# Patient Record
Sex: MALE | Race: BLACK OR AFRICAN AMERICAN | NOT HISPANIC OR LATINO | Employment: OTHER | URBAN - METROPOLITAN AREA
[De-identification: names, ages, dates, MRNs, and addresses within clinical notes are randomized per-mention and may not be internally consistent; named-entity substitution may affect disease eponyms.]

---

## 2017-01-06 ENCOUNTER — HOSPITAL ENCOUNTER (OUTPATIENT)
Dept: RADIOLOGY | Facility: HOSPITAL | Age: 62
Discharge: HOME | End: 2017-01-06
Attending: INTERNAL MEDICINE
Payer: MEDICARE

## 2017-01-06 ENCOUNTER — PATIENT MESSAGE (OUTPATIENT)
Dept: HEMATOLOGY/ONCOLOGY | Facility: CLINIC | Age: 62
End: 2017-01-06

## 2017-01-06 PROCEDURE — 78815 PET IMAGE W/CT SKULL-THIGH: CPT | Mod: 26,PS,, | Performed by: RADIOLOGY

## 2017-01-06 PROCEDURE — A9552 F18 FDG: HCPCS

## 2017-01-06 PROCEDURE — 78815 PET IMAGE W/CT SKULL-THIGH: CPT | Mod: TC

## 2017-01-23 ENCOUNTER — LAB VISIT (OUTPATIENT)
Dept: LAB | Facility: HOSPITAL | Age: 62
End: 2017-01-23
Attending: INTERNAL MEDICINE
Payer: MEDICARE

## 2017-01-23 ENCOUNTER — OFFICE VISIT (OUTPATIENT)
Dept: INTERNAL MEDICINE | Facility: CLINIC | Age: 62
End: 2017-01-23
Payer: MEDICARE

## 2017-01-23 VITALS
DIASTOLIC BLOOD PRESSURE: 78 MMHG | SYSTOLIC BLOOD PRESSURE: 147 MMHG | HEART RATE: 64 BPM | HEIGHT: 72 IN | BODY MASS INDEX: 26.07 KG/M2 | TEMPERATURE: 98 F | OXYGEN SATURATION: 98 % | WEIGHT: 192.44 LBS

## 2017-01-23 DIAGNOSIS — S90.822A BLISTER OF FOOT WITHOUT INFECTION, LEFT, INITIAL ENCOUNTER: Primary | ICD-10-CM

## 2017-01-23 DIAGNOSIS — S90.822A BLISTER OF FOOT WITHOUT INFECTION, LEFT, INITIAL ENCOUNTER: ICD-10-CM

## 2017-01-23 LAB
BASOPHILS # BLD AUTO: 0.03 K/UL
BASOPHILS NFR BLD: 0.3 %
DIFFERENTIAL METHOD: ABNORMAL
EOSINOPHIL # BLD AUTO: 0.2 K/UL
EOSINOPHIL NFR BLD: 1.7 %
ERYTHROCYTE [DISTWIDTH] IN BLOOD BY AUTOMATED COUNT: 12.4 %
HCT VFR BLD AUTO: 32.1 %
HGB BLD-MCNC: 10.8 G/DL
LYMPHOCYTES # BLD AUTO: 3.9 K/UL
LYMPHOCYTES NFR BLD: 37.9 %
MCH RBC QN AUTO: 27.5 PG
MCHC RBC AUTO-ENTMCNC: 33.6 %
MCV RBC AUTO: 82 FL
MONOCYTES # BLD AUTO: 0.5 K/UL
MONOCYTES NFR BLD: 5.3 %
NEUTROPHILS # BLD AUTO: 5.6 K/UL
NEUTROPHILS NFR BLD: 54.6 %
PLATELET # BLD AUTO: 204 K/UL
PMV BLD AUTO: 10.2 FL
RBC # BLD AUTO: 3.93 M/UL
WBC # BLD AUTO: 10.17 K/UL

## 2017-01-23 PROCEDURE — 83036 HEMOGLOBIN GLYCOSYLATED A1C: CPT

## 2017-01-23 PROCEDURE — 85025 COMPLETE CBC W/AUTO DIFF WBC: CPT

## 2017-01-23 PROCEDURE — 36415 COLL VENOUS BLD VENIPUNCTURE: CPT

## 2017-01-23 PROCEDURE — 99215 OFFICE O/P EST HI 40 MIN: CPT | Mod: PBBFAC,PO | Performed by: INTERNAL MEDICINE

## 2017-01-23 PROCEDURE — 99213 OFFICE O/P EST LOW 20 MIN: CPT | Mod: S$PBB,,, | Performed by: INTERNAL MEDICINE

## 2017-01-23 PROCEDURE — 99999 PR PBB SHADOW E&M-EST. PATIENT-LVL V: CPT | Mod: PBBFAC,,, | Performed by: INTERNAL MEDICINE

## 2017-01-23 RX ORDER — DICLOXACILLIN SODIUM 250 MG/1
250 CAPSULE ORAL 4 TIMES DAILY
Qty: 28 CAPSULE | Refills: 0 | Status: SHIPPED | OUTPATIENT
Start: 2017-01-23 | End: 2017-01-30

## 2017-01-23 NOTE — MR AVS SNAPSHOT
[facility]  [address]  [place] [address]  Phone: [phone]  Fax: [phone]                  Marie Reis Jr.   2017 11:40 AM   Office Visit    Description:  Male : 1955   Provider:  Deloris Ferro MD   Department:  [facility]           Reason for Visit     Foot Problem           Diagnoses this Visit        Comments    Blister of foot without infection, left, initial encounter    -  Primary            To Do List           Future Appointments        Provider Department Dept Phone    2017 1:10 PM APPOINTMENT LAB, [facility] [phone]    2017 2:00 PM SILVIA Al MD [facility] - Ophthalmology [phone]    2017 8:20 AM Deloris Ferro MD [facility] [phone]    2017 11:00 AM LAB, [facility] [phone]    2017 11:00 AM LAB, [facility] [phone]      Goals (5 Years of Data)     None       These Medications        Disp Refills Start End    dicloxacillin (DYNAPEN) 250 MG capsule 28 capsule 0 2017    Take 1 capsule (250 mg total) by mouth 4 (four) times daily. - Oral    Pharmacy: [id number] - [place] - [address] AT [facility] Ph #: [phone]         [facility]     [facility] Care [facility] -  Assistance  Registered nurses in the [facility] provide clinical advisement, health education, appointment booking, and other advisory services.  Call for this free service at [phone].             Medications           Message regarding Medications     Verify the changes and/or additions to your medication regime listed below are the same as discussed with your clinician today.  If any of these changes or additions are incorrect, please notify your healthcare provider.        START taking these NEW medications         "Refills    dicloxacillin (DYNAPEN) 250 MG capsule 0    Sig: Take 1 capsule (250 mg total) by mouth 4 (four) times daily.    Class: Normal    Route: Oral           Verify that the below list of medications is an accurate representation of the medications you are currently taking.  If none reported, the list may be blank. If incorrect, please contact your healthcare provider. Carry this list with you in case of emergency.           Current Medications     amlodipine (NORVASC) 10 MG tablet Take 1 tablet (10 mg total) by mouth once daily.    aspirin (ECOTRIN) 81 MG EC tablet Take 1 tablet (81 mg total) by mouth once daily.    atenolol (TENORMIN) 50 MG tablet Take 1 tablet (50 mg total) by mouth 2 (two) times daily.    atorvastatin (LIPITOR) 80 MG tablet Take 1 tablet (80 mg total) by mouth once daily.    blood sugar diagnostic Strp 1 strip by Misc.(Non-Drug; Combo Route) route after meals as needed.    clopidogrel (PLAVIX) 75 mg tablet Take 1 tablet (75 mg total) by mouth once daily.    fluticasone (FLONASE) 50 mcg/actuation nasal spray 1 spray by Each Nare route 2 (two) times daily as needed for Rhinitis.    furosemide (LASIX) 20 MG tablet Take 1 tablet (20 mg total) by mouth 2 (two) times daily. Do not take the pm dose after 3 pm    gabapentin (NEURONTIN) 300 MG capsule Take 1 capsule (300 mg total) by mouth 2 (two) times daily.    insulin aspart (NOVOLOG) 100 unit/mL InPn pen Inject 12 units w/ breakfast and lunch, 14 units w/ dinner plus scale 150-200 +2, 201-250 +4, 251-300 +6, 301-350 +8, >350 +10. Snack 4 units.    insulin glargine (LANTUS SOLOSTAR) 100 unit/mL (3 mL) InPn pen Inject 36 Units into the skin every evening.    insulin needles, disposable, 31 X 5/16 " Ndle Pt to use pen needle with Lantus daily    ketorolac 0.5% (ACULAR) 0.5 % Drop     lisinopril (PRINIVIL,ZESTRIL) 40 MG tablet TAKE 1 TABLET (40 MG TOTAL) BY MOUTH ONCE DAILY.    meclizine (ANTIVERT) 25 mg tablet Take 1 tablet (25 mg total) by mouth 3 " (three) times daily as needed for Dizziness.    METHYL SALICYLATE/MENTHOL (MENTHOLATUM DEEP HEAT RUB TOP) Apply topically.    mirtazapine (REMERON) 30 MG tablet Take 30 mg by mouth every evening.     montelukast (SINGULAIR) 10 mg tablet Take 1 tablet (10 mg total) by mouth every evening.    ondansetron (ZOFRAN-ODT) 8 MG TbDL Take 1 tablet (8 mg total) by mouth every 6 (six) hours as needed.    dicloxacillin (DYNAPEN) 250 MG capsule Take 1 capsule (250 mg total) by mouth 4 (four) times daily.           Clinical Reference Information           Vital Signs - Last Recorded  Most recent update: 1/23/2017 11:52 AM by Kayce Cleary    BP Pulse Temp Ht Wt SpO2    (!) 147/78 64 97.6 °F (36.4 °C) (Oral) 6' (1.829 m) 87.3 kg (192 lb 7.4 oz) 98%    BMI                26.1 kg/m2          Blood Pressure          Most Recent Value    BP  (!)  147/78      Allergies as of 1/23/2017     No Known Allergies      Immunizations Administered on Date of Encounter - 1/23/2017     None      Orders Placed During Today's Visit      Normal Orders This Visit    Ambulatory referral to Podiatry     Future Labs/Procedures Expected by Expires    CBC auto differential  1/23/2017 3/24/2018    Hemoglobin A1c  As directed 1/23/2018

## 2017-01-23 NOTE — PROGRESS NOTES
Subjective:    Portions of this note are generated with voice recognition software. Typographical errors may exist.   Patient ID: Marie Reis Jr. is a 61 y.o. male.    Chief Complaint: Foot Problem (left foot blisters)    HPI: Large blister left foot X 1 week, now ruptured. No purulent secretion, no fever or chills.    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    Lt eye   5/2/15     DR [facility]ULL[facility]     Family History   Problem Relation Age of Onset    Diabetes Mother     Diabetes Father     Diabetes Sister     Diabetes Brother     No Known Problems Maternal Aunt     No Known Problems Maternal Uncle     No Known Problems Paternal Aunt     No Known Problems Paternal Uncle     No Known Problems Maternal Grandmother     No Known Problems Maternal Grandfather     No Known Problems Paternal Grandmother     No Known Problems Paternal Grandfather     Blindness Neg Hx     Anesthesia problems Neg Hx     Amblyopia Neg Hx     Cancer Neg Hx     Cataracts Neg Hx     Glaucoma Neg Hx     Hypertension Neg Hx     Macular degeneration Neg Hx     Retinal detachment Neg Hx     Strabismus Neg Hx     Stroke Neg Hx     Thyroid disease Neg Hx      Social History     Social History    Marital status:      Spouse name: N/A    Number of children: N/A    Years of education: N/A     Occupational History    Not on file.     Social History Main Topics    Smoking status: Never Smoker    Smokeless tobacco: Not on file    Alcohol use No    Drug use: No    Sexual  activity: No     Other Topics Concern    Not on file     Social History Narrative     ROS:  .  PERIPHERAL VASCULAR: No claudication or cyanosis.  MUSCULOSKELETAL: No joint stiffness or swelling. Denies back pain.  NEUROLOGIC: No weakness or numbness.    Physical Exam       Vital signs reviewed  PE:   Left foot : large blister near heel about 3 x 2 in partly ruptured no discharge.denuded area: no evidence of infection     Assessment:     Labs:    Recent Results (from the past 1008 hour(s))   CBC auto differential    Collection Time: 01/23/17 12:29 PM   Result Value Ref Range    WBC 10.17 3.90 - 12.70 K/uL    RBC 3.93 (L) 4.60 - 6.20 M/uL    Hemoglobin 10.8 (L) 14.0 - 18.0 g/dL    Hematocrit 32.1 (L) 40.0 - 54.0 %    MCV 82 82 - 98 fL    MCH 27.5 27.0 - 31.0 pg    MCHC 33.6 32.0 - 36.0 %    RDW 12.4 11.5 - 14.5 %    Platelets 204 150 - 350 K/uL    MPV 10.2 9.2 - 12.9 fL    Gran # 5.6 1.8 - 7.7 K/uL    Lymph # 3.9 1.0 - 4.8 K/uL    Mono # 0.5 0.3 - 1.0 K/uL    Eos # 0.2 0.0 - 0.5 K/uL    Baso # 0.03 0.00 - 0.20 K/uL    Gran% 54.6 38.0 - 73.0 %    Lymph% 37.9 18.0 - 48.0 %    Mono% 5.3 4.0 - 15.0 %    Eosinophil% 1.7 0.0 - 8.0 %    Basophil% 0.3 0.0 - 1.9 %    Differential Method Automated      1. Blister of foot without infection, left, initial encounter        Plan:   Start on diclox. Referral to podiatry for foot care check A1c. Follow up in 1 week.  Orders Placed This Encounter   Procedures    CBC auto differential    Hemoglobin A1c    Ambulatory referral to Podiatry

## 2017-01-24 LAB
ESTIMATED AVG GLUCOSE: 220 MG/DL
HBA1C MFR BLD HPLC: 9.3 %

## 2017-01-31 ENCOUNTER — OFFICE VISIT (OUTPATIENT)
Dept: OPHTHALMOLOGY | Facility: CLINIC | Age: 62
End: 2017-01-31
Payer: MEDICARE

## 2017-01-31 DIAGNOSIS — E11.3412 SEVERE NONPROLIFERATIVE DIABETIC RETINOPATHY OF LEFT EYE WITH MACULAR EDEMA ASSOCIATED WITH TYPE 2 DIABETES MELLITUS: ICD-10-CM

## 2017-01-31 DIAGNOSIS — E11.3511 PROLIFERATIVE DIABETIC RETINOPATHY OF RIGHT EYE WITH MACULAR EDEMA ASSOCIATED WITH TYPE 2 DIABETES MELLITUS: Primary | ICD-10-CM

## 2017-01-31 PROCEDURE — 92134 CPTRZ OPH DX IMG PST SGM RTA: CPT | Mod: PBBFAC | Performed by: OPHTHALMOLOGY

## 2017-01-31 PROCEDURE — 99999 PR PBB SHADOW E&M-EST. PATIENT-LVL II: CPT | Mod: PBBFAC,,, | Performed by: OPHTHALMOLOGY

## 2017-01-31 PROCEDURE — 99212 OFFICE O/P EST SF 10 MIN: CPT | Mod: PBBFAC | Performed by: OPHTHALMOLOGY

## 2017-01-31 PROCEDURE — 92014 COMPRE OPH EXAM EST PT 1/>: CPT | Mod: S$PBB,,, | Performed by: OPHTHALMOLOGY

## 2017-01-31 PROCEDURE — 92226 PR SPECIAL EYE EXAM, SUBSEQUENT: CPT | Mod: 50,S$PBB,, | Performed by: OPHTHALMOLOGY

## 2017-01-31 PROCEDURE — 92226 PR SPECIAL EYE EXAM, SUBSEQUENT: CPT | Mod: 50,PBBFAC | Performed by: OPHTHALMOLOGY

## 2017-01-31 NOTE — MR AVS SNAPSHOT
[facility]  [address]  [facility] [address]  Phone: [phone]  Fax: [phone]                  Marie Reis Jr.   2017 3:00 PM   Office Visit    Description:  Male : 1955   Provider:  SILVIA Al MD   Department:  [facility] - Ophthalmology           Reason for Visit     Severe Non-proliferative Diabetic Retinopathy           Diagnoses this Visit        Comments    Proliferative diabetic retinopathy of right eye with macular edema associated with type 2 diabetes mellitus    -  Primary     Severe nonproliferative diabetic retinopathy of left eye with macular edema associated with type 2 diabetes mellitus                To Do List           Future Appointments        Provider Department Dept Phone    2017 8:20 AM Deloris eFrro MD [facility] Internal Medicine [phone]    2017 11:00 AM LAB, [facility] [phone]    2017 11:00 AM LAB, [facility] [phone]    2017 11:00 AM LAB, [facility] [phone]    2017 7:45 AM LAB, [facility] [phone]      Goals (5 Years of Data)     None      Follow-Up and Disposition     Return in about 4 weeks (around 2017).      [facility]     [facility] Nurse Care Line -  Assistance  Registered nurses in the [facility] provide clinical advisement, health education, appointment booking, and other advisory services.  Call for this free service at [phone].             Medications           Message regarding Medications     Verify the changes and/or additions to your medication regime listed below are the same as discussed with your clinician today.  If any of these changes or additions are incorrect, please notify your healthcare provider.        STOP taking these medications     ketorolac 0.5% (ACULAR) 0.5 % Drop            Verify that the below list of  "medications is an accurate representation of the medications you are currently taking.  If none reported, the list may be blank. If incorrect, please contact your healthcare provider. Carry this list with you in case of emergency.           Current Medications     amlodipine (NORVASC) 10 MG tablet Take 1 tablet (10 mg total) by mouth once daily.    aspirin (ECOTRIN) 81 MG EC tablet Take 1 tablet (81 mg total) by mouth once daily.    atenolol (TENORMIN) 50 MG tablet Take 1 tablet (50 mg total) by mouth 2 (two) times daily.    atorvastatin (LIPITOR) 80 MG tablet Take 1 tablet (80 mg total) by mouth once daily.    blood sugar diagnostic Strp 1 strip by Misc.(Non-Drug; Combo Route) route after meals as needed.    clopidogrel (PLAVIX) 75 mg tablet Take 1 tablet (75 mg total) by mouth once daily.    fluticasone (FLONASE) 50 mcg/actuation nasal spray 1 spray by Each Nare route 2 (two) times daily as needed for Rhinitis.    furosemide (LASIX) 20 MG tablet Take 1 tablet (20 mg total) by mouth 2 (two) times daily. Do not take the pm dose after 3 pm    gabapentin (NEURONTIN) 300 MG capsule Take 1 capsule (300 mg total) by mouth 2 (two) times daily.    insulin aspart (NOVOLOG) 100 unit/mL InPn pen Inject 12 units w/ breakfast and lunch, 14 units w/ dinner plus scale 150-200 +2, 201-250 +4, 251-300 +6, 301-350 +8, >350 +10. Snack 4 units.    insulin glargine (LANTUS SOLOSTAR) 100 unit/mL (3 mL) InPn pen Inject 36 Units into the skin every evening.    insulin needles, disposable, 31 X 5/16 " Ndle Pt to use pen needle with Lantus daily    lisinopril (PRINIVIL,ZESTRIL) 40 MG tablet TAKE 1 TABLET (40 MG TOTAL) BY MOUTH ONCE DAILY.    meclizine (ANTIVERT) 25 mg tablet Take 1 tablet (25 mg total) by mouth 3 (three) times daily as needed for Dizziness.    METHYL SALICYLATE/MENTHOL (MENTHOLATUM DEEP HEAT RUB TOP) Apply topically.    mirtazapine (REMERON) 30 MG tablet Take 30 mg by mouth every evening.     montelukast (SINGULAIR) 10 mg " tablet Take 1 tablet (10 mg total) by mouth every evening.    ondansetron (ZOFRAN-ODT) 8 MG TbDL Take 1 tablet (8 mg total) by mouth every 6 (six) hours as needed.           Clinical Reference Information           Allergies as of 1/31/2017     No Known Allergies      Immunizations Administered on Date of Encounter - 1/31/2017     None      Orders Placed During Today's Visit      Normal Orders This Visit    Posterior Segment OCT Retina-Both eyes     Future Labs/Procedures Expected by Expires    Color Fundus Photography - OU - Both Eyes  As directed 1/31/2018    Fluorescein Angiography - OU - Both Eyes  As directed 1/31/2018
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
## 2017-02-01 ENCOUNTER — TELEPHONE (OUTPATIENT)
Dept: INTERNAL MEDICINE | Facility: CLINIC | Age: 62
End: 2017-02-01

## 2017-02-06 ENCOUNTER — OFFICE VISIT (OUTPATIENT)
Dept: INTERNAL MEDICINE | Facility: CLINIC | Age: 62
End: 2017-02-06
Payer: MEDICARE

## 2017-02-06 VITALS
SYSTOLIC BLOOD PRESSURE: 140 MMHG | DIASTOLIC BLOOD PRESSURE: 90 MMHG | BODY MASS INDEX: 25.32 KG/M2 | HEART RATE: 82 BPM | OXYGEN SATURATION: 99 % | WEIGHT: 186.94 LBS | HEIGHT: 72 IN

## 2017-02-06 DIAGNOSIS — S90.822D BLISTER (NONTHERMAL), LEFT FOOT, SUBSEQUENT ENCOUNTER: Primary | ICD-10-CM

## 2017-02-06 PROCEDURE — 99214 OFFICE O/P EST MOD 30 MIN: CPT | Mod: PBBFAC,PO | Performed by: INTERNAL MEDICINE

## 2017-02-06 PROCEDURE — 3046F HEMOGLOBIN A1C LEVEL >9.0%: CPT | Mod: ,,, | Performed by: INTERNAL MEDICINE

## 2017-02-06 PROCEDURE — 3078F DIAST BP <80 MM HG: CPT | Mod: ,,, | Performed by: INTERNAL MEDICINE

## 2017-02-06 PROCEDURE — 3077F SYST BP >= 140 MM HG: CPT | Mod: ,,, | Performed by: INTERNAL MEDICINE

## 2017-02-06 PROCEDURE — 3066F NEPHROPATHY DOC TX: CPT | Mod: ,,, | Performed by: INTERNAL MEDICINE

## 2017-02-06 PROCEDURE — 99214 OFFICE O/P EST MOD 30 MIN: CPT | Mod: S$PBB,,, | Performed by: INTERNAL MEDICINE

## 2017-02-06 PROCEDURE — 99999 PR PBB SHADOW E&M-EST. PATIENT-LVL IV: CPT | Mod: PBBFAC,,, | Performed by: INTERNAL MEDICINE

## 2017-02-06 RX ORDER — INSULIN GLARGINE 100 [IU]/ML
40 INJECTION, SOLUTION SUBCUTANEOUS NIGHTLY
Qty: 15 ML | Refills: 3
Start: 2017-02-06 | End: 2017-10-26 | Stop reason: SDUPTHER

## 2017-02-06 RX ORDER — MUPIROCIN 20 MG/G
OINTMENT TOPICAL 3 TIMES DAILY
Qty: 1 TUBE | Refills: 2 | Status: SHIPPED | OUTPATIENT
Start: 2017-02-06 | End: 2017-02-16

## 2017-02-06 NOTE — MR AVS SNAPSHOT
[facility]  [address] [place] [address] [address]  [place] [address]  Phone: [phone]  Fax: [phone]                  Marie Reis Jr.   2017 8:20 AM   Office Visit    Description:  Male : 1955   Provider:  Deloris Ferro MD   Department:  [facility]           Reason for Visit     Follow-up           Diagnoses this Visit        Comments    Blister (nonthermal), left foot, subsequent encounter    -  Primary     Uncontrolled diabetes mellitus with polyneuropathy                To Do List           Future Appointments        Provider Department Dept Phone    2017 11:00 AM LAB, [facility] [phone]    3/7/2017 2:50 PM SILVIA Al MD [facility] - Ophthalmology [phone]    2017 11:00 AM LAB, [facility] [phone]    2017 11:00 AM LAB, [facility] [phone]    2017 7:45 AM LAB, [facility] [facility] [phone]      Goals (5 Years of Data)     None      Follow-Up and Disposition     Return in about 2 months (around 2017), or if symptoms worsen or fail to improve.       These Medications        Disp Refills Start End    mupirocin (BACTROBAN) 2 % ointment 1 Tube 2 2017    Apply topically 3 (three) times daily. - Topical (Top)    Pharmacy: [facility] - [address] Ph #: [phone]       insulin glargine (LANTUS SOLOSTAR) 100 unit/mL (3 mL) InPn pen 15 mL 3 2017     Inject 40 Units into the skin every evening. - Subcutaneous    Pharmacy: [facility] - [place], [place] - [address] [place] [address] Ph #: [phone]         [facility]     [facility] Nurse Care Line -  Assistance  Registered nurses in the [facility] provide clinical advisement, health education, appointment booking, and other advisory services.  Call for this free  service at [phone].             Medications           Message regarding Medications     Verify the changes and/or additions to your medication regime listed below are the same as discussed with your clinician today.  If any of these changes or additions are incorrect, please notify your healthcare provider.        START taking these NEW medications        Refills    mupirocin (BACTROBAN) 2 % ointment 2    Sig: Apply topically 3 (three) times daily.    Class: Normal    Route: Topical (Top)      CHANGE how you are taking these medications     Start Taking Instead of    insulin glargine (LANTUS SOLOSTAR) 100 unit/mL (3 mL) InPn pen insulin glargine (LANTUS SOLOSTAR) 100 unit/mL (3 mL) InPn pen    Dosage:  Inject 40 Units into the skin every evening. Dosage:  Inject 36 Units into the skin every evening.    Reason for Change:  Reorder            Verify that the below list of medications is an accurate representation of the medications you are currently taking.  If none reported, the list may be blank. If incorrect, please contact your healthcare provider. Carry this list with you in case of emergency.           Current Medications     amlodipine (NORVASC) 10 MG tablet Take 1 tablet (10 mg total) by mouth once daily.    aspirin (ECOTRIN) 81 MG EC tablet Take 1 tablet (81 mg total) by mouth once daily.    atenolol (TENORMIN) 50 MG tablet Take 1 tablet (50 mg total) by mouth 2 (two) times daily.    atorvastatin (LIPITOR) 80 MG tablet Take 1 tablet (80 mg total) by mouth once daily.    clopidogrel (PLAVIX) 75 mg tablet Take 1 tablet (75 mg total) by mouth once daily.    fluticasone (FLONASE) 50 mcg/actuation nasal spray 1 spray by Each Nare route 2 (two) times daily as needed for Rhinitis.    furosemide (LASIX) 20 MG tablet Take 1 tablet (20 mg total) by mouth 2 (two) times daily. Do not take the pm dose after 3 pm    gabapentin (NEURONTIN) 300 MG capsule Take 1 capsule (300 mg total) by mouth 2 (two) times daily.     "insulin aspart (NOVOLOG) 100 unit/mL InPn pen Inject 12 units w/ breakfast and lunch, 14 units w/ dinner plus scale 150-200 +2, 201-250 +4, 251-300 +6, 301-350 +8, >350 +10. Snack 4 units.    insulin glargine (LANTUS SOLOSTAR) 100 unit/mL (3 mL) InPn pen Inject 40 Units into the skin every evening.    lisinopril (PRINIVIL,ZESTRIL) 40 MG tablet TAKE 1 TABLET (40 MG TOTAL) BY MOUTH ONCE DAILY.    meclizine (ANTIVERT) 25 mg tablet Take 1 tablet (25 mg total) by mouth 3 (three) times daily as needed for Dizziness.    METHYL SALICYLATE/MENTHOL (MENTHOLATUM DEEP HEAT RUB TOP) Apply topically.    mirtazapine (REMERON) 30 MG tablet Take 30 mg by mouth every evening.     montelukast (SINGULAIR) 10 mg tablet Take 1 tablet (10 mg total) by mouth every evening.    ondansetron (ZOFRAN-ODT) 8 MG TbDL Take 1 tablet (8 mg total) by mouth every 6 (six) hours as needed.    blood sugar diagnostic Strp 1 strip by Misc.(Non-Drug; Combo Route) route after meals as needed.    insulin needles, disposable, 31 X 5/16 " Ndle Pt to use pen needle with Lantus daily    mupirocin (BACTROBAN) 2 % ointment Apply topically 3 (three) times daily.           Clinical Reference Information           Your Vitals Were     BP Pulse Height Weight SpO2 BMI    140/90 82 6' (1.829 m) 84.8 kg (186 lb 15.2 oz) 99% 25.35 kg/m2      Blood Pressure          Most Recent Value    BP  (!)  140/90      Allergies as of 2/6/2017     No Known Allergies      Immunizations Administered on Date of Encounter - 2/6/2017     None      Language Assistance Services     ATTENTION: Language assistance services are available, free of charge. Please call [phone].      ATENCIÓN: Si césar mejia, tiene a baca disposición servicios gratuitos de asistencia lingüística. Llame al [phone].     CHÚ Ý: N?u b?n nói Ti?ng Vi?t, có các d?ch v? h? tr? ngôn ng? mi?n phí dành cho b?n. G?i s? [phone].         [facility] complies with applicable Federal civil " rights laws and does not discriminate on the basis of race, color, national origin, age, disability, or sex.

## 2017-02-06 NOTE — PROGRESS NOTES
Subjective:    Portions of this note are generated with voice recognition software. Typographical errors may exist.   Patient ID: Marie Reis Jr. is a 61 y.o. male.    Chief Complaint: Follow-up    HPI: Patient is here for a follow-up visit.  He had been here with a blister of his left for.  The blister has ruptured and the denuded area visible.  No history of fever or chills.  He was referred to podiatry but has not received an appointment to he says.  He has a history of smoldering myeloma followed by Dr. Guaman and [facility].  History of type 2 diabetes mellitus on insulin.  The patient says his fasting blood sugar today is 117.  He does not recall the blood sugars and the prior days.  His A1c on January 23 was 9.3.  He is on 36 units of glargine at night and NovoLog with meals.    Review of patient's allergies indicates:  No Known Allergies  Past Medical History   Diagnosis Date    Cataract     Cervical spondylosis 10/1/2015    Diabetes mellitus, type 2     Hypertension     Smoldering myeloma 6/2/2016    Stroke 2014     Past Surgical History   Procedure Laterality Date    Left hand fracture sx      Cataract extraction w/  intraocular lens implant Left 4/30/15     Diane    Hand surgery  2/2012     [facility]    Lt eye   5/2/15     DR KULLMAN [facility]     Family History   Problem Relation Age of Onset    Diabetes Mother     Diabetes Father     Diabetes Sister     Diabetes Brother     No Known Problems Maternal Aunt     No Known Problems Maternal Uncle     No Known Problems Paternal Aunt     No Known Problems Paternal Uncle     No Known Problems Maternal Grandmother     No Known Problems Maternal Grandfather     No Known Problems Paternal Grandmother     No Known Problems Paternal Grandfather     Blindness Neg Hx     Anesthesia problems Neg Hx     Amblyopia Neg Hx     Cancer Neg Hx     Cataracts Neg Hx     Glaucoma Neg Hx     Hypertension Neg Hx     Macular degeneration Neg Hx      Retinal detachment Neg Hx     Strabismus Neg Hx     Stroke Neg Hx     Thyroid disease Neg Hx      Social History     Social History    Marital status:      Spouse name: N/A    Number of children: N/A    Years of education: N/A     Occupational History    Not on file.     Social History Main Topics    Smoking status: Never Smoker    Smokeless tobacco: Not on file    Alcohol use No    Drug use: No    Sexual activity: No     Other Topics Concern    Not on file     Social History Narrative     ROS:  GENERAL:   SKIN:   HEAD: No headaches.  EYES: No Blurriing of vision  EARS: No ear pain or changes in hearing.  NOSE: No congestion or rhinorrhea.  MOUTH & THROAT: No hoarseness, change in voice, or sore throat.  NODES: Denies swollen glands.  CHEST: Does chronically get some slight shortness of breath with going up her stairs. No chest pain. No acute worsening recently.  CARDIOVASCULAR: Denies chest pain, PND, orthopnea.  ABDOMEN: No nausea, vomiting, or changes in bowel function.  URINARY: No flank pain, dysuria or hematuria.      Physical Exam       Vital signs reviewed  PE:   APPEARANCE: Well nourished, well developed, in no acute distress.   HEAD: Normocephalic, atraumatic.  EYES: PERRL. EOMI. Conjunctivae noninjected.  EARS: TM's intact. Light reflex normal. No retraction or perforation  NOSE: Mucosa pink. Airway clear.  MOUTH & THROAT: No tonsillar enlargement. No pharyngeal erythema or exudate.   NECK: Supple with no cervical lymphadenopathy. No carotid bruits. No thyromegaly  CHEST: Good inspiratory effort. Lungs clear to auscultation with no wheezes or crackles.  CARDIOVASCULAR: Normal S1, S2. No rubs, murmurs, or gallops.  ABDOMEN: Bowel sounds normal. Not distended. Soft. No tenderness or masses. No organomegaly.  EXTREMITIES: Denuded skin and skin present left heel.     Assessment:     Labs:    Recent Results (from the past 1008 hour(s))   CBC auto differential    Collection Time: 01/23/17  12:29 PM   Result Value Ref Range    WBC 10.17 3.90 - 12.70 K/uL    RBC 3.93 (L) 4.60 - 6.20 M/uL    Hemoglobin 10.8 (L) 14.0 - 18.0 g/dL    Hematocrit 32.1 (L) 40.0 - 54.0 %    MCV 82 82 - 98 fL    MCH 27.5 27.0 - 31.0 pg    MCHC 33.6 32.0 - 36.0 %    RDW 12.4 11.5 - 14.5 %    Platelets 204 150 - 350 K/uL    MPV 10.2 9.2 - 12.9 fL    Gran # 5.6 1.8 - 7.7 K/uL    Lymph # 3.9 1.0 - 4.8 K/uL    Mono # 0.5 0.3 - 1.0 K/uL    Eos # 0.2 0.0 - 0.5 K/uL    Baso # 0.03 0.00 - 0.20 K/uL    Gran% 54.6 38.0 - 73.0 %    Lymph% 37.9 18.0 - 48.0 %    Mono% 5.3 4.0 - 15.0 %    Eosinophil% 1.7 0.0 - 8.0 %    Basophil% 0.3 0.0 - 1.9 %    Differential Method Automated    Hemoglobin A1c    Collection Time: 01/23/17 12:29 PM   Result Value Ref Range    Hemoglobin A1C 9.3 (H) 4.5 - 6.2 %    Estimated Avg Glucose 220 (H) 68 - 131 mg/dL     1. Blister (nonthermal), left foot, subsequent encounter    2. Uncontrolled diabetes mellitus with polyneuropathy        Prescribe Bactroban.  Refer to podiatry assured him that he would receive an appointment.  Increase glargine to 40 units daily at bedtime.  Advised patient to keep a record of blood sugars and call in for adjustment of insulin and return to clinic in 2  month.  No orders of the defined types were placed in this encounter.

## 2017-02-13 ENCOUNTER — LAB VISIT (OUTPATIENT)
Dept: LAB | Facility: HOSPITAL | Age: 62
End: 2017-02-13
Attending: INTERNAL MEDICINE
Payer: MEDICARE

## 2017-02-13 DIAGNOSIS — D47.2 SMOLDERING MYELOMA: ICD-10-CM

## 2017-02-13 DIAGNOSIS — R80.9 PROTEINURIA: ICD-10-CM

## 2017-02-13 DIAGNOSIS — D47.2 MONOCLONAL GAMMOPATHY: ICD-10-CM

## 2017-02-13 LAB
BASOPHILS # BLD AUTO: 0.03 K/UL
BASOPHILS NFR BLD: 0.4 %
DIFFERENTIAL METHOD: ABNORMAL
EOSINOPHIL # BLD AUTO: 0.3 K/UL
EOSINOPHIL NFR BLD: 3.3 %
ERYTHROCYTE [DISTWIDTH] IN BLOOD BY AUTOMATED COUNT: 12.9 %
HCT VFR BLD AUTO: 31.8 %
HGB BLD-MCNC: 10.5 G/DL
LYMPHOCYTES # BLD AUTO: 2.1 K/UL
LYMPHOCYTES NFR BLD: 25.9 %
MCH RBC QN AUTO: 27.6 PG
MCHC RBC AUTO-ENTMCNC: 33 %
MCV RBC AUTO: 84 FL
MONOCYTES # BLD AUTO: 0.6 K/UL
MONOCYTES NFR BLD: 6.9 %
NEUTROPHILS # BLD AUTO: 5.2 K/UL
NEUTROPHILS NFR BLD: 63.4 %
PLATELET # BLD AUTO: 190 K/UL
PMV BLD AUTO: 10.7 FL
RBC # BLD AUTO: 3.8 M/UL
WBC # BLD AUTO: 8.12 K/UL

## 2017-02-13 PROCEDURE — 84165 PROTEIN E-PHORESIS SERUM: CPT | Mod: 26,,, | Performed by: PATHOLOGY

## 2017-02-13 PROCEDURE — 85025 COMPLETE CBC W/AUTO DIFF WBC: CPT

## 2017-02-13 PROCEDURE — 36415 COLL VENOUS BLD VENIPUNCTURE: CPT | Mod: PO

## 2017-02-13 PROCEDURE — 82784 ASSAY IGA/IGD/IGG/IGM EACH: CPT

## 2017-02-13 PROCEDURE — 84165 PROTEIN E-PHORESIS SERUM: CPT

## 2017-02-13 PROCEDURE — 83520 IMMUNOASSAY QUANT NOS NONAB: CPT

## 2017-02-13 PROCEDURE — 80053 COMPREHEN METABOLIC PANEL: CPT

## 2017-02-14 LAB
ALBUMIN SERPL BCP-MCNC: 3.4 G/DL
ALBUMIN SERPL ELPH-MCNC: 3.57 G/DL
ALP SERPL-CCNC: 136 U/L
ALPHA1 GLOB SERPL ELPH-MCNC: 0.28 G/DL
ALPHA2 GLOB SERPL ELPH-MCNC: 0.76 G/DL
ALT SERPL W/O P-5'-P-CCNC: 16 U/L
ANION GAP SERPL CALC-SCNC: 6 MMOL/L
AST SERPL-CCNC: 18 U/L
B-GLOBULIN SERPL ELPH-MCNC: 0.7 G/DL
BILIRUB SERPL-MCNC: 0.3 MG/DL
BUN SERPL-MCNC: 29 MG/DL
CALCIUM SERPL-MCNC: 9.1 MG/DL
CHLORIDE SERPL-SCNC: 106 MMOL/L
CO2 SERPL-SCNC: 24 MMOL/L
CREAT SERPL-MCNC: 2 MG/DL
EST. GFR  (AFRICAN AMERICAN): 40.7 ML/MIN/1.73 M^2
EST. GFR  (NON AFRICAN AMERICAN): 35.2 ML/MIN/1.73 M^2
GAMMA GLOB SERPL ELPH-MCNC: 1.49 G/DL
GLUCOSE SERPL-MCNC: 172 MG/DL
IGG SERPL-MCNC: 1532 MG/DL
KAPPA LC SER QL IA: 71.68 MG/DL
KAPPA LC/LAMBDA SER IA: 24.98
LAMBDA LC SER QL IA: 2.87 MG/DL
PATHOLOGIST INTERPRETATION SPE: NORMAL
POTASSIUM SERPL-SCNC: 4.4 MMOL/L
PROT SERPL-MCNC: 6.8 G/DL
PROT SERPL-MCNC: 7.2 G/DL
SODIUM SERPL-SCNC: 136 MMOL/L

## 2017-03-03 DIAGNOSIS — N18.30 BENIGN HYPERTENSION WITH CHRONIC KIDNEY DISEASE, STAGE III: ICD-10-CM

## 2017-03-03 DIAGNOSIS — I12.9 BENIGN HYPERTENSION WITH CHRONIC KIDNEY DISEASE, STAGE III: ICD-10-CM

## 2017-03-03 RX ORDER — ATENOLOL 50 MG/1
TABLET ORAL
Qty: 30 TABLET | Refills: 0 | Status: SHIPPED | OUTPATIENT
Start: 2017-03-03 | End: 2017-11-08 | Stop reason: SDUPTHER

## 2017-03-07 ENCOUNTER — OFFICE VISIT (OUTPATIENT)
Dept: OPHTHALMOLOGY | Facility: CLINIC | Age: 62
End: 2017-03-07
Payer: MEDICARE

## 2017-03-07 VITALS — HEART RATE: 70 BPM | DIASTOLIC BLOOD PRESSURE: 81 MMHG | SYSTOLIC BLOOD PRESSURE: 162 MMHG

## 2017-03-07 DIAGNOSIS — E11.3511 PROLIFERATIVE DIABETIC RETINOPATHY OF RIGHT EYE WITH MACULAR EDEMA ASSOCIATED WITH TYPE 2 DIABETES MELLITUS: Primary | ICD-10-CM

## 2017-03-07 PROBLEM — E11.3513 PROLIFERATIVE DIABETIC RETINOPATHY OF BOTH EYES WITH MACULAR EDEMA ASSOCIATED WITH TYPE 2 DIABETES MELLITUS: Status: ACTIVE | Noted: 2017-01-31

## 2017-03-07 PROCEDURE — 99499 UNLISTED E&M SERVICE: CPT | Mod: S$PBB,,, | Performed by: OPHTHALMOLOGY

## 2017-03-07 PROCEDURE — 92250 FUNDUS PHOTOGRAPHY W/I&R: CPT | Mod: PBBFAC | Performed by: OPHTHALMOLOGY

## 2017-03-07 PROCEDURE — 99999 PR PBB SHADOW E&M-EST. PATIENT-LVL III: CPT | Mod: PBBFAC,,, | Performed by: OPHTHALMOLOGY

## 2017-03-07 PROCEDURE — 92235 FLUORESCEIN ANGRPH MLTIFRAME: CPT | Mod: 50,PBBFAC | Performed by: OPHTHALMOLOGY

## 2017-03-07 PROCEDURE — 99213 OFFICE O/P EST LOW 20 MIN: CPT | Mod: PBBFAC | Performed by: OPHTHALMOLOGY

## 2017-03-07 PROCEDURE — 67228 TREATMENT X10SV RETINOPATHY: CPT | Mod: S$PBB,RT,, | Performed by: OPHTHALMOLOGY

## 2017-03-07 PROCEDURE — 67228 TREATMENT X10SV RETINOPATHY: CPT | Mod: PBBFAC,RT | Performed by: OPHTHALMOLOGY

## 2017-03-07 NOTE — MR AVS SNAPSHOT
[facility] - Ophthalmology  [address]  [facility] [address]  Phone: [phone]  Fax: [phone]                  Marie Reis Jr.   3/7/2017 2:50 PM   Office Visit    Description:  Male : 1955   Provider:  SILVIA Al MD   Department:  [facility] - Ophthalmology           Reason for Visit     Laser Treatment           Diagnoses this Visit        Comments    Proliferative diabetic retinopathy of right eye with macular edema associated with type 2 diabetes mellitus    -  Primary            To Do List           Future Appointments        Provider Department Dept Phone    3/27/2017 3:10 PM MD [facility] - Ophthalmology [phone]    2017 11:00 AM [facility] [phone]    2017 11:00 AM [facility], [facility] [phone]    2017 7:45 AM [facility] [phone]    7/10/2017 10:00 AM Nancy Colón MD [facility] Internal Medicine [phone]      Goals (5 Years of Data)     None      Follow-Up and Disposition     Return in about 2 weeks (around 3/21/2017).      [facility]     [facility] Care [facility] - 24/7 Assistance  Registered nurses in the [facility] provide clinical advisement, health education, appointment booking, and other advisory services.  Call for this free service at [phone].             Medications           Message regarding Medications     Verify the changes and/or additions to your medication regime listed below are the same as discussed with your clinician today.  If any of these changes or additions are incorrect, please notify your healthcare provider.             Verify that the below list of medications is an accurate representation of the medications you are currently taking.  If none reported, the list may be blank. If incorrect, please contact your healthcare provider. Carry this list with you in case of emergency.  "          Current Medications     amlodipine (NORVASC) 10 MG tablet Take 1 tablet (10 mg total) by mouth once daily.    aspirin (ECOTRIN) 81 MG EC tablet Take 1 tablet (81 mg total) by mouth once daily.    atenolol (TENORMIN) 50 MG tablet TAKE 1 TABLET (50 MG TOTAL) BY MOUTH ONCE DAILY.    atorvastatin (LIPITOR) 80 MG tablet Take 1 tablet (80 mg total) by mouth once daily.    blood sugar diagnostic Strp 1 strip by Misc.(Non-Drug; Combo Route) route after meals as needed.    clopidogrel (PLAVIX) 75 mg tablet Take 1 tablet (75 mg total) by mouth once daily.    fluticasone (FLONASE) 50 mcg/actuation nasal spray 1 spray by Each Nare route 2 (two) times daily as needed for Rhinitis.    furosemide (LASIX) 20 MG tablet Take 1 tablet (20 mg total) by mouth 2 (two) times daily. Do not take the pm dose after 3 pm    gabapentin (NEURONTIN) 300 MG capsule Take 1 capsule (300 mg total) by mouth 2 (two) times daily.    insulin aspart (NOVOLOG) 100 unit/mL InPn pen Inject 12 units w/ breakfast and lunch, 14 units w/ dinner plus scale 150-200 +2, 201-250 +4, 251-300 +6, 301-350 +8, >350 +10. Snack 4 units.    insulin glargine (LANTUS SOLOSTAR) 100 unit/mL (3 mL) InPn pen Inject 40 Units into the skin every evening.    insulin needles, disposable, 31 X 5/16 " Ndle Pt to use pen needle with Lantus daily    lisinopril (PRINIVIL,ZESTRIL) 40 MG tablet TAKE 1 TABLET (40 MG TOTAL) BY MOUTH ONCE DAILY.    meclizine (ANTIVERT) 25 mg tablet Take 1 tablet (25 mg total) by mouth 3 (three) times daily as needed for Dizziness.    METHYL SALICYLATE/MENTHOL (MENTHOLATUM DEEP HEAT RUB TOP) Apply topically.    montelukast (SINGULAIR) 10 mg tablet Take 1 tablet (10 mg total) by mouth every evening.    ondansetron (ZOFRAN-ODT) 8 MG TbDL Take 1 tablet (8 mg total) by mouth every 6 (six) hours as needed.    mirtazapine (REMERON) 30 MG tablet Take 30 mg by mouth every evening.            Clinical Reference Information           Your Vitals Were     BP " Pulse                162/81 (BP Location: Left arm, Patient Position: Sitting, BP Method: Automatic) 70          Blood Pressure          Most Recent Value    BP  (!)  162/81      Allergies as of 3/7/2017     No Known Allergies      Immunizations Administered on Date of Encounter - 3/7/2017     None      Orders Placed During Today's Visit      Normal Orders This Visit    Color Fundus Photography - OU - Both Eyes     Fluorescein Angiography - OU - Both Eyes       Instructions      Fluorescein Angiography  Fluorescein angiography is an eye test. It is done to look at the back of your eye, including:  · The blood vessels in your eye  · The layer of tissue at the back of your eye (the retina)  · The center of your retina (the macula)  · The optic nerve  This test can diagnose diseases found in these areas. It can also diagnose other conditions that affect these areas. To do this test, a dye called fluorescein is shot (injected) into your arm. The dye goes into your bloodstream and up into the blood vessels in your eyes. A special camera is then used to take images (angiograms) of your eyes.     A fluorescein angiogram of the retina   Getting ready for your test  Tell your healthcare provider if you:  · Are pregnant or think you may be pregnant  · Are breastfeeding  · Have a history of severe allergic reactions, including to X-ray dye or other medicines  · Have kidney problems  Tell your provider about any medicines you are taking. You may need to stop taking all or some of these before the test. This includes:  · All prescription medicines  · Over-the-counter medicines such as aspirin or ibuprofen  · Street drugs  · Herbs, vitamins, and other supplements  You should arrange for an adult family member or friend to drive you home after your test. Your vision will be blurry for up to 12 hours.  Follow any other instructions from your healthcare provider.  During your test  · You are given eye drops to enlarge (dilate) your  pupils.  · You then sit in front of a special camera. You place your chin on the chin rest and look into the camera.  · Images are taken of your eyes, one eye at a time.  · Fluorescein dye is then injected into your arm. The lights in the room are turned off. You may have mild nausea. You may have a warm feeling in your arm or upper body. Tell your provider if your skin feels itchy or if you are having trouble breathing. If so, you could be having an allergic reaction to the dye.  · More pictures of your eyes are taken over 15 to 30 minutes. The camera shines a bright light into your eyes. Try to keep your head still and your eyes open.  · When enough images have been taken, the test is over.  After your test  Your vision will be blurry for up to 4 to 12 hours. This is because of your dilated pupils. Your eye will be more sensitive to light for up to 12 hours. You may want to wear sunglasses during this time. Do not drive if your vision is very blurry. You may also find it uncomfortable to read. Your skin may look yellow for a few hours. This is from the dye. Your urine will be bright yellow or orange for 24 to 48 hours after the test.     Risks and possible complications  All procedures have some risks. Possible risks of fluorescein angiography include:  · Upset stomach (nausea) and vomiting  · Leaking dye around the injection site that causes pain and swelling  · Metallic taste in your mouth  · Infection at injection site  · Allergic reaction to the dye  · Dry mouth or too much saliva  · Faster heart rate  · Sweating  · Lower back pain   © 2512-9656 [employer]. [address] [place] [address], [place] [address]. All rights reserved. This information is not intended as a substitute for professional medical care. Always follow your healthcare professional's instructions.          Controlling High Blood Pressure  High blood pressure (hypertension) is often called the silent killer. This is because many people  who have it dont know it. High blood pressure is defined as 140/90 mm Hg or higher. Know your blood pressure and remember to check it regularly. Doing so can save your life. Here are some things you can do to help control your blood pressure.    Choose heart-healthy foods  · Select low-salt, low-fat foods. Limit sodium intake to 2,400 mg per day or the amount suggested by your healthcare provider.  · Limit canned, dried, cured, packaged, and fast foods. These can contain a lot of salt.  · Eat 8 to 10 servings of fruits and vegetables every day.  · Choose lean meats, fish, or chicken.  · Eat whole-grain pasta, brown rice, and beans.  · Eat 2 to 3 servings of low-fat or fat-free dairy products.  · Ask your doctor about the DASH eating plan. This plan helps reduce blood pressure.  · When you go to a restaurant, ask that your meal be prepared with no added salt.  Maintain a healthy weight  · Ask your healthcare provider how many calories to eat a day. Then stick to that number.  · Ask your healthcare provider what weight range is healthiest for you. If you are overweight, a weight loss of only 3% to 5% of your body weight can help lower blood pressure. Generally, a good weight loss goal is to lose 10% of your body weight in a year.  · Limit snacks and sweets.  · Get regular exercise.  Get up and get active  · Choose activities you enjoy. Find ones you can do with friends or family. This includes bicycling, dancing, walking, and jogging.  · Park farther away from building entrances.  · Use stairs instead of the elevator.  · When you can, walk or bike instead of driving.  · [place] leaves, garden, or do household repairs.  · Be active at a moderate to vigorous level of physical activity for at least 40 minutes for a minimum of 3 to 4 days a week.   Manage stress  · Make time to relax and enjoy life. Find time to laugh.  · Communicate your concerns with your loved ones and your healthcare provider.  · Visit with family and  friends, and keep up with hobbies.  Limit alcohol and quit smoking  · Men should have no more than 2 drinks per day.  · Women should have no more than 1 drink per day.  · Talk with your healthcare provider about quitting smoking. Smoking significantly increases your risk for heart disease and stroke. Ask your healthcare provider about community smoking cessation programs and other options.  Medicines  If lifestyle changes arent enough, your healthcare provider may prescribe high blood pressure medicine. Take all medicines as prescribed. If you have any questions about your medicines, ask your healthcare provider before stopping or changing them.   © 8546-4161 [employer]. [address] [place] [address]. All rights reserved. This information is not intended as a substitute for professional medical care. Always follow your healthcare professional's instructions.             Language Assistance Services     ATTENTION: Language assistance services are available, free of charge. Please call [phone].      ATENCIÓN: Si habla español, tiene a baca disposición servicios gratuitos de asistencia lingüística. Llame al [phone].     MONSTER Ý: N?u b?n nói Ti?ng Vi?t, có các d?ch v? h? tr? ngôn ng? mi?n phí dành cho b?n. G?i s? [phone].         [facility] complies with applicable Federal civil rights laws and does not discriminate on the basis of race, color, national origin, age, disability, or sex.

## 2017-03-07 NOTE — PATIENT INSTRUCTIONS
Fluorescein Angiography  Fluorescein angiography is an eye test. It is done to look at the back of your eye, including:  · The blood vessels in your eye  · The layer of tissue at the back of your eye (the retina)  · The center of your retina (the macula)  · The optic nerve  This test can diagnose diseases found in these areas. It can also diagnose other conditions that affect these areas. To do this test, a dye called fluorescein is shot (injected) into your arm. The dye goes into your bloodstream and up into the blood vessels in your eyes. A special camera is then used to take images (angiograms) of your eyes.     A fluorescein angiogram of the retina   Getting ready for your test  Tell your healthcare provider if you:  · Are pregnant or think you may be pregnant  · Are breastfeeding  · Have a history of severe allergic reactions, including to X-ray dye or other medicines  · Have kidney problems  Tell your provider about any medicines you are taking. You may need to stop taking all or some of these before the test. This includes:  · All prescription medicines  · Over-the-counter medicines such as aspirin or ibuprofen  · Street drugs  · Herbs, vitamins, and other supplements  You should arrange for an adult family member or friend to drive you home after your test. Your vision will be blurry for up to 12 hours.  Follow any other instructions from your healthcare provider.  During your test  · You are given eye drops to enlarge (dilate) your pupils.  · You then sit in front of a special camera. You place your chin on the chin rest and look into the camera.  · Images are taken of your eyes, one eye at a time.  · Fluorescein dye is then injected into your arm. The lights in the room are turned off. You may have mild nausea. You may have a warm feeling in your arm or upper body. Tell your provider if your skin feels itchy or if you are having trouble breathing. If so, you could be having an allergic reaction to the  dye.  · More pictures of your eyes are taken over 15 to 30 minutes. The camera shines a bright light into your eyes. Try to keep your head still and your eyes open.  · When enough images have been taken, the test is over.  After your test  Your vision will be blurry for up to 4 to 12 hours. This is because of your dilated pupils. Your eye will be more sensitive to light for up to 12 hours. You may want to wear sunglasses during this time. Do not drive if your vision is very blurry. You may also find it uncomfortable to read. Your skin may look yellow for a few hours. This is from the dye. Your urine will be bright yellow or orange for 24 to 48 hours after the test.     Risks and possible complications  All procedures have some risks. Possible risks of fluorescein angiography include:  · Upset stomach (nausea) and vomiting  · Leaking dye around the injection site that causes pain and swelling  · Metallic taste in your mouth  · Infection at injection site  · Allergic reaction to the dye  · Dry mouth or too much saliva  · Faster heart rate  · Sweating  · Lower back pain   © 9734-3502 [employer]. [address] [place] [address]. All rights reserved. This information is not intended as a substitute for professional medical care. Always follow your healthcare professional's instructions.          Controlling High Blood Pressure  High blood pressure (hypertension) is often called the silent killer. This is because many people who have it dont know it. High blood pressure is defined as 140/90 mm Hg or higher. Know your blood pressure and remember to check it regularly. Doing so can save your life. Here are some things you can do to help control your blood pressure.    Choose heart-healthy foods  · Select low-salt, low-fat foods. Limit sodium intake to 2,400 mg per day or the amount suggested by your healthcare provider.  · Limit canned, dried, cured, packaged, and fast foods. These can contain a  lot of salt.  · Eat 8 to 10 servings of fruits and vegetables every day.  · Choose lean meats, fish, or chicken.  · Eat whole-grain pasta, brown rice, and beans.  · Eat 2 to 3 servings of low-fat or fat-free dairy products.  · Ask your doctor about the DASH eating plan. This plan helps reduce blood pressure.  · When you go to a restaurant, ask that your meal be prepared with no added salt.  Maintain a healthy weight  · Ask your healthcare provider how many calories to eat a day. Then stick to that number.  · Ask your healthcare provider what weight range is healthiest for you. If you are overweight, a weight loss of only 3% to 5% of your body weight can help lower blood pressure. Generally, a good weight loss goal is to lose 10% of your body weight in a year.  · Limit snacks and sweets.  · Get regular exercise.  Get up and get active  · Choose activities you enjoy. Find ones you can do with friends or family. This includes bicycling, dancing, walking, and jogging.  · Park farther away from building entrances.  · Use stairs instead of the elevator.  · When you can, walk or bike instead of driving.  · [place] leaves, garden, or do household repairs.  · Be active at a moderate to vigorous level of physical activity for at least 40 minutes for a minimum of 3 to 4 days a week.   Manage stress  · Make time to relax and enjoy life. Find time to laugh.  · Communicate your concerns with your loved ones and your healthcare provider.  · Visit with family and friends, and keep up with hobbies.  Limit alcohol and quit smoking  · Men should have no more than 2 drinks per day.  · Women should have no more than 1 drink per day.  · Talk with your healthcare provider about quitting smoking. Smoking significantly increases your risk for heart disease and stroke. Ask your healthcare provider about community smoking cessation programs and other options.  Medicines  If lifestyle changes arent enough, your healthcare provider may prescribe  high blood pressure medicine. Take all medicines as prescribed. If you have any questions about your medicines, ask your healthcare provider before stopping or changing them.   © 5225-1568 [employer]. [address] [place] [address], [place] [address]. All rights reserved. This information is not intended as a substitute for professional medical care. Always follow your healthcare professional's instructions.
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
## 2017-03-15 ENCOUNTER — TELEPHONE (OUTPATIENT)
Dept: ORTHOPEDICS | Facility: CLINIC | Age: 62
End: 2017-03-15

## 2017-03-15 NOTE — TELEPHONE ENCOUNTER
----- Message from Ruby Mcknight sent at 3/14/2017  2:40 PM CDT -----  Contact: [phone]  Bon from worker comp would to confirm that you receive the referral he faxed. Please advise.

## 2017-03-15 NOTE — TELEPHONE ENCOUNTER
Spoke with Bon and informed this office does not handle WC paperwork, provided phone number to WC dept. Understanding verbalized.

## 2017-03-27 ENCOUNTER — OFFICE VISIT (OUTPATIENT)
Dept: OPHTHALMOLOGY | Facility: CLINIC | Age: 62
End: 2017-03-27
Payer: MEDICARE

## 2017-03-27 VITALS — HEART RATE: 76 BPM | SYSTOLIC BLOOD PRESSURE: 163 MMHG | DIASTOLIC BLOOD PRESSURE: 83 MMHG

## 2017-03-27 DIAGNOSIS — E11.3513 PROLIFERATIVE DIABETIC RETINOPATHY OF BOTH EYES WITH MACULAR EDEMA ASSOCIATED WITH TYPE 2 DIABETES MELLITUS: Primary | ICD-10-CM

## 2017-03-27 PROCEDURE — 99499 UNLISTED E&M SERVICE: CPT | Mod: S$PBB,,, | Performed by: OPHTHALMOLOGY

## 2017-03-27 PROCEDURE — 99999 PR PBB SHADOW E&M-EST. PATIENT-LVL II: CPT | Mod: PBBFAC,,, | Performed by: OPHTHALMOLOGY

## 2017-03-27 PROCEDURE — 99212 OFFICE O/P EST SF 10 MIN: CPT | Mod: PBBFAC | Performed by: OPHTHALMOLOGY

## 2017-03-27 PROCEDURE — 67228 TREATMENT X10SV RETINOPATHY: CPT | Mod: S$PBB,RT,, | Performed by: OPHTHALMOLOGY

## 2017-03-27 PROCEDURE — 67228 TREATMENT X10SV RETINOPATHY: CPT | Mod: PBBFAC,RT | Performed by: OPHTHALMOLOGY
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
## 2017-03-27 NOTE — MR AVS SNAPSHOT
[facility] - Ophthalmology  [address]  [facility] [address]  Phone: [phone]  Fax: [phone]                  Marie Reis Jr.   3/27/2017 3:10 PM   Office Visit    Description:  Male : 1955   Provider:  SILVIA Al MD   Department:  [facility] - Ophthalmology           Reason for Visit     Post-op Evaluation           Diagnoses this Visit        Comments    Proliferative diabetic retinopathy of both eyes with macular edema associated with type 2 diabetes mellitus    -  Primary            To Do List           Future Appointments        Provider Department Dept Phone    2017 11:00 AM LAB, [facility] [phone]    2017 2:50 PM MD [facility] Ophthalmology [phone]    2017 11:00 AM LAB, [facility] [phone]    2017 7:45 AM [facility], [facility] [phone]    7/10/2017 10:00 AM Nancy Colón MD [place] - Internal Medicine [phone]      Goals (5 Years of Data)     None      Follow-Up and Disposition     Return in about 2 weeks (around 4/10/2017).      [facility]     [facility] -  Assistance  Registered nurses in the [facility] provide clinical advisement, health education, appointment booking, and other advisory services.  Call for this free service at [phone].             Medications           Message regarding Medications     Verify the changes and/or additions to your medication regime listed below are the same as discussed with your clinician today.  If any of these changes or additions are incorrect, please notify your healthcare provider.             Verify that the below list of medications is an accurate representation of the medications you are currently taking.  If none reported, the list may be blank. If incorrect, please contact your healthcare provider. Carry this list with you in case of  "emergency.           Current Medications     amlodipine (NORVASC) 10 MG tablet Take 1 tablet (10 mg total) by mouth once daily.    aspirin (ECOTRIN) 81 MG EC tablet Take 1 tablet (81 mg total) by mouth once daily.    atenolol (TENORMIN) 50 MG tablet TAKE 1 TABLET (50 MG TOTAL) BY MOUTH ONCE DAILY.    atorvastatin (LIPITOR) 80 MG tablet Take 1 tablet (80 mg total) by mouth once daily.    blood sugar diagnostic Strp 1 strip by Misc.(Non-Drug; Combo Route) route after meals as needed.    clopidogrel (PLAVIX) 75 mg tablet Take 1 tablet (75 mg total) by mouth once daily.    fluticasone (FLONASE) 50 mcg/actuation nasal spray 1 spray by Each Nare route 2 (two) times daily as needed for Rhinitis.    furosemide (LASIX) 20 MG tablet Take 1 tablet (20 mg total) by mouth 2 (two) times daily. Do not take the pm dose after 3 pm    gabapentin (NEURONTIN) 300 MG capsule Take 1 capsule (300 mg total) by mouth 2 (two) times daily.    insulin aspart (NOVOLOG) 100 unit/mL InPn pen Inject 12 units w/ breakfast and lunch, 14 units w/ dinner plus scale 150-200 +2, 201-250 +4, 251-300 +6, 301-350 +8, >350 +10. Snack 4 units.    insulin glargine (LANTUS SOLOSTAR) 100 unit/mL (3 mL) InPn pen Inject 40 Units into the skin every evening.    insulin needles, disposable, 31 X 5/16 " Ndle Pt to use pen needle with Lantus daily    lisinopril (PRINIVIL,ZESTRIL) 40 MG tablet TAKE 1 TABLET (40 MG TOTAL) BY MOUTH ONCE DAILY.    meclizine (ANTIVERT) 25 mg tablet Take 1 tablet (25 mg total) by mouth 3 (three) times daily as needed for Dizziness.    METHYL SALICYLATE/MENTHOL (MENTHOLATUM DEEP HEAT RUB TOP) Apply topically.    mirtazapine (REMERON) 30 MG tablet Take 30 mg by mouth every evening.     montelukast (SINGULAIR) 10 mg tablet Take 1 tablet (10 mg total) by mouth every evening.    ondansetron (ZOFRAN-ODT) 8 MG TbDL Take 1 tablet (8 mg total) by mouth every 6 (six) hours as needed.           Clinical Reference Information           Your Vitals " Were     BP Pulse                163/83 (BP Location: Right arm, Patient Position: Sitting, BP Method: Automatic) 76          Blood Pressure          Most Recent Value    BP  (!)  163/83      Allergies as of 3/27/2017     No Known Allergies      Immunizations Administered on Date of Encounter - 3/27/2017     None      Language Assistance Services     ATTENTION: Language assistance services are available, free of charge. Please call [phone].      ATENCIÓN: Si habla español, tiene a baca disposición servicios gratuitos de asistencia lingüística. Llame al [phone].     CHÚ Ý: N?u b?n nói Ti?ng Vi?t, có các d?ch v? h? tr? ngôn ng? mi?n phí dành cho b?n. G?i s? [phone].         [facility] complies with applicable Federal civil rights laws and does not discriminate on the basis of race, color, national origin, age, disability, or sex.

## 2017-03-30 ENCOUNTER — TELEPHONE (OUTPATIENT)
Dept: ORTHOPEDICS | Facility: CLINIC | Age: 62
End: 2017-03-30

## 2017-03-30 NOTE — TELEPHONE ENCOUNTER
----- Message from Mary Zuluaga sent at 3/30/2017  9:43 AM CDT -----  Contact: Bon [facility], [phone]  Calling in regards to paperwork faxed on 3/20, looking to know whether or not you will see new patient referral with workmen's compensation case. Please advise.

## 2017-04-06 ENCOUNTER — TELEPHONE (OUTPATIENT)
Dept: ORTHOPEDICS | Facility: CLINIC | Age: 62
End: 2017-04-06

## 2017-04-06 NOTE — TELEPHONE ENCOUNTER
----- Message from Lucinda Hui sent at 4/6/2017  9:32 AM CDT -----  Bon,  with [facility], returned your call.   No. [phone]  Ext. [phone]

## 2017-04-06 NOTE — TELEPHONE ENCOUNTER
----- Message from Mary Zuluaga sent at 4/5/2017  3:41 PM CDT -----  Contact: Bon [facility], [phone]  Called in requesting to speak with you regarding workmen's comp case and paperwork faxed on 3/27 and again today to have patient come in to see you. Please advise.

## 2017-04-06 NOTE — TELEPHONE ENCOUNTER
----- Message from Perlita Ramirez sent at 4/6/2017  1:29 PM CDT -----  Regarding: Second Opinon Exam  Contact: [phone]  Good afternoon,    I have received a second request from  Bon Ireland to schedule a second opinion exam. A copy of the request has been scanned into the patient's chart. For any additional information please contact me at [phone]. The number listed above is for the .    Thanks,    Grant Ramirez

## 2017-04-06 NOTE — TELEPHONE ENCOUNTER
I called and spoke with Mr. Crockett and he stated that they will find someone that can evaluate the patient for all of the body parts. He denied setting up an appointment for the patient.

## 2017-04-18 ENCOUNTER — OFFICE VISIT (OUTPATIENT)
Dept: OPHTHALMOLOGY | Facility: CLINIC | Age: 62
End: 2017-04-18
Payer: MEDICARE

## 2017-04-18 VITALS — HEART RATE: 79 BPM | SYSTOLIC BLOOD PRESSURE: 151 MMHG | DIASTOLIC BLOOD PRESSURE: 72 MMHG

## 2017-04-18 DIAGNOSIS — E11.3513 PROLIFERATIVE DIABETIC RETINOPATHY OF BOTH EYES WITH MACULAR EDEMA ASSOCIATED WITH TYPE 2 DIABETES MELLITUS: Primary | ICD-10-CM

## 2017-04-18 PROCEDURE — 99999 PR PBB SHADOW E&M-EST. PATIENT-LVL III: CPT | Mod: PBBFAC,,, | Performed by: OPHTHALMOLOGY

## 2017-04-18 PROCEDURE — 99499 UNLISTED E&M SERVICE: CPT | Mod: S$PBB,,, | Performed by: OPHTHALMOLOGY

## 2017-04-18 PROCEDURE — 67228 TREATMENT X10SV RETINOPATHY: CPT | Mod: PBBFAC,LT | Performed by: OPHTHALMOLOGY

## 2017-04-18 PROCEDURE — 67228 TREATMENT X10SV RETINOPATHY: CPT | Mod: S$PBB,LT,, | Performed by: OPHTHALMOLOGY

## 2017-04-18 PROCEDURE — 99213 OFFICE O/P EST LOW 20 MIN: CPT | Mod: PBBFAC | Performed by: OPHTHALMOLOGY
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
## 2017-04-18 NOTE — MR AVS SNAPSHOT
[facility] - Ophthalmology  [address]  [facility] [address]  Phone: [phone]  Fax: [phone]                  Marie Reis Jr.   2017 2:50 PM   Office Visit    Description:  Male : 1955   Provider:  SILVIA Al MD   Department:  [facility] - Ophthalmology           Reason for Visit     Diabetic Eye Exam           Diagnoses this Visit        Comments    Proliferative diabetic retinopathy of both eyes with macular edema associated with type 2 diabetes mellitus    -  Primary            To Do List           Future Appointments        Provider Department Dept Phone    2017 2:40 PM MD [facility] - Ophthalmology [phone]    2017 9:00 AM LAB, [facility] [phone]    2017 10:30 AM MD Keanu Villanueva Jr. - Hematology Oncology [phone]    2017 7:45 AM LAB, [facility] [phone]    7/10/2017 10:00 AM Nancy Colón MD [place] - Internal Medicine [phone]      Goals (5 Years of Data)     None      Follow-Up and Disposition     Return in about 2 weeks (around 2017).      [facility]     [facility] Care [facility] -  Assistance  Unless otherwise directed by your provider, please contact [facility], our nurse care line that is available for  assistance.     Registered nurses in the [facility] provide: appointment scheduling, clinical advisement, health education, and other advisory services.  Call: [phone] (toll free)               Medications           Message regarding Medications     Verify the changes and/or additions to your medication regime listed below are the same as discussed with your clinician today.  If any of these changes or additions are incorrect, please notify your healthcare provider.             Verify that the below list of medications is an accurate representation of the medications you are currently taking.   "If none reported, the list may be blank. If incorrect, please contact your healthcare provider. Carry this list with you in case of emergency.           Current Medications     amlodipine (NORVASC) 10 MG tablet Take 1 tablet (10 mg total) by mouth once daily.    aspirin (ECOTRIN) 81 MG EC tablet Take 1 tablet (81 mg total) by mouth once daily.    atenolol (TENORMIN) 50 MG tablet TAKE 1 TABLET (50 MG TOTAL) BY MOUTH ONCE DAILY.    atorvastatin (LIPITOR) 80 MG tablet Take 1 tablet (80 mg total) by mouth once daily.    blood sugar diagnostic Strp 1 strip by Misc.(Non-Drug; Combo Route) route after meals as needed.    clopidogrel (PLAVIX) 75 mg tablet Take 1 tablet (75 mg total) by mouth once daily.    fluticasone (FLONASE) 50 mcg/actuation nasal spray 1 spray by Each Nare route 2 (two) times daily as needed for Rhinitis.    furosemide (LASIX) 20 MG tablet Take 1 tablet (20 mg total) by mouth 2 (two) times daily. Do not take the pm dose after 3 pm    gabapentin (NEURONTIN) 300 MG capsule Take 1 capsule (300 mg total) by mouth 2 (two) times daily.    insulin aspart (NOVOLOG) 100 unit/mL InPn pen Inject 12 units w/ breakfast and lunch, 14 units w/ dinner plus scale 150-200 +2, 201-250 +4, 251-300 +6, 301-350 +8, >350 +10. Snack 4 units.    insulin glargine (LANTUS SOLOSTAR) 100 unit/mL (3 mL) InPn pen Inject 40 Units into the skin every evening.    insulin needles, disposable, 31 X 5/16 " Ndle Pt to use pen needle with Lantus daily    lisinopril (PRINIVIL,ZESTRIL) 40 MG tablet TAKE 1 TABLET (40 MG TOTAL) BY MOUTH ONCE DAILY.    meclizine (ANTIVERT) 25 mg tablet Take 1 tablet (25 mg total) by mouth 3 (three) times daily as needed for Dizziness.    METHYL SALICYLATE/MENTHOL (MENTHOLATUM DEEP HEAT RUB TOP) Apply topically.    mirtazapine (REMERON) 30 MG tablet Take 30 mg by mouth every evening.     montelukast (SINGULAIR) 10 mg tablet Take 1 tablet (10 mg total) by mouth every evening.    ondansetron (ZOFRAN-ODT) 8 MG TbDL " Take 1 tablet (8 mg total) by mouth every 6 (six) hours as needed.           Clinical Reference Information           Your Vitals Were     BP Pulse                151/72 (BP Location: Right arm, Patient Position: Sitting, BP Method: Automatic) 79          Blood Pressure          Most Recent Value    BP  (!)  151/72      Allergies as of 4/18/2017     No Known Allergies      Immunizations Administered on Date of Encounter - 4/18/2017     None      Language Assistance Services     ATTENTION: Language assistance services are available, free of charge. Please call [phone].      ATENCIÓN: Si habla shandragabo, tiene a baca disposición servicios gratuitos de asistencia lingüística. Llame al [phone].     MONSTER Ý: N?u b?n nói Ti?ng Vi?t, có các d?ch v? h? tr? ngôn ng? mi?n phí dành cho b?n. G?i s? [phone].         [facility] complies with applicable Federal civil rights laws and does not discriminate on the basis of race, color, national origin, age, disability, or sex.

## 2017-05-05 RX ORDER — FUROSEMIDE 20 MG/1
TABLET ORAL
Qty: 60 TABLET | Refills: 0 | Status: SHIPPED | OUTPATIENT
Start: 2017-05-05 | End: 2017-06-09 | Stop reason: SDUPTHER

## 2017-05-09 ENCOUNTER — OFFICE VISIT (OUTPATIENT)
Dept: OPHTHALMOLOGY | Facility: CLINIC | Age: 62
End: 2017-05-09
Payer: MEDICARE

## 2017-05-09 DIAGNOSIS — E11.3513 PROLIFERATIVE DIABETIC RETINOPATHY OF BOTH EYES WITH MACULAR EDEMA ASSOCIATED WITH TYPE 2 DIABETES MELLITUS: Primary | ICD-10-CM

## 2017-05-09 PROCEDURE — 67228 TREATMENT X10SV RETINOPATHY: CPT | Mod: S$PBB,LT,, | Performed by: OPHTHALMOLOGY

## 2017-05-09 PROCEDURE — 99213 OFFICE O/P EST LOW 20 MIN: CPT | Mod: PBBFAC,25 | Performed by: OPHTHALMOLOGY

## 2017-05-09 PROCEDURE — 67228 TREATMENT X10SV RETINOPATHY: CPT | Mod: PBBFAC,LT | Performed by: OPHTHALMOLOGY

## 2017-05-09 PROCEDURE — 99499 UNLISTED E&M SERVICE: CPT | Mod: S$PBB,,, | Performed by: OPHTHALMOLOGY

## 2017-05-09 PROCEDURE — 99999 PR PBB SHADOW E&M-EST. PATIENT-LVL III: CPT | Mod: PBBFAC,,, | Performed by: OPHTHALMOLOGY

## 2017-05-09 NOTE — MR AVS SNAPSHOT
[facility] - Ophthalmology  [address]  [facility] [address]  Phone: [phone]  Fax: [phone]                  Marie Reis Jr.   2017 2:40 PM   Office Visit    Description:  Male : 1955   Provider:  SILVIA Al MD   Department:  [facility] - Ophthalmology           Reason for Visit     here for PRP completion today           Diagnoses this Visit        Comments    Proliferative diabetic retinopathy of both eyes with macular edema associated with type 2 diabetes mellitus    -  Primary            To Do List           Future Appointments        Provider Department Dept Phone    2017 9:00 AM [facility], [facility] [phone]    2017 10:30 AM MD Darci Villanueva Jr.son - Hematology Oncology [phone]    2017 10:30 AM MD [facility] - Ophthalmology [phone]    2017 7:45 AM [facility] [phone]    7/10/2017 10:00 AM Nancy Colón MD [place] - Internal Medicine [phone]      Goals (5 Years of Data)     None      Follow-Up and Disposition     Return in about 6 weeks (around 2017).      [facility]     [facility] Nurse Care Line -  Assistance  Unless otherwise directed by your provider, please contact [facility], our nurse care line that is available for  assistance.     Registered nurses in the [facility] provide: appointment scheduling, clinical advisement, health education, and other advisory services.  Call: [phone] (toll free)               Medications           Message regarding Medications     Verify the changes and/or additions to your medication regime listed below are the same as discussed with your clinician today.  If any of these changes or additions are incorrect, please notify your healthcare provider.             Verify that the below list of medications is an accurate representation of the medications you are  "currently taking.  If none reported, the list may be blank. If incorrect, please contact your healthcare provider. Carry this list with you in case of emergency.           Current Medications     amlodipine (NORVASC) 10 MG tablet Take 1 tablet (10 mg total) by mouth once daily.    aspirin (ECOTRIN) 81 MG EC tablet Take 1 tablet (81 mg total) by mouth once daily.    atenolol (TENORMIN) 50 MG tablet TAKE 1 TABLET (50 MG TOTAL) BY MOUTH ONCE DAILY.    atorvastatin (LIPITOR) 80 MG tablet Take 1 tablet (80 mg total) by mouth once daily.    blood sugar diagnostic Strp 1 strip by Misc.(Non-Drug; Combo Route) route after meals as needed.    clopidogrel (PLAVIX) 75 mg tablet Take 1 tablet (75 mg total) by mouth once daily.    fluticasone (FLONASE) 50 mcg/actuation nasal spray 1 spray by Each Nare route 2 (two) times daily as needed for Rhinitis.    furosemide (LASIX) 20 MG tablet Take 1 tablet (20 mg total) by mouth 2 (two) times daily. Do not take the pm dose after 3 pm    furosemide (LASIX) 20 MG tablet TAKE 1 TABLET (20 MG TOTAL) BY MOUTH 2 (TWO) TIMES DAILY. DO NOT TAKE THE PM DOSE AFTER 3 PM    gabapentin (NEURONTIN) 300 MG capsule Take 1 capsule (300 mg total) by mouth 2 (two) times daily.    insulin aspart (NOVOLOG) 100 unit/mL InPn pen Inject 12 units w/ breakfast and lunch, 14 units w/ dinner plus scale 150-200 +2, 201-250 +4, 251-300 +6, 301-350 +8, >350 +10. Snack 4 units.    insulin glargine (LANTUS SOLOSTAR) 100 unit/mL (3 mL) InPn pen Inject 40 Units into the skin every evening.    insulin needles, disposable, 31 X 5/16 " Ndle Pt to use pen needle with Lantus daily    lisinopril (PRINIVIL,ZESTRIL) 40 MG tablet TAKE 1 TABLET (40 MG TOTAL) BY MOUTH ONCE DAILY.    meclizine (ANTIVERT) 25 mg tablet Take 1 tablet (25 mg total) by mouth 3 (three) times daily as needed for Dizziness.    METHYL SALICYLATE/MENTHOL (MENTHOLATUM DEEP HEAT RUB TOP) Apply topically.    mirtazapine (REMERON) 30 MG tablet Take 30 mg by mouth " every evening.     montelukast (SINGULAIR) 10 mg tablet Take 1 tablet (10 mg total) by mouth every evening.    ondansetron (ZOFRAN-ODT) 8 MG TbDL Take 1 tablet (8 mg total) by mouth every 6 (six) hours as needed.           Clinical Reference Information           Allergies as of 5/9/2017     No Known Allergies      Immunizations Administered on Date of Encounter - 5/9/2017     None      Language Assistance Services     ATTENTION: Language assistance services are available, free of charge. Please call [phone].      ATENCIÓN: Si habla español, tiene a baca disposición servicios gratuitos de asistencia lingüística. Llame al [phone].     MONSTER Ý: N?u b?n nói Ti?ng Vi?t, có các d?ch v? h? tr? ngôn ng? mi?n phí dành cho b?n. G?i s? [phone].         [facility] complies with applicable Federal civil rights laws and does not discriminate on the basis of race, color, national origin, age, disability, or sex.
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
## 2017-06-09 RX ORDER — FUROSEMIDE 20 MG/1
TABLET ORAL
Qty: 60 TABLET | Refills: 0 | Status: SHIPPED | OUTPATIENT
Start: 2017-06-09 | End: 2017-07-16 | Stop reason: SDUPTHER

## 2017-06-20 ENCOUNTER — OFFICE VISIT (OUTPATIENT)
Dept: OPHTHALMOLOGY | Facility: CLINIC | Age: 62
End: 2017-06-20
Payer: MEDICARE

## 2017-06-20 VITALS — HEART RATE: 67 BPM | DIASTOLIC BLOOD PRESSURE: 79 MMHG | SYSTOLIC BLOOD PRESSURE: 180 MMHG

## 2017-06-20 DIAGNOSIS — E11.3513 PROLIFERATIVE DIABETIC RETINOPATHY OF BOTH EYES WITH MACULAR EDEMA ASSOCIATED WITH TYPE 2 DIABETES MELLITUS: Primary | ICD-10-CM

## 2017-06-20 PROCEDURE — 67210 TREATMENT OF RETINAL LESION: CPT | Mod: S$PBB,RT,, | Performed by: OPHTHALMOLOGY

## 2017-06-20 PROCEDURE — 99214 OFFICE O/P EST MOD 30 MIN: CPT | Mod: PBBFAC | Performed by: OPHTHALMOLOGY

## 2017-06-20 PROCEDURE — 92134 CPTRZ OPH DX IMG PST SGM RTA: CPT | Mod: PBBFAC | Performed by: OPHTHALMOLOGY

## 2017-06-20 PROCEDURE — 99999 PR PBB SHADOW E&M-EST. PATIENT-LVL IV: CPT | Mod: PBBFAC,,, | Performed by: OPHTHALMOLOGY

## 2017-06-20 PROCEDURE — 67210 TREATMENT OF RETINAL LESION: CPT | Mod: PBBFAC,RT | Performed by: OPHTHALMOLOGY

## 2017-06-20 PROCEDURE — 92014 COMPRE OPH EXAM EST PT 1/>: CPT | Mod: 57,S$PBB,, | Performed by: OPHTHALMOLOGY
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
## 2017-07-17 RX ORDER — FUROSEMIDE 20 MG/1
TABLET ORAL
Qty: 60 TABLET | Refills: 0 | Status: SHIPPED | OUTPATIENT
Start: 2017-07-17 | End: 2017-08-26 | Stop reason: SDUPTHER

## 2017-08-27 RX ORDER — FUROSEMIDE 20 MG/1
TABLET ORAL
Qty: 30 TABLET | Refills: 0 | Status: SHIPPED | OUTPATIENT
Start: 2017-08-27 | End: 2017-10-08 | Stop reason: SDUPTHER

## 2017-09-22 DIAGNOSIS — Z00.00 ROUTINE GENERAL MEDICAL EXAMINATION AT A HEALTH CARE FACILITY: Primary | ICD-10-CM

## 2017-09-26 ENCOUNTER — OFFICE VISIT (OUTPATIENT)
Dept: OPHTHALMOLOGY | Facility: CLINIC | Age: 62
End: 2017-09-26
Payer: MEDICARE

## 2017-09-26 DIAGNOSIS — H35.033 HYPERTENSIVE RETINOPATHY OF BOTH EYES: ICD-10-CM

## 2017-09-26 PROCEDURE — 67028 INJECTION EYE DRUG: CPT | Mod: PBBFAC,RT | Performed by: OPHTHALMOLOGY

## 2017-09-26 PROCEDURE — C9257 BEVACIZUMAB INJECTION: HCPCS | Mod: PBBFAC | Performed by: OPHTHALMOLOGY

## 2017-09-26 PROCEDURE — 99213 OFFICE O/P EST LOW 20 MIN: CPT | Mod: PBBFAC | Performed by: OPHTHALMOLOGY

## 2017-09-26 PROCEDURE — 92134 CPTRZ OPH DX IMG PST SGM RTA: CPT | Mod: PBBFAC | Performed by: OPHTHALMOLOGY

## 2017-09-26 PROCEDURE — 67028 INJECTION EYE DRUG: CPT | Mod: S$PBB,RT,, | Performed by: OPHTHALMOLOGY

## 2017-09-26 PROCEDURE — 99999 PR PBB SHADOW E&M-EST. PATIENT-LVL III: CPT | Mod: PBBFAC,,, | Performed by: OPHTHALMOLOGY

## 2017-09-26 PROCEDURE — 92014 COMPRE OPH EXAM EST PT 1/>: CPT | Mod: 25,S$PBB,, | Performed by: OPHTHALMOLOGY

## 2017-09-26 RX ADMIN — BEVACIZUMAB 1.25 MG: 100 INJECTION, SOLUTION INTRAVENOUS at 11:09
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
## 2017-09-26 NOTE — PATIENT INSTRUCTIONS

## 2017-10-09 RX ORDER — FUROSEMIDE 20 MG/1
TABLET ORAL
Qty: 30 TABLET | Refills: 0 | Status: SHIPPED | OUTPATIENT
Start: 2017-10-09 | End: 2017-11-13 | Stop reason: DRUGHIGH

## 2017-10-24 ENCOUNTER — PROCEDURE VISIT (OUTPATIENT)
Dept: OPHTHALMOLOGY | Facility: CLINIC | Age: 62
End: 2017-10-24
Payer: MEDICARE

## 2017-10-24 VITALS — DIASTOLIC BLOOD PRESSURE: 83 MMHG | HEART RATE: 73 BPM | SYSTOLIC BLOOD PRESSURE: 169 MMHG

## 2017-10-24 DIAGNOSIS — H35.033 HYPERTENSIVE RETINOPATHY OF BOTH EYES: ICD-10-CM

## 2017-10-24 PROCEDURE — 92134 CPTRZ OPH DX IMG PST SGM RTA: CPT | Mod: PBBFAC | Performed by: OPHTHALMOLOGY

## 2017-10-24 PROCEDURE — 67028 INJECTION EYE DRUG: CPT | Mod: PBBFAC,RT | Performed by: OPHTHALMOLOGY

## 2017-10-24 PROCEDURE — 67028 INJECTION EYE DRUG: CPT | Mod: S$PBB,RT,, | Performed by: OPHTHALMOLOGY

## 2017-10-24 PROCEDURE — 92014 COMPRE OPH EXAM EST PT 1/>: CPT | Mod: 25,S$PBB,, | Performed by: OPHTHALMOLOGY

## 2017-10-24 PROCEDURE — C9257 BEVACIZUMAB INJECTION: HCPCS | Mod: PBBFAC | Performed by: OPHTHALMOLOGY

## 2017-10-24 RX ADMIN — BEVACIZUMAB 1.25 MG: 100 INJECTION, SOLUTION INTRAVENOUS at 01:10

## 2017-10-24 NOTE — PATIENT INSTRUCTIONS
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
## 2017-10-25 ENCOUNTER — HOSPITAL ENCOUNTER (OUTPATIENT)
Dept: RADIOLOGY | Facility: HOSPITAL | Age: 62
Discharge: HOME | End: 2017-10-25
Attending: INTERNAL MEDICINE
Payer: MEDICARE

## 2017-10-25 ENCOUNTER — OFFICE VISIT (OUTPATIENT)
Dept: INTERNAL MEDICINE | Facility: CLINIC | Age: 62
End: 2017-10-25
Payer: MEDICARE

## 2017-10-25 VITALS
BODY MASS INDEX: 26.94 KG/M2 | SYSTOLIC BLOOD PRESSURE: 152 MMHG | DIASTOLIC BLOOD PRESSURE: 80 MMHG | WEIGHT: 203.25 LBS | HEART RATE: 80 BPM | HEIGHT: 73 IN

## 2017-10-25 DIAGNOSIS — E78.5 DYSLIPIDEMIA: ICD-10-CM

## 2017-10-25 DIAGNOSIS — Z12.11 SCREEN FOR COLON CANCER: ICD-10-CM

## 2017-10-25 DIAGNOSIS — I10 ESSENTIAL HYPERTENSION: ICD-10-CM

## 2017-10-25 DIAGNOSIS — R60.9 EDEMA, UNSPECIFIED TYPE: ICD-10-CM

## 2017-10-25 DIAGNOSIS — R06.02 SOB (SHORTNESS OF BREATH): ICD-10-CM

## 2017-10-25 DIAGNOSIS — D63.8 ANEMIA OF CHRONIC DISEASE: ICD-10-CM

## 2017-10-25 DIAGNOSIS — D47.2 SMOLDERING MYELOMA: ICD-10-CM

## 2017-10-25 DIAGNOSIS — Z23 NEED FOR INFLUENZA VACCINATION: ICD-10-CM

## 2017-10-25 DIAGNOSIS — Z91.199 PATIENT NONADHERENCE: ICD-10-CM

## 2017-10-25 DIAGNOSIS — Z86.73 HISTORY OF STROKE: ICD-10-CM

## 2017-10-25 PROCEDURE — 99215 OFFICE O/P EST HI 40 MIN: CPT | Mod: S$PBB,,, | Performed by: INTERNAL MEDICINE

## 2017-10-25 PROCEDURE — 99999 PR PBB SHADOW E&M-EST. PATIENT-LVL V: CPT | Mod: PBBFAC,,, | Performed by: INTERNAL MEDICINE

## 2017-10-25 PROCEDURE — 71020 XR CHEST PA AND LATERAL: CPT | Mod: 26,,, | Performed by: RADIOLOGY

## 2017-10-25 PROCEDURE — 90688 IIV4 VACCINE SPLT 0.5 ML IM: CPT | Mod: PBBFAC,PO

## 2017-10-25 PROCEDURE — 71020 XR CHEST PA AND LATERAL: CPT | Mod: TC,PO

## 2017-10-25 PROCEDURE — 99215 OFFICE O/P EST HI 40 MIN: CPT | Mod: PBBFAC,25,PO | Performed by: INTERNAL MEDICINE

## 2017-10-25 NOTE — PROGRESS NOTES
Subjective:       Patient ID: Marie Reis Jr. is a 62 y.o. male.    Chief Complaint: Annual Exam    HPI 62-year-old male presents to clinic today for annual physical exam.  Patient did not undergo his blood work prior to her visit.  He is also overdue for follow-up with endocrinology for his uncontrolled diabetes with marked complications including neuropathy history of stroke chronic kidney disease and retinopathy.  Additionally he is overdue for his nephrology appointment.  He is also due for his hematology oncology appointment for smoldering myeloma.  Patient often comes the appointments backtracking on his disease control often due to lack of adherence to medications and regular follow-up.  Also needs to improve regarding his dietary adherence.  [facility].  Patient reports and swelling of the legs and some chest congestion.  Review of Systems  otherwise negative shortness of breath and edema   Objective:      Physical Exam  General: Well-appearing, well-nourished.  No distress  HEENT: conjunctivae are normal.  Pupils are equal and reative to light.  TM's are clear and intact bilaterally.  Hearing is grossly normal.  Nasopharynx is clear.  Oropharynx is clear.  Neck: Supple.  No thyroid megaly.  No bruits.  Lymph: No cervical or supraclavicular adenopathy.  Heart: Regular rate and rhythm, without murmur, rub or gallop.  Lungs: Clear to auscultation; respiratory effort normal.  Abdomen: Soft, nontender, nondistended.  Normoactive bowel sounds.  No hepatomegaly.  No masses.  Extremities: Good distal pulses.  No edema.  Psych: Oriented to time person place.  Judgment and insight seem unimpaired.  Mood and affect are appropriate.  Protective Sensation (w/ 10 gram monofilament):  Right: Intact  Left: Intact    Visual Inspection:  Normal -  Bilateral    Pedal Pulses:   Right: Present  Left: Present    Posterior tibialis:   Right:Present  Left: Present      Assessment:       .    Plan:       Marie  was seen today for annual exam.    Diagnoses and all orders for this visit:    Uncontrolled diabetes mellitus with polyneuropathy  -     Ambulatory consult to Endocrinology  Reminded the importance of adherence to medication diet and.  And follow-up. 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 follow-up scheduled  Dyslipidemia  Recommend compliance of medication  Anemia of chronic disease  Follow-up with nephrology  Screen for colon cancer  -     Case request GI: COLONOSCOPY    Smoldering myeloma  -     Ambulatory consult to Hematology    Need for influenza vaccination  -     Influenza - Quadrivalent (3 years & older)    Edema, unspecified type  -     2D Echo w/ Color Flow Doppler; Future  Follow-up scheduled to revisit his symptoms and test results  SOB (shortness of breath)  -     X-Ray Chest PA And Lateral; Future  -     B-TYPE NATRIURETIC PEPTIDE; Future

## 2017-10-26 ENCOUNTER — OFFICE VISIT (OUTPATIENT)
Dept: ENDOCRINOLOGY | Facility: CLINIC | Age: 62
End: 2017-10-26
Payer: MEDICARE

## 2017-10-26 ENCOUNTER — HOSPITAL ENCOUNTER (OUTPATIENT)
Dept: CARDIOLOGY | Facility: CLINIC | Age: 62
Discharge: HOME | End: 2017-10-26
Payer: MEDICARE

## 2017-10-26 VITALS
WEIGHT: 203.94 LBS | SYSTOLIC BLOOD PRESSURE: 140 MMHG | DIASTOLIC BLOOD PRESSURE: 70 MMHG | HEART RATE: 69 BPM | HEIGHT: 73 IN | BODY MASS INDEX: 27.03 KG/M2

## 2017-10-26 DIAGNOSIS — I10 ESSENTIAL HYPERTENSION: ICD-10-CM

## 2017-10-26 DIAGNOSIS — I51.7 CARDIOMEGALY: ICD-10-CM

## 2017-10-26 DIAGNOSIS — M43.16 SPONDYLOLISTHESIS OF LUMBAR REGION: ICD-10-CM

## 2017-10-26 DIAGNOSIS — E78.5 DYSLIPIDEMIA: ICD-10-CM

## 2017-10-26 DIAGNOSIS — R60.9 EDEMA, UNSPECIFIED TYPE: ICD-10-CM

## 2017-10-26 DIAGNOSIS — D47.2 SMOLDERING MYELOMA: ICD-10-CM

## 2017-10-26 LAB
AORTIC VALVE REGURGITATION: ABNORMAL
AORTIC VALVE STENOSIS: ABNORMAL
DIASTOLIC DYSFUNCTION: YES
ESTIMATED PA SYSTOLIC PRESSURE: 47.62
MITRAL VALVE REGURGITATION: ABNORMAL
RETIRED EF AND QEF - SEE NOTES: 55 (ref 55–65)
TRICUSPID VALVE REGURGITATION: ABNORMAL

## 2017-10-26 PROCEDURE — 93306 TTE W/DOPPLER COMPLETE: CPT | Mod: PBBFAC | Performed by: INTERNAL MEDICINE

## 2017-10-26 PROCEDURE — 99214 OFFICE O/P EST MOD 30 MIN: CPT | Mod: S$PBB,,, | Performed by: NURSE PRACTITIONER

## 2017-10-26 PROCEDURE — 99214 OFFICE O/P EST MOD 30 MIN: CPT | Mod: PBBFAC | Performed by: NURSE PRACTITIONER

## 2017-10-26 PROCEDURE — 99999 PR PBB SHADOW E&M-EST. PATIENT-LVL IV: CPT | Mod: PBBFAC,,, | Performed by: NURSE PRACTITIONER

## 2017-10-26 RX ORDER — INSULIN GLARGINE 100 [IU]/ML
INJECTION, SOLUTION SUBCUTANEOUS
Qty: 15 ML | Refills: 6
Start: 2017-10-26 | End: 2018-05-08 | Stop reason: SDUPTHER

## 2017-10-26 RX ORDER — INSULIN ASPART 100 [IU]/ML
INJECTION, SOLUTION INTRAVENOUS; SUBCUTANEOUS
Qty: 1 BOX | Refills: 6
Start: 2017-10-26 | End: 2018-08-08 | Stop reason: SDUPTHER

## 2017-10-26 NOTE — LETTER
October 26, 2017      Nancy Colón MD  [address] [facility] [address]           [facility] - Endocrinology  [address] [facility] [address]  Phone: [phone]          Patient: Marie Reis Jr.   MR Number: [id number]   YOB: 1955   Date of Visit: 10/26/2017       Dear Dr. Nancy Colón:    Thank you for referring Marie Reis to me for evaluation. Attached you will find relevant portions of my assessment and plan of care.    If you have questions, please do not hesitate to call me. I look forward to following Marie Reis along with you.    Sincerely,    BELEM Leslie, FNP    Enclosure  CC:  No Recipients    If you would like to receive this communication electronically, please contact [email or web address] or [phone] to request more information on [employer] Link access.    For providers and/or their staff who would like to refer a patient to [facility], please contact us through our one-stop-shop provider referral line, [facility], at [phone].    If you feel you have received this communication in error or would no longer like to receive these types of communications, please e-mail [email or web address]

## 2017-10-26 NOTE — PATIENT INSTRUCTIONS
Lantus 28 units at night  novolog 8 units w/ meals plus scale 150-200+2, 201-250+4, 251-300+6, 301-350+8.         Snacks can be an important part of a balanced, healthy meal plan. They allow you to eat more frequently, feeling full and satisfied throughout the day. Also, they allow you to spread carbohydrates evenly, which may stabilize blood sugars.  Plus, snacks are enjoyable!     The amount of carbohydrate needed at snacks varies. Generally, about 15-30 grams of carbohydrate per snack is recommended.  Below you will find some tasty treats.       0-5 gm carb   Crystal Light   Vitamin Water Zero   Herbal tea, unsweetened   2 tsp peanut butter on celery   1./2 cup sugar-free jell-o   1 sugar-free popsicle   ¼ cup blueberries   8oz Blue Amy unsweetened almond milk   5 baby carrots & celery sticks, cucumbers, bell peppers dipped in ¼ cup salsa, 2Tbsp light ranch dressing or 2Tbsp plain Greek yogurt   10 Goldfish crackers   ½ oz low-fat cheese or string cheese   1 closed handful of nuts, unsalted   1 Tbsp of sunflower seeds, unsalted   1 cup Smart Pop popcorn   1 whole grain brown rice cake        15 gm carb   1 small piece of fruit or ½ banana or 1/2 cup lite canned fruit   3 donna cracker squares   3 cups Smart Pop popcorn, top spray butter, Mijares lite salt or cinnamon and Truvia   5 Vanilla Wafers   ½ cup low fat, no added sugar ice cream or frozen yogurt (Blue bell, Blue Bunny, Weight Watchers, Skinny Cow)   ½ turkey, ham, or chicken sandwich   ½ c fruit with ½ c Cottage cheese   4-6 unsalted wheat crackers with 1 oz low fat cheese or 1 tbsp peanut butter    30-45 goldfish crackers (depending on flavor)    7-8 Yazidism mini brown rice cakes (caramel, apple cinnamon, chocolate)    12 Yazidism mini brown rice cakes (cheddar, bbq, ranch)    1/3 cup hummus dip with raw veg   1/2 whole wheat sourav, 1Tbsp hummus   Mini Pizza (1/2 whole wheat English muffin, low-fat  cheese, tomato  sauce)   100 calorie snack pack (Oreo, Chips Ahoy, Ritz Mix, Baked Cheetos)   4-6 oz. light or Greek Style yogurt (Chobani, Yoplait, Okios, Stoneyfield)   ½ cup sugar-free pudding     6 in. wheat tortilla or sourav oven toasted chips (topped with spray butter flavoring, cinnamon, Truvia OR spray butter, garlic powder, chili powder)    18 BBQ Popchips (available at Target, Whole Foods, Fresh Market)                   Diabetes Support Group Meetings    Date Topics   February 9 Health Promotion/Nutrition   March 9 Taking Care of Your Kidneys   April 13 Taking Care of Your Feet   May 11 Ease Your Mind with Diabetes   June 8 Hurricane and Emergency Preparedness   July 13 Family & Caregiver Support for Diabetes   *August 17  Taking Care of Your Eyes   September 14 Devices & Technology   October 12 Recipes & Treats   November 9 Getting Pumped Up for Diabetes   December 14 Year-End Close Out            Meetings are held in the [facility] (A) of the [facility] located at [address] [place] [address]. Please call [phone] for additional information.    Free service, offered every 2nd Thursday of every month, except in August! Family members and/or friends are welcome as well!  Support group is for patients with type 1 or type 2 diabetes.    From 3:30p to 4:30p

## 2017-10-26 NOTE — PROGRESS NOTES
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 Diagnoses of Diabetes mellitus due to underlying condition, uncontrolled, with stage 3 chronic kidney disease, without long-term current use of insulin, Uncontrolled diabetes mellitus with polyneuropathy, Essential hypertension, Dyslipidemia, Spondylolisthesis of lumbar region, and Smoldering myeloma were also pertinent to this visit.      HPI: Mr. Marie Reis Jr. is a 62 y.o. Black or  male who was diagnosed with Type 2 DM in 2006.   Pt was last seen by me late 2016 and is now being seen by me again today.  A1c has improved since last visit.  Pt has f/u with hem/onc regularly.  Lab Results   Component Value Date    HGBA1C 8.2 (H) 10/25/2017     BG readings are checked  1-3x/day and readings are   BG 71-90 mg/dl   Mostly under 200 mg/dl     Hypoglycemia: No    Skipped/missed glycemic medications: No    Prandial injections taken with meals: Yes    Physical Activity: No    Skipping meals and/or snacking: The patient eats a regular, healthy diet.    CURRENT DIABETIC MEDS: novolog 8 units ac w/ mod dose correction scale 150-200+2, etc , lantus 28 units qhs  Uses Vials or Pens: pens  Type of Glucose Meter: uptodate    Last Eye Exam: annual  Last [facility] Exam: 2017[facility]    Last DM Education Attended:  4/27/16    REVIEW OF SYSTEMS  General: no weakness or + fatigue.   Eyes: no visual disturbances.   Cardiac: no chest pain. +cuest discomfort- ekg recently done, +edema   Respiratory: no cough or dyspnea.   GI: no abdominal pain or nausea.   Skin: no rashes or itching.   Neuro: no numbness or tingling. +back pain  Endocrine: no polyuria, polydipsia, polyphagia.     Vital Signs  There were no vitals taken for this visit.    Hemoglobin A1C   Date Value Ref Range Status   10/25/2017 8.2 (H) 4.0 - 5.6 % Final     Comment:      According to ADA guidelines, hemoglobin A1c <7.0% represents  optimal control in non-pregnant diabetic patients. Different  metrics may apply to specific patient populations.   Standards of Medical Care in Diabetes-2016.  For the purpose of screening for the presence of diabetes:  <5.7%     Consistent with the absence of diabetes  5.7-6.4%  Consistent with increasing risk for diabetes   (prediabetes)  >or=6.5%  Consistent with diabetes  Currently, no consensus exists for use of hemoglobin A1c  for diagnosis of diabetes for children.  This Hemoglobin A1c assay has significant interference with fetal   hemoglobin   (HbF). The results are invalid for patients with abnormal amounts of   HbF,   including those with known Hereditary Persistence   of Fetal Hemoglobin. Heterozygous hemoglobin variants (HbAS, HbAC,   HbAD, HbAE, HbA2) do not significantly interfere with this assay;   however, presence of multiple variants in a sample may impact the %   interference.     01/23/2017 9.3 (H) 4.5 - 6.2 % Final     Comment:     According to ADA guidelines, hemoglobin A1C <7.0% represents  optimal control in non-pregnant diabetic patients.  Different  metrics may apply to specific populations.   Standards of Medical Care in Diabetes - 2016.  For the purpose of screening for the presence of diabetes:  <5.7%     Consistent with the absence of diabetes  5.7-6.4%  Consistent with increasing risk for diabetes   (prediabetes)  >or=6.5%  Consistent with diabetes  Currently no consensus exists for use of hemoglobin A1C  for diagnosis of diabetes for children.     10/25/2016 10.6 (H) 4.5 - 6.2 % Final     Comment:     According to ADA guidelines, hemoglobin A1C <7.0% represents  optimal control in non-pregnant diabetic patients.  Different  metrics may apply to specific populations.   Standards of Medical Care in Diabetes - 2016.  For the purpose of screening for the presence of diabetes:  <5.7%     Consistent with the absence of  diabetes  5.7-6.4%  Consistent with increasing risk for diabetes   (prediabetes)  >or=6.5%  Consistent with diabetes  Currently no consensus exists for use of hemoglobin A1C  for diagnosis of diabetes for children.         Chemistry        Component Value Date/Time     10/25/2017 1515    K 3.5 10/25/2017 1515     10/25/2017 1515    CO2 26 10/25/2017 1515    BUN 22 10/25/2017 1515    CREATININE 2.5 (H) 10/25/2017 1515    GLU 75 10/25/2017 1515        Component Value Date/Time    CALCIUM 8.8 10/25/2017 1515    ALKPHOS 128 10/25/2017 1515    AST 21 10/25/2017 1515    ALT 15 10/25/2017 1515    BILITOT 0.7 10/25/2017 1515          Lab Results   Component Value Date    CHOL 119 (L) 10/25/2017    CHOL 144 09/17/2016    CHOL 167 04/02/2016     Lab Results   Component Value Date    HDL 32 (L) 10/25/2017    HDL 27 (L) 09/17/2016    HDL 28 (L) 04/02/2016     Lab Results   Component Value Date    LDLCALC 68.6 10/25/2017    LDLCALC 72.8 09/17/2016    LDLCALC 82.8 04/02/2016     Lab Results   Component Value Date    TRIG 92 10/25/2017    TRIG 221 (H) 09/17/2016    TRIG 281 (H) 04/02/2016     Lab Results   Component Value Date    CHOLHDL 26.9 10/25/2017    CHOLHDL 18.8 (L) 09/17/2016    CHOLHDL 16.8 (L) 04/02/2016       Lab Results   Component Value Date    TSH 1.851 09/17/2016       Lab Results   Component Value Date    MICALBCREAT 1987.0 (H) 10/25/2017       Vit D, 25-Hydroxy   Date Value Ref Range Status   04/20/2016 18 (L) 30 - 96 ng/mL Final     Comment:     Vitamin D deficiency.........<10 ng/mL                              Vitamin D insufficiency......10-29 ng/mL       Vitamin D sufficiency........> or equal to 30 ng/mL  Vitamin D toxicity............>100 ng/mL         PHYSICAL EXAMINATION  Constitutional: Appears well, no distress  Neck: Supple, trachea midline.   Respiratory: no wheezes, even and unlabored.  Cardiovascular: RRR  Lymph: trace/+1 pedal edema  Skin: warm and dry; no injection site reactions, no  acanthosis nigracans observed.  Neuro:patient alert and cooperative.  Feet: footwear appropriate    Assessment/Plan  1. 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 Diabetes mellitus due to underlying condition, uncontrolled, with stage 3 chronic kidney disease, without long-term current use of insulin  See above   3. Uncontrolled diabetes mellitus with polyneuropathy  See above   4. Essential hypertension  Controlled, continue med(s)   5. Dyslipidemia  Lab Results   Component Value Date    LDLCALC 68.6 10/25/2017        6. Spondylolisthesis of lumbar region  F/u with back specialist   7. Smoldering myeloma  F/u with onc   FOLLOW UP  Return in about 3 months (around 1/26/2018).     Orders Placed This Encounter   Procedures    Hemoglobin A1c     Standing Status:   Future     Standing Expiration Date:   12/25/2018

## 2017-10-27 ENCOUNTER — OFFICE VISIT (OUTPATIENT)
Dept: NEPHROLOGY | Facility: CLINIC | Age: 62
End: 2017-10-27
Payer: MEDICARE

## 2017-10-27 VITALS
HEIGHT: 73 IN | OXYGEN SATURATION: 95 % | DIASTOLIC BLOOD PRESSURE: 70 MMHG | WEIGHT: 201.5 LBS | HEART RATE: 75 BPM | BODY MASS INDEX: 26.71 KG/M2 | SYSTOLIC BLOOD PRESSURE: 150 MMHG

## 2017-10-27 DIAGNOSIS — Z79.4 CONTROLLED TYPE 2 DIABETES MELLITUS WITH STAGE 3 CHRONIC KIDNEY DISEASE, WITH LONG-TERM CURRENT USE OF INSULIN: Primary | ICD-10-CM

## 2017-10-27 DIAGNOSIS — N18.30 CONTROLLED TYPE 2 DIABETES MELLITUS WITH STAGE 3 CHRONIC KIDNEY DISEASE, WITH LONG-TERM CURRENT USE OF INSULIN: Primary | ICD-10-CM

## 2017-10-27 DIAGNOSIS — N17.9 ACUTE KIDNEY INJURY (NONTRAUMATIC): ICD-10-CM

## 2017-10-27 DIAGNOSIS — E11.22 CONTROLLED TYPE 2 DIABETES MELLITUS WITH STAGE 3 CHRONIC KIDNEY DISEASE, WITH LONG-TERM CURRENT USE OF INSULIN: Primary | ICD-10-CM

## 2017-10-27 PROCEDURE — 99999 PR PBB SHADOW E&M-EST. PATIENT-LVL III: CPT | Mod: PBBFAC,,, | Performed by: INTERNAL MEDICINE

## 2017-10-27 PROCEDURE — 99213 OFFICE O/P EST LOW 20 MIN: CPT | Mod: PBBFAC | Performed by: INTERNAL MEDICINE

## 2017-10-27 PROCEDURE — 99214 OFFICE O/P EST MOD 30 MIN: CPT | Mod: S$PBB,,, | Performed by: INTERNAL MEDICINE

## 2017-10-27 NOTE — LETTER
November 3, 2017      Nancy Colón MD  [address] [facility] [place] [address]           [facility] - Nephrology  [address]  [place] [address]  Phone: [phone]  Fax: [phone]          Patient: Marie Reis Jr.   MR Number: [id number]   YOB: 1955   Date of Visit: 10/27/2017       Dear Dr. Nancy Colón:    Thank you for referring Marie Reis to me for evaluation. Attached you will find relevant portions of my assessment and plan of care.    If you have questions, please do not hesitate to call me. I look forward to following Marie Reis along with you.    Sincerely,    Jelani Huerta MD    Enclosure  CC:  No Recipients    If you would like to receive this communication electronically, please contact [email or web address] or [phone] to request more information on [employer] Link access.    For providers and/or their staff who would like to refer a patient to [facility], please contact us through our one-stop-shop provider referral line, [facility], at [phone].    If you feel you have received this communication in error or would no longer like to receive these types of communications, please e-mail [email or web address]

## 2017-10-27 NOTE — Clinical Note
Please schedule labs after other appt 11.8, ordered, please schedule with Dr. Dotson within 4 weeks.  Please call to review daily weights and make sure shortness of breath improved, thank you.

## 2017-11-03 NOTE — PROGRESS NOTES
Subjective:       Patient ID: Marie Reis Jr. is a 62 y.o. Black or  male who presents for new evaluation of Acute Kidney Injury    HPI  Mr. Reis is a 62 year old man with medical history of diabetes, hypertension presenting for evaluation of acute kidney injury.  Patient last seen in Nephrology clinic May 2016 by Dr. Dotson, this is his first visit with me.  Patient recently noted to have elevated creatinine, along with worsening shortness of breath with exertion.  Patient reports blood sugars elevated, though better controlled.  He reports adequate fluid intake, denies any NSAID use.     Review of Systems   Constitutional: Negative for appetite change, fatigue and fever.   Respiratory: Positive for shortness of breath. Negative for cough.    Cardiovascular: Positive for leg swelling. Negative for chest pain.   Gastrointestinal: Negative for abdominal pain, constipation, diarrhea, nausea and vomiting.   Genitourinary: Negative for dysuria, flank pain, frequency, hematuria and urgency.   Musculoskeletal: Negative for arthralgias, back pain and joint swelling.   Skin: Negative for rash.   Neurological: Negative for dizziness and light-headedness.   All other systems reviewed and are negative.      Objective:      Physical Exam   Constitutional: He appears well-developed and well-nourished.   Cardiovascular: Normal rate and regular rhythm.  Exam reveals no gallop and no friction rub.    No murmur heard.  Pulmonary/Chest: Effort normal. No respiratory distress. He has no wheezes. He has rales.   Musculoskeletal: He exhibits edema (1+ bilateral lower extremity swelling).   Neurological: He is alert.   Skin: Skin is warm and dry. No rash noted.   Vitals reviewed.      Assessment:       1. Controlled type 2 diabetes mellitus with stage 3 chronic kidney disease, with long-term current use of insulin    2. Acute kidney injury (nontraumatic)        Plan:     Mr. Reis is a 62 year old man with medical history  of diabetes, hypertension presenting for evaluation of acute kidney injury.  Patient creatinine elevated, possibly due to cardiomyopathy, will increase furosemide x3days and monitor symptoms.  Will repeat renal panel to trend, further recommendations pending results.  Stressed importance of blood pressure/glycemic control to prevent any further progression of kidney disease, patient voiced understanding.     Return to clinic in 2-4 weeks, with Dr. Dotson, with renal/heme panel, iron/TIBC/ferritin, urinalysis/culture, urine protein/creatinine ratio, PTH/VitD prior to next visit

## 2017-11-08 ENCOUNTER — LAB VISIT (OUTPATIENT)
Dept: LAB | Facility: HOSPITAL | Age: 62
End: 2017-11-08
Attending: INTERNAL MEDICINE
Payer: MEDICARE

## 2017-11-08 ENCOUNTER — OFFICE VISIT (OUTPATIENT)
Dept: INTERNAL MEDICINE | Facility: CLINIC | Age: 62
End: 2017-11-08
Payer: MEDICARE

## 2017-11-08 VITALS
DIASTOLIC BLOOD PRESSURE: 84 MMHG | HEIGHT: 73 IN | BODY MASS INDEX: 25.6 KG/M2 | SYSTOLIC BLOOD PRESSURE: 126 MMHG | WEIGHT: 193.13 LBS | HEART RATE: 84 BPM

## 2017-11-08 DIAGNOSIS — I34.0 MODERATE MITRAL REGURGITATION: ICD-10-CM

## 2017-11-08 DIAGNOSIS — I51.89 DIASTOLIC DYSFUNCTION: Primary | ICD-10-CM

## 2017-11-08 DIAGNOSIS — N18.30 CONTROLLED TYPE 2 DIABETES MELLITUS WITH STAGE 3 CHRONIC KIDNEY DISEASE, WITH LONG-TERM CURRENT USE OF INSULIN: ICD-10-CM

## 2017-11-08 DIAGNOSIS — I12.9 BENIGN HYPERTENSION WITH CHRONIC KIDNEY DISEASE, STAGE III: ICD-10-CM

## 2017-11-08 DIAGNOSIS — I51.7 ATRIAL ENLARGEMENT, BILATERAL: ICD-10-CM

## 2017-11-08 DIAGNOSIS — Z79.4 CONTROLLED TYPE 2 DIABETES MELLITUS WITH STAGE 3 CHRONIC KIDNEY DISEASE, WITH LONG-TERM CURRENT USE OF INSULIN: ICD-10-CM

## 2017-11-08 DIAGNOSIS — R06.02 SOB (SHORTNESS OF BREATH): ICD-10-CM

## 2017-11-08 DIAGNOSIS — E11.22 CONTROLLED TYPE 2 DIABETES MELLITUS WITH STAGE 3 CHRONIC KIDNEY DISEASE, WITH LONG-TERM CURRENT USE OF INSULIN: ICD-10-CM

## 2017-11-08 DIAGNOSIS — I27.20 PULMONARY HYPERTENSION: ICD-10-CM

## 2017-11-08 DIAGNOSIS — R60.9 EDEMA, UNSPECIFIED TYPE: ICD-10-CM

## 2017-11-08 DIAGNOSIS — I50.33 ACUTE ON CHRONIC DIASTOLIC HEART FAILURE: ICD-10-CM

## 2017-11-08 DIAGNOSIS — N18.30 BENIGN HYPERTENSION WITH CHRONIC KIDNEY DISEASE, STAGE III: ICD-10-CM

## 2017-11-08 LAB
ALBUMIN SERPL BCP-MCNC: 2.9 G/DL
ANION GAP SERPL CALC-SCNC: 5 MMOL/L
BUN SERPL-MCNC: 20 MG/DL
CALCIUM SERPL-MCNC: 8.9 MG/DL
CHLORIDE SERPL-SCNC: 108 MMOL/L
CO2 SERPL-SCNC: 26 MMOL/L
CREAT SERPL-MCNC: 2.3 MG/DL
EST. GFR  (AFRICAN AMERICAN): 34.2 ML/MIN/1.73 M^2
EST. GFR  (NON AFRICAN AMERICAN): 29.5 ML/MIN/1.73 M^2
GLUCOSE SERPL-MCNC: 88 MG/DL
PHOSPHATE SERPL-MCNC: 3.3 MG/DL
POTASSIUM SERPL-SCNC: 4.1 MMOL/L
SODIUM SERPL-SCNC: 139 MMOL/L

## 2017-11-08 PROCEDURE — 80069 RENAL FUNCTION PANEL: CPT

## 2017-11-08 PROCEDURE — 99214 OFFICE O/P EST MOD 30 MIN: CPT | Mod: S$PBB,,, | Performed by: INTERNAL MEDICINE

## 2017-11-08 PROCEDURE — 99999 PR PBB SHADOW E&M-EST. PATIENT-LVL III: CPT | Mod: PBBFAC,,, | Performed by: INTERNAL MEDICINE

## 2017-11-08 PROCEDURE — 99213 OFFICE O/P EST LOW 20 MIN: CPT | Mod: PBBFAC,PO | Performed by: INTERNAL MEDICINE

## 2017-11-08 PROCEDURE — 36415 COLL VENOUS BLD VENIPUNCTURE: CPT | Mod: PO

## 2017-11-08 RX ORDER — ATENOLOL 100 MG/1
100 TABLET ORAL DAILY
Start: 2017-11-08 | End: 2017-11-13 | Stop reason: ALTCHOICE

## 2017-11-08 NOTE — PROGRESS NOTES
Subjective:       Patient ID: Marie Reis Jr. is a 62 y.o. male.    Chief Complaint: Follow-up    HPI 62-year-old male presents to clinic today for follow-up.  Recently his having problems with shortness of breath and edema in his legs.  Further evaluation with BNP, echocardiogram and chest x-ray was obtained.  Findings are consistent with cardiac dysfunction secondary with diastolic dysfunction and pulmonary hypertension and moderate mitral regurgitation.  Patient recent recently saw his nephrologist who upped his Lasix for the last 3 days to twice a day dosing which is helped at least 10 pounds lower.  He still not at a euvolemic baseline.  The patient reports some decreased shortness of breath he's still having some edema and fluid retention.  Review of Systems  otherwise negative  Objective:      Physical Exam  General: Well-appearing, well-nourished.  No distress  HEENT: conjunctivae are normal.  Pupils are equal and reative to light.  TM's are clear and intact bilaterally.  Hearing is grossly normal.  Nasopharynx is clear.  Oropharynx is clear.  Neck: Supple.  No thyroid megaly.  No bruits.  Lymph: No cervical or supraclavicular adenopathy.  Heart: Regular rate and rhythm, without murmur, rub or gallop.  Lungs: Clear to auscultation; respiratory effort normal.  Abdomen: Soft, nontender, nondistended.  Normoactive bowel sounds.  No hepatomegaly.  No masses.  Extremities: Good distal pulses.   1+ edema  Psych: Oriented to time person place.  Judgment and insight seem unimpaired.  Mood and affect are appropriate.  Assessment:       1. Diastolic dysfunction    2. Pulmonary hypertension    3. Edema, unspecified type    4. Moderate mitral regurgitation    5. Atrial enlargement, bilateral    6. SOB (shortness of breath)    7. Acute on chronic diastolic heart failure    8. Benign hypertension with chronic kidney disease, stage III        Plan:       Marie was seen today for follow-up.    Diagnoses and all orders for  this visit:    Diastolic dysfunction  -     Ambulatory consult to Cardiology    Pulmonary hypertension  -     Ambulatory consult to Cardiology    Edema, unspecified type  -     Ambulatory consult to Cardiology    Moderate mitral regurgitation  -     Ambulatory consult to Cardiology    Atrial enlargement, bilateral  -     Ambulatory consult to Cardiology    SOB (shortness of breath)  -     Ambulatory consult to Cardiology    Acute on chronic diastolic heart failure  -     Ambulatory consult to Cardiology  Discussed with patient that this going to be a challenge as were going have to monitor his weight and not over diurese him given his kidney failure.  We will go ahead and schedule appointment with cardiology to discuss further medical management given patient is currently on beta blocker, ACE inhibitor, diuretic therapy.  Benign hypertension with chronic kidney disease, stage III  -     atenolol (TENORMIN) 100 MG tablet; Take 1 tablet (100 mg total) by mouth once daily.

## 2017-11-13 ENCOUNTER — DOCUMENTATION ONLY (OUTPATIENT)
Dept: CARDIOLOGY | Facility: CLINIC | Age: 62
End: 2017-11-13

## 2017-11-13 ENCOUNTER — OFFICE VISIT (OUTPATIENT)
Dept: CARDIOLOGY | Facility: CLINIC | Age: 62
End: 2017-11-13
Payer: MEDICARE

## 2017-11-13 ENCOUNTER — TELEPHONE (OUTPATIENT)
Dept: CARDIOLOGY | Facility: CLINIC | Age: 62
End: 2017-11-13

## 2017-11-13 VITALS
HEART RATE: 64 BPM | BODY MASS INDEX: 25.1 KG/M2 | WEIGHT: 189.38 LBS | SYSTOLIC BLOOD PRESSURE: 193 MMHG | DIASTOLIC BLOOD PRESSURE: 88 MMHG | HEIGHT: 73 IN

## 2017-11-13 DIAGNOSIS — D63.8 ANEMIA OF CHRONIC DISEASE: ICD-10-CM

## 2017-11-13 DIAGNOSIS — I50.33 ACUTE ON CHRONIC DIASTOLIC HEART FAILURE: Primary | ICD-10-CM

## 2017-11-13 DIAGNOSIS — I10 ESSENTIAL HYPERTENSION: ICD-10-CM

## 2017-11-13 DIAGNOSIS — D47.2 SMOLDERING MYELOMA: ICD-10-CM

## 2017-11-13 DIAGNOSIS — E78.5 DYSLIPIDEMIA: ICD-10-CM

## 2017-11-13 DIAGNOSIS — N25.81 SECONDARY HYPERPARATHYROIDISM: ICD-10-CM

## 2017-11-13 DIAGNOSIS — I10 HYPERTENSION, ESSENTIAL: Primary | ICD-10-CM

## 2017-11-13 DIAGNOSIS — I65.23 CAROTID STENOSIS, ASYMPTOMATIC, BILATERAL: ICD-10-CM

## 2017-11-13 PROCEDURE — 99999 PR PBB SHADOW E&M-EST. PATIENT-LVL III: CPT | Mod: PBBFAC,,, | Performed by: INTERNAL MEDICINE

## 2017-11-13 PROCEDURE — 99205 OFFICE O/P NEW HI 60 MIN: CPT | Mod: S$PBB,,, | Performed by: INTERNAL MEDICINE

## 2017-11-13 PROCEDURE — 99213 OFFICE O/P EST LOW 20 MIN: CPT | Mod: PBBFAC,PO | Performed by: INTERNAL MEDICINE

## 2017-11-13 RX ORDER — FUROSEMIDE 40 MG/1
40 TABLET ORAL 2 TIMES DAILY
COMMUNITY
End: 2017-11-13 | Stop reason: SDUPTHER

## 2017-11-13 RX ORDER — FUROSEMIDE 40 MG/1
40 TABLET ORAL 2 TIMES DAILY
Qty: 60 TABLET | Refills: 0 | Status: SHIPPED | OUTPATIENT
Start: 2017-11-13 | End: 2017-11-16 | Stop reason: SDUPTHER

## 2017-11-13 RX ORDER — CARVEDILOL 6.25 MG/1
6.25 TABLET ORAL 2 TIMES DAILY WITH MEALS
COMMUNITY
End: 2017-11-13 | Stop reason: SDUPTHER

## 2017-11-13 RX ORDER — CARVEDILOL 6.25 MG/1
6.25 TABLET ORAL 2 TIMES DAILY WITH MEALS
Qty: 60 TABLET | Refills: 0 | Status: SHIPPED | OUTPATIENT
Start: 2017-11-13 | End: 2017-11-16 | Stop reason: SDUPTHER

## 2017-11-13 NOTE — PROGRESS NOTES
Subjective:   Patient ID:  Marie Reis Jr. is a 62 y.o. male who presents for evaulation of Diastolic Dysfunction      HPI: Patient presents with substernal chest tightness and progressive leg edema.  He has poorly controlled diabetes, poorly controlled hypertension and CKD 3 with proteinuria.  He also carries a diagnosis of Myeloma and is followed by Dr. CORWIN Guaamn.  IN the past he was hospitalized with CVA.  For about a year he has on-off peripheral edema that improves with Lasix.  For about a month his symptoms are worse and associated with evening chest tightness and indigestion.  It resoles with rubbing alcohol in this chest.  He also noted more swelling.  His doctor prescribed Lasix 20 mg to take BID    He is sedentary, does not exercise and is not compliant with diet and recommendations.  He took his BP meds 15 minutes before his am visit and this morning BP is elevated.      Most recent CFD showed preserved LV function, presence of diastolic dysfunction,  bi atrial enlargement,  mild AS, mild-moderate MR and pulmonary hypertension in high 40 ties.    ECG- LVH, NSSTT wave changes.  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 BAKER U    left hand fracture sx      LT EYE   5/2/15    DR KULLMAN [facility]       Social History     Social History    Marital status:      Spouse name: N/A    Number of children: N/A    Years of education: N/A     Social History Main Topics    Smoking status: Never Smoker    Smokeless tobacco: Never Used    Alcohol use No    Drug use: No    Sexual activity: No     Other Topics Concern    None     Social History Narrative    None  "      Review of patient's allergies indicates:  No Known Allergies    Lab Results   Component Value Date     11/08/2017    K 4.1 11/08/2017     11/08/2017    CO2 26 11/08/2017    BUN 20 11/08/2017    CREATININE 2.3 (H) 11/08/2017    GLU 88 11/08/2017    HGBA1C 8.2 (H) 10/25/2017    MG 2.4 09/17/2016    AST 21 10/25/2017    ALT 15 10/25/2017    ALBUMIN 2.9 (L) 11/08/2017    PROT 7.1 10/25/2017    BILITOT 0.7 10/25/2017    WBC 8.27 10/25/2017    HGB 9.2 (L) 10/25/2017    HCT 27.5 (L) 10/25/2017    MCV 82 10/25/2017     (L) 10/25/2017    INR 1.0 09/17/2016    PSA 1.5 06/26/2015    TSH 1.851 09/17/2016    CHOL 119 (L) 10/25/2017    HDL 32 (L) 10/25/2017    LDLCALC 68.6 10/25/2017    TRIG 92 10/25/2017       Review of Systems   Constitution: Positive for weakness, malaise/fatigue and weight gain.   HENT: Negative.    Eyes: Negative.    Cardiovascular: Positive for chest pain, dyspnea on exertion and leg swelling. Negative for claudication, irregular heartbeat, near-syncope, palpitations and syncope.   Respiratory: Positive for cough and wheezing. Negative for shortness of breath and snoring.    Endocrine: Positive for polydipsia and polyuria. Negative for cold intolerance, heat intolerance and polyphagia.   Skin: Negative.    Musculoskeletal: Positive for arthritis, back pain and muscle weakness.   Gastrointestinal: Negative.    Genitourinary: Negative.    Neurological: Positive for dizziness, loss of balance, numbness and paresthesias.   Psychiatric/Behavioral: Positive for depression. The patient is nervous/anxious.        Objective:   Physical Exam   Constitutional: He is oriented to person, place, and time. He appears well-developed and well-nourished.   BP (!) 193/88   Pulse 64   Ht 6' 1" (1.854 m)   Wt 85.9 kg (189 lb 6 oz)   BMI 24.99 kg/m²   Flat affect   HENT:   Head: Normocephalic.   Eyes: Pupils are equal, round, and reactive to light.   Neck: Normal range of motion. Neck supple. No " thyromegaly present.   Cardiovascular: Normal rate, regular rhythm and intact distal pulses.  Exam reveals no gallop and no friction rub.    Murmur heard.   Early systolic murmur is present with a grade of 1/6  at the upper right sternal border  Pulses:       Carotid pulses are 2+ on the right side with bruit, and 2+ on the left side with bruit.       Radial pulses are 2+ on the right side, and 2+ on the left side.        Femoral pulses are 2+ on the right side, and 2+ on the left side.       Popliteal pulses are 2+ on the right side, and 2+ on the left side.        Dorsalis pedis pulses are 2+ on the right side, and 2+ on the left side.        Posterior tibial pulses are 2+ on the right side, and 2+ on the left side.   Pulmonary/Chest: Effort normal and breath sounds normal. No respiratory distress. He has no wheezes. He has no rales. He exhibits no tenderness.   Abdominal: Soft. Bowel sounds are normal.   Musculoskeletal: Normal range of motion. He exhibits edema.   +bilaterally   Neurological: He is alert and oriented to person, place, and time.   Skin: Skin is warm and dry.   Psychiatric: He has a normal mood and affect.   Nursing note and vitals reviewed.      Current Outpatient Prescriptions   Medication Sig    amlodipine (NORVASC) 10 MG tablet Take 1 tablet (10 mg total) by mouth once daily.    atenolol (TENORMIN) 100 MG tablet Take 1 tablet (100 mg total) by mouth once daily.    atorvastatin (LIPITOR) 80 MG tablet Take 1 tablet (80 mg total) by mouth once daily.    blood sugar diagnostic Strp 1 strip by Misc.(Non-Drug; Combo Route) route after meals as needed.    clopidogrel (PLAVIX) 75 mg tablet Take 1 tablet (75 mg total) by mouth once daily.    fluticasone (FLONASE) 50 mcg/actuation nasal spray 1 spray by Each Nare route 2 (two) times daily as needed for Rhinitis.    furosemide (LASIX) 20 MG tablet TAKE 1 TABLET (20 MG TOTAL) BY MOUTH 2 (TWO) TIMES DAILY. DO NOT TAKE THE PM DOSE AFTER 3 PM     "gabapentin (NEURONTIN) 300 MG capsule Take 1 capsule (300 mg total) by mouth 2 (two) times daily.    insulin aspart (NOVOLOG) 100 unit/mL InPn pen Inject 8 units w/ meals plus scale 150-200 +2, 201-250 +4, 251-300 +6, 301-350 +8, >350 +10. Snack 4 units.    insulin glargine (LANTUS SOLOSTAR) 100 unit/mL (3 mL) InPn pen Inject 28 units at night.    insulin needles, disposable, 31 X 5/16 " Ndle Pt to use pen needle with Lantus daily    lisinopril (PRINIVIL,ZESTRIL) 40 MG tablet TAKE 1 TABLET (40 MG TOTAL) BY MOUTH ONCE DAILY.    meclizine (ANTIVERT) 25 mg tablet Take 1 tablet (25 mg total) by mouth 3 (three) times daily as needed for Dizziness.    METHYL SALICYLATE/MENTHOL (MENTHOLATUM DEEP HEAT RUB TOP) Apply topically.    mirtazapine (REMERON) 30 MG tablet Take 30 mg by mouth every evening.     montelukast (SINGULAIR) 10 mg tablet Take 1 tablet (10 mg total) by mouth every evening.    ondansetron (ZOFRAN-ODT) 8 MG TbDL Take 1 tablet (8 mg total) by mouth every 6 (six) hours as needed.     No current facility-administered medications for this visit.        Assessment and Plan:     Problem List Items Addressed This Visit        Cardiology Problems    Essential hypertension    Relevant Orders    Cardiac PET Scan Stress    CAR Ultrasound doppler carotid bliateral    Hemoglobin A1c    Brain natriuretic peptide    TSH    Comprehensive metabolic panel    Hemoglobin    Hematocrit    Acute on chronic diastolic heart failure - Primary    Relevant Orders    Cardiac PET Scan Stress    CAR Ultrasound doppler carotid bliateral    Hemoglobin A1c    Brain natriuretic peptide    TSH    Comprehensive metabolic panel    Hemoglobin    Hematocrit       Other    Dyslipidemia    Relevant Orders    Cardiac PET Scan Stress    CAR Ultrasound doppler carotid bliateral    Uncontrolled diabetes with stage 3 chronic kidney disease GFR 30-59    Relevant Orders    Cardiac PET Scan Stress    CAR Ultrasound doppler carotid bliateral    " Hemoglobin A1c    Brain natriuretic peptide    TSH    Comprehensive metabolic panel    Hemoglobin    Hematocrit    Anemia of chronic disease    Relevant Orders    Cardiac PET Scan Stress    CAR Ultrasound doppler carotid bliateral    Hemoglobin A1c    Brain natriuretic peptide    TSH    Comprehensive metabolic panel    Hemoglobin    Hematocrit    Secondary hyperparathyroidism    Relevant Orders    Cardiac PET Scan Stress    CAR Ultrasound doppler carotid bliateral    Hemoglobin A1c    Brain natriuretic peptide    TSH    Comprehensive metabolic panel    Hemoglobin    Hematocrit    Smoldering myeloma    Relevant Orders    Cardiac PET Scan Stress    CAR Ultrasound doppler carotid bliateral    Hemoglobin A1c    Brain natriuretic peptide    TSH    Comprehensive metabolic panel    Hemoglobin    Hematocrit    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, with long-term current use of insulin    Relevant Orders    Cardiac PET Scan Stress    CAR Ultrasound doppler carotid bliateral    Hemoglobin A1c    Brain natriuretic peptide    TSH    Comprehensive metabolic panel    Hemoglobin    Hematocrit      Other Visit Diagnoses     Carotid stenosis, asymptomatic, bilateral        Relevant Orders    Cardiac PET Scan Stress    CAR Ultrasound doppler carotid bliateral    Hemoglobin A1c    Brain natriuretic peptide    TSH    Comprehensive metabolic panel    Hemoglobin    Hematocrit          Patient's Medications   New Prescriptions    No medications on file   Previous Medications    AMLODIPINE (NORVASC) 10 MG TABLET    Take 1 tablet (10 mg total) by mouth once daily.    ATENOLOL (TENORMIN) 100 MG TABLET    Take 1 tablet (100 mg total) by mouth once daily.    ATORVASTATIN (LIPITOR) 80 MG TABLET    Take 1 tablet (80 mg total) by mouth once daily.    BLOOD SUGAR DIAGNOSTIC STRP    1 strip by Misc.(Non-Drug; Combo Route) route after meals as needed.    CLOPIDOGREL (PLAVIX) 75  "MG TABLET    Take 1 tablet (75 mg total) by mouth once daily.    FLUTICASONE (FLONASE) 50 MCG/ACTUATION NASAL SPRAY    1 spray by Each Nare route 2 (two) times daily as needed for Rhinitis.    FUROSEMIDE (LASIX) 20 MG TABLET    TAKE 1 TABLET (20 MG TOTAL) BY MOUTH 2 (TWO) TIMES DAILY. DO NOT TAKE THE PM DOSE AFTER 3 PM    GABAPENTIN (NEURONTIN) 300 MG CAPSULE    Take 1 capsule (300 mg total) by mouth 2 (two) times daily.    INSULIN ASPART (NOVOLOG) 100 UNIT/ML INPN PEN    Inject 8 units w/ meals plus scale 150-200 +2, 201-250 +4, 251-300 +6, 301-350 +8, >350 +10. Snack 4 units.    INSULIN GLARGINE (LANTUS SOLOSTAR) 100 UNIT/ML (3 ML) INPN PEN    Inject 28 units at night.    INSULIN NEEDLES, DISPOSABLE, 31 X 5/16 " NDLE    Pt to use pen needle with Lantus daily    LISINOPRIL (PRINIVIL,ZESTRIL) 40 MG TABLET    TAKE 1 TABLET (40 MG TOTAL) BY MOUTH ONCE DAILY.    MECLIZINE (ANTIVERT) 25 MG TABLET    Take 1 tablet (25 mg total) by mouth 3 (three) times daily as needed for Dizziness.    METHYL SALICYLATE/MENTHOL (MENTHOLATUM DEEP HEAT RUB TOP)    Apply topically.    MIRTAZAPINE (REMERON) 30 MG TABLET    Take 30 mg by mouth every evening.     MONTELUKAST (SINGULAIR) 10 MG TABLET    Take 1 tablet (10 mg total) by mouth every evening.    ONDANSETRON (ZOFRAN-ODT) 8 MG TBDL    Take 1 tablet (8 mg total) by mouth every 6 (six) hours as needed.   Modified Medications    No medications on file   Discontinued Medications    No medications on file     HFpEF most likely related to uncontrolled hypertension.  Ischemic MR cannot be ruled out, but it seems that his valvular heart disease and PH are related to CKD and volume overload.  Edema is multifactorial and in part related to HFpEF, CKD 3 with proteinuria,chronic anemia, Amlodipine therapy and dietary non-compliance.  Atenolol is renaly excreted long lasting beta blocker. Will change it to Carvedilol 6.25mg ID and hopefully increase to 112.5 mg BID.  Increase Lasix to 40 mg " BID.  We discussed work up of CAD in light of CKD and possible ramifications of these two disease processes.  Review result of the tests and advise.  Wife was asked to call with BP reading after patient takes extra Lasix this afternoon.    Return in about 4 weeks (around 12/11/2017).

## 2017-11-13 NOTE — LETTER
November 13, 2017      Nancy Colón MD  [address] [facility] [address]           [place] - Cardiology  [address] [facility]  [place] [address]  Phone: [phone]          Patient: Marie Reis Jr.   MR Number: [id number]   YOB: 1955   Date of Visit: 11/13/2017       Dear Dr. Nancy Colón:    Thank you for referring Marie Reis to me for evaluation. Attached you will find relevant portions of my assessment and plan of care.    If you have questions, please do not hesitate to call me. I look forward to following Marie Reis along with you.    Sincerely,    Debbie Salinas MD    Enclosure  CC:  No Recipients    If you would like to receive this communication electronically, please contact [email or web address] or [phone] to request more information on [employer] Link access.    For providers and/or their staff who would like to refer a patient to [facility], please contact us through our one-stop-shop provider referral line, [facility], at [phone].    If you feel you have received this communication in error or would no longer like to receive these types of communications, please e-mail [email or web address]

## 2017-11-14 ENCOUNTER — OFFICE VISIT (OUTPATIENT)
Dept: HEMATOLOGY/ONCOLOGY | Facility: CLINIC | Age: 62
End: 2017-11-14
Payer: MEDICARE

## 2017-11-14 VITALS
WEIGHT: 192 LBS | HEIGHT: 73 IN | BODY MASS INDEX: 25.45 KG/M2 | DIASTOLIC BLOOD PRESSURE: 75 MMHG | HEART RATE: 80 BPM | SYSTOLIC BLOOD PRESSURE: 135 MMHG

## 2017-11-14 DIAGNOSIS — D63.8 ANEMIA OF CHRONIC DISEASE: ICD-10-CM

## 2017-11-14 DIAGNOSIS — D47.2 SMOLDERING MYELOMA: Primary | ICD-10-CM

## 2017-11-14 DIAGNOSIS — I50.33 ACUTE ON CHRONIC DIASTOLIC HEART FAILURE: ICD-10-CM

## 2017-11-14 PROCEDURE — 99999 PR PBB SHADOW E&M-EST. PATIENT-LVL III: CPT | Mod: PBBFAC,,, | Performed by: INTERNAL MEDICINE

## 2017-11-14 PROCEDURE — 99213 OFFICE O/P EST LOW 20 MIN: CPT | Mod: PBBFAC | Performed by: INTERNAL MEDICINE

## 2017-11-14 PROCEDURE — 99215 OFFICE O/P EST HI 40 MIN: CPT | Mod: S$PBB,,, | Performed by: INTERNAL MEDICINE

## 2017-11-14 NOTE — LETTER
November 14, 2017      Nancy Colón MD  [address] [facility]  [place] [address]           [facility]  [facility]  [place] [address]  Phone: [phone]          Patient: Marie Ries Jr.   MR Number: [id number]   YOB: 1955   Date of Visit: 11/14/2017       Dear Dr. Nancy Colón:    Thank you for referring Marie Reis to me for evaluation. Attached you will find relevant portions of my assessment and plan of care.    If you have questions, please do not hesitate to call me. I look forward to following Marie Reis along with you.    Sincerely,    Artur Guaman Jr., MD    Enclosure  CC:  No Recipients    If you would like to receive this communication electronically, please contact [email or web address] or [phone] to request more information on [employer] Link access.    For providers and/or their staff who would like to refer a patient to [facility], please contact us through our one-stop-shop provider referral line, [facility] , at [phone].    If you feel you have received this communication in error or would no longer like to receive these types of communications, please e-mail [email or web address]

## 2017-11-14 NOTE — PROGRESS NOTES
Mr. Reis is a 62-year-old man whom I first evaluated in May 2016 because of a   monoclonal protein.  A serum protein electrophoresis at that time showed a   paraprotein measuring 0.86 g/dL and identified as IgG kappa.  Since that time,   there has been a gradual increase in the amount of paraprotein with most recent   serum protein electrophoresis showing 1.20 g/dL of this protein.  The   quantitative IgG has risen from 1442 to 1660 and the kappa/lambda ratio has risen   from 19 to 32.    He has had renal impairment since at least 2014.  When the paraprotein was first   found, a 24-hour urine contained 847 mg of protein with 3% of this being kappa   light chains.  I suspected then that most of his renal impairment was due to   diabetes mellitus and atherosclerotic vascular disease.    A bone marrow examination revealed 15% plasma cells with immunostaining and 13%   with the standard count.  A cytogenetic study was normal, but FISH studies   disclosed a plasma cell clone with trisomy 1, 7, 9 and 15.  A PET scan to assess   his bones was performed in January 2017 and showed no evidence of bone lesions.    He has poorly controlled diabetes mellitus.  He has had several strokes, the   last of which was in September 2016.  Today, he reports some chest tightness and   he was seen yesterday in the Department of Cardiology.  His BNP is elevated and   a recent echocardiogram showed diastolic dysfunction, pulmonary hypertension   and mild-to-moderate mitral regurgitation.  I understand that he is scheduled   for a stress cardiac study.    He continues to have symptoms of peripheral neuropathy.  He has some difficulty   with balance, but he has had no falls.  He frequently has a sensation of   numbness in his feet and he regularly takes gabapentin for this.  His diabetes   mellitus has not been well controlled, with his last A1c level at 8.2%.    ADDITIONAL PAST HISTORY, SYSTEM REVIEW, SOCIAL HISTORY AND FAMILY HISTORY:   Have   been reviewed and updated in the electronic record.    PHYSICAL EXAMINATION:  GENERAL APPEARANCE:  Well-developed, well-nourished man in no distress.  EYES:  No jaundice or pallor.  SINUSES:  No tenderness.  MOUTH AND THROAT:  Adequate dentition.  No mucosal lesions.  NECK:  No masses or bruits.  No thyroid abnormalities.  LYMPH NODES:  No enlarged cervical, axillary or inguinal nodes.  CHEST AND LUNGS:  Normal respiratory effort.  Clear to auscultation and   percussion.  HEART:  Regular rate and rhythm.  I/VI systolic murmur at the left sternal   border.  ABDOMEN:  Soft without masses or tenderness.  No hepatosplenomegaly.  EXTREMITIES:  Trace pretibial edema bilaterally.  No cyanosis.  PERIPHERAL VASCULATURE:  Adequate pulses in the feet and ankles.  NEUROLOGIC:  Memory is impaired.  Motor function is good.  He is   oriented      INITIAL LABORATORY STUDIES:  The blood counts on 10/25/2017 included hemoglobin   9.2, WBC 8270 and platelets 149,000.  Comprehensive metabolic profile on that   day was remarkable for creatinine 2.5, BUN 22, along with albumin 3.1.    IMPRESSION:  1.  Smoldering myeloma.  2.  Diabetes mellitus with neuropathy, nephropathy and anemia.  3.  Cardiac disease, probably coronary artery disease, with some signs and   symptoms of congestive heart failure.  4.  Pulmonary hypertension.    RECOMMENDATIONS:  1.  I had a very lengthy discussion with Mr. Reis today.  There has not been up   to now a definite indication for treating him for myeloma.  The major change   that has occurred, suggesting that therapy possibly could be needed is the deterioration in   his renal function and the associated decline in his hemoglobin.  I do   not know if these are a consequence of progressive diabetes and vascular disease   or whether the renal impairment may be a consequence of myeloma.  If it is, I   would likely recommend treatment.  If it is not, I will probably continue to   monitor him very  closely.  2.  He will submit a 24-hour urine for protein analysis with electrophoresis and   immunofixation.  If there is a substantial amount of a paraprotein present, I   will likely recommend institution of some myeloma therapy, although it will be   challenging to find drugs this man may be able to tolerate because of his   numerous serious illnesses.  3.  If the urine paraprotein level is not very high, I plan to repeat CBC, CMP,   serum protein electrophoresis, IgG level and free light chain analysis every two   months and have him return to see me (EPA visit) in six months with the same   laboratory studies about one week before his visit.    We had a very lengthy discussion today about his numerous problems and the   difficulty in knowing whether myeloma is harming him at   this time along with the difficulty in finding a potential therapy that he may   tolerate.  Most of his 40-minute visit today was devoted to discussing all these   issues with him.  I think he understands some of the complexity of it.  I am   not certain that he will follow all the medical advice that he is given,   particularly since I see blood pressure readings and glucose readings that   suggest poor compliance.      HILDA/IN  dd: 11/14/2017 11:59:27 (CST)  td: 11/15/2017 03:51:33 (CST)  Doc ID   #[id number]  Job ID #[id number]    CC:

## 2017-11-16 DIAGNOSIS — I10 HYPERTENSION, ESSENTIAL: ICD-10-CM

## 2017-11-16 RX ORDER — FUROSEMIDE 40 MG/1
40 TABLET ORAL 2 TIMES DAILY
Qty: 180 TABLET | Refills: 3 | Status: SHIPPED | OUTPATIENT
Start: 2017-11-16 | End: 2018-01-09 | Stop reason: SDUPTHER

## 2017-11-16 RX ORDER — CARVEDILOL 6.25 MG/1
6.25 TABLET ORAL 2 TIMES DAILY WITH MEALS
Qty: 180 TABLET | Refills: 3 | Status: SHIPPED | OUTPATIENT
Start: 2017-11-16 | End: 2017-12-11 | Stop reason: DRUGHIGH

## 2017-11-21 ENCOUNTER — TELEPHONE (OUTPATIENT)
Dept: CARDIOLOGY | Facility: CLINIC | Age: 62
End: 2017-11-21

## 2017-11-22 ENCOUNTER — CLINICAL SUPPORT (OUTPATIENT)
Dept: CARDIOLOGY | Facility: CLINIC | Age: 62
End: 2017-11-22
Attending: INTERNAL MEDICINE
Payer: MEDICARE

## 2017-11-22 ENCOUNTER — LAB VISIT (OUTPATIENT)
Dept: LAB | Facility: HOSPITAL | Age: 62
End: 2017-11-22
Attending: INTERNAL MEDICINE
Payer: MEDICARE

## 2017-11-22 DIAGNOSIS — I10 ESSENTIAL HYPERTENSION: ICD-10-CM

## 2017-11-22 DIAGNOSIS — N25.81 SECONDARY HYPERPARATHYROIDISM: ICD-10-CM

## 2017-11-22 DIAGNOSIS — I50.33 ACUTE ON CHRONIC DIASTOLIC HEART FAILURE: ICD-10-CM

## 2017-11-22 DIAGNOSIS — I65.23 CAROTID STENOSIS, ASYMPTOMATIC, BILATERAL: ICD-10-CM

## 2017-11-22 DIAGNOSIS — E78.5 DYSLIPIDEMIA: ICD-10-CM

## 2017-11-22 DIAGNOSIS — D47.2 SMOLDERING MYELOMA: ICD-10-CM

## 2017-11-22 DIAGNOSIS — D63.8 ANEMIA OF CHRONIC DISEASE: ICD-10-CM

## 2017-11-22 LAB
DIASTOLIC DYSFUNCTION: NO
INTERNAL CAROTID STENOSIS: ABNORMAL

## 2017-11-22 PROCEDURE — 93017 CV STRESS TEST TRACING ONLY: CPT | Mod: PBBFAC | Performed by: INTERNAL MEDICINE

## 2017-11-22 PROCEDURE — 84156 ASSAY OF PROTEIN URINE: CPT

## 2017-11-22 PROCEDURE — 84166 PROTEIN E-PHORESIS/URINE/CSF: CPT | Mod: 26,,, | Performed by: PATHOLOGY

## 2017-11-22 PROCEDURE — A9555 RB82 RUBIDIUM: HCPCS | Mod: PBBFAC | Performed by: INTERNAL MEDICINE

## 2017-11-22 PROCEDURE — 93880 EXTRACRANIAL BILAT STUDY: CPT | Mod: PBBFAC | Performed by: INTERNAL MEDICINE

## 2017-11-22 PROCEDURE — 84166 PROTEIN E-PHORESIS/URINE/CSF: CPT

## 2017-11-22 PROCEDURE — 93018 CV STRESS TEST I&R ONLY: CPT | Mod: S$PBB,,, | Performed by: INTERNAL MEDICINE

## 2017-11-22 PROCEDURE — 86335 IMMUNFIX E-PHORSIS/URINE/CSF: CPT

## 2017-11-22 PROCEDURE — 86335 IMMUNFIX E-PHORSIS/URINE/CSF: CPT | Mod: 26,,, | Performed by: PATHOLOGY

## 2017-11-22 PROCEDURE — 93016 CV STRESS TEST SUPVJ ONLY: CPT | Mod: S$PBB,,, | Performed by: INTERNAL MEDICINE

## 2017-11-22 PROCEDURE — 78492 MYOCRD IMG PET MLT RST&STRS: CPT | Mod: 26,S$PBB,, | Performed by: INTERNAL MEDICINE

## 2017-11-24 ENCOUNTER — PATIENT MESSAGE (OUTPATIENT)
Dept: CARDIOLOGY | Facility: CLINIC | Age: 62
End: 2017-11-24

## 2017-11-24 LAB
PROT 24H UR-MRATE: 4559 MG/SPEC
PROT UR-MCNC: 188 MG/DL
URINE COLLECTION DURATION: 24 HR
URINE VOLUME: 2425 ML

## 2017-11-27 LAB
INTERPRETATION UR IFE-IMP: NORMAL
PATHOLOGIST INTERPRETATION UIFE: NORMAL
PATHOLOGIST INTERPRETATION UPE: NORMAL
PROT PATTERN UR ELPH-IMP: NORMAL

## 2017-11-28 ENCOUNTER — TELEPHONE (OUTPATIENT)
Dept: HEMATOLOGY/ONCOLOGY | Facility: CLINIC | Age: 62
End: 2017-11-28

## 2017-11-28 NOTE — TELEPHONE ENCOUNTER
He has much more protein in urine now (4559 mg) than he did in May 2016 (824 mg), but only 4.6% is kappa chains now compared to 3% in 2016.    If this is due to myeloma, it is not a consequence of cast nephropathy; it could be due to one of the infrequent GN complications of monoclonal proteins. Only a renal biopsy would clarify that issue.    I do not advise myeloma therapy now, but I will observe his closer.    1. Cbc, cmp, spep, IgG and light chains monthly.  2. EPA 4 months with same labs a week before.

## 2017-11-29 ENCOUNTER — TELEPHONE (OUTPATIENT)
Dept: CARDIOLOGY | Facility: CLINIC | Age: 62
End: 2017-11-29

## 2017-11-29 NOTE — TELEPHONE ENCOUNTER
----- Message from Debbie Salinas MD sent at 11/29/2017  3:44 PM CST -----  Mr. Reis ,please review results of  Your carotid duplex. It shows mild-moderate bilateral blockages. You are on appropriate medical therapy. We will repeat this test in 6 months

## 2017-11-29 NOTE — TELEPHONE ENCOUNTER
----- Message from Debbie Salinas MD sent at 11/29/2017  3:48 PM CST -----  ....And your PET scan shows mildly depressed heart function with probable heart attack that happen in the past.  In order for us to investigate it further we would consider coronary angiography, but it would require a dye.  As we have discussed this could further compromise your kidney functions.  If you are feeling better on the increased medications and your symptoms of chest pain have resolved we could follow you medically . Otherwise I would recommend coronary angiography. Please let me know how you would like to proceed

## 2017-11-30 DIAGNOSIS — R94.39 ABNORMAL STRESS TEST: Primary | ICD-10-CM

## 2017-11-30 NOTE — TELEPHONE ENCOUNTER
Pt notified, verbalized understanding. Pt stated that he wants to have an appointment to discuss angiography.

## 2017-12-11 ENCOUNTER — OFFICE VISIT (OUTPATIENT)
Dept: CARDIOLOGY | Facility: CLINIC | Age: 62
End: 2017-12-11
Payer: MEDICARE

## 2017-12-11 ENCOUNTER — PROCEDURE VISIT (OUTPATIENT)
Dept: OPHTHALMOLOGY | Facility: CLINIC | Age: 62
End: 2017-12-11
Payer: MEDICARE

## 2017-12-11 VITALS
HEART RATE: 80 BPM | WEIGHT: 188.5 LBS | DIASTOLIC BLOOD PRESSURE: 93 MMHG | HEIGHT: 73 IN | SYSTOLIC BLOOD PRESSURE: 202 MMHG | BODY MASS INDEX: 24.98 KG/M2

## 2017-12-11 VITALS — SYSTOLIC BLOOD PRESSURE: 158 MMHG | DIASTOLIC BLOOD PRESSURE: 79 MMHG | HEART RATE: 76 BPM

## 2017-12-11 DIAGNOSIS — E78.5 DYSLIPIDEMIA: ICD-10-CM

## 2017-12-11 DIAGNOSIS — I50.33 ACUTE ON CHRONIC DIASTOLIC HEART FAILURE: ICD-10-CM

## 2017-12-11 DIAGNOSIS — D47.2 SMOLDERING MYELOMA: ICD-10-CM

## 2017-12-11 DIAGNOSIS — E11.65 POORLY CONTROLLED DIABETES MELLITUS: Primary | ICD-10-CM

## 2017-12-11 DIAGNOSIS — D63.8 ANEMIA OF CHRONIC DISEASE: ICD-10-CM

## 2017-12-11 DIAGNOSIS — H35.033 HYPERTENSIVE RETINOPATHY OF BOTH EYES: ICD-10-CM

## 2017-12-11 DIAGNOSIS — I10 ESSENTIAL HYPERTENSION: ICD-10-CM

## 2017-12-11 PROCEDURE — 67028 INJECTION EYE DRUG: CPT | Mod: S$PBB,RT,, | Performed by: OPHTHALMOLOGY

## 2017-12-11 PROCEDURE — 99999 PR PBB SHADOW E&M-EST. PATIENT-LVL III: CPT | Mod: PBBFAC,,, | Performed by: INTERNAL MEDICINE

## 2017-12-11 PROCEDURE — 92134 CPTRZ OPH DX IMG PST SGM RTA: CPT | Mod: PBBFAC | Performed by: OPHTHALMOLOGY

## 2017-12-11 PROCEDURE — 67028 INJECTION EYE DRUG: CPT | Mod: PBBFAC,RT | Performed by: OPHTHALMOLOGY

## 2017-12-11 PROCEDURE — 99213 OFFICE O/P EST LOW 20 MIN: CPT | Mod: PBBFAC,PO | Performed by: INTERNAL MEDICINE

## 2017-12-11 PROCEDURE — 99214 OFFICE O/P EST MOD 30 MIN: CPT | Mod: S$PBB,,, | Performed by: INTERNAL MEDICINE

## 2017-12-11 PROCEDURE — 92014 COMPRE OPH EXAM EST PT 1/>: CPT | Mod: 25,S$PBB,, | Performed by: OPHTHALMOLOGY

## 2017-12-11 PROCEDURE — C9257 BEVACIZUMAB INJECTION: HCPCS | Mod: PBBFAC | Performed by: OPHTHALMOLOGY

## 2017-12-11 RX ORDER — CARVEDILOL 12.5 MG/1
12.5 TABLET ORAL 2 TIMES DAILY WITH MEALS
Qty: 180 TABLET | Refills: 3 | Status: SHIPPED | OUTPATIENT
Start: 2017-12-11 | End: 2017-12-27 | Stop reason: SDUPTHER

## 2017-12-11 RX ORDER — NITROGLYCERIN 0.4 MG/1
0.4 TABLET SUBLINGUAL EVERY 5 MIN PRN
Qty: 90 TABLET | Refills: 1 | Status: CANCELLED | OUTPATIENT
Start: 2017-12-11

## 2017-12-11 RX ORDER — CARVEDILOL 12.5 MG/1
12.5 TABLET ORAL 2 TIMES DAILY WITH MEALS
COMMUNITY
End: 2017-12-11 | Stop reason: SDUPTHER

## 2017-12-11 RX ORDER — NITROGLYCERIN 0.4 MG/1
0.4 TABLET SUBLINGUAL EVERY 5 MIN PRN
COMMUNITY
End: 2018-03-19 | Stop reason: SDUPTHER

## 2017-12-11 RX ORDER — NITROGLYCERIN 0.3 MG/1
0.4 TABLET SUBLINGUAL ONCE
Status: COMPLETED | OUTPATIENT
Start: 2017-12-11 | End: 2017-12-11

## 2017-12-11 RX ADMIN — NITROGLYCERIN 0.3 MG: 0.3 TABLET SUBLINGUAL at 08:12

## 2017-12-11 RX ADMIN — BEVACIZUMAB 1.25 MG: 100 INJECTION, SOLUTION INTRAVENOUS at 11:12

## 2017-12-11 NOTE — PROGRESS NOTES
Subjective:   Patient ID:  Marie Reis Jr. is a 62 y.o. male who presents for follow-up of Congestive Heart Failure      HPI: Patient returns with uncontrolled HTN.  Admits to dietary non-compliance.  Last night had Papayes chicken for dinner.  He is not checking BP at home.  Has two BP machines.  Still has occasional chest discomfort, but peripheral edema has resolved.    Lab Results   Component Value Date     11/08/2017    K 4.1 11/08/2017     11/08/2017    CO2 26 11/08/2017    BUN 20 11/08/2017    CREATININE 2.3 (H) 11/08/2017    GLU 88 11/08/2017    HGBA1C 8.2 (H) 10/25/2017    MG 2.4 09/17/2016    AST 21 10/25/2017    ALT 15 10/25/2017    ALBUMIN 2.9 (L) 11/08/2017    PROT 7.1 10/25/2017    BILITOT 0.7 10/25/2017    WBC 8.27 10/25/2017    HGB 9.2 (L) 10/25/2017    HCT 27.5 (L) 10/25/2017    MCV 82 10/25/2017     (L) 10/25/2017    INR 1.0 09/17/2016    PSA 1.5 06/26/2015    TSH 1.851 09/17/2016    CHOL 119 (L) 10/25/2017    HDL 32 (L) 10/25/2017    LDLCALC 68.6 10/25/2017    TRIG 92 10/25/2017       Review of Systems   Constitution: Positive for weakness, malaise/fatigue and weight gain.   HENT: Negative.    Eyes: Negative.    Cardiovascular: Positive for chest pain, dyspnea on exertion and irregular heartbeat. Negative for claudication, leg swelling, near-syncope, palpitations and syncope.   Respiratory: Negative.  Negative for cough, shortness of breath, snoring and wheezing.    Endocrine: Negative.  Negative for cold intolerance, heat intolerance, polydipsia, polyphagia and polyuria.   Skin: Negative.    Musculoskeletal: Positive for back pain and muscle weakness.   Gastrointestinal: Negative.    Genitourinary: Negative.    Neurological: Positive for dizziness, light-headedness, loss of balance, numbness and paresthesias.   Psychiatric/Behavioral: Positive for depression. The patient is nervous/anxious.        Objective:   Physical Exam   Constitutional: He is oriented to person, place, and  "time. He appears well-developed and well-nourished.   BP (!) 202/93   Pulse 80   Ht 6' 1" (1.854 m)   Wt 85.5 kg (188 lb 7.9 oz)   BMI 24.87 kg/m²      HENT:   Head: Normocephalic.   Eyes: Pupils are equal, round, and reactive to light.   Neck: Normal range of motion. Neck supple. Carotid bruit is not present. No thyromegaly present.   Cardiovascular: Normal rate, regular rhythm, normal heart sounds and intact distal pulses.  Exam reveals no gallop and no friction rub.    No murmur heard.  Pulses:       Carotid pulses are 2+ on the right side, and 2+ on the left side.       Radial pulses are 2+ on the right side, and 2+ on the left side.        Femoral pulses are 2+ on the right side, and 2+ on the left side.       Popliteal pulses are 2+ on the right side, and 2+ on the left side.        Dorsalis pedis pulses are 2+ on the right side, and 2+ on the left side.        Posterior tibial pulses are 2+ on the right side, and 2+ on the left side.   Pulmonary/Chest: Effort normal and breath sounds normal. No respiratory distress. He has no wheezes. He has no rales. He exhibits no tenderness.   Abdominal: Soft. Bowel sounds are normal.   Musculoskeletal: Normal range of motion. He exhibits no edema.   Neurological: He is alert and oriented to person, place, and time.   Skin: Skin is warm and dry.   Psychiatric: He has a normal mood and affect.   Nursing note and vitals reviewed.        Assessment and Plan:     Problem List Items Addressed This Visit        Cardiology Problems    Essential hypertension    Relevant Medications    carvedilol (COREG) 12.5 MG tablet    Other Relevant Orders    CAR US Renal Artery Stenosis Hyperten Comp    Acute on chronic diastolic heart failure    Relevant Medications    carvedilol (COREG) 12.5 MG tablet    Other Relevant Orders    CAR US Renal Artery Stenosis Hyperten Comp       Other    Dyslipidemia    Relevant Medications    carvedilol (COREG) 12.5 MG tablet    Other Relevant Orders    " "CAR US Renal Artery Stenosis Hyperten Comp    Poorly controlled diabetes mellitus - Primary    Relevant Medications    carvedilol (COREG) 12.5 MG tablet    Other Relevant Orders    CAR US Renal Artery Stenosis Hyperten Comp    Uncontrolled diabetes with stage 3 chronic kidney disease GFR 30-59    Relevant Medications    carvedilol (COREG) 12.5 MG tablet    Other Relevant Orders    CAR US Renal Artery Stenosis Hyperten Comp    Anemia of chronic disease    Relevant Medications    carvedilol (COREG) 12.5 MG tablet    Other Relevant Orders    CAR US Renal Artery Stenosis Hyperten Comp    Smoldering myeloma    Relevant Medications    carvedilol (COREG) 12.5 MG tablet    Other Relevant Orders    CAR  Renal Artery Stenosis Hyperten Comp          Patient's Medications   New Prescriptions    No medications on file   Previous Medications    AMLODIPINE (NORVASC) 10 MG TABLET    Take 1 tablet (10 mg total) by mouth once daily.    ATORVASTATIN (LIPITOR) 80 MG TABLET    Take 1 tablet (80 mg total) by mouth once daily.    BLOOD SUGAR DIAGNOSTIC STRP    1 strip by Misc.(Non-Drug; Combo Route) route after meals as needed.    CLOPIDOGREL (PLAVIX) 75 MG TABLET    Take 1 tablet (75 mg total) by mouth once daily.    FLUTICASONE (FLONASE) 50 MCG/ACTUATION NASAL SPRAY    1 spray by Each Nare route 2 (two) times daily as needed for Rhinitis.    FUROSEMIDE (LASIX) 40 MG TABLET    Take 1 tablet (40 mg total) by mouth 2 (two) times daily.    GABAPENTIN (NEURONTIN) 300 MG CAPSULE    Take 1 capsule (300 mg total) by mouth 2 (two) times daily.    INSULIN ASPART (NOVOLOG) 100 UNIT/ML INPN PEN    Inject 8 units w/ meals plus scale 150-200 +2, 201-250 +4, 251-300 +6, 301-350 +8, >350 +10. Snack 4 units.    INSULIN GLARGINE (LANTUS SOLOSTAR) 100 UNIT/ML (3 ML) INPN PEN    Inject 28 units at night.    INSULIN NEEDLES, DISPOSABLE, 31 X 5/16 " NDLE    Pt to use pen needle with Lantus daily    LISINOPRIL (PRINIVIL,ZESTRIL) 40 MG TABLET    TAKE 1 " TABLET (40 MG TOTAL) BY MOUTH ONCE DAILY.    MECLIZINE (ANTIVERT) 25 MG TABLET    Take 1 tablet (25 mg total) by mouth 3 (three) times daily as needed for Dizziness.    METHYL SALICYLATE/MENTHOL (MENTHOLATUM DEEP HEAT RUB TOP)    Apply topically.    MIRTAZAPINE (REMERON) 30 MG TABLET    Take 30 mg by mouth every evening.     MONTELUKAST (SINGULAIR) 10 MG TABLET    Take 1 tablet (10 mg total) by mouth every evening.    NITROGLYCERIN (NITROSTAT) 0.4 MG SL TABLET    Place 0.4 mg under the tongue every 5 (five) minutes as needed for Chest pain.    ONDANSETRON (ZOFRAN-ODT) 8 MG TBDL    Take 1 tablet (8 mg total) by mouth every 6 (six) hours as needed.   Modified Medications    Modified Medication Previous Medication    CARVEDILOL (COREG) 12.5 MG TABLET carvedilol (COREG) 12.5 MG tablet       Take 1 tablet (12.5 mg total) by mouth 2 (two) times daily with meals.    Take 12.5 mg by mouth 2 (two) times daily with meals.   Discontinued Medications    CARVEDILOL (COREG) 6.25 MG TABLET    Take 1 tablet (6.25 mg total) by mouth 2 (two) times daily with meals.   We gave patient I spray of s.l. Nitro with BP decreasing to 120/66.  He was stongly advised to be compliant with medications and diet.  Carvedilol was increased to 12.5mg BID and NTG s.l. Prescribed.  We discussed continued medical therapy, as presently his symptoms are controlled and he needs better BP control to improve his symptoms.  I am also concerned about CKD 3.  Patient will establish with interventional cardiology in case angiography is necessary in the future.    Return in about 6 months (around 6/11/2018).

## 2017-12-11 NOTE — PATIENT INSTRUCTIONS

## 2017-12-13 DIAGNOSIS — I10 HYPERTENSION, ESSENTIAL: ICD-10-CM

## 2017-12-14 RX ORDER — FUROSEMIDE 40 MG/1
40 TABLET ORAL 2 TIMES DAILY
Qty: 60 TABLET | Refills: 0 | OUTPATIENT
Start: 2017-12-14

## 2017-12-14 RX ORDER — CARVEDILOL 6.25 MG/1
6.25 TABLET ORAL 2 TIMES DAILY WITH MEALS
Qty: 60 TABLET | Refills: 0 | OUTPATIENT
Start: 2017-12-14

## 2017-12-15 ENCOUNTER — LAB VISIT (OUTPATIENT)
Dept: LAB | Facility: HOSPITAL | Age: 62
End: 2017-12-15
Attending: INTERNAL MEDICINE
Payer: MEDICARE

## 2017-12-15 DIAGNOSIS — I50.33 ACUTE ON CHRONIC DIASTOLIC HEART FAILURE: ICD-10-CM

## 2017-12-15 DIAGNOSIS — R80.9 PROTEINURIA: ICD-10-CM

## 2017-12-15 DIAGNOSIS — N25.81 SECONDARY HYPERPARATHYROIDISM: ICD-10-CM

## 2017-12-15 DIAGNOSIS — D47.2 MONOCLONAL GAMMOPATHY: ICD-10-CM

## 2017-12-15 DIAGNOSIS — D47.2 SMOLDERING MYELOMA: ICD-10-CM

## 2017-12-15 DIAGNOSIS — D63.8 ANEMIA OF CHRONIC DISEASE: ICD-10-CM

## 2017-12-15 DIAGNOSIS — I10 ESSENTIAL HYPERTENSION: ICD-10-CM

## 2017-12-15 DIAGNOSIS — I65.23 CAROTID STENOSIS, ASYMPTOMATIC, BILATERAL: ICD-10-CM

## 2017-12-15 LAB
ALBUMIN SERPL BCP-MCNC: 3.1 G/DL
ALP SERPL-CCNC: 121 U/L
ALT SERPL W/O P-5'-P-CCNC: 12 U/L
ANION GAP SERPL CALC-SCNC: 7 MMOL/L
AST SERPL-CCNC: 21 U/L
BASOPHILS # BLD AUTO: 0.02 K/UL
BASOPHILS NFR BLD: 0.2 %
BILIRUB SERPL-MCNC: 0.6 MG/DL
BNP SERPL-MCNC: 546 PG/ML
BUN SERPL-MCNC: 31 MG/DL
CALCIUM SERPL-MCNC: 9.2 MG/DL
CHLORIDE SERPL-SCNC: 107 MMOL/L
CO2 SERPL-SCNC: 25 MMOL/L
CREAT SERPL-MCNC: 2.5 MG/DL
DIFFERENTIAL METHOD: ABNORMAL
EOSINOPHIL # BLD AUTO: 0.2 K/UL
EOSINOPHIL NFR BLD: 1.9 %
ERYTHROCYTE [DISTWIDTH] IN BLOOD BY AUTOMATED COUNT: 12.9 %
EST. GFR  (AFRICAN AMERICAN): 30.9 ML/MIN/1.73 M^2
EST. GFR  (NON AFRICAN AMERICAN): 26.7 ML/MIN/1.73 M^2
ESTIMATED AVG GLUCOSE: 171 MG/DL
GLUCOSE SERPL-MCNC: 96 MG/DL
HBA1C MFR BLD HPLC: 7.6 %
HCT VFR BLD AUTO: 30.4 %
HGB BLD-MCNC: 10.2 G/DL
IGG SERPL-MCNC: 1745 MG/DL
IMM GRANULOCYTES # BLD AUTO: 0.04 K/UL
IMM GRANULOCYTES NFR BLD AUTO: 0.5 %
LYMPHOCYTES # BLD AUTO: 1.5 K/UL
LYMPHOCYTES NFR BLD: 17.1 %
MCH RBC QN AUTO: 27.6 PG
MCHC RBC AUTO-ENTMCNC: 33.6 G/DL
MCV RBC AUTO: 82 FL
MONOCYTES # BLD AUTO: 0.5 K/UL
MONOCYTES NFR BLD: 5.2 %
NEUTROPHILS # BLD AUTO: 6.6 K/UL
NEUTROPHILS NFR BLD: 75.1 %
NRBC BLD-RTO: 0 /100 WBC
PLATELET # BLD AUTO: 158 K/UL
PMV BLD AUTO: 10.5 FL
POTASSIUM SERPL-SCNC: 3.9 MMOL/L
PROT SERPL-MCNC: 7.3 G/DL
RBC # BLD AUTO: 3.7 M/UL
SODIUM SERPL-SCNC: 139 MMOL/L
TSH SERPL DL<=0.005 MIU/L-ACNC: 2.46 UIU/ML
WBC # BLD AUTO: 8.83 K/UL

## 2017-12-15 PROCEDURE — 36415 COLL VENOUS BLD VENIPUNCTURE: CPT | Mod: PO

## 2017-12-15 PROCEDURE — 84165 PROTEIN E-PHORESIS SERUM: CPT

## 2017-12-15 PROCEDURE — 83880 ASSAY OF NATRIURETIC PEPTIDE: CPT

## 2017-12-15 PROCEDURE — 85025 COMPLETE CBC W/AUTO DIFF WBC: CPT

## 2017-12-15 PROCEDURE — 82784 ASSAY IGA/IGD/IGG/IGM EACH: CPT

## 2017-12-15 PROCEDURE — 84165 PROTEIN E-PHORESIS SERUM: CPT | Mod: 26,,, | Performed by: PATHOLOGY

## 2017-12-15 PROCEDURE — 80053 COMPREHEN METABOLIC PANEL: CPT

## 2017-12-15 PROCEDURE — 84443 ASSAY THYROID STIM HORMONE: CPT

## 2017-12-15 PROCEDURE — 83520 IMMUNOASSAY QUANT NOS NONAB: CPT

## 2017-12-15 PROCEDURE — 83036 HEMOGLOBIN GLYCOSYLATED A1C: CPT

## 2017-12-18 ENCOUNTER — CLINICAL SUPPORT (OUTPATIENT)
Dept: CARDIOLOGY | Facility: CLINIC | Age: 62
End: 2017-12-18
Attending: INTERNAL MEDICINE
Payer: MEDICARE

## 2017-12-18 ENCOUNTER — TELEPHONE (OUTPATIENT)
Dept: CARDIOLOGY | Facility: CLINIC | Age: 62
End: 2017-12-18

## 2017-12-18 DIAGNOSIS — E78.5 DYSLIPIDEMIA: ICD-10-CM

## 2017-12-18 DIAGNOSIS — I10 ESSENTIAL HYPERTENSION: ICD-10-CM

## 2017-12-18 DIAGNOSIS — D63.8 ANEMIA OF CHRONIC DISEASE: ICD-10-CM

## 2017-12-18 DIAGNOSIS — D47.2 SMOLDERING MYELOMA: ICD-10-CM

## 2017-12-18 DIAGNOSIS — E11.65 POORLY CONTROLLED DIABETES MELLITUS: ICD-10-CM

## 2017-12-18 DIAGNOSIS — I50.33 ACUTE ON CHRONIC DIASTOLIC HEART FAILURE: ICD-10-CM

## 2017-12-18 LAB
ALBUMIN SERPL ELPH-MCNC: 3.48 G/DL
ALPHA1 GLOB SERPL ELPH-MCNC: 0.3 G/DL
ALPHA2 GLOB SERPL ELPH-MCNC: 0.78 G/DL
B-GLOBULIN SERPL ELPH-MCNC: 0.75 G/DL
GAMMA GLOB SERPL ELPH-MCNC: 1.69 G/DL
KAPPA LC SER QL IA: 93.54 MG/DL
KAPPA LC/LAMBDA SER IA: 34.39
LAMBDA LC SER QL IA: 2.72 MG/DL
PATHOLOGIST INTERPRETATION SPE: NORMAL
PROT SERPL-MCNC: 7 G/DL

## 2017-12-18 PROCEDURE — 93975 VASCULAR STUDY: CPT | Mod: PBBFAC,PO | Performed by: INTERNAL MEDICINE

## 2017-12-18 NOTE — TELEPHONE ENCOUNTER
----- Message from Debbie Salinas MD sent at 12/18/2017 11:00 AM CST -----  Mr. Reis, Please review results of your blood work.  Heart failure test is better, but not at goal. Diabetes test is also better, but not at goal. Please keep low salt diet and return to clinic to check your blood presse. It is very important to keep your blood pressure controlled.

## 2017-12-18 NOTE — TELEPHONE ENCOUNTER
----- Message from Debbie Salinas MD sent at 12/18/2017 12:41 PM CST -----  Mr. Reis ,[;ease review results of your kidney ultrasound. It did not show any blockages in the kidney vessels. I think this test will be also reviewed with you by kidney doctors and interventional doctors.

## 2017-12-19 ENCOUNTER — PATIENT MESSAGE (OUTPATIENT)
Dept: HEMATOLOGY/ONCOLOGY | Facility: CLINIC | Age: 62
End: 2017-12-19

## 2017-12-27 ENCOUNTER — INITIAL CONSULT (OUTPATIENT)
Dept: CARDIOLOGY | Facility: CLINIC | Age: 62
End: 2017-12-27
Payer: MEDICARE

## 2017-12-27 VITALS
WEIGHT: 189.13 LBS | HEART RATE: 74 BPM | DIASTOLIC BLOOD PRESSURE: 80 MMHG | OXYGEN SATURATION: 100 % | SYSTOLIC BLOOD PRESSURE: 140 MMHG | BODY MASS INDEX: 25.62 KG/M2 | HEIGHT: 72 IN

## 2017-12-27 DIAGNOSIS — I20.9 ANGINA, CLASS II: ICD-10-CM

## 2017-12-27 DIAGNOSIS — D47.2 SMOLDERING MYELOMA: ICD-10-CM

## 2017-12-27 DIAGNOSIS — Z86.73 HISTORY OF CVA (CEREBROVASCULAR ACCIDENT): ICD-10-CM

## 2017-12-27 DIAGNOSIS — I10 ESSENTIAL HYPERTENSION: ICD-10-CM

## 2017-12-27 DIAGNOSIS — R94.39 ABNORMAL STRESS TEST: Primary | ICD-10-CM

## 2017-12-27 DIAGNOSIS — D63.8 ANEMIA OF CHRONIC DISEASE: ICD-10-CM

## 2017-12-27 DIAGNOSIS — I50.33 ACUTE ON CHRONIC DIASTOLIC HEART FAILURE: ICD-10-CM

## 2017-12-27 DIAGNOSIS — Z86.73 HISTORY OF STROKE: ICD-10-CM

## 2017-12-27 DIAGNOSIS — E78.5 DYSLIPIDEMIA: ICD-10-CM

## 2017-12-27 DIAGNOSIS — E11.65 POORLY CONTROLLED DIABETES MELLITUS: ICD-10-CM

## 2017-12-27 PROCEDURE — 99204 OFFICE O/P NEW MOD 45 MIN: CPT | Mod: S$PBB,,, | Performed by: INTERNAL MEDICINE

## 2017-12-27 PROCEDURE — 99999 PR PBB SHADOW E&M-EST. PATIENT-LVL IV: CPT | Mod: PBBFAC,,, | Performed by: INTERNAL MEDICINE

## 2017-12-27 PROCEDURE — 99214 OFFICE O/P EST MOD 30 MIN: CPT | Mod: PBBFAC | Performed by: INTERNAL MEDICINE

## 2017-12-27 RX ORDER — CARVEDILOL 12.5 MG/1
12.5 TABLET ORAL 2 TIMES DAILY WITH MEALS
Qty: 180 TABLET | Refills: 3 | Status: SHIPPED | OUTPATIENT
Start: 2017-12-27 | End: 2017-12-29 | Stop reason: SDUPTHER

## 2017-12-27 NOTE — LETTER
December 27, 2017      Debbie Salinas MD  [address]  [address]th [address]  [place] [address]           [facility]  [address]  [facility] [address]  Phone: [phone]          Patient: Marie Reis Jr.   MR Number: [id number]   YOB: 1955   Date of Visit: 12/27/2017       Dear Dr. Debbie Salinas:    Thank you for referring Marie Reis to me for evaluation. Attached you will find relevant portions of my assessment and plan of care.    If you have questions, please do not hesitate to call me. I look forward to following Marie Reis along with you.    Sincerely,    Foreign Urbano MD    Enclosure  CC:  No Recipients    If you would like to receive this communication electronically, please contact [email or web address] or [phone] to request more information on [employer] Link access.    For providers and/or their staff who would like to refer a patient to [facility], please contact us through our one-stop-shop provider referral line, [facility], at [phone].    If you feel you have received this communication in error or would no longer like to receive these types of communications, please e-mail [email or web address]

## 2017-12-27 NOTE — ASSESSMENT & PLAN NOTE
Low risk stress test with mostly scar involving LCx territory (<5% myocardium)  Patient has rapid progression in CKD with GFR < 30 (probably multifactorial with uncontrolled HTN, DM2 and MGUS)  Scheduled to see nephrology  Medical management with asa/statin/BB/CCB - restart amlodipine  Will re-evaluate in 6 mths unless symptoms progress

## 2017-12-27 NOTE — PROGRESS NOTES
Cardiology Clinic Note  Reason for Visit: angina  Referring MD: Dr Salinas    HPI:   61 yo male with HTN, DM2, MGUS, nephrotic range proteinuria    ROS:    Constitution: Negative for fever or chills. Negative for weight loss or gain.   HENT: Negative for sore throat or headaches. Negative for rhinorrhea.  Eyes: Negative for blurred or double vision.   Cardiovascular: See above  Pulmonary: Negative for SOB. Negative for cough.   Gastrointestinal: Negative for abdominal pain. Negative for nausea/ vomiting. Negative for diarrhea.   : Negative for dysuria.   Neurological: Negative for focal weakness or sensory changes.  PMH:     Past Medical History:   Diagnosis Date    Cataract     Cervical spondylosis 10/1/2015    Diabetes mellitus, type 2     Hypertension     Smoldering myeloma 6/2/2016    Strabismus     Stroke 2014     Past Surgical History:   Procedure Laterality Date    CATARACT EXTRACTION W/  INTRAOCULAR LENS IMPLANT Left 4/30/15    [facility]    HAND SURGERY  2/2012    DR. BAKER [facility]    left hand fracture sx      LT EYE   5/2/15    DR KULLMAN [facility]     Allergies:   Review of patient's allergies indicates:  No Known Allergies  Medications:     Current Outpatient Prescriptions on File Prior to Visit   Medication Sig Dispense Refill    atorvastatin (LIPITOR) 80 MG tablet Take 1 tablet (80 mg total) by mouth once daily. 30 tablet 0    blood sugar diagnostic Strp 1 strip by Misc.(Non-Drug; Combo Route) route after meals as needed.      clopidogrel (PLAVIX) 75 mg tablet Take 1 tablet (75 mg total) by mouth once daily. 90 tablet 3    fluticasone (FLONASE) 50 mcg/actuation nasal spray 1 spray by Each Nare route 2 (two) times daily as needed for Rhinitis. 16 g 5    furosemide (LASIX) 40 MG tablet Take 1 tablet (40 mg total) by mouth 2 (two) times daily. 180 tablet 3    gabapentin (NEURONTIN) 300 MG capsule Take 1 capsule (300 mg total) by mouth 2 (two) times daily. 180 capsule 1    insulin aspart  "(NOVOLOG) 100 unit/mL InPn pen Inject 8 units w/ meals plus scale 150-200 +2, 201-250 +4, 251-300 +6, 301-350 +8, >350 +10. Snack 4 units. 1 Box 6    insulin glargine (LANTUS SOLOSTAR) 100 unit/mL (3 mL) InPn pen Inject 28 units at night. 15 mL 6    insulin needles, disposable, 31 X 5/16 " Ndle Pt to use pen needle with Lantus daily 100 each 3    lisinopril (PRINIVIL,ZESTRIL) 40 MG tablet TAKE 1 TABLET (40 MG TOTAL) BY MOUTH ONCE DAILY. 30 tablet 3    meclizine (ANTIVERT) 25 mg tablet Take 1 tablet (25 mg total) by mouth 3 (three) times daily as needed for Dizziness. 60 tablet 0    METHYL SALICYLATE/MENTHOL (MENTHOLATUM DEEP HEAT RUB TOP) Apply topically.      mirtazapine (REMERON) 30 MG tablet Take 30 mg by mouth every evening.       montelukast (SINGULAIR) 10 mg tablet Take 1 tablet (10 mg total) by mouth every evening. 90 tablet 1    ondansetron (ZOFRAN-ODT) 8 MG TbDL Take 1 tablet (8 mg total) by mouth every 6 (six) hours as needed. 30 tablet 2    [DISCONTINUED] carvedilol (COREG) 12.5 MG tablet Take 1 tablet (12.5 mg total) by mouth 2 (two) times daily with meals. 180 tablet 3    amlodipine (NORVASC) 10 MG tablet Take 1 tablet (10 mg total) by mouth once daily. 90 tablet 1    nitroGLYCERIN (NITROSTAT) 0.4 MG SL tablet Place 0.4 mg under the tongue every 5 (five) minutes as needed for Chest pain.       No current facility-administered medications on file prior to visit.      Social History:     Social History   Substance Use Topics    Smoking status: Never Smoker    Smokeless tobacco: Never Used    Alcohol use No     Family History:     Family History   Problem Relation Age of Onset    Diabetes Mother     Diabetes Father     Diabetes Sister     Diabetes Brother     No Known Problems Maternal Aunt     No Known Problems Maternal Uncle     No Known Problems Paternal Aunt     No Known Problems Paternal Uncle     No Known Problems Maternal Grandmother     No Known Problems Maternal Grandfather "     No Known Problems Paternal Grandmother     No Known Problems Paternal Grandfather     Blindness Neg Hx     Anesthesia problems Neg Hx     Amblyopia Neg Hx     Cancer Neg Hx     Cataracts Neg Hx     Glaucoma Neg Hx     Hypertension Neg Hx     Macular degeneration Neg Hx     Retinal detachment Neg Hx     Strabismus Neg Hx     Stroke Neg Hx     Thyroid disease Neg Hx     Heart attack Neg Hx     Heart disease Neg Hx     Heart failure Neg Hx     Hyperlipidemia Neg Hx      Physical Exam:   BP (!) 140/80 (BP Location: Left arm, Patient Position: Sitting, BP Method: Large (Manual))   Pulse 74   Ht 6' (1.829 m)   Wt 85.8 kg (189 lb 2.5 oz)   SpO2 100%   BMI 25.65 kg/m²      Constitutional: No acute distress, conversant  HEENT: Sclera anicteric, Pupils equal, round and reactive to light, extraocular motions intact, Oropharynx clear  Neck: No JVD, no carotid bruits  Cardiovascular: regular rate and rhythm, no murmur, rubs or gallops, normal S1/S2  Pulmonary: Clear to auscultation bilaterally  Abdominal: Abdomen soft, nontender, nondistended, positive bowel sounds  Extremities: No lower extremity edema,   Pulses: 2+ BL Radial, 2+ BL carotid, 2+ BL DP, 2+ BL PT, normal Allens Test BL  Skin: No ecchymosis, erythema, or ulcers  Psych: Alert and oriented x 3, appropriate affect  Neuro: CNII-XII intact, no focal deficits    Labs:     Lab Results   Component Value Date     12/26/2017    K 4.6 12/26/2017     12/26/2017    CO2 26 12/26/2017    BUN 31 (H) 12/26/2017    CREATININE 2.8 (H) 12/26/2017    ANIONGAP 7 (L) 12/26/2017     Lab Results   Component Value Date    AST 21 12/15/2017    ALT 12 12/15/2017    ALKPHOS 121 12/15/2017    BILITOT 0.6 12/15/2017    ALBUMIN 3.1 (L) 12/15/2017     Lab Results   Component Value Date    CALCIUM 9.0 12/26/2017    MG 2.4 09/17/2016    PHOS 3.3 11/08/2017     Lab Results   Component Value Date     (H) 12/15/2017    BNP 1,491 (H) 10/25/2017    BNP 51  01/12/2014    Lab Results   Component Value Date    WBC 7.35 12/26/2017    HGB 10.4 (L) 12/26/2017    HCT 30.8 (L) 12/26/2017     12/26/2017    GRAN 6.6 12/15/2017    GRAN 75.1 (H) 12/15/2017     Lab Results   Component Value Date    INR 1.0 12/26/2017     Lab Results   Component Value Date    CHOL 119 (L) 10/25/2017    HDL 32 (L) 10/25/2017    LDLCALC 68.6 10/25/2017    TRIG 92 10/25/2017     Lab Results   Component Value Date    HGBA1C 7.6 (H) 12/15/2017     Lab Results   Component Value Date    TSH 2.459 12/15/2017          Imaging:         EF   Date Value Ref Range Status   10/26/2017 55 55 - 65    09/19/2016 58 55 - 65    03/31/2015 55 55 - 65          Assessment:     Plan:   Essential hypertension  Suboptimally controlled  Patient reports compliance with meds - although has not been taking amlodipine 10 mg daily  Restart amlodipine  Continue coreg 12.5 bid and lisinopril 40 mg daily    Acute on chronic diastolic heart failure  euvolemic on exam today  Asymptomatic from a heart standpoint    History of stroke  Carotid surveillance - non critical disease  Improve BP control    Angina, class II  Low risk stress test with mostly scar involving LCx territory (<5% myocardium)  Patient has rapid progression in CKD with GFR < 30 (probably multifactorial with uncontrolled HTN, DM2 and MGUS)  Scheduled to see nephrology  Medical management with asa/statin/BB/CCB - restart amlodipine  Will re-evaluate in 6 mths unless symptoms progress      Signed:  Poncho Gore MD  Interventional Cardiology Fellow  [phone]  12/27/2017 9:24 AM    Follow-up:   Future Appointments  Date Time Provider Department Center   1/11/2018 10:30 AM Alissa Dotson MD [facility] NEPHRO [facility]   1/15/2018 9:00 AM LAB, [facility] [place]   1/31/2018 8:00 AM BELEM Leslie, FNP [facility] ENDODIA [facility]   2/6/2018 8:40 AM SILVIA Al MD [facility] OPHTHAL [facility]   2/15/2018 9:00 AM LAB, [facility] [place]

## 2017-12-27 NOTE — ASSESSMENT & PLAN NOTE
Suboptimally controlled  Patient reports compliance with meds - although has not been taking amlodipine 10 mg daily  Restart amlodipine  Continue coreg 12.5 bid and lisinopril 40 mg daily

## 2017-12-29 DIAGNOSIS — D47.2 SMOLDERING MYELOMA: ICD-10-CM

## 2017-12-29 DIAGNOSIS — N18.30 BENIGN HYPERTENSION WITH CHRONIC KIDNEY DISEASE, STAGE III: ICD-10-CM

## 2017-12-29 DIAGNOSIS — I50.33 ACUTE ON CHRONIC DIASTOLIC HEART FAILURE: ICD-10-CM

## 2017-12-29 DIAGNOSIS — I12.9 BENIGN HYPERTENSION WITH CHRONIC KIDNEY DISEASE, STAGE III: ICD-10-CM

## 2017-12-29 DIAGNOSIS — I10 ESSENTIAL HYPERTENSION: ICD-10-CM

## 2017-12-29 DIAGNOSIS — E11.65 POORLY CONTROLLED DIABETES MELLITUS: ICD-10-CM

## 2017-12-29 DIAGNOSIS — E78.5 DYSLIPIDEMIA: ICD-10-CM

## 2017-12-29 DIAGNOSIS — D63.8 ANEMIA OF CHRONIC DISEASE: ICD-10-CM

## 2017-12-29 RX ORDER — CARVEDILOL 12.5 MG/1
12.5 TABLET ORAL 2 TIMES DAILY WITH MEALS
Qty: 28 TABLET | Refills: 0 | Status: SHIPPED | OUTPATIENT
Start: 2017-12-29 | End: 2018-01-09 | Stop reason: DRUGHIGH

## 2017-12-29 RX ORDER — AMLODIPINE BESYLATE 10 MG/1
10 TABLET ORAL DAILY
Qty: 14 TABLET | Refills: 0 | Status: SHIPPED | OUTPATIENT
Start: 2017-12-29 | End: 2018-01-03 | Stop reason: SDUPTHER

## 2018-01-03 ENCOUNTER — TELEPHONE (OUTPATIENT)
Dept: CARDIOLOGY | Facility: CLINIC | Age: 63
End: 2018-01-03

## 2018-01-03 DIAGNOSIS — N18.30 BENIGN HYPERTENSION WITH CHRONIC KIDNEY DISEASE, STAGE III: ICD-10-CM

## 2018-01-03 DIAGNOSIS — I12.9 BENIGN HYPERTENSION WITH CHRONIC KIDNEY DISEASE, STAGE III: ICD-10-CM

## 2018-01-03 RX ORDER — AMLODIPINE BESYLATE 10 MG/1
10 TABLET ORAL DAILY
Qty: 90 TABLET | Refills: 3 | Status: SHIPPED | OUTPATIENT
Start: 2018-01-03 | End: 2018-03-02 | Stop reason: SDUPTHER

## 2018-01-03 NOTE — TELEPHONE ENCOUNTER
----- Message from Debbie Salinas MD sent at 1/2/2018  9:42 AM CST -----  He should stop by either this week or next week to check BP. Amlodipine was restarted and I want to see how it works.  ----- Message -----  From: Nicci Hallman MA  Sent: 12/29/2017   2:28 PM  To: Debbie Salinas MD    Pt was supposed to come to our office for b/p check on 12/27/17. He was seen by Dr. Urbano on 12/27/17. Please review pt's b/p from this visit and advise. Do you want him to come in for more b/p checks?  Thanks,  Nicci Hallman MA

## 2018-01-05 NOTE — TELEPHONE ENCOUNTER
Pt notified, verbalized understanding. Pt stated that he will come to the clinic on 1/9/18 at 8:30am for b/p check.

## 2018-01-09 ENCOUNTER — TELEPHONE (OUTPATIENT)
Dept: CARDIOLOGY | Facility: CLINIC | Age: 63
End: 2018-01-09

## 2018-01-09 DIAGNOSIS — I10 HYPERTENSION, ESSENTIAL: ICD-10-CM

## 2018-01-09 DIAGNOSIS — I10 HYPERTENSION, ESSENTIAL: Primary | ICD-10-CM

## 2018-01-09 RX ORDER — FUROSEMIDE 40 MG/1
40 TABLET ORAL 2 TIMES DAILY
Qty: 60 TABLET | Refills: 6 | Status: SHIPPED | OUTPATIENT
Start: 2018-01-09 | End: 2018-01-11 | Stop reason: SDUPTHER

## 2018-01-09 RX ORDER — CARVEDILOL 25 MG/1
25 TABLET ORAL 2 TIMES DAILY WITH MEALS
COMMUNITY
End: 2018-01-09 | Stop reason: SDUPTHER

## 2018-01-09 RX ORDER — CARVEDILOL 25 MG/1
25 TABLET ORAL 2 TIMES DAILY WITH MEALS
Qty: 60 TABLET | Refills: 6 | Status: SHIPPED | OUTPATIENT
Start: 2018-01-09 | End: 2018-03-02 | Stop reason: SDUPTHER

## 2018-01-09 NOTE — TELEPHONE ENCOUNTER
Pt in office today for b/p check. Pt stated that he forgot his b/p readings at home, but his readings have been 140's-150's. Pt stated that he had a nose bleed this am and that he has been having edema in his feet. I advised pt per Dr. Salinas to speak w/his PCP regarding nose bleed pt verbalized understanding. Please advise on the b/p readings and edema in feet.   Right Arm 142/63 P81  Left Arm 151/71 P 81

## 2018-01-11 ENCOUNTER — OFFICE VISIT (OUTPATIENT)
Dept: NEPHROLOGY | Facility: CLINIC | Age: 63
End: 2018-01-11
Payer: MEDICARE

## 2018-01-11 VITALS
HEART RATE: 73 BPM | BODY MASS INDEX: 27.33 KG/M2 | HEIGHT: 72 IN | OXYGEN SATURATION: 99 % | WEIGHT: 201.75 LBS | DIASTOLIC BLOOD PRESSURE: 70 MMHG | SYSTOLIC BLOOD PRESSURE: 142 MMHG

## 2018-01-11 DIAGNOSIS — I15.2 HYPERTENSION ASSOCIATED WITH DIABETES: ICD-10-CM

## 2018-01-11 DIAGNOSIS — D63.8 ANEMIA OF CHRONIC DISEASE: ICD-10-CM

## 2018-01-11 DIAGNOSIS — E11.59 HYPERTENSION ASSOCIATED WITH DIABETES: ICD-10-CM

## 2018-01-11 DIAGNOSIS — I10 ESSENTIAL HYPERTENSION: ICD-10-CM

## 2018-01-11 DIAGNOSIS — R80.1 PERSISTENT PROTEINURIA: ICD-10-CM

## 2018-01-11 DIAGNOSIS — I10 HYPERTENSION, ESSENTIAL: ICD-10-CM

## 2018-01-11 DIAGNOSIS — E11.65 POORLY CONTROLLED DIABETES MELLITUS: ICD-10-CM

## 2018-01-11 DIAGNOSIS — I50.32 CHRONIC DIASTOLIC HEART FAILURE: ICD-10-CM

## 2018-01-11 DIAGNOSIS — N25.81 SECONDARY HYPERPARATHYROIDISM: ICD-10-CM

## 2018-01-11 DIAGNOSIS — D47.2 SMOLDERING MYELOMA: ICD-10-CM

## 2018-01-11 PROCEDURE — 99214 OFFICE O/P EST MOD 30 MIN: CPT | Mod: PBBFAC | Performed by: INTERNAL MEDICINE

## 2018-01-11 PROCEDURE — 99215 OFFICE O/P EST HI 40 MIN: CPT | Mod: S$PBB,,, | Performed by: INTERNAL MEDICINE

## 2018-01-11 PROCEDURE — 99999 PR PBB SHADOW E&M-EST. PATIENT-LVL IV: CPT | Mod: PBBFAC,,, | Performed by: INTERNAL MEDICINE

## 2018-01-11 RX ORDER — FUROSEMIDE 40 MG/1
TABLET ORAL
Qty: 240 TABLET | Refills: 6 | Status: SHIPPED | OUTPATIENT
Start: 2018-01-11 | End: 2018-03-02 | Stop reason: SDUPTHER

## 2018-01-11 NOTE — PATIENT INSTRUCTIONS
Follow low sodium diet, sodium less than 1.5 to 2 grams per day.    Increase the dose of lasix to 60 mg in the morning and keep 40 mg dose for the afternoon.    Avoid NSAID like medicines like Ibuprofen, Advil, Motrin, Meloxicam/ Mobic, Naprosyn, Toradol/ Ketorolac, BC powder, Goody powder.    Tylenol 500 mg every 8 hours as needed for pain/ fever is ok to use.     Home BP monitoring, record reading 3-4 times per week, call MD if upper number > 150 mm hg.

## 2018-01-11 NOTE — PROGRESS NOTES
Subjective:       Patient ID: Marie Reis Jr. is a 62 y.o. Black or  male who presents for follow up evaluation of Chronic Kidney Disease    HPI     Mr. Reis is seen in nephrology clinic for follow up evaluation for CKD. He arrived alone for this visit. He was last seen by me in 5/16 and then he was lost for follow up until 10/17 when he saw Dr. Huerta in nephrology clinic. Since then again he had no follow up until today.     Pt has severely uncontrolled diabetes with A1C above 10 for several years. He has diabetic retinopathy, hypertension, NSAID use, diabetic polyneuropathy, prior h/o stroke, hyperlipidemia. He has CKD III which has progressed to stage IV now . He appears to have prior episodes of JAZLYN. Prior urine studies from 03/15 show presence of micro albumin which now has progressed to overt proteinuria.     Review of labs after his clinic visit showed presence of paraprotein and monoclonal band on protein electrophoresis. Hence he was referred to hematology. Dr. Guaman's note was reviewed. 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    Recent note by cardiology clinic noted. He had echo in 10/17 which showed diastolic dysfunction and elevated PA of 48. He has been on lasix, amlodipine, coreg, lisinopril. Recently during cardiology follow up in 12/17 he was advised to resume amlodipine. He feels it causes more leg swelling. But as such he has worsening of proteinuria as well. He does not have any symptoms to suggest uremia. He has been making good amount of urine.    Pt appears to be aware of his poorly controlled diabetes. He has some forgetfulness and that continues. Not sure if that's from his prior stroke. He acknowledges his worsening kidney function.    Labs from 4/20/16 noted for K 4.3, bicarbonate 26, creatinine 1.7 which is close to his baseline of 1.7 to 1.8, Ca 9, albumin 3.4, phos 3.6, , vit D 18, Hb 11.8, urine PC ratio of 1.02.    Historically he has been non compliant with  diet and medicines. He has uncontrolled diabetes. His most recent A1C from 4/2/16 was 11.1. And it is noted that he always have A1C above 9, mostly in the range of 11 to 12. He does not have any acute illness recently.    Renal Function:  Lab Results   Component Value Date     (H) 12/26/2017    GLU 96 12/15/2017     12/26/2017     12/15/2017    K 4.6 12/26/2017    K 3.9 12/15/2017     12/26/2017     12/15/2017    CO2 26 12/26/2017    CO2 25 12/15/2017    BUN 31 (H) 12/26/2017    BUN 31 (H) 12/15/2017    CALCIUM 9.0 12/26/2017    CALCIUM 9.2 12/15/2017    CREATININE 2.8 (H) 12/26/2017    CREATININE 2.5 (H) 12/15/2017    ALBUMIN 3.1 (L) 12/15/2017    ALBUMIN 2.9 (L) 11/08/2017    PHOS 3.3 11/08/2017    PHOS 3.5 09/17/2016    ESTGFRAFRICA 26.9 (A) 12/26/2017    ESTGFRAFRICA 30.9 (A) 12/15/2017    EGFRNONAA 23.3 (A) 12/26/2017    EGFRNONAA 26.7 (A) 12/15/2017       Urinalysis:  Lab Results   Component Value Date    APPEARANCEUA Clear 11/08/2017    PHUR 6.0 11/08/2017    SPECGRAV 1.010 11/08/2017    PROTEINUA 3+ (A) 11/08/2017    GLUCUA 1+ (A) 11/08/2017    OCCULTUA 2+ (A) 11/08/2017    NITRITE Negative 11/08/2017    LEUKOCYTESUR Negative 11/08/2017       Protein/Creatinine Ratio:  Lab Results   Component Value Date    PROTEINURINE 188 (H) 11/22/2017    CREATRANDUR 113.0 11/08/2017    UTPCR 3.08 (H) 11/08/2017       CBC:  Lab Results   Component Value Date    WBC 7.35 12/26/2017    HGB 10.4 (L) 12/26/2017    HCT 30.8 (L) 12/26/2017           Review of Systems   Constitutional: Negative for fever, chills, appetite change and fatigue.   HENT: Negative for sneezing and sore throat.    Respiratory: Negative for cough, shortness of breath and wheezing.    Cardiovascular: Positive for leg swelling worsened. Negative for chest pain.   Gastrointestinal: Negative for nausea, vomiting, abdominal pain and diarrhea.   Genitourinary: Negative for dysuria, frequency, hematuria and flank pain.    Musculoskeletal: Negative for myalgias and back pain.   Skin: Negative for pallor.   Neurological: Negative for dizziness, light-headedness and headaches.   Psychiatric/Behavioral: Negative for behavioral problems.       Objective:      Physical Exam   Constitutional: He is oriented to person, place, and time. He appears well-developed and well-nourished. No distress.   Mouth/Throat: Oropharynx is clear and moist.   Eyes: Conjunctivae are normal. Right eye exhibits no discharge. Left eye exhibits no discharge.   Neck: Normal range of motion. Neck supple.   Cardiovascular: Normal rate, regular rhythm and normal heart sounds.    Pulmonary/Chest: Effort normal and breath sounds normal. No respiratory distress. He has no wheezes. He has no rales.   Abdominal: Soft. Abdominal obesity.There is no tenderness. There is no guarding.   Musculoskeletal: Normal range of motion. He exhibits 2+ pitting edema. He exhibits no tenderness.   Neurological: He is alert and oriented to person, place, and time.   Skin: Skin is warm and dry. No erythema.   Psychiatric: He has a normal mood and affect.   Vitals reviewed.      Assessment:       1. Uncontrolled diabetes with stage 3 chronic kidney disease GFR 30-59    2. Proteinuria    3. paraproteinemia   4. Essential hypertension    5. Uncontrolled diabetes mellitus    6. Anemia of chronic illness    7.      Secondary hyperparathyroidism    Plan:     Mr. Reis has uncontrolled and longstanding diabetes with complications like polyneuropathy, retinopathy, has progression of CKD and proteinuria which is clinically felt to be due to diabetic nephropathy from his longstanding and poorly controlled diabetes. He has hypertension, hyperlipidemia, prior stroke, prior documented micro albumin in the urine. Prior CT imaging has shown right ureteral stone with mild right hydronephrosis from 08/14. US from 04/16 noted for 10.3 and 10.4 cm kidney size, there was no hydronephrosis. At present it is  very crucial that he gets better control of his diabetes and BP. He is at very high risk for rapid progression of his CKD to higher stages. Continue to follow with heme for smoldering myeloma and anemia management, per heme his anemia is likely multifactorial. Consider Epogen if Hb less than 10. Obtain iron profile.     Plan:    - increase lasix to 60 mg in the morning and 40 in the afternoon from current 40 mg bid, close follow up on labs  - pt explained about potential risk of worsening renal function to the point of need for dialysis   - strict glycemic control  - strict low salt diet, less than 2 grams per day  - avoid NSAID use ever  - continue lisinopril for RAAS blockade   - continue to trend PTH, anemia, acid base disorder  - medication compliance was stressed to him  - iron studies     Risk of progression of CKD to ESRD was discussed with him in detail again today. Stressed importance of monthly labs for surveillance and very close follow up given above. He expressed understanding.    Labs in 3 to 4 weeks and   RTC 4 weeks

## 2018-01-15 ENCOUNTER — LAB VISIT (OUTPATIENT)
Dept: LAB | Facility: HOSPITAL | Age: 63
End: 2018-01-15
Attending: INTERNAL MEDICINE
Payer: MEDICARE

## 2018-01-15 DIAGNOSIS — D47.2 SMOLDERING MYELOMA: ICD-10-CM

## 2018-01-15 DIAGNOSIS — D47.2 MONOCLONAL GAMMOPATHY: ICD-10-CM

## 2018-01-15 DIAGNOSIS — R80.9 PROTEINURIA: ICD-10-CM

## 2018-01-15 DIAGNOSIS — D64.9 ANEMIA, UNSPECIFIED TYPE: Primary | ICD-10-CM

## 2018-01-15 LAB
ALBUMIN SERPL BCP-MCNC: 2.8 G/DL
ALP SERPL-CCNC: 111 U/L
ALT SERPL W/O P-5'-P-CCNC: 15 U/L
ANION GAP SERPL CALC-SCNC: 7 MMOL/L
AST SERPL-CCNC: 18 U/L
BASOPHILS # BLD AUTO: 0.04 K/UL
BASOPHILS NFR BLD: 0.6 %
BILIRUB SERPL-MCNC: 0.8 MG/DL
BUN SERPL-MCNC: 36 MG/DL
CALCIUM SERPL-MCNC: 9 MG/DL
CHLORIDE SERPL-SCNC: 111 MMOL/L
CO2 SERPL-SCNC: 23 MMOL/L
CREAT SERPL-MCNC: 2.4 MG/DL
DIFFERENTIAL METHOD: ABNORMAL
EOSINOPHIL # BLD AUTO: 0.2 K/UL
EOSINOPHIL NFR BLD: 3.3 %
ERYTHROCYTE [DISTWIDTH] IN BLOOD BY AUTOMATED COUNT: 13.4 %
EST. GFR  (AFRICAN AMERICAN): 32.4 ML/MIN/1.73 M^2
EST. GFR  (NON AFRICAN AMERICAN): 28.1 ML/MIN/1.73 M^2
GLUCOSE SERPL-MCNC: 175 MG/DL
HCT VFR BLD AUTO: 23.4 %
HGB BLD-MCNC: 7.5 G/DL
IGG SERPL-MCNC: 1747 MG/DL
IMM GRANULOCYTES # BLD AUTO: 0.03 K/UL
IMM GRANULOCYTES NFR BLD AUTO: 0.4 %
LYMPHOCYTES # BLD AUTO: 1.2 K/UL
LYMPHOCYTES NFR BLD: 18.1 %
MCH RBC QN AUTO: 27.2 PG
MCHC RBC AUTO-ENTMCNC: 32.1 G/DL
MCV RBC AUTO: 85 FL
MONOCYTES # BLD AUTO: 0.5 K/UL
MONOCYTES NFR BLD: 6.9 %
NEUTROPHILS # BLD AUTO: 4.7 K/UL
NEUTROPHILS NFR BLD: 70.7 %
NRBC BLD-RTO: 0 /100 WBC
PLATELET # BLD AUTO: 178 K/UL
PMV BLD AUTO: 11.4 FL
POTASSIUM SERPL-SCNC: 4.6 MMOL/L
PROT SERPL-MCNC: 7.1 G/DL
RBC # BLD AUTO: 2.76 M/UL
SODIUM SERPL-SCNC: 141 MMOL/L
WBC # BLD AUTO: 6.68 K/UL

## 2018-01-15 PROCEDURE — 36415 COLL VENOUS BLD VENIPUNCTURE: CPT | Mod: PO

## 2018-01-15 PROCEDURE — 82784 ASSAY IGA/IGD/IGG/IGM EACH: CPT

## 2018-01-15 PROCEDURE — 80053 COMPREHEN METABOLIC PANEL: CPT

## 2018-01-15 PROCEDURE — 86334 IMMUNOFIX E-PHORESIS SERUM: CPT | Mod: 26,,, | Performed by: PATHOLOGY

## 2018-01-15 PROCEDURE — 86334 IMMUNOFIX E-PHORESIS SERUM: CPT

## 2018-01-15 PROCEDURE — 84165 PROTEIN E-PHORESIS SERUM: CPT | Mod: 26,,, | Performed by: PATHOLOGY

## 2018-01-15 PROCEDURE — 84165 PROTEIN E-PHORESIS SERUM: CPT

## 2018-01-15 PROCEDURE — 83520 IMMUNOASSAY QUANT NOS NONAB: CPT | Mod: 59

## 2018-01-15 PROCEDURE — 85025 COMPLETE CBC W/AUTO DIFF WBC: CPT

## 2018-01-16 ENCOUNTER — LAB VISIT (OUTPATIENT)
Dept: LAB | Facility: HOSPITAL | Age: 63
End: 2018-01-16
Attending: INTERNAL MEDICINE
Payer: MEDICARE

## 2018-01-16 ENCOUNTER — OFFICE VISIT (OUTPATIENT)
Dept: HEMATOLOGY/ONCOLOGY | Facility: CLINIC | Age: 63
End: 2018-01-16
Payer: MEDICARE

## 2018-01-16 VITALS
DIASTOLIC BLOOD PRESSURE: 68 MMHG | HEART RATE: 76 BPM | WEIGHT: 200.19 LBS | BODY MASS INDEX: 27.11 KG/M2 | HEIGHT: 72 IN | SYSTOLIC BLOOD PRESSURE: 118 MMHG

## 2018-01-16 DIAGNOSIS — I50.33 ACUTE ON CHRONIC DIASTOLIC HEART FAILURE: ICD-10-CM

## 2018-01-16 DIAGNOSIS — D64.9 ANEMIA, UNSPECIFIED TYPE: ICD-10-CM

## 2018-01-16 DIAGNOSIS — D50.0 ANEMIA DUE TO CHRONIC BLOOD LOSS: Primary | ICD-10-CM

## 2018-01-16 DIAGNOSIS — E11.65 POORLY CONTROLLED DIABETES MELLITUS: ICD-10-CM

## 2018-01-16 DIAGNOSIS — I10 ESSENTIAL HYPERTENSION: ICD-10-CM

## 2018-01-16 DIAGNOSIS — D50.0 ANEMIA DUE TO CHRONIC BLOOD LOSS: ICD-10-CM

## 2018-01-16 DIAGNOSIS — C90.00 MULTIPLE MYELOMA NOT HAVING ACHIEVED REMISSION: Primary | ICD-10-CM

## 2018-01-16 LAB
ABO + RH BLD: NORMAL
ALBUMIN SERPL ELPH-MCNC: 3.04 G/DL
ALPHA1 GLOB SERPL ELPH-MCNC: 0.42 G/DL
ALPHA2 GLOB SERPL ELPH-MCNC: 0.85 G/DL
B-GLOBULIN SERPL ELPH-MCNC: 0.71 G/DL
BASOPHILS # BLD AUTO: 0.02 K/UL
BASOPHILS NFR BLD: 0.3 %
BLD GP AB SCN CELLS X3 SERPL QL: NORMAL
DIFFERENTIAL METHOD: ABNORMAL
EOSINOPHIL # BLD AUTO: 0.2 K/UL
EOSINOPHIL NFR BLD: 3.1 %
ERYTHROCYTE [DISTWIDTH] IN BLOOD BY AUTOMATED COUNT: 13.5 %
FERRITIN SERPL-MCNC: 406 NG/ML
GAMMA GLOB SERPL ELPH-MCNC: 1.58 G/DL
HAPTOGLOB SERPL-MCNC: 314 MG/DL
HCT VFR BLD AUTO: 22.8 %
HGB BLD-MCNC: 7.6 G/DL
IMM GRANULOCYTES # BLD AUTO: 0.02 K/UL
IMM GRANULOCYTES NFR BLD AUTO: 0.3 %
IRON SERPL-MCNC: 34 UG/DL
KAPPA LC SER QL IA: 123.21 MG/DL
KAPPA LC/LAMBDA SER IA: 44
LAMBDA LC SER QL IA: 2.8 MG/DL
LYMPHOCYTES # BLD AUTO: 1.3 K/UL
LYMPHOCYTES NFR BLD: 22.5 %
MCH RBC QN AUTO: 27.6 PG
MCHC RBC AUTO-ENTMCNC: 33.3 G/DL
MCV RBC AUTO: 83 FL
MONOCYTES # BLD AUTO: 0.5 K/UL
MONOCYTES NFR BLD: 7.8 %
NEUTROPHILS # BLD AUTO: 3.9 K/UL
NEUTROPHILS NFR BLD: 66 %
NRBC BLD-RTO: 0 /100 WBC
PATHOLOGIST INTERPRETATION SPE: NORMAL
PLATELET # BLD AUTO: 155 K/UL
PMV BLD AUTO: 10.3 FL
PROT SERPL-MCNC: 6.6 G/DL
RBC # BLD AUTO: 2.75 M/UL
RETICS/RBC NFR AUTO: 2.8 %
SATURATED IRON: 17 %
TOTAL IRON BINDING CAPACITY: 203 UG/DL
TRANSFERRIN SERPL-MCNC: 137 MG/DL
WBC # BLD AUTO: 5.9 K/UL

## 2018-01-16 PROCEDURE — 36415 COLL VENOUS BLD VENIPUNCTURE: CPT

## 2018-01-16 PROCEDURE — 99215 OFFICE O/P EST HI 40 MIN: CPT | Mod: S$PBB,,, | Performed by: INTERNAL MEDICINE

## 2018-01-16 PROCEDURE — 99213 OFFICE O/P EST LOW 20 MIN: CPT | Mod: PBBFAC,25 | Performed by: INTERNAL MEDICINE

## 2018-01-16 PROCEDURE — 85045 AUTOMATED RETICULOCYTE COUNT: CPT

## 2018-01-16 PROCEDURE — 83010 ASSAY OF HAPTOGLOBIN QUANT: CPT

## 2018-01-16 PROCEDURE — 99999 PR PBB SHADOW E&M-EST. PATIENT-LVL III: CPT | Mod: PBBFAC,,, | Performed by: INTERNAL MEDICINE

## 2018-01-16 PROCEDURE — 85025 COMPLETE CBC W/AUTO DIFF WBC: CPT

## 2018-01-16 PROCEDURE — 86920 COMPATIBILITY TEST SPIN: CPT

## 2018-01-16 PROCEDURE — 83540 ASSAY OF IRON: CPT

## 2018-01-16 PROCEDURE — 86850 RBC ANTIBODY SCREEN: CPT

## 2018-01-16 PROCEDURE — 82728 ASSAY OF FERRITIN: CPT

## 2018-01-16 RX ORDER — FUROSEMIDE 10 MG/ML
20 INJECTION INTRAMUSCULAR; INTRAVENOUS ONCE
Status: CANCELLED | OUTPATIENT
Start: 2018-01-16

## 2018-01-16 RX ORDER — HYDROCODONE BITARTRATE AND ACETAMINOPHEN 500; 5 MG/1; MG/1
TABLET ORAL ONCE
Status: CANCELLED | OUTPATIENT
Start: 2018-01-16 | End: 2018-01-16

## 2018-01-16 RX ORDER — DEXAMETHASONE 4 MG/1
TABLET ORAL
Qty: 12 TABLET | Refills: 11 | Status: SHIPPED | OUTPATIENT
Start: 2018-01-16 | End: 2018-05-21 | Stop reason: ALTCHOICE

## 2018-01-16 RX ORDER — LENALIDOMIDE 10 MG/1
CAPSULE ORAL
Qty: 21 EACH | Refills: 8 | Status: SHIPPED | OUTPATIENT
Start: 2018-01-16 | End: 2018-01-22 | Stop reason: RX

## 2018-01-17 ENCOUNTER — TELEPHONE (OUTPATIENT)
Dept: PHARMACY | Facility: CLINIC | Age: 63
End: 2018-01-17

## 2018-01-17 ENCOUNTER — TELEPHONE (OUTPATIENT)
Dept: ENDOCRINOLOGY | Facility: CLINIC | Age: 63
End: 2018-01-17

## 2018-01-17 ENCOUNTER — TELEPHONE (OUTPATIENT)
Dept: HEMATOLOGY/ONCOLOGY | Facility: CLINIC | Age: 63
End: 2018-01-17

## 2018-01-17 NOTE — TELEPHONE ENCOUNTER
----- Message from BELEM Leslie, FNP sent at 1/17/2018 12:58 PM CST -----  Please let pt know that it is very important to make 2/1/8 with me.    If BG have remained above 200 throughout day and fasting bg has been >150    Increase long acting from 28 to 34 units.    Increase meal insulin from 8 to 12 units w/ scale 150-200+2, 201-250+4, 251-300+6.    Call office if bg consistently remain above 250.    We will have to increase meal insulin for dexamethasone (steroid) treatment/    Thanks,  Gifty

## 2018-01-17 NOTE — TELEPHONE ENCOUNTER
Spoke with patient to confirm his appointment with Anika on 2/1/18 he agree to come on that day. Anika  Has some new instructions for patient  With his insulin, Increase long acting from 28 to 34 units.     Increase meal insulin from 8 to 12 units w/ scale 150-200+2, 201-250+4, 251-300+6.     Call office if bg consistently remain above 250.

## 2018-01-17 NOTE — TELEPHONE ENCOUNTER
----- Message from Armani Uribe sent at 1/17/2018  1:20 PM CST -----  Contact: Pt  Will like to cancel and reschedule infusion appt on today due to the weather     Contact::[phone]    Called patient. No answered. Left message to call back.

## 2018-01-18 ENCOUNTER — TELEPHONE (OUTPATIENT)
Dept: HEMATOLOGY/ONCOLOGY | Facility: CLINIC | Age: 63
End: 2018-01-18

## 2018-01-18 LAB
INTERPRETATION SERPL IFE-IMP: NORMAL
PATHOLOGIST INTERPRETATION IFE: NORMAL

## 2018-01-18 NOTE — TELEPHONE ENCOUNTER
----- Message from Armani Uribe sent at 1/18/2018  1:53 PM CST -----  Contact: Pt  Pt calling stating that he was supposed to receive medication        Pt call back number [phone]   Patient reschedule for his blood transfusion tomorrow.

## 2018-01-19 ENCOUNTER — INFUSION (OUTPATIENT)
Dept: INFUSION THERAPY | Facility: HOSPITAL | Age: 63
End: 2018-01-19
Attending: INTERNAL MEDICINE
Payer: MEDICARE

## 2018-01-19 VITALS
SYSTOLIC BLOOD PRESSURE: 163 MMHG | OXYGEN SATURATION: 98 % | DIASTOLIC BLOOD PRESSURE: 75 MMHG | TEMPERATURE: 98 F | RESPIRATION RATE: 16 BRPM | HEART RATE: 75 BPM

## 2018-01-19 DIAGNOSIS — D50.0 ANEMIA DUE TO CHRONIC BLOOD LOSS: ICD-10-CM

## 2018-01-19 LAB
BLD PROD TYP BPU: NORMAL
BLOOD UNIT EXPIRATION DATE: NORMAL
BLOOD UNIT TYPE CODE: 5100
BLOOD UNIT TYPE: NORMAL
CODING SYSTEM: NORMAL
DISPENSE STATUS: NORMAL
NUM UNITS TRANS PACKED RBC: NORMAL
STFR SERPL-MCNC: 2.7 MG/L

## 2018-01-19 PROCEDURE — 63600175 PHARM REV CODE 636 W HCPCS: Performed by: INTERNAL MEDICINE

## 2018-01-19 PROCEDURE — 25000003 PHARM REV CODE 250: Performed by: INTERNAL MEDICINE

## 2018-01-19 PROCEDURE — 96374 THER/PROPH/DIAG INJ IV PUSH: CPT

## 2018-01-19 PROCEDURE — 36430 TRANSFUSION BLD/BLD COMPNT: CPT

## 2018-01-19 PROCEDURE — P9040 RBC LEUKOREDUCED IRRADIATED: HCPCS

## 2018-01-19 RX ORDER — FUROSEMIDE 10 MG/ML
20 INJECTION INTRAMUSCULAR; INTRAVENOUS ONCE
Status: COMPLETED | OUTPATIENT
Start: 2018-01-19 | End: 2018-01-19

## 2018-01-19 RX ORDER — HYDROCODONE BITARTRATE AND ACETAMINOPHEN 500; 5 MG/1; MG/1
TABLET ORAL ONCE
Status: COMPLETED | OUTPATIENT
Start: 2018-01-19 | End: 2018-01-19

## 2018-01-19 RX ADMIN — FUROSEMIDE 20 MG: 10 INJECTION, SOLUTION INTRAMUSCULAR; INTRAVENOUS at 02:01

## 2018-01-19 RX ADMIN — SODIUM CHLORIDE: 0.9 INJECTION, SOLUTION INTRAVENOUS at 02:01

## 2018-01-19 NOTE — PLAN OF CARE
Problem: Anemia (Adult)  Goal: Identify Related Risk Factors and Signs and Symptoms  Related risk factors and signs and symptoms are identified upon initiation of Human Response Clinical Practice Guideline (CPG)  Outcome: Ongoing (interventions implemented as appropriate)  Pt here for 1 unit PRBC. VSS. C/o weakness, dyspnea. Consent/labs/meds/allergies reviewed. PIV to right AC with blood return noted. All questions answered. Will continue to monitor.

## 2018-01-19 NOTE — PLAN OF CARE
Problem: Patient Care Overview (Adult)  Goal: Plan of Care Review  Outcome: Ongoing (interventions implemented as appropriate)  Pt tolerated tx without complications. VSS. No s/s of reaction. Instructed to contact MD with any questions. AVS given to patient.

## 2018-01-22 DIAGNOSIS — C90.00 MULTIPLE MYELOMA NOT HAVING ACHIEVED REMISSION: Primary | ICD-10-CM

## 2018-01-22 RX ORDER — LENALIDOMIDE 10 MG/1
CAPSULE ORAL
Qty: 21 EACH | Refills: 11 | Status: SHIPPED | OUTPATIENT
Start: 2018-01-22 | End: 2018-03-22 | Stop reason: SDUPTHER

## 2018-01-23 ENCOUNTER — TELEPHONE (OUTPATIENT)
Dept: CARDIOLOGY | Facility: CLINIC | Age: 63
End: 2018-01-23

## 2018-01-23 NOTE — TELEPHONE ENCOUNTER
Pt in office for b/p check. Pt stated his b/p at home is around 130's-140's/70's. Pt stated that he took his b/p medicine at 8:10am today.   175/78 P74 Right Arm  173/81 P73 Left Arm  Per Dr. Salinas pt is ok to go home, but he needs to call us around 10am to tell us what his b/p is at home at that time. Pt notified, verbalized understanding and stated that he will call w/his b/p reading at 10am today.

## 2018-01-24 NOTE — TELEPHONE ENCOUNTER
I called pt because he didn't call back yesterday w/b/p reading. Pt stated that his b/p yesterday when he got home was 140/78 P73 and at his doctors appointment outside of [facility] today 144/78 P72. Please advise.

## 2018-01-26 NOTE — TELEPHONE ENCOUNTER
Pt notified, per Dr. Salinas to come in next week for another b/p check 2-3 hours after taking his medications. Pt verbalized understanding and stated that he will come on 1/30/18 around 11 am for b/p check.

## 2018-01-30 ENCOUNTER — TELEPHONE (OUTPATIENT)
Dept: CARDIOLOGY | Facility: CLINIC | Age: 63
End: 2018-01-30

## 2018-01-30 NOTE — TELEPHONE ENCOUNTER
Pt here for b/p check. Pt stated that he took his medications at 7:30am. Please advise.   Right Arm 155/71  Left Arm 150/69 p71

## 2018-02-06 ENCOUNTER — PROCEDURE VISIT (OUTPATIENT)
Dept: OPHTHALMOLOGY | Facility: CLINIC | Age: 63
End: 2018-02-06
Payer: MEDICARE

## 2018-02-06 VITALS — DIASTOLIC BLOOD PRESSURE: 75 MMHG | SYSTOLIC BLOOD PRESSURE: 136 MMHG | HEART RATE: 79 BPM

## 2018-02-06 DIAGNOSIS — H35.033 HYPERTENSIVE RETINOPATHY OF BOTH EYES: ICD-10-CM

## 2018-02-06 PROCEDURE — 92014 COMPRE OPH EXAM EST PT 1/>: CPT | Mod: 25,S$PBB,, | Performed by: OPHTHALMOLOGY

## 2018-02-06 PROCEDURE — 67028 INJECTION EYE DRUG: CPT | Mod: S$PBB,RT,, | Performed by: OPHTHALMOLOGY

## 2018-02-06 PROCEDURE — 92134 CPTRZ OPH DX IMG PST SGM RTA: CPT | Mod: PBBFAC | Performed by: OPHTHALMOLOGY

## 2018-02-06 PROCEDURE — 67028 INJECTION EYE DRUG: CPT | Mod: PBBFAC,RT | Performed by: OPHTHALMOLOGY

## 2018-02-06 PROCEDURE — C9257 BEVACIZUMAB INJECTION: HCPCS | Mod: PBBFAC,JG | Performed by: OPHTHALMOLOGY

## 2018-02-06 RX ADMIN — BEVACIZUMAB 1.25 MG: 100 INJECTION, SOLUTION INTRAVENOUS at 10:02
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
## 2018-02-06 NOTE — PATIENT INSTRUCTIONS

## 2018-02-19 ENCOUNTER — TELEPHONE (OUTPATIENT)
Dept: HEMATOLOGY/ONCOLOGY | Facility: CLINIC | Age: 63
End: 2018-02-19

## 2018-02-19 NOTE — TELEPHONE ENCOUNTER
----- Message from Hilda Johnson sent at 2018 12:13 PM CST -----  Contact: Rayna Soler  Ms. Soler is calling to speak with Staff regarding authorization for the pt to start Revlimid; Reference #[id number].  The prescription has  and they have to verify whether the pt has started it.    She can be reached at [phone].    Thank you.      Spoke with patient. He will call [facility] to get his Revlimid.

## 2018-02-21 ENCOUNTER — HOSPITAL ENCOUNTER (INPATIENT)
Facility: HOSPITAL | Age: 63
LOS: 3 days | Discharge: HOME | DRG: 871 | End: 2018-02-24
Attending: EMERGENCY MEDICINE | Admitting: INTERNAL MEDICINE
Payer: MEDICARE

## 2018-02-21 DIAGNOSIS — R41.82 ALTERED MENTAL STATUS: ICD-10-CM

## 2018-02-21 DIAGNOSIS — E11.22 TYPE 2 DIABETES MELLITUS WITH STAGE 4 CHRONIC KIDNEY DISEASE, WITH LONG-TERM CURRENT USE OF INSULIN: ICD-10-CM

## 2018-02-21 DIAGNOSIS — R00.1 BRADYCARDIA: ICD-10-CM

## 2018-02-21 DIAGNOSIS — T68.XXXD HYPOTHERMIA, SUBSEQUENT ENCOUNTER: ICD-10-CM

## 2018-02-21 DIAGNOSIS — C90.00 MULTIPLE MYELOMA NOT HAVING ACHIEVED REMISSION: ICD-10-CM

## 2018-02-21 DIAGNOSIS — Z79.4 TYPE 2 DIABETES MELLITUS WITH STAGE 4 CHRONIC KIDNEY DISEASE, WITH LONG-TERM CURRENT USE OF INSULIN: ICD-10-CM

## 2018-02-21 DIAGNOSIS — T68.XXXA HYPOTHERMIA, INITIAL ENCOUNTER: ICD-10-CM

## 2018-02-21 DIAGNOSIS — N18.4 TYPE 2 DIABETES MELLITUS WITH STAGE 4 CHRONIC KIDNEY DISEASE, WITH LONG-TERM CURRENT USE OF INSULIN: ICD-10-CM

## 2018-02-21 DIAGNOSIS — G93.40 ACUTE ENCEPHALOPATHY: ICD-10-CM

## 2018-02-21 DIAGNOSIS — A41.9 SEPSIS, DUE TO UNSPECIFIED ORGANISM: Primary | ICD-10-CM

## 2018-02-21 DIAGNOSIS — A41.9 SEPSIS: ICD-10-CM

## 2018-02-21 LAB
ABO + RH BLD: NORMAL
ALBUMIN SERPL BCP-MCNC: 3.4 G/DL
ALLENS TEST: ABNORMAL
ALP SERPL-CCNC: 146 U/L
ALT SERPL W/O P-5'-P-CCNC: 39 U/L
AMMONIA PLAS-SCNC: 35 UMOL/L
AMPHET+METHAMPHET UR QL: NEGATIVE
ANION GAP SERPL CALC-SCNC: 4 MMOL/L
ANION GAP SERPL CALC-SCNC: 6 MMOL/L
ANION GAP SERPL CALC-SCNC: 9 MMOL/L
APTT BLDCRRT: 31.1 SEC
AST SERPL-CCNC: 29 U/L
BACTERIA #/AREA URNS HPF: NORMAL /HPF
BARBITURATES UR QL SCN>200 NG/ML: NEGATIVE
BASOPHILS # BLD AUTO: 0.02 K/UL
BASOPHILS NFR BLD: 0.2 %
BENZODIAZ UR QL SCN>200 NG/ML: NEGATIVE
BILIRUB SERPL-MCNC: 0.7 MG/DL
BILIRUB UR QL STRIP: NEGATIVE
BLD GP AB SCN CELLS X3 SERPL QL: NORMAL
BNP SERPL-MCNC: 1120 PG/ML
BUN SERPL-MCNC: 26 MG/DL
BUN SERPL-MCNC: 28 MG/DL
BUN SERPL-MCNC: 28 MG/DL
BZE UR QL SCN: NEGATIVE
CALCIUM SERPL-MCNC: 8.8 MG/DL
CALCIUM SERPL-MCNC: 8.9 MG/DL
CALCIUM SERPL-MCNC: 9.1 MG/DL
CANNABINOIDS UR QL SCN: NEGATIVE
CHLORIDE SERPL-SCNC: 109 MMOL/L
CHLORIDE SERPL-SCNC: 110 MMOL/L
CHLORIDE SERPL-SCNC: 111 MMOL/L
CK SERPL-CCNC: 385 U/L
CLARITY UR: CLEAR
CO2 SERPL-SCNC: 21 MMOL/L
CO2 SERPL-SCNC: 25 MMOL/L
CO2 SERPL-SCNC: 27 MMOL/L
COLOR UR: YELLOW
CORTIS SERPL-MCNC: 15.2 UG/DL
CREAT SERPL-MCNC: 2 MG/DL
CREAT SERPL-MCNC: 2.1 MG/DL
CREAT SERPL-MCNC: 2.2 MG/DL
CREAT UR-MCNC: 50 MG/DL
DIFFERENTIAL METHOD: ABNORMAL
EOSINOPHIL # BLD AUTO: 0.2 K/UL
EOSINOPHIL NFR BLD: 1.9 %
ERYTHROCYTE [DISTWIDTH] IN BLOOD BY AUTOMATED COUNT: 14.7 %
EST. GFR  (AFRICAN AMERICAN): 36 ML/MIN/1.73 M^2
EST. GFR  (AFRICAN AMERICAN): 38 ML/MIN/1.73 M^2
EST. GFR  (AFRICAN AMERICAN): 40 ML/MIN/1.73 M^2
EST. GFR  (NON AFRICAN AMERICAN): 31 ML/MIN/1.73 M^2
EST. GFR  (NON AFRICAN AMERICAN): 33 ML/MIN/1.73 M^2
EST. GFR  (NON AFRICAN AMERICAN): 35 ML/MIN/1.73 M^2
GLUCOSE SERPL-MCNC: 103 MG/DL
GLUCOSE SERPL-MCNC: 64 MG/DL
GLUCOSE SERPL-MCNC: 70 MG/DL
GLUCOSE UR QL STRIP: ABNORMAL
HCO3 UR-SCNC: 23.8 MMOL/L (ref 24–28)
HCT VFR BLD AUTO: 27.1 %
HCT VFR BLD CALC: 27 %PCV (ref 36–54)
HGB BLD-MCNC: 8.8 G/DL
HGB BLD-MCNC: 9 G/DL
HGB UR QL STRIP: ABNORMAL
HYALINE CASTS #/AREA URNS LPF: 0 /LPF
INR PPP: 1.1
KETONES UR QL STRIP: NEGATIVE
LACTATE SERPL-SCNC: 1.4 MMOL/L
LEUKOCYTE ESTERASE UR QL STRIP: NEGATIVE
LYMPHOCYTES # BLD AUTO: 1.4 K/UL
LYMPHOCYTES NFR BLD: 16 %
MAGNESIUM SERPL-MCNC: 2.3 MG/DL
MCH RBC QN AUTO: 27.2 PG
MCHC RBC AUTO-ENTMCNC: 32.5 G/DL
MCV RBC AUTO: 84 FL
METHADONE UR QL SCN>300 NG/ML: NEGATIVE
MICROSCOPIC COMMENT: NORMAL
MONOCYTES # BLD AUTO: 0.4 K/UL
MONOCYTES NFR BLD: 4.4 %
NEUTROPHILS # BLD AUTO: 6.8 K/UL
NEUTROPHILS NFR BLD: 77.5 %
NITRITE UR QL STRIP: NEGATIVE
OPIATES UR QL SCN: NEGATIVE
PCO2 BLDA: 48.2 MMHG (ref 35–45)
PCP UR QL SCN>25 NG/ML: NEGATIVE
PH SMN: 7.3 [PH] (ref 7.35–7.45)
PH UR STRIP: 6 [PH] (ref 5–8)
PLATELET # BLD AUTO: 95 K/UL
PMV BLD AUTO: 10.9 FL
PO2 BLDA: 45 MMHG (ref 40–60)
POC BE: -3 MMOL/L
POC IONIZED CALCIUM: 1.29 MMOL/L (ref 1.06–1.42)
POC SATURATED O2: 76 % (ref 95–100)
POC TCO2: 25 MMOL/L (ref 24–29)
POCT GLUCOSE: 112 MG/DL (ref 70–110)
POCT GLUCOSE: 69 MG/DL (ref 70–110)
POCT GLUCOSE: 75 MG/DL (ref 70–110)
POCT GLUCOSE: 76 MG/DL (ref 70–110)
POCT GLUCOSE: 97 MG/DL (ref 70–110)
POTASSIUM BLD-SCNC: 3.1 MMOL/L (ref 3.5–5.1)
POTASSIUM SERPL-SCNC: 3.1 MMOL/L
POTASSIUM SERPL-SCNC: 3.2 MMOL/L
POTASSIUM SERPL-SCNC: 3.3 MMOL/L
PROT SERPL-MCNC: 7.4 G/DL
PROT UR QL STRIP: ABNORMAL
PROTHROMBIN TIME: 12 SEC
RBC # BLD AUTO: 3.24 M/UL
RBC #/AREA URNS HPF: 4 /HPF (ref 0–4)
SAMPLE: ABNORMAL
SITE: ABNORMAL
SODIUM BLD-SCNC: 145 MMOL/L (ref 136–145)
SODIUM SERPL-SCNC: 140 MMOL/L
SODIUM SERPL-SCNC: 140 MMOL/L
SODIUM SERPL-SCNC: 142 MMOL/L
SP GR UR STRIP: 1.02 (ref 1–1.03)
SQUAMOUS #/AREA URNS HPF: 0 /HPF
TOXICOLOGY INFORMATION: NORMAL
TROPONIN I SERPL DL<=0.01 NG/ML-MCNC: 0.01 NG/ML
TSH SERPL DL<=0.005 MIU/L-ACNC: 2.4 UIU/ML
URN SPEC COLLECT METH UR: ABNORMAL
UROBILINOGEN UR STRIP-ACNC: NEGATIVE EU/DL
WBC # BLD AUTO: 8.8 K/UL
WBC #/AREA URNS HPF: 1 /HPF (ref 0–5)

## 2018-02-21 PROCEDURE — 84132 ASSAY OF SERUM POTASSIUM: CPT

## 2018-02-21 PROCEDURE — 99285 EMERGENCY DEPT VISIT HI MDM: CPT | Mod: 25

## 2018-02-21 PROCEDURE — 80048 BASIC METABOLIC PNL TOTAL CA: CPT

## 2018-02-21 PROCEDURE — 85730 THROMBOPLASTIN TIME PARTIAL: CPT

## 2018-02-21 PROCEDURE — 86850 RBC ANTIBODY SCREEN: CPT

## 2018-02-21 PROCEDURE — 63600175 PHARM REV CODE 636 W HCPCS: Performed by: EMERGENCY MEDICINE

## 2018-02-21 PROCEDURE — 63600175 PHARM REV CODE 636 W HCPCS: Performed by: INTERNAL MEDICINE

## 2018-02-21 PROCEDURE — 85025 COMPLETE CBC W/AUTO DIFF WBC: CPT

## 2018-02-21 PROCEDURE — 25000003 PHARM REV CODE 250: Performed by: STUDENT IN AN ORGANIZED HEALTH CARE EDUCATION/TRAINING PROGRAM

## 2018-02-21 PROCEDURE — 84484 ASSAY OF TROPONIN QUANT: CPT | Mod: 91

## 2018-02-21 PROCEDURE — 85610 PROTHROMBIN TIME: CPT

## 2018-02-21 PROCEDURE — 96374 THER/PROPH/DIAG INJ IV PUSH: CPT

## 2018-02-21 PROCEDURE — 51702 INSERT TEMP BLADDER CATH: CPT

## 2018-02-21 PROCEDURE — 80307 DRUG TEST PRSMV CHEM ANLYZR: CPT

## 2018-02-21 PROCEDURE — 96375 TX/PRO/DX INJ NEW DRUG ADDON: CPT

## 2018-02-21 PROCEDURE — 80053 COMPREHEN METABOLIC PANEL: CPT

## 2018-02-21 PROCEDURE — 82533 TOTAL CORTISOL: CPT

## 2018-02-21 PROCEDURE — 25000003 PHARM REV CODE 250: Performed by: EMERGENCY MEDICINE

## 2018-02-21 PROCEDURE — 82330 ASSAY OF CALCIUM: CPT

## 2018-02-21 PROCEDURE — 81000 URINALYSIS NONAUTO W/SCOPE: CPT

## 2018-02-21 PROCEDURE — S0030 INJECTION, METRONIDAZOLE: HCPCS | Performed by: STUDENT IN AN ORGANIZED HEALTH CARE EDUCATION/TRAINING PROGRAM

## 2018-02-21 PROCEDURE — 25000003 PHARM REV CODE 250: Performed by: INTERNAL MEDICINE

## 2018-02-21 PROCEDURE — 83605 ASSAY OF LACTIC ACID: CPT

## 2018-02-21 PROCEDURE — 84443 ASSAY THYROID STIM HORMONE: CPT

## 2018-02-21 PROCEDURE — 63600175 PHARM REV CODE 636 W HCPCS: Performed by: STUDENT IN AN ORGANIZED HEALTH CARE EDUCATION/TRAINING PROGRAM

## 2018-02-21 PROCEDURE — 86920 COMPATIBILITY TEST SPIN: CPT

## 2018-02-21 PROCEDURE — 87040 BLOOD CULTURE FOR BACTERIA: CPT

## 2018-02-21 PROCEDURE — 93005 ELECTROCARDIOGRAM TRACING: CPT

## 2018-02-21 PROCEDURE — 20000000 HC ICU ROOM

## 2018-02-21 PROCEDURE — 82962 GLUCOSE BLOOD TEST: CPT

## 2018-02-21 PROCEDURE — 83735 ASSAY OF MAGNESIUM: CPT

## 2018-02-21 PROCEDURE — 82550 ASSAY OF CK (CPK): CPT

## 2018-02-21 PROCEDURE — 84295 ASSAY OF SERUM SODIUM: CPT

## 2018-02-21 PROCEDURE — 36415 COLL VENOUS BLD VENIPUNCTURE: CPT

## 2018-02-21 PROCEDURE — 82803 BLOOD GASES ANY COMBINATION: CPT

## 2018-02-21 PROCEDURE — 82140 ASSAY OF AMMONIA: CPT

## 2018-02-21 PROCEDURE — 83880 ASSAY OF NATRIURETIC PEPTIDE: CPT

## 2018-02-21 PROCEDURE — 99900035 HC TECH TIME PER 15 MIN (STAT)

## 2018-02-21 PROCEDURE — 87086 URINE CULTURE/COLONY COUNT: CPT

## 2018-02-21 PROCEDURE — 84484 ASSAY OF TROPONIN QUANT: CPT

## 2018-02-21 RX ORDER — INSULIN ASPART 100 [IU]/ML
1-10 INJECTION, SOLUTION INTRAVENOUS; SUBCUTANEOUS
Status: DISCONTINUED | OUTPATIENT
Start: 2018-02-21 | End: 2018-02-22

## 2018-02-21 RX ORDER — HEPARIN SODIUM 5000 [USP'U]/ML
5000 INJECTION, SOLUTION INTRAVENOUS; SUBCUTANEOUS EVERY 8 HOURS
Status: DISCONTINUED | OUTPATIENT
Start: 2018-02-21 | End: 2018-02-24 | Stop reason: HOSPADM

## 2018-02-21 RX ORDER — DEXTROSE MONOHYDRATE, SODIUM CHLORIDE, AND POTASSIUM CHLORIDE 50; 2.98; 4.5 G/1000ML; G/1000ML; G/1000ML
INJECTION, SOLUTION INTRAVENOUS CONTINUOUS
Status: DISCONTINUED | OUTPATIENT
Start: 2018-02-21 | End: 2018-02-22

## 2018-02-21 RX ORDER — GLUCAGON 1 MG
1 KIT INJECTION
Status: DISCONTINUED | OUTPATIENT
Start: 2018-02-21 | End: 2018-02-24 | Stop reason: HOSPADM

## 2018-02-21 RX ORDER — IBUPROFEN 200 MG
16 TABLET ORAL
Status: DISCONTINUED | OUTPATIENT
Start: 2018-02-21 | End: 2018-02-24 | Stop reason: HOSPADM

## 2018-02-21 RX ORDER — DEXTROSE 50 % IN WATER (D50W) INTRAVENOUS SYRINGE
25
Status: COMPLETED | OUTPATIENT
Start: 2018-02-21 | End: 2018-02-21

## 2018-02-21 RX ORDER — POTASSIUM CHLORIDE 7.45 MG/ML
10 INJECTION INTRAVENOUS ONCE
Status: DISCONTINUED | OUTPATIENT
Start: 2018-02-21 | End: 2018-02-24 | Stop reason: HOSPADM

## 2018-02-21 RX ORDER — METRONIDAZOLE 500 MG/100ML
500 INJECTION, SOLUTION INTRAVENOUS
Status: DISCONTINUED | OUTPATIENT
Start: 2018-02-21 | End: 2018-02-23

## 2018-02-21 RX ORDER — ATORVASTATIN CALCIUM 40 MG/1
80 TABLET, FILM COATED ORAL DAILY
Status: DISCONTINUED | OUTPATIENT
Start: 2018-02-21 | End: 2018-02-24 | Stop reason: HOSPADM

## 2018-02-21 RX ORDER — AMLODIPINE BESYLATE 5 MG/1
10 TABLET ORAL DAILY
Status: DISCONTINUED | OUTPATIENT
Start: 2018-02-21 | End: 2018-02-24 | Stop reason: HOSPADM

## 2018-02-21 RX ORDER — SODIUM CHLORIDE 0.9 % (FLUSH) 0.9 %
5 SYRINGE (ML) INJECTION
Status: DISCONTINUED | OUTPATIENT
Start: 2018-02-21 | End: 2018-02-24 | Stop reason: HOSPADM

## 2018-02-21 RX ORDER — IBUPROFEN 200 MG
24 TABLET ORAL
Status: DISCONTINUED | OUTPATIENT
Start: 2018-02-21 | End: 2018-02-24 | Stop reason: HOSPADM

## 2018-02-21 RX ORDER — LISINOPRIL 20 MG/1
40 TABLET ORAL DAILY
Status: DISCONTINUED | OUTPATIENT
Start: 2018-02-21 | End: 2018-02-24 | Stop reason: HOSPADM

## 2018-02-21 RX ADMIN — DEXTROSE MONOHYDRATE, SODIUM CHLORIDE, AND POTASSIUM CHLORIDE: 50; 4.5; 2.98 INJECTION, SOLUTION INTRAVENOUS at 06:02

## 2018-02-21 RX ADMIN — VANCOMYCIN HYDROCHLORIDE 1750 MG: 10 INJECTION, POWDER, LYOPHILIZED, FOR SOLUTION INTRAVENOUS at 06:02

## 2018-02-21 RX ADMIN — METRONIDAZOLE 500 MG: 500 INJECTION, SOLUTION INTRAVENOUS at 10:02

## 2018-02-21 RX ADMIN — LISINOPRIL 40 MG: 20 TABLET ORAL at 01:02

## 2018-02-21 RX ADMIN — METRONIDAZOLE 500 MG: 500 INJECTION, SOLUTION INTRAVENOUS at 04:02

## 2018-02-21 RX ADMIN — DEXTROSE MONOHYDRATE 25 G: 25 INJECTION, SOLUTION INTRAVENOUS at 08:02

## 2018-02-21 RX ADMIN — HEPARIN SODIUM 5000 UNITS: 5000 INJECTION, SOLUTION INTRAVENOUS; SUBCUTANEOUS at 10:02

## 2018-02-21 RX ADMIN — PIPERACILLIN AND TAZOBACTAM 4.5 G: 4; .5 INJECTION, POWDER, LYOPHILIZED, FOR SOLUTION INTRAVENOUS; PARENTERAL at 11:02

## 2018-02-21 RX ADMIN — INSULIN HUMAN 10 UNITS: 100 INJECTION, SOLUTION PARENTERAL at 08:02

## 2018-02-21 RX ADMIN — PIPERACILLIN AND TAZOBACTAM 4.5 G: 4; .5 INJECTION, POWDER, LYOPHILIZED, FOR SOLUTION INTRAVENOUS; PARENTERAL at 04:02

## 2018-02-21 RX ADMIN — AMLODIPINE BESYLATE 10 MG: 5 TABLET ORAL at 01:02

## 2018-02-21 RX ADMIN — DEXTROSE MONOHYDRATE 12.5 G: 25 INJECTION, SOLUTION INTRAVENOUS at 08:02

## 2018-02-21 NOTE — ED PROVIDER NOTES
Encounter Date: 2/21/2018    SCRIBE #1 NOTE: IRon, am scribing for, and in the presence of, Dr. Ugalde.       History     Chief Complaint   Patient presents with    Bradycardia     pt was brought in by EMS with bradycardia, pt has a hx of stage 4 renal disease with an abnormal ECG.     Marie Reis Jr. is a 62 y.o. 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    The patient presents to the ED due to being found confused and somnolent by his wife this morning, around 07:00 AM. She reports he was last behaving normally around 10:00 PM last night. Patient's wife states he was somnolent, weak and unable to get up. He was also noted to have slurred speech. She reports history of stroke, but she denies noticing/witnessing any recent trauma or falls prior to today. She called EMS for transport.    En route by EMS, an EKG revealed bradycardia with wide QRS. Family reports history of kidney disease but patient is not on dialysis.    On arrival, patient somnolent but arousable to verbal stimuli. He reports only leg pain and swelling. He states he feels unwell, but denies any headache, focal weakness, fever, N/V, abdominal pain, chest pain, SOB, or urinary complaints.      The history is provided by the patient, the spouse and the EMS personnel.     Review of patient's allergies indicates:  No Known Allergies  Past Medical History:   Diagnosis Date    Cataract     Cervical spondylosis 10/1/2015    Diabetes mellitus, type 2     Hypertension     Smoldering myeloma 6/2/2016    Strabismus     Stroke 2014     Past Surgical History:   Procedure Laterality Date    CATARACT EXTRACTION W/  INTRAOCULAR LENS IMPLANT Left 4/30/15    Diane    HAND SURGERY  2/2012    DR. OLIVIA MOLINA    left hand fracture sx      LT EYE   5/2/15    DR KULLMAN [facility]     Family History   Problem  Relation Age of Onset    Diabetes Mother     Diabetes Father     Diabetes Sister     Diabetes Brother     No Known Problems Maternal Aunt     No Known Problems Maternal Uncle     No Known Problems Paternal Aunt     No Known Problems Paternal Uncle     No Known Problems Maternal Grandmother     No Known Problems Maternal Grandfather     No Known Problems Paternal Grandmother     No Known Problems Paternal Grandfather     Blindness Neg Hx     Anesthesia problems Neg Hx     Amblyopia Neg Hx     Cancer Neg Hx     Cataracts Neg Hx     Glaucoma Neg Hx     Hypertension Neg Hx     Macular degeneration Neg Hx     Retinal detachment Neg Hx     Strabismus Neg Hx     Stroke Neg Hx     Thyroid disease Neg Hx     Heart attack Neg Hx     Heart disease Neg Hx     Heart failure Neg Hx     Hyperlipidemia Neg Hx      Social History   Substance Use Topics    Smoking status: Never Smoker    Smokeless tobacco: Never Used    Alcohol use No     Review of Systems   Unable to perform ROS: Mental status change   Constitutional: Negative for fever.   HENT: Negative for sore throat.    Respiratory: Negative for shortness of breath.    Cardiovascular: Negative for chest pain.   Gastrointestinal: Negative for nausea.   Genitourinary: Negative for dysuria.   Musculoskeletal: Positive for arthralgias.   Skin: Negative for rash.   Neurological: Negative for weakness.   Hematological: Does not bruise/bleed easily.   Psychiatric/Behavioral: Positive for confusion.       Physical Exam     Vitals:    02/24/18 0432 02/24/18 0700 02/24/18 0710 02/24/18 0725   BP:    (!) 176/81   BP Location:    Right arm   Patient Position:    Lying   Pulse:   80 80   Resp:    20   Temp:    97.9 °F (36.6 °C)   TempSrc:    Oral   SpO2: 98%      Weight:  86 kg (189 lb 9.5 oz) 86 kg (189 lb 9.5 oz)    Height:   6' (1.829 m)          Physical Exam    Nursing note and vitals reviewed.  Constitutional: He appears well-developed and well-nourished.    HENT:   Head: Normocephalic and atraumatic.   Right Ear: External ear normal.   Left Ear: External ear normal.   Mouth/Throat: No oropharyngeal exudate.   Dry MM   Eyes: EOM are normal.   Pupils irregular, consistent with remote cataract repair   Neck: Neck supple.   Cardiovascular: Normal rate, regular rhythm, normal heart sounds and intact distal pulses.   Bradycardic with rate in the 50s   Pulmonary/Chest: Breath sounds normal. No respiratory distress. He has no wheezes.   Abdominal: Soft. He exhibits no distension. There is no tenderness.   Musculoskeletal: Normal range of motion.   Neurological: He is alert. No cranial nerve deficit. He exhibits abnormal muscle tone (generalized weakness). GCS eye subscore is 4. GCS verbal subscore is 4. GCS motor subscore is 6.   Confused.   Somnolent, but arousable.   Speech delayed.   Follows commands.   Skin: Skin is warm and dry. Capillary refill takes less than 2 seconds. No rash noted.   Psychiatric: His affect is blunt. He is slowed. Cognition and memory are impaired.         ED Course   Procedures  Labs Reviewed   CBC W/ AUTO DIFFERENTIAL - Abnormal; Notable for the following:        Result Value    RBC 3.24 (*)     Hemoglobin 8.8 (*)     Hematocrit 27.1 (*)     RDW 14.7 (*)     Platelets 95 (*)     Gran% 77.5 (*)     Lymph% 16.0 (*)     All other components within normal limits   COMPREHENSIVE METABOLIC PANEL - Abnormal; Notable for the following:     Potassium 3.2 (*)     CO2 21 (*)     BUN, Bld 28 (*)     Creatinine 2.2 (*)     Albumin 3.4 (*)     Alkaline Phosphatase 146 (*)     eGFR if  36 (*)     eGFR if non  31 (*)     All other components within normal limits   URINALYSIS - Abnormal; Notable for the following:     Protein, UA 2+ (*)     Glucose, UA Trace (*)     Occult Blood UA 2+ (*)     All other components within normal limits   B-TYPE NATRIURETIC PEPTIDE - Abnormal; Notable for the following:     BNP 1,120 (*)     All other  components within normal limits   CK - Abnormal; Notable for the following:      (*)     All other components within normal limits   BASIC METABOLIC PANEL - Abnormal; Notable for the following:     Potassium 3.1 (*)     Chloride 111 (*)     BUN, Bld 28 (*)     Creatinine 2.1 (*)     Anion Gap 6 (*)     eGFR if  38 (*)     eGFR if non  33 (*)     All other components within normal limits   POCT GLUCOSE - Abnormal; Notable for the following:     POCT Glucose 112 (*)     All other components within normal limits   ISTAT PROCEDURE - Abnormal; Notable for the following:     POC PH 7.301 (*)     POC PCO2 48.2 (*)     POC HCO3 23.8 (*)     POC SATURATED O2 76 (*)     POC Potassium 3.1 (*)     POC Hematocrit 27 (*)     All other components within normal limits   TROPONIN I   PROTIME-INR   LACTIC ACID, PLASMA   APTT   AMMONIA   URINALYSIS MICROSCOPIC   CK   MAGNESIUM   DRUG SCREEN PANEL, URINE EMERGENCY   MAGNESIUM   DRUG SCREEN PANEL, URINE EMERGENCY   TROPONIN I   CORTISOL, RANDOM   TSH   TYPE & SCREEN   POCT GLUCOSE   POCT GLUCOSE     EKG Readings: (Independently Interpreted)   Previous EKG: Compared with most recent EKG Rhythm: Sinus Bradycardia. Heart Rate: 49. ST Segments: Normal ST Segments. Other Findings: Prolonged QT Interval.   No ischemia.  Compared to prior EKG, sinus bradycardia is new, prolonged QT is new.     Imaging Results          MRI Brain Without Contrast (Final result)  Result time 02/21/18 13:16:38    Final result by Juan Mack DO (02/21/18 13:16:38)                 Impression:     Age-appropriate generalized volume loss with small bilateral basal ganglia and right thalamic remote lacunar-type infarcts corresponding to hypodensity seen on CT.    No evidence for acute infarction. Otherwise unremarkable noncontrast MRI brain as detailed above.      Electronically signed by: JUAN MACK DO  Date:     02/21/18  Time:    13:16              Narrative:     Procedure: MRI the brain without contrast.    Technique: Sagittal/axial T1, axial/coronal T2, axial FLAIR, axial gradient, and axial diffusion imaging of the whole brain.    Comparison: CT earlier today    Findings: Age-appropriate generalized cerebral volume loss. Small T2 flair signal hyperintensity within the right thalamus with more punctate components in the bilateral basal ganglia correspond to hypodensity seen on CT and compatible with remote lacunar type infarcts. There is no restricted diffusion to suggest acute infarction.  Study is limited by lack of contrast.  absence of the septum pellucidum again seen. Ventricles stable without hydrocephalus.. No midline shift or mass effect. No abnormal parenchymal  susceptibility. The major intracranial T2 flow-voids are present. Trace partial fluid opacification the mastoid air cells bilaterally                             CT Head Without Contrast (Final result)  Result time 02/21/18 09:54:30    Final result by Juan Mack DO (02/21/18 09:54:30)                 Impression:     Small hypodensities right thalamus and bilateral ganglia bilaterally suggestive for remote lacunar-type infarcts. Otherwise unremarkable noncontrast CT head specifically without evidence for acute intracranial hemorrhage or significant new abnormal parenchymal attenuation.. Further evaluation as warranted clinically.      Electronically signed by: JUAN MACK DO  Date:     02/21/18  Time:    09:54              Narrative:    CT brain without contrast.    Comparison: 09/17/2016    Technique: Multiple 5 mm axial images of the head were obtained without intravenous contrast.    Findings: No evidence for acute intracranial hemorrhage or sulcal effacement. The ventricles are stable in size and configuration with absent septum pellucidum without evidence for hydrocephalus. Small hypodensity right thalamus is compatible with remote lacunar type infarct unchanged. Stable small hypodensities  within the basal ganglia most compatible with remote lacunar type infarct. There is no midline shift or mass effect. The visualized paranasal sinuses and mastoid air cells are clear..                             X-Ray Chest 1 View (Final result)  Result time 02/21/18 08:49:43    Final result by Starla Dumont MD (02/21/18 08:49:43)                 Impression:     No acute intrathoracic process seen.      Electronically signed by: STARLA DUMONT MD  Date:     02/21/18  Time:    08:49              Narrative:     AP chest radiograph.      Comparison: 10/25/17    Results: The lung volume appears adequate.  No parenchymal or pleural abnormality is seen.  The cardiac silhouette  and pulmonary vascularity appear within normal limits.  The mediastinum is midline. .  No pneumothorax.  The osseous structures  appear normal  for age.                                 Medical Decision Making:   History:   Old Medical Records: I decided to obtain old medical records.  Initial Assessment:   62 y.o. Male with hx of HTN, diabetes mellitus, stroke, and recent diagnosis of CKD and multiple myeloma. He required a transfusion last month for anemia. Presents today with altered mental status per wife, who found him confused and somnolent at home this morning. She reports his last known well time was 10:00 PM last night. En route by EMS, EKG revealed bradycardia and wide complex QRS, most concerning for severe hyperkalemia. On arrival, blood pressure stable but patient remains bradycardic. Confused but follows commands, no focal deficits. Plan to treat empirically for hyperkalemia with calcium, insulin, D50, and albuterol.   Rectal temp on arrival 87.0 F. Will initiate Malgorzata hugger and warm IVF. Suspect dehydration and immobilization as potential cause of hypothermia but will evaluate for infection.  Will plan to repeat EKG and obtain labs, as well as CT head to evaluate for intracranial abnormality.   Will reassess after  treatment and lab results, and anticipate admission for further management.  Differential Diagnosis:   Differential Diagnosis includes, but is not limited to:  CVA/TIA, seizure, status epilepticus, post-ictal state, meningitis/encephalitis, sepsis, MI/ACS, arrhythmia, syncope, intracranial mass/hemorrhage, head trauma, anaphylaxis, substance abuse, alcohol intoxication/withdrawal, medication reaction, intentional medication overdose, neuroleptic malignant syndrome, serotonin syndrome, CO poisoning, hypoxia/hypercapnea, hepatic encephalopathy, metabolic disturbance, thyroid disease, hypoglycemia.  Clinical Tests:   Lab Tests: Ordered and Reviewed  Radiological Study: Ordered and Reviewed  Medical Tests: Ordered and Reviewed  ED Management:  10:21 AM  EKG sinus bradycardia, prolonged QT, no longer wide complex QRS, no evidence of ischemia.   CXR clear.   CT revealed possible remote infarcts but no acute findings.   Labs grossly unremarkable, WBC normal.  BNP slightly elevated.   Creatinine at baseline.   Potassium at 3.2.   Given confusion and persistent ams as well as hypothermia, will request admission for further evaluation and management.   [facility] medicine contacted for admission, who requested Neurology consult and evaluation.    11:15 AM  Discussed with neurology, who will evaluate.   Initial recommendation includes MRI brain, MRA head/neck, and ECHO.  Upon re-evaluation, the patient's status has remained stable.  At this time, I believe the patient should be admitted to the hospital for further evaluation and management of hypothermia, AMS, confusion.    [facility] service was contacted and the case was discussed. The consulting physician agrees with plan and will admit under their service. Additional recommendations at this time: Neurology consult    The patient and family were updated with test results, overall impression, and further plan of care. All questions were answered. The patient expressed  understanding and agreed with the current plan.                  Attending Attestation:         Attending Critical Care:   Critical Care Times:   Direct Patient Care (initial evaluation, reassessments, and time considering the case)................................................................55 minutes.   ==============================================================  · Total Critical Care Time - exclusive of procedural time: 55 minutes.  ==============================================================        Medications   calcium gluconate 1 g in dextrose 5 % 100 mL IVPB (0 g Intravenous Hold 2/21/18 0830)   insulin regular injection 10 Units (10 Units Intravenous Given 2/21/18 0835)   dextrose 50 % in water (D50W) injection 25 g (25 g Intravenous Given 2/21/18 0841)   vancomycin (VANCOCIN) 1,750 mg in sodium chloride 0.9% 500 mL IVPB (1,750 mg Intravenous New Bag 2/21/18 1846)   hydrALAZINE injection 5 mg (5 mg Intravenous Given 2/23/18 1601)                  Clinical Impression:   The primary encounter diagnosis was Sepsis, due to unspecified organism. Diagnoses of Altered mental status, Hypothermia, initial encounter, Bradycardia, Acute encephalopathy, Multiple myeloma not having achieved remission, and Sepsis were also pertinent to this visit.    Disposition:   Disposition: Admitted       I, Dr. Sina Ugalde, personally performed the services described in this documentation. All medical record entries made by the scribe were at my direction and in my presence.  I have reviewed the chart and agree that the record reflects my personal performance and is accurate and complete.     Sina Ugalde MD.                 Sina Ugalde MD  03/15/18 1257

## 2018-02-21 NOTE — H&P
"[facility] Internal Medicine History and Physical - Resident Note    Admitting Team: [facility] Internal Medicine Team B   Attending Physician: Dr. Encarnacion  Resident: Dr. Marie   Interns: Dr. Fairchild     Date of Admit: 2/21/2018    Chief Complaint     Weakness, Slurred speech, unsteady gait x1-2 hours    Subjective:      History of Present Illness:  Marie Reis Jr. is a 62 y.o.  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 The patient presented to [facility] on 2/21/2018 with a primary complaint of weakness, slurred speech, unsteady gait    The patient was in their usual state of health until this morning when his wife found him laying stretched out on the floor in the family room. She last saw him normal around 10pm last night, he was watching the news before going to sleep. When she got up around 7am this morning, she found on on the floor with his shirt off, which was next to him and later noticed to be "drenched." He was talking but she didn't understand him. He tried to get up but was too weak. He crawled about ft before his family helped him up. His gait was more unsteady than usually and had to be assisted with walking while he used the wall to balance. He was able to urinate standing up while leaning with both hands against the wall. EMS was called and brought him to ED. Family reports pt has had 4 strokes in the past 5 years, last one was around a year ago. Most of his strokes involved slurred speech, unsteady gate, and one with a headache. Pt reports he was told his glucose was in the 60's by EMS.     EMS run sheet with sinus bradycardia with widened QRS complexes, so he was empirically treated for hyperkalemia in the ED- with Calcium gluconate, Insulin 10units, and D50. Labs drawn at the same time, later resulting with a K of 3.2. He was also found to be " "hypothermic, initial temp 87.7F (rectal temp). Pt placed on a bearhugger. CT head negative for acute bleed, and CXR without acute findings.     Pt handles his own medications, but does not always take them consistently. He reports not eating much yesterday but he did take his Lantus 28units last night. He's had some nausea but denies vomiting. Denies fevers, chest pain, sore throat, rhinorrhea, abdominal pain. Notes diarrhea for past 2-3 days with 2-3 loose stools daily. Reports his legs are swollen, but no more than usual. Wife reports he was c/o leg pain this morning. At baseline, pt has residual gait disturbance- he's "wobbly" and unsteady, but only uses cane occasionally. His speech is normal at baseline, and his mentation is normal but mildly slowed since his strokes.     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 [facility]    left hand fracture sx      LT EYE   5/2/15    DR KULLMAN [facility]       Allergies:  Review of patient's allergies indicates:  No Known Allergies    Home Medications:  Prior to Admission medications    Medication Sig Start Date End Date Taking? Authorizing Provider   amLODIPine (NORVASC) 10 MG tablet Take 1 tablet (10 mg total) by mouth once daily. 1/3/18  Yes Debbie Salinas MD   atorvastatin (LIPITOR) 80 MG tablet Take 1 tablet (80 mg total) by mouth once daily. 9/26/16  Yes Nancy Colón MD   carvedilol (COREG) 25 MG tablet Take 1 tablet (25 mg total) by mouth 2 (two) times daily with meals. 1/9/18  Yes Debbie Salinas MD   clopidogrel (PLAVIX) 75 mg tablet Take 1 tablet (75 mg total) by mouth once daily. " "9/9/16 10/26/18 Yes Nancy Colón MD   dexamethasone (DECADRON) 4 MG Tab 3 tablets once a week 1/16/18  Yes Artur Guaman Jr., MD   fluticasone (FLONASE) 50 mcg/actuation nasal spray 1 spray by Each Nare route 2 (two) times daily as needed for Rhinitis. 9/9/16  Yes Nancy Colón MD   furosemide (LASIX) 40 MG tablet Take 60 mg which is 1 and a half pill in the morning at 8 a.m. And 40 mg (one full pill) at 3 p.m. In the afternoon 1/11/18  Yes Alissa Dotson MD   gabapentin (NEURONTIN) 300 MG capsule Take 1 capsule (300 mg total) by mouth 2 (two) times daily. 9/9/16  Yes Nancy Colón MD   insulin aspart (NOVOLOG) 100 unit/mL InPn pen Inject 8 units w/ meals plus scale 150-200 +2, 201-250 +4, 251-300 +6, 301-350 +8, >350 +10. Snack 4 units. 10/26/17  Yes BELEM Leslie FNP   insulin glargine (LANTUS SOLOSTAR) 100 unit/mL (3 mL) InPn pen Inject 28 units at night. 10/26/17  Yes BELEM Leslie FNP   lisinopril (PRINIVIL,ZESTRIL) 40 MG tablet TAKE 1 TABLET (40 MG TOTAL) BY MOUTH ONCE DAILY. 10/14/16  Yes Nancy Colón MD   blood sugar diagnostic Strp 1 strip by Misc.(Non-Drug; Combo Route) route after meals as needed. 1/14/14   Vince Kelly MD   insulin needles, disposable, 31 X 5/16 " Ndle Pt to use pen needle with Lantus daily 2/9/15   Nancy Colón MD   lenalidomide (REVLIMID) 10 mg Cap 10 mg daily for 21 days, followed by 7 days off 1/22/18   Artur Guaman Jr., MD   meclizine (ANTIVERT) 25 mg tablet Take 1 tablet (25 mg total) by mouth 3 (three) times daily as needed for Dizziness. 9/19/16   Gumaro Weinstein MD   METHYL SALICYLATE/MENTHOL (MENTHOLATUM DEEP HEAT RUB TOP) Apply topically.    Historical Provider, MD   mirtazapine (REMERON) 30 MG tablet Take 30 mg by mouth every evening.  5/11/15   Historical Provider, MD   montelukast (SINGULAIR) 10 mg tablet Take 1 tablet (10 mg total) by mouth every evening. 9/9/16   Nancy Colón MD   nitroGLYCERIN (NITROSTAT) 0.4 MG SL " tablet Place 0.4 mg under the tongue every 5 (five) minutes as needed for Chest pain.    Debbie Salinas MD   ondansetron (ZOFRAN-ODT) 8 MG TbDL Take 1 tablet (8 mg total) by mouth every 6 (six) hours as needed. 16   Gumaro Weinstein MD       Family History:  Family History   Problem Relation Age of Onset    Diabetes Mother     Diabetes Father     Diabetes Sister     Diabetes Brother     No Known Problems Maternal Aunt     No Known Problems Maternal Uncle     No Known Problems Paternal Aunt     No Known Problems Paternal Uncle     No Known Problems Maternal Grandmother     No Known Problems Maternal Grandfather     No Known Problems Paternal Grandmother     No Known Problems Paternal Grandfather     Blindness Neg Hx     Anesthesia problems Neg Hx     Amblyopia Neg Hx     Cancer Neg Hx     Cataracts Neg Hx     Glaucoma Neg Hx     Hypertension Neg Hx     Macular degeneration Neg Hx     Retinal detachment Neg Hx     Strabismus Neg Hx     Stroke Neg Hx     Thyroid disease Neg Hx     Heart attack Neg Hx     Heart disease Neg Hx     Heart failure Neg Hx     Hyperlipidemia Neg Hx        Social History:  Social History   Substance Use Topics    Smoking status: Never Smoker    Smokeless tobacco: Never Used    Alcohol use No       Review of Systems:  Pertinent items are noted in HPI. All other systems are reviewed and are negative.    Health Maintaince :   Primary Care Physician: Dr. Nancy Colón  Immunizations:   TDap is up to date, 9/3/13.  Influenza is up to date, 10/25/17.  Pneumovax is up to date, 16. Prevnar is up to date, 6/26/15  Cancer Screening:  Colonoscopy: is not up to date. Due 10/2017     Objective:   Last 24 Hour Vital Signs:  BP  Min: 142/58  Max: 187/86  Temp  Av.4 °F (36.9 °C)  Min: 98.1 °F (36.7 °C)  Max: 98.9 °F (37.2 °C)  Pulse  Av.7  Min: 76  Max: 86  Resp  Av.4  Min: 8  Max: 28  SpO2  Av.5 %  Min: 95 %  Max: 100 %  Weight  Av.3 kg  (190 lb 4.1 oz)  Min: 86.3 kg (190 lb 4.1 oz)  Max: 86.3 kg (190 lb 4.1 oz)  Body mass index is 25.8 kg/m².  I/O last 3 completed shifts:  In: 3084.3 [P.O.:6; I.V.:1978.3; IV Piggyback:1100]  Out: 1955 [Urine:1954]    Physical Examination:  General: sleepy but easily arousable, NAD, appears pale, layng with HOB at 60degrees  HEENT: NC/AT, EOMI, PERRL, MMM, Nasal cannual in place, oropharynx clear, uvula and tongue midline, no slurred speech or facial droop  Neck: Supple, symmetric, JVP 10cm  Resp: normal effort, lungs CTA b/l  CV: bradycardic with regular rhythm, grade 1/6 systolic murmur at RUSB, 2+ b/l radial and 1+ b/l DP pulses  Abdomen: soft, round/, nontender, nondistended, normoactive bowel sounds  Extremities: no cyanosis or clubbing, 3+ edema of b/l LE to above knees, TTP in areas of edema  Skin: dry, warm, non-diaphoretic, no lesions or rashes  Neuro: AOx4 but sluggish to respond to questions, follows commands, no slurred speech or facial droop, decreased hearing on the left but remaining CN intact, b/l UE 5/5 strenght and b/l LE 5/5 strength except with left hip flexion 4/5, no pronator drift, no asterixis, no focal deficits      Laboratory:  Most Recent Data:  Trended Lab Data:     Recent Labs  Lab 02/21/18  0840  02/21/18  0841 02/21/18  1149 02/21/18  1751   WBC  --   --  8.80  --   --    HGB  --   --  8.8*  --   --    HCT 27*  --  27.1*  --   --    PLT  --   --  95*  --   --    MCV  --   --  84  --   --    RDW  --   --  14.7*  --   --    NA  --   < > 140 142 140   K  --   < > 3.2* 3.1* 3.3*   CL  --   < > 110 111* 109   CO2  --   < > 21* 25 27   BUN  --   < > 28* 28* 26*   CREATININE  --   < > 2.2* 2.1* 2.0*   GLU  --   < > 103 70 64*   CALCIUM  --   < > 9.1 8.9 8.8   MG  --   --  2.3  --   --    PHOS  --   --   --   --   --    PROT  --   --  7.4  --   --    ALBUMIN  --   --  3.4*  --   --    BILITOT  --   --  0.7  --   --    AST  --   --  29  --   --    ALKPHOS  --   --  146*  --   --    ALT  --   --   39  --   --    < > = values in this interval not displayed.     WBC Differential: 77.5 % N, 0 % Bands, 16 % L, 4.4 % M, 1.9 % Eo, 0.2 % Baso    Trended Cardiac Data:     Recent Labs  Lab 02/21/18  0841 02/21/18  1149 02/21/18  1751 02/22/18  0014   TROPONINI 0.009 0.011 0.010 0.016   BNP 1,120*  --   --   --       Coags:   Lab Results   Component Value Date    INR 1.1 02/21/2018     FLP:   Lab Results   Component Value Date    CHOL 119 (L) 10/25/2017    HDL 32 (L) 10/25/2017    LDLCALC 68.6 10/25/2017    TRIG 92 10/25/2017    CHOLHDL 26.9 10/25/2017     DM:   Lab Results   Component Value Date    HGBA1C 7.6 (H) 12/15/2017    HGBA1C 8.2 (H) 10/25/2017    HGBA1C 9.3 (H) 01/23/2017    LDLCALC 68.6 10/25/2017    CREATININE 2.2 (H) 02/22/2018     Thyroid:   Lab Results   Component Value Date    TSH 2.402 02/21/2018     Anemia:   Lab Results   Component Value Date    IRON 34 (L) 01/16/2018    TIBC 203 (L) 01/16/2018    FERRITIN 406 (H) 01/16/2018    DXQBBJOZ38 529 09/18/2016    FOLATE 11.0 09/18/2016     Cardiac:   Lab Results   Component Value Date    TROPONINI 0.016 02/22/2018    BNP 1,120 (H) 02/21/2018     Urinalysis:   Lab Results   Component Value Date    LABURIN No growth 02/21/2018    COLORU Yellow 02/21/2018    SPECGRAV 1.020 02/21/2018    NITRITE Negative 02/21/2018    KETONESU Negative 02/21/2018    UROBILINOGEN Negative 02/21/2018    WBCUA 1 02/21/2018       Microbiology Data:  Blood Cx (2/21, x2)- pending  Urine Cx (2/21)- pending  C. Diff studies- awaiting sample      Other Results:  EKG (my interpretation): Sinus Bradycardia (rate 49), LVH, Prolonged QTc of 523    Radiology:  Imaging Results          MRI Brain Without Contrast (Final result)  Result time 02/21/18 13:16:38    Final result by Juan Mack DO (02/21/18 13:16:38)                 Impression:     Age-appropriate generalized volume loss with small bilateral basal ganglia and right thalamic remote lacunar-type infarcts corresponding to  hypodensity seen on CT.    No evidence for acute infarction. Otherwise unremarkable noncontrast MRI brain as detailed above.      Electronically signed by: JUAN MACK DO  Date:     02/21/18  Time:    13:16              Narrative:    Procedure: MRI the brain without contrast.    Technique: Sagittal/axial T1, axial/coronal T2, axial FLAIR, axial gradient, and axial diffusion imaging of the whole brain.    Comparison: CT earlier today    Findings: Age-appropriate generalized cerebral volume loss. Small T2 flair signal hyperintensity within the right thalamus with more punctate components in the bilateral basal ganglia correspond to hypodensity seen on CT and compatible with remote lacunar type infarcts. There is no restricted diffusion to suggest acute infarction.  Study is limited by lack of contrast.  absence of the septum pellucidum again seen. Ventricles stable without hydrocephalus.. No midline shift or mass effect. No abnormal parenchymal  susceptibility. The major intracranial T2 flow-voids are present. Trace partial fluid opacification the mastoid air cells bilaterally                             CT Head Without Contrast (Final result)  Result time 02/21/18 09:54:30    Final result by Juan Mack DO (02/21/18 09:54:30)                 Impression:     Small hypodensities right thalamus and bilateral ganglia bilaterally suggestive for remote lacunar-type infarcts. Otherwise unremarkable noncontrast CT head specifically without evidence for acute intracranial hemorrhage or significant new abnormal parenchymal attenuation.. Further evaluation as warranted clinically.      Electronically signed by: JUAN MACK DO  Date:     02/21/18  Time:    09:54              Narrative:    CT brain without contrast.    Comparison: 09/17/2016    Technique: Multiple 5 mm axial images of the head were obtained without intravenous contrast.    Findings: No evidence for acute intracranial hemorrhage or sulcal effacement. The  ventricles are stable in size and configuration with absent septum pellucidum without evidence for hydrocephalus. Small hypodensity right thalamus is compatible with remote lacunar type infarct unchanged. Stable small hypodensities within the basal ganglia most compatible with remote lacunar type infarct. There is no midline shift or mass effect. The visualized paranasal sinuses and mastoid air cells are clear..                             X-Ray Chest 1 View (Final result)  Result time 02/21/18 08:49:43    Final result by Starla Dumont MD (02/21/18 08:49:43)                 Impression:     No acute intrathoracic process seen.      Electronically signed by: STARLA DUMONT MD  Date:     02/21/18  Time:    08:49              Narrative:     AP chest radiograph.      Comparison: 10/25/17    Results: The lung volume appears adequate.  No parenchymal or pleural abnormality is seen.  The cardiac silhouette  and pulmonary vascularity appear within normal limits.  The mediastinum is midline. .  No pneumothorax.  The osseous structures  appear normal  for age.                                 Assessment:     Marie Reis Jr. is a 62 y.o. male with:  Patient Active Problem List    Diagnosis Date Noted    Altered mental status 02/22/2018    Prolonged Q-T interval on ECG 02/22/2018    Symptomatic sinus bradycardia 02/21/2018    Acute encephalopathy 02/21/2018    Hypothermia 02/21/2018    Sepsis 02/21/2018    Multiple myeloma not having achieved remission 01/16/2018    Uncontrolled secondary diabetes mellitus with stage 4 CKD (GFR 15-29) 01/11/2018    Hypertension associated with diabetes 01/11/2018    Persistent proteinuria 01/11/2018    Chronic diastolic heart failure 01/11/2018    Angina, class II 12/27/2017    Acute on chronic diastolic heart failure 11/13/2017    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, with long-term current use of  insulin 09/26/2017    Hypertensive retinopathy of both eyes 09/26/2017    Skew deviation 09/22/2016    Hypertropia of left eye 09/22/2016    Binocular vision disorder with diplopia 09/22/2016    Secondary hyperparathyroidism 05/26/2016    Cervical spondylosis 10/01/2015    Thoracic spondylosis 10/01/2015    Lumbar spondylosis 10/01/2015    Spondylolisthesis of lumbar region 10/01/2015    Radiculitis 10/01/2015    Bursitis/tendonitis, shoulder 10/01/2015    Bursitis of hip 10/01/2015    Uncontrolled diabetes mellitus with polyneuropathy 09/28/2015    Dyslipidemia 06/26/2015    Poorly controlled diabetes mellitus 06/26/2015    History of stroke 03/23/2015    Essential hypertension 01/12/2014    Hearing impairment 10/25/2013        Plan:     1) Sepsis with unknown source  - pt found with with slurred speech, AMS, acute on chronic unsteady gait, diaphoretic, and cold on floor this morning; brought in by EMS with bradycardia and widened QRS, treated empirically for hyperkalemia. Found to be hypothermic with initial rectal temp of 87.7F, and initial K 3.2. Suspect sepsis.  - Immunocompromised in setting of smoldering Multiple myeloma; will consult Heme/Onc  - Reports some diarrhea for past 2-3 days with 2-3 loose stools per day, some nausea and decreased PO intake but denies fevers. Increased SOB for 2-3 days.   -blood and urine cultures collected. Will add c.diff studies with reported loose stools.   -WBC 8.8, Hg 8.8, Plt 95, Lactate 1.4, UA with trace glucose, 2+ protein, 2+ blood, 4 RBC, 1 WBC, Troponin 0.009, BNP 1120, VBG 7.3/48/45/23.8. Urine and blood tox screen negative.  -CXR with no acute findings; Head CT with no acute bleed, remote lacunar infarcts. Will order stat MRI to r/o CVA  -started Vanc and Zosyn for broad empiric coverage for suspected sepsis.   -Admit to ICU for hypothermia and hypoglycemia therapy    2) Acute Encephalopathy   - found stretched on floor with shirt off and slurred  speech, too weak to get up, acute on chronic unsteady gait. EMS called  -blood and tox screen negative, ammonia WNL. Will get TSH and cortisol levels  - h/o 4 previous CVAs with symptoms of slurred speech and unsteady gait. Will get stat MRI  -pt with improved speech and strength in ED but still slow to respond and subjectively weak feeling  - suspect hypoglycemia and hypothermia are main contributors, but cannot rule out CVA.   -will consulte PT/OT and speech therapy for further assessment    3) Bradycardia, Prolonged QT  - EMS with run strip showing HR of 49 with widened QRS. HR in 50's on arrival, and pt empirically treated for hyperkalemia. EKG in ED with sinus bradycardia, LVH, and prolonged QTc of 523. Initial K 3.2. Initial Troponin 0.009  - likely 2/2 hypothermia and hypokalemia  - replete glucose and K, cont hypothermia tx  -cont to trend troponin and EKGs q6h   - cardiology consulted, appreciate recs  - will hold home beta-blocker    4) Hypothermia  - initial rectal temp 87.7F. Repeat bladder temp 88F  - placed on bearhugger and rewarming  - likely 2/2 sepsis vs hypoglycemia  - will get TSH and cortisol levels. F/u blood and urine cultures  - monitor closely in ICU    5) HTN   -/77 initial   - home meds include norvasc 10mg, coreg 25mg BID, lasix 60mg qam and 40mg qpm, lisinopril 40mg  - will hold beta blocker in setting of bradycardia, but continue remaining home meds  - hydralazine 10mg IV q6h prn SBP >180  - avoid hypotension    6) HLD   - continue home statin  - will repeat lipid panel if no recent studies    7) HFpEF  - b/l LE edema and pt c/o b/l leg pain per wife. BNP 1120 on admit  - h/o grade 2 diastolic dysfunction; follows Dr. Segundo for cardiology  - will continue home meds but hold beta blocker, as above  - cardiology consulted- concerned for volume overload, diruese prn, get TSH, monitor bradycardia and rewarm  - will repeat Echo    8) Hypoglycemia, DM type 2  - last A1c 7.6 on  12/15/17. Home meds include Lantus 28u qhs (last dose the night before admission, and pt reports eating less yesterday) and Novology 8u+ SSI before meals  - pt reports POC glucose was in 60's per EMS. POC glucose on arrival was 112- given insulin regular 10u with D50 for hyperkalemia empiric tx  - Repeat BMP with glucose of 70  - will start IV fluids with D5+1/2NS+40mEq KCl and prn D50 with q2h accuchecks for close monitoring and repletion    9) CKD4 with chronic proteinuria  - likely multifactoral with multiple myeloma, DM, HTN. Follows with Dr. Dotson for Nephrology  - Cr at baseline of 2.2  - renally dose all medications and avoid nephrotoxic meds    10) Multiple Myeloma  - dx'd 1 year ago but recently found to be worsening. Follows with Dr. Guaman for heme/onc  - recently was suppose to start Revlimid/Dexamethasone, but was told to wait until they get another medication approved, per pt's wife and daughter   - will consult Heme/Onc, Dr. Chiang - appreciate any recs    11) Anemia 2/2 chronic inflammation and poor production in setting of multiple myeloma  - required a blood transfusion last month  - Hg 8.8 on admit  - Iron studies on 1/16/18- iron 34, TIBC 203, Transferrin 137, ferrition 406  -continue to monitor; Heme/Onc consulted      Diet: NPO  PPx: Hep SQ  Code: Full    Dispo: Admit to the ICU for hypothermia therapy, close cardia monitoring, IV abx and IV fluids, Cardiology/Neurology/Heme-Onc consulted      Vivienne Marie DO  [facility] Internal Medicine/Pediatrics- PGY2  [facility] Internal Medicine Service Team B      [facility] Medicine Hospitalist Pager numbers:   [facility] Hospitalist Medicine Team A (Anson/Nadia): [phone]  [facility] Hospitalist Medicine Team B (Shashank/Aniket):  [phone]

## 2018-02-21 NOTE — ED TRIAGE NOTES
PT presents to ED today via EMS who reports wife found pt on floor at home with delayed response. Pt arrives AAO with delayed response, decreased heart rate, and cold to touch.

## 2018-02-21 NOTE — ED NOTES
APPEARANCE: Alert, oriented and in no acute distress.  CARDIAC: decreased rate and rhythm, no murmur heard.   PERIPHERAL VASCULAR: peripheral pulses present. delayed cap refill. 2+ edema. cold to touch.    RESPIRATORY:Normal rate and effort, breath sounds clear bilaterally throughout chest. Respirations are equal and unlabored no obvious signs of distress.  GASTRO: soft, bowel sounds normal, no tenderness, + abdominal distention.  MUSC: Full ROM. No bony tenderness or soft tissue tenderness. No obvious deformity.  SKIN: Skin is cold and dry, delayed skin turgor, mucous membranes moist.  MENTAL STATUS: awake, alert and aware of environment.  EYE: PERRL, both eyes: pupils brisk and reactive to light. Normal size.  ENT: EARS: no obvious drainage. NOSE: no active bleeding.

## 2018-02-21 NOTE — PLAN OF CARE
Patient arrived to the floor at 1500.  Dr Fairchild notified patient was on the floor.  Per Dr Fairchild they want the patient in ICU. Charge nurse and ICU called.  Patient transferred to ICU.

## 2018-02-21 NOTE — PROGRESS NOTES
Vancomycin Dosing and Monitoring Pharmacy Protocol    Marie Reis Jr. is a 62 y.o. male    Height: 6' (1.829 m)   Wt Readings from Last 1 Encounters:   02/21/18 90.7 kg (200 lb)     Ideal body weight: 77.6 kg (171 lb 1.2 oz)  Adjusted ideal body weight: 82.8 kg (182 lb 10.3 oz)    Temp Readings from Last 3 Encounters:   02/21/18 97.8 °F (36.6 °C)   01/19/18 98 °F (36.7 °C) (Oral)   01/23/17 97.6 °F (36.4 °C) (Oral)      Lab Results   Component Value Date/Time    WBC 8.80 02/21/2018 08:41 AM    WBC 5.90 01/16/2018 11:52 AM    WBC 6.68 01/15/2018 08:25 AM      Lab Results   Component Value Date/Time    CREATININE 2.1 (H) 02/21/2018 11:49 AM    CREATININE 2.2 (H) 02/21/2018 08:41 AM    CREATININE 2.4 (H) 01/15/2018 08:25 AM        Serum creatinine: 2.1 mg/dL (H) 02/21/18 1149  Estimated creatinine clearance: 40.5 mL/min (A)    Antibiotics     Start     Stop Route Frequency Ordered    02/21/18 1700  vancomycin (VANCOCIN) 1,750 mg in sodium chloride 0.9% 500 mL IVPB      -- IV Once 02/21/18 1527    02/21/18 1600  piperacillin-tazobactam 4.5 g in dextrose 5 % 100 mL IVPB (ready to mix system)      -- IV Every 8 hours (non-standard times) 02/21/18 1451    02/21/18 1500  vancomycin (VANCOCIN) 1,250 mg in sodium chloride 0.9% 250 mL IVPB  (Vancomycin IVPB with levels panel)      -- IV Every 24 hours (non-standard times) 02/21/18 1451        Antifungals     None          Microbiology Results (last 7 days)     Procedure Component Value Units Date/Time    Urine culture [[id number]]     Order Status:  No result Specimen:  Urine     Blood culture #2 **CANNOT BE ORDERED STAT** [[id number]] Collected:  02/21/18 0955    Order Status:  Sent Specimen:  Blood from Peripheral, Lower Arm, Right Updated:  02/21/18 1341    Blood culture #1 **CANNOT BE ORDERED STAT** [[id number]] Collected:  02/21/18 0922    Order Status:  Sent Specimen:  Blood from Peripheral, Hand, Right Updated:  02/21/18 1341          Indication:   Pneumonia    Target  Level: 15-20 mcg/ml    Hemodialysis:   No on N/A.  .  .  .  .  .  .  .  .  .  .  .  .  .  .  .  .  .  .  .  .  .      Dosing Weight:   ABW--actual body weight  If ABW is greater than or equal to 30% over Ideal Body Weight, AdjBW will be used to calculate vancomycin dose.    Last Vancomycin dose: N/A   Date/Time given: N/A             Per Protocol Initial/Adjustments Dosin. Initial/Adjustment Dose: 20mg/kg = 1750 mg  2. Vancomycin maintenance:  1250mg q 24 hours  Trough before 3rd dose  Pharmacy will continue to follow.    Please contact if you have any further questions. Thank you.    Felecia Dahl, PharmD  [phone]

## 2018-02-21 NOTE — CONSULTS
"NEUROLOGY FLOOR CONSULT    Reason for consult:  AMS    Informant:  Patient       Other sources of information : ED resident, family members, chart review    CC:  "n/a"    HPI:  Mr. Reis is 63 y/o AA male with PMHx of Uncontrolled DMII complicated with CKD and R eye Retinopathy, MM (w/o remission) with secondary normocytic anemia, Cervical spondylosis (10/1/2015), HTN, Small vessels disease 27009) who presents to [facility] ER due to changes in mental status. Last time known normal was yesterday around 10:00 Pm. Wife denies any recent trauma, falls, or almost falls. As per wife, patient was tired, somnolent and confused today morning. Recently patient visit his sister and he had contact with sister's boyfriend who was recovering from Influenza virus. Patient denies chest pain, sob, palpitations, n/v, or abdominal pain. Patient seen and examined at bedside, awake, alert, and oriented. Patient looks tired but he likely came back to his normal self. Patient c/o chronic neck pain, exacerbated with head movements. He also c/o chronic burning pain and paresthesias located BL Le.   Patient denies focal weakness, seizure like activity, dizziness, headache, visual disturbances, dysarthria, dysphagia, difficulty walking.     ROS:   As above    Histories:     Allergies:  Patient has no known allergies.    Current Medications:    Current Facility-Administered Medications   Medication Dose Route Frequency Provider Last Rate Last Dose    amLODIPine tablet 10 mg  10 mg Oral Daily Vivienne Cynthia, DO   10 mg at 02/21/18 1329    atorvastatin tablet 80 mg  80 mg Oral Daily Vivienne Cynthia, DO        calcium gluconate 1 g in dextrose 5 % 100 mL IVPB  1 g Intravenous ED 1 Time Sina Ugalde MD   Stopped at 02/21/18 0830    dextrose 50% injection 12.5 g  12.5 g Intravenous PRN Vivienne Mock, DO        dextrose 50% injection 25 g  25 g Intravenous PRN Vivienne Mock, DO        glucagon (human recombinant) injection 1 mg  1 mg Intramuscular " PRN Vivienne Mock, DO        glucose chewable tablet 16 g  16 g Oral PRN Vivienne Marie, DO        glucose chewable tablet 24 g  24 g Oral PRN Vivienne Mock, DO        heparin (porcine) injection 5,000 Units  5,000 Units Subcutaneous Q8H Vivienne Marie, DO        insulin aspart U-100 pen 1-10 Units  1-10 Units Subcutaneous QID (AC + HS) PRN Vivienne Mock, DO        insulin detemir U-100 pen 28 Units  28 Units Subcutaneous QHS Vivienne Marie, DO        lisinopril tablet 40 mg  40 mg Oral Daily Viviennenaveed Marie, DO   40 mg at 02/21/18 1329    sodium chloride 0.9% flush 5 mL  5 mL Intravenous PRN Vivienne Marie, DO         Current Outpatient Prescriptions   Medication Sig Dispense Refill    amLODIPine (NORVASC) 10 MG tablet Take 1 tablet (10 mg total) by mouth once daily. 90 tablet 3    atorvastatin (LIPITOR) 80 MG tablet Take 1 tablet (80 mg total) by mouth once daily. 30 tablet 0    carvedilol (COREG) 25 MG tablet Take 1 tablet (25 mg total) by mouth 2 (two) times daily with meals. 60 tablet 6    clopidogrel (PLAVIX) 75 mg tablet Take 1 tablet (75 mg total) by mouth once daily. 90 tablet 3    dexamethasone (DECADRON) 4 MG Tab 3 tablets once a week 12 tablet 11    fluticasone (FLONASE) 50 mcg/actuation nasal spray 1 spray by Each Nare route 2 (two) times daily as needed for Rhinitis. 16 g 5    furosemide (LASIX) 40 MG tablet Take 60 mg which is 1 and a half pill in the morning at 8 a.m. And 40 mg (one full pill) at 3 p.m. In the afternoon 240 tablet 6    gabapentin (NEURONTIN) 300 MG capsule Take 1 capsule (300 mg total) by mouth 2 (two) times daily. 180 capsule 1    insulin aspart (NOVOLOG) 100 unit/mL InPn pen Inject 8 units w/ meals plus scale 150-200 +2, 201-250 +4, 251-300 +6, 301-350 +8, >350 +10. Snack 4 units. 1 Box 6    insulin glargine (LANTUS SOLOSTAR) 100 unit/mL (3 mL) InPn pen Inject 28 units at night. 15 mL 6    lisinopril (PRINIVIL,ZESTRIL) 40 MG tablet TAKE 1 TABLET (40 MG TOTAL) BY MOUTH  "ONCE DAILY. 30 tablet 3    blood sugar diagnostic Strp 1 strip by Misc.(Non-Drug; Combo Route) route after meals as needed.      insulin needles, disposable, 31 X 5/16 " Ndle Pt to use pen needle with Lantus daily 100 each 3    lenalidomide (REVLIMID) 10 mg Cap 10 mg daily for 21 days, followed by 7 days off 21 each 11    meclizine (ANTIVERT) 25 mg tablet Take 1 tablet (25 mg total) by mouth 3 (three) times daily as needed for Dizziness. 60 tablet 0    METHYL SALICYLATE/MENTHOL (MENTHOLATUM DEEP HEAT RUB TOP) Apply topically.      mirtazapine (REMERON) 30 MG tablet Take 30 mg by mouth every evening.       montelukast (SINGULAIR) 10 mg tablet Take 1 tablet (10 mg total) by mouth every evening. 90 tablet 1    nitroGLYCERIN (NITROSTAT) 0.4 MG SL tablet Place 0.4 mg under the tongue every 5 (five) minutes as needed for Chest pain.      ondansetron (ZOFRAN-ODT) 8 MG TbDL Take 1 tablet (8 mg total) by mouth every 6 (six) hours as needed. 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 BAKER [facility]    left hand fracture sx      LT EYE   5/2/15    DR KULLMAN [facility]      Family:   Family History   Problem Relation Age of Onset    Diabetes Mother     Diabetes Father     Diabetes Sister     Diabetes Brother     No Known Problems Maternal Aunt     No Known Problems Maternal Uncle     No Known Problems Paternal Aunt     No Known Problems Paternal Uncle     No Known Problems Maternal Grandmother     No Known Problems Maternal Grandfather     No Known Problems Paternal Grandmother     No Known Problems Paternal " Grandfather     Blindness Neg Hx     Anesthesia problems Neg Hx     Amblyopia Neg Hx     Cancer Neg Hx     Cataracts Neg Hx     Glaucoma Neg Hx     Hypertension Neg Hx     Macular degeneration Neg Hx     Retinal detachment Neg Hx     Strabismus Neg Hx     Stroke Neg Hx     Thyroid disease Neg Hx     Heart attack Neg Hx     Heart disease Neg Hx     Heart failure Neg Hx     Hyperlipidemia Neg Hx    Non-contributory    Social History:    Substance Abuse/Dependence History:  Tobacco: Denies  EtOH: Denies   Ilicits: Denies    Occupational/Employment History:  Occupation: None    Current Evaluation:     Vital Signs:   Vitals:    02/21/18 1329   BP: (!) 158/73   Pulse: 68   Resp: 16   Temp: (!) 93.6 °F (34.2 °C)      Neurological Exam   Mental Status:  Alert and oriented to self, place, and situation    Language:  No aphasia, no dysarthria.     Cranial Nerves:  Visual fields were intact to confrontation, EOM intact bilaterally, no facial asymmetry, hearing grossly intact    Motor:  Moving four extremities spontaneously, no focal weakness noted.  Tone: Good muscle tone.    No pronator drift  No involuntary movements  Extremities +3 edema    DTRs:  Deferred    Sensation:  Intact to light touch through out.  Vibration and proprioception Deferred    Cerebellar:  Romberg: Deferred  Finger to nose: Deferred    Gait and Stand:  Gait and stand: no assess, patient wearing warming blanket      LABORATORY STUDIES:  Recent Results (from the past 24 hour(s))   POCT glucose    Collection Time: 02/21/18  8:29 AM   Result Value Ref Range    POCT Glucose 112 (H) 70 - 110 mg/dL   ISTAT PROCEDURE    Collection Time: 02/21/18  8:40 AM   Result Value Ref Range    POC PH 7.301 (L) 7.35 - 7.45    POC PCO2 48.2 (H) 35 - 45 mmHg    POC PO2 45 40 - 60 mmHg    POC HCO3 23.8 (L) 24 - 28 mmol/L    POC BE -3 -2 to 2 mmol/L    POC SATURATED O2 76 (L) 95 - 100 %    POC Sodium 145 136 - 145 mmol/L    POC Potassium 3.1 (L) 3.5 - 5.1 mmol/L     POC TCO2 25 24 - 29 mmol/L    POC Ionized Calcium 1.29 1.06 - 1.42 mmol/L    POC Hematocrit 27 (L) 36 - 54 %PCV    POC HEMOGLOBIN 9 g/dL    Sample VENOUS     Site Other     Allens Test N/A    CBC auto differential    Collection Time: 02/21/18  8:41 AM   Result Value Ref Range    WBC 8.80 3.90 - 12.70 K/uL    RBC 3.24 (L) 4.60 - 6.20 M/uL    Hemoglobin 8.8 (L) 14.0 - 18.0 g/dL    Hematocrit 27.1 (L) 40.0 - 54.0 %    MCV 84 82 - 98 fL    MCH 27.2 27.0 - 31.0 pg    MCHC 32.5 32.0 - 36.0 g/dL    RDW 14.7 (H) 11.5 - 14.5 %    Platelets 95 (L) 150 - 350 K/uL    MPV 10.9 9.2 - 12.9 fL    Gran # (ANC) 6.8 1.8 - 7.7 K/uL    Lymph # 1.4 1.0 - 4.8 K/uL    Mono # 0.4 0.3 - 1.0 K/uL    Eos # 0.2 0.0 - 0.5 K/uL    Baso # 0.02 0.00 - 0.20 K/uL    Gran% 77.5 (H) 38.0 - 73.0 %    Lymph% 16.0 (L) 18.0 - 48.0 %    Mono% 4.4 4.0 - 15.0 %    Eosinophil% 1.9 0.0 - 8.0 %    Basophil% 0.2 0.0 - 1.9 %    Differential Method Automated    Comprehensive metabolic panel    Collection Time: 02/21/18  8:41 AM   Result Value Ref Range    Sodium 140 136 - 145 mmol/L    Potassium 3.2 (L) 3.5 - 5.1 mmol/L    Chloride 110 95 - 110 mmol/L    CO2 21 (L) 23 - 29 mmol/L    Glucose 103 70 - 110 mg/dL    BUN, Bld 28 (H) 8 - 23 mg/dL    Creatinine 2.2 (H) 0.5 - 1.4 mg/dL    Calcium 9.1 8.7 - 10.5 mg/dL    Total Protein 7.4 6.0 - 8.4 g/dL    Albumin 3.4 (L) 3.5 - 5.2 g/dL    Total Bilirubin 0.7 0.1 - 1.0 mg/dL    Alkaline Phosphatase 146 (H) 55 - 135 U/L    AST 29 10 - 40 U/L    ALT 39 10 - 44 U/L    Anion Gap 9 8 - 16 mmol/L    eGFR if African American 36 (A) >60 mL/min/1.73 m^2    eGFR if non African American 31 (A) >60 mL/min/1.73 m^2   Troponin I    Collection Time: 02/21/18  8:41 AM   Result Value Ref Range    Troponin I 0.009 0.000 - 0.026 ng/mL   Protime-INR    Collection Time: 02/21/18  8:41 AM   Result Value Ref Range    Prothrombin Time 12.0 9.0 - 12.5 sec    INR 1.1 0.8 - 1.2   Lactic acid, plasma    Collection Time: 02/21/18  8:41 AM   Result  Value Ref Range    Lactate (Lactic Acid) 1.4 0.5 - 2.2 mmol/L   Brain natriuretic peptide    Collection Time: 02/21/18  8:41 AM   Result Value Ref Range    BNP 1,120 (H) 0 - 99 pg/mL   APTT    Collection Time: 02/21/18  8:41 AM   Result Value Ref Range    aPTT 31.1 21.0 - 32.0 sec   Type & Screen    Collection Time: 02/21/18  8:41 AM   Result Value Ref Range    Group & Rh O POS     Indirect Berenice NEG    Ammonia    Collection Time: 02/21/18  8:41 AM   Result Value Ref Range    Ammonia 35 10 - 50 umol/L   CK    Collection Time: 02/21/18  8:41 AM   Result Value Ref Range     (H) 20 - 200 U/L   Magnesium    Collection Time: 02/21/18  8:41 AM   Result Value Ref Range    Magnesium 2.3 1.6 - 2.6 mg/dL   Urinalysis    Collection Time: 02/21/18  9:15 AM   Result Value Ref Range    Specimen UA Urine, Catheterized     Color, UA Yellow Yellow, Straw, Jaycee    Appearance, UA Clear Clear    pH, UA 6.0 5.0 - 8.0    Specific Gravity, UA 1.020 1.005 - 1.030    Protein, UA 2+ (A) Negative    Glucose, UA Trace (A) Negative    Ketones, UA Negative Negative    Bilirubin (UA) Negative Negative    Occult Blood UA 2+ (A) Negative    Nitrite, UA Negative Negative    Urobilinogen, UA Negative <2.0 EU/dL    Leukocytes, UA Negative Negative   Urinalysis Microscopic    Collection Time: 02/21/18  9:15 AM   Result Value Ref Range    RBC, UA 4 0 - 4 /hpf    WBC, UA 1 0 - 5 /hpf    Bacteria, UA Rare None-Occ /hpf    Squam Epithel, UA 0 /hpf    Hyaline Casts, UA 0 0-1/lpf /lpf    Microscopic Comment SEE COMMENT    Drug screen panel, emergency    Collection Time: 02/21/18 10:58 AM   Result Value Ref Range    Benzodiazepines Negative     Methadone metabolites Negative     Cocaine (Metab.) Negative     Opiate Scrn, Ur Negative     Barbiturate Screen, Ur Negative     Amphetamine Screen, Ur Negative     THC Negative     Phencyclidine Negative     Creatinine, Random Ur 50.0 23.0 - 375.0 mg/dL    Toxicology Information SEE COMMENT    POCT glucose     Collection Time: 02/21/18 11:47 AM   Result Value Ref Range    POCT Glucose 75 70 - 110 mg/dL   Basic Metabolic Panel (BMP)    Collection Time: 02/21/18 11:49 AM   Result Value Ref Range    Sodium 142 136 - 145 mmol/L    Potassium 3.1 (L) 3.5 - 5.1 mmol/L    Chloride 111 (H) 95 - 110 mmol/L    CO2 25 23 - 29 mmol/L    Glucose 70 70 - 110 mg/dL    BUN, Bld 28 (H) 8 - 23 mg/dL    Creatinine 2.1 (H) 0.5 - 1.4 mg/dL    Calcium 8.9 8.7 - 10.5 mg/dL    Anion Gap 6 (L) 8 - 16 mmol/L    eGFR if African American 38 (A) >60 mL/min/1.73 m^2    eGFR if non African American 33 (A) >60 mL/min/1.73 m^2   Troponin I    Collection Time: 02/21/18 11:49 AM   Result Value Ref Range    Troponin I 0.011 0.000 - 0.026 ng/mL       RADIOLOGY STUDIES:  I have personally reviewed the images performed.     HEAD CT:  small hypodensities R thalamus and BL ganglia       BRAIN MRI:   Small T2 flair hyperintensity R thalamus with punctate components in BL ganglia          Assessment:  P     Mr. Reis is 63 y/o AA male with PMHx of Uncontrolled DMII complicated with CKD and R eye Retinopathy, MM (w/o remission) with secondary normocytic anemia and CKD stage IV, Cervical spondylosis (10/1/2015), HTN, Small vessels disease 84711) who presents [facility] ER due to being found confused and somnolent by his wife this morning. Last time known normal was yesterday around 10:00 Pm. Wife denies any recent trauma, falls, or almost falls.   BP (!) 158/73   Pulse 68   Temp (!) 93.6 °F (34.2 °C)   Resp 16   Ht 6' (1.829 m)   Wt 90.7 kg (200 lb)   SpO2 100%   BMI 27.12 kg/m²   X-ray chest w/o acute intrathoracic process.      1. AMS  Ddx: Hypothermia, Electrolytes derangment, endocrine, encephalopathy (hepatic, hypertensive, etc), infection (encephalitis, systemic infection), Overdose, Uremia, Trauma, Hypoglycemia, NCSE, CVA, SAH  -Admitted for changes in mental status (confusion and somnolent), hypothermia and bradycardia  -Neuro physical exam  unremarkable, no focal deficit found.   -Labs: Normal WBD, H/H 8.8/27.1 (MCV: 84), Plt 95, PT/INR/aPTT WNL. Hypokalemia (3.1), Hypochloremia (111), Cr/BUN: 2.1/28 (GFR: 33), inc ALK P, and hypoalbuminemia (3.4). CPK inc to 385 and BMP 1.120, lact WNL  UA normal  BCX pending  -CPK inc in Mi, myocarditis, Rhabdo, Myositis, Dermatomyositis/Polymyositis, Myxedema, Seizures, Pe, Aortic dissection, etc  -Drug screen negative.   -CT head with small hypodensities R thalamus and BL ganglia (remote lacunar CVA)  -MRI Brain: Generalized atrophy with remote small BL basal ganglia and R thalamic remote lacunar-type infarcts. No acute diffusion restriction  -Recommend consult hemo/onc for further w/u in patient with immunosuppressive condition  -Recommend TSH, B12, Thiamine, folate, A1c on 12/15/17: 7.6  -LP would be also a good idea in patient with MM and hypothermia  -No EEG is recommended at this time     Differential diagnosis was explained to the patient. All questions were answered. Patient understood and agreed to adhere to plan.     Please call us with any question/EEG test performed. Will sign off. Please do not hesitate to contact us for further assistance in the care of this patient. We appreciate the consult.     Case discussed with Dr. Vogel    Appreciate the consult.     WENDY Garrett MD  [employer]-Neurology PGY-2

## 2018-02-21 NOTE — CONSULTS
[facility] Consult - Resident Note    Primary Attending Physician: Dr. Encarnacion  Primary Team: [facility] Medicine Team B  Consultant Attending: Dr. Orr  Consultant Fellow: Dr. Vazquez    Reason for Consult:   Bradycardia    Subjective:      History of Present Illness:  Marie Reis Jr. is a 62 y.o.  male with hypertension, CAD, type 2 DM, multiple myeloma, and history of CVA who presented to ED after being found down at his home by his wife this morning.     History provided by wife. She states she last saw her  normal yesterday evening around 10pm. She was headed to bed and he told her he was going to watch tv a little bit more and then go to bed. This morning, his wife awoke and found him on the floor in the TV room. Unknown how long he was down. He was somnolent but arousable. He was slurring his speech. She was unable to stand him up due to weakness. He was not complaining of chest pain at that time. His shirt was off and she found it laying next to him soaking wet. He said he need to urinate and she tried to take him to the bathroom but he had difficulty standing up. She called her son who called EMS.     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    left hand fracture sx      LT EYE   5/2/15    DR KULLMAN [facility]       Allergies:  Review of patient's allergies indicates:  No Known Allergies    Medications:   In-Hospital Scheduled Medications:   amLODIPine  10 mg Oral Daily    atorvastatin  80 mg Oral Daily    calcium gluconate IVPB  1 g Intravenous ED 1 Time    heparin (porcine)  5,000 Units Subcutaneous Q8H     insulin detemir U-100  28 Units Subcutaneous QHS    lisinopril  40 mg Oral Daily      In-Hospital PRN Medications:  dextrose 50%, dextrose 50%, glucagon (human recombinant), glucose, glucose, insulin aspart U-100, sodium chloride 0.9%   In-Hospital IV Infusion Medications:     Home Medications:  Prior to Admission medications    Medication Sig Start Date End Date Taking? Authorizing Provider   amLODIPine (NORVASC) 10 MG tablet Take 1 tablet (10 mg total) by mouth once daily. 1/3/18  Yes Debbie Salinas MD   atorvastatin (LIPITOR) 80 MG tablet Take 1 tablet (80 mg total) by mouth once daily. 9/26/16  Yes Nancy Colón MD   carvedilol (COREG) 25 MG tablet Take 1 tablet (25 mg total) by mouth 2 (two) times daily with meals. 1/9/18  Yes Debbie Salinas MD   clopidogrel (PLAVIX) 75 mg tablet Take 1 tablet (75 mg total) by mouth once daily. 9/9/16 10/26/18 Yes Nancy Colón MD   dexamethasone (DECADRON) 4 MG Tab 3 tablets once a week 1/16/18  Yes Artur Guaman Jr., MD   fluticasone (FLONASE) 50 mcg/actuation nasal spray 1 spray by Each Nare route 2 (two) times daily as needed for Rhinitis. 9/9/16  Yes Nancy Colón MD   furosemide (LASIX) 40 MG tablet Take 60 mg which is 1 and a half pill in the morning at 8 a.m. And 40 mg (one full pill) at 3 p.m. In the afternoon 1/11/18  Yes Alissa Dotson MD   gabapentin (NEURONTIN) 300 MG capsule Take 1 capsule (300 mg total) by mouth 2 (two) times daily. 9/9/16  Yes Nancy Colón MD   insulin aspart (NOVOLOG) 100 unit/mL InPn pen Inject 8 units w/ meals plus scale 150-200 +2, 201-250 +4, 251-300 +6, 301-350 +8, >350 +10. Snack 4 units. 10/26/17  Yes Gifty Donaldson, APRN, FNP   insulin glargine (LANTUS SOLOSTAR) 100 unit/mL (3 mL) InPn pen Inject 28 units at night. 10/26/17  Yes BELEM Leslie, EMILIEP   lisinopril (PRINIVIL,ZESTRIL) 40 MG tablet TAKE 1 TABLET (40 MG TOTAL) BY MOUTH ONCE DAILY. 10/14/16  Yes Nancy Colón MD   blood sugar  "diagnostic Strp 1 strip by Misc.(Non-Drug; Combo Route) route after meals as needed. 1/14/14   Vince Kelly MD   insulin needles, disposable, 31 X 5/16 " Ndle Pt to use pen needle with Lantus daily 2/9/15   Nancy Colón MD   lenalidomide (REVLIMID) 10 mg Cap 10 mg daily for 21 days, followed by 7 days off 1/22/18   Artur Guaman Jr., MD   meclizine (ANTIVERT) 25 mg tablet Take 1 tablet (25 mg total) by mouth 3 (three) times daily as needed for Dizziness. 9/19/16   Gumaro Weinstein MD   METHYL SALICYLATE/MENTHOL (MENTHOLATUM DEEP HEAT RUB TOP) Apply topically.    Historical Provider, MD   mirtazapine (REMERON) 30 MG tablet Take 30 mg by mouth every evening.  5/11/15   Historical Provider, MD   montelukast (SINGULAIR) 10 mg tablet Take 1 tablet (10 mg total) by mouth every evening. 9/9/16   Nancy Colón MD   nitroGLYCERIN (NITROSTAT) 0.4 MG SL tablet Place 0.4 mg under the tongue every 5 (five) minutes as needed for Chest pain.    Debbie Salinas MD   ondansetron (ZOFRAN-ODT) 8 MG TbDL Take 1 tablet (8 mg total) by mouth every 6 (six) hours as needed. 9/19/16   Gumaro Weinstein MD       Family History:  Family History   Problem Relation Age of Onset    Diabetes Mother     Diabetes Father     Diabetes Sister     Diabetes Brother     No Known Problems Maternal Aunt     No Known Problems Maternal Uncle     No Known Problems Paternal Aunt     No Known Problems Paternal Uncle     No Known Problems Maternal Grandmother     No Known Problems Maternal Grandfather     No Known Problems Paternal Grandmother     No Known Problems Paternal Grandfather     Blindness Neg Hx     Anesthesia problems Neg Hx     Amblyopia Neg Hx     Cancer Neg Hx     Cataracts Neg Hx     Glaucoma Neg Hx     Hypertension Neg Hx     Macular degeneration Neg Hx     Retinal detachment Neg Hx     Strabismus Neg Hx     Stroke Neg Hx     Thyroid disease Neg Hx     Heart attack Neg Hx     Heart disease Neg Hx     " Heart failure Neg Hx     Hyperlipidemia Neg Hx        Social History:  Social History   Substance Use Topics    Smoking status: Never Smoker    Smokeless tobacco: Never Used    Alcohol use No       Review of Systems:  As per HPI. All other systems are reviewed and are negative.     Objective:   Last 24 Hour Vital Signs:  BP  Min: 153/75  Max: 164/71  Temp  Av.5 °F (31.9 °C)  Min: 87.7 °F (30.9 °C)  Max: 91.6 °F (33.1 °C)  Pulse  Av.9  Min: 48  Max: 59  Resp  Av.7  Min: 10  Max: 19  SpO2  Av %  Min: 100 %  Max: 100 %  Height  Av' (182.9 cm)  Min: 6' (182.9 cm)  Max: 6' (182.9 cm)  Weight  Av.7 kg (200 lb)  Min: 90.7 kg (200 lb)  Max: 90.7 kg (200 lb)  No intake/output data recorded.    Physical Examination:  Gen: alert, awake, conversing, slow to respond  Head: normocephalic, atraumatic  Eyes: PERRL, conjunctiva clear  CV: bradycardic in highs 50s but regular rhythm, no murmurs  Resp: on room air, breathing comfortably  Abd: soft, non-distended  Ext: edema bilaterally    Laboratory Results:  Most Recent Data:  CBC: Lab Results   Component Value Date    WBC 8.80 2018    HGB 8.8 (L) 2018    HCT 27.1 (L) 2018    PLT 95 (L) 2018    MCV 84 2018    RDW 14.7 (H) 2018     BMP: Lab Results   Component Value Date     2018    K 3.2 (L) 2018     2018    CO2 21 (L) 2018    BUN 28 (H) 2018     2018    CALCIUM 9.1 2018    MG 2.3 2018    PHOS 3.3 2017     LFTs: Lab Results   Component Value Date    PROT 7.4 2018    ALBUMIN 3.4 (L) 2018    BILITOT 0.7 2018    AST 29 2018    ALKPHOS 146 (H) 2018    ALT 39 2018     Coags:   Lab Results   Component Value Date    INR 1.1 2018     FLP: Lab Results   Component Value Date    CHOL 119 (L) 10/25/2017    HDL 32 (L) 10/25/2017    LDLCALC 68.6 10/25/2017    TRIG 92 10/25/2017    CHOLHDL 26.9 10/25/2017     DM: Lab  Results   Component Value Date    HGBA1C 7.6 (H) 12/15/2017    HGBA1C 8.2 (H) 10/25/2017    HGBA1C 9.3 (H) 01/23/2017    LDLCALC 68.6 10/25/2017    CREATININE 2.2 (H) 02/21/2018     Thyroid: Lab Results   Component Value Date    TSH 2.459 12/15/2017     Anemia: Lab Results   Component Value Date    IRON 34 (L) 01/16/2018    TIBC 203 (L) 01/16/2018    FERRITIN 406 (H) 01/16/2018    [id number] 529 09/18/2016    FOLATE 11.0 09/18/2016     Cardiac: Lab Results   Component Value Date    TROPONINI 0.009 02/21/2018    BNP 1,120 (H) 02/21/2018     Urinalysis: Lab Results   Component Value Date    COLORU Yellow 02/21/2018    SPECGRAV 1.020 02/21/2018    NITRITE Negative 02/21/2018    KETONESU Negative 02/21/2018    UROBILINOGEN Negative 02/21/2018       Trended Lab Data:    Recent Labs  Lab 02/21/18  0840 02/21/18  0841   WBC  --  8.80   HGB  --  8.8*   HCT 27* 27.1*   PLT  --  95*   MCV  --  84   RDW  --  14.7*   NA  --  140   K  --  3.2*   CL  --  110   CO2  --  21*   BUN  --  28*   GLU  --  103   PROT  --  7.4   ALBUMIN  --  3.4*   BILITOT  --  0.7   AST  --  29   ALKPHOS  --  146*   ALT  --  39       Trended Cardiac Data:    Recent Labs  Lab 02/21/18  0841   TROPONINI 0.009   BNP 1,120*       Other Results:  EKG (my interpretation): sinus bradycardia, no ST changes, normal axis    Radiology:  X-ray Chest 1 View    Result Date: 2/21/2018   AP chest radiograph.  Comparison: 10/25/17 Results: The lung volume appears adequate.  No parenchymal or pleural abnormality is seen.  The cardiac silhouette  and pulmonary vascularity appear within normal limits.  The mediastinum is midline. .  No pneumothorax.  The osseous structures  appear normal  for age.     No acute intrathoracic process seen. Electronically signed by: STARLA DUMONT MD Date:     02/21/18 Time:    08:49     Ct Head Without Contrast    Result Date: 2/21/2018  CT brain without contrast. Comparison: 09/17/2016 Technique: Multiple 5 mm axial images of the head  were obtained without intravenous contrast. Findings: No evidence for acute intracranial hemorrhage or sulcal effacement. The ventricles are stable in size and configuration with absent septum pellucidum without evidence for hydrocephalus. Small hypodensity right thalamus is compatible with remote lacunar type infarct unchanged. Stable small hypodensities within the basal ganglia most compatible with remote lacunar type infarct. There is no midline shift or mass effect. The visualized paranasal sinuses and mastoid air cells are clear..     Small hypodensities right thalamus and bilateral ganglia bilaterally suggestive for remote lacunar-type infarcts. Otherwise unremarkable noncontrast CT head specifically without evidence for acute intracranial hemorrhage or significant new abnormal parenchymal attenuation.. Further evaluation as warranted clinically. Electronically signed by: DOROTHY MADRID DO Date:     02/21/18 Time:    09:54     Assessment/Plan:   63 yo male with chronic diastolic heart failure, CAD, multiple myeloma, hypertension, type 2 DM, and CVA who presents after being found down for unknown period of time with altered mentation, dysarthria, and weakness. In ED, patient hypothermic and bradycardic.     Sinus bradycardia: Per family, patient with no complaints of chest pain. EKG with sinus preet with HR in 40s initially. No evidence of ischemia. Troponin normal. Likely secondary to underlying hypothermia. On exam, HR improved to 60s as patient was being rewarmed.  Patient also on coreg 25 BID.   -Monitor on telemetry.   -Continue rewarming and see if heart rate improves.   -Hold coreg.     Acute on chronic diastolic heart failure: Seen on previous echos. Most recent echo in Oct 2017 with grade III diastolic dysfunction. EF 55%. On admission, patient with bilateral lower ext edema. CXR with mild pulmonary congestion. BNP > 1,000.   -Diurese with lasix.   -Hold beta blocker due to bradycardia.   -Okay to  continue ace inhibitor if kidney function stable.     Thank you for allowing us to participate in the care of this patient. Please contact us if you have any questions regarding this consult.    Amy Quijano  [facility] HO-3  [facility] - Pediatrics

## 2018-02-22 PROBLEM — R94.31 PROLONGED Q-T INTERVAL ON ECG: Status: ACTIVE | Noted: 2018-02-22

## 2018-02-22 PROBLEM — R41.82 ALTERED MENTAL STATUS: Status: ACTIVE | Noted: 2018-02-22

## 2018-02-22 LAB
ALBUMIN SERPL BCP-MCNC: 2.7 G/DL
ALP SERPL-CCNC: 112 U/L
ALT SERPL W/O P-5'-P-CCNC: 29 U/L
ANION GAP SERPL CALC-SCNC: 6 MMOL/L
AST SERPL-CCNC: 22 U/L
BACTERIA UR CULT: NO GROWTH
BASOPHILS # BLD AUTO: 0.02 K/UL
BASOPHILS NFR BLD: 0.3 %
BILIRUB SERPL-MCNC: 0.7 MG/DL
BUN SERPL-MCNC: 21 MG/DL
CALCIUM SERPL-MCNC: 8.3 MG/DL
CHLORIDE SERPL-SCNC: 111 MMOL/L
CO2 SERPL-SCNC: 22 MMOL/L
CREAT SERPL-MCNC: 2.2 MG/DL
DIFFERENTIAL METHOD: ABNORMAL
EOSINOPHIL # BLD AUTO: 0.1 K/UL
EOSINOPHIL NFR BLD: 1.6 %
ERYTHROCYTE [DISTWIDTH] IN BLOOD BY AUTOMATED COUNT: 14.8 %
EST. GFR  (AFRICAN AMERICAN): 36 ML/MIN/1.73 M^2
EST. GFR  (NON AFRICAN AMERICAN): 31 ML/MIN/1.73 M^2
GLUCOSE SERPL-MCNC: 123 MG/DL
HCT VFR BLD AUTO: 23.7 %
HGB BLD-MCNC: 7.9 G/DL
LYMPHOCYTES # BLD AUTO: 1.2 K/UL
LYMPHOCYTES NFR BLD: 16.2 %
MAGNESIUM SERPL-MCNC: 1.6 MG/DL
MCH RBC QN AUTO: 27.6 PG
MCHC RBC AUTO-ENTMCNC: 33.3 G/DL
MCV RBC AUTO: 83 FL
MONOCYTES # BLD AUTO: 0.4 K/UL
MONOCYTES NFR BLD: 5.2 %
NEUTROPHILS # BLD AUTO: 5.6 K/UL
NEUTROPHILS NFR BLD: 76.4 %
PHOSPHATE SERPL-MCNC: 3.7 MG/DL
PLATELET # BLD AUTO: 131 K/UL
PMV BLD AUTO: 10.3 FL
POCT GLUCOSE: 114 MG/DL (ref 70–110)
POCT GLUCOSE: 114 MG/DL (ref 70–110)
POCT GLUCOSE: 124 MG/DL (ref 70–110)
POCT GLUCOSE: 150 MG/DL (ref 70–110)
POCT GLUCOSE: 181 MG/DL (ref 70–110)
POCT GLUCOSE: 214 MG/DL (ref 70–110)
POCT GLUCOSE: 77 MG/DL (ref 70–110)
POCT GLUCOSE: 99 MG/DL (ref 70–110)
POTASSIUM SERPL-SCNC: 3.6 MMOL/L
PROT SERPL-MCNC: 5.8 G/DL
RBC # BLD AUTO: 2.86 M/UL
SODIUM SERPL-SCNC: 139 MMOL/L
TROPONIN I SERPL DL<=0.01 NG/ML-MCNC: 0.02 NG/ML
WBC # BLD AUTO: 7.3 K/UL

## 2018-02-22 PROCEDURE — 80053 COMPREHEN METABOLIC PANEL: CPT

## 2018-02-22 PROCEDURE — G8997 SWALLOW GOAL STATUS: HCPCS | Mod: CH

## 2018-02-22 PROCEDURE — 85025 COMPLETE CBC W/AUTO DIFF WBC: CPT

## 2018-02-22 PROCEDURE — S0030 INJECTION, METRONIDAZOLE: HCPCS | Performed by: STUDENT IN AN ORGANIZED HEALTH CARE EDUCATION/TRAINING PROGRAM

## 2018-02-22 PROCEDURE — 94761 N-INVAS EAR/PLS OXIMETRY MLT: CPT

## 2018-02-22 PROCEDURE — G8996 SWALLOW CURRENT STATUS: HCPCS | Mod: CH

## 2018-02-22 PROCEDURE — 11000001 HC ACUTE MED/SURG PRIVATE ROOM

## 2018-02-22 PROCEDURE — 25000003 PHARM REV CODE 250: Performed by: STUDENT IN AN ORGANIZED HEALTH CARE EDUCATION/TRAINING PROGRAM

## 2018-02-22 PROCEDURE — 84100 ASSAY OF PHOSPHORUS: CPT

## 2018-02-22 PROCEDURE — 99223 1ST HOSP IP/OBS HIGH 75: CPT | Mod: ,,, | Performed by: INTERNAL MEDICINE

## 2018-02-22 PROCEDURE — G8998 SWALLOW D/C STATUS: HCPCS | Mod: CH

## 2018-02-22 PROCEDURE — 36415 COLL VENOUS BLD VENIPUNCTURE: CPT

## 2018-02-22 PROCEDURE — 25000003 PHARM REV CODE 250: Performed by: INTERNAL MEDICINE

## 2018-02-22 PROCEDURE — 63600175 PHARM REV CODE 636 W HCPCS: Performed by: STUDENT IN AN ORGANIZED HEALTH CARE EDUCATION/TRAINING PROGRAM

## 2018-02-22 PROCEDURE — 92610 EVALUATE SWALLOWING FUNCTION: CPT

## 2018-02-22 PROCEDURE — 63600175 PHARM REV CODE 636 W HCPCS: Performed by: INTERNAL MEDICINE

## 2018-02-22 PROCEDURE — 83735 ASSAY OF MAGNESIUM: CPT

## 2018-02-22 PROCEDURE — 84484 ASSAY OF TROPONIN QUANT: CPT

## 2018-02-22 RX ORDER — CLOPIDOGREL BISULFATE 75 MG/1
75 TABLET ORAL DAILY
Status: DISCONTINUED | OUTPATIENT
Start: 2018-02-22 | End: 2018-02-24 | Stop reason: HOSPADM

## 2018-02-22 RX ORDER — CARVEDILOL 25 MG/1
25 TABLET ORAL 2 TIMES DAILY
Status: DISCONTINUED | OUTPATIENT
Start: 2018-02-22 | End: 2018-02-24 | Stop reason: HOSPADM

## 2018-02-22 RX ORDER — INSULIN ASPART 100 [IU]/ML
1-10 INJECTION, SOLUTION INTRAVENOUS; SUBCUTANEOUS
Status: DISCONTINUED | OUTPATIENT
Start: 2018-02-22 | End: 2018-02-24 | Stop reason: HOSPADM

## 2018-02-22 RX ADMIN — AMLODIPINE BESYLATE 10 MG: 5 TABLET ORAL at 08:02

## 2018-02-22 RX ADMIN — ATORVASTATIN CALCIUM 80 MG: 40 TABLET, FILM COATED ORAL at 08:02

## 2018-02-22 RX ADMIN — HEPARIN SODIUM 5000 UNITS: 5000 INJECTION, SOLUTION INTRAVENOUS; SUBCUTANEOUS at 02:02

## 2018-02-22 RX ADMIN — DEXTROSE MONOHYDRATE, SODIUM CHLORIDE, AND POTASSIUM CHLORIDE: 50; 4.5; 2.98 INJECTION, SOLUTION INTRAVENOUS at 06:02

## 2018-02-22 RX ADMIN — VANCOMYCIN HYDROCHLORIDE 1250 MG: 100 INJECTION, POWDER, LYOPHILIZED, FOR SOLUTION INTRAVENOUS at 09:02

## 2018-02-22 RX ADMIN — HEPARIN SODIUM 5000 UNITS: 5000 INJECTION, SOLUTION INTRAVENOUS; SUBCUTANEOUS at 06:02

## 2018-02-22 RX ADMIN — INSULIN DETEMIR 28 UNITS: 100 INJECTION, SOLUTION SUBCUTANEOUS at 09:02

## 2018-02-22 RX ADMIN — INSULIN ASPART 6 UNITS: 100 INJECTION, SOLUTION INTRAVENOUS; SUBCUTANEOUS at 10:02

## 2018-02-22 RX ADMIN — METRONIDAZOLE 500 MG: 500 INJECTION, SOLUTION INTRAVENOUS at 03:02

## 2018-02-22 RX ADMIN — HEPARIN SODIUM 5000 UNITS: 5000 INJECTION, SOLUTION INTRAVENOUS; SUBCUTANEOUS at 09:02

## 2018-02-22 RX ADMIN — CARVEDILOL 25 MG: 25 TABLET, FILM COATED ORAL at 09:02

## 2018-02-22 RX ADMIN — PIPERACILLIN AND TAZOBACTAM 4.5 G: 4; .5 INJECTION, POWDER, LYOPHILIZED, FOR SOLUTION INTRAVENOUS; PARENTERAL at 08:02

## 2018-02-22 RX ADMIN — METRONIDAZOLE 500 MG: 500 INJECTION, SOLUTION INTRAVENOUS at 06:02

## 2018-02-22 RX ADMIN — CLOPIDOGREL BISULFATE 75 MG: 75 TABLET ORAL at 02:02

## 2018-02-22 RX ADMIN — LISINOPRIL 40 MG: 20 TABLET ORAL at 08:02

## 2018-02-22 RX ADMIN — CARVEDILOL 25 MG: 25 TABLET, FILM COATED ORAL at 02:02

## 2018-02-22 RX ADMIN — PIPERACILLIN AND TAZOBACTAM 4.5 G: 4; .5 INJECTION, POWDER, LYOPHILIZED, FOR SOLUTION INTRAVENOUS; PARENTERAL at 03:02

## 2018-02-22 NOTE — PLAN OF CARE
Problem: SLP Goal  Goal: SLP Goal  Outcome: Outcome(s) achieved Date Met: 02/22/18  Swallow eval completed this date. No audible s/s of dysphagia present. Continue regular diet and thin liquids.

## 2018-02-22 NOTE — PLAN OF CARE
Future Appointments  Date Time Provider Department Center   2/26/2018 3:00 PM Artur Guaman Jr., MD [facility] HEM ONC [place]   3/7/2018 7:40 AM LAB, HEMONC CANCER BLDG [facility] LAB HO [place]   3/14/2018 8:30 AM Artur Guaman Jr., MD [facility] HEM ONC [place]   4/17/2018 8:50 AM SILVIA Al MD [facility] BONNIE Gumaro Lopez       Patient lives at home with wife       02/22/18 1245   Discharge Assessment   Assessment Type Discharge Planning Assessment   Confirmed/corrected address and phone number on facesheet? Yes   Assessment information obtained from? Patient;Caregiver   Prior to hospitilization cognitive status: Alert/Oriented   Prior to hospitalization functional status: Independent   Current cognitive status: Alert/Oriented   Current Functional Status: Independent   Lives With spouse   Able to Return to Prior Arrangements yes   Patient's perception of discharge disposition home or selfcare   Readmission Within The Last 30 Days no previous admission in last 30 days   Patient currently being followed by outpatient case management? No   Patient currently receives any other outside agency services? Yes   Equipment Currently Used at Home cane, quad;grab bar   Do you have any problems affording any of your prescribed medications? No   Is the patient taking medications as prescribed? yes   Does the patient have transportation home? No   Transportation Available car   Does the patient receive services at the [facility]? No   Discharge Plan A Home   Discharge Plan B Home   Patient/Family In Agreement With Plan no     Vilma Stout, RN, CCM, CMSRN  RN Transition Navigator  [phone]

## 2018-02-22 NOTE — NURSING TRANSFER
Nursing Transfer Note      2/22/2018     Transfer To: 402    Transfer via wheelchair    Transfer with monitor    Transported by RN    Medicines sent: insulin    Chart send with patient: Yes    Notified: spouse with pt    Patient reassessed at:  (date, time)    Upon arrival to floor: patient oriented to room, call bell in reach and bed in lowest position

## 2018-02-22 NOTE — PT/OT/SLP EVAL
Speech Language Pathology Evaluation  Bedside Swallow    Patient Name:  Marie Reis Jr.   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 BAKER [facility]    left hand fracture sx      LT EYE   5/2/15    DR KULLMAN [facility]   HPI:  Mr. Reis is 61 y/o AA male with PMHx of Uncontrolled DMII complicated with CKD and R eye Retinopathy, MM (w/o remission) with secondary normocytic anemia, Cervical spondylosis (10/1/2015), HTN, Small vessels disease 23550) who presents to [facility] ER due to changes in mental status. Last time known normal was yesterday around 10:00 Pm. Wife denies any recent trauma, falls, or almost falls. As per wife, patient was tired, somnolent and confused today morning. Recently patient visit his sister and he had contact with sister's boyfriend who was recovering from Influenza virus. Patient denies chest pain, sob, palpitations, n/v, or abdominal pain. Patient seen and examined at bedside, awake, alert, and oriented. Patient looks tired but he likely came back to his normal self. Patient c/o chronic neck pain, exacerbated with head movements. He also c/o chronic burning pain and paresthesias located BL Le.   Patient denies focal weakness, seizure like activity, dizziness, headache, visual disturbances, dysarthria, dysphagia,  "difficulty walking.     Social History: Patient lives with wife and family at home.    Prior diet: pt currently on a regular diet and thin liquids at home.     Subjective     Consult received for clinical swallow eval this date, SLP did communicate with RN prior to eval/treat.    Patient goals: "Can you let me out of here?"    Objective:   Pt found in room, SLP did clear with RN. RN reported no issues with oral medications or cardiac tray this am.  Pt w/ fluent verbal expression, able to follow commands and converse with SLP.   He is alert, oriented x4 and able to state son's name.     Oral Musculature Evaluation  · Oral Musculature: WFL  · Dentition: present and adequate  · Mucosal Quality: good, adequate  · Mandibular Strength and Mobility: WFL  · Oral Labial Strength and Mobility: WFL  · Lingual Strength and Mobility: WFL  · Buccal Strength and Mobility: WFL  · Volitional Cough: elicited  · Volitional Swallow: timely  · Voice Prior to PO Intake: clear voice, non gurgly    Bedside Swallow Eval:   Consistencies Assessed:  · Thin liquids water by cup/straw   · Puree pudding by tsp  · Solids solid cracker 1/2" bite     Oral Phase:   · WNL    Pharyngeal Phase:   · WFL  · no overt clinical signs/symptoms of aspiration  · no overt clinical signs/symptoms of pharyngeal dysphagia    Treatment: continue regular diet and thin liquids, no further ST needs.     Assessment:     Marie Reis Jr. is a 62 y.o. male admitted with Sepsis. Pt presents with timely oral and pharyngeal swallow, no audible s/s of aspiration noted on any tested consistencies. Continue oral diet, no issues with verbal fluency/communication noted during assessment.     Goals:    SLP Goals     Not on file          Multidisciplinary Problems (Resolved)        Problem: SLP Goal    Goal Priority Disciplines Outcome   SLP Goal   (Resolved)     SLP Outcome(s) achieved                   Plan:     · Patient to be seen:      · Plan of Care expires:     · Plan of Care " reviewed with:  patient, son   · SLP Follow-Up:  No       Discharge recommendations:   (no further ST needs)     Time Tracking:     SLP Treatment Date:   02/22/18  Speech Start Time:  1015  Speech Stop Time:  1027     Speech Total Time (min):  12 min    Billable Minutes: Eval Swallow and Oral Function 12    IVAN Gross, CCC-SLP  02/22/2018

## 2018-02-22 NOTE — PROGRESS NOTES
U Internal Medicine Resident  Progress Note    Subjective:      Marie Reis Jr. is a 62 y.o. male who is being followed by the [facility] IM service at [facility] for sepsis.     Pt says he feels much better today. Did not require the bearhugger overnight, but currently has it on as he likes the warm feeling. Denies HA/dizziness, SOB, chest pain, N/V, abdomianl pain. Has not had any stools since admit.      Objective:   Last 24 Hour Vital Signs:  BP  Min: 142/58  Max: 185/79  Temp  Av.4 °F (35.8 °C)  Min: 91.6 °F (33.1 °C)  Max: 98.8 °F (37.1 °C)  Pulse  Av.6  Min: 59  Max: 83  Resp  Av.9  Min: 11  Max: 27  SpO2  Av.5 %  Min: 95 %  Max: 100 %  Height  Av' (182.9 cm)  Min: 6' (182.9 cm)  Max: 6' (182.9 cm)  Weight  Av.5 kg (186 lb 5.8 oz)  Min: 82.1 kg (181 lb)  Max: 86.3 kg (190 lb 4.1 oz)  I/O last 3 completed shifts:  In: 2292.7 [P.O.:6; I.V.:1286.7; IV Piggyback:1000]  Out: 1445 [Urine:1445]    Physical Examination:  General: sleepy but easily arousable, NAD, appears pale, layng with HOB at 60degrees  HEENT: NC/AT, EOMI, PERRL, MMM, Nasal cannual in place, oropharynx clear, uvula and tongue midline, no slurred speech or facial droop  Neck: Supple, symmetric, JVP 10cm  Resp: normal effort, lungs CTA b/l  CV: bradycardic with regular rhythm, grade 1/6 systolic murmur at RUSB, 2+ b/l radial and 1+ b/l DP pulses  Abdomen: soft, round/, nontender, nondistended, normoactive bowel sounds  Extremities: no cyanosis or clubbing, 3+ edema of b/l LE to above knees, TTP in areas of edema  Skin: dry, warm, non-diaphoretic, no lesions or rashes  Neuro: AOx4 but sluggish to respond to questions, follows commands, no slurred speech or facial droop, decreased hearing on the left but remaining CN intact, b/l UE 5/5 strenght and b/l LE 5/5 strength except with left hip flexion 4/5, no pronator drift, no asterixis, no focal deficits         Laboratory:  Laboratory Data Reviewed:  yes  Pertinent Findings:  Trended Lab Data:     Recent Labs  Lab 02/21/18  0840  02/21/18  0841 02/21/18  1149 02/21/18  1751 02/22/18  0607 02/22/18  0942   WBC  --   --  8.80  --   --   --  7.30   HGB  --   --  8.8*  --   --   --  7.9*   HCT 27*  --  27.1*  --   --   --  23.7*   PLT  --   --  95*  --   --   --  131*   MCV  --   --  84  --   --   --  83   RDW  --   --  14.7*  --   --   --  14.8*   NA  --   < > 140 142 140 139  --    K  --   < > 3.2* 3.1* 3.3* 3.6  --    CL  --   < > 110 111* 109 111*  --    CO2  --   < > 21* 25 27 22*  --    BUN  --   < > 28* 28* 26* 21  --    CREATININE  --   < > 2.2* 2.1* 2.0* 2.2*  --    GLU  --   < > 103 70 64* 123*  --    CALCIUM  --   < > 9.1 8.9 8.8 8.3*  --    MG  --   --  2.3  --   --  1.6  --    PHOS  --   --   --   --   --  3.7  --    PROT  --   --  7.4  --   --  5.8*  --    ALBUMIN  --   --  3.4*  --   --  2.7*  --    BILITOT  --   --  0.7  --   --  0.7  --    AST  --   --  29  --   --  22  --    ALKPHOS  --   --  146*  --   --  112  --    ALT  --   --  39  --   --  29  --    < > = values in this interval not displayed.   Trended Cardiac Data:     Recent Labs  Lab 02/21/18  0841 02/21/18  1149 02/21/18 1751 02/22/18  0014   TROPONINI 0.009 0.011 0.010 0.016   BNP 1,120*  --   --   --           Microbiology Data Reviewed: yes  Pertinent Findings:  Blood Cx (2/21, x2)- NGTD  Urine Cx (2/21)- NGTD  C. Diff studies- awaiting sample    Other Results:  Radiology Data Reviewed: yes  Pertinent Findings:  MRI Brain (2/21)- Age-appropriate generalized volume loss with small bilateral basal ganglia and right thalamic remote lacunar-type infarcts corresponding to hypodensity seen on CT. No evidence for acute infarction. Otherwise unremarkable noncontrast     LE US (2/21)- No evidence of acute DVT in the bilateral lower extremity     Current Medications:     Infusions:       Scheduled:   amLODIPine  10 mg Oral Daily    atorvastatin  80 mg Oral Daily    heparin (porcine)  5,000  Units Subcutaneous Q8H    insulin detemir U-100  28 Units Subcutaneous QHS    lisinopril  40 mg Oral Daily    metronidazole  500 mg Intravenous Q8H    piperacillin-tazobactam (ZOSYN) IVPB  4.5 g Intravenous Q8H    potassium chloride  10 mEq Intravenous Once    vancomycin (VANCOCIN) IVPB  1,250 mg Intravenous Q24H        PRN:  dextrose 50%, dextrose 50%, glucagon (human recombinant), glucose, glucose, insulin aspart U-100, sodium chloride 0.9%    Antibiotics and Day Number of Therapy:  Vanc, Zosyn, Flagyl- 2/21 to current    Lines and Day Number of Therapy:  PIV x2    Assessment:     Marie Reis Jr. is a 62 y.o.male with  Patient Active Problem List    Diagnosis Date Noted    Symptomatic sinus bradycardia 02/21/2018    Acute encephalopathy 02/21/2018    Hypothermia 02/21/2018    Sepsis 02/21/2018    Multiple myeloma not having achieved remission 01/16/2018    Uncontrolled secondary diabetes mellitus with stage 4 CKD (GFR 15-29) 01/11/2018    Hypertension associated with diabetes 01/11/2018    Persistent proteinuria 01/11/2018    Chronic diastolic heart failure 01/11/2018    Angina, class II 12/27/2017    Acute on chronic diastolic heart failure 11/13/2017    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, with long-term current use of insulin 09/26/2017    Hypertensive retinopathy of both eyes 09/26/2017    Skew deviation 09/22/2016    Hypertropia of left eye 09/22/2016    Binocular vision disorder with diplopia 09/22/2016    Secondary hyperparathyroidism 05/26/2016    Cervical spondylosis 10/01/2015    Thoracic spondylosis 10/01/2015    Lumbar spondylosis 10/01/2015    Spondylolisthesis of lumbar region 10/01/2015    Radiculitis 10/01/2015    Bursitis/tendonitis, shoulder 10/01/2015    Bursitis of hip 10/01/2015    Uncontrolled diabetes mellitus with polyneuropathy 09/28/2015    Dyslipidemia 06/26/2015    Poorly controlled  diabetes mellitus 06/26/2015    History of stroke 03/23/2015    Essential hypertension 01/12/2014    Hearing impairment 10/25/2013        Plan:        1) Sepsis with unknown source  - pt found with with slurred speech, AMS, acute on chronic unsteady gait, diaphoretic, and cold on floor this morning; brought in by EMS with bradycardia and widened QRS, treated empirically for hyperkalemia. Found to be hypothermic with initial rectal temp of 87.7F, and initial K 3.2. Suspect sepsis.  - Immunocompromised in setting of smoldering Multiple myeloma; will consult Heme/Onc  - Reports some diarrhea for past 2-3 days with 2-3 loose stools per day, some nausea and decreased PO intake but denies fevers. Increased SOB for 2-3 days.   -WBC 8.8, Hg 8.8, Plt 95, Lactate 1.4, UA with trace glucose, 2+ protein, 2+ blood, 4 RBC, 1 WBC, Troponin 0.009, BNP 1120, VBG 7.3/48/45/23.8. Urine and blood tox screen negative.  -blood and urine cultures- NGTD. C.diff studies unable to be completed as pt no longer with loose stools. Will stop contact precautions  -CXR with no acute findings; Head CT with no acute bleed, remote lacunar infarcts. MRI - no acute findings  -started Vanc, Zosyn, and flagyl for broad empiric coverage for suspected sepsis and c.diff coverage. Will de-escalate tomorrow once BCx and UCx are [id number]   -will step down to the floor today as hypoglycemia and hypothermia having resolved     2) Acute Encephalopathy - resolved  - found stretched on floor with shirt off and slurred speech, too weak to get up, acute on chronic unsteady gait. EMS called. Pt with improved speech and strength in ED but still slow to respond and subjectively weak feeling  -blood and tox screen negative, ammonia WNL. Will get TSH and cortisol levels  - h/o 4 previous CVAs with symptoms of slurred speech and unsteady gait. MRI with no acute findings  - suspect hypoglycemia and hypothermia are main contributors  - Neuro consulted-no eeg recommended,  suspect infection, continue to monitor cuoltures, consider LP in pt wit MM and hypothermia  -consulted PT/OT and speech therapy- appreciate recs     3) Bradycardia- resolved, Prolonged QT  - EMS with run strip showing HR of 49 with widened QRS. HR in 50's on arrival, and pt empirically treated for hyperkalemia. EKG in ED with sinus bradycardia, LVH, and prolonged QTc of 523. Initial K 3.2. Initial Troponin 0.009  - likely 2/2 hypothermia and hypokalemia   - repleted glucose and K, and hypothermia resolved  - trended troponins and EKGs q6h - no acute changes  - cardiology consulted-at this time, nothing cardiac to offer  - holding home beta-blocker, but as BP increasing and bradycardia resolved, will restart home coreg 25mg BID     4) Hypothermia- resolved  - initial rectal temp 87.7F. Repeat bladder temp 88F  - placed on bearhugger and rewarmed  - likely 2/2 sepsis vs hypoglycemia  - TSH and random cortisol levels WNL. Blood and urine cultures NGTD  - resolved and pt able to maintain normal body temp at room temp environment. Will step down to floor today     5) HTN   -/77 initial   - home meds include norvasc 10mg, coreg 25mg BID, lasix 60mg qam and 40mg qpm, lisinopril 40mg  - holding beta blocker in setting of bradycardia, but continue remaining home meds  - hydralazine 10mg IV q6h prn SBP >180  - avoiding hypotension  - will add back coreg 25mg BID today and monitor closely     6) HLD   - continue home statin  - will repeat lipid panel if no recent studies     7) HFpEF  - b/l LE edema and pt c/o b/l leg pain per wife. BNP 1120 on admit  - h/o grade 2 diastolic dysfunction; follows Dr. Segundo for cardiology  - will continue home meds but hold beta blocker, as above  - cardiology consulted- concerned for volume overload, diruese prn, get TSH, monitor bradycardia and rewarm     8) Hypoglycemia- resolved, DM type 2  - last A1c 7.6 on 12/15/17. Home meds include Lantus 28u qhs (last dose the night before  admission, and pt reports eating less yesterday) and Novology 8u+ SSI before meals  - pt reports POC glucose was in 60's per EMS. POC glucose on arrival was 112- given insulin regular 10u with D50 for hyperkalemia empiric tx  - Repeat BMP with glucose of 70.  - started IV fluids with D5+1/2NS+40mEq KCl and prn D50 with q2h accuchecks for close monitoring. POC glucose trended in 60-70's yesterday and required one dose of D50. But early this morning, pt maintaining normal glucose levels. Will stop IVF and start diet.   - will space accuchecks to q4h and as glucose rises, will add back pt's home insulin regimen     9) CKD4 with chronic proteinuria  - likely multifactoral with multiple myeloma, DM, HTN. Follows with Dr. Dotson for Nephrology  - Cr at baseline of 2.2  - renally dose all medications and avoid nephrotoxic meds     10) IgG Multiple Myeloma  - dx'd 1 year ago but recently found to be worsening. Follows with Dr. Guaman for heme/onc  - recently was suppose to start Revlimid/Dexamethasone, but was told to wait until they get another medication approved, per pt's wife and daughter   - consulted Heme/Onc, Dr. Chiang - do not start MM tx until pt completely recovers from ongoing acute illness, pt to contact Dr. Guaman's office after hospital discharge prior to starting the Revlimid; recommend transfusing regular pRBC to keep Hg close to 9g/dL- given fact that pt will be starting tx for his MM in the near future     11) Anemia 2/2 chronic inflammation and poor production in setting of multiple myeloma  - required a blood transfusion last month  - Hg 8.8 on admit  - Iron studies on 1/16/18- iron 34, TIBC 203, Transferrin 137, ferrition 406  - Hg 7.9 today (2/22)  - Heme/Onc consulted- recommend transfusing regular pRBC to keep Hg close to 9g/dL  - continue to monitor;     Diet: Cardiac  PPx: Hep SQ  Code: Full     Dispo: Step down to the floor as hypoglycemia, bradycardia, and hypothermia have resolved; tele cardia  monitoring; continue empiric IV abx, stop IV fluids, Cardiology signed off, Neurology signed off, Heme-Onc following, PT/OT/ST following       Vivienne Marie, DO  U Internal Medicine/Pediatrics- PGY2  [facility] Internal Medicine Service Team B        [facility] Medicine Hospitalist Pager numbers:   [facility] Hospitalist Medicine Team A (Anson/Nadia): [phone]  [facility] Hospitalist Medicine Team B (Shashank/Aniket):  [phone]

## 2018-02-22 NOTE — PLAN OF CARE
Problem: Patient Care Overview  Goal: Plan of Care Review  Outcome: Ongoing (interventions implemented as appropriate)  Pt's SpO2 99% on room air. No respiratory distress noted. Will continue to monitor SpO2.

## 2018-02-23 ENCOUNTER — TELEPHONE (OUTPATIENT)
Dept: HEMATOLOGY/ONCOLOGY | Facility: CLINIC | Age: 63
End: 2018-02-23

## 2018-02-23 LAB
ALBUMIN SERPL BCP-MCNC: 2.7 G/DL
ALP SERPL-CCNC: 101 U/L
ALT SERPL W/O P-5'-P-CCNC: 25 U/L
ANION GAP SERPL CALC-SCNC: 6 MMOL/L
AST SERPL-CCNC: 19 U/L
BASOPHILS # BLD AUTO: 0.03 K/UL
BASOPHILS NFR BLD: 0.5 %
BILIRUB SERPL-MCNC: 0.8 MG/DL
BLD PROD TYP BPU: NORMAL
BLD PROD TYP BPU: NORMAL
BLOOD UNIT EXPIRATION DATE: NORMAL
BLOOD UNIT EXPIRATION DATE: NORMAL
BLOOD UNIT TYPE CODE: 5100
BLOOD UNIT TYPE CODE: 5100
BLOOD UNIT TYPE: NORMAL
BLOOD UNIT TYPE: NORMAL
BUN SERPL-MCNC: 21 MG/DL
CALCIUM SERPL-MCNC: 8.4 MG/DL
CHLORIDE SERPL-SCNC: 113 MMOL/L
CO2 SERPL-SCNC: 23 MMOL/L
CODING SYSTEM: NORMAL
CODING SYSTEM: NORMAL
CREAT SERPL-MCNC: 2.3 MG/DL
DIFFERENTIAL METHOD: ABNORMAL
DISPENSE STATUS: NORMAL
DISPENSE STATUS: NORMAL
EOSINOPHIL # BLD AUTO: 0.2 K/UL
EOSINOPHIL NFR BLD: 3.4 %
ERYTHROCYTE [DISTWIDTH] IN BLOOD BY AUTOMATED COUNT: 14.5 %
EST. GFR  (AFRICAN AMERICAN): 34 ML/MIN/1.73 M^2
EST. GFR  (NON AFRICAN AMERICAN): 30 ML/MIN/1.73 M^2
GLUCOSE SERPL-MCNC: 52 MG/DL
GLUCOSE SERPL-MCNC: 73 MG/DL
HCT VFR BLD AUTO: 22 %
HGB BLD-MCNC: 7.3 G/DL
LYMPHOCYTES # BLD AUTO: 1.3 K/UL
LYMPHOCYTES NFR BLD: 19.6 %
MAGNESIUM SERPL-MCNC: 1.7 MG/DL
MCH RBC QN AUTO: 27.4 PG
MCHC RBC AUTO-ENTMCNC: 33.2 G/DL
MCV RBC AUTO: 83 FL
MONOCYTES # BLD AUTO: 0.5 K/UL
MONOCYTES NFR BLD: 7.7 %
NEUTROPHILS # BLD AUTO: 4.4 K/UL
NEUTROPHILS NFR BLD: 68.6 %
NUM UNITS TRANS PACKED RBC: NORMAL
NUM UNITS TRANS PACKED RBC: NORMAL
PHOSPHATE SERPL-MCNC: 2.6 MG/DL
PLATELET # BLD AUTO: 113 K/UL
PMV BLD AUTO: 9.5 FL
POCT GLUCOSE: 107 MG/DL (ref 70–110)
POCT GLUCOSE: 50 MG/DL (ref 70–110)
POCT GLUCOSE: 53 MG/DL (ref 70–110)
POCT GLUCOSE: 85 MG/DL (ref 70–110)
POCT GLUCOSE: 87 MG/DL (ref 70–110)
POCT GLUCOSE: 87 MG/DL (ref 70–110)
POTASSIUM SERPL-SCNC: 3.6 MMOL/L
PROT SERPL-MCNC: 5.9 G/DL
RBC # BLD AUTO: 2.66 M/UL
SODIUM SERPL-SCNC: 142 MMOL/L
WBC # BLD AUTO: 6.47 K/UL

## 2018-02-23 PROCEDURE — G8980 MOBILITY D/C STATUS: HCPCS | Mod: CI

## 2018-02-23 PROCEDURE — G8978 MOBILITY CURRENT STATUS: HCPCS | Mod: CI

## 2018-02-23 PROCEDURE — 97161 PT EVAL LOW COMPLEX 20 MIN: CPT

## 2018-02-23 PROCEDURE — 82947 ASSAY GLUCOSE BLOOD QUANT: CPT

## 2018-02-23 PROCEDURE — 11000001 HC ACUTE MED/SURG PRIVATE ROOM

## 2018-02-23 PROCEDURE — 94761 N-INVAS EAR/PLS OXIMETRY MLT: CPT

## 2018-02-23 PROCEDURE — 80053 COMPREHEN METABOLIC PANEL: CPT

## 2018-02-23 PROCEDURE — 36415 COLL VENOUS BLD VENIPUNCTURE: CPT

## 2018-02-23 PROCEDURE — 84100 ASSAY OF PHOSPHORUS: CPT

## 2018-02-23 PROCEDURE — 63600175 PHARM REV CODE 636 W HCPCS: Performed by: STUDENT IN AN ORGANIZED HEALTH CARE EDUCATION/TRAINING PROGRAM

## 2018-02-23 PROCEDURE — S0030 INJECTION, METRONIDAZOLE: HCPCS | Performed by: STUDENT IN AN ORGANIZED HEALTH CARE EDUCATION/TRAINING PROGRAM

## 2018-02-23 PROCEDURE — 97165 OT EVAL LOW COMPLEX 30 MIN: CPT

## 2018-02-23 PROCEDURE — 25000003 PHARM REV CODE 250: Performed by: STUDENT IN AN ORGANIZED HEALTH CARE EDUCATION/TRAINING PROGRAM

## 2018-02-23 PROCEDURE — 85025 COMPLETE CBC W/AUTO DIFF WBC: CPT

## 2018-02-23 PROCEDURE — 83735 ASSAY OF MAGNESIUM: CPT

## 2018-02-23 PROCEDURE — G8979 MOBILITY GOAL STATUS: HCPCS | Mod: CI

## 2018-02-23 PROCEDURE — 36430 TRANSFUSION BLD/BLD COMPNT: CPT

## 2018-02-23 PROCEDURE — P9038 RBC IRRADIATED: HCPCS

## 2018-02-23 PROCEDURE — 27201040 HC RC 50 FILTER

## 2018-02-23 RX ORDER — HYDRALAZINE HYDROCHLORIDE 20 MG/ML
5 INJECTION INTRAMUSCULAR; INTRAVENOUS ONCE
Status: COMPLETED | OUTPATIENT
Start: 2018-02-23 | End: 2018-02-23

## 2018-02-23 RX ORDER — HYDROCODONE BITARTRATE AND ACETAMINOPHEN 500; 5 MG/1; MG/1
TABLET ORAL
Status: DISCONTINUED | OUTPATIENT
Start: 2018-02-23 | End: 2018-02-24 | Stop reason: HOSPADM

## 2018-02-23 RX ORDER — MOXIFLOXACIN HYDROCHLORIDE 400 MG/1
400 TABLET ORAL DAILY
Qty: 5 TABLET | Refills: 0 | Status: SHIPPED | OUTPATIENT
Start: 2018-02-23 | End: 2018-02-28

## 2018-02-23 RX ADMIN — HEPARIN SODIUM 5000 UNITS: 5000 INJECTION, SOLUTION INTRAVENOUS; SUBCUTANEOUS at 06:02

## 2018-02-23 RX ADMIN — HEPARIN SODIUM 5000 UNITS: 5000 INJECTION, SOLUTION INTRAVENOUS; SUBCUTANEOUS at 01:02

## 2018-02-23 RX ADMIN — METRONIDAZOLE 500 MG: 500 INJECTION, SOLUTION INTRAVENOUS at 08:02

## 2018-02-23 RX ADMIN — CARVEDILOL 25 MG: 25 TABLET, FILM COATED ORAL at 08:02

## 2018-02-23 RX ADMIN — PIPERACILLIN AND TAZOBACTAM 4.5 G: 4; .5 INJECTION, POWDER, LYOPHILIZED, FOR SOLUTION INTRAVENOUS; PARENTERAL at 08:02

## 2018-02-23 RX ADMIN — PIPERACILLIN AND TAZOBACTAM 4.5 G: 4; .5 INJECTION, POWDER, LYOPHILIZED, FOR SOLUTION INTRAVENOUS; PARENTERAL at 01:02

## 2018-02-23 RX ADMIN — AMLODIPINE BESYLATE 10 MG: 5 TABLET ORAL at 08:02

## 2018-02-23 RX ADMIN — HEPARIN SODIUM 5000 UNITS: 5000 INJECTION, SOLUTION INTRAVENOUS; SUBCUTANEOUS at 10:02

## 2018-02-23 RX ADMIN — ATORVASTATIN CALCIUM 80 MG: 40 TABLET, FILM COATED ORAL at 08:02

## 2018-02-23 RX ADMIN — CLOPIDOGREL BISULFATE 75 MG: 75 TABLET ORAL at 08:02

## 2018-02-23 RX ADMIN — METRONIDAZOLE 500 MG: 500 INJECTION, SOLUTION INTRAVENOUS at 12:02

## 2018-02-23 RX ADMIN — HYDRALAZINE HYDROCHLORIDE 5 MG: 20 INJECTION INTRAMUSCULAR; INTRAVENOUS at 04:02

## 2018-02-23 RX ADMIN — LISINOPRIL 40 MG: 20 TABLET ORAL at 08:02

## 2018-02-23 NOTE — PLAN OF CARE
Problem: Patient Care Overview  Goal: Plan of Care Review  Outcome: Ongoing (interventions implemented as appropriate)   02/23/18 1240   Coping/Psychosocial   Plan Of Care Reviewed With patient;spouse   Care plan reviewed with Pt verbalized plan of care. AAOx4, spouse at bedside, no respiratory distress noted, on room air. NSR 76 per cardiac monitor, no red alarm noted. Denies any pain of discomfort, education provided on medication effect and side effect, MD educated on blood transfusion, patient voices understanding. No adverse reaction from RBC noted, first unit infusing at this time. Safety measures maintained, call light within his reach, no apparent distress noted, bed in low position, bed alarm on, educated on the importance of calling as needed, voices understanding, stable at this time.

## 2018-02-23 NOTE — HPI
Mr. Reis is a patient of Dr. Artur Guaman (heme onc).  He has symptomatic IgG multiple myeloma and was recently prescribed Revlimid/dexamethasone during the last clinic visit in January.  The patient has not started treatment yet.    He is now admitted to the hospital with altered mentation/hypothermia/sepsis and is on broad-spectrum antibiotics.    Labs from 2/22 WBC 7.3, hemoglobin 7.9, platelets 1 31,000.  Is recovering slowly.  Family is at the bedside..

## 2018-02-23 NOTE — NURSING
Dr. Encarnacion in to see patient, made aware of blood pressure 181/87. Patient stable at this time, first unit of RBC completed at 1450, no adverse reaction to blood transfusion noted, continue to monitor.

## 2018-02-23 NOTE — PLAN OF CARE
"Patient lying in bed. Patient reports "feeling hot" Patient also diaphoretic. Blood glucose check 50. Administered apple juice and donna crackers to patient. Will continue to monitor.   "

## 2018-02-23 NOTE — DISCHARGE SUMMARY
[facility] Discharge Summary    Primary Team: [facility] Team B  Attending Physician: Nadeem Encarnacion MD  Resident: Cynthia  Intern: Gurinder    Date of Admit: 2/21/2018  Date of Discharge: 2/24/2018    Discharge to: home  Condition: stable    Discharge Diagnoses     Patient Active Problem List   Diagnosis    Hearing impairment    Essential hypertension    History of stroke    Dyslipidemia    Poorly controlled diabetes mellitus    Uncontrolled diabetes mellitus with polyneuropathy    Cervical spondylosis    Thoracic spondylosis    Lumbar spondylosis    Spondylolisthesis of lumbar region    Radiculitis    Bursitis/tendonitis, shoulder    Bursitis of hip    Secondary hyperparathyroidism    Skew deviation    Hypertropia of left eye    Binocular vision disorder with diplopia    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, with long-term current use of insulin    Hypertensive retinopathy of both eyes    Acute on chronic diastolic heart failure    Angina, class II    Uncontrolled secondary diabetes mellitus with stage 4 CKD (GFR 15-29)    Hypertension associated with diabetes    Persistent proteinuria    Chronic diastolic heart failure    Multiple myeloma not having achieved remission    Symptomatic sinus bradycardia    Acute encephalopathy    Hypothermia    Sepsis    Altered mental status    Prolonged Q-T interval on ECG       Consultants and Procedures     Consultants:  Heme Onc  Neurology  Cardiology    Procedures:   none    Brief History of Present Illness      Marie Reis Jr. is a 62 y.o. 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  The patient presented to [facility] on 2/21/2018 with a primary complaint of Bradycardia (pt was brought in by EMS with bradycardia, pt  "has a hx of stage 4 renal disease with an abnormal ECG.)    The patient was in their usual state of health until this morning when his wife found him laying stretched out on the floor in the family room. She last saw him normal around 10pm last night, he was watching the news before going to sleep. When she got up around 7am this morning, she found on on the floor with his shirt off, which was next to him and later noticed to be "drenched." He was talking but she didn't understand him. He tried to get up but was too weak. He crawled about ft before his family helped him up. His gait was more unsteady than usually and had to be assisted with walking while he used the wall to balance. He was able to urinate standing up while leaning with both hands against the wall. EMS was called and brought him to ED. Family reports pt has had 4 strokes in the past 5 years, last one was around a year ago. Most of his strokes involved slurred speech, unsteady gate, and one with a headache. Pt reports he was told his glucose was in the 60's by EMS.      EMS run sheet with sinus bradycardia with widened QRS complexes, so he was empirically treated for hyperkalemia in the ED- with Calcium gluconate, Insulin 10units, and D50. Labs drawn at the same time, later resulting with a K of 3.2. He was also found to be hypothermic, initial temp 87.7F (rectal temp). Pt placed on a bearhugger. CT head negative for acute bleed, and CXR without acute findings.      Pt handles his own medications, but does not always take them consistently. He reports not eating much yesterday but he did take his Lantus 28units last night. He's had some nausea but denies vomiting. Denies fevers, chest pain, sore throat, rhinorrhea, abdominal pain. Notes diarrhea for past 2-3 days with 2-3 loose stools daily. Reports his legs are swollen, but no more than usual. Wife reports he was c/o leg pain this morning. At baseline, pt has residual gait disturbance- he's "wobbly" " and unsteady, but only uses cane occasionally. His speech is normal at baseline, and his mentation is normal but mildly slowed since his strokes.     For the full HPI please refer to the History & Physical from this admission.    Hospital Course By Problem with Pertinent Findings       1) Sepsis with unknown source  - pt found with with slurred speech, AMS, acute on chronic unsteady gait, diaphoretic, and cold on floor this morning; brought in by EMS with bradycardia and widened QRS, treated empirically for hyperkalemia. Found to be hypothermic with initial rectal temp of 87.7F, and initial K 3.2. Suspect sepsis.  - Immunocompromised in setting of smoldering Multiple myeloma; will consult Heme/Onc  - Reports some diarrhea for past 2-3 days with 2-3 loose stools per day, some nausea and decreased PO intake but denies fevers. Increased SOB for 2-3 days.   -WBC 8.8, Hg 8.8, Plt 95, Lactate 1.4, UA with trace glucose, 2+ protein, 2+ blood, 4 RBC, 1 WBC, Troponin 0.009, BNP 1120, VBG 7.3/48/45/23.8. Urine and blood tox screen negative.  -blood and urine cultures- NGTD. C.diff studies unable to be completed as pt no longer with loose stools. Will stop contact precautions  -CXR with no acute findings; Head CT with no acute bleed, remote lacunar infarcts. MRI - no acute findings  -started Vanc, Zosyn, and flagyl for broad empiric coverage for suspected sepsis and c.diff coverage. Will de-escalate tomorrow once BCx and UCx are NGx48h; discharge home with course of Moxifloxicin for 5 days  -stepped down to floor 2.22 as hypoglycemia and hypothermia having resolved     2) Acute Encephalopathy - resolved  - found stretched on floor with shirt off and slurred speech, too weak to get up, acute on chronic unsteady gait. EMS called. Pt with improved speech and strength in ED but still slow to respond and subjectively weak feeling  -blood and tox screen negative, ammonia WNL. Will get TSH and cortisol levels  - h/o 4 previous CVAs  with symptoms of slurred speech and unsteady gait. MRI with no acute findings  - suspect hypoglycemia and hypothermia are main contributors  - Neuro consulted-no eeg recommended, suspect infection, continue to monitor cuoltures, consider LP in pt wit MM and hypothermia  -consulted PT/OT and speech therapy- appreciate recs, no home needs     3) Bradycardia- resolved, Prolonged QT  - EMS with run strip showing HR of 49 with widened QRS. HR in 50's on arrival, and pt empirically treated for hyperkalemia. EKG in ED with sinus bradycardia, LVH, and prolonged QTc of 523. Initial K 3.2. Initial Troponin 0.009  - likely 2/2 hypothermia and hypokalemia   - repleted glucose and K, and hypothermia resolved  - trended troponins and EKGs q6h - no acute changes  - cardiology consulted-at this time, nothing cardiac to offer  - holding home beta-blocker, but as BP increasing and bradycardia resolved, will restart home coreg 25mg BID     4) Hypothermia- resolved  - initial rectal temp 87.7F. Repeat bladder temp 88F  - placed on bearhugger and rewarmed  - likely 2/2 sepsis vs hypoglycemia  - TSH and random cortisol levels WNL. Blood and urine cultures NGTD  - resolved and pt able to maintain normal body temp at room temp environment. Will step down to floor 2.22     5) HTN   -/77 initial   - home meds include norvasc 10mg, coreg 25mg BID, lasix 60mg qam and 40mg qpm, lisinopril 40mg  - holding beta blocker in setting of bradycardia, but continue remaining home meds  - hydralazine 10mg IV q6h prn SBP >180  - avoiding hypotension  - will add back coreg 25mg BID today and monitor closely  -hydralazine increased to 25mg PO TID with improvement in BPs; will discharge with script for same      6) HLD   - continue home statin  - will repeat lipid panel if no recent studies     7) HFpEF  - b/l LE edema and pt c/o b/l leg pain per wife. BNP 1120 on admit  - h/o grade 2 diastolic dysfunction; follows Dr. Segundo for cardiology  - will  continue home meds but hold beta blocker, as above  - cardiology consulted- concerned for volume overload, diruese prn, get TSH, monitor bradycardia and rewarm     8) Hypoglycemia- resolved, DM type 2  - last A1c 7.6 on 12/15/17. Home meds include Lantus 28u qhs (last dose the night before admission, and pt reports eating less yesterday) and [employer] 8u+ SSI before meals  - pt rports POC glucose was in 60's per EMS. POC glucose on arrival was 112- given insulin regular 10u with D50 for hyperkalemia empiric tx  - Repeat BMP with glucose of 70.  - started IV fluids with D5+1/2NS+40mEq KCl and prn D50 with q2h accuchecks for close monitoring. POC glucose trended in 60-70's yesterday and required one dose of D50. But early this morning, pt maintaining normal glucose levels. Will stop IVF and start diet.   - will space accuchecks to q4h and as glucose rises, will add back pt's home insulin regimen     9) CKD4 with chronic proteinuria  - likely multifactoral with multiple myeloma, DM, HTN. Follows with Dr. Dotson for Nephrology  - Cr at baseline of 2.2  - renally dose all medications and avoid nephrotoxic meds     10) IgG Multiple Myeloma  - dx'd 1 year ago but recently found to be worsening. Follows with Dr. Guaman for heme/onc  - recently was suppose to start Revlimid/Dexamethasone, but was told to wait until they get another medication approved, per pt's wife and daughter   - consulted Heme/Onc, Dr. Chiang - do not start MM tx until pt completely recovers from ongoing acute illness, pt to contact Dr. Guaman's office after hospital discharge prior to starting the Revlimid; recommend transfusing regular pRBC to keep Hg close to 9g/dL- given fact that pt will be starting tx for his MM in the near future     11) Anemia 2/2 chronic infla+mmation and poor production in setting of multiple myeloma  - required a blood transfusion last month  - Hg 8.8 on admit  - Iron studies on 1/16/18- iron 34, TIBC 203, Transferrin 137,  ferrition 406  - continue to monitor; 2 units ordered 2.23, patient consented  - Hg 9.2 s/p transfusion  - Heme/Onc consulted- recommend transfusing regular pRBC to keep Hg close to 9g/dL    Discharge Medications        Medication List      START taking these medications    hydrALAZINE 25 MG tablet  Commonly known as:  APRESOLINE  Take 1 tablet (25 mg total) by mouth every 8 (eight) hours.     moxifloxacin 400 mg tablet  Commonly known as:  AVELOX  Take 1 tablet (400 mg total) by mouth once daily.        CONTINUE taking these medications    amLODIPine 10 MG tablet  Commonly known as:  NORVASC  Take 1 tablet (10 mg total) by mouth once daily.     atorvastatin 80 MG tablet  Commonly known as:  LIPITOR  Take 1 tablet (80 mg total) by mouth once daily.     blood sugar diagnostic Strp     carvedilol 25 MG tablet  Commonly known as:  COREG  Take 1 tablet (25 mg total) by mouth 2 (two) times daily with meals.     clopidogrel 75 mg tablet  Commonly known as:  PLAVIX  Take 1 tablet (75 mg total) by mouth once daily.     dexamethasone 4 MG Tab  Commonly known as:  DECADRON  3 tablets once a week     fluticasone 50 mcg/actuation nasal spray  Commonly known as:  FLONASE  1 spray by Each Nare route 2 (two) times daily as needed for Rhinitis.     furosemide 40 MG tablet  Commonly known as:  LASIX  Take 60 mg which is 1 and a half pill in the morning at 8 a.m. And 40 mg (one full pill) at 3 p.m. In the afternoon     gabapentin 300 MG capsule  Commonly known as:  NEURONTIN  Take 1 capsule (300 mg total) by mouth 2 (two) times daily.     insulin aspart U-100 100 unit/mL Inpn pen  Commonly known as:  NovoLOG  Inject 8 units w/ meals plus scale 150-200 +2, 201-250 +4, 251-300 +6, 301-350 +8, >350 +10. Snack 4 units.     insulin glargine 100 unit/mL (3 mL) Inpn pen  Commonly known as:  LANTUS SOLOSTAR U-100 INSULIN  Inject 28 units at night.     lenalidomide 10 mg Cap  Commonly known as:  REVLIMID  10 mg daily for 21 days, followed by  "7 days off     lisinopril 40 MG tablet  Commonly known as:  PRINIVIL,ZESTRIL  TAKE 1 TABLET (40 MG TOTAL) BY MOUTH ONCE DAILY.     meclizine 25 mg tablet  Commonly known as:  ANTIVERT  Take 1 tablet (25 mg total) by mouth 3 (three) times daily as needed for Dizziness.     MENTHOLATUM DEEP HEAT RUB TOP     mirtazapine 30 MG tablet  Commonly known as:  REMERON     montelukast 10 mg tablet  Commonly known as:  SINGULAIR  Take 1 tablet (10 mg total) by mouth every evening.     nitroGLYCERIN 0.4 MG SL tablet  Commonly known as:  NITROSTAT     ondansetron 8 MG Tbdl  Commonly known as:  ZOFRAN-ODT  Take 1 tablet (8 mg total) by mouth every 6 (six) hours as needed.     pen needle, diabetic 31 gauge x 5/16" Ndle  Pt to use pen needle with Lantus daily           Where to Get Your Medications      [facility] - [address]  [address], [place] [address]    Phone:  [phone]   · moxifloxacin 400 mg tablet     You can get these medications from any pharmacy    Bring a paper prescription for each of these medications  · hydrALAZINE 25 MG tablet         Discharge Information:   Diet:  cardiac    Physical Activity:  As tolerated    Instructions:  1. Take all medications as prescribed  2. Keep all follow-up appointments  3. Return to the hospital or call your primary care physicians if any worsening symptoms such as chest pain, shortness of breath, one sided weakness or slurred speech occur.    Follow-Up Appointments:  Future Appointments  Date Time Provider Department Center   2/26/2018 3:00 PM Artur Guaman Jr., MD [facility] HEM ONC [place]   3/7/2018 7:40 AM LAB, HEMONC CANC[facility] LAB [facility] [place]   3/14/2018 8:30 AM Artur Guaman Jr., MD [facility] HEM ONC [place]   4/17/2018 8:50 AM SILVIA Al MD [facility]  Internal Medicine/Emergency Medicine, " PGY-1

## 2018-02-23 NOTE — PLAN OF CARE
Problem: Physical Therapy Goal  Goal: Physical Therapy Goal  Outcome: Outcome(s) achieved Date Met: 02/23/18  PT evaluation completed, pt with no needs at this time as he is functioning at his PLOF. D/C PT.

## 2018-02-23 NOTE — TELEPHONE ENCOUNTER
----- Message from Amber Alexander sent at 2/23/2018  2:12 PM CST -----  Contact: Pt Wife Naheed  Pt wife called about medication do not know the name at this time but give her a call  Callback [phone]  Thank You  FREDERICK Alexander      Called Naheed. No answered, but left message to call back.

## 2018-02-23 NOTE — PROGRESS NOTES
.Pharmacy New Medication Education    Patient accepted medication education.    Pharmacy educated patient on name and purpose of medications and possible side effects, using the teach-back method.     Current Inpatient Medication Orders   0.9% NaCl infusion (for blood administration)   amLODIPine tablet 10 mg   atorvastatin tablet 80 mg   carvedilol tablet 25 mg   clopidogrel tablet 75 mg   dextrose 50% injection 12.5 g   dextrose 50% injection 25 g   glucagon (human recombinant) injection 1 mg   glucose chewable tablet 16 g   glucose chewable tablet 24 g   heparin (porcine) injection 5,000 Units   insulin aspart U-100 pen 1-10 Units   insulin detemir U-100 pen 28 Units   lisinopril tablet 40 mg   metronidazole IVPB 500 mg   piperacillin-tazobactam 4.5 g in dextrose 5 % 100 mL IVPB (ready to mix system)   potassium chloride 10 mEq in 100 mL IVPB   sodium chloride 0.9% flush 5 mL   vancomycin (VANCOCIN) 1,250 mg in sodium chloride 0.9% 250 mL IVPB       Learners of pharmacy medication education included:  Patient    Patient +/- learner response:  Verbalized Understanding, Teachback

## 2018-02-23 NOTE — PROGRESS NOTES
[facility] Internal Medicine Resident  Progress Note    Subjective:      Marie Reis Jr. is a 62 y.o. male who is being followed by the [facility] IM service at [facility] for sepsis.     Pt says he feels much better today. Had BM this am. Stepped down to floor.  Consented for 2 units RBC.  Would like to go home today if possible.  Plan to de-escalate abx.        Objective:   Last 24 Hour Vital Signs:  BP  Min: 143/65  Max: 187/86  Temp  Av.2 °F (36.8 °C)  Min: 97.9 °F (36.6 °C)  Max: 98.9 °F (37.2 °C)  Pulse  Av.5  Min: 75  Max: 86  Resp  Av.2  Min: 8  Max: 28  SpO2  Av.7 %  Min: 95 %  Max: 100 %  Height  Av' (182.9 cm)  Min: 6' (182.9 cm)  Max: 6' (182.9 cm)  Weight  Av.3 kg (190 lb 4.1 oz)  Min: 86.3 kg (190 lb 4.1 oz)  Max: 86.3 kg (190 lb 4.1 oz)  I/O last 3 completed shifts:  In: 2378.3 [I.V.:1978.3; IV Piggyback:400]  Out: 1470 [Urine:1470]    Physical Examination:  General: NAD, resting in bed  HEENT: NC/AT, EOMI, PERRL, MMM,   Neck: Supple, symmetric, JVP 10cm  Resp: normal effort, lungs CTA b/l  CV: bradycardic with regular rhythm, grade 1/6 systolic murmur at RUSB, 2+ b/l radial and 1+ b/l DP pulses  Abdomen: soft, round/, nontender, nondistended, normoactive bowel sounds  Extremities: no cyanosis or clubbing, 3+ edema of b/l LE to above knees, TTP in areas of edema  Skin: dry, warm, non-diaphoretic, no lesions or rashes  Neuro: AOx4, no focal deficits       Laboratory:  Laboratory Data Reviewed: yes  Pertinent Findings:  Trended Lab Data:     Recent Labs  Lab 18  0841  18  1751 18  0607 18  0942 18  0424 18  0425 18  0618   WBC 8.80  --   --   --  7.30  --  6.47  --    HGB 8.8*  --   --   --  7.9*  --  7.3*  --    HCT 27.1*  --   --   --  23.7*  --  22.0*  --    PLT 95*  --   --   --  131*  --  113*  --    MCV 84  --   --   --  83  --  83  --    RDW 14.7*  --   --   --  14.8*  --  14.5  --      < > 140 139  --  142  --   --     K 3.2*  < > 3.3* 3.6  --  3.6  --   --      < > 109 111*  --  113*  --   --    CO2 21*  < > 27 22*  --  23  --   --    BUN 28*  < > 26* 21  --  21  --   --    CREATININE 2.2*  < > 2.0* 2.2*  --  2.3*  --   --      < > 64* 123*  --  73  --  52*   CALCIUM 9.1  < > 8.8 8.3*  --  8.4*  --   --    MG 2.3  --   --  1.6  --  1.7  --   --    PHOS  --   --   --  3.7  --  2.6*  --   --    PROT 7.4  --   --  5.8*  --  5.9*  --   --    ALBUMIN 3.4*  --   --  2.7*  --  2.7*  --   --    BILITOT 0.7  --   --  0.7  --  0.8  --   --    AST 29  --   --  22  --  19  --   --    ALKPHOS 146*  --   --  112  --  101  --   --    ALT 39  --   --  29  --  25  --   --    < > = values in this interval not displayed.   Trended Cardiac Data:     Recent Labs  Lab 02/21/18  0841 02/21/18  1149 02/21/18  1751 02/22/18  0014   TROPONINI 0.009 0.011 0.010 0.016   BNP 1,120*  --   --   --           Microbiology Data Reviewed: yes  Pertinent Findings:  Blood Cx (2/21, x2)- NGTD  Urine Cx (2/21)- NGTD  C. Diff studies- awaiting sample    Other Results:  Radiology Data Reviewed: yes  Pertinent Findings:  MRI Brain (2/21)- Age-appropriate generalized volume loss with small bilateral basal ganglia and right thalamic remote lacunar-type infarcts corresponding to hypodensity seen on CT. No evidence for acute infarction. Otherwise unremarkable noncontrast     LE US (2/21)- No evidence of acute DVT in the bilateral lower extremity     Current Medications:     Infusions:       Scheduled:   amLODIPine  10 mg Oral Daily    atorvastatin  80 mg Oral Daily    carvedilol  25 mg Oral BID    clopidogrel  75 mg Oral Daily    heparin (porcine)  5,000 Units Subcutaneous Q8H    insulin detemir U-100  28 Units Subcutaneous QHS    lisinopril  40 mg Oral Daily    metronidazole  500 mg Intravenous Q8H    piperacillin-tazobactam (ZOSYN) IVPB  4.5 g Intravenous Q8H    potassium chloride  10 mEq Intravenous Once    vancomycin (VANCOCIN) IVPB  1,250 mg  Intravenous Q24H        PRN:  sodium chloride, dextrose 50%, dextrose 50%, glucagon (human recombinant), glucose, glucose, insulin aspart U-100, sodium chloride 0.9%    Antibiotics and Day Number of Therapy:  Vanc, Zosyn, Flagyl- 2/21-2/23    Lines and Day Number of Therapy:  PIV x2    Assessment:     Marie Reis Jr. is a 62 y.o.male with  Patient Active Problem List    Diagnosis Date Noted    Altered mental status 02/22/2018    Prolonged Q-T interval on ECG 02/22/2018    Symptomatic sinus bradycardia 02/21/2018    Acute encephalopathy 02/21/2018    Hypothermia 02/21/2018    Sepsis 02/21/2018    Multiple myeloma not having achieved remission 01/16/2018    Uncontrolled secondary diabetes mellitus with stage 4 CKD (GFR 15-29) 01/11/2018    Hypertension associated with diabetes 01/11/2018    Persistent proteinuria 01/11/2018    Chronic diastolic heart failure 01/11/2018    Angina, class II 12/27/2017    Acute on chronic diastolic heart failure 11/13/2017    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, with long-term current use of insulin 09/26/2017    Hypertensive retinopathy of both eyes 09/26/2017    Skew deviation 09/22/2016    Hypertropia of left eye 09/22/2016    Binocular vision disorder with diplopia 09/22/2016    Secondary hyperparathyroidism 05/26/2016    Cervical spondylosis 10/01/2015    Thoracic spondylosis 10/01/2015    Lumbar spondylosis 10/01/2015    Spondylolisthesis of lumbar region 10/01/2015    Radiculitis 10/01/2015    Bursitis/tendonitis, shoulder 10/01/2015    Bursitis of hip 10/01/2015    Uncontrolled diabetes mellitus with polyneuropathy 09/28/2015    Dyslipidemia 06/26/2015    Poorly controlled diabetes mellitus 06/26/2015    History of stroke 03/23/2015    Essential hypertension 01/12/2014    Hearing impairment 10/25/2013        Plan:        1) Sepsis with unknown source  - pt found with with  slurred speech, AMS, acute on chronic unsteady gait, diaphoretic, and cold on floor this morning; brought in by EMS with bradycardia and widened QRS, treated empirically for hyperkalemia. Found to be hypothermic with initial rectal temp of 87.7F, and initial K 3.2. Suspect sepsis.  - Immunocompromised in setting of smoldering Multiple myeloma; will consult Heme/Onc  - Reports some diarrhea for past 2-3 days with 2-3 loose stools per day, some nausea and decreased PO intake but denies fevers. Increased SOB for 2-3 days.   -WBC 8.8, Hg 8.8, Plt 95, Lactate 1.4, UA with trace glucose, 2+ protein, 2+ blood, 4 RBC, 1 WBC, Troponin 0.009, BNP 1120, VBG 7.3/48/45/23.8. Urine and blood tox screen negative.  -blood and urine cultures- NGTD. C.diff studies unable to be completed as pt no longer with loose stools. Will stop contact precautions  -CXR with no acute findings; Head CT with no acute bleed, remote lacunar infarcts. MRI - no acute findings  -started Vanc, Zosyn, and flagyl for broad empiric coverage for suspected sepsis and c.diff coverage. Will de-escalate tomorrow once BCx and UCx are NGx48h; discharge home with course of Moxifloxicin for 5 days  -stepped down to floor yesterday as hypoglycemia and hypothermia having resolved     2) Acute Encephalopathy - resolved  - found stretched on floor with shirt off and slurred speech, too weak to get up, acute on chronic unsteady gait. EMS called. Pt with improved speech and strength in ED but still slow to respond and subjectively weak feeling  -blood and tox screen negative, ammonia WNL. Will get TSH and cortisol levels  - h/o 4 previous CVAs with symptoms of slurred speech and unsteady gait. MRI with no acute findings  - suspect hypoglycemia and hypothermia are main contributors  - Neuro consulted-no eeg recommended, suspect infection, continue to monitor cuoltures, consider LP in pt wit MM and hypothermia  -consulted PT/OT and speech therapy- appreciate recs, no home  needs     3) Bradycardia- resolved, Prolonged QT  - EMS with run strip showing HR of 49 with widened QRS. HR in 50's on arrival, and pt empirically treated for hyperkalemia. EKG in ED with sinus bradycardia, LVH, and prolonged QTc of 523. Initial K 3.2. Initial Troponin 0.009  - likely 2/2 hypothermia and hypokalemia   - repleted glucose and K, and hypothermia resolved  - trended troponins and EKGs q6h - no acute changes  - cardiology consulted-at this time, nothing cardiac to offer  - holding home beta-blocker, but as BP increasing and bradycardia resolved, will restart home coreg 25mg BID     4) Hypothermia- resolved  - initial rectal temp 87.7F. Repeat bladder temp 88F  - placed on bearhugger and rewarmed  - likely 2/2 sepsis vs hypoglycemia  - TSH and random cortisol levels WNL. Blood and urine cultures NGTD  - resolved and pt able to maintain normal body temp at room temp environment. Will step down to floor 2.22     5) HTN   -/77 initial   - home meds include norvasc 10mg, coreg 25mg BID, lasix 60mg qam and 40mg qpm, lisinopril 40mg  - holding beta blocker in setting of bradycardia, but continue remaining home meds  - hydralazine 10mg IV q6h prn SBP >180  - avoiding hypotension  - will add back coreg 25mg BID today and monitor closely     6) HLD   - continue home statin  - will repeat lipid panel if no recent studies     7) HFpEF  - b/l LE edema and pt c/o b/l leg pain per wife. BNP 1120 on admit  - h/o grade 2 diastolic dysfunction; follows Dr. Segundo for cardiology  - will continue home meds but hold beta blocker, as above  - cardiology consulted- concerned for volume overload, diruese prn, get TSH, monitor bradycardia and rewarm     8) Hypoglycemia- resolved, DM type 2  - last A1c 7.6 on 12/15/17. Home meds include Lantus 28u qhs (last dose the night before admission, and pt reports eating less yesterday) and Novology 8u+ SSI before meals  - pt reports POC glucose was in 60's per EMS. POC glucose on  arrival was 112- given insulin regular 10u with D50 for hyperkalemia empiric tx  - Repeat BMP with glucose of 70.  - started IV fluids with D5+1/2NS+40mEq KCl and prn D50 with q2h accuchecks for close monitoring. POC glucose trended in 60-70's yesterday and required one dose of D50. But early this morning, pt maintaining normal glucose levels. Will stop IVF and start diet.   - will space accuchecks to q4h and as glucose rises, will add back pt's home insulin regimen     9) CKD4 with chronic proteinuria  - likely multifactoral with multiple myeloma, DM, HTN. Follows with Dr. Dotson for Nephrology  - Cr at baseline of 2.2  - renally dose all medications and avoid nephrotoxic meds     10) IgG Multiple Myeloma  - dx'd 1 year ago but recently found to be worsening. Follows with Dr. Guaman for heme/onc  - recently was suppose to start Revlimid/Dexamethasone, but was told to wait until they get another medication approved, per pt's wife and daughter   - consulted Heme/Onc, Dr. Chiang - do not start MM tx until pt completely recovers from ongoing acute illness, pt to contact Dr. Guaman's office after hospital discharge prior to starting the Revlimid; recommend transfusing regular pRBC to keep Hg close to 9g/dL- given fact that pt will be starting tx for his MM in the near future     11) Anemia 2/2 chronic infla+mmation and poor production in setting of multiple myeloma  - required a blood transfusion last month  - Hg 8.8 on admit  - Iron studies on 1/16/18- iron 34, TIBC 203, Transferrin 137, ferrition 406  - Hg 7.9 today (2/22)  - Heme/Onc consulted- recommend transfusing regular pRBC to keep Hg close to 9g/dL  - continue to monitor; 2 unites ordered today, patient consented     Diet: Cardiac  PPx: Hep SQ  Code: Full     Dispo: Pending Transfusion, possibly home today.     Milla Horowitz  Internal Medicine/Emergency Medicine, PGY-1    [facility] Internal Medicine Service Team B    [facility] Medicine Hospitalist Pager numbers:   [facility]  Hospitalist Medicine Team A (Anson/Nadia): [phone]  [facility] Hospitalist Medicine Team B (Shashank/Aniket):  [phone]

## 2018-02-23 NOTE — SUBJECTIVE & OBJECTIVE
Oncology Treatment Plan:   [No treatment plan]    Medications:  Continuous Infusions:  Scheduled Meds:   amLODIPine  10 mg Oral Daily    atorvastatin  80 mg Oral Daily    carvedilol  25 mg Oral BID    clopidogrel  75 mg Oral Daily    heparin (porcine)  5,000 Units Subcutaneous Q8H    insulin detemir U-100  28 Units Subcutaneous QHS    lisinopril  40 mg Oral Daily    metronidazole  500 mg Intravenous Q8H    piperacillin-tazobactam (ZOSYN) IVPB  4.5 g Intravenous Q8H    potassium chloride  10 mEq Intravenous Once    vancomycin (VANCOCIN) IVPB  1,250 mg Intravenous Q24H     PRN Meds:dextrose 50%, dextrose 50%, glucagon (human recombinant), glucose, glucose, insulin aspart U-100, sodium chloride 0.9%     Review of patient's allergies indicates:  No Known Allergies     Past Medical History:   Diagnosis Date    Cataract     Cervical spondylosis 10/1/2015    Diabetes mellitus, type 2     Hypertension     Smoldering myeloma 6/2/2016    Strabismus     Stroke 2014     Past Surgical History:   Procedure Laterality Date    CATARACT EXTRACTION W/  INTRAOCULAR LENS IMPLANT Left 4/30/15    [facility]    HAND SURGERY  2/2012    DR. BAKER [facility]    left hand fracture sx      LT EYE   5/2/15    DR KULLMAN [facility]     Family History     Problem Relation (Age of Onset)    Diabetes Mother, Father, Sister, Brother    No Known Problems Maternal Aunt, Maternal Uncle, Paternal Aunt, Paternal Uncle, Maternal Grandmother, Maternal Grandfather, Paternal Grandmother, Paternal Grandfather        Social History Main Topics    Smoking status: Never Smoker    Smokeless tobacco: Never Used    Alcohol use No    Drug use: No    Sexual activity: No       Review of Systems   Constitutional: Negative for fever and unexpected weight change.   Respiratory: Negative for wheezing and stridor.    Cardiovascular: Negative for chest pain and palpitations.   Gastrointestinal: Negative for abdominal pain and vomiting.   Musculoskeletal:  Positive for arthralgias and gait problem.   Psychiatric/Behavioral: Negative for agitation.     Objective:     Vital Signs (Most Recent):  Temp: 98.1 °F (36.7 °C) (02/22/18 1759)  Pulse: 81 (02/22/18 1759)  Resp: 16 (02/22/18 1759)  BP: (!) 173/80 (02/22/18 1759)  SpO2: 96 % (02/22/18 1630) Vital Signs (24h Range):  Temp:  [98.1 °F (36.7 °C)-98.8 °F (37.1 °C)] 98.1 °F (36.7 °C)  Pulse:  [76-86] 81  Resp:  [8-28] 16  SpO2:  [95 %-100 %] 96 %  BP: (142-187)/() 173/80     Weight: 86.3 kg (190 lb 4.1 oz)  Body mass index is 25.8 kg/m².  Body surface area is 2.09 meters squared.      Intake/Output Summary (Last 24 hours) at 02/22/18 2006  Last data filed at 02/22/18 1400   Gross per 24 hour   Intake          2378.34 ml   Output             1430 ml   Net           948.34 ml       Physical Exam   Constitutional: He is oriented to person, place, and time.  Non-toxic appearance. He does not have a sickly appearance. No distress.   HENT:   Nose: No epistaxis.   Mouth/Throat: Oropharynx is clear and moist. Mucous membranes are not pale, not dry and not cyanotic. No lacerations. No oropharyngeal exudate.   Eyes: Conjunctivae are normal. No scleral icterus.   Neck: Neck supple. No thyroid mass and no thyromegaly present.   Cardiovascular: Normal rate, regular rhythm, S1 normal and normal heart sounds.    Pulmonary/Chest: Effort normal and breath sounds normal. No accessory muscle usage or stridor. No respiratory distress. He has no wheezes. He has no rales.   Abdominal: Soft. He exhibits no ascites and no mass. There is no hepatosplenomegaly. There is no tenderness.   Musculoskeletal: He exhibits no edema.   Lymphadenopathy:        Head (right side): No submental and no submandibular adenopathy present.        Head (left side): No submental and no submandibular adenopathy present.     He has no cervical adenopathy.     He has no axillary adenopathy.   Neurological: He is alert and oriented to person, place, and time. He  has normal strength. No cranial nerve deficit or sensory deficit. Gait normal.   Skin: No bruising, no petechiae and no rash noted. He is not diaphoretic. No cyanosis.   Psychiatric: He has a normal mood and affect. Judgment and thought content normal. Cognition and memory are normal.   Vitals reviewed.      Significant Labs:   All pertinent labs from the last 24 hours have been reviewed.    Diagnostic Results:  I have reviewed all pertinent imaging results/findings within the past 24 hours.

## 2018-02-23 NOTE — PLAN OF CARE
Problem: Occupational Therapy Goal  Goal: Occupational Therapy Goal  Outcome: Outcome(s) achieved Date Met: 02/23/18  Pt performing at baseline for ADLs and functional mobility. No deficits in strength/coordination. No further OT needs at this time. D/c OT

## 2018-02-23 NOTE — PLAN OF CARE
Patient . Placed call to primary team Dr. Shashank emnezes. Telephone order given from primary team to give Detemir 28 units.

## 2018-02-23 NOTE — CONSULTS
[facility]  Hematology/Oncology  Consult Note    Patient Name: Marie Reis Jr.  MRN: [id number]  Admission Date: 2/21/2018  Hospital Length of Stay: 1 days  Code Status: Full Code   Attending Provider: Nadeem Encarnacion MD  Consulting Provider: Miguel Angel Chiang MD  Primary Care Physician: Nancy Colón MD  Principal Problem:Sepsis    Consults  Subjective:     HPI:  Mr. Reis is a patient of Dr. Artur Guaman (heme onc).  He has symptomatic IgG multiple myeloma and was recently prescribed Revlimid/dexamethasone during the last clinic visit in January.  The patient has not started treatment yet.    He is now admitted to the hospital with altered mentation/hypothermia/sepsis and is on broad-spectrum antibiotics.    Labs from 2/22 WBC 7.3, hemoglobin 7.9, platelets 1 31,000.  Is recovering slowly.  Family is at the bedside..    Oncology Treatment Plan:   [No treatment plan]    Medications:  Continuous Infusions:  Scheduled Meds:   amLODIPine  10 mg Oral Daily    atorvastatin  80 mg Oral Daily    carvedilol  25 mg Oral BID    clopidogrel  75 mg Oral Daily    heparin (porcine)  5,000 Units Subcutaneous Q8H    insulin detemir U-100  28 Units Subcutaneous QHS    lisinopril  40 mg Oral Daily    metronidazole  500 mg Intravenous Q8H    piperacillin-tazobactam (ZOSYN) IVPB  4.5 g Intravenous Q8H    potassium chloride  10 mEq Intravenous Once    vancomycin (VANCOCIN) IVPB  1,250 mg Intravenous Q24H     PRN Meds:dextrose 50%, dextrose 50%, glucagon (human recombinant), glucose, glucose, insulin aspart U-100, sodium chloride 0.9%     Review of patient's allergies indicates:  No Known Allergies     Past Medical History:   Diagnosis Date    Cataract     Cervical spondylosis 10/1/2015    Diabetes mellitus, type 2     Hypertension     Smoldering myeloma 6/2/2016    Strabismus     Stroke 2014     Past Surgical History:   Procedure Laterality Date    CATARACT EXTRACTION W/  INTRAOCULAR LENS IMPLANT Left  4/30/15    [facility]    HAND SURGERY  2/2012    DR. BAKER [facility]    left hand fracture sx      LT EYE   5/2/15    DR KULLMAN [facility]     Family History     Problem Relation (Age of Onset)    Diabetes Mother, Father, Sister, Brother    No Known Problems Maternal Aunt, Maternal Uncle, Paternal Aunt, Paternal Uncle, Maternal Grandmother, Maternal Grandfather, Paternal Grandmother, Paternal Grandfather        Social History Main Topics    Smoking status: Never Smoker    Smokeless tobacco: Never Used    Alcohol use No    Drug use: No    Sexual activity: No       Review of Systems   Constitutional: Negative for fever and unexpected weight change.   Respiratory: Negative for wheezing and stridor.    Cardiovascular: Negative for chest pain and palpitations.   Gastrointestinal: Negative for abdominal pain and vomiting.   Musculoskeletal: Positive for arthralgias and gait problem.   Psychiatric/Behavioral: Negative for agitation.     Objective:     Vital Signs (Most Recent):  Temp: 98.1 °F (36.7 °C) (02/22/18 1759)  Pulse: 81 (02/22/18 1759)  Resp: 16 (02/22/18 1759)  BP: (!) 173/80 (02/22/18 1759)  SpO2: 96 % (02/22/18 1630) Vital Signs (24h Range):  Temp:  [98.1 °F (36.7 °C)-98.8 °F (37.1 °C)] 98.1 °F (36.7 °C)  Pulse:  [76-86] 81  Resp:  [8-28] 16  SpO2:  [95 %-100 %] 96 %  BP: (142-187)/() 173/80     Weight: 86.3 kg (190 lb 4.1 oz)  Body mass index is 25.8 kg/m².  Body surface area is 2.09 meters squared.      Intake/Output Summary (Last 24 hours) at 02/22/18 2006  Last data filed at 02/22/18 1400   Gross per 24 hour   Intake          2378.34 ml   Output             1430 ml   Net           948.34 ml       Physical Exam   Constitutional: He is oriented to person, place, and time.  Non-toxic appearance. He does not have a sickly appearance. No distress.   HENT:   Nose: No epistaxis.   Mouth/Throat: Oropharynx is clear and moist. Mucous membranes are not pale, not dry and not cyanotic. No lacerations. No  oropharyngeal exudate.   Eyes: Conjunctivae are normal. No scleral icterus.   Neck: Neck supple. No thyroid mass and no thyromegaly present.   Cardiovascular: Normal rate, regular rhythm, S1 normal and normal heart sounds.    Pulmonary/Chest: Effort normal and breath sounds normal. No accessory muscle usage or stridor. No respiratory distress. He has no wheezes. He has no rales.   Abdominal: Soft. He exhibits no ascites and no mass. There is no hepatosplenomegaly. There is no tenderness.   Musculoskeletal: He exhibits no edema.   Lymphadenopathy:        Head (right side): No submental and no submandibular adenopathy present.        Head (left side): No submental and no submandibular adenopathy present.     He has no cervical adenopathy.     He has no axillary adenopathy.   Neurological: He is alert and oriented to person, place, and time. He has normal strength. No cranial nerve deficit or sensory deficit. Gait normal.   Skin: No bruising, no petechiae and no rash noted. He is not diaphoretic. No cyanosis.   Psychiatric: He has a normal mood and affect. Judgment and thought content normal. Cognition and memory are normal.   Vitals reviewed.      Significant Labs:   All pertinent labs from the last 24 hours have been reviewed.    Diagnostic Results:  I have reviewed all pertinent imaging results/findings within the past 24 hours.    Assessment/Plan:   1.  IgG multiple myeloma  2.  Chronic kidney disease  3.  Altered mentation/hypothermia - better  4.  Sepsis of unclear source  5.  Anemia secondary to myeloma/chronic disease/chronic kidney disease    His symptoms are certainly better since admission.  Continue current care.    I spoke to the patient and family not to start his myeloma treatment until he completely recovers from ongoing acute illness. He will contact Dr. Guaman's office after hospital discharge prior to starting his Revlimid.    Recommend transfusing regular packed red blood cells to keep hemoglobin  close to 9 grams per deciliter - given the fact that he will be starting treatment for his multiple myeloma in the near future.    Miguel Angel Chiang MD  Hematology/Oncology  [facility]

## 2018-02-23 NOTE — PLAN OF CARE
Problem: Patient Care Overview  Goal: Plan of Care Review  Outcome: Ongoing (interventions implemented as appropriate)  Patient transferred to unit from ICU. Plan of care reviewed with patient and family, verbalized understanding. Pt AAOx4, follows commands, breathing unlabored, equal chest movement, lungs clear equal bilaterally, radial pulses 2+,pedal pulses 1+. Continuous cardiac monitoring NSR per telemetry. Bed rails 2x, bed in low position, call button in reach, bed alarm turned on.No acute distress noted, will continue to monitor.

## 2018-02-23 NOTE — PT/OT/SLP EVAL
"Occupational Therapy   Evaluation and Discharge Note    Name: Marie Reis Jr.  MRN: [id number]  Admitting Diagnosis:  Sepsis      Recommendations:     Discharge Recommendations: home  Discharge Equipment Recommendations:  none  Barriers to discharge:  None    History:     Occupational Profile:  Living Environment: Pt lives with spouse,in SSH, no steps, WIS and tub/shower available with grab bars   Previous level of function: independent, driving, uses SPC PRN   Equipment Owned:  cane, straight, grab bar  Assistance upon Discharge: from spouse if required     Past Medical History:   Diagnosis Date    Cataract     Cervical spondylosis 10/1/2015    Diabetes mellitus, type 2     Hypertension     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 BAKER [facility]    left hand fracture sx      LT EYE   5/2/15    DR KULLMAN [facility]       Subjective     Chief Complaint: fatigue; "He was up all night complaining about being hot then cold" per wife  Patient/Family stated goals: return home   Communicated with: nsg prior to session.  Pain/Comfort:  · Pain Rating 1: 0/10    Patients cultural, spiritual, Episcopal conflicts given the current situation:      Objective:     Patient found with:      General Precautions: Standard, fall   Orthopedic Precautions:N/A   Braces: N/A     Occupational Performance:    Bed Mobility:    · Patient completed Supine to Sit with supervision  · Patient completed Sit to Supine with supervision    Functional Mobility/Transfers:  · Patient completed Sit <> Stand Transfer with supervision  with  no assistive device   · Functional Mobility: supervision no AD     Activities of Daily Living:  · Feeding:  independence    · LB Dressing: modified independence      Cognitive/Visual Perceptual:  Cognitive/Psychosocial Skills:     -       " "Oriented to: Person, Place, Time and Situation   -       Follows Commands/attention:Follows multistep  commands  -       Communication: clear/fluent  -       Memory: No Deficits noted  -       Safety awareness/insight to disability: intact   -       Mood/Affect/Coping skills/emotional control: Appropriate to situation    Physical Exam:  Balance:    -       WFL  Postural examination/scapula alignment:    -       No postural abnormalities identified  Skin integrity: Visible skin intact  Edema:  None noted  Dominant hand:    -       right  Upper Extremity Range of Motion:   WFL  Upper Extremity Strength:  WFL   Strength:  Good   Fine Motor Coordination:    -       Intact    Patient left seated EOB with all lines intact, call button in reach, nsg notified and spouse  present    [facility] 6 Click:  [facility] Total Score: 23    Treatment & Education:  Pt educated on home safety; no further OT needs   Education:    Assessment:     Marie Reis Jr. is a 62 y.o. male with a medical diagnosis of Sepsis. At this time, patient is functioning at their prior level of function and does not require further acute OT services. Pt performing at baseline for ADLs and functional mobility. No deficits in strength/coordination. No further OT needs at this time. D/c OT     Clinical Decision Makin.  OT Low:  "Pt evaluation falls under low complexity for evaluation coding due to performance deficits noted in 1-3 areas as stated above and 0 co-morbities affecting current functional status. Data obtained from problem focused assessments. No modifications or assistance was required for completion of evaluation. Only brief occupational profile and history review completed."     Plan:     During this hospitalization, patient does not require further acute OT services.  Please re-consult if situation changes.    · Plan of Care Reviewed with: patient, spouse    This Plan of care has been discussed with the patient who was involved in its " development and understands and is in agreement with the identified goals and treatment plan    GOALS:    Occupational Therapy Goals     Not on file          Multidisciplinary Problems (Resolved)        Problem: Occupational Therapy Goal    Goal Priority Disciplines Outcome Interventions   Occupational Therapy Goal   (Resolved)     OT, PT/OT Outcome(s) achieved                    Time Tracking:     OT Date of Treatment: 02/23/18  OT Start Time: 0914  OT Stop Time: 0930  OT Total Time (min): 16 min    Billable Minutes:Evaluation 16    Grace Vazquez OT  2/23/2018

## 2018-02-23 NOTE — PT/OT/SLP EVAL
Physical Therapy Evaluation and Discharge Note    Patient Name:  Marie Reis Jr.   MRN:  [id number]    Recommendations:     Discharge Recommendations:  home   Discharge Equipment Recommendations: none   Barriers to discharge: None    Assessment:     Marie Reis Jr. is a 62 y.o. male admitted with a medical diagnosis of Sepsis. At this time, patient is functioning at their prior level of function and does not require further acute PT services.     Recent Surgery: * No surgery found *      Plan:     During this hospitalization, patient does not require further acute PT services.  Please re-consult if situation changes.     Plan of Care Reviewed with: patient, spouse    Subjective     Communicated with RADHA Castro prior to session.  Patient found L sidelying upon PT entry to room, agreeable to evaluation.      Chief Complaint: being tired  Patient comments/goals: pt reports he feels back to his baseline and has no concerns with his mobility. Pt's wife reporting he is ambulating and moving at his baseline level.  Pain/Comfort:  · Pain Rating 1: 0/10  · Pain Rating Post-Intervention 1: 0/10    Patients cultural, spiritual, Gnosticism conflicts given the current situation: none reported    Living Environment:  Pt lives with is wife in a H with no JANIYA and tub/shower combo with grab bar.  Prior to admission, patients level of function was independent without AD at home and occasionally uses SPC when ambulating outside the home, drives, and is independent with ADLs.  Patient has the following equipment: cane, straight, grab bar.  DME owned (not currently used): none.  Upon discharge, patient will have assistance from his wife.    Objective:     Patient found with: bed alarm, telemetry     General Precautions: Standard,     Orthopedic Precautions:N/A   Braces: N/A     Exams:  · Cognitive Exam:  Patient is oriented to Person, Place, Time and Situation and follows 100% of all commands   · Fine Motor Coordination:    · -        Intact  · Gross Motor Coordination:  WFL  · Sensation:    · -       Intact  · Skin Integrity/Edema:      · -       Skin integrity: Visible skin intact  · -       Edema: None noted    · RLE ROM: WFL  · RLE Strength: WFL  · LLE ROM: WFL  · LLE Strength: WFL    Functional Mobility:  · Bed Mobility:     · Scooting: modified independence  · Supine to Sit: modified independence  · Sit to Supine: modified independence  · Transfers:     · Sit to Stand:  independence with no AD  · Gait: 180 ft with no AD independently over level surface. Pt ambulates with wide DIALLO and toes out, reporting this is his baseline.    AM-PAC 6 CLICK MOBILITY  Total Score:23     Therapeutic Activities and Exercises:  Pt and pt's wife reporting pt is ambulating and moving at his PLOF. No IP PT/OT needs at this time.    Patient left HOB elevated with all lines intact, call button in reach, bed alarm on, RN notified and pt's wife present.    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    left hand fracture sx      LT EYE   5/2/15    DR KULLMAN [facility]       Clinical Decision Making:     History  Co-morbidities and personal factors that may impact the plan of care Examination  Body Structures and Functions, activity limitations and participation restrictions that may impact the plan of care Clinical Presentation   Decision  Making/ Complexity Score   Co-morbidities:   [] Time since onset of injury / illness / exacerbation  [] Status of current condition  []Patient's cognitive status and safety concerns    [] Multiple Medical Problems (see med hx)  Personal Factors:   [] Patient's age  [] Prior Level of function   [] Patient's home situation (environment and family support)  [] Patient's level of motivation  [] Expected progression of patient      HISTORY:(criteria)    [x] 54965 - no personal factors/history    [] 14232 - has 1-2 personal factor/comorbidity     [] 56516 - has >3 personal factor/comorbidity     Body Regions:  [] Objective examination findings  [] Head     []  Neck  [] Trunk   [] Upper Extremity  [] Lower Extremity    Body Systems:  [] For communication ability, affect, cognition, language, and learning style: the assessment of the ability to make needs known, consciousness, orientation (person, place, and time), expected emotional /behavioral responses, and learning preferences (eg, learning barriers, education  needs)  [] For the neuromuscular system: a general assessment of gross coordinated movement (eg, balance, gait, locomotion, transfers, and transitions) and motor function  (motor control and motor learning)  [] For the musculoskeletal system: the assessment of gross symmetry, gross range of motion, gross strength, height, and weight  [] For the integumentary system: the assessment of pliability(texture), presence of scar formation, skin color, and skin integrity  [] For cardiovascular/pulmonary system: the assessment of heart rate, respiratory rate, blood pressure, and edema     Activity limitations:    [] Patient's cognitive status and saf ety concerns          [] Status of current condition      [] Weight bearing restriction  [] Cardiopulmunary Restriction    Participation Restrictions:   [] Goals and goal agreement with the patient     [] Rehab potential (prognosis) and probable outcome      Examination of  Body System: (criteria)    [x] 22649 - addressing 1-2 elements    [] 99319 - addressing a total of 3 or more elements     [] 09527 -  Addressing a total of 4 or more elements         Clinical Presentation: (criteria)  Stable - 96991     On examination of body system using standardized tests and measures patient presents with 1-2 elements from any of the following: body structures and functions, activity limitations, and/or participation restrictions.  Leading to a clinical presentation that is considered stable and/or uncomplicated                              Clinical Decision Making  (Eval Complexity):  Low- 06063     Time Tracking:     PT Received On: 02/23/18  PT Start Time: 0914     PT Stop Time: 0928  PT Total Time (min): 14 min with OT    Billable Minutes: Evaluation 14      Antonia Zaumdio, PT  02/23/2018

## 2018-02-24 VITALS
HEART RATE: 80 BPM | HEIGHT: 72 IN | RESPIRATION RATE: 20 BRPM | BODY MASS INDEX: 25.68 KG/M2 | WEIGHT: 189.63 LBS | TEMPERATURE: 98 F | SYSTOLIC BLOOD PRESSURE: 176 MMHG | DIASTOLIC BLOOD PRESSURE: 81 MMHG | OXYGEN SATURATION: 98 %

## 2018-02-24 LAB
ALBUMIN SERPL BCP-MCNC: 3.2 G/DL
ALP SERPL-CCNC: 115 U/L
ALT SERPL W/O P-5'-P-CCNC: 29 U/L
ANION GAP SERPL CALC-SCNC: 9 MMOL/L
AST SERPL-CCNC: 36 U/L
BASOPHILS # BLD AUTO: 0.02 K/UL
BASOPHILS NFR BLD: 0.3 %
BILIRUB SERPL-MCNC: 1.5 MG/DL
BUN SERPL-MCNC: 20 MG/DL
CALCIUM SERPL-MCNC: 9 MG/DL
CHLORIDE SERPL-SCNC: 112 MMOL/L
CO2 SERPL-SCNC: 21 MMOL/L
CREAT SERPL-MCNC: 2.3 MG/DL
DIFFERENTIAL METHOD: ABNORMAL
EOSINOPHIL # BLD AUTO: 0.4 K/UL
EOSINOPHIL NFR BLD: 5.6 %
ERYTHROCYTE [DISTWIDTH] IN BLOOD BY AUTOMATED COUNT: 15.2 %
EST. GFR  (AFRICAN AMERICAN): 34 ML/MIN/1.73 M^2
EST. GFR  (NON AFRICAN AMERICAN): 30 ML/MIN/1.73 M^2
GLUCOSE SERPL-MCNC: 80 MG/DL
HCT VFR BLD AUTO: 28.3 %
HGB BLD-MCNC: 9.2 G/DL
LYMPHOCYTES # BLD AUTO: 1.4 K/UL
LYMPHOCYTES NFR BLD: 17.8 %
MAGNESIUM SERPL-MCNC: 1.7 MG/DL
MCH RBC QN AUTO: 26.8 PG
MCHC RBC AUTO-ENTMCNC: 32.5 G/DL
MCV RBC AUTO: 83 FL
MONOCYTES # BLD AUTO: 0.5 K/UL
MONOCYTES NFR BLD: 6.4 %
NEUTROPHILS # BLD AUTO: 5.4 K/UL
NEUTROPHILS NFR BLD: 69.8 %
PHOSPHATE SERPL-MCNC: 3.1 MG/DL
PLATELET # BLD AUTO: 137 K/UL
PMV BLD AUTO: 10.6 FL
POCT GLUCOSE: 95 MG/DL (ref 70–110)
POTASSIUM SERPL-SCNC: 3.6 MMOL/L
PROT SERPL-MCNC: 6.7 G/DL
RBC # BLD AUTO: 3.43 M/UL
SODIUM SERPL-SCNC: 142 MMOL/L
WBC # BLD AUTO: 7.7 K/UL

## 2018-02-24 PROCEDURE — 36415 COLL VENOUS BLD VENIPUNCTURE: CPT

## 2018-02-24 PROCEDURE — 85025 COMPLETE CBC W/AUTO DIFF WBC: CPT

## 2018-02-24 PROCEDURE — 25000003 PHARM REV CODE 250: Performed by: STUDENT IN AN ORGANIZED HEALTH CARE EDUCATION/TRAINING PROGRAM

## 2018-02-24 PROCEDURE — 84100 ASSAY OF PHOSPHORUS: CPT

## 2018-02-24 PROCEDURE — 80053 COMPREHEN METABOLIC PANEL: CPT

## 2018-02-24 PROCEDURE — 63600175 PHARM REV CODE 636 W HCPCS: Performed by: STUDENT IN AN ORGANIZED HEALTH CARE EDUCATION/TRAINING PROGRAM

## 2018-02-24 PROCEDURE — 83735 ASSAY OF MAGNESIUM: CPT

## 2018-02-24 RX ORDER — HYDRALAZINE HYDROCHLORIDE 25 MG/1
25 TABLET, FILM COATED ORAL EVERY 8 HOURS
Qty: 90 TABLET | Refills: 3 | Status: SHIPPED | OUTPATIENT
Start: 2018-02-24 | End: 2018-03-02 | Stop reason: SDUPTHER

## 2018-02-24 RX ORDER — HYDRALAZINE HYDROCHLORIDE 25 MG/1
25 TABLET, FILM COATED ORAL EVERY 8 HOURS
Status: DISCONTINUED | OUTPATIENT
Start: 2018-02-24 | End: 2018-02-24 | Stop reason: HOSPADM

## 2018-02-24 RX ADMIN — HYDRALAZINE HYDROCHLORIDE 25 MG: 25 TABLET, FILM COATED ORAL at 06:02

## 2018-02-24 RX ADMIN — CARVEDILOL 25 MG: 25 TABLET, FILM COATED ORAL at 04:02

## 2018-02-24 RX ADMIN — HEPARIN SODIUM 5000 UNITS: 5000 INJECTION, SOLUTION INTRAVENOUS; SUBCUTANEOUS at 06:02

## 2018-02-24 NOTE — PLAN OF CARE
PRBC infusion complete. Patient tolerated well. Patient denies any pain at this time. Will continue to monitor

## 2018-02-24 NOTE — PROGRESS NOTES
[facility] Internal Medicine Resident  Progress Note    Subjective:      Marie Reis Jr. is a 62 y.o. male who is being followed by the [facility] IM service at [facility] for sepsis.     RBC transfusion delayed yesterday due to elevated BP; PRN hydralazine added with improvement. Otherwise no issues overnight; has started having BM.  Patient ready to go.  Received 2nd unit of blood around midnight.  Discharge today with script for hydralazine and 5 day course of moxi (bedside delivery).        Objective:   Last 24 Hour Vital Signs:  BP  Min: 123/99  Max: 192/85  Temp  Av.1 °F (36.7 °C)  Min: 97.7 °F (36.5 °C)  Max: 98.8 °F (37.1 °C)  Pulse  Av.3  Min: 76  Max: 89  Resp  Av.6  Min: 16  Max: 20  SpO2  Av.3 %  Min: 95 %  Max: 98 %  Height  Av' (182.9 cm)  Min: 6' (182.9 cm)  Max: 6' (182.9 cm)  Weight  Av kg (189 lb 9.5 oz)  Min: 86 kg (189 lb 9.5 oz)  Max: 86 kg (189 lb 9.5 oz)  I/O last 3 completed shifts:  In: 558 [P.O.:360; Blood:198]  Out: 950 [Urine:950]    Physical Examination:  General: NAD, resting in bed  HEENT: NC/AT, EOMI, PERRL, MMM,   Neck: Supple, symmetric, JVP 10cm  Resp: normal effort, lungs CTA b/l  CV: bradycardic with regular rhythm, grade 1/6 systolic murmur at RUSB, 2+ b/l radial and 1+ b/l DP pulses  Abdomen: soft, round/, nontender, nondistended, normoactive bowel sounds  Extremities: no cyanosis or clubbing, 3+ edema of b/l LE to above knees, TTP in areas of edema  Skin: dry, warm, non-diaphoretic, no lesions or rashes  Neuro: AOx4, no focal deficits       Laboratory:  Laboratory Data Reviewed: yes  Pertinent Findings:  Trended Lab Data:     Recent Labs  Lab 18  0607 18  0942 18  0424 18  0425 18  0618 18  0330   WBC  --  7.30  --  6.47  --  7.70   HGB  --  7.9*  --  7.3*  --  9.2*   HCT  --  23.7*  --  22.0*  --  28.3*   PLT  --  131*  --  113*  --  137*   MCV  --  83  --  83  --  83   RDW  --  14.8*  --  14.5  --  15.2*      --  142  --   --  142   K 3.6  --  3.6  --   --  3.6   *  --  113*  --   --  112*   CO2 22*  --  23  --   --  21*   BUN 21  --  21  --   --  20   CREATININE 2.2*  --  2.3*  --   --  2.3*   *  --  73  --  52* 80   CALCIUM 8.3*  --  8.4*  --   --  9.0   MG 1.6  --  1.7  --   --  1.7   PHOS 3.7  --  2.6*  --   --  3.1   PROT 5.8*  --  5.9*  --   --  6.7   ALBUMIN 2.7*  --  2.7*  --   --  3.2*   BILITOT 0.7  --  0.8  --   --  1.5*   AST 22  --  19  --   --  36   ALKPHOS 112  --  101  --   --  115   ALT 29  --  25  --   --  29      Trended Cardiac Data:     Recent Labs  Lab 02/21/18  0841 02/21/18  1149 02/21/18  1751 02/22/18  0014   TROPONINI 0.009 0.011 0.010 0.016   BNP 1,120*  --   --   --           Microbiology Data Reviewed: yes  Pertinent Findings:  Blood Cx (2/21, x2)- NGTD  Urine Cx (2/21)- NGTD    Other Results:  Radiology Data Reviewed: yes  Pertinent Findings:  MRI Brain (2/21)- Age-appropriate generalized volume loss with small bilateral basal ganglia and right thalamic remote lacunar-type infarcts corresponding to hypodensity seen on CT. No evidence for acute infarction. Otherwise unremarkable noncontrast     LE US (2/21)- No evidence of acute DVT in the bilateral lower extremity     Current Medications:     Infusions:       Scheduled:   amLODIPine  10 mg Oral Daily    atorvastatin  80 mg Oral Daily    carvedilol  25 mg Oral BID    clopidogrel  75 mg Oral Daily    heparin (porcine)  5,000 Units Subcutaneous Q8H    hydrALAZINE  25 mg Oral Q8H    insulin detemir U-100  28 Units Subcutaneous QHS    lisinopril  40 mg Oral Daily    potassium chloride  10 mEq Intravenous Once        PRN:  sodium chloride, dextrose 50%, dextrose 50%, glucagon (human recombinant), glucose, glucose, insulin aspart U-100, sodium chloride 0.9%    Antibiotics and Day Number of Therapy:  Vanc, Zosyn, Flagyl- 2/21-2/23  Moxi 2/23-discharge    Lines and Day Number of Therapy:  PIV x2    Assessment:      Marie Reis Jr. is a 62 y.o.male with  Patient Active Problem List    Diagnosis Date Noted    Altered mental status 02/22/2018    Prolonged Q-T interval on ECG 02/22/2018    Symptomatic sinus bradycardia 02/21/2018    Acute encephalopathy 02/21/2018    Hypothermia 02/21/2018    Sepsis 02/21/2018    Multiple myeloma not having achieved remission 01/16/2018    Uncontrolled secondary diabetes mellitus with stage 4 CKD (GFR 15-29) 01/11/2018    Hypertension associated with diabetes 01/11/2018    Persistent proteinuria 01/11/2018    Chronic diastolic heart failure 01/11/2018    Angina, class II 12/27/2017    Acute on chronic diastolic heart failure 11/13/2017    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, with long-term current use of insulin 09/26/2017    Hypertensive retinopathy of both eyes 09/26/2017    Skew deviation 09/22/2016    Hypertropia of left eye 09/22/2016    Binocular vision disorder with diplopia 09/22/2016    Secondary hyperparathyroidism 05/26/2016    Cervical spondylosis 10/01/2015    Thoracic spondylosis 10/01/2015    Lumbar spondylosis 10/01/2015    Spondylolisthesis of lumbar region 10/01/2015    Radiculitis 10/01/2015    Bursitis/tendonitis, shoulder 10/01/2015    Bursitis of hip 10/01/2015    Uncontrolled diabetes mellitus with polyneuropathy 09/28/2015    Dyslipidemia 06/26/2015    Poorly controlled diabetes mellitus 06/26/2015    History of stroke 03/23/2015    Essential hypertension 01/12/2014    Hearing impairment 10/25/2013        Plan:        1) Sepsis with unknown source  - pt found with with slurred speech, AMS, acute on chronic unsteady gait, diaphoretic, and cold on floor this morning; brought in by EMS with bradycardia and widened QRS, treated empirically for hyperkalemia. Found to be hypothermic with initial rectal temp of 87.7F, and initial K 3.2. Suspect sepsis.  - Immunocompromised  in setting of smoldering Multiple myeloma; will consult Heme/Onc  - Reports some diarrhea for past 2-3 days with 2-3 loose stools per day, some nausea and decreased PO intake but denies fevers. Increased SOB for 2-3 days.   -WBC 8.8, Hg 8.8, Plt 95, Lactate 1.4, UA with trace glucose, 2+ protein, 2+ blood, 4 RBC, 1 WBC, Troponin 0.009, BNP 1120, VBG 7.3/48/45/23.8. Urine and blood tox screen negative.  -blood and urine cultures- NGTD. C.diff studies unable to be completed as pt no longer with loose stools. Will stop contact precautions  -CXR with no acute findings; Head CT with no acute bleed, remote lacunar infarcts. MRI - no acute findings  -started Vanc, Zosyn, and flagyl for broad empiric coverage for suspected sepsis and c.diff coverage. Will de-escalate tomorrow once BCx and UCx are NGx48h; discharge home with course of Moxifloxicin for 5 days  -stepped down to floor 2.22 as hypoglycemia and hypothermia having resolved     2) Acute Encephalopathy - resolved  - found stretched on floor with shirt off and slurred speech, too weak to get up, acute on chronic unsteady gait. EMS called. Pt with improved speech and strength in ED but still slow to respond and subjectively weak feeling  -blood and tox screen negative, ammonia WNL. Will get TSH and cortisol levels  - h/o 4 previous CVAs with symptoms of slurred speech and unsteady gait. MRI with no acute findings  - suspect hypoglycemia and hypothermia are main contributors  - Neuro consulted-no eeg recommended, suspect infection, continue to monitor cuoltures, consider LP in pt wit MM and hypothermia  -consulted PT/OT and speech therapy- appreciate recs, no home needs     3) Bradycardia- resolved, Prolonged QT  - EMS with run strip showing HR of 49 with widened QRS. HR in 50's on arrival, and pt empirically treated for hyperkalemia. EKG in ED with sinus bradycardia, LVH, and prolonged QTc of 523. Initial K 3.2. Initial Troponin 0.009  - likely 2/2 hypothermia and  hypokalemia   - repleted glucose and K, and hypothermia resolved  - trended troponins and EKGs q6h - no acute changes  - cardiology consulted-at this time, nothing cardiac to offer  - holding home beta-blocker, but as BP increasing and bradycardia resolved, will restart home coreg 25mg BID     4) Hypothermia- resolved  - initial rectal temp 87.7F. Repeat bladder temp 88F  - placed on bearhugger and rewarmed  - likely 2/2 sepsis vs hypoglycemia  - TSH and random cortisol levels WNL. Blood and urine cultures NGTD  - resolved and pt able to maintain normal body temp at room temp environment. Will step down to floor 2.22     5) HTN   -/77 initial   - home meds include norvasc 10mg, coreg 25mg BID, lasix 60mg qam and 40mg qpm, lisinopril 40mg  - holding beta blocker in setting of bradycardia, but continue remaining home meds  - hydralazine 10mg IV q6h prn SBP >180  - avoiding hypotension  - will add back coreg 25mg BID today and monitor closely  -hydralazine increased to 25mg PO TID with improvement in BPs; will discharge with script for same      6) HLD   - continue home statin  - will repeat lipid panel if no recent studies     7) HFpEF  - b/l LE edema and pt c/o b/l leg pain per wife. BNP 1120 on admit  - h/o grade 2 diastolic dysfunction; follows Dr. Segundo for cardiology  - will continue home meds but hold beta blocker, as above  - cardiology consulted- concerned for volume overload, diruese prn, get TSH, monitor bradycardia and rewarm     8) Hypoglycemia- resolved, DM type 2  - last A1c 7.6 on 12/15/17. Home meds include Lantus 28u qhs (last dose the night before admission, and pt reports eating less yesterday) and Novology 8u+ SSI before meals  - pt rports POC glucose was in 60's per EMS. POC glucose on arrival was 112- given insulin regular 10u with D50 for hyperkalemia empiric tx  - Repeat BMP with glucose of 70.  - started IV fluids with D5+1/2NS+40mEq KCl and prn D50 with q2h accuchecks for close  monitoring. POC glucose trended in 60-70's yesterday and required one dose of D50. But early this morning, pt maintaining normal glucose levels. Will stop IVF and start diet.   - will space accuchecks to q4h and as glucose rises, will add back pt's home insulin regimen     9) CKD4 with chronic proteinuria  - likely multifactoral with multiple myeloma, DM, HTN. Follows with Dr. Dotson for Nephrology  - Cr at baseline of 2.2  - renally dose all medications and avoid nephrotoxic meds     10) IgG Multiple Myeloma  - dx'd 1 year ago but recently found to be worsening. Follows with Dr. Guaman for heme/onc  - recently was suppose to start Revlimid/Dexamethasone, but was told to wait until they get another medication approved, per pt's wife and daughter   - consulted Heme/Onc, Dr. Chiang - do not start MM tx until pt completely recovers from ongoing acute illness, pt to contact Dr. Guaman's office after hospital discharge prior to starting the Revlimid; recommend transfusing regular pRBC to keep Hg close to 9g/dL- given fact that pt will be starting tx for his MM in the near future     11) Anemia 2/2 chronic infla+mmation and poor production in setting of multiple myeloma  - required a blood transfusion last month  - Hg 8.8 on admit  - Iron studies on 1/16/18- iron 34, TIBC 203, Transferrin 137, ferrition 406  - continue to monitor; 2 units ordered 2.23, patient consented  - Hg 9.2 s/p transfusion  - Heme/Onc consulted- recommend transfusing regular pRBC to keep Hg close to 9g/dL       Diet: Cardiac  PPx: Hep SQ  Code: Full     Dispo: Home today    Milla Ivy Horowitz  Internal Medicine/Emergency Medicine, PGY-1    [facility] Internal Medicine Service Team B    [facility] Medicine Hospitalist Pager numbers:   [facility] Hospitalist Medicine Team A (Anson/Nadia): [phone]  [facility] Hospitalist Medicine Team B (Shashank/Aniket):  [phone]

## 2018-02-24 NOTE — PLAN OF CARE
Problem: Patient Care Overview  Goal: Plan of Care Review  Pt on RA with sats of 98%. No respiratory distress noted. Will cont to monitor.

## 2018-02-24 NOTE — PLAN OF CARE
Problem: Patient Care Overview  Goal: Plan of Care Review  Outcome: Outcome(s) achieved Date Met: 02/24/18 02/24/18 0803   Coping/Psychosocial   Plan Of Care Reviewed With patient;spouse   Care plan reviewed with patient, JONNOx4, spouse at bedside, no respiratory distress noted, on room air. NSR 80 per cardiac monitor, no red alarm noted, telemetry discontinued. Denies any pain of discomfort, discharge instructions provided to patient including, education on medication effect and side effect, follow up appointments, voices understanding. Peripheral line discontinued, tip intact, pressure applied. Safety measures maintained, call light within his reach, no apparent distress noted, bed in low position, bed alarm on, educated on the importance of calling as needed, voices understanding, stable at this time.

## 2018-02-24 NOTE — PLAN OF CARE
Spoke to Dr. Horowitz, ordered to give scheduled blood pressure medications earlier and start the second unit of RBC. Report to incoming nurse provided, patient stable at this time.

## 2018-02-24 NOTE — NURSING
Dr. Encarnacion team paged, patient /86, HR 84, pending second unit of RBC. POCT rechecked after dinner, 107, continue to monitor.

## 2018-02-26 ENCOUNTER — PATIENT OUTREACH (OUTPATIENT)
Dept: ADMINISTRATIVE | Facility: CLINIC | Age: 63
End: 2018-02-26

## 2018-02-26 ENCOUNTER — OFFICE VISIT (OUTPATIENT)
Dept: HEMATOLOGY/ONCOLOGY | Facility: CLINIC | Age: 63
End: 2018-02-26
Payer: MEDICARE

## 2018-02-26 VITALS
BODY MASS INDEX: 26.78 KG/M2 | DIASTOLIC BLOOD PRESSURE: 75 MMHG | SYSTOLIC BLOOD PRESSURE: 126 MMHG | WEIGHT: 197.75 LBS | HEART RATE: 80 BPM | HEIGHT: 72 IN

## 2018-02-26 DIAGNOSIS — Z86.73 HISTORY OF STROKE: ICD-10-CM

## 2018-02-26 DIAGNOSIS — I50.32 CHRONIC DIASTOLIC HEART FAILURE: ICD-10-CM

## 2018-02-26 DIAGNOSIS — C90.00 MULTIPLE MYELOMA NOT HAVING ACHIEVED REMISSION: Primary | ICD-10-CM

## 2018-02-26 PROBLEM — A41.9 SEPSIS: Status: RESOLVED | Noted: 2018-02-21 | Resolved: 2018-02-26

## 2018-02-26 PROBLEM — R41.82 ALTERED MENTAL STATUS: Status: RESOLVED | Noted: 2018-02-22 | Resolved: 2018-02-26

## 2018-02-26 PROBLEM — G93.40 ACUTE ENCEPHALOPATHY: Status: RESOLVED | Noted: 2018-02-21 | Resolved: 2018-02-26

## 2018-02-26 LAB
BACTERIA BLD CULT: NORMAL
BACTERIA BLD CULT: NORMAL

## 2018-02-26 PROCEDURE — 99213 OFFICE O/P EST LOW 20 MIN: CPT | Mod: PBBFAC | Performed by: INTERNAL MEDICINE

## 2018-02-26 PROCEDURE — 99999 PR PBB SHADOW E&M-EST. PATIENT-LVL III: CPT | Mod: PBBFAC,,, | Performed by: INTERNAL MEDICINE

## 2018-02-26 PROCEDURE — 99215 OFFICE O/P EST HI 40 MIN: CPT | Mod: S$PBB,,, | Performed by: INTERNAL MEDICINE

## 2018-02-26 NOTE — PROGRESS NOTES
Mr. Reis is a 62-year-old man who was evaluated in May 2016  because of  a  monoclonal protein.  He had an IgG kappa measuring 0.86 g/dL.  He has had renal   impairment since at least 2014.  In 2016, a 24-hour urine contained 947 mg of   protein, 3% of which was kappa light chains.  Bone marrow examination had 15%   plasma cells.  The cytogenetic study was normal.  A FISH panel disclosed a   plasma cell clone with trisomy 1, 7, 9 and 15.  A PET scan to assess his bones   in January 2017 showed no bone lesions.    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  In November 2017, his proteinuria was substantially greater   with 4500 mg of protein, 4.6% of which was kappa light chains and 2.8% of which   was IgG kappa.  His anemia became worse and he was transfused in January and   transfused again several days ago.  There was a gradual increase in the serum   creatinine over the last 2 to 3 years.  There has also been modest increase in   the amount of paraprotein, which most recently measured 1.23 g/dL with   electrophoresis, compared to a value of 0.82 in May 2016.  Also, the   kappa/lambda light chain level has been increasing with a value of 44 in January 2018, compared to a value of 19 in May 2016.    He has vast array of other serious medical problems.  He had a stroke, which led   to persistent weakness of his left leg.  He can walk short distances with a   cane.  He has severe diabetes mellitus with retinal disease, renal disease and   severe symptomatic peripheral neuropathy.  He also has cardiac disease with   diastolic dysfunction, pulmonary hypertension and mild-to-moderate mitral   regurgitation.    At the time of his last visit 01/16/2018, I recommended that he start therapy   with Revlimid 10 mg daily for 21 days followed by one week off, along with   dexamethasone 12 mg once a week.  I had a long discussion with the patient and   his wife about these  drugs, particularly their potential side effects and the   importance of careful monitoring of his blood sugar and blood counts.    Unfortunately, he has not obtained the Revlimid.  Several phone calls to his   home from my office have gone unanswered (the patient indicated that   at times he does not  the phone or return phone messages).  His   wife seems far more motivated to help him with therapy than the patient   himself.  After a number of phone calls, it appears that his wife only needs to   call one number to have the Revlimid sent to him.  I will assume that he is   going to start the drug this week, along with dexamethasone 12 mg once weekly.    He was hospitalized at the [facility] in [place], [place] between   February 21st - 24th, 2018.  He was found lying in a room in his house with   bradycardia and very altered mentation.  He may have had hypoglycemia.  He was   suspected to have sepsis and treated with antibiotics.  No bacteria were   cultured.  He was also transfused with red blood cells when his hemoglobin   declined to a juliet of 7.3.    He is now feeling better.  He spends much of his time resting at home, but says   he plans to take walks outside of his house.  He is not having fevers or sweats.    ADDITIONAL PAST HISTORY, SYSTEM REVIEW, SOCIAL HISTORY AND FAMILY HISTORY:  Have   been reviewed and updated in the electronic record.    PHYSICAL EXAMINATION:  GENERAL APPEARANCE:  Well-developed, well-nourished man in no distress.  EYES:  Some conjunctival pallor.  No jaundice.  NOSE:  Clear nares.  SINUSES:  No tenderness.  MOUTH AND THROAT:  No mucosal lesions.  Adequate dentition.  EARS:  Clear canals and membranes.  LYMPH NODES:  No enlarged cervical, axillary or inguinal nodes.  CHEST AND LUNGS:  Normal respiratory effort.  Clear to auscultation and   percussion.  HEART:  Regular rate and rhythm.  No gallop.  I/VI systolic murmur heard at the   base.  ABDOMEN:  Soft without  "masses or tenderness.  No hepatosplenomegaly.  EXTREMITIES:  1+ pretibial and ankle edema bilaterally.  PERIPHERAL VASCULATURE:  Satisfactory pulses in the feet and ankles.  NEUROLOGIC:  Memory is poor.  He is oriented.  Weakness in the right leg.  SKIN:  No suspicious lesions in the areas examined.    IMPRESSION:  1.  Plasma cell myeloma, which now appears to be "symptomatic." This is   manifested by worsening anemia.  As I have noted above, there is also a   progression of the protein abnormalities that reflect the activity of myeloma.  2.  Diabetes mellitus with neuropathy, and nephropathy, retinal disease, etc.  3.  Cardiac dysfunction, probably coronary artery disease, with chronic heart   failure.  4.  Pulmonary hypertension.  5.  Prior stroke.    PLAN:  1.  Weekly CBC and CMP at [facility] in [place], [place].  2.  In four weeks, he will also have a serum protein electrophoresis, free light   chain analysis and quantitative IgG.  3.  Return visit.  ([facility] appointment) in six weeks with CBC and CMP.    I again spent much of Mr. Reis's 40-minute visit today trying to explain to him   and his wife the importance of careful monitoring of his blood counts and blood   chemistries while we are attempting to treat his myeloma.  If he cannot tolerate   Revlimid and dexamethasone, I will consider the use of Mephalan-Prednisone,   another regimen that is sometimes tolerated in patients who have numerous   comorbidities and dysfunction of multiple organs.      CLARISSE  dd: 02/26/2018 15:36:03 (CST)  td: 02/27/2018 09:45:15 (CST)  Doc ID   #[id number]  Job ID #[id number]    CC:       "

## 2018-02-26 NOTE — PATIENT INSTRUCTIONS
Bradycardia    When your heart rate is slow, less than 60 beats per minute, it is called bradycardia. Bradycardia can be normal, caused by medicines, or a sign of a disease. The slow heart rate may not be constant; it can come and go. It is a concern when it is very low, or you have symptoms.  Signs and symptoms  The following are signs and symptoms of bradycardia:  · Heart rate less than 60 per minute  · Dizziness or feeling lightheaded  · Weakness  · Trouble breathing  · Fainting  · Sleepiness  · More trouble exercising than usual because of fatigue  · Confusion or trouble concentrating  Causes  There are many causes of bradycardia. Some can be related to your heart, but some may be related to other factors.  Non-heart-related causes:  · Advanced age  · Side effect of certain medicines (such as beta-blockers, calcium channel blockers, digitalis, antiarrhythmic medicines like amiodarone, clonidine, lithium)  · Medical conditions such as hypoglycemia (low blood sugar), hypothyroidism (low thyroid), electrolyte disorder,  hypothermia, sleep apnea  · Athletes, especially long-distance runners, may have a slow heart rate. This can be normal.  · Sleep apnea  · Brain injury such as stroke or bleeding inside the brain  Heart-related causes:  · Coronary artery disease (angina or prior heart attack, also known as acute myocardial infarction, or AMI)  · Heart valve disease  · Heart muscle disease (cardiomyopathy)  · Congestive heart failure  · Sick sinus syndrome, which is when your heart's natural pacemaker is no longer working properly  · Diseases that infiltrate the heart such as sarcoid  · Heart infections  Sometimes the cause for the arrhythmia cannot be found.  Bradycardia that causes symptoms is sometimes reversible, and can be treated with medicines. When more severe bradycardia persists, a pacemaker is generally recommended. When the bradycardia does not cause symptoms, your doctor may decide to evaluate it in his  or her office.  Home care  The following will help you care for yourself at home:  · Resume your usual activities when you are feeling back to normal.  · If you develop any of the symptoms below during exertion, then you should not exert yourself until evaluated further by your doctor.  · Work with your doctor on any needed lifestyle changes, such as changing your diet, stopping smoking if you are a smoker, and a planned exercise program.  Follow-up care  Follow up with your doctor, or as advised.  Call 911  Call 911 if any of the following occur:  · Chest pain  · Trouble breathing  · Slow heart rate with dizziness or lightheadedness  · Fainting or loss of consciousness  · Chest, shoulder, arm, neck, or back pain  · Slow heart rate (under 50 beats per minute) if associated with symptoms  When to seek medical advice  Get prompt medical attention if any of the following occur:  · Occasional weakness, dizziness, or lightheadedness  Date Last Reviewed: 4/25/2016  © 8420-2274 [employer]. [address] [place] [address]. All rights reserved. This information is not intended as a substitute for professional medical care. Always follow your healthcare professional's instructions.        Sepsis     To treat sepsis, antibiotics and fluids may by given through an intravenous (IV) line.     Sepsis happens when your body responds with widespread inflammation to a bad infection or bacteremia--the presence of bacteria in your bloodstream. Sepsis can be deadly. Blood pressure may drop and the lungs and kidneys may start to fail. Emergency care for sepsis is crucial.  Risk factors  Those most at risk for sepsis are:  · Infants or older adults  · People who have an illness that weakens their immune system, such as cancer, AIDS, or diabetes  · People being treated with chemotherapy medicines or radiation, which weakens the immune system  · People who have had a transplant  · People with a very severe infection  such as pneumonia, meningitis, or a urinary tract infection  When to go to the emergency department (ED)  Sepsis is an emergency. Go to the nearest ED if you have a fever with any of these symptoms:  · Chills and shaking  · Rapid heartbeat and breathing  · Trouble breathing  · Severe nausea or uncontrolled vomiting  · Confusion, disorientation, drowsiness, or dizziness  · Decreased urination  · Severe pain, including in the back or joints   What to expect in the ED  · Blood and urine tests are done to look for bacteria. They also check for organ failure.  · Blood, urine, or sputum cultures may be taken. The samples are sent to a lab. They are placed in a special container. Any bacteria should grow in 24 hours.  · X-rays or other imaging tests may be done.  A person with sepsis will be admitted to the hospital and treated with antibiotics. Treatment may also include oxygen and intravenous fluids.  Date Last Reviewed: 10/1/2016  © 1376-8635 [employer]. [address] [place] [address], [place] [address]. All rights reserved. This information is not intended as a substitute for professional medical care. Always follow your healthcare professional's instructions.

## 2018-02-27 ENCOUNTER — TELEPHONE (OUTPATIENT)
Dept: ENDOCRINOLOGY | Facility: CLINIC | Age: 63
End: 2018-02-27

## 2018-02-27 NOTE — TELEPHONE ENCOUNTER
----- Message from BELEM Leslie, NAVI sent at 2/27/2018 11:07 AM CST -----  Please have pt call back his past 3-4 days of readings.  If he is having hypoglycemia.  Cut back long acting from 28 units to 22 units.  If not eating hold meal insulin, if not eating much cut back from 8 to 4 units.  Get info from pt on how much insulin he is on for both insulins.  He possibly had a hypoglycemic event not too long ago.  He missed his last appt with me.  Thanks  Gifty

## 2018-02-27 NOTE — TELEPHONE ENCOUNTER
Spoke with patient in regards to how is readings been, patient said he left the hospital on Saturday.     2/25   8 in the morning 126  12 noon 114  No units    2/26   9 in the morning 138 , took 2 units  12 noon 148, took 4 units  Dinner 128, took 2 units    2/27   7 in the morning 108, no units  3:30 163 6 units

## 2018-02-28 ENCOUNTER — LAB VISIT (OUTPATIENT)
Dept: LAB | Facility: HOSPITAL | Age: 63
End: 2018-02-28
Attending: INTERNAL MEDICINE
Payer: MEDICARE

## 2018-02-28 ENCOUNTER — TELEPHONE (OUTPATIENT)
Dept: ENDOCRINOLOGY | Facility: CLINIC | Age: 63
End: 2018-02-28

## 2018-02-28 DIAGNOSIS — R80.9 PROTEINURIA: ICD-10-CM

## 2018-02-28 DIAGNOSIS — D47.2 MONOCLONAL GAMMOPATHY: ICD-10-CM

## 2018-02-28 LAB
ALBUMIN SERPL BCP-MCNC: 3.1 G/DL
ALP SERPL-CCNC: 165 U/L
ALT SERPL W/O P-5'-P-CCNC: 135 U/L
ANION GAP SERPL CALC-SCNC: 7 MMOL/L
AST SERPL-CCNC: 115 U/L
BASOPHILS # BLD AUTO: 0.03 K/UL
BASOPHILS NFR BLD: 0.5 %
BILIRUB SERPL-MCNC: 0.5 MG/DL
BUN SERPL-MCNC: 27 MG/DL
CALCIUM SERPL-MCNC: 8.5 MG/DL
CHLORIDE SERPL-SCNC: 108 MMOL/L
CO2 SERPL-SCNC: 23 MMOL/L
CREAT SERPL-MCNC: 2.8 MG/DL
DIFFERENTIAL METHOD: ABNORMAL
EOSINOPHIL # BLD AUTO: 0.3 K/UL
EOSINOPHIL NFR BLD: 4.8 %
ERYTHROCYTE [DISTWIDTH] IN BLOOD BY AUTOMATED COUNT: 15.6 %
EST. GFR  (AFRICAN AMERICAN): 26.9 ML/MIN/1.73 M^2
EST. GFR  (NON AFRICAN AMERICAN): 23.3 ML/MIN/1.73 M^2
GLUCOSE SERPL-MCNC: 135 MG/DL
HCT VFR BLD AUTO: 25.6 %
HGB BLD-MCNC: 8.2 G/DL
IMM GRANULOCYTES # BLD AUTO: 0.01 K/UL
IMM GRANULOCYTES NFR BLD AUTO: 0.2 %
LYMPHOCYTES # BLD AUTO: 1 K/UL
LYMPHOCYTES NFR BLD: 18.7 %
MCH RBC QN AUTO: 26.6 PG
MCHC RBC AUTO-ENTMCNC: 32 G/DL
MCV RBC AUTO: 83 FL
MONOCYTES # BLD AUTO: 0.5 K/UL
MONOCYTES NFR BLD: 8.1 %
NEUTROPHILS # BLD AUTO: 3.8 K/UL
NEUTROPHILS NFR BLD: 67.7 %
NRBC BLD-RTO: 0 /100 WBC
PLATELET # BLD AUTO: 143 K/UL
PMV BLD AUTO: 11 FL
POTASSIUM SERPL-SCNC: 3.7 MMOL/L
PROT SERPL-MCNC: 6.9 G/DL
RBC # BLD AUTO: 3.08 M/UL
SODIUM SERPL-SCNC: 138 MMOL/L
WBC # BLD AUTO: 5.57 K/UL

## 2018-02-28 PROCEDURE — 85025 COMPLETE CBC W/AUTO DIFF WBC: CPT

## 2018-02-28 PROCEDURE — 80053 COMPREHEN METABOLIC PANEL: CPT

## 2018-02-28 PROCEDURE — 36415 COLL VENOUS BLD VENIPUNCTURE: CPT | Mod: PO

## 2018-02-28 NOTE — TELEPHONE ENCOUNTER
----- Message from BELEM Leslie, EMILIEP sent at 2/27/2018  4:34 PM CST -----  If he is only taking 2-4 units of meal insulin, change it on Med card.  Also, verify if he is on the long acting-if he is off take it off Med list and change dose that he is currently on.  Thanks  Gifty

## 2018-03-02 ENCOUNTER — LAB VISIT (OUTPATIENT)
Dept: LAB | Facility: HOSPITAL | Age: 63
End: 2018-03-02
Attending: INTERNAL MEDICINE
Payer: MEDICARE

## 2018-03-02 ENCOUNTER — OFFICE VISIT (OUTPATIENT)
Dept: INTERNAL MEDICINE | Facility: CLINIC | Age: 63
End: 2018-03-02
Payer: MEDICARE

## 2018-03-02 VITALS
HEIGHT: 72 IN | BODY MASS INDEX: 27.92 KG/M2 | HEART RATE: 64 BPM | WEIGHT: 206.13 LBS | SYSTOLIC BLOOD PRESSURE: 130 MMHG | DIASTOLIC BLOOD PRESSURE: 70 MMHG

## 2018-03-02 DIAGNOSIS — E11.3513 TYPE 2 DIABETES MELLITUS WITH BOTH EYES AFFECTED BY PROLIFERATIVE RETINOPATHY AND MACULAR EDEMA, WITH LONG-TERM CURRENT USE OF INSULIN: ICD-10-CM

## 2018-03-02 DIAGNOSIS — Z86.73 HISTORY OF STROKE: ICD-10-CM

## 2018-03-02 DIAGNOSIS — E87.6 DIURETIC-INDUCED HYPOKALEMIA: ICD-10-CM

## 2018-03-02 DIAGNOSIS — N18.30 BENIGN HYPERTENSION WITH CHRONIC KIDNEY DISEASE, STAGE III: ICD-10-CM

## 2018-03-02 DIAGNOSIS — Z79.4 TYPE 2 DIABETES MELLITUS WITH BOTH EYES AFFECTED BY PROLIFERATIVE RETINOPATHY AND MACULAR EDEMA, WITH LONG-TERM CURRENT USE OF INSULIN: ICD-10-CM

## 2018-03-02 DIAGNOSIS — E11.22 TYPE 2 DIABETES MELLITUS WITH STAGE 4 CHRONIC KIDNEY DISEASE, WITH LONG-TERM CURRENT USE OF INSULIN: ICD-10-CM

## 2018-03-02 DIAGNOSIS — C90.00 MULTIPLE MYELOMA NOT HAVING ACHIEVED REMISSION: ICD-10-CM

## 2018-03-02 DIAGNOSIS — I50.32 CHRONIC DIASTOLIC HEART FAILURE: ICD-10-CM

## 2018-03-02 DIAGNOSIS — R60.9 EDEMA, UNSPECIFIED TYPE: ICD-10-CM

## 2018-03-02 DIAGNOSIS — E78.5 DYSLIPIDEMIA: ICD-10-CM

## 2018-03-02 DIAGNOSIS — T50.2X5A DIURETIC-INDUCED HYPOKALEMIA: ICD-10-CM

## 2018-03-02 DIAGNOSIS — N18.4 TYPE 2 DIABETES MELLITUS WITH STAGE 4 CHRONIC KIDNEY DISEASE, WITH LONG-TERM CURRENT USE OF INSULIN: ICD-10-CM

## 2018-03-02 DIAGNOSIS — Z79.4 TYPE 2 DIABETES MELLITUS WITH STAGE 4 CHRONIC KIDNEY DISEASE, WITH LONG-TERM CURRENT USE OF INSULIN: ICD-10-CM

## 2018-03-02 DIAGNOSIS — I10 HYPERTENSION, ESSENTIAL: ICD-10-CM

## 2018-03-02 DIAGNOSIS — I12.9 BENIGN HYPERTENSION WITH CHRONIC KIDNEY DISEASE, STAGE III: ICD-10-CM

## 2018-03-02 DIAGNOSIS — E11.59 HYPERTENSION ASSOCIATED WITH DIABETES: Primary | ICD-10-CM

## 2018-03-02 DIAGNOSIS — I15.2 HYPERTENSION ASSOCIATED WITH DIABETES: Primary | ICD-10-CM

## 2018-03-02 DIAGNOSIS — E11.42 DIABETIC POLYNEUROPATHY ASSOCIATED WITH TYPE 2 DIABETES MELLITUS: ICD-10-CM

## 2018-03-02 PROBLEM — T68.XXXA HYPOTHERMIA: Status: RESOLVED | Noted: 2018-02-21 | Resolved: 2018-03-02

## 2018-03-02 PROBLEM — R00.1 SYMPTOMATIC SINUS BRADYCARDIA: Status: RESOLVED | Noted: 2018-02-21 | Resolved: 2018-03-02

## 2018-03-02 PROBLEM — R94.31 PROLONGED Q-T INTERVAL ON ECG: Status: RESOLVED | Noted: 2018-02-22 | Resolved: 2018-03-02

## 2018-03-02 LAB
ANION GAP SERPL CALC-SCNC: 9 MMOL/L
BUN SERPL-MCNC: 34 MG/DL
CALCIUM SERPL-MCNC: 8.7 MG/DL
CHLORIDE SERPL-SCNC: 110 MMOL/L
CO2 SERPL-SCNC: 20 MMOL/L
CREAT SERPL-MCNC: 2.7 MG/DL
EST. GFR  (AFRICAN AMERICAN): 28.1 ML/MIN/1.73 M^2
EST. GFR  (NON AFRICAN AMERICAN): 24.3 ML/MIN/1.73 M^2
GLUCOSE SERPL-MCNC: 80 MG/DL
POTASSIUM SERPL-SCNC: 4.2 MMOL/L
SODIUM SERPL-SCNC: 139 MMOL/L

## 2018-03-02 PROCEDURE — 80048 BASIC METABOLIC PNL TOTAL CA: CPT

## 2018-03-02 PROCEDURE — 99214 OFFICE O/P EST MOD 30 MIN: CPT | Mod: S$PBB,,, | Performed by: INTERNAL MEDICINE

## 2018-03-02 PROCEDURE — 99213 OFFICE O/P EST LOW 20 MIN: CPT | Mod: PBBFAC,PO | Performed by: INTERNAL MEDICINE

## 2018-03-02 PROCEDURE — 99496 TRANSJ CARE MGMT HIGH F2F 7D: CPT | Mod: PBBFAC,PO | Performed by: INTERNAL MEDICINE

## 2018-03-02 PROCEDURE — 36415 COLL VENOUS BLD VENIPUNCTURE: CPT | Mod: PO

## 2018-03-02 PROCEDURE — 99999 PR PBB SHADOW E&M-EST. PATIENT-LVL III: CPT | Mod: PBBFAC,,, | Performed by: INTERNAL MEDICINE

## 2018-03-02 RX ORDER — HYDRALAZINE HYDROCHLORIDE 25 MG/1
50 TABLET, FILM COATED ORAL EVERY 8 HOURS
Qty: 90 TABLET | Refills: 3
Start: 2018-03-02 | End: 2018-03-05

## 2018-03-02 NOTE — PATIENT INSTRUCTIONS
Hydralazine 25 mg  Take 2 tabs in am and 1 mid-day and 1 in pm  After 5 days, if blood pressure still >140/90, increase to 2 tabs in am 1 in mid-day and 2 in pm.  After 2 weeks if BP still >140/90, increase to 2 tabs 3x/day and let me know so I can adjust prescription.

## 2018-03-02 NOTE — PROGRESS NOTES
Subjective:       Patient ID: Marie Reis Jr. is a 62 y.o. male.    Chief Complaint: Hospital Follow Up    HPI 62-year-old male presents to clinic today for follow-up of recent hospitalization in late February where he presented after his wife found him down on the house.  His admission was for possible sepsis due to clinical presentation associate with hypothermia and hypoglycemia and hypokalemia.  He was treated with empiric and a biotics and fluids.  Also his electrolyte work reacted.  He is currently on Lasix 60 mg in the morning and 40 in the afternoon.  With diuresis in the hospital he improved and he states that he's gained approximately 8 pounds since his discharge.  The only medication that was added to his regimen was hydralazine 25 mg 3 times per days blood pressure remains high despite this addition.  He also continues to have edema.  He does not have a current follow-up with his cardiologist or nephrologist.  He is scheduled to see his hematologist regarding his current diagnosis of smoldering multiple myeloma.  His glycemic control is markedly improved with an A1c of 7.6 which is the best he has had in years.  He is bringing in blood sugar and blood pressure logs which is the first time ever seen and participates in his health this way.  This is a positive sign.  Review of Systems  otherwise negative  Objective:      Physical Exam  General: Well-appearing, well-nourished.  No distress  HEENT: conjunctivae are normal.  Pupils are equal and reative to light.  TM's are clear and intact bilaterally.  Hearing is grossly normal.  Nasopharynx is clear.  Oropharynx is clear.  Neck: Supple.  No thyroid megaly.  No bruits.  Lymph: No cervical or supraclavicular adenopathy.  Heart: Regular rate and rhythm, without murmur, rub or gallop.  Lungs: Clear to auscultation; respiratory effort normal.  Abdomen: Soft, nontender, nondistended.  Normoactive bowel sounds.  No hepatomegaly.  No masses.  Extremities: Good  distal pulses.   1+ edema.  Psych: Oriented to time person place.  Judgment and insight seem unimpaired.  Mood and affect are appropriate.  Assessment:       1. Hypertension associated with diabetes    2. History of stroke    3. Dyslipidemia    4. Multiple myeloma not having achieved remission    5. Chronic diastolic heart failure    6. Edema, unspecified type    7. Type 2 diabetes mellitus with stage 4 chronic kidney disease, with long-term current use of insulin    8. Type 2 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, with long-term current use of insulin    9. Diabetic polyneuropathy associated with type 2 diabetes mellitus    10. Diuretic-induced hypokalemia        Plan:       Marie was seen today for hospital follow up.    Diagnoses and all orders for this visit:    Hypertension associated with diabetes  -     hydrALAZINE (APRESOLINE) 25 MG tablet; Take 2 tablets (50 mg total) by mouth every 8 (eight) hours.  Written instructions given regarding titration of his hydralazine over the next 2-3 weeks to 50 mg up to 3 times per day if required for blood pressure greater than 140/90.  Also will schedule up follow-up with his cardiologist and nephrologist.  Glycemic control is much improved continue current regimen and follow up with endocrine  History of stroke    Dyslipidemia  Controlled.  Continue current medical regimen.  Prescription refills addressed.  Followup advised. See after visit summary.  Multiple myeloma not having achieved remission  Followed by Dr. Guaman in oncology  Chronic diastolic heart failure  -     hydrALAZINE (APRESOLINE) 25 MG tablet; Take 2 tablets (50 mg total) by mouth every 8 (eight) hours.  Set up cardiology follow-up  Edema, unspecified type  -     hydrALAZINE (APRESOLINE) 25 MG tablet; Take 2 tablets (50 mg total) by mouth every 8 (eight) hours.  Set up cardiology and nephrology follow-up discussed regular weight and will need to discuss  further Lasix instructions and get on the same page with specialist as well.  I went ahead and augmented his Lasix to 60 mg twice a day and we'll go ahead and check a BMP today to assess his potassium patient may require potassium replacement given his recent hospitalization  Type 2 diabetes mellitus with stage 4 chronic kidney disease, with long-term current use of insulin  Followed by endocrinology and nephrology  Type 2 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, with long-term current use of insulin  Followed by endocrinology and ophthalmology  Diabetic polyneuropathy associated with type 2 diabetes mellitus    Diuretic-induced hypokalemia  -     Basic metabolic panel; Future

## 2018-03-05 ENCOUNTER — HOSPITAL ENCOUNTER (OUTPATIENT)
Dept: RADIOLOGY | Facility: HOSPITAL | Age: 63
Discharge: HOME | End: 2018-03-05
Attending: INTERNAL MEDICINE
Payer: MEDICARE

## 2018-03-05 ENCOUNTER — OFFICE VISIT (OUTPATIENT)
Dept: INTERNAL MEDICINE | Facility: CLINIC | Age: 63
End: 2018-03-05
Payer: MEDICARE

## 2018-03-05 VITALS
BODY MASS INDEX: 28.07 KG/M2 | HEIGHT: 72 IN | DIASTOLIC BLOOD PRESSURE: 80 MMHG | WEIGHT: 207.25 LBS | SYSTOLIC BLOOD PRESSURE: 154 MMHG | OXYGEN SATURATION: 97 % | HEART RATE: 72 BPM

## 2018-03-05 DIAGNOSIS — Z79.4 TYPE 2 DIABETES MELLITUS WITH STAGE 4 CHRONIC KIDNEY DISEASE, WITH LONG-TERM CURRENT USE OF INSULIN: Primary | ICD-10-CM

## 2018-03-05 DIAGNOSIS — R60.9 EDEMA, UNSPECIFIED TYPE: ICD-10-CM

## 2018-03-05 DIAGNOSIS — R63.5 WEIGHT GAIN: ICD-10-CM

## 2018-03-05 DIAGNOSIS — E11.22 TYPE 2 DIABETES MELLITUS WITH STAGE 4 CHRONIC KIDNEY DISEASE, WITH LONG-TERM CURRENT USE OF INSULIN: Primary | ICD-10-CM

## 2018-03-05 DIAGNOSIS — I50.32 CHRONIC DIASTOLIC HEART FAILURE: ICD-10-CM

## 2018-03-05 DIAGNOSIS — R80.1 PERSISTENT PROTEINURIA: ICD-10-CM

## 2018-03-05 DIAGNOSIS — N18.4 TYPE 2 DIABETES MELLITUS WITH STAGE 4 CHRONIC KIDNEY DISEASE, WITH LONG-TERM CURRENT USE OF INSULIN: Primary | ICD-10-CM

## 2018-03-05 DIAGNOSIS — T50.905A ADVERSE EFFECT OF DRUG, INITIAL ENCOUNTER: ICD-10-CM

## 2018-03-05 DIAGNOSIS — I50.33 ACUTE ON CHRONIC DIASTOLIC HEART FAILURE: ICD-10-CM

## 2018-03-05 DIAGNOSIS — C90.00 MULTIPLE MYELOMA NOT HAVING ACHIEVED REMISSION: ICD-10-CM

## 2018-03-05 PROCEDURE — 71046 X-RAY EXAM CHEST 2 VIEWS: CPT | Mod: TC,FY

## 2018-03-05 PROCEDURE — 99213 OFFICE O/P EST LOW 20 MIN: CPT | Mod: PBBFAC,PO | Performed by: INTERNAL MEDICINE

## 2018-03-05 PROCEDURE — 99214 OFFICE O/P EST MOD 30 MIN: CPT | Mod: S$PBB,,, | Performed by: INTERNAL MEDICINE

## 2018-03-05 PROCEDURE — 99999 PR PBB SHADOW E&M-EST. PATIENT-LVL III: CPT | Mod: PBBFAC,,, | Performed by: INTERNAL MEDICINE

## 2018-03-05 PROCEDURE — 71046 X-RAY EXAM CHEST 2 VIEWS: CPT | Mod: 26,,, | Performed by: RADIOLOGY

## 2018-03-05 RX ORDER — MECLIZINE HYDROCHLORIDE 25 MG/1
25 TABLET ORAL 3 TIMES DAILY PRN
Qty: 270 TABLET | Refills: 0 | Status: SHIPPED | OUTPATIENT
Start: 2018-03-05 | End: 2018-03-09 | Stop reason: SDUPTHER

## 2018-03-05 RX ORDER — CARVEDILOL 25 MG/1
25 TABLET ORAL 2 TIMES DAILY WITH MEALS
Qty: 180 TABLET | Refills: 1 | Status: SHIPPED | OUTPATIENT
Start: 2018-03-05 | End: 2018-03-09 | Stop reason: SDUPTHER

## 2018-03-05 RX ORDER — FUROSEMIDE 40 MG/1
TABLET ORAL
Qty: 240 TABLET | Refills: 6 | Status: SHIPPED | OUTPATIENT
Start: 2018-03-05 | End: 2018-03-09 | Stop reason: SDUPTHER

## 2018-03-05 RX ORDER — METOLAZONE 2.5 MG/1
2.5 TABLET ORAL DAILY
Qty: 30 TABLET | Refills: 3 | Status: SHIPPED | OUTPATIENT
Start: 2018-03-05 | End: 2019-01-01 | Stop reason: SDUPTHER

## 2018-03-05 RX ORDER — FUROSEMIDE 20 MG/1
TABLET ORAL
Qty: 60 TABLET | Refills: 0 | Status: SHIPPED | OUTPATIENT
Start: 2018-03-05 | End: 2018-03-10

## 2018-03-05 RX ORDER — AMLODIPINE BESYLATE 10 MG/1
10 TABLET ORAL DAILY
Qty: 90 TABLET | Refills: 3 | Status: SHIPPED | OUTPATIENT
Start: 2018-03-05 | End: 2018-03-09 | Stop reason: SDUPTHER

## 2018-03-05 NOTE — PROGRESS NOTES
Subjective:       Patient ID: Marie Reis Jr. is a 62 y.o. male.    Chief Complaint: Swelling    HPI 62-year-old male presents to clinic today62-year-old male presents to clinic today for follow-up of recent hospitalization in late February where he presented after his wife found him down on the house.  His admission was for possible sepsis due to clinical presentation associate with hypothermia and hypoglycemia and hypokalemia.  He was treated with empiric and a biotics and fluids.  Also his electrolyte work reacted.  He is currently on Lasix 60 mg in the morning and 40 in the afternoon.  With diuresis in the hospital he improved and he states that he's gained approximately 8 pounds since his discharge.  The only medication that was added to his regimen was hydralazine 25 mg 3 times per days blood pressure remains high despite this addition.  He also continues to have edema.  He does not have a current follow-up with his cardiologist or nephrologist.  He is scheduled to see his hematologist regarding his current diagnosis of smoldering multiple myeloma.  His glycemic control is markedly improved with an A1c of 7.6 which is the best he has had in years.  He is bringing in blood sugar and blood pressure logs which is the first time ever seen and participates in his health this way.  This is a positive sign.    At my last visit I increased the hydralazine 25 mg 3 times per day that was started in the hospital to 50 mg 3 times per day.  He states he has not yet started to take this at a higher dose.  He reports increased swelling he's gained approximately 18 pounds since his hospital discharge.  He continues to take Lasix 60 mg twice a day.  He reports swelling really from head to toe he woke up this morning with his face and hands were swollen.  His legs are swollen up to his knees.  He does report a little shortness of breath.  Also reports some throat itching and tightness this started during his hospitalization the  only new medication is hydralazine.  At her last follow-up visit we scheduled his follow-ups with cardiology and nephrology their upcoming.  Review of Systems  otherwise negative  Objective:      Physical Exam  General: Well-appearing, well-nourished.  No distress  HEENT: conjunctivae are normal.  Pupils are equal and reative to light.  TM's are clear and intact bilaterally.  Hearing is grossly normal.  Nasopharynx is clear.  Oropharynx is clear.  Neck: Supple.  No thyroid megaly.  No bruits.  Lymph: No cervical or supraclavicular adenopathy.  Heart: Regular rate and rhythm, without murmur, rub or gallop.  Lungs: Clear to auscultation; respiratory effort normal.  Abdomen: Soft, nontender, nondistended.  Normoactive bowel sounds.  No hepatomegaly.  No masses.  Extremities: Good distal pulses.  2+ edema.  Psych: Oriented to time person place.  Judgment and insight seem unimpaired.  Mood and affect are appropriate.    Assessment:       1. Type 2 diabetes mellitus with stage 4 chronic kidney disease, with long-term current use of insulin    2. Acute on chronic diastolic heart failure    3. Chronic diastolic heart failure    4. Edema, unspecified type    5. Adverse effect of drug, initial encounter    6. Weight gain        Plan:       Diagnoses and all orders for this visit:    Type 2 diabetes mellitus with stage 4 chronic kidney disease, with long-term current use of insulin    Acute on chronic diastolic heart failure  -     metOLazone (ZAROXOLYN) 2.5 MG tablet; Take 1 tablet (2.5 mg total) by mouth once daily.  -     Brain natriuretic peptide; Future  -     X-Ray Chest PA And Lateral; Future  Plan to repeat BMP in 1 week at his follow-up to assess his potassium  Chronic diastolic heart failure  -     metOLazone (ZAROXOLYN) 2.5 MG tablet; Take 1 tablet (2.5 mg total) by mouth once daily.  -     Brain natriuretic peptide; Future    Edema, unspecified type  -     metOLazone (ZAROXOLYN) 2.5 MG tablet; Take 1 tablet (2.5 mg  total) by mouth once daily.  -     Brain natriuretic peptide; Future  -     X-Ray Chest PA And Lateral; Future    Adverse effect of drug, initial encounter  Discontinue hydralazine suspicious about a possible drug reaction given his throat itching and sensation of swelling this has been occurring over the last week and a half only new medication was hydralazine during his hospitalization.  Weight gain  -     X-Ray Chest PA And Lateral; Future

## 2018-03-09 DIAGNOSIS — I12.9 BENIGN HYPERTENSION WITH CHRONIC KIDNEY DISEASE, STAGE III: ICD-10-CM

## 2018-03-09 DIAGNOSIS — N18.30 BENIGN HYPERTENSION WITH CHRONIC KIDNEY DISEASE, STAGE III: ICD-10-CM

## 2018-03-09 DIAGNOSIS — I10 HYPERTENSION, ESSENTIAL: ICD-10-CM

## 2018-03-10 RX ORDER — AMLODIPINE BESYLATE 10 MG/1
10 TABLET ORAL DAILY
Qty: 90 TABLET | Refills: 3 | Status: SHIPPED | OUTPATIENT
Start: 2018-03-10 | End: 2018-03-14 | Stop reason: SDUPTHER

## 2018-03-10 RX ORDER — CARVEDILOL 25 MG/1
25 TABLET ORAL 2 TIMES DAILY WITH MEALS
Qty: 180 TABLET | Refills: 1 | Status: SHIPPED | OUTPATIENT
Start: 2018-03-10 | End: 2018-07-23 | Stop reason: SDUPTHER

## 2018-03-10 RX ORDER — FUROSEMIDE 40 MG/1
TABLET ORAL
Qty: 240 TABLET | Refills: 6 | Status: SHIPPED | OUTPATIENT
Start: 2018-03-10 | End: 2018-03-19 | Stop reason: SDUPTHER

## 2018-03-10 RX ORDER — MECLIZINE HYDROCHLORIDE 25 MG/1
25 TABLET ORAL 3 TIMES DAILY PRN
Qty: 270 TABLET | Refills: 0 | Status: ON HOLD | OUTPATIENT
Start: 2018-03-10 | End: 2020-01-01 | Stop reason: HOSPADM

## 2018-03-14 ENCOUNTER — OFFICE VISIT (OUTPATIENT)
Dept: INTERNAL MEDICINE | Facility: CLINIC | Age: 63
End: 2018-03-14
Payer: MEDICARE

## 2018-03-14 VITALS
BODY MASS INDEX: 26.82 KG/M2 | SYSTOLIC BLOOD PRESSURE: 136 MMHG | HEIGHT: 72 IN | DIASTOLIC BLOOD PRESSURE: 74 MMHG | HEART RATE: 84 BPM | WEIGHT: 198 LBS

## 2018-03-14 DIAGNOSIS — E11.22 TYPE 2 DIABETES MELLITUS WITH STAGE 4 CHRONIC KIDNEY DISEASE, WITH LONG-TERM CURRENT USE OF INSULIN: ICD-10-CM

## 2018-03-14 DIAGNOSIS — I12.9 BENIGN HYPERTENSION WITH CHRONIC KIDNEY DISEASE, STAGE III: ICD-10-CM

## 2018-03-14 DIAGNOSIS — I15.2 HYPERTENSION ASSOCIATED WITH DIABETES: ICD-10-CM

## 2018-03-14 DIAGNOSIS — N18.30 BENIGN HYPERTENSION WITH CHRONIC KIDNEY DISEASE, STAGE III: ICD-10-CM

## 2018-03-14 DIAGNOSIS — E11.59 HYPERTENSION ASSOCIATED WITH DIABETES: ICD-10-CM

## 2018-03-14 DIAGNOSIS — I50.33 ACUTE ON CHRONIC DIASTOLIC HEART FAILURE: Primary | ICD-10-CM

## 2018-03-14 DIAGNOSIS — R60.9 EDEMA, UNSPECIFIED TYPE: ICD-10-CM

## 2018-03-14 DIAGNOSIS — Z79.4 TYPE 2 DIABETES MELLITUS WITH STAGE 4 CHRONIC KIDNEY DISEASE, WITH LONG-TERM CURRENT USE OF INSULIN: ICD-10-CM

## 2018-03-14 DIAGNOSIS — Z79.4 TYPE 2 DIABETES MELLITUS WITH BOTH EYES AFFECTED BY PROLIFERATIVE RETINOPATHY AND MACULAR EDEMA, WITH LONG-TERM CURRENT USE OF INSULIN: ICD-10-CM

## 2018-03-14 DIAGNOSIS — E11.3513 TYPE 2 DIABETES MELLITUS WITH BOTH EYES AFFECTED BY PROLIFERATIVE RETINOPATHY AND MACULAR EDEMA, WITH LONG-TERM CURRENT USE OF INSULIN: ICD-10-CM

## 2018-03-14 DIAGNOSIS — N18.4 TYPE 2 DIABETES MELLITUS WITH STAGE 4 CHRONIC KIDNEY DISEASE, WITH LONG-TERM CURRENT USE OF INSULIN: ICD-10-CM

## 2018-03-14 DIAGNOSIS — I50.32 CHRONIC DIASTOLIC HEART FAILURE: ICD-10-CM

## 2018-03-14 PROCEDURE — 99999 PR PBB SHADOW E&M-EST. PATIENT-LVL III: CPT | Mod: PBBFAC,,, | Performed by: INTERNAL MEDICINE

## 2018-03-14 PROCEDURE — 99214 OFFICE O/P EST MOD 30 MIN: CPT | Mod: S$PBB,,, | Performed by: INTERNAL MEDICINE

## 2018-03-14 PROCEDURE — 99213 OFFICE O/P EST LOW 20 MIN: CPT | Mod: PBBFAC,PO | Performed by: INTERNAL MEDICINE

## 2018-03-14 RX ORDER — AMLODIPINE BESYLATE 10 MG/1
10 TABLET ORAL DAILY
Qty: 90 TABLET | Refills: 3 | Status: SHIPPED | OUTPATIENT
Start: 2018-03-14 | End: 2020-01-01 | Stop reason: SDUPTHER

## 2018-03-14 NOTE — PROGRESS NOTES
Subjective:       Patient ID: Marie Reis Jr. is a 62 y.o. male.    Chief Complaint: Follow-up    HPI 62-year-old male presents to clinic today please see last note for complete details. follow-up of recent hospitalization in late February where he presented after his wife found him down on the house.  His admission was for possible sepsis due to clinical presentation associate with hypothermia and hypoglycemia and hypokalemia.  He was treated with empiric and a biotics and fluids.  Also his electrolyte work reacted.  He is currently on Lasix 60 mg in the morning and 40 in the afternoon.  With diuresis in the hospital he improved and he states that he's gained approximately 8 pounds since his discharge.  The only medication that was added to his regimen was hydralazine 25 mg 3 times per days blood pressure remains high despite this addition.  He also continues to have edema.  He does not have a current follow-up with his cardiologist or nephrologist.  He is scheduled to see his hematologist regarding his current diagnosis of smoldering multiple myeloma.  His glycemic control is markedly improved with an A1c of 7.6 which is the best he has had in years.  He is bringing in blood sugar and blood pressure logs which is the first time ever seen and participates in his health this way.  This is a positive sign.     At my last visit I increased the hydralazine 25 mg 3 times per day that was started in the hospital to 50 mg 3 times per day.  He states he has not yet started to take this at a higher dose.  He reports increased swelling he's gained approximately 18 pounds since his hospital discharge.  He continues to take Lasix 60 mg twice a day.  He reports swelling really from head to toe he woke up this morning with his face and hands were swollen.  His legs are swollen up to his knees.  He does report a little shortness of breath.  Also reports some throat itching and tightness this started during his hospitalization the  only new medication is hydralazine.      Since our last week visit we discontinued hydralazine and added Zaroxolyn diuretic to his Lasix regimen, he is lost approximately 12 pounds and feels better regarding the swelling and shortness of breath.  He still has swelling but is able now to start wearing his compression stockings which she started today.  Additionally at his last visit his BNP was elevated at 1700 and chest x-ray was consistent with acute on chronic diastolic heart failure.  Patient has responded to the regimen and he has a follow-up with cardiology this Monday.  He is also still [facility] and [facility] for treatment for smoldering myeloma.  Review of Systems  otherwise negative  Objective:      Physical Exam  General: Well-appearing, well-nourished.  No distress  HEENT: conjunctivae are normal.  Pupils are equal and reative to light.  TM's are clear and intact bilaterally.  Hearing is grossly normal.  Nasopharynx is clear.  Oropharynx is clear.  Neck: Supple.  No thyroid megaly.  No bruits.  Lymph: No cervical or supraclavicular adenopathy.  Heart: Regular rate and rhythm, without murmur, rub or gallop.  Lungs: Clear to auscultation; respiratory effort normal.  Abdomen: Soft, nontender, nondistended.  Normoactive bowel sounds.  No hepatomegaly.  No masses.  Extremities: Good distal pulses.  1-2+ edema wearing compression stockings  Psych: Oriented to time person place.  Judgment and insight seem unimpaired.  Mood and affect are appropriate.  Assessment:       1. Acute on chronic diastolic heart failure    2. Chronic diastolic heart failure    3. Edema, unspecified type    4. Hypertension associated with diabetes    5. Type 2 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, with long-term current use of insulin    6. Type 2 diabetes mellitus with stage 4 chronic kidney disease, with long-term current use of insulin     7. Benign hypertension with chronic kidney disease, stage III        Plan:       Marie was seen today for follow-up.    Diagnoses and all orders for this visit:    Acute on chronic diastolic heart failure  -     Brain natriuretic peptide; Future  Clinically improving patient with weight loss and decreased edema.  Continue current regimen check BMP to assess potassium and BNP.  Patient has appropriate follow-up with myself and cardiology and nephrology.  Chronic diastolic heart failure  -     Brain natriuretic peptide; Future    Edema, unspecified type  -     Basic metabolic panel; Future    Hypertension associated with diabetes  -     Basic metabolic panel; Future  Clinically improved continue current regimen  Type 2 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, with long-term current use of insulin  Controlled.  Continue current medical regimen.  Prescription refills addressed.  Followup advised. See after visit summary.  Followed by ophthalmology  Type 2 diabetes mellitus with stage 4 chronic kidney disease, with long-term current use of insulin  Controlled.  Continue current medical regimen.  Prescription refills addressed.  Followup advised. See after visit summary.  Followed by nephrology  Benign hypertension with chronic kidney disease, stage III  -     amLODIPine (NORVASC) 10 MG tablet; Take 1 tablet (10 mg total) by mouth once daily.

## 2018-03-16 DIAGNOSIS — Z86.73 HISTORY OF CVA (CEREBROVASCULAR ACCIDENT): ICD-10-CM

## 2018-03-16 DIAGNOSIS — I63.9 CEREBROVASCULAR ACCIDENT (CVA), UNSPECIFIED MECHANISM: ICD-10-CM

## 2018-03-16 RX ORDER — CLOPIDOGREL BISULFATE 75 MG/1
75 TABLET ORAL DAILY
Qty: 90 TABLET | Refills: 3 | Status: SHIPPED | OUTPATIENT
Start: 2018-03-16 | End: 2019-04-11 | Stop reason: SDUPTHER

## 2018-03-16 RX ORDER — ATORVASTATIN CALCIUM 80 MG/1
80 TABLET, FILM COATED ORAL DAILY
Qty: 90 TABLET | Refills: 1 | Status: SHIPPED | OUTPATIENT
Start: 2018-03-16 | End: 2020-01-01 | Stop reason: SDUPTHER

## 2018-03-19 ENCOUNTER — OFFICE VISIT (OUTPATIENT)
Dept: CARDIOLOGY | Facility: CLINIC | Age: 63
End: 2018-03-19
Payer: MEDICARE

## 2018-03-19 VITALS
SYSTOLIC BLOOD PRESSURE: 166 MMHG | HEART RATE: 71 BPM | HEIGHT: 72 IN | BODY MASS INDEX: 25.3 KG/M2 | DIASTOLIC BLOOD PRESSURE: 76 MMHG | WEIGHT: 186.81 LBS

## 2018-03-19 DIAGNOSIS — E78.5 DYSLIPIDEMIA: ICD-10-CM

## 2018-03-19 DIAGNOSIS — C90.00 MULTIPLE MYELOMA NOT HAVING ACHIEVED REMISSION: ICD-10-CM

## 2018-03-19 DIAGNOSIS — I50.33 ACUTE ON CHRONIC DIASTOLIC HEART FAILURE: Primary | ICD-10-CM

## 2018-03-19 DIAGNOSIS — I25.84 CORONARY ARTERY DISEASE DUE TO CALCIFIED CORONARY LESION: ICD-10-CM

## 2018-03-19 DIAGNOSIS — E11.22 TYPE 2 DIABETES MELLITUS WITH STAGE 4 CHRONIC KIDNEY DISEASE, WITH LONG-TERM CURRENT USE OF INSULIN: ICD-10-CM

## 2018-03-19 DIAGNOSIS — I10 ESSENTIAL HYPERTENSION: ICD-10-CM

## 2018-03-19 DIAGNOSIS — I10 HYPERTENSION, ESSENTIAL: ICD-10-CM

## 2018-03-19 DIAGNOSIS — I25.10 CORONARY ARTERY DISEASE DUE TO CALCIFIED CORONARY LESION: ICD-10-CM

## 2018-03-19 DIAGNOSIS — N18.4 TYPE 2 DIABETES MELLITUS WITH STAGE 4 CHRONIC KIDNEY DISEASE, WITH LONG-TERM CURRENT USE OF INSULIN: ICD-10-CM

## 2018-03-19 DIAGNOSIS — Z86.73 HISTORY OF STROKE: ICD-10-CM

## 2018-03-19 DIAGNOSIS — Z79.4 TYPE 2 DIABETES MELLITUS WITH STAGE 4 CHRONIC KIDNEY DISEASE, WITH LONG-TERM CURRENT USE OF INSULIN: ICD-10-CM

## 2018-03-19 DIAGNOSIS — I65.23 BILATERAL CAROTID ARTERY STENOSIS: ICD-10-CM

## 2018-03-19 PROBLEM — I50.32 CHRONIC DIASTOLIC HEART FAILURE: Status: RESOLVED | Noted: 2018-01-11 | Resolved: 2018-03-19

## 2018-03-19 PROBLEM — I20.9 ANGINA, CLASS II: Status: RESOLVED | Noted: 2017-12-27 | Resolved: 2018-03-19

## 2018-03-19 PROCEDURE — 99999 PR PBB SHADOW E&M-EST. PATIENT-LVL III: CPT | Mod: PBBFAC,,, | Performed by: INTERNAL MEDICINE

## 2018-03-19 PROCEDURE — 99214 OFFICE O/P EST MOD 30 MIN: CPT | Mod: S$PBB,,, | Performed by: INTERNAL MEDICINE

## 2018-03-19 PROCEDURE — 99213 OFFICE O/P EST LOW 20 MIN: CPT | Mod: PBBFAC,PO | Performed by: INTERNAL MEDICINE

## 2018-03-19 RX ORDER — FUROSEMIDE 40 MG/1
TABLET ORAL
Qty: 270 TABLET | Refills: 3 | Status: SHIPPED | OUTPATIENT
Start: 2018-03-19 | End: 2019-01-01 | Stop reason: SDUPTHER

## 2018-03-19 RX ORDER — NITROGLYCERIN 0.4 MG/1
0.4 TABLET SUBLINGUAL EVERY 5 MIN PRN
Qty: 30 TABLET | Refills: 1 | Status: SHIPPED | OUTPATIENT
Start: 2018-03-19 | End: 2020-01-01 | Stop reason: SDUPTHER

## 2018-03-19 NOTE — PROGRESS NOTES
Subjective:   Patient ID:  Marie Reis Jr. is a 62 y.o. male who presents for follow-up of Congestive Heart Failure      HPI: Patient was recently hospitalized with presumed sepsis and encephalopathy.  He was also found to be hypoglycemic and in wide complex bradycardia ( presumed hyperkalemia).  He was successfully treated and his symptoms have resolved.  He also received transfusion for steadily declining H/H  He is followed by Dr. Guaman for MM and is supposed to have a follow up visit in 4 weeks.  Most recent H/H shows another steady decline.  Patient has no symptoms except for chronic peripheral edema.  His last BNP was around 1500    Lab Results   Component Value Date     03/14/2018     03/14/2018    K 4.3 03/14/2018    K 4.2 03/14/2018     03/14/2018     03/14/2018    CO2 19 (L) 03/14/2018    CO2 20 (L) 03/14/2018    BUN 31 (H) 03/14/2018    BUN 31 (H) 03/14/2018    CREATININE 2.5 (H) 03/14/2018    CREATININE 2.5 (H) 03/14/2018     (H) 03/14/2018     (H) 03/14/2018    HGBA1C 7.6 (H) 12/15/2017    MG 1.7 02/24/2018    AST 37 03/14/2018    ALT 45 (H) 03/14/2018    ALBUMIN 3.1 (L) 03/14/2018    PROT 7.1 03/14/2018    BILITOT 0.8 03/14/2018    WBC 8.68 03/14/2018    HGB 8.1 (L) 03/14/2018    HCT 25.5 (L) 03/14/2018    HCT 27 (L) 02/21/2018    MCV 88 03/14/2018     (L) 03/14/2018    INR 1.1 02/21/2018    PSA 1.5 06/26/2015    TSH 2.402 02/21/2018    CHOL 119 (L) 10/25/2017    HDL 32 (L) 10/25/2017    LDLCALC 68.6 10/25/2017    TRIG 92 10/25/2017       Review of Systems   Constitution: Positive for weakness and malaise/fatigue.   HENT: Negative.    Eyes: Negative.    Cardiovascular: Positive for leg swelling. Negative for chest pain, claudication, dyspnea on exertion, irregular heartbeat, near-syncope, palpitations and syncope.   Respiratory: Negative.  Negative for cough, shortness of breath, snoring and wheezing.    Endocrine: Negative.  Negative for cold intolerance,  heat intolerance, polydipsia, polyphagia and polyuria.   Skin: Negative.    Musculoskeletal: Positive for arthritis, back pain and muscle weakness.   Gastrointestinal: Negative.    Genitourinary: Negative.    Neurological: Positive for dizziness, light-headedness, loss of balance and numbness.   Psychiatric/Behavioral: Negative.        Objective:   Physical Exam   Constitutional: He is oriented to person, place, and time. He appears well-developed and well-nourished.   BP (!) 166/76   Pulse 71   Ht 6' (1.829 m)   Wt 84.8 kg (186 lb 13.4 oz)   BMI 25.34 kg/m²      HENT:   Head: Normocephalic.   Eyes: Pupils are equal, round, and reactive to light.   Neck: Normal range of motion. Neck supple. Carotid bruit is not present. No thyromegaly present.   Cardiovascular: Normal rate, regular rhythm, normal heart sounds and intact distal pulses.  Exam reveals no gallop and no friction rub.    No murmur heard.  Pulses:       Carotid pulses are 2+ on the right side, and 2+ on the left side.       Radial pulses are 2+ on the right side, and 2+ on the left side.        Femoral pulses are 2+ on the right side, and 2+ on the left side.       Popliteal pulses are 2+ on the right side, and 2+ on the left side.        Dorsalis pedis pulses are 2+ on the right side, and 2+ on the left side.        Posterior tibial pulses are 2+ on the right side, and 2+ on the left side.   Pulmonary/Chest: Effort normal and breath sounds normal. No respiratory distress. He has no wheezes. He has no rales. He exhibits no tenderness.   Abdominal: Soft. Bowel sounds are normal.   Musculoskeletal: Normal range of motion. He exhibits edema.   1+ bilaterally   Neurological: He is alert and oriented to person, place, and time.   Skin: Skin is warm and dry.   Psychiatric: He has a normal mood and affect.   Nursing note and vitals reviewed.        Assessment and Plan:     Problem List Items Addressed This Visit        Cardiology Problems    Essential  hypertension    Relevant Medications    nitroGLYCERIN (NITROSTAT) 0.4 MG SL tablet    Other Relevant Orders    2D echo with color flow doppler    CAR Ultrasound doppler carotid bilateral    Acute on chronic diastolic heart failure - Primary    Relevant Medications    nitroGLYCERIN (NITROSTAT) 0.4 MG SL tablet    Other Relevant Orders    2D echo with color flow doppler    CAR Ultrasound doppler carotid bilateral    Coronary artery disease due to calcified coronary lesion    Relevant Medications    nitroGLYCERIN (NITROSTAT) 0.4 MG SL tablet    Other Relevant Orders    2D echo with color flow doppler    CAR Ultrasound doppler carotid bilateral    Bilateral carotid artery stenosis    Relevant Medications    nitroGLYCERIN (NITROSTAT) 0.4 MG SL tablet    Other Relevant Orders    2D echo with color flow doppler    CAR Ultrasound doppler carotid bilateral       Other    History of stroke    Dyslipidemia    Relevant Medications    nitroGLYCERIN (NITROSTAT) 0.4 MG SL tablet    Other Relevant Orders    2D echo with color flow doppler    CAR Ultrasound doppler carotid bilateral    Type 2 diabetes mellitus with stage 4 chronic kidney disease, with long-term current use of insulin    Multiple myeloma not having achieved remission      Other Visit Diagnoses     Hypertension, essential              Patient's Medications   New Prescriptions    No medications on file   Previous Medications    AMLODIPINE (NORVASC) 10 MG TABLET    Take 1 tablet (10 mg total) by mouth once daily.    ATORVASTATIN (LIPITOR) 80 MG TABLET    Take 1 tablet (80 mg total) by mouth once daily.    BLOOD SUGAR DIAGNOSTIC STRP    1 strip by Misc.(Non-Drug; Combo Route) route after meals as needed.    CARVEDILOL (COREG) 25 MG TABLET    Take 1 tablet (25 mg total) by mouth 2 (two) times daily with meals.    CLOPIDOGREL (PLAVIX) 75 MG TABLET    Take 1 tablet (75 mg total) by mouth once daily.    DEXAMETHASONE (DECADRON) 4 MG TAB    3 tablets once a week     "FLUTICASONE (FLONASE) 50 MCG/ACTUATION NASAL SPRAY    1 spray by Each Nare route 2 (two) times daily as needed for Rhinitis.    FUROSEMIDE (LASIX) 40 MG TABLET    Take 60 mg which is 1 and a half pill in the morning at 8 a.m. And 40 mg (one full pill) at 3 p.m. In the afternoon    GABAPENTIN (NEURONTIN) 300 MG CAPSULE    Take 1 capsule (300 mg total) by mouth 2 (two) times daily.    INSULIN ASPART (NOVOLOG) 100 UNIT/ML INPN PEN    Inject 8 units w/ meals plus scale 150-200 +2, 201-250 +4, 251-300 +6, 301-350 +8, >350 +10. Snack 4 units.    INSULIN GLARGINE (LANTUS SOLOSTAR) 100 UNIT/ML (3 ML) INPN PEN    Inject 28 units at night.    INSULIN NEEDLES, DISPOSABLE, 31 X 5/16 " NDLE    Pt to use pen needle with Lantus daily    LENALIDOMIDE (REVLIMID) 10 MG CAP    10 mg daily for 21 days, followed by 7 days off    LISINOPRIL (PRINIVIL,ZESTRIL) 40 MG TABLET    TAKE 1 TABLET (40 MG TOTAL) BY MOUTH ONCE DAILY.    MECLIZINE (ANTIVERT) 25 MG TABLET    Take 1 tablet (25 mg total) by mouth 3 (three) times daily as needed for Dizziness.    METHYL SALICYLATE/MENTHOL (MENTHOLATUM DEEP HEAT RUB TOP)    Apply topically.    METOLAZONE (ZAROXOLYN) 2.5 MG TABLET    Take 1 tablet (2.5 mg total) by mouth once daily.    MIRTAZAPINE (REMERON) 30 MG TABLET    Take 30 mg by mouth every evening.     MONTELUKAST (SINGULAIR) 10 MG TABLET    Take 1 tablet (10 mg total) by mouth every evening.    ONDANSETRON (ZOFRAN-ODT) 8 MG TBDL    Take 1 tablet (8 mg total) by mouth every 6 (six) hours as needed.   Modified Medications    Modified Medication Previous Medication    NITROGLYCERIN (NITROSTAT) 0.4 MG SL TABLET nitroGLYCERIN (NITROSTAT) 0.4 MG SL tablet       Place 1 tablet (0.4 mg total) under the tongue every 5 (five) minutes as needed for Chest pain.    Place 0.4 mg under the tongue every 5 (five) minutes as needed for Chest pain.   Discontinued Medications    No medications on file   Peripheral edema is most likely multifactorial: HFpEF, CKD and " anemia.  Repeat CFD to assess for regional and global LV function.  Prior tests indicated CAD, but in light of lack of symptoms and CKD we have elected to treat medically for now.  Increase Lasix to 60 mg twice daily.  Monitor BP and call if out of range.  Anemia and MM is managed by Dr. Guaman.  Compliance with diet and medications reinforced    Follow-up in about 3 months (around 6/19/2018).

## 2018-03-20 ENCOUNTER — TELEPHONE (OUTPATIENT)
Dept: HEMATOLOGY/ONCOLOGY | Facility: CLINIC | Age: 63
End: 2018-03-20

## 2018-03-20 DIAGNOSIS — D50.0 ANEMIA DUE TO CHRONIC BLOOD LOSS: Primary | ICD-10-CM

## 2018-03-20 PROCEDURE — 86920 COMPATIBILITY TEST SPIN: CPT

## 2018-03-20 RX ORDER — FUROSEMIDE 10 MG/ML
20 INJECTION INTRAMUSCULAR; INTRAVENOUS
Status: CANCELLED | OUTPATIENT
Start: 2018-03-20

## 2018-03-20 RX ORDER — HYDROCODONE BITARTRATE AND ACETAMINOPHEN 500; 5 MG/1; MG/1
TABLET ORAL ONCE
Status: CANCELLED | OUTPATIENT
Start: 2018-03-20 | End: 2018-03-20

## 2018-03-22 DIAGNOSIS — C90.00 MULTIPLE MYELOMA NOT HAVING ACHIEVED REMISSION: ICD-10-CM

## 2018-03-22 RX ORDER — LENALIDOMIDE 10 MG/1
CAPSULE ORAL
Qty: 21 EACH | Refills: 11 | Status: SHIPPED | OUTPATIENT
Start: 2018-03-22 | End: 2018-03-27 | Stop reason: SDUPTHER

## 2018-03-23 ENCOUNTER — INFUSION (OUTPATIENT)
Dept: INFUSION THERAPY | Facility: HOSPITAL | Age: 63
End: 2018-03-23
Attending: INTERNAL MEDICINE
Payer: MEDICARE

## 2018-03-23 VITALS
RESPIRATION RATE: 16 BRPM | DIASTOLIC BLOOD PRESSURE: 88 MMHG | SYSTOLIC BLOOD PRESSURE: 193 MMHG | HEART RATE: 73 BPM | TEMPERATURE: 98 F

## 2018-03-23 DIAGNOSIS — D50.0 ANEMIA DUE TO CHRONIC BLOOD LOSS: ICD-10-CM

## 2018-03-23 LAB
BLD PROD TYP BPU: NORMAL
BLD PROD TYP BPU: NORMAL
BLOOD UNIT EXPIRATION DATE: NORMAL
BLOOD UNIT EXPIRATION DATE: NORMAL
BLOOD UNIT TYPE CODE: 5100
BLOOD UNIT TYPE CODE: 5100
BLOOD UNIT TYPE: NORMAL
BLOOD UNIT TYPE: NORMAL
CODING SYSTEM: NORMAL
CODING SYSTEM: NORMAL
DISPENSE STATUS: NORMAL
DISPENSE STATUS: NORMAL
NUM UNITS TRANS PACKED RBC: NORMAL
NUM UNITS TRANS PACKED RBC: NORMAL

## 2018-03-23 PROCEDURE — 25000003 PHARM REV CODE 250: Performed by: INTERNAL MEDICINE

## 2018-03-23 PROCEDURE — P9040 RBC LEUKOREDUCED IRRADIATED: HCPCS

## 2018-03-23 PROCEDURE — 96374 THER/PROPH/DIAG INJ IV PUSH: CPT

## 2018-03-23 PROCEDURE — 36430 TRANSFUSION BLD/BLD COMPNT: CPT

## 2018-03-23 PROCEDURE — 63600175 PHARM REV CODE 636 W HCPCS: Performed by: INTERNAL MEDICINE

## 2018-03-23 RX ORDER — HYDROCODONE BITARTRATE AND ACETAMINOPHEN 500; 5 MG/1; MG/1
TABLET ORAL ONCE
Status: COMPLETED | OUTPATIENT
Start: 2018-03-23 | End: 2018-03-23

## 2018-03-23 RX ORDER — FUROSEMIDE 10 MG/ML
20 INJECTION INTRAMUSCULAR; INTRAVENOUS
Status: DISCONTINUED | OUTPATIENT
Start: 2018-03-23 | End: 2018-03-23 | Stop reason: HOSPADM

## 2018-03-23 RX ADMIN — FUROSEMIDE 20 MG: 10 INJECTION, SOLUTION INTRAMUSCULAR; INTRAVENOUS at 02:03

## 2018-03-23 RX ADMIN — SODIUM CHLORIDE: 9 INJECTION, SOLUTION INTRAVENOUS at 11:03

## 2018-03-23 NOTE — PLAN OF CARE
Problem: Patient Care Overview (Adult)  Goal: Plan of Care Review  Outcome: Ongoing (interventions implemented as appropriate)  Pt received 2 units of PRBC with 20mg lasix between units. BP elevated after 2nd unit- patient stated he forgot to take Lisinopril and will take at home. Explained to retake BP tonight and if not trending down to go to ED. Patient and wife stated understanding. PIV removed and AVS given to patient.

## 2018-03-26 ENCOUNTER — CLINICAL SUPPORT (OUTPATIENT)
Dept: CARDIOLOGY | Facility: CLINIC | Age: 63
End: 2018-03-26
Attending: INTERNAL MEDICINE
Payer: MEDICARE

## 2018-03-26 DIAGNOSIS — I25.84 CORONARY ARTERY DISEASE DUE TO CALCIFIED CORONARY LESION: ICD-10-CM

## 2018-03-26 DIAGNOSIS — E78.5 DYSLIPIDEMIA: ICD-10-CM

## 2018-03-26 DIAGNOSIS — I10 ESSENTIAL HYPERTENSION: ICD-10-CM

## 2018-03-26 DIAGNOSIS — I50.33 ACUTE ON CHRONIC DIASTOLIC HEART FAILURE: ICD-10-CM

## 2018-03-26 DIAGNOSIS — I65.23 BILATERAL CAROTID ARTERY STENOSIS: ICD-10-CM

## 2018-03-26 DIAGNOSIS — I25.10 CORONARY ARTERY DISEASE DUE TO CALCIFIED CORONARY LESION: ICD-10-CM

## 2018-03-26 LAB
AORTIC VALVE REGURGITATION: ABNORMAL
AORTIC VALVE STENOSIS: ABNORMAL
DIASTOLIC DYSFUNCTION: YES
ESTIMATED PA SYSTOLIC PRESSURE: 42.19
INTERNAL CAROTID STENOSIS: ABNORMAL
MITRAL VALVE REGURGITATION: ABNORMAL
RETIRED EF AND QEF - SEE NOTES: 50 (ref 55–65)
TRICUSPID VALVE REGURGITATION: ABNORMAL

## 2018-03-26 PROCEDURE — 93306 TTE W/DOPPLER COMPLETE: CPT | Mod: PBBFAC,PO | Performed by: INTERNAL MEDICINE

## 2018-03-26 PROCEDURE — 93880 EXTRACRANIAL BILAT STUDY: CPT | Mod: PBBFAC,PO | Performed by: INTERNAL MEDICINE

## 2018-03-26 RX ORDER — LISINOPRIL 40 MG/1
40 TABLET ORAL DAILY
Qty: 90 TABLET | Refills: 1 | Status: SHIPPED | OUTPATIENT
Start: 2018-03-26 | End: 2018-10-06 | Stop reason: SDUPTHER

## 2018-03-27 ENCOUNTER — TELEPHONE (OUTPATIENT)
Dept: CARDIOLOGY | Facility: CLINIC | Age: 63
End: 2018-03-27

## 2018-03-27 DIAGNOSIS — C90.00 MULTIPLE MYELOMA NOT HAVING ACHIEVED REMISSION: ICD-10-CM

## 2018-03-27 RX ORDER — LENALIDOMIDE 10 MG/1
CAPSULE ORAL
Qty: 21 EACH | Refills: 0 | Status: SHIPPED | OUTPATIENT
Start: 2018-03-27 | End: 2018-05-21 | Stop reason: ALTCHOICE

## 2018-03-27 NOTE — TELEPHONE ENCOUNTER
----- Message from Debbie Salinas MD sent at 3/27/2018  3:31 PM CDT -----  Please inform the patient that carotid duplex is c/w mild bilateral plaques.  He is on appropriate medical therapy, just needs to adhere to diet and recommendations.

## 2018-03-27 NOTE — TELEPHONE ENCOUNTER
----- Message from Debbie Salinas MD sent at 3/27/2018  3:32 PM CDT -----  ...and CFD looks good too with some chronic changes to be expected in his condition.

## 2018-03-29 ENCOUNTER — TELEPHONE (OUTPATIENT)
Dept: CARDIOLOGY | Facility: CLINIC | Age: 63
End: 2018-03-29

## 2018-03-29 NOTE — TELEPHONE ENCOUNTER
I spoke w/pt regarding letter from [facility] stating that they can't fill his Furosemide 40mg because they don't have all of his insurance info. Pt stated that he spoke w/his PCP and his PCP sent Furosemide [facility]mg to his [facility] and [facility] picked [facility]. Pt stated that we should disregard the letter from [facility].

## 2018-04-02 ENCOUNTER — LAB VISIT (OUTPATIENT)
Dept: LAB | Facility: HOSPITAL | Age: 63
End: 2018-04-02
Attending: INTERNAL MEDICINE
Payer: MEDICARE

## 2018-04-02 DIAGNOSIS — D47.2 MONOCLONAL GAMMOPATHY: ICD-10-CM

## 2018-04-02 DIAGNOSIS — R80.9 PROTEINURIA: ICD-10-CM

## 2018-04-02 LAB
ALBUMIN SERPL BCP-MCNC: 3.4 G/DL
ALP SERPL-CCNC: 198 U/L
ALT SERPL W/O P-5'-P-CCNC: 52 U/L
ANION GAP SERPL CALC-SCNC: 8 MMOL/L
AST SERPL-CCNC: 37 U/L
BASOPHILS # BLD AUTO: 0.04 K/UL
BASOPHILS NFR BLD: 0.6 %
BILIRUB SERPL-MCNC: 0.6 MG/DL
BUN SERPL-MCNC: 60 MG/DL
CALCIUM SERPL-MCNC: 9.3 MG/DL
CHLORIDE SERPL-SCNC: 98 MMOL/L
CO2 SERPL-SCNC: 26 MMOL/L
CREAT SERPL-MCNC: 4.1 MG/DL
DIFFERENTIAL METHOD: ABNORMAL
EOSINOPHIL # BLD AUTO: 0.3 K/UL
EOSINOPHIL NFR BLD: 4.4 %
ERYTHROCYTE [DISTWIDTH] IN BLOOD BY AUTOMATED COUNT: 13.4 %
EST. GFR  (AFRICAN AMERICAN): 17 ML/MIN/1.73 M^2
EST. GFR  (NON AFRICAN AMERICAN): 14.7 ML/MIN/1.73 M^2
GLUCOSE SERPL-MCNC: 400 MG/DL
HCT VFR BLD AUTO: 35.3 %
HGB BLD-MCNC: 11.6 G/DL
IMM GRANULOCYTES # BLD AUTO: 0.04 K/UL
IMM GRANULOCYTES NFR BLD AUTO: 0.6 %
LYMPHOCYTES # BLD AUTO: 1.6 K/UL
LYMPHOCYTES NFR BLD: 22.5 %
MCH RBC QN AUTO: 27.8 PG
MCHC RBC AUTO-ENTMCNC: 32.9 G/DL
MCV RBC AUTO: 85 FL
MONOCYTES # BLD AUTO: 0.6 K/UL
MONOCYTES NFR BLD: 8 %
NEUTROPHILS # BLD AUTO: 4.6 K/UL
NEUTROPHILS NFR BLD: 63.9 %
NRBC BLD-RTO: 0 /100 WBC
PLATELET # BLD AUTO: 170 K/UL
PMV BLD AUTO: 11.1 FL
POTASSIUM SERPL-SCNC: 4.5 MMOL/L
PROT SERPL-MCNC: 7.9 G/DL
RBC # BLD AUTO: 4.17 M/UL
SODIUM SERPL-SCNC: 132 MMOL/L
WBC # BLD AUTO: 7.24 K/UL

## 2018-04-02 PROCEDURE — 80053 COMPREHEN METABOLIC PANEL: CPT

## 2018-04-02 PROCEDURE — 85025 COMPLETE CBC W/AUTO DIFF WBC: CPT

## 2018-04-02 PROCEDURE — 36415 COLL VENOUS BLD VENIPUNCTURE: CPT | Mod: PO

## 2018-04-03 ENCOUNTER — TELEPHONE (OUTPATIENT)
Dept: HEMATOLOGY/ONCOLOGY | Facility: CLINIC | Age: 63
End: 2018-04-03

## 2018-04-03 NOTE — TELEPHONE ENCOUNTER
----- Message from Artur Guaman Jr., MD sent at 4/2/2018  7:49 PM CDT -----  Major worsening of kidney function & glucose.  We cannot fix this in infusion center--must go to ED here or [facility]    I called patient and his wife. I instructed Mr Salazar to go to the ER due to abnormal results from glucose and BUN. Patient and wife verbalized understanding.

## 2018-04-11 ENCOUNTER — HOSPITAL ENCOUNTER (INPATIENT)
Facility: HOSPITAL | Age: 63
LOS: 1 days | Discharge: HOME | DRG: 683 | End: 2018-04-12
Attending: INTERNAL MEDICINE | Admitting: HOSPITALIST
Payer: MEDICARE

## 2018-04-11 ENCOUNTER — OFFICE VISIT (OUTPATIENT)
Dept: NEPHROLOGY | Facility: CLINIC | Age: 63
DRG: 683 | End: 2018-04-11
Payer: MEDICARE

## 2018-04-11 ENCOUNTER — TELEPHONE (OUTPATIENT)
Dept: NEPHROLOGY | Facility: CLINIC | Age: 63
End: 2018-04-11

## 2018-04-11 VITALS
DIASTOLIC BLOOD PRESSURE: 72 MMHG | HEIGHT: 72 IN | WEIGHT: 171.94 LBS | SYSTOLIC BLOOD PRESSURE: 140 MMHG | HEART RATE: 80 BPM | BODY MASS INDEX: 23.29 KG/M2 | OXYGEN SATURATION: 99 %

## 2018-04-11 DIAGNOSIS — N17.9 AKI (ACUTE KIDNEY INJURY): ICD-10-CM

## 2018-04-11 DIAGNOSIS — N17.9 AKI (ACUTE KIDNEY INJURY): Primary | ICD-10-CM

## 2018-04-11 DIAGNOSIS — Z79.4 TYPE 2 DIABETES MELLITUS WITH STAGE 4 CHRONIC KIDNEY DISEASE, WITH LONG-TERM CURRENT USE OF INSULIN: ICD-10-CM

## 2018-04-11 DIAGNOSIS — R80.9 PROTEINURIA, UNSPECIFIED TYPE: ICD-10-CM

## 2018-04-11 DIAGNOSIS — E11.59 HYPERTENSION ASSOCIATED WITH DIABETES: ICD-10-CM

## 2018-04-11 DIAGNOSIS — C90.00 MULTIPLE MYELOMA NOT HAVING ACHIEVED REMISSION: ICD-10-CM

## 2018-04-11 DIAGNOSIS — N18.4 TYPE 2 DIABETES MELLITUS WITH STAGE 4 CHRONIC KIDNEY DISEASE, WITH LONG-TERM CURRENT USE OF INSULIN: ICD-10-CM

## 2018-04-11 DIAGNOSIS — I12.9 HYPERTENSIVE CKD (CHRONIC KIDNEY DISEASE): ICD-10-CM

## 2018-04-11 DIAGNOSIS — N25.81 SECONDARY HYPERPARATHYROIDISM: ICD-10-CM

## 2018-04-11 DIAGNOSIS — Z79.4 TYPE 2 DIABETES MELLITUS WITH DIABETIC POLYNEUROPATHY, WITH LONG-TERM CURRENT USE OF INSULIN: Primary | ICD-10-CM

## 2018-04-11 DIAGNOSIS — E11.42 TYPE 2 DIABETES MELLITUS WITH DIABETIC POLYNEUROPATHY, WITH LONG-TERM CURRENT USE OF INSULIN: Primary | ICD-10-CM

## 2018-04-11 DIAGNOSIS — I15.2 HYPERTENSION ASSOCIATED WITH DIABETES: ICD-10-CM

## 2018-04-11 DIAGNOSIS — E11.22 TYPE 2 DIABETES MELLITUS WITH STAGE 4 CHRONIC KIDNEY DISEASE, WITH LONG-TERM CURRENT USE OF INSULIN: ICD-10-CM

## 2018-04-11 PROBLEM — E11.65 TYPE 2 DIABETES MELLITUS WITH HYPERGLYCEMIA, WITH LONG-TERM CURRENT USE OF INSULIN: Status: ACTIVE | Noted: 2018-04-11

## 2018-04-11 PROBLEM — I50.33 ACUTE ON CHRONIC DIASTOLIC HEART FAILURE: Status: RESOLVED | Noted: 2017-11-13 | Resolved: 2018-04-11

## 2018-04-11 PROBLEM — R60.9 EDEMA: Status: RESOLVED | Noted: 2018-03-05 | Resolved: 2018-04-11

## 2018-04-11 LAB
ESTIMATED AVG GLUCOSE: 223 MG/DL
HBA1C MFR BLD HPLC: 9.4 %
POCT GLUCOSE: 187 MG/DL (ref 70–110)
POCT GLUCOSE: 255 MG/DL (ref 70–110)
POCT GLUCOSE: 498 MG/DL (ref 70–110)

## 2018-04-11 PROCEDURE — 36415 COLL VENOUS BLD VENIPUNCTURE: CPT

## 2018-04-11 PROCEDURE — 63600175 PHARM REV CODE 636 W HCPCS: Performed by: STUDENT IN AN ORGANIZED HEALTH CARE EDUCATION/TRAINING PROGRAM

## 2018-04-11 PROCEDURE — 99215 OFFICE O/P EST HI 40 MIN: CPT | Mod: S$PBB,,, | Performed by: INTERNAL MEDICINE

## 2018-04-11 PROCEDURE — 99213 OFFICE O/P EST LOW 20 MIN: CPT | Mod: PBBFAC | Performed by: INTERNAL MEDICINE

## 2018-04-11 PROCEDURE — 25000003 PHARM REV CODE 250: Performed by: STUDENT IN AN ORGANIZED HEALTH CARE EDUCATION/TRAINING PROGRAM

## 2018-04-11 PROCEDURE — 80048 BASIC METABOLIC PNL TOTAL CA: CPT

## 2018-04-11 PROCEDURE — 11000001 HC ACUTE MED/SURG PRIVATE ROOM

## 2018-04-11 PROCEDURE — 99999 PR PBB SHADOW E&M-EST. PATIENT-LVL III: CPT | Mod: PBBFAC,,, | Performed by: INTERNAL MEDICINE

## 2018-04-11 PROCEDURE — 83036 HEMOGLOBIN GLYCOSYLATED A1C: CPT

## 2018-04-11 RX ORDER — CARVEDILOL 25 MG/1
25 TABLET ORAL 2 TIMES DAILY WITH MEALS
Status: DISCONTINUED | OUTPATIENT
Start: 2018-04-11 | End: 2018-04-12 | Stop reason: HOSPADM

## 2018-04-11 RX ORDER — GLUCAGON 1 MG
1 KIT INJECTION
Status: DISCONTINUED | OUTPATIENT
Start: 2018-04-11 | End: 2018-04-12 | Stop reason: HOSPADM

## 2018-04-11 RX ORDER — CLOPIDOGREL BISULFATE 75 MG/1
75 TABLET ORAL DAILY
Status: DISCONTINUED | OUTPATIENT
Start: 2018-04-12 | End: 2018-04-12 | Stop reason: HOSPADM

## 2018-04-11 RX ORDER — AMLODIPINE BESYLATE 10 MG/1
10 TABLET ORAL DAILY
Status: DISCONTINUED | OUTPATIENT
Start: 2018-04-11 | End: 2018-04-12 | Stop reason: HOSPADM

## 2018-04-11 RX ORDER — ACETAMINOPHEN 325 MG/1
650 TABLET ORAL EVERY 4 HOURS PRN
Status: DISCONTINUED | OUTPATIENT
Start: 2018-04-11 | End: 2018-04-12 | Stop reason: HOSPADM

## 2018-04-11 RX ORDER — HEPARIN SODIUM 5000 [USP'U]/ML
5000 INJECTION, SOLUTION INTRAVENOUS; SUBCUTANEOUS EVERY 8 HOURS
Status: DISCONTINUED | OUTPATIENT
Start: 2018-04-11 | End: 2018-04-12 | Stop reason: HOSPADM

## 2018-04-11 RX ORDER — AMOXICILLIN 250 MG
1 CAPSULE ORAL 2 TIMES DAILY
Status: DISCONTINUED | OUTPATIENT
Start: 2018-04-11 | End: 2018-04-12 | Stop reason: HOSPADM

## 2018-04-11 RX ORDER — MIRTAZAPINE 30 MG/1
30 TABLET, FILM COATED ORAL NIGHTLY
Status: DISCONTINUED | OUTPATIENT
Start: 2018-04-11 | End: 2018-04-12 | Stop reason: HOSPADM

## 2018-04-11 RX ORDER — IBUPROFEN 200 MG
16 TABLET ORAL
Status: DISCONTINUED | OUTPATIENT
Start: 2018-04-11 | End: 2018-04-12 | Stop reason: HOSPADM

## 2018-04-11 RX ORDER — IBUPROFEN 200 MG
24 TABLET ORAL
Status: DISCONTINUED | OUTPATIENT
Start: 2018-04-11 | End: 2018-04-12 | Stop reason: HOSPADM

## 2018-04-11 RX ORDER — SODIUM CHLORIDE 9 MG/ML
INJECTION, SOLUTION INTRAVENOUS CONTINUOUS
Status: ACTIVE | OUTPATIENT
Start: 2018-04-11 | End: 2018-04-12

## 2018-04-11 RX ORDER — SODIUM CHLORIDE 0.9 % (FLUSH) 0.9 %
5 SYRINGE (ML) INJECTION
Status: DISCONTINUED | OUTPATIENT
Start: 2018-04-11 | End: 2018-04-12 | Stop reason: HOSPADM

## 2018-04-11 RX ORDER — INSULIN ASPART 100 [IU]/ML
1-10 INJECTION, SOLUTION INTRAVENOUS; SUBCUTANEOUS EVERY 6 HOURS PRN
Status: DISCONTINUED | OUTPATIENT
Start: 2018-04-11 | End: 2018-04-12 | Stop reason: HOSPADM

## 2018-04-11 RX ORDER — GLUCAGON 1 MG
1 KIT INJECTION
Status: DISCONTINUED | OUTPATIENT
Start: 2018-04-11 | End: 2018-04-11

## 2018-04-11 RX ADMIN — STANDARDIZED SENNA CONCENTRATE AND DOCUSATE SODIUM 1 TABLET: 8.6; 5 TABLET, FILM COATED ORAL at 09:04

## 2018-04-11 RX ADMIN — INSULIN ASPART 1 UNITS: 100 INJECTION, SOLUTION INTRAVENOUS; SUBCUTANEOUS at 11:04

## 2018-04-11 RX ADMIN — CARVEDILOL 25 MG: 25 TABLET, FILM COATED ORAL at 06:04

## 2018-04-11 RX ADMIN — INSULIN ASPART 10 UNITS: 100 INJECTION, SOLUTION INTRAVENOUS; SUBCUTANEOUS at 06:04

## 2018-04-11 RX ADMIN — AMLODIPINE BESYLATE 10 MG: 10 TABLET ORAL at 06:04

## 2018-04-11 RX ADMIN — INSULIN DETEMIR 23 UNITS: 100 INJECTION, SOLUTION SUBCUTANEOUS at 06:04

## 2018-04-11 RX ADMIN — SODIUM CHLORIDE 1000 ML: 0.9 INJECTION, SOLUTION INTRAVENOUS at 06:04

## 2018-04-11 RX ADMIN — HEPARIN SODIUM 5000 UNITS: 5000 INJECTION, SOLUTION INTRAVENOUS; SUBCUTANEOUS at 09:04

## 2018-04-11 RX ADMIN — SODIUM CHLORIDE: 0.9 INJECTION, SOLUTION INTRAVENOUS at 09:04

## 2018-04-11 NOTE — SUBJECTIVE & OBJECTIVE
Past Medical History:   Diagnosis Date    Binocular vision disorder with diplopia 9/22/2016    Bradycardia     Cataract     Cervical spondylosis 10/1/2015    Chronic diastolic heart failure 1/11/2018    Coronary artery disease due to calcified coronary lesion 3/19/2018    Dyslipidemia 6/26/2015    Hearing impairment 10/25/2013    History of stroke 3/23/2015    Hypertension associated with diabetes 1/11/2018    Hypertensive retinopathy of both eyes 9/26/2017    Lumbar spondylosis 10/1/2015    Multiple myeloma not having achieved remission 1/16/2018    Persistent proteinuria 1/11/2018    Spondylolisthesis of lumbar region 10/1/2015    Strabismus     Stroke 2014    Thoracic spondylosis 10/1/2015    Type 2 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, with long-term current use of insulin 9/26/2017    Type 2 diabetes mellitus with diabetic polyneuropathy, with long-term current use of insulin 9/28/2015    Type 2 diabetes mellitus with stage 4 chronic kidney disease, with long-term current use of insulin 1/11/2018       Past Surgical History:   Procedure Laterality Date    CATARACT EXTRACTION W/  INTRAOCULAR LENS IMPLANT Left 4/30/15    [facility]    HAND SURGERY  2/2012    DR. BAKER [facility]    left hand fracture sx      LT EYE   5/2/15    DR KULLMAN [facility]       Review of patient's allergies indicates:   Allergen Reactions    Hydralazine analogues      Increase swelling and throat itching and tightness with use.  Symptoms correlate only with this medication onset in hospital.       No current facility-administered medications on file prior to encounter.      Current Outpatient Prescriptions on File Prior to Encounter   Medication Sig    amLODIPine (NORVASC) 10 MG tablet Take 1 tablet (10 mg total) by mouth once daily.    atorvastatin (LIPITOR) 80 MG tablet Take 1 tablet (80 mg total) by mouth once daily.    blood sugar diagnostic Strp 1 strip by  "Misc.(Non-Drug; Combo Route) route after meals as needed.    carvedilol (COREG) 25 MG tablet Take 1 tablet (25 mg total) by mouth 2 (two) times daily with meals.    clopidogrel (PLAVIX) 75 mg tablet Take 1 tablet (75 mg total) by mouth once daily.    dexamethasone (DECADRON) 4 MG Tab 3 tablets once a week    furosemide (LASIX) 40 MG tablet Pt to take 1 1/2 tablet twice a day per Dr. Salinas on 3/19/18.    gabapentin (NEURONTIN) 300 MG capsule Take 1 capsule (300 mg total) by mouth 2 (two) times daily.    insulin aspart (NOVOLOG) 100 unit/mL InPn pen Inject 8 units w/ meals plus scale 150-200 +2, 201-250 +4, 251-300 +6, 301-350 +8, >350 +10. Snack 4 units.    insulin glargine (LANTUS SOLOSTAR) 100 unit/mL (3 mL) InPn pen Inject 28 units at night.    lenalidomide (REVLIMID) 10 mg Cap 10 mg daily for 21 days, followed by 7 days off    lisinopril (PRINIVIL,ZESTRIL) 40 MG tablet Take 1 tablet (40 mg total) by mouth once daily.    meclizine (ANTIVERT) 25 mg tablet Take 1 tablet (25 mg total) by mouth 3 (three) times daily as needed for Dizziness.    metOLazone (ZAROXOLYN) 2.5 MG tablet Take 1 tablet (2.5 mg total) by mouth once daily.    mirtazapine (REMERON) 30 MG tablet Take 30 mg by mouth every evening.     montelukast (SINGULAIR) 10 mg tablet Take 1 tablet (10 mg total) by mouth every evening.    fluticasone (FLONASE) 50 mcg/actuation nasal spray 1 spray by Each Nare route 2 (two) times daily as needed for Rhinitis.    insulin needles, disposable, 31 X 5/16 " Ndle Pt to use pen needle with Lantus daily    METHYL SALICYLATE/MENTHOL (MENTHOLATUM DEEP HEAT RUB TOP) Apply topically.    nitroGLYCERIN (NITROSTAT) 0.4 MG SL tablet Place 1 tablet (0.4 mg total) under the tongue every 5 (five) minutes as needed for Chest pain.    ondansetron (ZOFRAN-ODT) 8 MG TbDL Take 1 tablet (8 mg total) by mouth every 6 (six) hours as needed.     Family History     Problem Relation (Age of Onset)    Diabetes Mother, " Father, Sister, Brother    No Known Problems Maternal Aunt, Maternal Uncle, Paternal Aunt, Paternal Uncle, Maternal Grandmother, Maternal Grandfather, Paternal Grandmother, Paternal Grandfather        Social History Main Topics    Smoking status: Never Smoker    Smokeless tobacco: Never Used    Alcohol use No    Drug use: No    Sexual activity: No     Review of Systems   Constitutional: Positive for fatigue. Negative for chills and fever.   HENT: Negative for sore throat and trouble swallowing.    Eyes: Negative for photophobia and visual disturbance.   Respiratory: Negative for cough and shortness of breath.    Cardiovascular: Negative for chest pain, palpitations and leg swelling.   Gastrointestinal: Negative for abdominal distention, abdominal pain, diarrhea, nausea and vomiting.   Endocrine: Positive for polydipsia and polyuria.   Genitourinary: Negative for decreased urine volume, dysuria and hematuria.   Musculoskeletal: Negative for arthralgias and myalgias.   Skin: Negative for rash and wound.   Neurological: Negative for dizziness and syncope.   Psychiatric/Behavioral: Negative for agitation and confusion.     Objective:     Vital Signs (Most Recent):  Temp: 97.7 °F (36.5 °C) (04/11/18 1623)  Pulse: 72 (04/11/18 1623)  Resp: 16 (04/11/18 1623)  BP: (!) 173/70 (04/11/18 1700)  SpO2: 100 % (04/11/18 1623) Vital Signs (24h Range):  Temp:  [97.7 °F (36.5 °C)] 97.7 °F (36.5 °C)  Pulse:  [72-80] 72  Resp:  [16] 16  SpO2:  [99 %-100 %] 100 %  BP: (140-193)/(70-88) 173/70     Weight: 73.5 kg (162 lb 0.6 oz)  Body mass index is 21.98 kg/m².    Physical Exam   Constitutional: He is oriented to person, place, and time.   HENT:   Head: Normocephalic and atraumatic.   Eyes: EOM are normal. Pupils are equal, round, and reactive to light.   Cardiovascular: Normal rate, regular rhythm, normal heart sounds and intact distal pulses.    Pulmonary/Chest: Effort normal and breath sounds normal. No respiratory distress. He  has no wheezes. He has no rales.   Abdominal: Soft. He exhibits no distension. There is no tenderness. There is no rebound and no guarding.   Musculoskeletal: He exhibits no edema or deformity.   Lymphadenopathy:     He has no cervical adenopathy.   Neurological: He is alert and oriented to person, place, and time. No cranial nerve deficit.   Skin: Skin is warm and dry.   Psychiatric: He has a normal mood and affect. His behavior is normal.   Nursing note and vitals reviewed.        CRANIAL NERVES     CN III, IV, VI   Pupils are equal, round, and reactive to light.  Extraocular motions are normal.        Significant Labs:   Recent Lab Results       04/11/18  1808 04/11/18  1210 04/11/18  1201      Immature Granulocytes  0.8(H)      Immature Grans (Abs)  0.10  Comment:  Mild elevation in immature granulocytes is non specific and   can be seen in a variety of conditions including stress response,   acute inflammation, trauma and pregnancy. Correlation with other   laboratory and clinical findings is essential.  (H)      Albumin  3.5      Anion Gap  8      Appearance, UA   Clear     Bacteria, UA   None     Baso #  0.01      Basophil%  0.1      Bilirubin (UA)   Negative     BUN, Bld  57(H)      Calcium  9.4      Chloride  94(L)      CO2  25      Color, UA   Straw     Creatinine  3.5(H)      Creatinine, Random Ur   93.0  Comment:  The random urine reference ranges provided were established   for 24 hour urine collections.  No reference ranges exist for  random urine specimens.  Correlate clinically.       Differential Method  Automated      eGFR if   20.6(A)      eGFR if non   17.8  Comment:  Calculation used to obtain the estimated glomerular filtration  rate (eGFR) is the CKD-EPI equation.   (A)      Eos #  0.3      Eosinophil%  2.2      Glucose  546  Comment:  *Critical value -   Results called to and read back by:frank morse lpn  (HH)      Glucose, UA   3+(A)     Gran # (ANC)   8.3(H)      Gran%  68.5      Hematocrit  36.3(L)      Hemoglobin  12.0(L)      Hyaline Casts, UA   1     Ketones, UA   Negative     Leukocytes, UA   Negative     Lymph #  2.8      Lymph%  22.8      MCH  27.4      MCHC  33.1      MCV  83      Microscopic Comment   SEE COMMENT  Comment:  Other formed elements not mentioned in the report are not   present in the microscopic examination.        Mono #  0.7      Mono%  5.6      MPV  11.2      Nitrite, UA   Negative     nRBC  0      Occult Blood UA   2+(A)     pH, UA   5.0     Phosphorus  3.6      Platelets  152      POCT Glucose 498(HH)       Potassium  4.1      Prot/Creat Ratio, Ur   0.90(H)     Protein, UA   2+  Comment:  Recommend a 24 hour urine protein or a urine   protein/creatinine ratio if globulin induced proteinuria is  clinically suspected.  (A)     Protein, Urine Random   84  Comment:  The random urine reference ranges provided were established   for 24 hour urine collections.  No reference ranges exist for  random urine specimens.  Correlate clinically.  (H)     PTH  161.0(H)      RBC  4.38(L)      RBC, UA   4     RDW  13.3      Sodium  127(L)      Specific Gravity, UA   1.020     Specimen UA   Urine, Unspecified     Uric Acid  8.5(H)      Urobilinogen, UA   Negative     WBC, UA   1     WBC  12.13      Yeast, UA   None

## 2018-04-11 NOTE — Clinical Note
I admitted him from the clinic. He did not go to ER when he was advised to do so per labs from 4/2/18 from heme clinic. He has definitely declined further. Blood glucose were in 500's which was likely causing osmotic diuresis. Plan to repeat labs in one week. Of note he has not been doing labs, keeping with follow up visits etc. Apparently he did not take insulin for a week, he self decided to do so. His wife had come for this visit, I explained to her about his non compliance and did explain his risk of heading towards ESRD.  Thanks, Alissa

## 2018-04-11 NOTE — HPI
62 M with T2DM with retinopathy and CKD IV, HFpEF, CAD, HTN, multiple myeloma who was admitted for hyperglycemia and JAZLYN. Patient is on chronic diuresis for diastolic dysfunction and lower extremity swelling and he noticed a few days ago that his leg swelling had improved so he stopped taking them, apparently per his cardiologist's recommendations. Additionally patient decided to stop taking his insulin because he was having polyuria and general malaise and wasn't sure how the medication would affect him. Additionally he took his weekly dose of steroids for multiple myeloma yesterday. He reports that his blood glucose is usually very well controlled but he decided to stop checking in the last few days and did not know it was elevated. He reports polyuria without dysuria or hematuria, denies fever or chills, cp, abdominal pain, N/V/D, syncope. Patient takes 28 units Lantus QHS, 8 units Aspart with meals.     Patient had labs drawn on 4/2 which showed JAZLYN with creatinine >4 from a baseline in the mid 2s and was instructed to come to the ED at that time. He decided to stay home and was sent from clinic today when he was seen by nephrology with cr 3.5 and blood glucose >600.

## 2018-04-11 NOTE — PLAN OF CARE
Direct Admit from Clinic Acceptance Note    Clinic Physician or Mid-Level provider/Clinic giving report: Dr. Alissa Dotson from Nephrology clinic, [facility]    Accepting Physician for admission to hospital: Alexsandra Tavarez     Date of acceptance: 04/11/2018     Reason for direct admission from clinic: JAZLYN and Hyperglycemia in known diabetic with baseline CKD stage IV    Report from Clinic Physician/Mid-Level Provider: 63 y/o male with past medical history of Type 2 diabetes complicated by retinopathy and CKD stage IV and polyneuropathy on long term insulin therapy, chronic diastolic heart failure, CAD, essential HTN, bilateral carotid stenosis, multiple myeloma being treated by Dr. Guaman in Heme/Onc clinic, essential HTN and CAD was noted on recent labs on 4/2 to have Creatinine of 4.1 up from baseline creatinine of 2.0-2.6 and blood sugar of 400 on routine blood work. Patient was called and told to go to ER for evaluation but patient did not. Patient did hold his diuretics Lasix and Metolazone for past 4 days as his Cardiologist instructed him too. Today, patient came back I for repeat labs and his creatinine is better at 3.5 but blood sugar even higher at 546. Dr. Dotson states patient appears dehydrated and she suspects JAZLYN is prerenal from hyperglycemia causing osmotic diuresis in conjunction with patient already being on diuretics for his chronic heart failure. Dr. Dotson requesting admit for management of patient's hyperglycemia as well as JAZLYN.     To Do List upon arrival:   · IVF's for hyperglycemia and JAZLYN  · Treatment of hyperglycemia     Please call extension [phone] upon patient arrival to floor for Hospital Medicine admit team assignment and for additional admit orders for the patient.      ALEXSADNRA TAVAREZ MD  Attending Staff Physician   Hospital Medicine  Pager: [phone]  Spectralink: [phone]

## 2018-04-12 VITALS
HEART RATE: 77 BPM | WEIGHT: 176.38 LBS | BODY MASS INDEX: 23.89 KG/M2 | SYSTOLIC BLOOD PRESSURE: 163 MMHG | TEMPERATURE: 98 F | OXYGEN SATURATION: 97 % | DIASTOLIC BLOOD PRESSURE: 72 MMHG | RESPIRATION RATE: 20 BRPM | HEIGHT: 72 IN

## 2018-04-12 PROBLEM — N17.9 AKI (ACUTE KIDNEY INJURY): Status: RESOLVED | Noted: 2018-04-11 | Resolved: 2018-04-12

## 2018-04-12 LAB
25(OH)D3+25(OH)D2 SERPL-MCNC: 26 NG/ML
ALBUMIN SERPL BCP-MCNC: 2.9 G/DL
ALP SERPL-CCNC: 140 U/L
ALT SERPL W/O P-5'-P-CCNC: 33 U/L
ANION GAP SERPL CALC-SCNC: 8 MMOL/L
ANION GAP SERPL CALC-SCNC: 8 MMOL/L
AST SERPL-CCNC: 16 U/L
BASOPHILS # BLD AUTO: 0.03 K/UL
BASOPHILS NFR BLD: 0.4 %
BILIRUB SERPL-MCNC: 0.6 MG/DL
BUN SERPL-MCNC: 52 MG/DL
BUN SERPL-MCNC: 55 MG/DL
CALCIUM SERPL-MCNC: 8.5 MG/DL
CALCIUM SERPL-MCNC: 8.9 MG/DL
CHLORIDE SERPL-SCNC: 105 MMOL/L
CHLORIDE SERPL-SCNC: 109 MMOL/L
CO2 SERPL-SCNC: 22 MMOL/L
CO2 SERPL-SCNC: 24 MMOL/L
CREAT SERPL-MCNC: 2.6 MG/DL
CREAT SERPL-MCNC: 2.9 MG/DL
DIFFERENTIAL METHOD: ABNORMAL
EOSINOPHIL # BLD AUTO: 0.3 K/UL
EOSINOPHIL NFR BLD: 4 %
ERYTHROCYTE [DISTWIDTH] IN BLOOD BY AUTOMATED COUNT: 13.4 %
EST. GFR  (AFRICAN AMERICAN): 25.8 ML/MIN/1.73 M^2
EST. GFR  (AFRICAN AMERICAN): 29.4 ML/MIN/1.73 M^2
EST. GFR  (NON AFRICAN AMERICAN): 22.3 ML/MIN/1.73 M^2
EST. GFR  (NON AFRICAN AMERICAN): 25.5 ML/MIN/1.73 M^2
GLUCOSE SERPL-MCNC: 194 MG/DL
GLUCOSE SERPL-MCNC: 91 MG/DL
HCT VFR BLD AUTO: 32.1 %
HGB BLD-MCNC: 10.8 G/DL
IMM GRANULOCYTES # BLD AUTO: 0.06 K/UL
IMM GRANULOCYTES NFR BLD AUTO: 0.7 %
LYMPHOCYTES # BLD AUTO: 2 K/UL
LYMPHOCYTES NFR BLD: 24.1 %
MAGNESIUM SERPL-MCNC: 1.6 MG/DL
MCH RBC QN AUTO: 27.7 PG
MCHC RBC AUTO-ENTMCNC: 33.6 G/DL
MCV RBC AUTO: 82 FL
MONOCYTES # BLD AUTO: 0.5 K/UL
MONOCYTES NFR BLD: 5.8 %
NEUTROPHILS # BLD AUTO: 5.4 K/UL
NEUTROPHILS NFR BLD: 65 %
NRBC BLD-RTO: 0 /100 WBC
PHOSPHATE SERPL-MCNC: 3.6 MG/DL
PLATELET # BLD AUTO: 114 K/UL
PMV BLD AUTO: 11.5 FL
POCT GLUCOSE: 79 MG/DL (ref 70–110)
POCT GLUCOSE: 82 MG/DL (ref 70–110)
POTASSIUM SERPL-SCNC: 3.5 MMOL/L
POTASSIUM SERPL-SCNC: 3.7 MMOL/L
PROT SERPL-MCNC: 6 G/DL
RBC # BLD AUTO: 3.9 M/UL
SODIUM SERPL-SCNC: 137 MMOL/L
SODIUM SERPL-SCNC: 139 MMOL/L
WBC # BLD AUTO: 8.3 K/UL

## 2018-04-12 PROCEDURE — 63600175 PHARM REV CODE 636 W HCPCS: Performed by: STUDENT IN AN ORGANIZED HEALTH CARE EDUCATION/TRAINING PROGRAM

## 2018-04-12 PROCEDURE — 99223 1ST HOSP IP/OBS HIGH 75: CPT | Mod: AI,GC,, | Performed by: HOSPITALIST

## 2018-04-12 PROCEDURE — 99900038 HC OT GENERIC THERAPY SCREENING (STAT)

## 2018-04-12 PROCEDURE — 99900037 HC PT THERAPY SCREENING (STAT)

## 2018-04-12 PROCEDURE — 85025 COMPLETE CBC W/AUTO DIFF WBC: CPT

## 2018-04-12 PROCEDURE — 84100 ASSAY OF PHOSPHORUS: CPT

## 2018-04-12 PROCEDURE — 82306 VITAMIN D 25 HYDROXY: CPT

## 2018-04-12 PROCEDURE — 36415 COLL VENOUS BLD VENIPUNCTURE: CPT

## 2018-04-12 PROCEDURE — 97802 MEDICAL NUTRITION INDIV IN: CPT

## 2018-04-12 PROCEDURE — 25000003 PHARM REV CODE 250: Performed by: STUDENT IN AN ORGANIZED HEALTH CARE EDUCATION/TRAINING PROGRAM

## 2018-04-12 PROCEDURE — 80053 COMPREHEN METABOLIC PANEL: CPT

## 2018-04-12 PROCEDURE — 83735 ASSAY OF MAGNESIUM: CPT

## 2018-04-12 RX ORDER — ERGOCALCIFEROL 1.25 MG/1
50000 CAPSULE ORAL
Qty: 6 CAPSULE | Refills: 0 | Status: SHIPPED | OUTPATIENT
Start: 2018-04-12 | End: 2018-05-24

## 2018-04-12 RX ORDER — POTASSIUM CHLORIDE 20 MEQ/1
20 TABLET, EXTENDED RELEASE ORAL ONCE
Status: COMPLETED | OUTPATIENT
Start: 2018-04-12 | End: 2018-04-12

## 2018-04-12 RX ORDER — LENALIDOMIDE 10 MG/1
CAPSULE ORAL
Qty: 21 EACH | Refills: 5 | Status: SHIPPED | OUTPATIENT
Start: 2018-04-12 | End: 2018-05-16

## 2018-04-12 RX ORDER — ERGOCALCIFEROL 1.25 MG/1
50000 CAPSULE ORAL
Qty: 6 CAPSULE | Refills: 0 | Status: SHIPPED | OUTPATIENT
Start: 2018-04-12 | End: 2018-04-12

## 2018-04-12 RX ORDER — CHOLECALCIFEROL (VITAMIN D3) 25 MCG
1000 TABLET ORAL DAILY
Status: DISCONTINUED | OUTPATIENT
Start: 2018-04-12 | End: 2018-04-12 | Stop reason: HOSPADM

## 2018-04-12 RX ORDER — VIT C/E/ZN/COPPR/LUTEIN/ZEAXAN 250MG-90MG
1000 CAPSULE ORAL DAILY
Refills: 0 | COMMUNITY
Start: 2018-04-12 | End: 2018-04-12 | Stop reason: HOSPADM

## 2018-04-12 RX ADMIN — CLOPIDOGREL 75 MG: 75 TABLET, FILM COATED ORAL at 08:04

## 2018-04-12 RX ADMIN — POTASSIUM CHLORIDE 20 MEQ: 1500 TABLET, EXTENDED RELEASE ORAL at 08:04

## 2018-04-12 RX ADMIN — CARVEDILOL 25 MG: 25 TABLET, FILM COATED ORAL at 08:04

## 2018-04-12 RX ADMIN — VITAMIN D, TAB 1000IU (100/BT) 1000 UNITS: 25 TAB at 10:04

## 2018-04-12 RX ADMIN — HEPARIN SODIUM 5000 UNITS: 5000 INJECTION, SOLUTION INTRAVENOUS; SUBCUTANEOUS at 05:04

## 2018-04-12 RX ADMIN — SODIUM CHLORIDE: 0.9 INJECTION, SOLUTION INTRAVENOUS at 03:04

## 2018-04-12 RX ADMIN — AMLODIPINE BESYLATE 10 MG: 10 TABLET ORAL at 08:04

## 2018-04-12 NOTE — DISCHARGE SUMMARY
[facility] Hospital Medicine  Discharge Summary      Patient Name: Marie Reis Jr.  MRN: [id number]  Admission Date: 4/11/2018  Hospital Length of Stay: 1 days  Discharge Date and Time:  04/12/2018 11:02 AM  Attending Physician: Silvana Delong MD   Discharging Provider: Des Williamson MD  Primary Care Provider: Nancy Colón MD  [facility] Medicine Team: [facility]OSP MED 1 Des Williamson MD    HPI:   62 M with T2DM with retinopathy and CKD IV, HFpEF, CAD, HTN, multiple myeloma who was admitted for hyperglycemia and JAZLYN. Patient is on chronic diuresis for diastolic dysfunction and lower extremity swelling and he noticed a few days ago that his leg swelling had improved so he stopped taking them, apparently per his cardiologist's recommendations. Additionally patient decided to stop taking his insulin because he was having polyuria and general malaise and wasn't sure how the medication would affect him. Additionally he took his weekly dose of steroids for multiple myeloma yesterday. He reports that his blood glucose is usually very well controlled but he decided to stop checking in the last few days and did not know it was elevated. He reports polyuria without dysuria or hematuria, denies fever or chills, cp, abdominal pain, N/V/D, syncope. Patient takes 28 units Lantus QHS, 8 units Aspart with meals.     Patient had labs drawn on 4/2 which showed JAZLYN with creatinine >4 from a baseline in the mid 2s and was instructed to come to the ED at that time. He decided to stay home and was sent from clinic today when he was seen by nephrology with cr 3.5 and blood glucose >600.     * No surgery found *      Hospital Course:   Patient admitted with hyperglycemia and JAZLYN 2/2 hypovolemia. Was not taking insulin at home, recent steroid use and was eating large amounts of candy.     Given 3 L NS and restarted on long acting insulin with 10 units aspart given initially. Blood glucose improved to WNL and JAZLYN resolved  with creatinine returning to what appears to be baseline around 2.5.     For Outpatient follow up:   1. Assess blood glucose and insuline regimen. No changes made, it seemed like patient just wasn't using it. No barriers identified.   2. BP elevated while admitted, lisinopril held 2/2 JAZLYN while inpatient. No changes made to outpatient regimen  3. Will need monitoring of renal function and diuretics     Review of Systems   Constitutional:  Negative for chills and fever.   HENT: Negative for sore throat and trouble swallowing.    Eyes: Negative for photophobia and visual disturbance.   Respiratory: Negative for cough and shortness of breath.    Cardiovascular: Negative for chest pain, palpitations and leg swelling.   Gastrointestinal: Negative for abdominal distention, abdominal pain, diarrhea, nausea and vomiting.   Endocrine: Positive for polyuria  Genitourinary: Negative for decreased urine volume, dysuria and hematuria.   Musculoskeletal: Negative for arthralgias and myalgias.   Skin: Negative for rash and wound.   Neurological: Negative for dizziness and syncope.   Psychiatric/Behavioral: Negative for agitation and confusion.      Objective:      Vitals:    04/12/18 0733   BP: (!) 163/72   Pulse: 77   Resp: 20   Temp: 98.1 °F (36.7 °C)          Physical Exam   Constitutional: He is oriented to person, place, and time.   HENT:   Head: Normocephalic and atraumatic.   Eyes: EOM are normal. Pupils are equal, round, and reactive to light.   Cardiovascular: Normal rate, regular rhythm, normal heart sounds and intact distal pulses.    Pulmonary/Chest: Effort normal and breath sounds normal. No respiratory distress. He has no wheezes. He has no rales.   Abdominal: Soft. He exhibits no distension. There is no tenderness. There is no rebound and no guarding.   Musculoskeletal: He exhibits no edema or deformity.   Lymphadenopathy:     He has no cervical adenopathy.   Neurological: He is alert and oriented to person, place,  and time. No cranial nerve deficit.   Skin: Skin is warm and dry.   Psychiatric: He has a normal mood and affect. His behavior is normal.   Nursing note and vitals reviewed.      * JAZLYN (acute kidney injury)-resolved as of 4/12/2018    Suspect pre-renal in setting of osmotic diuresis. Improved with fluid resuscitation back to baseline creatinine.   Resuming all home medications.           Type 2 diabetes mellitus with hyperglycemia, with long-term current use of insulin    Presented with  and it appears to have been elevated for at least one week. Had not been taking insulin and responded well to long acting given in hospital.   Advised on need for insulin and not skipping when he doesn't feel well. Explained rationale behind SSI and long acting and he agrees to take at home.   Has all supplies needed.             Multiple myeloma not having achieved remission    Followed by Dr. Guaman on steroids and  Revlimid. Resume home regimen.           Chronic diastolic heart failure    No evidence of volume overload despite 3 L crystalloid given          Hypertension associated with diabetes    Hypertensive on presentation. Home meds given without lisinopril. Resuming all home medications on discharge.         Hyperparathyroidism    Elevated PTH with nl Calcium. Will order Vit D for AM  Appears 2/2 renal disease with low vitamin D  Discharge with Vit D supplementation        Essential hypertension    Continued B Blocker, CCB.   Acei held on admission, restart on discharge            Final Active Diagnoses:    Diagnosis Date Noted POA    Type 2 diabetes mellitus with hyperglycemia, with long-term current use of insulin [E11.65, Z79.4] 04/11/2018 Not Applicable    Coronary artery disease due to calcified coronary lesion [I25.10, I25.84] 03/19/2018 Yes    Multiple myeloma not having achieved remission [C90.00] 01/16/2018 Yes    Type 2 diabetes mellitus with stage 4 chronic kidney disease, with long-term current use of  insulin [E11.22, N18.4, Z79.4] 01/11/2018 Not Applicable    Hypertension associated with diabetes [E11.59, I10] 01/11/2018 Yes    Chronic diastolic heart failure [I50.32] 01/11/2018 Yes    Type 2 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, with long-term current use of insulin [E11.3513, Z79.4] 09/26/2017 Not Applicable    Hyperparathyroidism [E21.3] 05/26/2016 Yes    Type 2 diabetes mellitus with diabetic polyneuropathy, with long-term current use of insulin [E11.42, Z79.4] 09/28/2015 Not Applicable    Essential hypertension [I10] 01/12/2014 Yes      Problems Resolved During this Admission:    Diagnosis Date Noted Date Resolved POA    PRINCIPAL PROBLEM:  JAZLYN (acute kidney injury) [N17.9] 04/11/2018 04/12/2018 Yes       Discharged Condition: stable    Disposition:     Follow Up:  Follow-up Information     Nancy Colón MD On 4/23/2018.    Specialty:  Internal Medicine  Why:  2:20 for recheck of blood sugar and management of diuretics  Contact information:  [address] [facility] [facility]  [place] [id number] [phone]                 Patient Instructions:     Diet diabetic     Activity as tolerated     Notify your health care provider if you experience any of the following:  temperature >100.4     Notify your health care provider if you experience any of the following:  persistent nausea and vomiting or diarrhea     Notify your health care provider if you experience any of the following:  severe uncontrolled pain     Notify your health care provider if you experience any of the following:  difficulty breathing or increased cough     Notify your health care provider if you experience any of the following:  persistent dizziness, light-headedness, or visual disturbances     Notify your health care provider if you experience any of the following:  increased confusion or weakness         Significant Diagnostic Studies: Labs:   BMP:   Recent Labs  Lab 04/11/18  1210  04/11/18  2332 04/12/18  0432   * 194* 91   * 137 139   K 4.1 3.7 3.5   CL 94* 105 109   CO2 25 24 22*   BUN 57* 55* 52*   CREATININE 3.5* 2.9* 2.6*   CALCIUM 9.4 8.9 8.5*   MG  --   --  1.6   , CMP   Recent Labs  Lab 04/11/18  1210 04/11/18  2332 04/12/18  0432   * 137 139   K 4.1 3.7 3.5   CL 94* 105 109   CO2 25 24 22*   * 194* 91   BUN 57* 55* 52*   CREATININE 3.5* 2.9* 2.6*   CALCIUM 9.4 8.9 8.5*   PROT  --   --  6.0   ALBUMIN 3.5  --  2.9*   BILITOT  --   --  0.6   ALKPHOS  --   --  140*   AST  --   --  16   ALT  --   --  33   ANIONGAP 8 8 8   ESTGFRAFRICA 20.6* 25.8* 29.4*   EGFRNONAA 17.8* 22.3* 25.5*    and A1C:   Recent Labs  Lab 10/25/17  1515 12/15/17  0840 04/11/18  1829   HGBA1C 8.2* 7.6* 9.4*       Pending Diagnostic Studies:     None         Medications:  Reconciled Home Medications:      Medication List      START taking these medications    ergocalciferol 50,000 unit Cap  Commonly known as:  VITAMIN D2  Take 1 capsule (50,000 Units total) by mouth every 7 days.        CHANGE how you take these medications    * lenalidomide 10 mg Cap  Commonly known as:  REVLIMID  10 mg daily for 21 days, followed by 7 days off  What changed:  Another medication with the same name was added. Make sure you understand how and when to take each.     * REVLIMID 10 mg Cap  Generic drug:  lenalidomide  TAKE 1 CAPSULE (10MG) BY MOUTH ONCE DAILY FOR 21 DAYS ON AND 7 DAYS OFF  What changed:  You were already taking a medication with the same name, and this prescription was added. Make sure you understand how and when to take each.        * This list has 2 medication(s) that are the same as other medications prescribed for you. Read the directions carefully, and ask your doctor or other care provider to review them with you.            CONTINUE taking these medications    amLODIPine 10 MG tablet  Commonly known as:  NORVASC  Take 1 tablet (10 mg total) by mouth once daily.     atorvastatin 80 MG  tablet  Commonly known as:  LIPITOR  Take 1 tablet (80 mg total) by mouth once daily.     blood sugar diagnostic Strp  1 strip by Misc.(Non-Drug; Combo Route) route after meals as needed.     carvedilol 25 MG tablet  Commonly known as:  COREG  Take 1 tablet (25 mg total) by mouth 2 (two) times daily with meals.     clopidogrel 75 mg tablet  Commonly known as:  PLAVIX  Take 1 tablet (75 mg total) by mouth once daily.     dexamethasone 4 MG Tab  Commonly known as:  DECADRON  3 tablets once a week     fluticasone 50 mcg/actuation nasal spray  Commonly known as:  FLONASE  1 spray by Each Nare route 2 (two) times daily as needed for Rhinitis.     furosemide 40 MG tablet  Commonly known as:  LASIX  Pt to take 1 1/2 tablet twice a day per Dr. Salinas on 3/19/18.     gabapentin 300 MG capsule  Commonly known as:  NEURONTIN  Take 1 capsule (300 mg total) by mouth 2 (two) times daily.     insulin aspart U-100 100 unit/mL Inpn pen  Commonly known as:  NovoLOG  Inject 8 units w/ meals plus scale 150-200 +2, 201-250 +4, 251-300 +6, 301-350 +8, >350 +10. Snack 4 units.     insulin glargine 100 unit/mL (3 mL) Inpn pen  Commonly known as:  LANTUS SOLOSTAR U-100 INSULIN  Inject 28 units at night.     lisinopril 40 MG tablet  Commonly known as:  PRINIVIL,ZESTRIL  Take 1 tablet (40 mg total) by mouth once daily.     meclizine 25 mg tablet  Commonly known as:  ANTIVERT  Take 1 tablet (25 mg total) by mouth 3 (three) times daily as needed for Dizziness.     MENTHOLATUM DEEP HEAT RUB TOP  Apply topically.     metOLazone 2.5 MG tablet  Commonly known as:  ZAROXOLYN  Take 1 tablet (2.5 mg total) by mouth once daily.     mirtazapine 30 MG tablet  Commonly known as:  REMERON  Take 30 mg by mouth every evening.     montelukast 10 mg tablet  Commonly known as:  SINGULAIR  Take 1 tablet (10 mg total) by mouth every evening.     nitroGLYCERIN 0.4 MG SL tablet  Commonly known as:  NITROSTAT  Place 1 tablet (0.4 mg total) under the tongue every  "5 (five) minutes as needed for Chest pain.     ondansetron 8 MG Tbdl  Commonly known as:  ZOFRAN-ODT  Take 1 tablet (8 mg total) by mouth every 6 (six) hours as needed.     pen needle, diabetic 31 gauge x 5/16" Ndle  Pt to use pen needle with Lantus daily            Indwelling Lines/Drains at time of discharge:   Lines/Drains/Airways          No matching active lines, drains, or airways          Time spent on the discharge of patient: 30 minutes  Patient was seen and examined on the date of discharge and determined to be suitable for discharge.         Des Williamson MD   Internal Medicine PGY-1  [phone]    "

## 2018-04-12 NOTE — PLAN OF CARE
Problem: Patient Care Overview  Goal: Plan of Care Review  Outcome: Outcome(s) achieved Date Met: 04/12/18  Pt being discharged home per MD orders.  VSS and no c/o pain at this time.  Reviewed discharge instructions, follow-up's and meds with ptZACHARIAH.  Removed PIV access from R fa, dry gauze/tape placed to site, CDI.  Ride to be provided by family upon discharge and will transport per W/C.  W/C transport order placed.

## 2018-04-12 NOTE — HOSPITAL COURSE
Patient admitted with hyperglycemia and JAZLYN 2/2 hypovolemia. Was not taking insulin at home, recent steroid use and was eating large amounts of candy.     Given 3 L NS and restarted on long acting insulin with 10 units aspart given initially. Blood glucose improved to WNL and JAZLYN resolved with creatinine returning to what appears to be baseline around 2.5.     For Outpatient follow up:   1. Assess blood glucose and insuline regimen. No changes made, it seemed like patient just wasn't using it. No barriers identified.   2. BP elevated while admitted, lisinopril held 2/2 JAZLYN while inpatient. No changes made to outpatient regimen  3. Will need monitoring of renal function and diuretics

## 2018-04-12 NOTE — PT/OT/SLP PROGRESS
Occupational Therapy Screen      Patient Name:  Marie Reis Jr.   MRN:  [id number]    Pt with OT orders written on 4/11/18 by MD Teri.  Upon attempt at evaluation pt & pt's spouse reported that pt has been independent with ADL's & ambulation in room during hospitalization & is at baseline mobility.  Pt's RN confirmed pt mobility.  Pt declined need for therapy evaluation.  Upon discussion, OT, pt & pt's spouse agreed that no evaluation needed at this time.  No further OT attempts to follow.  No formal evaluation proceeded & no charge posted.    JAIMIE BairdR  4/12/2018

## 2018-04-12 NOTE — MEDICAL/APP STUDENT
Progress Note  [facility] Medicine    Patient Name: Marie Reis Jr.  YOB: 1955    Admit Date: 4/11/2018                     LOS: 1    SUBJECTIVE:     Reason for Admission:  JAZLYN (acute kidney injury)    Initial HPI:  Mr. Marie Reis Jr. is a 62 y.o. male with Type 2 diabetes mellitus, stage IV chonic kidney disease, diastolic heart failure and multiple myeloma who presented direct from the nephrology clinic with hyperglycemia and increased creatinine. Patient was noted on recent labs on 4/2 to have Creatinine of 4.1 up from baseline creatinine of 2.0-2.6 and blood sugar of 400 on routine blood work. Patient was called and told to go to ER for evaluation but patient did not. Patient did hold his diuretics, Lasix and Metolazone, for the past 4 days as his Cardiologist instructed him too. Today, patient came back in for repeat labs and his creatinine was better at 3.5 but blood sugar even higher at 546. Patient's only complaints are nausea and fatigue for the past 2 days. The nausea is somewhat relieved with eating. Patient denies chest pain, dyspnea, SOB, abdominal pain, dysuria and hematuria. Patient reports he is constantly thirsty and urinates a lot even though he stopped the diuretics. Patient reports some noncompliance with his insulin regimen over the past week because he wasn't sure how the polyuria and fatigue would effect it.    Hospital course:  4/12 Patient's blood sugar has gone down to 79 overnight. Patient is no longer NPO and will be discharged today. Patient's Vitamin D 26 and , will give Vitamin D analog today.    Interval history: NAEON    Review of Systems   Constitutional: Positive for malaise/fatigue. Negative for chills and fever.   HENT: Negative.    Eyes: Positive for blurred vision, double vision and photophobia.   Respiratory: Negative for cough, shortness of breath and wheezing.    Cardiovascular: Negative for chest pain, orthopnea and leg swelling.   Gastrointestinal:  Negative for abdominal pain, diarrhea, nausea and vomiting.   Genitourinary: Positive for frequency. Negative for dysuria and urgency.   Musculoskeletal: Negative for back pain, joint pain and myalgias.   Skin: Negative.    Neurological: Negative for dizziness, focal weakness and headaches.   Endo/Heme/Allergies: Positive for polydipsia.   Psychiatric/Behavioral: Negative for depression and memory loss. The patient is not nervous/anxious.        OBJECTIVE:     Vital Signs Range (Last 24H):  Temp:  [97.7 °F (36.5 °C)-98.1 °F (36.7 °C)]   Pulse:  [72-83]   Resp:  [16-20]   BP: (140-193)/(65-88)   SpO2:  [97 %-100 %] Body mass index is 23.92 kg/m².  Wt Readings from Last 1 Encounters:   04/12/18 0546 80 kg (176 lb 5.9 oz)   04/11/18 1700 73.5 kg (162 lb 0.6 oz)       Physical Exam:  Physical Exam   Constitutional: He is oriented to person, place, and time and well-developed, well-nourished, and in no distress. No distress.   HENT:   Head: Normocephalic and atraumatic.   Eyes: Conjunctivae and EOM are normal. Pupils are equal, round, and reactive to light.   Neck: Normal range of motion. Neck supple.   Cardiovascular: Normal rate, regular rhythm, normal heart sounds and intact distal pulses.    Pulmonary/Chest: Breath sounds normal. No respiratory distress.   Abdominal: Soft. Bowel sounds are normal. There is no tenderness.   Musculoskeletal: Normal range of motion.   Neurological: He is alert and oriented to person, place, and time. Gait normal.   Skin: Skin is warm and dry. He is not diaphoretic.   Psychiatric: Mood and affect normal.       Diagnostic Results:  Lab Results   Component Value Date    WBC 8.30 04/12/2018    HGB 10.8 (L) 04/12/2018    HCT 32.1 (L) 04/12/2018    MCV 82 04/12/2018     (L) 04/12/2018       Recent Labs  Lab 04/12/18  0432   GLU 91      K 3.5      CO2 22*   BUN 52*   CREATININE 2.6*   CALCIUM 8.5*   MG 1.6     Lab Results   Component Value Date    INR 1.1 02/21/2018    INR  1.0 12/26/2017    INR 1.0 09/17/2016     Lab Results   Component Value Date    HGBA1C 9.4 (H) 04/11/2018     Recent Labs      04/11/18   1808  04/11/18   2123  04/11/18   2308  04/12/18   0540  04/12/18   0811   POCTGLUCOSE  498*  255*  187*  79  82       ASSESSMENT/PLAN:     Active Hospital Problems    Diagnosis  POA    Type 2 diabetes mellitus with hyperglycemia, with long-term current use of insulin [E11.65, Z79.4]  Not Applicable    Coronary artery disease due to calcified coronary lesion [I25.10, I25.84]  Yes    Multiple myeloma not having achieved remission [C90.00]  Yes    Type 2 diabetes mellitus with stage 4 chronic kidney disease, with long-term current use of insulin [E11.22, N18.4, Z79.4]  Not Applicable    Hypertension associated with diabetes [E11.59, I10]  Yes    Chronic diastolic heart failure [I50.32]  Yes    Type 2 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, with long-term current use of insulin [E11.3513, Z79.4]  Not Applicable    Hyperparathyroidism [E21.3]  Yes    Type 2 diabetes mellitus with diabetic polyneuropathy, with long-term current use of insulin [E11.42, Z79.4]  Not Applicable    Essential hypertension [I10]  Yes      Resolved Hospital Problems    Diagnosis Date Resolved POA    *JAZLYN (acute kidney injury) [N17.9] 04/12/2018 Yes       JAZLYN:   - Patient came in with Cr of 3.5, was 4.1 at outpatient clinic last week   - labs improving, Cr now 2.6 which is close to baseline    Hyperglycemia:   - Glucose of 546 on admission, now down to 82   - Restart normal diet    Type 2 diabetes mellitus:   - Restart insulin regimen of 28 Lantus QHS and 8 units Novolog with meals    Hyperparathyroidism of renal disease:   - PTH of 161, Vitamin D of 26   - Give Vitamin D analog 1,000 units    Multiple myeloma:   - Continue home medicines upon discharge    Chronic diastolic heart failure:   - Clinically dry    Hypertension:   - Continue home medicines  upon discharge    DISCHARGE PLANNING:   Discharge home with wife.    Signing Physician:  Iván Jasso

## 2018-04-12 NOTE — MEDICAL/APP STUDENT
HISTORY & PHYSICAL  Hospital Medicine    [place] [facility] [place] [facility]    PRESENTING HISTORY     Chief Complaint/Reason for Admission:  JAZLYN and hyperglycemia    History of Present Illness:  Mr. Marie Reis Jr. is a 62 y.o. male with Type 2 diabetes mellitus, stage IV chonic kidney disease, diastolic heart failure and multiple myeloma who presented direct from the nephrology clinic with hyperglycemia and increased creatinine. Patient was noted on recent labs on 4/2 to have Creatinine of 4.1 up from baseline creatinine of 2.0-2.6 and blood sugar of 400 on routine blood work. Patient was called and told to go to ER for evaluation but patient did not. Patient did hold his diuretics, Lasix and Metolazone, for the past 4 days as his Cardiologist instructed him too. Today, patient came back in for repeat labs and his creatinine was better at 3.5 but blood sugar even higher at 546. Patient's only complaints are nausea and fatigue for the past 2 days. The nausea is somewhat relieved with eating. Patient denies chest pain, dyspnea, SOB, abdominal pain, dysuria and hematuria. Patient reports he is constantly thirsty and urinates a lot even though he stopped the diuretics. Patient reports some noncompliance with his insulin regimen over the past week because he wasn't sure how the polyuria and fatigue would effect it.    Provisional diagnosis is JAZLYN due to osmotic diuresis 2/2 insulin noncompliance. Differential includes continued diuretic use, adverse reaction to Revlimid    Review of Systems:  Review of Systems   Constitutional: Positive for malaise/fatigue. Negative for chills, fever and weight loss.   HENT: Negative.    Eyes: Positive for blurred vision and double vision. Negative for pain.   Respiratory: Negative for cough, shortness of breath and wheezing.    Cardiovascular: Negative for chest pain, orthopnea and leg swelling.   Gastrointestinal: Positive for nausea. Negative for abdominal pain, diarrhea and vomiting.    Genitourinary: Negative for dysuria, frequency and urgency.        Polyuria   Musculoskeletal: Positive for back pain. Negative for joint pain, myalgias and neck pain.   Skin: Negative.    Neurological: Negative for dizziness, sensory change, focal weakness, weakness and headaches.   Endo/Heme/Allergies: Positive for polydipsia.   Psychiatric/Behavioral: Negative for depression and memory loss. The patient is not nervous/anxious.          PAST HISTORY:     Past Medical History:   Diagnosis Date    Binocular vision disorder with diplopia 9/22/2016    Bradycardia     Cataract     Cervical spondylosis 10/1/2015    Chronic diastolic heart failure 1/11/2018    Coronary artery disease due to calcified coronary lesion 3/19/2018    Dyslipidemia 6/26/2015    Hearing impairment 10/25/2013    History of stroke 3/23/2015    Hypertension associated with diabetes 1/11/2018    Hypertensive retinopathy of both eyes 9/26/2017    Lumbar spondylosis 10/1/2015    Multiple myeloma not having achieved remission 1/16/2018    Persistent proteinuria 1/11/2018    Spondylolisthesis of lumbar region 10/1/2015    Strabismus     Stroke 2014    Thoracic spondylosis 10/1/2015    Type 2 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, with long-term current use of insulin 9/26/2017    Type 2 diabetes mellitus with diabetic polyneuropathy, with long-term current use of insulin 9/28/2015    Type 2 diabetes mellitus with stage 4 chronic kidney disease, with long-term current use of insulin 1/11/2018       Past Surgical History:   Procedure Laterality Date    CATARACT EXTRACTION W/  INTRAOCULAR LENS IMPLANT Left 4/30/15    [facility]    HAND SURGERY  2/2012    DR. BAKER [facility]    left hand fracture sx      LT EYE   5/2/15    DR KULLMAN [facility]       Family History   Problem Relation Age of Onset    Diabetes Mother     Diabetes Father     Diabetes Sister     Diabetes Brother     No Known  Problems Maternal Aunt     No Known Problems Maternal Uncle     No Known Problems Paternal Aunt     No Known Problems Paternal Uncle     No Known Problems Maternal Grandmother     No Known Problems Maternal Grandfather     No Known Problems Paternal Grandmother     No Known Problems Paternal Grandfather     Blindness Neg Hx     Anesthesia problems Neg Hx     Amblyopia Neg Hx     Cancer Neg Hx     Cataracts Neg Hx     Glaucoma Neg Hx     Hypertension Neg Hx     Macular degeneration Neg Hx     Retinal detachment Neg Hx     Strabismus Neg Hx     Stroke Neg Hx     Thyroid disease Neg Hx     Heart attack Neg Hx     Heart disease Neg Hx     Heart failure Neg Hx     Hyperlipidemia Neg Hx        Social History     Social History    Marital status:      Spouse name: N/A    Number of children: N/A    Years of education: N/A     Social History Main Topics    Smoking status: Never Smoker    Smokeless tobacco: Never Used    Alcohol use No    Drug use: No    Sexual activity: No     Other Topics Concern    None     Social History Narrative    None       MEDICATIONS & ALLERGIES:     No current facility-administered medications on file prior to encounter.      Current Outpatient Prescriptions on File Prior to Encounter   Medication Sig Dispense Refill    amLODIPine (NORVASC) 10 MG tablet Take 1 tablet (10 mg total) by mouth once daily. 90 tablet 3    atorvastatin (LIPITOR) 80 MG tablet Take 1 tablet (80 mg total) by mouth once daily. 90 tablet 1    blood sugar diagnostic Strp 1 strip by Misc.(Non-Drug; Combo Route) route after meals as needed.      carvedilol (COREG) 25 MG tablet Take 1 tablet (25 mg total) by mouth 2 (two) times daily with meals. 180 tablet 1    clopidogrel (PLAVIX) 75 mg tablet Take 1 tablet (75 mg total) by mouth once daily. 90 tablet 3    dexamethasone (DECADRON) 4 MG Tab 3 tablets once a week 12 tablet 11    furosemide (LASIX) 40 MG tablet Pt to take 1 1/2 tablet  "twice a day per Dr. Salinas on 3/19/18. 270 tablet 3    gabapentin (NEURONTIN) 300 MG capsule Take 1 capsule (300 mg total) by mouth 2 (two) times daily. 180 capsule 1    insulin aspart (NOVOLOG) 100 unit/mL InPn pen Inject 8 units w/ meals plus scale 150-200 +2, 201-250 +4, 251-300 +6, 301-350 +8, >350 +10. Snack 4 units. 1 Box 6    insulin glargine (LANTUS SOLOSTAR) 100 unit/mL (3 mL) InPn pen Inject 28 units at night. 15 mL 6    lenalidomide (REVLIMID) 10 mg Cap 10 mg daily for 21 days, followed by 7 days off 21 each 0    lisinopril (PRINIVIL,ZESTRIL) 40 MG tablet Take 1 tablet (40 mg total) by mouth once daily. 90 tablet 1    meclizine (ANTIVERT) 25 mg tablet Take 1 tablet (25 mg total) by mouth 3 (three) times daily as needed for Dizziness. 270 tablet 0    metOLazone (ZAROXOLYN) 2.5 MG tablet Take 1 tablet (2.5 mg total) by mouth once daily. 30 tablet 3    mirtazapine (REMERON) 30 MG tablet Take 30 mg by mouth every evening.       montelukast (SINGULAIR) 10 mg tablet Take 1 tablet (10 mg total) by mouth every evening. 90 tablet 1    fluticasone (FLONASE) 50 mcg/actuation nasal spray 1 spray by Each Nare route 2 (two) times daily as needed for Rhinitis. 16 g 5    insulin needles, disposable, 31 X 5/16 " Ndle Pt to use pen needle with Lantus daily 100 each 3    METHYL SALICYLATE/MENTHOL (MENTHOLATUM DEEP HEAT RUB TOP) Apply topically.      nitroGLYCERIN (NITROSTAT) 0.4 MG SL tablet Place 1 tablet (0.4 mg total) under the tongue every 5 (five) minutes as needed for Chest pain. 30 tablet 1    ondansetron (ZOFRAN-ODT) 8 MG TbDL Take 1 tablet (8 mg total) by mouth every 6 (six) hours as needed. 30 tablet 2        Review of patient's allergies indicates:   Allergen Reactions    Hydralazine analogues      Increase swelling and throat itching and tightness with use.  Symptoms correlate only with this medication onset in hospital.       OBJECTIVE:     Vital Signs:  Temp:  [97.7 °F (36.5 °C)-97.9 °F (36.6 " °C)] 97.9 °F (36.6 °C)  Pulse:  [72-83] 83  Resp:  [16-18] 18  SpO2:  [99 %-100 %] 99 %  BP: (140-193)/(70-88) 159/80  Body mass index is 21.98 kg/m².     Physical Exam:  Physical Exam   Constitutional: He is oriented to person, place, and time and well-developed, well-nourished, and in no distress.   Eyes: EOM are normal. Pupils are equal, round, and reactive to light.   Neck: Normal range of motion. Neck supple.   Cardiovascular: Normal rate, regular rhythm, normal heart sounds and intact distal pulses.    Pulmonary/Chest: Effort normal and breath sounds normal. No respiratory distress.   Abdominal: Soft. Bowel sounds are normal. He exhibits no distension. There is no tenderness.   Musculoskeletal: Normal range of motion.   Neurological: He is alert and oriented to person, place, and time. Gait normal.   Skin: Skin is warm and dry.   Psychiatric: Mood and affect normal.   Vitals reviewed.      Laboratory  Lab Results   Component Value Date    WBC 12.13 04/11/2018    HGB 12.0 (L) 04/11/2018    HCT 36.3 (L) 04/11/2018    MCV 83 04/11/2018     04/11/2018       Recent Labs  Lab 04/11/18  1210   *   *   K 4.1   CL 94*   CO2 25   BUN 57*   CREATININE 3.5*   CALCIUM 9.4     Lab Results   Component Value Date    INR 1.1 02/21/2018    INR 1.0 12/26/2017    INR 1.0 09/17/2016     Lab Results   Component Value Date    HGBA1C 9.4 (H) 04/11/2018     Recent Labs      04/11/18   1808   POCTGLUCOSE  498*       ASSESSMENT & PLAN:     Current Problems List:  Active Hospital Problems    Diagnosis  POA    *JAZLYN (acute kidney injury) [N17.9]  Yes    Type 2 diabetes mellitus with hyperglycemia, with long-term current use of insulin [E11.65, Z79.4]  Not Applicable    Coronary artery disease due to calcified coronary lesion [I25.10, I25.84]  Yes    Multiple myeloma not having achieved remission [C90.00]  Yes    Type 2 diabetes mellitus with stage 4 chronic kidney disease, with long-term current use of insulin  [E11.22, N18.4, Z79.4]  Not Applicable    Hypertension associated with diabetes [E11.59, I10]  Yes    Chronic diastolic heart failure [I50.32]  Yes    Type 2 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, with long-term current use of insulin [E11.3513, Z79.4]  Not Applicable    Hyperparathyroidism [E21.3]  Yes    Type 2 diabetes mellitus with diabetic polyneuropathy, with long-term current use of insulin [E11.42, Z79.4]  Not Applicable    Essential hypertension [I10]  Yes      Resolved Hospital Problems    Diagnosis Date Resolved POA   No resolved problems to display.       Problem Assessment & Treatment Plan:    Acute kidney injury:   - Patient with stage IV chronic kidney disease   - Cr 3.5, was 4.1 last week, baseline is ~2.0-2.6   - Most likely 2/2 osmotic diuresis   - Given 1L of IVF, will continue to monitor renal function   - PTH elevated, ordering Vitamin D, Magnesium and Phorphorus labs    Hyperglycemia:   - Patient with type 2 diabetes mellitus, noncompliant with insulin regimen   - Home regimen is 28 units of Lantus at night and 8 units Novolog with meals   - Serum osm 305, no anion gap or evidence of acidosis   - Given 1L of IVF   - Given 23 units of insulin, reduced from home medicine due to reduced kidney function    - Q6 accucheck and NPO to limit glucose intake while correcting    Multiple myeloma:   - Followed by Dr. Artur Guaman Jr.   - On dexamethasone and Revlimid, holding for now    Chronic diastolic heart failure:   - Clinically dry   - Will monitor for signs of fluid overload    Hypertension:   - Will give home amlodipine and carvedilol, hold Lisinopril due to JAZLYN        Signing Physician:  Iván Jasso, medical student

## 2018-04-12 NOTE — H&P
[facility] Hospital Medicine  History & Physical    Patient Name: Marie Reis Jr.  MRN: [id number]  Admission Date: 4/11/2018  Attending Physician: Silvana Delong MD   Primary Care Provider: Nancy Colón MD    [facility] Medicine Team: [facility] 1 Des Williamson MD     Patient information was obtained from patient and past medical records.     Subjective:     Principal Problem:JAZLYN (acute kidney injury)    Chief Complaint: Fatigue       HPI: 62 M with T2DM with retinopathy and CKD IV, HFpEF, CAD, HTN, multiple myeloma who was admitted for hyperglycemia and JAZLYN. Patient is on chronic diuresis for diastolic dysfunction and lower extremity swelling and he noticed a few days ago that his leg swelling had improved so he stopped taking them, apparently per his cardiologist's recommendations. Additionally patient decided to stop taking his insulin because he was having polyuria and general malaise and wasn't sure how the medication would affect him. Additionally he took his weekly dose of steroids for multiple myeloma yesterday. He reports that his blood glucose is usually very well controlled but he decided to stop checking in the last few days and did not know it was elevated. He reports polyuria without dysuria or hematuria, denies fever or chills, cp, abdominal pain, N/V/D, syncope. Patient takes 28 units Lantus QHS, 8 units Aspart with meals.     Patient had labs drawn on 4/2 which showed JAZLYN with creatinine >4 from a baseline in the mid 2s and was instructed to come to the ED at that time. He decided to stay home and was sent from clinic today when he was seen by nephrology with cr 3.5 and blood glucose >600.     Past Medical History:   Diagnosis Date    Binocular vision disorder with diplopia 9/22/2016    Bradycardia     Cataract     Cervical spondylosis 10/1/2015    Chronic diastolic heart failure 1/11/2018    Coronary artery disease due to calcified coronary lesion 3/19/2018     Dyslipidemia 6/26/2015    Hearing impairment 10/25/2013    History of stroke 3/23/2015    Hypertension associated with diabetes 1/11/2018    Hypertensive retinopathy of both eyes 9/26/2017    Lumbar spondylosis 10/1/2015    Multiple myeloma not having achieved remission 1/16/2018    Persistent proteinuria 1/11/2018    Spondylolisthesis of lumbar region 10/1/2015    Strabismus     Stroke 2014    Thoracic spondylosis 10/1/2015    Type 2 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, with long-term current use of insulin 9/26/2017    Type 2 diabetes mellitus with diabetic polyneuropathy, with long-term current use of insulin 9/28/2015    Type 2 diabetes mellitus with stage 4 chronic kidney disease, with long-term current use of insulin 1/11/2018       Past Surgical History:   Procedure Laterality Date    CATARACT EXTRACTION W/  INTRAOCULAR LENS IMPLANT Left 4/30/15    [facility]    HAND SURGERY  2/2012    DR. BAKER [facility]    left hand fracture sx      LT EYE   5/2/15    DR KULLMAN [facility]       Review of patient's allergies indicates:   Allergen Reactions    Hydralazine analogues      Increase swelling and throat itching and tightness with use.  Symptoms correlate only with this medication onset in hospital.       No current facility-administered medications on file prior to encounter.      Current Outpatient Prescriptions on File Prior to Encounter   Medication Sig    amLODIPine (NORVASC) 10 MG tablet Take 1 tablet (10 mg total) by mouth once daily.    atorvastatin (LIPITOR) 80 MG tablet Take 1 tablet (80 mg total) by mouth once daily.    blood sugar diagnostic Strp 1 strip by Misc.(Non-Drug; Combo Route) route after meals as needed.    carvedilol (COREG) 25 MG tablet Take 1 tablet (25 mg total) by mouth 2 (two) times daily with meals.    clopidogrel (PLAVIX) 75 mg tablet Take 1 tablet (75 mg total) by mouth once daily.    dexamethasone (DECADRON) 4 MG Tab  "3 tablets once a week    furosemide (LASIX) 40 MG tablet Pt to take 1 1/2 tablet twice a day per Dr. Salinas on 3/19/18.    gabapentin (NEURONTIN) 300 MG capsule Take 1 capsule (300 mg total) by mouth 2 (two) times daily.    insulin aspart (NOVOLOG) 100 unit/mL InPn pen Inject 8 units w/ meals plus scale 150-200 +2, 201-250 +4, 251-300 +6, 301-350 +8, >350 +10. Snack 4 units.    insulin glargine (LANTUS SOLOSTAR) 100 unit/mL (3 mL) InPn pen Inject 28 units at night.    lenalidomide (REVLIMID) 10 mg Cap 10 mg daily for 21 days, followed by 7 days off    lisinopril (PRINIVIL,ZESTRIL) 40 MG tablet Take 1 tablet (40 mg total) by mouth once daily.    meclizine (ANTIVERT) 25 mg tablet Take 1 tablet (25 mg total) by mouth 3 (three) times daily as needed for Dizziness.    metOLazone (ZAROXOLYN) 2.5 MG tablet Take 1 tablet (2.5 mg total) by mouth once daily.    mirtazapine (REMERON) 30 MG tablet Take 30 mg by mouth every evening.     montelukast (SINGULAIR) 10 mg tablet Take 1 tablet (10 mg total) by mouth every evening.    fluticasone (FLONASE) 50 mcg/actuation nasal spray 1 spray by Each Nare route 2 (two) times daily as needed for Rhinitis.    insulin needles, disposable, 31 X 5/16 " Ndle Pt to use pen needle with Lantus daily    METHYL SALICYLATE/MENTHOL (MENTHOLATUM DEEP HEAT RUB TOP) Apply topically.    nitroGLYCERIN (NITROSTAT) 0.4 MG SL tablet Place 1 tablet (0.4 mg total) under the tongue every 5 (five) minutes as needed for Chest pain.    ondansetron (ZOFRAN-ODT) 8 MG TbDL Take 1 tablet (8 mg total) by mouth every 6 (six) hours as needed.     Family History     Problem Relation (Age of Onset)    Diabetes Mother, Father, Sister, Brother    No Known Problems Maternal Aunt, Maternal Uncle, Paternal Aunt, Paternal Uncle, Maternal Grandmother, Maternal Grandfather, Paternal Grandmother, Paternal Grandfather        Social History Main Topics    Smoking status: Never Smoker    Smokeless tobacco: Never " Used    Alcohol use No    Drug use: No    Sexual activity: No     Review of Systems   Constitutional: Positive for fatigue. Negative for chills and fever.   HENT: Negative for sore throat and trouble swallowing.    Eyes: Negative for photophobia and visual disturbance.   Respiratory: Negative for cough and shortness of breath.    Cardiovascular: Negative for chest pain, palpitations and leg swelling.   Gastrointestinal: Negative for abdominal distention, abdominal pain, diarrhea, nausea and vomiting.   Endocrine: Positive for polydipsia and polyuria.   Genitourinary: Negative for decreased urine volume, dysuria and hematuria.   Musculoskeletal: Negative for arthralgias and myalgias.   Skin: Negative for rash and wound.   Neurological: Negative for dizziness and syncope.   Psychiatric/Behavioral: Negative for agitation and confusion.     Objective:     Vital Signs (Most Recent):  Temp: 97.7 °F (36.5 °C) (04/11/18 1623)  Pulse: 72 (04/11/18 1623)  Resp: 16 (04/11/18 1623)  BP: (!) 173/70 (04/11/18 1700)  SpO2: 100 % (04/11/18 1623) Vital Signs (24h Range):  Temp:  [97.7 °F (36.5 °C)] 97.7 °F (36.5 °C)  Pulse:  [72-80] 72  Resp:  [16] 16  SpO2:  [99 %-100 %] 100 %  BP: (140-193)/(70-88) 173/70     Weight: 73.5 kg (162 lb 0.6 oz)  Body mass index is 21.98 kg/m².    Physical Exam   Constitutional: He is oriented to person, place, and time.   HENT:   Head: Normocephalic and atraumatic.   Eyes: EOM are normal. Pupils are equal, round, and reactive to light.   Cardiovascular: Normal rate, regular rhythm, normal heart sounds and intact distal pulses.    Pulmonary/Chest: Effort normal and breath sounds normal. No respiratory distress. He has no wheezes. He has no rales.   Abdominal: Soft. He exhibits no distension. There is no tenderness. There is no rebound and no guarding.   Musculoskeletal: He exhibits no edema or deformity.   Lymphadenopathy:     He has no cervical adenopathy.   Neurological: He is alert and oriented  to person, place, and time. No cranial nerve deficit.   Skin: Skin is warm and dry.   Psychiatric: He has a normal mood and affect. His behavior is normal.   Nursing note and vitals reviewed.        CRANIAL NERVES     CN III, IV, VI   Pupils are equal, round, and reactive to light.  Extraocular motions are normal.        Significant Labs:   Recent Lab Results       04/11/18  1808 04/11/18  1210 04/11/18  1201      Immature Granulocytes  0.8(H)      Immature Grans (Abs)  0.10  Comment:  Mild elevation in immature granulocytes is non specific and   can be seen in a variety of conditions including stress response,   acute inflammation, trauma and pregnancy. Correlation with other   laboratory and clinical findings is essential.  (H)      Albumin  3.5      Anion Gap  8      Appearance, UA   Clear     Bacteria, UA   None     Baso #  0.01      Basophil%  0.1      Bilirubin (UA)   Negative     BUN, Bld  57(H)      Calcium  9.4      Chloride  94(L)      CO2  25      Color, UA   Straw     Creatinine  3.5(H)      Creatinine, Random Ur   93.0  Comment:  The random urine reference ranges provided were established   for 24 hour urine collections.  No reference ranges exist for  random urine specimens.  Correlate clinically.       Differential Method  Automated      eGFR if   20.6(A)      eGFR if non   17.8  Comment:  Calculation used to obtain the estimated glomerular filtration  rate (eGFR) is the CKD-EPI equation.   (A)      Eos #  0.3      Eosinophil%  2.2      Glucose  546  Comment:  *Critical value -   Results called to and read back by:frank morse lpn  (HH)      Glucose, UA   3+(A)     Gran # (ANC)  8.3(H)      Gran%  68.5      Hematocrit  36.3(L)      Hemoglobin  12.0(L)      Hyaline Casts, UA   1     Ketones, UA   Negative     Leukocytes, UA   Negative     Lymph #  2.8      Lymph%  22.8      MCH  27.4      MCHC  33.1      MCV  83      Microscopic Comment   SEE COMMENT  Comment:  Other  formed elements not mentioned in the report are not   present in the microscopic examination.        Mono #  0.7      Mono%  5.6      MPV  11.2      Nitrite, UA   Negative     nRBC  0      Occult Blood UA   2+(A)     pH, UA   5.0     Phosphorus  3.6      Platelets  152      POCT Glucose 498(HH)       Potassium  4.1      Prot/Creat Ratio, Ur   0.90(H)     Protein, UA   2+  Comment:  Recommend a 24 hour urine protein or a urine   protein/creatinine ratio if globulin induced proteinuria is  clinically suspected.  (A)     Protein, Urine Random   84  Comment:  The random urine reference ranges provided were established   for 24 hour urine collections.  No reference ranges exist for  random urine specimens.  Correlate clinically.  (H)     PTH  161.0(H)      RBC  4.38(L)      RBC, UA   4     RDW  13.3      Sodium  127(L)      Specific Gravity, UA   1.020     Specimen UA   Urine, Unspecified     Uric Acid  8.5(H)      Urobilinogen, UA   Negative     WBC, UA   1     WBC  12.13      Yeast, UA   None             Assessment/Plan:     * JAZLYN (acute kidney injury)    Suspect pre-renal in setting of osmotic diuresis.   Baseline cr mid 2s increased to 4.1 about 1 week ago. Improved somewhat today to 3.5.   Will give IVF and monitor for improvement.           Type 2 diabetes mellitus with hyperglycemia, with long-term current use of insulin    Likely etiology of hyperglycemia is medication nonconformity in setting of steroid use.   Serum osm 305, no anion gap or evidence of acidosis  Patient with polyuria likely 2/2 hyperglycemia   Will replace suspected hypovolemia and correct blood glucose. Reducing home dose by 5 units to account for kidney injury  Q6 accucheck with MDSSI and holding NPO to limit variables while correcting.   23 units detemir          Multiple myeloma not having achieved remission    Followed by Dr. Guaman on steroids and  Revlimid. Holding for now.           Chronic diastolic heart failure    Clinically dry, will  fluid resuscitate and monitor for signs of volume overload          Hypertension associated with diabetes    Hypertensive on presentation. Will give home medicines but holding Lisinopril in setting of JAZLYN.           Hyperparathyroidism    Elevated PTH with nl Calcium. Will order Vit D for AM        Essential hypertension    Continuing B Blocker, CCB. Hold Acei in setting of JAZLYN            VTE Risk Mitigation         Ordered     heparin (porcine) injection 5,000 Units  Every 8 hours     Route:  Subcutaneous        04/11/18 1757     IP VTE HIGH RISK PATIENT  Once      04/11/18 1757             Des Williamson MD   Internal Medicine PGY-1  [phone]

## 2018-04-12 NOTE — PLAN OF CARE
Extended Emergency Contact Information  Primary Emergency Contact: Naheed Reis  Address: [address] [address] [place]  Home Phone: [phone]  Relation: Spouse  Secondary Emergency Contact: Ursula Guaman  Address: [address] [address] [place]  Mobile Phone: [phone]  Relation: Daughter    Nancy Colón MD  [address] [facility] / [place] [address]    Future Appointments  Date Time Provider Department Center   4/17/2018 8:50 AM SILVIA Al MD [facility] OPHTHAL [address]   4/17/2018 11:00 AM NOM OIC-US1 MASTER NOM ULTR IC [address]   4/23/2018 2:20 PM Nancy Colón MD [facility] IM [place]   4/30/2018 10:00 AM LAB, [facility] LAB [place]   5/8/2018 10:30 AM Gifty Donaldson APRN, FNP [facility] ENDODIA [address]   5/14/2018 2:00 PM LAB, HEMONC CANCER BLDG NOM LAB HO [address]   5/14/2018 3:00 PM Artur Guaman Jr., MD [facility] HEM ONC [address]   6/12/2018 9:30 AM Debbie Salinas MD [facility] CARDIO [place]   6/28/2018 7:30 AM LAB, [facility] LAB [place]   7/2/2018 10:20 AM Nancy Colón MD [facility] [place]     Payor: MEDICARE / Plan: MEDICARE PART A & B / Product Type: Government /       [facility] - [place], [place] - [facility] [address] [address]  Phone: [phone] Fax: [phone]    [id number] [place] - [address] AT [facility]  [address] [place] [address]  Phone: [phone] Fax: [phone]    [facility] - [place], [place] - [address]  [address]  [address]  [facility] [address]  Phone: [phone] Fax: [phone]    [facility] [place] [facility] [address] [facility] [address]  [address] [facility] [address]  [place] [address]  Phone: [phone] Fax: [phone]       04/12/18 1111   Discharge Assessment   Assessment Type Discharge Planning Assessment    Confirmed/corrected address and phone number on facesheet? Yes   Assessment information obtained from? Patient;Medical Record   Expected Length of Stay (days) 1   Communicated expected length of stay with patient/caregiver yes   Prior to hospitilization cognitive status: Alert/Oriented   Prior to hospitalization functional status: Assistive Equipment   Current cognitive status: Alert/Oriented   Current Functional Status: Assistive Equipment   Lives With spouse   Able to Return to Prior Arrangements yes   Is patient able to care for self after discharge? Yes   Patient's perception of discharge disposition home or selfcare   Readmission Within The Last 30 Days no previous admission in last 30 days   Patient currently being followed by outpatient case management? No   Patient currently receives any other outside agency services? No   Equipment Currently Used at Home cane, straight   Do you have any problems affording any of your prescribed medications? No   Is the patient taking medications as prescribed? yes   Does the patient have transportation home? Yes   Transportation Available family or friend will provide   Does the patient receive services at the [facility]mad[facility]? No   Discharge Plan A Home   Discharge Plan B Home   Patient/Family In Agreement With Plan yes

## 2018-04-12 NOTE — PROGRESS NOTES
Food & Nutrition  Education    Diet Education: DM diet   Time Spent: 15 minutes  Learners: Pt and wife      Nutrition Education provided with handouts: Carbohydrate Counting for Patient with Diabetes.       Comments: Per glycemic index committee, DM education was provided to pt d/t elevated HgA1c of 9.4. Pt reports prior DM diet education. Reviewed meaning of lab values with pt and explained the importance of eating a consistent amount of CHO throughout the day. Provided pt with examples of foods that contain CHO. Educated pt on the number of grams of CHO that are equivalent to 1 serving of CHO. Pt was receptive.        All questions and concerns answered. Dietitian's contact information provided.         Follow-Up: 4/23/18      Please Re-consult as needed  Thanks!    Pepe Cabezas   Dietetic Intern

## 2018-04-12 NOTE — PT/OT/SLP PROGRESS
Physical Therapy      Patient Name:  Marie Reis Jr.   MRN:  [id number]    Patient not seen today secondary to not being appropriate for skilled PT services. PT ( I ) for all ambulation and functional tasks at this time.     Brock Gandhi, PT

## 2018-04-13 DIAGNOSIS — E11.9 DIABETES MELLITUS WITHOUT COMPLICATION: ICD-10-CM

## 2018-04-14 PROBLEM — I12.9 HYPERTENSIVE CKD (CHRONIC KIDNEY DISEASE): Status: ACTIVE | Noted: 2018-04-14

## 2018-04-14 PROBLEM — N25.81 SECONDARY HYPERPARATHYROIDISM: Status: ACTIVE | Noted: 2018-04-14

## 2018-04-14 NOTE — PROGRESS NOTES
Subjective:       Patient ID: Marie Reis Jr. is a 62 y.o. Black or  male who presents for follow up evaluation of Chronic Kidney Disease    HPI     Mr. Reis is seen in nephrology clinic for follow up evaluation for CKD. He arrived accompanied by his wife. He established care with me in 4/16, he followed until 5/16 and then he was lost for follow up until 10/17 when he saw Dr. Huerta in nephrology clinic. Since then again he did not return for follow up until 1/18. He was last followed on 1/11/18.     At the time of his last visit in 1/18 he had dose increase in lasix due to edema. He was noted to have progression of his diabetic nephropathy and CKD. He was advised strict need for monthly labs and labs were ordered accordingly but he did not follow up for lab visit and hence did not do labs since the dose increase in lasix. He also did not return for follow up within 4 weeks of the visit in 1/18. That time he had come for the appointment himself and he had told me his wife was out of town.     Also, of note he was hospitalized in February 2018 after he was found down at house. He was essentially treated for sepsis. He also received blood transfusion due to worsened anemia. During visit with his PCP in 3/18 he was noted to have severe weight gain suspected from fluid retention. So he was interim also started on metolazone per his cardiologist and PCP, to be taken daily along with higher dose of lasix. He did have episodes of diastolic heart failure. Interim he was also started on treatment fo myeloma. Per Dr. Guaman he was started on Revlimid. Noted that he had labs obtained by his Oncologist, Dr. Guaman on 4/2/18, it showed JAZLYN with creatinine rising to 4.1, BUN 60, blood glucose 400. He was advised to go to ER his Oncologist office. But apparently patient did not go to the ER. This became apparent only during this clinic visit on 4/11/18. So stat labs were obtained and followed urgently and pt was  brought back to the clinic to review the labs. Labs came back showing blood glucose of 546, Na 127, K 4.1, bicarbonate 25, BUN 57, creatinine 3.5, uric acid 8.5. He was advised hospital admission. Arrangements were made to admit patient to hospital medicine after this clinic visit for management of JAZLYN, azotemia, hyperglycemia crisis. As such it has been noticed that his CKD has been progressing rapidly over last few months, as well his proteinuria.     Pt has severely uncontrolled diabetes with A1C above 10 for several years. He has diabetic retinopathy, hypertension, NSAID use, diabetic polyneuropathy, prior h/o stroke, hyperlipidemia. Additionally he has chronic heart failure with recent acute exacerbation, multiple myeloma. He has progression of CKD and proteinuria. He appears to have prior episodes of JAZLYN. Prior urine studies from 03/15 show presence of micro albumin which now has progressed to overt proteinuria.     During prior visits he has acknowledged his worsening diabetes, fluid status but problem has been his poor memory which likely is impacting his ability to retain information about follow up, dietary changes etc. He admits he has not been following diabetic diet at all. His most recent A1C was 9.4.    Renal Function:  As detailed above    Urinalysis:  Lab Results   Component Value Date    APPEARANCEUA Clear 04/11/2018    PHUR 5.0 04/11/2018    SPECGRAV 1.020 04/11/2018    PROTEINUA 2+ (A) 04/11/2018    GLUCUA 3+ (A) 04/11/2018    OCCULTUA 2+ (A) 04/11/2018    NITRITE Negative 04/11/2018    LEUKOCYTESUR Negative 04/11/2018       Protein/Creatinine Ratio:  Lab Results   Component Value Date    PROTEINURINE 84 (H) 04/11/2018    CREATRANDUR 93.0 04/11/2018    UTPCR 0.90 (H) 04/11/2018         Review of Systems   Constitutional: Negative for fever, chills, appetite change, positive for fatigue.   HENT: Negative for sneezing and sore throat.    Respiratory: Negative for cough, shortness of breath  and wheezing. But reduced effort tolerance.  Cardiovascular: Positive for dizziness. Negative for chest pain.   Gastrointestinal: Negative for nausea, vomiting, abdominal pain and diarrhea.   Genitourinary: Negative for dysuria, frequency, hematuria and flank pain.   Musculoskeletal: Negative for myalgias and back pain.   Skin: Negative for pallor.   Neurological: Negative for dizziness, light-headedness and headaches.   Psychiatric/Behavioral: Negative for behavioral problems.       Objective:      Physical Exam   Constitutional: He is oriented to person, place, and time. He appears well-developed and well-nourished. No distress.   Mouth/Throat: Oropharynx is clear and moist.   Eyes: Conjunctivae are normal. Right eye exhibits no discharge. Left eye exhibits no discharge.   Neck: Normal range of motion. Neck supple.   Cardiovascular: Normal rate, regular rhythm and normal heart sounds.    Pulmonary/Chest: Effort normal and breath sounds normal. No respiratory distress. He has no wheezes. He has no rales.   Abdominal: Soft. Abdominal obesity.There is no tenderness. There is no guarding.   Musculoskeletal: Normal range of motion. He exhibits only trace edema. He exhibits no tenderness.   Neurological: He is alert and oriented to person, place, and time.   Skin: Skin is warm and dry. No erythema.   Psychiatric: He has a normal mood and affect.   Vitals reviewed.      Assessment:       1. Uncontrolled diabetes with stage 3 chronic kidney disease GFR 30-59    2. Proteinuria    3. paraproteinemia   4. Essential hypertension    5. Uncontrolled diabetes mellitus    6. Anemia of chronic illness    7.      Secondary hyperparathyroidism    Plan:     JAZLYN superimposed on CKD III/IV due to osmotic diuresis due to severe hyperglycemia, likely over diuresis. Pt has very high risk of progression to ESRD. Pt admitted to hospital medicine after these labs were obtained. Plan to check urine studies, rule out UTI, also obtain US  kidneys to rule out obstruction.    Worsened proteinuria due to diabetic nephropathy, diabetes becoming uncontrolled again due to his dietary non compliance and multiple myeloma possibly.    CKD is due to longstanding and poorly controlled diabetes, hypertension, MM, cardiorenal causing decreased EABV.     Non compliance with recommendations, follow up, dietary restrictions, medicine intake.    Memory deficit. Likely due to h/o stroke.     Mr. Reis has uncontrolled and longstanding diabetes with complications like polyneuropathy, retinopathy, has progression of CKD and proteinuria. He has hypertension, hyperlipidemia, prior stroke, prior documented micro albumin in the urine. Prior CT imaging has shown right ureteral stone with mild right hydronephrosis from 08/14. US from 04/16 noted for 10.3 and 10.4 cm kidney size, there was no hydronephrosis. At present it is very crucial that he gets better control of his diabetes and BP. He is at very high risk for rapid progression of his CKD to higher stages. Continue to follow with heme for smoldering myeloma and anemia management, per heme his anemia is likely multifactorial. Consider Epogen if Hb less than 10.     Plan:    - diuretic regimen needs to be addressed again after he is released from hospital  - will need renal panel every 4 weeks post discharge to follow on creatinine, eGFR, electrolytes, acid base status very closely  - high risk of progression to ESRD explained to both patient and his wife, will need arrangements with access creation, TOPS education after he is discharged from the hospital  - strict glycemic control  - strict low salt diet, less than 2 grams per day  - avoid NSAID use ever  - continue lisinopril for RAAS blockade after his JAZLYN resolves, can be decided after he comes back for follow up after hospital discharge   - continue to trend PTH, anemia, acid base disorder  - medication compliance was stressed to him      Risk of progression of CKD to  ESRD was discussed with him in detail again today. Stressed importance of monthly labs for surveillance and very close follow up given above. He and his wife expressed understanding.    Labs in 1-2 weeks and RTC in 2-3 weeks for closer follow up

## 2018-04-17 ENCOUNTER — PROCEDURE VISIT (OUTPATIENT)
Dept: OPHTHALMOLOGY | Facility: CLINIC | Age: 63
End: 2018-04-17
Payer: MEDICARE

## 2018-04-17 ENCOUNTER — HOSPITAL ENCOUNTER (OUTPATIENT)
Dept: RADIOLOGY | Facility: HOSPITAL | Age: 63
Discharge: HOME | End: 2018-04-17
Attending: INTERNAL MEDICINE
Payer: MEDICARE

## 2018-04-17 VITALS — DIASTOLIC BLOOD PRESSURE: 53 MMHG | HEART RATE: 75 BPM | SYSTOLIC BLOOD PRESSURE: 99 MMHG

## 2018-04-17 DIAGNOSIS — N17.9 AKI (ACUTE KIDNEY INJURY): ICD-10-CM

## 2018-04-17 DIAGNOSIS — H35.033 HYPERTENSIVE RETINOPATHY OF BOTH EYES: ICD-10-CM

## 2018-04-17 PROCEDURE — 76770 US EXAM ABDO BACK WALL COMP: CPT | Mod: TC

## 2018-04-17 PROCEDURE — 92014 COMPRE OPH EXAM EST PT 1/>: CPT | Mod: 25,S$PBB,, | Performed by: OPHTHALMOLOGY

## 2018-04-17 PROCEDURE — 67028 INJECTION EYE DRUG: CPT | Mod: S$PBB,RT,, | Performed by: OPHTHALMOLOGY

## 2018-04-17 PROCEDURE — 76770 US EXAM ABDO BACK WALL COMP: CPT | Mod: 26,,, | Performed by: RADIOLOGY

## 2018-04-17 PROCEDURE — 92134 CPTRZ OPH DX IMG PST SGM RTA: CPT | Mod: PBBFAC | Performed by: OPHTHALMOLOGY

## 2018-04-17 PROCEDURE — 67028 INJECTION EYE DRUG: CPT | Mod: PBBFAC,RT | Performed by: OPHTHALMOLOGY

## 2018-04-17 PROCEDURE — C9257 BEVACIZUMAB INJECTION: HCPCS | Performed by: OPHTHALMOLOGY

## 2018-04-17 RX ADMIN — BEVACIZUMAB 1.25 MG: 100 INJECTION, SOLUTION INTRAVENOUS at 10:04

## 2018-04-17 NOTE — PROGRESS NOTES
HPI     10 wk / OCT / Avastin   DLS-2018 Dr. Al     Pt sts va has not changed since last visit. Denies pain.  (-)Flashes (+)Floaters occasionally   (-)Photophobia  (-)Glare    Clear eyes PRN     HPI     Eye Problem    Additional comments: 8 week check           Comments   DLS 17- No changes or new concerns x last visit      HPI     DLS 10/24/17 Pt states VA OU seems the same.       HPI     Eye Problem    Additional comments: 4 week check        Meds: Systane Balance prn OU       1/15 - Pt's son and grandson  in a car accident     Prior FA -  Late macular leakage OU  NP OU with NV OU    OCT - increased DME temporal OD   Increased temporal DME OS vision, stable    A/P    1. Recurrent iritis  stable    2. PDR OU  - new small preretinal heme nasal OD  S/p PRP OD 2017  S/p PRP OS 2017    3. DME OU  - stable OS, temporal edema increase OD  S/p Avastin OD x 4 OS x 2  S/p Focal OS (14)  - mild worsening extrafoveal edema OD -  S/p Focal OD (16, )    Try injection therapy with extension OD    Avastin OD today    Get approved for Ozurdex - but doing well on anti VEGF extension      4. PCIOL OU  S/p phaco w/IOL OD 8/13/15  S/p phaco w/IOL OS 4/30/15        12 weeks OCT    Risks, benefits, and alternatives to treatment discussed in detail with the patient.  The patient voiced understanding and wished to proceed with the procedure    Injection Procedure Note:  Diagnosis: DME OD    Topical Proparacaine and Betadine.  Inject Avastin OD at 6:00 @ 3.5-4mm posterior to limbus  Post Operative Dx: Same  Complications: None  Follow up as above.

## 2018-04-17 NOTE — PATIENT INSTRUCTIONS

## 2018-04-23 ENCOUNTER — OFFICE VISIT (OUTPATIENT)
Dept: INTERNAL MEDICINE | Facility: CLINIC | Age: 63
End: 2018-04-23
Payer: MEDICARE

## 2018-04-23 VITALS
WEIGHT: 173.75 LBS | HEIGHT: 72 IN | DIASTOLIC BLOOD PRESSURE: 66 MMHG | SYSTOLIC BLOOD PRESSURE: 120 MMHG | BODY MASS INDEX: 23.53 KG/M2 | HEART RATE: 84 BPM

## 2018-04-23 DIAGNOSIS — R53.81 MALAISE AND FATIGUE: ICD-10-CM

## 2018-04-23 DIAGNOSIS — I12.9 HYPERTENSIVE CHRONIC KIDNEY DISEASE, UNSPECIFIED CKD STAGE: ICD-10-CM

## 2018-04-23 DIAGNOSIS — I10 ESSENTIAL HYPERTENSION: ICD-10-CM

## 2018-04-23 DIAGNOSIS — J42 CHRONIC BRONCHITIS, UNSPECIFIED CHRONIC BRONCHITIS TYPE: Primary | ICD-10-CM

## 2018-04-23 DIAGNOSIS — E11.59 HYPERTENSION ASSOCIATED WITH DIABETES: ICD-10-CM

## 2018-04-23 DIAGNOSIS — Z79.4 TYPE 2 DIABETES MELLITUS WITH STAGE 4 CHRONIC KIDNEY DISEASE, WITH LONG-TERM CURRENT USE OF INSULIN: ICD-10-CM

## 2018-04-23 DIAGNOSIS — N18.4 TYPE 2 DIABETES MELLITUS WITH STAGE 4 CHRONIC KIDNEY DISEASE, WITH LONG-TERM CURRENT USE OF INSULIN: ICD-10-CM

## 2018-04-23 DIAGNOSIS — C90.00 MULTIPLE MYELOMA NOT HAVING ACHIEVED REMISSION: ICD-10-CM

## 2018-04-23 DIAGNOSIS — Z79.4 TYPE 2 DIABETES MELLITUS WITH HYPERGLYCEMIA, WITH LONG-TERM CURRENT USE OF INSULIN: ICD-10-CM

## 2018-04-23 DIAGNOSIS — E11.22 TYPE 2 DIABETES MELLITUS WITH STAGE 4 CHRONIC KIDNEY DISEASE, WITH LONG-TERM CURRENT USE OF INSULIN: ICD-10-CM

## 2018-04-23 DIAGNOSIS — I15.2 HYPERTENSION ASSOCIATED WITH DIABETES: ICD-10-CM

## 2018-04-23 DIAGNOSIS — Z86.73 HISTORY OF STROKE: ICD-10-CM

## 2018-04-23 DIAGNOSIS — R53.83 MALAISE AND FATIGUE: ICD-10-CM

## 2018-04-23 DIAGNOSIS — E11.65 TYPE 2 DIABETES MELLITUS WITH HYPERGLYCEMIA, WITH LONG-TERM CURRENT USE OF INSULIN: ICD-10-CM

## 2018-04-23 PROCEDURE — 99213 OFFICE O/P EST LOW 20 MIN: CPT | Mod: PBBFAC,PO | Performed by: INTERNAL MEDICINE

## 2018-04-23 PROCEDURE — 99214 OFFICE O/P EST MOD 30 MIN: CPT | Mod: S$PBB,,, | Performed by: INTERNAL MEDICINE

## 2018-04-23 PROCEDURE — 99999 PR PBB SHADOW E&M-EST. PATIENT-LVL III: CPT | Mod: PBBFAC,,, | Performed by: INTERNAL MEDICINE

## 2018-04-23 RX ORDER — HYDRALAZINE HYDROCHLORIDE 25 MG/1
25 TABLET, FILM COATED ORAL EVERY 12 HOURS
COMMUNITY
Start: 2018-04-21 | End: 2019-01-15

## 2018-04-23 RX ORDER — DOXYCYCLINE 100 MG/1
100 CAPSULE ORAL 2 TIMES DAILY
Qty: 20 CAPSULE | Refills: 0 | Status: SHIPPED | OUTPATIENT
Start: 2018-04-23 | End: 2018-05-03

## 2018-04-24 ENCOUNTER — TELEPHONE (OUTPATIENT)
Dept: CARDIOLOGY | Facility: CLINIC | Age: 63
End: 2018-04-24

## 2018-04-24 ENCOUNTER — TELEPHONE (OUTPATIENT)
Dept: HEMATOLOGY/ONCOLOGY | Facility: CLINIC | Age: 63
End: 2018-04-24

## 2018-04-24 NOTE — TELEPHONE ENCOUNTER
----- Message from Amber Alexander sent at 4/23/2018  3:32 PM CDT -----  Contact: Pt wife   Pt wife called and would like to speak with nurse about pt medication REVLIMID 10 mg Cap states that pt is feeling weak   Callback#[phone]  Thank You  FREDERICK Benjamin    Spoke with patient 's wife. She said that Mr Salazar feels weak and feels like he is going to passed out. Per Dr Guaman. His CBC yesterday stable. She will call his cardiologist about this symptoms.

## 2018-04-24 NOTE — TELEPHONE ENCOUNTER
----- Message from Isatu Jones sent at 4/24/2018 11:59 AM CDT -----  Contact: pt  Pls call pt's wife at [phone].  Pt is having some weakness and feels off balance since taking  carvedilol (COREG) 25 MG tablet .      Thank you

## 2018-04-24 NOTE — TELEPHONE ENCOUNTER
Naheed called stating that pt has been having dizziness and looking spaced out. Naheed stated that pt told her that he feels this way when he takes Carvedilol 25mg. Naheed stated that pt saw his PCP yesterday for his symptoms and pt had blood work done and was told pt should contact his oncologist. Nhaeed stated that they spoke w/pt's oncologist and they stated that it sounds like pt's symptoms are d/t Carvedilol. Please advise.

## 2018-04-25 NOTE — PROGRESS NOTES
Subjective:       Patient ID: Marie Reis Jr. is a 62 y.o. male.    Chief Complaint: Hospital Follow Up    HPI 62-year-old male presents to clinic today for hospital follow-up recently had a another hospitalization being found down in his home.  Patient had a history of stroke.  There's been some concern about his blood sugar levels up and down.  He had a history of very poor control diabetes often due to a lack of adherence to medication and follow-up.  More recently his regimen seems to be working.  During the last hospitalization was commented that he stopped taking his insulin but now he does not recall staying that or doing that.  Certainly would explain his lab results during his hospitalization.  He also recently had Decadron as part as his chemotherapy regimen for multiple myeloma and recently started also on revlimid.  He is followed by oncologist Dr. Guaman.  Today he complains mostly of congestion and sinus pressure and ALLERGY symptoms.  He is experiencing cough with sputum production.  Since his been present for greater than one week.  Review of Systems  otherwise negative  Objective:      Physical Exam  General: Well-appearing, well-nourished.  No distress  HEENT: conjunctivae are normal.  Pupils are equal and reative to light.  TM's are clear and intact bilaterally.  Hearing is grossly normal.  Nasopharynx is clear.  Oropharynx is clear.  Neck: Supple.  No thyroid megaly.  No bruits.  Lymph: No cervical or supraclavicular adenopathy.  Heart: Regular rate and rhythm, without murmur, rub or gallop.  Lungs: Clear to auscultation; respiratory effort normal.  Abdomen: Soft, nontender, nondistended.  Normoactive bowel sounds.  No hepatomegaly.  No masses.  Extremities: Good distal pulses.  No edema.  Psych: Oriented to time person place.  Judgment and insight seem unimpaired.  Mood and affect are appropriate.  Assessment:       1. Chronic bronchitis, unspecified chronic bronchitis type    2. Hypertensive  chronic kidney disease, unspecified CKD stage    3. Multiple myeloma not having achieved remission    4. Hypertension associated with diabetes    5. Type 2 diabetes mellitus with stage 4 chronic kidney disease, with long-term current use of insulin    6. Essential hypertension    7. Uncontrolled type 2 diabetes mellitus with diabetic nephropathy, with long-term current use of insulin    8. Type 2 diabetes mellitus with hyperglycemia, with long-term current use of insulin    9. History of stroke    10. Malaise and fatigue        Plan:       Marie was seen today for hospital follow up.    Diagnoses and all orders for this visit:    Chronic bronchitis, unspecified chronic bronchitis type  -     doxycycline (VIBRAMYCIN) 100 MG Cap; Take 1 capsule (100 mg total) by mouth 2 (two) times daily.  Follow-up if refractory  Hypertensive chronic kidney disease, unspecified CKD stage    Multiple myeloma not having achieved remission  Followed by oncology  Hypertension associated with diabetes  Controlled.  Continue current medical regimen.  Prescription refills addressed.  Followup advised. See after visit summary.  Diabetes follow-up scheduled  Type 2 diabetes mellitus with stage 4 chronic kidney disease, with long-term current use of insulin  Patient followed by endocrinology and nephrology.        Type 2 diabetes mellitus with hyperglycemia, with long-term current use of insulin  Diabetes follow-up scheduled  History of stroke  Continue risk factor management  Malaise and fatigue  -     CBC auto differential; Future  -     Comprehensive metabolic panel; Future

## 2018-04-30 ENCOUNTER — LAB VISIT (OUTPATIENT)
Dept: LAB | Facility: HOSPITAL | Age: 63
End: 2018-04-30
Attending: INTERNAL MEDICINE
Payer: MEDICARE

## 2018-04-30 ENCOUNTER — TELEPHONE (OUTPATIENT)
Dept: NEPHROLOGY | Facility: CLINIC | Age: 63
End: 2018-04-30

## 2018-04-30 DIAGNOSIS — D47.2 MONOCLONAL GAMMOPATHY: ICD-10-CM

## 2018-04-30 DIAGNOSIS — R80.9 PROTEINURIA: ICD-10-CM

## 2018-04-30 LAB
25(OH)D3+25(OH)D2 SERPL-MCNC: 25 NG/ML
ALBUMIN SERPL BCP-MCNC: 2.7 G/DL
ALP SERPL-CCNC: 119 U/L
ALT SERPL W/O P-5'-P-CCNC: 24 U/L
ANION GAP SERPL CALC-SCNC: 6 MMOL/L
AST SERPL-CCNC: 18 U/L
BASOPHILS # BLD AUTO: 0.03 K/UL
BASOPHILS NFR BLD: 0.4 %
BILIRUB SERPL-MCNC: 0.5 MG/DL
BUN SERPL-MCNC: 31 MG/DL
CALCIUM SERPL-MCNC: 8.8 MG/DL
CHLORIDE SERPL-SCNC: 107 MMOL/L
CO2 SERPL-SCNC: 29 MMOL/L
CREAT SERPL-MCNC: 2.1 MG/DL
DIFFERENTIAL METHOD: ABNORMAL
EOSINOPHIL # BLD AUTO: 0.3 K/UL
EOSINOPHIL NFR BLD: 4.4 %
ERYTHROCYTE [DISTWIDTH] IN BLOOD BY AUTOMATED COUNT: 13.2 %
EST. GFR  (AFRICAN AMERICAN): 38 ML/MIN/1.73 M^2
EST. GFR  (NON AFRICAN AMERICAN): 33 ML/MIN/1.73 M^2
ESTIMATED AVG GLUCOSE: 249 MG/DL
FERRITIN SERPL-MCNC: 747 NG/ML
GLUCOSE SERPL-MCNC: 79 MG/DL
HBA1C MFR BLD HPLC: 10.3 %
HCT VFR BLD AUTO: 32.5 %
HGB BLD-MCNC: 11 G/DL
IRON SERPL-MCNC: 69 UG/DL
LYMPHOCYTES # BLD AUTO: 1.8 K/UL
LYMPHOCYTES NFR BLD: 25.1 %
MCH RBC QN AUTO: 27.2 PG
MCHC RBC AUTO-ENTMCNC: 33.8 G/DL
MCV RBC AUTO: 80 FL
MONOCYTES # BLD AUTO: 0.9 K/UL
MONOCYTES NFR BLD: 12.6 %
NEUTROPHILS # BLD AUTO: 4 K/UL
NEUTROPHILS NFR BLD: 56.7 %
PHOSPHATE SERPL-MCNC: 3.9 MG/DL
PHOSPHATE SERPL-MCNC: 3.9 MG/DL
PLATELET # BLD AUTO: 318 K/UL
PMV BLD AUTO: 9.7 FL
POTASSIUM SERPL-SCNC: 3.6 MMOL/L
PROT SERPL-MCNC: 6.1 G/DL
PTH-INTACT SERPL-MCNC: 224.5 PG/ML
PTH-INTACT SERPL-MCNC: 224.5 PG/ML
RBC # BLD AUTO: 4.04 M/UL
SATURATED IRON: 39 %
SODIUM SERPL-SCNC: 142 MMOL/L
TOTAL IRON BINDING CAPACITY: 178 UG/DL
TRANSFERRIN SERPL-MCNC: 120 MG/DL
WBC # BLD AUTO: 7.12 K/UL

## 2018-04-30 PROCEDURE — 82728 ASSAY OF FERRITIN: CPT

## 2018-04-30 PROCEDURE — 85025 COMPLETE CBC W/AUTO DIFF WBC: CPT

## 2018-04-30 PROCEDURE — 83970 ASSAY OF PARATHORMONE: CPT

## 2018-04-30 PROCEDURE — 82306 VITAMIN D 25 HYDROXY: CPT

## 2018-04-30 PROCEDURE — 80069 RENAL FUNCTION PANEL: CPT

## 2018-04-30 PROCEDURE — 80053 COMPREHEN METABOLIC PANEL: CPT

## 2018-04-30 PROCEDURE — 83036 HEMOGLOBIN GLYCOSYLATED A1C: CPT

## 2018-04-30 PROCEDURE — 36415 COLL VENOUS BLD VENIPUNCTURE: CPT

## 2018-04-30 PROCEDURE — 83540 ASSAY OF IRON: CPT

## 2018-05-07 NOTE — PROGRESS NOTES
CC: The primary encounter diagnosis was Dyslipidemia. Diagnoses of Essential hypertension, History of stroke, Type 2 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, with long-term current use of insulin, Type 2 diabetes mellitus with diabetic polyneuropathy, with long-term current use of insulin, Type 2 diabetes mellitus with stage 4 chronic kidney disease, with long-term current use of insulin, 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, with long-term current use of insulin, Multiple myeloma not having achieved remission, Lumbar spondylosis, Coronary artery disease due to calcified coronary lesion, and Chronic diastolic heart failure were also pertinent to this visit.      HPI: Mr. Marie Reis Jr. is a 62 y.o. Black or  male who was diagnosed with Type 2 DM in 2006.     Pt was admitted on 4/11/18 for JAZLYN and in feb 2018 for sepsis.    Has f/u with hem/onc regularly-received steroid therapy (dexamethasone 4 mg 3 tabs/week) with chemotherapy treatment x 1 month.  a1c elevated since last visit r/t steroid therapy. Eating a lot fruits-grapes, watermelon.     Lab Results   Component Value Date    HGBA1C 10.3 (H) 04/30/2018     BG readings are checked  3x/day and readings are   FBG  71-100s, 325 (ate seafood)  Lunch mid 140s-180s  Bedtime 140s-180s     Hypoglycemia: +mild (r/t not eating enough carbs)-uses oj for correction,  glucose tabs prn, symptomatic   Has had a severe hypoglycemia -EMS in the past few years.    Skipped/missed glycemic medications: No    Prandial injections taken with meals: Yes    Physical Activity: No    Skipping meals and/or snacking: The patient eats a regular, healthy diet.    CURRENT DIABETIC MEDS: novolog 8 units ac w/ mod dose correction scale 150-200+2, etc , lantus 28 units qhs  Uses Vials or Pens: pens  Type of Glucose Meter: up to date    Last Eye Exam: 4/17/18   Servando (procedure), f/u in 2 weeks   Last Podiatry Exam: 2018-[facility] x 2-3 mos    Last DM Education Attended:  4/27/16    REVIEW OF SYSTEMS  General: no weakness or + fatigue.   Eyes: no visual disturbances.   Cardiac: + intermittent chest pain. +edema   Respiratory: no cough or dyspnea.   GI: no abdominal pain or nausea.   Skin: no rashes or itching.   Neuro: no numbness or tingling. +back pain  Musc: Wears (L) wrist brace-had accident/trauma  Endocrine: no polyuria, polydipsia, polyphagia.     Vital Signs  There were no vitals taken for this visit.    Hemoglobin A1C   Date Value Ref Range Status   04/30/2018 10.3 (H) 4.0 - 5.6 % Final     Comment:     According to ADA guidelines, hemoglobin A1c <7.0% represents  optimal control in non-pregnant diabetic patients. Different  metrics may apply to specific patient populations.   Standards of Medical Care in Diabetes-2016.  For the purpose of screening for the presence of diabetes:  <5.7%     Consistent with the absence of diabetes  5.7-6.4%  Consistent with increasing risk for diabetes   (prediabetes)  >or=6.5%  Consistent with diabetes  Currently, no consensus exists for use of hemoglobin A1c  for diagnosis of diabetes for children.  This Hemoglobin A1c assay has significant interference with fetal   hemoglobin   (HbF). The results are invalid for patients with abnormal amounts of   HbF,   including those with known Hereditary Persistence   of Fetal Hemoglobin. Heterozygous hemoglobin variants (HbAS, HbAC,   HbAD, HbAE, HbA2) do not significantly interfere with this assay;   however, presence of multiple variants in a sample may impact the %   interference.     04/11/2018 9.4 (H) 4.0 - 5.6 % Final     Comment:     According to ADA guidelines, hemoglobin A1c <7.0% represents  optimal control in non-pregnant diabetic patients. Different  metrics may apply to specific patient populations.   Standards of Medical Care in Diabetes-2016.  For the purpose of screening  for the presence of diabetes:  <5.7%     Consistent with the absence of diabetes  5.7-6.4%  Consistent with increasing risk for diabetes   (prediabetes)  >or=6.5%  Consistent with diabetes  Currently, no consensus exists for use of hemoglobin A1c  for diagnosis of diabetes for children.  This Hemoglobin A1c assay has significant interference with fetal   hemoglobin   (HbF). The results are invalid for patients with abnormal amounts of   HbF,   including those with known Hereditary Persistence   of Fetal Hemoglobin. Heterozygous hemoglobin variants (HbAS, HbAC,   HbAD, HbAE, HbA2) do not significantly interfere with this assay;   however, presence of multiple variants in a sample may impact the %   interference.     12/15/2017 7.6 (H) 4.0 - 5.6 % Final     Comment:     According to ADA guidelines, hemoglobin A1c <7.0% represents  optimal control in non-pregnant diabetic patients. Different  metrics may apply to specific patient populations.   Standards of Medical Care in Diabetes-2016.  For the purpose of screening for the presence of diabetes:  <5.7%     Consistent with the absence of diabetes  5.7-6.4%  Consistent with increasing risk for diabetes   (prediabetes)  >or=6.5%  Consistent with diabetes  Currently, no consensus exists for use of hemoglobin A1c  for diagnosis of diabetes for children.  This Hemoglobin A1c assay has significant interference with fetal   hemoglobin   (HbF). The results are invalid for patients with abnormal amounts of   HbF,   including those with known Hereditary Persistence   of Fetal Hemoglobin. Heterozygous hemoglobin variants (HbAS, HbAC,   HbAD, HbAE, HbA2) do not significantly interfere with this assay;   however, presence of multiple variants in a sample may impact the %   interference.         Chemistry        Component Value Date/Time     04/30/2018 1043     04/30/2018 1043     04/30/2018 1043    K 3.6 04/30/2018 1043    K 3.6 04/30/2018 1043    K 3.6 04/30/2018  1043     04/30/2018 1043     04/30/2018 1043     04/30/2018 1043    CO2 29 04/30/2018 1043    CO2 29 04/30/2018 1043    CO2 29 04/30/2018 1043    BUN 31 (H) 04/30/2018 1043    BUN 31 (H) 04/30/2018 1043    BUN 31 (H) 04/30/2018 1043    CREATININE 2.1 (H) 04/30/2018 1043    CREATININE 2.1 (H) 04/30/2018 1043    CREATININE 2.1 (H) 04/30/2018 1043    GLU 79 04/30/2018 1043    GLU 79 04/30/2018 1043    GLU 79 04/30/2018 1043        Component Value Date/Time    CALCIUM 8.8 04/30/2018 1043    CALCIUM 8.8 04/30/2018 1043    CALCIUM 8.8 04/30/2018 1043    ALKPHOS 119 04/30/2018 1043    AST 18 04/30/2018 1043    ALT 24 04/30/2018 1043    BILITOT 0.5 04/30/2018 1043          Lab Results   Component Value Date    CHOL 119 (L) 10/25/2017    CHOL 144 09/17/2016    CHOL 167 04/02/2016     Lab Results   Component Value Date    HDL 32 (L) 10/25/2017    HDL 27 (L) 09/17/2016    HDL 28 (L) 04/02/2016     Lab Results   Component Value Date    LDLCALC 68.6 10/25/2017    LDLCALC 72.8 09/17/2016    LDLCALC 82.8 04/02/2016     Lab Results   Component Value Date    TRIG 92 10/25/2017    TRIG 221 (H) 09/17/2016    TRIG 281 (H) 04/02/2016     Lab Results   Component Value Date    CHOLHDL 26.9 10/25/2017    CHOLHDL 18.8 (L) 09/17/2016    CHOLHDL 16.8 (L) 04/02/2016       Lab Results   Component Value Date    TSH 2.402 02/21/2018       Lab Results   Component Value Date    MICALBCREAT 1987.0 (H) 10/25/2017       Vit D, 25-Hydroxy   Date Value Ref Range Status   04/30/2018 25 (L) 30 - 96 ng/mL Final     Comment:     Vitamin D deficiency.........<10 ng/mL                              Vitamin D insufficiency......10-29 ng/mL       Vitamin D sufficiency........> or equal to 30 ng/mL  Vitamin D toxicity............>100 ng/mL         PHYSICAL EXAMINATION  Constitutional: Appears well, no distress  Neck: Supple, trachea midline.   Respiratory: no wheezes, even and unlabored.  Cardiovascular: RRR  Lymph: trace/+1 pedal edema  Skin:  warm and dry; no injection site reactions, no acanthosis nigracans observed.  Neuro:patient alert and cooperative.  Feet: footwear appropriate    Assessment/Plan    1. Type 2 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, with long-term current use of insulin  F/u in 3 mos  a1c next time   a1c goal less than 7%  Add [id number] h/o pancreatitis, medullary thyroid ca  Start at 0.6 mg then 1.2 mg 2nd week-will help w/ a1c reduction and postprandial excursions  Decrease basal to 24 units at night r/t hypoglycemia  Continue prandial 8 units ac   Mod dose correction scale 150-200+2 ,etc   DE for f/u with added medication, etc-nutrition    2. Type 2 diabetes mellitus with stage 4 chronic kidney disease, with long-term current use of insulin  F/u with nephrology   3. Type 2 diabetes mellitus with diabetic polyneuropathy, with long-term current use of insulin  Continue gabapentin  If uncontrolled-may increase insulin resistance.    4. Dyslipidemia  Lab Results   Component Value Date    LDLCALC 68.6 10/25/2017     At goal, continue atorvastatin 80 mg qhs   5. Essential hypertension  Controlled, continue med(s)   6. History of stroke  F/u with cards    7. Multiple myeloma not having achieved remission  F/u with hem/onc, steroid therapy -may increase insulin resistance.    8. Lumbar spondylosis  If uncontrolled, may increase insulin resistance    9. Coronary artery disease due to calcified coronary lesion  Avoid hypoglycemia  F/u with cards    10. Chronic diastolic heart failure  See above        Follow-up in about 3 months (around 8/8/2018).     Orders Placed This Encounter   Procedures    Hemoglobin A1c     Standing Status:   Future     Standing Expiration Date:   7/7/2019

## 2018-05-08 ENCOUNTER — OFFICE VISIT (OUTPATIENT)
Dept: ENDOCRINOLOGY | Facility: CLINIC | Age: 63
End: 2018-05-08
Payer: MEDICARE

## 2018-05-08 VITALS
BODY MASS INDEX: 24.25 KG/M2 | WEIGHT: 183 LBS | HEIGHT: 73 IN | SYSTOLIC BLOOD PRESSURE: 140 MMHG | DIASTOLIC BLOOD PRESSURE: 80 MMHG | HEART RATE: 84 BPM

## 2018-05-08 DIAGNOSIS — I50.32 CHRONIC DIASTOLIC HEART FAILURE: ICD-10-CM

## 2018-05-08 DIAGNOSIS — E11.42 TYPE 2 DIABETES MELLITUS WITH DIABETIC POLYNEUROPATHY, WITH LONG-TERM CURRENT USE OF INSULIN: ICD-10-CM

## 2018-05-08 DIAGNOSIS — N18.4 TYPE 2 DIABETES MELLITUS WITH STAGE 4 CHRONIC KIDNEY DISEASE, WITH LONG-TERM CURRENT USE OF INSULIN: ICD-10-CM

## 2018-05-08 DIAGNOSIS — I25.10 CORONARY ARTERY DISEASE DUE TO CALCIFIED CORONARY LESION: ICD-10-CM

## 2018-05-08 DIAGNOSIS — Z79.4 TYPE 2 DIABETES MELLITUS WITH STAGE 4 CHRONIC KIDNEY DISEASE, WITH LONG-TERM CURRENT USE OF INSULIN: ICD-10-CM

## 2018-05-08 DIAGNOSIS — M47.816 LUMBAR SPONDYLOSIS: ICD-10-CM

## 2018-05-08 DIAGNOSIS — E11.3513 TYPE 2 DIABETES MELLITUS WITH BOTH EYES AFFECTED BY PROLIFERATIVE RETINOPATHY AND MACULAR EDEMA, WITH LONG-TERM CURRENT USE OF INSULIN: Primary | ICD-10-CM

## 2018-05-08 DIAGNOSIS — E78.5 DYSLIPIDEMIA: ICD-10-CM

## 2018-05-08 DIAGNOSIS — I10 ESSENTIAL HYPERTENSION: ICD-10-CM

## 2018-05-08 DIAGNOSIS — I25.84 CORONARY ARTERY DISEASE DUE TO CALCIFIED CORONARY LESION: ICD-10-CM

## 2018-05-08 DIAGNOSIS — Z86.73 HISTORY OF STROKE: ICD-10-CM

## 2018-05-08 DIAGNOSIS — C90.00 MULTIPLE MYELOMA NOT HAVING ACHIEVED REMISSION: ICD-10-CM

## 2018-05-08 DIAGNOSIS — Z79.4 TYPE 2 DIABETES MELLITUS WITH DIABETIC POLYNEUROPATHY, WITH LONG-TERM CURRENT USE OF INSULIN: ICD-10-CM

## 2018-05-08 DIAGNOSIS — Z79.4 TYPE 2 DIABETES MELLITUS WITH BOTH EYES AFFECTED BY PROLIFERATIVE RETINOPATHY AND MACULAR EDEMA, WITH LONG-TERM CURRENT USE OF INSULIN: Primary | ICD-10-CM

## 2018-05-08 DIAGNOSIS — E11.22 TYPE 2 DIABETES MELLITUS WITH STAGE 4 CHRONIC KIDNEY DISEASE, WITH LONG-TERM CURRENT USE OF INSULIN: ICD-10-CM

## 2018-05-08 PROCEDURE — 99999 PR PBB SHADOW E&M-EST. PATIENT-LVL V: CPT | Mod: PBBFAC,,, | Performed by: NURSE PRACTITIONER

## 2018-05-08 PROCEDURE — 99214 OFFICE O/P EST MOD 30 MIN: CPT | Mod: S$PBB,,, | Performed by: NURSE PRACTITIONER

## 2018-05-08 PROCEDURE — 99215 OFFICE O/P EST HI 40 MIN: CPT | Mod: PBBFAC | Performed by: NURSE PRACTITIONER

## 2018-05-08 RX ORDER — INSULIN GLARGINE 100 [IU]/ML
INJECTION, SOLUTION SUBCUTANEOUS
Qty: 15 ML | Refills: 6
Start: 2018-05-08 | End: 2018-08-08 | Stop reason: SDUPTHER

## 2018-05-08 NOTE — PATIENT INSTRUCTIONS
Snacks can be an important part of a balanced, healthy meal plan. They allow you to eat more frequently, feeling full and satisfied throughout the day. Also, they allow you to spread carbohydrates evenly, which may stabilize blood sugars.  Plus, snacks are enjoyable!     The amount of carbohydrate needed at snacks varies. Generally, about 15-30 grams of carbohydrate per snack is recommended.  Below you will find some tasty treats.       0-5 gm carb   Crystal Light   Vitamin Water Zero   Herbal tea, unsweetened   2 tsp peanut butter on celery   1./2 cup sugar-free jell-o   1 sugar-free popsicle   ¼ cup blueberries   8oz Blue Amy unsweetened almond milk   5 baby carrots & celery sticks, cucumbers, bell peppers dipped in ¼ cup salsa, 2Tbsp light ranch dressing or 2Tbsp plain Greek yogurt   10 Goldfish crackers   ½ oz low-fat cheese or string cheese   1 closed handful of nuts, unsalted   1 Tbsp of sunflower seeds, unsalted   1 cup Smart Pop popcorn   1 whole grain brown rice cake        15 gm carb   1 small piece of fruit or ½ banana or 1/2 cup lite canned fruit   3 donna cracker squares   3 cups Smart Pop popcorn, top spray butter, Mijares lite salt or cinnamon and Truvia   5 Vanilla Wafers   ½ cup low fat, no added sugar ice cream or frozen yogurt (Blue bell, Blue Bunny, Weight Watchers, Skinny Cow)   ½ turkey, ham, or chicken sandwich   ½ c fruit with ½ c Cottage cheese   4-6 unsalted wheat crackers with 1 oz low fat cheese or 1 tbsp peanut butter    30-45 goldfish crackers (depending on flavor)    7-8 Worship mini brown rice cakes (caramel, apple cinnamon, chocolate)    12 Worship mini brown rice cakes (cheddar, bbq, ranch)    1/3 cup hummus dip with raw veg   1/2 whole wheat sourav, 1Tbsp hummus   Mini Pizza (1/2 whole wheat English muffin, low-fat  cheese, tomato sauce)   100 calorie snack pack (Oreo, Chips Ahoy, Ritz Mix, Baked Cheetos)   4-6 oz. light or Greek Style yogurt  (Mata, [place], [place], [facility])   ½ cup sugar-free pudding     6 in. wheat tortilla or sourav oven toasted chips (topped with spray butter flavoring, cinnamon, Truvia OR spray butter, garlic powder, chili powder)    18 BBQ Popchips (available at [employer], [employer], Fresh Market)                   Diabetes Support Group Meetings         Date: Topic:   February 8 Health Promotion/Cooking Demo   March 8 Taking Care of Your Kidneys   April 12 Taking Care of Your Feet   May 10 Ease Your Mind with Diabetes   Alexandra 14 Summer Treats/Cooking Demo   July 12 Super Market Sweep   August 9 Taking Care of Your Eyes   Sept 13 Technology/ADA updates   October 11 Recipes & Treats/Cooking Demo   November 8 Heart Health/Pump it up!   December 13 Year-End Close Out        Meetings are held in the [facility] ([facility]) of the [facility] located at [address] [place] [address]. Please call [phone] for additional information.    Free service, offered every 2nd Thursday of every month! Family members and/or friends are welcome as well!  Support group is for patients with type 1 or type 2 diabetes.    From 3:30p to 4:30p        Liraglutide injection  What is this medicine?  LIRAGLUTIDE (LIR a GLOO tide) is used to improve blood sugar control in adults with type 2 diabetes. This medicine may be used with other oral diabetes medicines.  How should I use this medicine?  This medicine is for injection under the skin of your upper leg, stomach area, or upper arm. You will be taught how to prepare and give this medicine. Use exactly as directed. Take your medicine at regular intervals. Do not take it more often than directed.  It is important that you put your used needles and syringes in a special sharps container. Do not put them in a trash can. If you do not have a sharps container, call your pharmacist or healthcare provider to get one.  A special MedGuide will be given to you by  the pharmacist with each prescription and refill. Be sure to read this information carefully each time.  Talk to your pediatrician regarding the use of this medicine in children. Special care may be needed.  What side effects may I notice from receiving this medicine?  Side effects that you should report to your doctor or health care professional as soon as possible:  · allergic reactions like skin rash, itching or hives, swelling of the face, lips, or tongue  · breathing problems  · fever, chills  · loss of appetite  · signs and symptoms of low blood sugar such as feeling anxious, confusion, dizziness, increased hunger, unusually weak or tired, sweating, shakiness, cold, irritable, headache, blurred vision, fast heartbeat, loss of consciousness  · trouble passing urine or change in the amount of urine  · unusual stomach pain or upset  · vomiting  Side effects that usually do not require medical attention (Report these to your doctor or health care professional if they continue or are bothersome.):  · constipation  · diarrhea  · fatigue  · headache  · nausea  What may interact with this medicine?  · acetaminophen  · atorvastatin  · birth control pills  · digoxin  · griseofulvin  · lisinoprilMany medications may cause changes in blood sugar, these include:  · alcohol containing beverages  · aspirin and aspirin-like drugs  · chloramphenicol  · chromium  · diuretics  · female hormones, such as estrogens or progestins, birth control pills  · heart medicines  · isoniazid  · male hormones or anabolic steroids  · medications for weight loss  · medicines for allergies, asthma, cold, or cough  · medicines for mental problems  · medicines called MAO inhibitors - Nardil, Parnate, Marplan, Eldepryl  · niacin  · NSAIDS, such as ibuprofen  · pentamidine  · phenytoin  · probenecid  · quinolone antibiotics such as ciprofloxacin, levofloxacin, ofloxacin  · some herbal dietary supplements  · steroid medicines such as prednisone or  cortisone  · thyroid hormonesSome medications can hide the warning symptoms of low blood sugar (hypoglycemia). You may need to monitor your blood sugar more closely if you are taking one of these medications. These include:  · beta-blockers, often used for high blood pressure or heart problems (examples include atenolol, metoprolol, propranolol)  · clonidine  · guanethidine  · reserpine  What if I miss a dose?  If you miss a dose, take it as soon as you can. If it is almost time for your next dose, take only that dose. Do not take double or extra doses.  Where should I keep my medicine?  Keep out of the reach of children.  Store unopened pen in a refrigerator between 2 and 8 degrees C (36 and 46 degrees F). Do not freeze or use if the medicine has been frozen. Protect from light and excessive heat. After you first use the pen, it can be stored at room temperature between 15 and 30 degrees C (59 and 86 degrees F) or in a refrigerator. Throw away your used pen after 30 days or after the expiration date, whichever comes first.  Do not store your pen with the needle attached. If the needle is left on, medicine may leak from the pen.  What should I tell my health care provider before I take this medicine?  They need to know if you have any of these conditions:  · endocrine tumors (MEN 2) or if someone in your family had these tumors  · gallstones  · high cholesterol  · history of alcohol abuse problem  · history of pancreatitis  · kidney disease or if you are on dialysis  · liver disease  · previous swelling of the tongue, face, or lips with difficulty breathing, difficulty swallowing, hoarseness, or tightening of the throat  · stomach problems  · suicidal thoughts, plans, or attempt; a previous suicide attempt by you or a family member  · thyroid cancer or if someone in your family had thyroid cancer  · an unusual or allergic reaction to liraglutide, medicines, foods, dyes, or preservatives  · pregnant or trying to get  pregnant  · breast-feeding  What should I watch for while using this medicine?  Visit your doctor or health care professional for regular checks on your progress.  A test called the HbA1C (A1C) will be monitored. This is a simple blood test. It measures your blood sugar control over the last 2 to 3 months. You will receive this test every 3 to 6 months.  Learn how to check your blood sugar. Learn the symptoms of low and high blood sugar and how to manage them.  Always carry a quick-source of sugar with you in case you have symptoms of low blood sugar. Examples include hard sugar candy or glucose tablets. Make sure others know that you can choke if you eat or drink when you develop serious symptoms of low blood sugar, such as seizures or unconsciousness. They must get medical help at once.  Tell your doctor or health care professional if you have high blood sugar. You might need to change the dose of your medicine. If you are sick or exercising more than usual, you might need to change the dose of your medicine.  Do not skip meals. Ask your doctor or health care professional if you should avoid alcohol. Many nonprescription cough and cold products contain sugar or alcohol. These can affect blood sugar.  Liraglutide pens and cartridges should never be shared. Even if the needle is changed, sharing may result in passing of viruses like hepatitis or HIV.  Wear a medical ID bracelet or chain, and carry a card that describes your disease and details of your medicine and dosage times.  Patients and their families should watch out for worsening depression or thoughts of suicide. Also watch out for sudden changes in feelings such as feeling anxious, agitated, panicky, irritable, hostile, aggressive, impulsive, severely restless, overly excited and hyperactive, or not being able to sleep. If this happens, especially at the beginning of treatment or after a change in dose, call your health care professional.  NOTE:This sheet  is a summary. It may not cover all possible information. If you have questions about this medicine, talk to your doctor, pharmacist, or health care provider. Copyright© 2017 [employer]        Liraglutide injection  What is this medicine?  LIRAGLUTIDE (LIR a GLOO tide) is used to improve blood sugar control in adults with type 2 diabetes. This medicine may be used with other oral diabetes medicines.  How should I use this medicine?  This medicine is for injection under the skin of your upper leg, stomach area, or upper arm. You will be taught how to prepare and give this medicine. Use exactly as directed. Take your medicine at regular intervals. Do not take it more often than directed.  It is important that you put your used needles and syringes in a special sharps container. Do not put them in a trash can. If you do not have a sharps container, call your pharmacist or healthcare provider to get one.  A special MedGuide will be given to you by the pharmacist with each prescription and refill. Be sure to read this information carefully each time.  Talk to your pediatrician regarding the use of this medicine in children. Special care may be needed.  What side effects may I notice from receiving this medicine?  Side effects that you should report to your doctor or health care professional as soon as possible:  · allergic reactions like skin rash, itching or hives, swelling of the face, lips, or tongue  · breathing problems  · fever, chills  · loss of appetite  · signs and symptoms of low blood sugar such as feeling anxious, confusion, dizziness, increased hunger, unusually weak or tired, sweating, shakiness, cold, irritable, headache, blurred vision, fast heartbeat, loss of consciousness  · trouble passing urine or change in the amount of urine  · unusual stomach pain or upset  · vomiting  Side effects that usually do not require medical attention (Report these to your doctor or health care professional if they  continue or are bothersome.):  · constipation  · diarrhea  · fatigue  · headache  · nausea  What may interact with this medicine?  · acetaminophen  · atorvastatin  · birth control pills  · digoxin  · griseofulvin  · lisinoprilMany medications may cause changes in blood sugar, these include:  · alcohol containing beverages  · aspirin and aspirin-like drugs  · chloramphenicol  · chromium  · diuretics  · female hormones, such as estrogens or progestins, birth control pills  · heart medicines  · isoniazid  · male hormones or anabolic steroids  · medications for weight loss  · medicines for allergies, asthma, cold, or cough  · medicines for mental problems  · medicines called MAO inhibitors - Nardil, Parnate, Marplan, Eldepryl  · niacin  · NSAIDS, such as ibuprofen  · pentamidine  · phenytoin  · probenecid  · quinolone antibiotics such as ciprofloxacin, levofloxacin, ofloxacin  · some herbal dietary supplements  · steroid medicines such as prednisone or cortisone  · thyroid hormonesSome medications can hide the warning symptoms of low blood sugar (hypoglycemia). You may need to monitor your blood sugar more closely if you are taking one of these medications. These include:  · beta-blockers, often used for high blood pressure or heart problems (examples include atenolol, metoprolol, propranolol)  · clonidine  · guanethidine  · reserpine  What if I miss a dose?  If you miss a dose, take it as soon as you can. If it is almost time for your next dose, take only that dose. Do not take double or extra doses.  Where should I keep my medicine?  Keep out of the reach of children.  Store unopened pen in a refrigerator between 2 and 8 degrees C (36 and 46 degrees F). Do not freeze or use if the medicine has been frozen. Protect from light and excessive heat. After you first use the pen, it can be stored at room temperature between 15 and 30 degrees C (59 and 86 degrees F) or in a refrigerator. Throw away your used pen after 30 days  or after the expiration date, whichever comes first.  Do not store your pen with the needle attached. If the needle is left on, medicine may leak from the pen.  What should I tell my health care provider before I take this medicine?  They need to know if you have any of these conditions:  · endocrine tumors (MEN 2) or if someone in your family had these tumors  · gallstones  · high cholesterol  · history of alcohol abuse problem  · history of pancreatitis  · kidney disease or if you are on dialysis  · liver disease  · previous swelling of the tongue, face, or lips with difficulty breathing, difficulty swallowing, hoarseness, or tightening of the throat  · stomach problems  · suicidal thoughts, plans, or attempt; a previous suicide attempt by you or a family member  · thyroid cancer or if someone in your family had thyroid cancer  · an unusual or allergic reaction to liraglutide, medicines, foods, dyes, or preservatives  · pregnant or trying to get pregnant  · breast-feeding  What should I watch for while using this medicine?  Visit your doctor or health care professional for regular checks on your progress.  A test called the HbA1C (A1C) will be monitored. This is a simple blood test. It measures your blood sugar control over the last 2 to 3 months. You will receive this test every 3 to 6 months.  Learn how to check your blood sugar. Learn the symptoms of low and high blood sugar and how to manage them.  Always carry a quick-source of sugar with you in case you have symptoms of low blood sugar. Examples include hard sugar candy or glucose tablets. Make sure others know that you can choke if you eat or drink when you develop serious symptoms of low blood sugar, such as seizures or unconsciousness. They must get medical help at once.  Tell your doctor or health care professional if you have high blood sugar. You might need to change the dose of your medicine. If you are sick or exercising more than usual, you might  need to change the dose of your medicine.  Do not skip meals. Ask your doctor or health care professional if you should avoid alcohol. Many nonprescription cough and cold products contain sugar or alcohol. These can affect blood sugar.  Liraglutide pens and cartridges should never be shared. Even if the needle is changed, sharing may result in passing of viruses like hepatitis or HIV.  Wear a medical ID bracelet or chain, and carry a card that describes your disease and details of your medicine and dosage times.  Patients and their families should watch out for worsening depression or thoughts of suicide. Also watch out for sudden changes in feelings such as feeling anxious, agitated, panicky, irritable, hostile, aggressive, impulsive, severely restless, overly excited and hyperactive, or not being able to sleep. If this happens, especially at the beginning of treatment or after a change in dose, call your health care professional.  NOTE:This sheet is a summary. It may not cover all possible information. If you have questions about this medicine, talk to your doctor, pharmacist, or health care provider. Copyright© 2017 [employer]

## 2018-05-10 ENCOUNTER — PATIENT MESSAGE (OUTPATIENT)
Dept: NEUROLOGY | Facility: HOSPITAL | Age: 63
End: 2018-05-10

## 2018-05-14 ENCOUNTER — OFFICE VISIT (OUTPATIENT)
Dept: HEMATOLOGY/ONCOLOGY | Facility: CLINIC | Age: 63
End: 2018-05-14
Payer: MEDICARE

## 2018-05-14 ENCOUNTER — LAB VISIT (OUTPATIENT)
Dept: LAB | Facility: HOSPITAL | Age: 63
End: 2018-05-14
Attending: INTERNAL MEDICINE
Payer: MEDICARE

## 2018-05-14 VITALS
HEIGHT: 73 IN | BODY MASS INDEX: 23.58 KG/M2 | DIASTOLIC BLOOD PRESSURE: 72 MMHG | SYSTOLIC BLOOD PRESSURE: 125 MMHG | HEART RATE: 84 BPM | WEIGHT: 177.94 LBS

## 2018-05-14 DIAGNOSIS — N17.9 AKI (ACUTE KIDNEY INJURY): ICD-10-CM

## 2018-05-14 DIAGNOSIS — C90.00 MULTIPLE MYELOMA NOT HAVING ACHIEVED REMISSION: Primary | ICD-10-CM

## 2018-05-14 DIAGNOSIS — Z79.4 TYPE 2 DIABETES MELLITUS WITH DIABETIC POLYNEUROPATHY, WITH LONG-TERM CURRENT USE OF INSULIN: ICD-10-CM

## 2018-05-14 DIAGNOSIS — I12.9 HYPERTENSIVE CHRONIC KIDNEY DISEASE, UNSPECIFIED CKD STAGE: ICD-10-CM

## 2018-05-14 DIAGNOSIS — E11.42 TYPE 2 DIABETES MELLITUS WITH DIABETIC POLYNEUROPATHY, WITH LONG-TERM CURRENT USE OF INSULIN: ICD-10-CM

## 2018-05-14 LAB
ALBUMIN SERPL BCP-MCNC: 2.4 G/DL
ANION GAP SERPL CALC-SCNC: 8 MMOL/L
BUN SERPL-MCNC: 19 MG/DL
CALCIUM SERPL-MCNC: 8.7 MG/DL
CHLORIDE SERPL-SCNC: 105 MMOL/L
CO2 SERPL-SCNC: 23 MMOL/L
CREAT SERPL-MCNC: 2.3 MG/DL
EST. GFR  (AFRICAN AMERICAN): 34.2 ML/MIN/1.73 M^2
EST. GFR  (NON AFRICAN AMERICAN): 29.5 ML/MIN/1.73 M^2
GLUCOSE SERPL-MCNC: 222 MG/DL
PHOSPHATE SERPL-MCNC: 3.3 MG/DL
POTASSIUM SERPL-SCNC: 3.8 MMOL/L
SODIUM SERPL-SCNC: 136 MMOL/L

## 2018-05-14 PROCEDURE — 99213 OFFICE O/P EST LOW 20 MIN: CPT | Mod: PBBFAC | Performed by: INTERNAL MEDICINE

## 2018-05-14 PROCEDURE — 36415 COLL VENOUS BLD VENIPUNCTURE: CPT | Mod: PO

## 2018-05-14 PROCEDURE — 99999 PR PBB SHADOW E&M-EST. PATIENT-LVL III: CPT | Mod: PBBFAC,,, | Performed by: INTERNAL MEDICINE

## 2018-05-14 PROCEDURE — 99214 OFFICE O/P EST MOD 30 MIN: CPT | Mod: S$PBB,,, | Performed by: INTERNAL MEDICINE

## 2018-05-14 PROCEDURE — 80069 RENAL FUNCTION PANEL: CPT | Mod: 91

## 2018-05-14 NOTE — PROGRESS NOTES
HISTORY OF PRESENT ILLNESS:  Mr. Reis is a 62-year-old man with plasma cell   myeloma.  In May 2016, a paraprotein measuring 0.86 g/dL and identified as IgG   kappa was found.  His 24-hour urine collection contained 947 mg of protein, 3%   of which was kappa light chains.  His bone marrow had 15% plasma cells with the   FISH panel revealing a plasma cell clone with trisomy 1, 7, 9 and 15.  A PET   scan in January 2017 showed no bone lesions.    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  I was more concerned about this   disease in early 2018 because there was a substantially greater amount of   protein in his urine (4500 mg) with 4.6% of this kappa light chain and 2.8% IgG   kappa.  His anemia was worse and he was requiring periodic transfusions.  The   paraprotein measured 1.23 g/dL.  There was some increase in the kappa/lambda   ratio.    He has a huge array of other serious medical problems.  He long ago had a   stroke, which left him with weakness of his left leg and arm.  He has severe diabetes   mellitus with retinal, renal and peripheral nerves complications.  He also has   advanced cardiac disease with diastolic dysfunction, pulmonary hypertension and   valvular changes.    I was concerned primarily about the worsening anemia and the possibility that   myeloma was contributing to this.  I think that the increase in protein in the   urine is mostly a consequence his diabetes mellitus.  I advised treatment with   Revlimid 10 mg daily for 21 days followed by one week off along with   dexamethasone 12 mg once weekly.  I felt that this was a therapy that was likely   to have the least side effects of those available and probably be better   tolerated.  After reviewing his status today, I am very doubtful that any   therapy for myeloma will be satisfactorily tolerated.    He has taken three weeks of Revlimid after a long delay in obtaining it.  He   feels that it has worsened his balance and that he is weaker.   He also has   anorexia and nausea.    He was hospitalized briefly April 11-12, 2018 for acute renal impairment   and hyperglycemia.  He also had been hospitalized in late March 2018 for   findings compatible with sepsis along with encephalopathy, congestive heart   failure and hypoglycemia.  He has required red cell transfusions with 2 units on   02/21/2018 when his hemoglobin was 7.9 and another 2 units on 03/20/2018 when   his hemoglobin was 7.8.  On 04/30/2018 his hemoglobin was 11.0.    ADDITIONAL PAST HISTORY, SYSTEM REVIEW, SOCIAL HISTORY AND FAMILY HISTORY:  Have   been reviewed and updated in the electronic record.    PHYSICAL EXAMINATION:  GENERAL APPEARANCE:  A well-developed man in no acute distress.  EYES:  No pallor or jaundice.  NOSE:  Clear nares.  SINUSES:  No tenderness.  MOUTH AND THROAT:  No mucosal lesions.  Some gingivitis.  The teeth are   otherwise in satisfactory repair.  LYMPH NODES:  No enlarged cervical, axillary or inguinal nodes.  CHEST AND LUNGS:  Normal respiratory effort.  Clear to auscultation and   percussion.  HEART:  Regular rate and rhythm without murmur or gallop.  ABDOMEN:  Soft without masses or tenderness.  No hepatosplenomegaly.  EXTREMITIES:  A faint trace of pretibial and ankle edema bilaterally.  PERIPHERAL VASCULATURE:  Adequate pulses in the feet and ankles.  NEUROLOGIC:  Poor memory.  He is oriented to person and place.  Weakness of the   left leg and to a lesser extent the left arm.  SKIN:  No suspicious lesions in the areas examined.    LABORATORY STUDIES:  The protein studies that I had wanted were not drawn and I   have asked that to be obtained today.  His blood counts include hemoglobin 8.6,   WBC 7080 and platelets 158,000.  Comprehensive metabolic profile is remarkable   for BUN 20, creatinine 2.4 and glucose 330.  Calcium is 8.5.  Albumin is 2.3.    IMPRESSION:  1.  Plasma cell myeloma.  2.  Diabetes mellitus with neuropathy, nephropathy and retinal  disease.  3.  Cardiomyopathy, probably ischemic with chronic heart failure.  4.  Pulmonary hypertension.  5.  Prior stroke.    PLAN:  1.  I had a long discussion with the patient and his wife today.  Both of them   are convinced that he feels worse after three weeks of relatively low-dose   Revlimid and dexamethasone that he did before.  It seems clear that my major   goal in treating him for this disease (decreasing the need for red cell   transfusions by improving his anemia) has not been achieved and the potential   risks from continued therapy seem excessive.  Therefore, we have agreed that he   will stop Revlimid and dexamethasone at this time.  2.  Each month, he will have a CBC and type and cross and be transfused as   necessary.  3.  Return visit in three months with CBC, CMP, serum protein electrophoresis,   free light chain analysis, IgG level and type and cross.    Almost all of his 30 minute visit today was devoted to discussing the above   matters.  I am very doubtful that this patient could tolerate any further   myeloma therapy.  If he has severe symptomatic myeloma with bone pain or   hypercalcemia, I might consider an alkylating agent with or without a   corticosteroid.  Unfortunately, his other illnesses are so advanced that most of   the effective drugs for myeloma cannot be given with an adequate safety margin.    ADDENDUM: The total IgG declined from 1,741 in January 2018 to 991 now,  so the Revlimid-Decadron did have some effect on his myeloma, even though  no symptoms improved and he had troublesome side effects.      HILDA/MARIA G  dd: 05/14/2018 15:36:40 (CDT)  td: 05/15/2018 05:46:53 (CDT)  Doc ID   #[id number]  Job ID #[id number]    CC:  VIDHI Colón MD

## 2018-05-16 ENCOUNTER — OFFICE VISIT (OUTPATIENT)
Dept: NEPHROLOGY | Facility: CLINIC | Age: 63
End: 2018-05-16
Payer: MEDICARE

## 2018-05-16 VITALS
HEIGHT: 73 IN | BODY MASS INDEX: 23.58 KG/M2 | OXYGEN SATURATION: 98 % | DIASTOLIC BLOOD PRESSURE: 66 MMHG | HEART RATE: 92 BPM | SYSTOLIC BLOOD PRESSURE: 126 MMHG | WEIGHT: 177.94 LBS

## 2018-05-16 DIAGNOSIS — I15.2 HYPERTENSION ASSOCIATED WITH DIABETES: ICD-10-CM

## 2018-05-16 DIAGNOSIS — I10 ESSENTIAL HYPERTENSION: ICD-10-CM

## 2018-05-16 DIAGNOSIS — N25.81 SECONDARY HYPERPARATHYROIDISM: ICD-10-CM

## 2018-05-16 DIAGNOSIS — E11.59 HYPERTENSION ASSOCIATED WITH DIABETES: ICD-10-CM

## 2018-05-16 DIAGNOSIS — I12.9 HYPERTENSIVE KIDNEY DISEASE WITH STAGE 3 CHRONIC KIDNEY DISEASE: ICD-10-CM

## 2018-05-16 DIAGNOSIS — Z79.4 TYPE 2 DIABETES MELLITUS WITH STAGE 3 CHRONIC KIDNEY DISEASE, WITH LONG-TERM CURRENT USE OF INSULIN: Primary | ICD-10-CM

## 2018-05-16 DIAGNOSIS — R80.1 PERSISTENT PROTEINURIA: ICD-10-CM

## 2018-05-16 DIAGNOSIS — E55.9 VITAMIN D DEFICIENCY: ICD-10-CM

## 2018-05-16 DIAGNOSIS — C90.00 MULTIPLE MYELOMA NOT HAVING ACHIEVED REMISSION: ICD-10-CM

## 2018-05-16 DIAGNOSIS — E11.22 TYPE 2 DIABETES MELLITUS WITH STAGE 3 CHRONIC KIDNEY DISEASE, WITH LONG-TERM CURRENT USE OF INSULIN: Primary | ICD-10-CM

## 2018-05-16 DIAGNOSIS — N18.30 HYPERTENSIVE KIDNEY DISEASE WITH STAGE 3 CHRONIC KIDNEY DISEASE: ICD-10-CM

## 2018-05-16 DIAGNOSIS — N18.30 TYPE 2 DIABETES MELLITUS WITH STAGE 3 CHRONIC KIDNEY DISEASE, WITH LONG-TERM CURRENT USE OF INSULIN: Primary | ICD-10-CM

## 2018-05-16 DIAGNOSIS — I50.32 CHRONIC DIASTOLIC HEART FAILURE: ICD-10-CM

## 2018-05-16 PROCEDURE — 99214 OFFICE O/P EST MOD 30 MIN: CPT | Mod: S$PBB,,, | Performed by: INTERNAL MEDICINE

## 2018-05-16 PROCEDURE — 99213 OFFICE O/P EST LOW 20 MIN: CPT | Mod: PBBFAC | Performed by: INTERNAL MEDICINE

## 2018-05-16 PROCEDURE — 99999 PR PBB SHADOW E&M-EST. PATIENT-LVL III: CPT | Mod: PBBFAC,,, | Performed by: INTERNAL MEDICINE

## 2018-05-16 NOTE — PROGRESS NOTES
Subjective:       Patient ID: Marie Reis Jr. is a 62 y.o. Black or  male who presents for follow up evaluation of Chronic Kidney Disease    HPI     Mr. Reis is seen in nephrology clinic for follow up evaluation for CKD. He arrived accompanied by his wife. He established care with me in 4/16, he followed until 5/16 and then he was lost for follow up until 10/17 when he saw Dr. Huerta in nephrology clinic. Since then again he did not return for follow up until 1/18. He was last followed on 4/11/18.     Most recently, his wife reports he has been feeling very tired. She reports he has been taken off Revlimid by his oncologist. He continues to have uncontrolled hyperglycemia. He states he does check BP at home and describes variations throughout the day. But he did not bring any BP readings for review.    He has had several episodes pf JAZLYN on CKD in the last few months. He has partial recovery. His wife wanted to know if he can receive a kidney transplant. Per most recent labs his eGFR is in low 30's. Explained that he will need clearance by his Oncologist before referring for kidney transplant as he is being treated with chemo for his myeloma. Stressed to follow BP at home and report to MD. His diuretic regimen is being managed by his cardiologist. Pt remains on lisinopril. He has mild vit D deficiency and secondary hyperparathyroidism. But his corrected Ca stays at upper limit of normal.    Pt has severely uncontrolled diabetes with A1C above 10 for several years. He has diabetic retinopathy, hypertension, NSAID use, diabetic polyneuropathy, prior h/o stroke, hyperlipidemia. Additionally he has chronic heart failure with recent acute exacerbation, multiple myeloma. He has progression of CKD and proteinuria. He appears to have prior episodes of JAZLYN. Prior urine studies from 03/15 show presence of micro albumin which now has progressed to overt proteinuria.     During prior visits he has acknowledged his  worsening diabetes, fluid status but problem has been his poor memory which likely is impacting his ability to retain information about follow up, dietary changes etc. He admits he has not been following diabetic diet at all. His most recent A1C was 10.3.      Renal Function:  Lab Results   Component Value Date     (H) 05/14/2018     (H) 05/14/2018     05/14/2018     05/14/2018    K 4.1 05/14/2018    K 4.1 05/14/2018     05/14/2018     05/14/2018    CO2 22 (L) 05/14/2018    CO2 22 (L) 05/14/2018    BUN 20 05/14/2018    BUN 20 05/14/2018    CALCIUM 8.5 (L) 05/14/2018    CALCIUM 8.5 (L) 05/14/2018    CREATININE 2.4 (H) 05/14/2018    CREATININE 2.4 (H) 05/14/2018    ALBUMIN 2.3 (L) 05/14/2018    ALBUMIN 2.3 (L) 05/14/2018    PHOS 3.4 05/14/2018    PHOS 3.3 05/14/2018    ESTGFRAFRICA 32.4 (A) 05/14/2018    ESTGFRAFRICA 32.4 (A) 05/14/2018    EGFRNONAA 28.1 (A) 05/14/2018    EGFRNONAA 28.1 (A) 05/14/2018       Urinalysis:  Lab Results   Component Value Date    APPEARANCEUA Clear 04/11/2018    PHUR 5.0 04/11/2018    SPECGRAV 1.020 04/11/2018    PROTEINUA 2+ (A) 04/11/2018    GLUCUA 3+ (A) 04/11/2018    OCCULTUA 2+ (A) 04/11/2018    NITRITE Negative 04/11/2018    LEUKOCYTESUR Negative 04/11/2018       Protein/Creatinine Ratio:  Lab Results   Component Value Date    PROTEINURINE 84 (H) 04/11/2018    CREATRANDUR 93.0 04/11/2018    UTPCR 0.90 (H) 04/11/2018       CBC:  Lab Results   Component Value Date    WBC 7.08 05/14/2018    HGB 8.6 (L) 05/14/2018    HCT 26.3 (L) 05/14/2018       .5  Vit D 25    Review of Systems   Constitutional: Negative for fever, chills, appetite change, positive for fatigue and worsening.   HENT: Negative for sneezing and sore throat.    Respiratory: Negative for cough, shortness of breath and wheezing. But reduced effort tolerance.  Cardiovascular: Positive for dizziness. Negative for chest pain.   Gastrointestinal: Negative for nausea, vomiting,  abdominal pain and diarrhea.   Genitourinary: Negative for dysuria, frequency, hematuria and flank pain.   Musculoskeletal: Negative for myalgias and back pain.   Skin: Negative for pallor.   Neurological: Negative for dizziness, light-headedness and headaches.   Psychiatric/Behavioral: Negative for behavioral problems.       Objective:      Physical Exam   Constitutional: He is oriented to person, place, and time. He appears well-developed and well-nourished. No distress.   Mouth/Throat: Oropharynx is clear and moist.   Eyes: Conjunctivae are normal. Right eye exhibits no discharge. Left eye exhibits no discharge.   Neck: Normal range of motion. Neck supple.   Cardiovascular: Normal rate, regular rhythm and normal heart sounds.    Pulmonary/Chest: Effort normal and breath sounds normal. No respiratory distress. He has no wheezes. He has no rales.   Abdominal: Soft. Abdominal obesity.There is no tenderness. There is no guarding.   Musculoskeletal: Normal range of motion. He exhibits only trace edema. He exhibits no tenderness.   Neurological: He is alert and oriented to person, place, and time.   Skin: Skin is warm and dry. No erythema.   Psychiatric: He has a normal mood and affect.   Vitals reviewed.      Assessment:       1. Uncontrolled diabetes with stage 3 chronic kidney disease GFR 30-59    2. Proteinuria    3. paraproteinemia   4. Essential hypertension    5. Uncontrolled diabetes mellitus    6. Anemia of chronic illness    7.      Secondary hyperparathyroidism  8.      Multiple myeloma    Plan:     He has had multiple episodes of JAZLYN superimposed on CKD III/IV due to osmotic diuresis due to severe hyperglycemia, likely over diuresis. Pt has very high risk of progression to ESRD.     Worsened proteinuria due to diabetic nephropathy, diabetes becoming uncontrolled again due to his dietary non compliance and multiple myeloma possibly.    CKD is due to longstanding and poorly controlled diabetes, hypertension,  MM, cardiorenal causing decreased EABV. Unfortunately, his diabetes continues to remain poorly controlled.    Non compliance with recommendations, follow up, dietary restrictions, medicine intake.    Memory deficit. Likely due to h/o stroke.     Mr. Reis has uncontrolled and longstanding diabetes with complications like polyneuropathy, retinopathy, has progression of CKD and proteinuria. He has hypertension, hyperlipidemia, prior stroke, prior documented micro albumin in the urine. Prior CT imaging has shown right ureteral stone with mild right hydronephrosis from 08/14. US from 04/16 noted for 10.3 and 10.4 cm kidney size, there was no hydronephrosis. At present it is very crucial that he gets better control of his diabetes and BP. He is at very high risk for rapid progression of his CKD to higher stages. Continue to follow with heme for smoldering myeloma and anemia management, per heme his anemia is likely multifactorial. Consider Epogen if Hb less than 10.     His worsened fatigue and anemia is likely due to chemo for MM. Management deferred to his oncology team.    Plan:    - will need renal panel every 4 weeks to follow on creatinine, eGFR, electrolytes, acid base status very closely  - high risk of progression to ESRD explained to both patient and his wife, will need arrangements with access creation, TOPS education if eGFR remains < 20 and persists   - strict glycemic control  - strict low salt diet, less than 2 grams per day  - avoid NSAID use ever  - continue lisinopril for RAAS blockade   - continue to trend PTH, acid base disorder, corrected Ca   - medication compliance was stressed to him  - most recent US kidneys from 4/18 noted to show changes c/w CKD  - diuretic regimen per his cardiologist    Risk of progression of CKD to ESRD was discussed with him in detail again today. Stressed importance of monthly labs for surveillance and very close follow up given above. He and his wife expressed  understanding.    RTC 3 months, sooner if any new symptoms.  Plan, labs, recommendations were discussed with patient, his wife, their questions were answered to their satisfaction.   Renal panel every 4 weekly

## 2018-05-21 DIAGNOSIS — C90.00 MULTIPLE MYELOMA NOT HAVING ACHIEVED REMISSION: ICD-10-CM

## 2018-05-21 RX ORDER — LENALIDOMIDE 10 MG/1
CAPSULE ORAL
OUTPATIENT
Start: 2018-05-21

## 2018-05-28 ENCOUNTER — TELEPHONE (OUTPATIENT)
Dept: HEMATOLOGY/ONCOLOGY | Facility: CLINIC | Age: 63
End: 2018-05-28

## 2018-05-28 ENCOUNTER — LAB VISIT (OUTPATIENT)
Dept: LAB | Facility: HOSPITAL | Age: 63
End: 2018-05-28
Attending: INTERNAL MEDICINE
Payer: MEDICARE

## 2018-05-28 DIAGNOSIS — D50.0 ANEMIA DUE TO CHRONIC BLOOD LOSS: Primary | ICD-10-CM

## 2018-05-28 DIAGNOSIS — D50.0 ANEMIA DUE TO CHRONIC BLOOD LOSS: ICD-10-CM

## 2018-05-28 DIAGNOSIS — D47.2 MONOCLONAL GAMMOPATHY: ICD-10-CM

## 2018-05-28 LAB
ABO + RH BLD: NORMAL
BASOPHILS # BLD AUTO: 0.06 K/UL
BASOPHILS NFR BLD: 0.8 %
BLD GP AB SCN CELLS X3 SERPL QL: NORMAL
DIFFERENTIAL METHOD: ABNORMAL
EOSINOPHIL # BLD AUTO: 0.2 K/UL
EOSINOPHIL NFR BLD: 2.5 %
ERYTHROCYTE [DISTWIDTH] IN BLOOD BY AUTOMATED COUNT: 14.3 %
HCT VFR BLD AUTO: 20.5 %
HGB BLD-MCNC: 6.5 G/DL
IMM GRANULOCYTES # BLD AUTO: 0.03 K/UL
IMM GRANULOCYTES NFR BLD AUTO: 0.4 %
LYMPHOCYTES # BLD AUTO: 1.3 K/UL
LYMPHOCYTES NFR BLD: 16.9 %
MCH RBC QN AUTO: 27 PG
MCHC RBC AUTO-ENTMCNC: 31.7 G/DL
MCV RBC AUTO: 85 FL
MONOCYTES # BLD AUTO: 0.3 K/UL
MONOCYTES NFR BLD: 4.4 %
NEUTROPHILS # BLD AUTO: 5.7 K/UL
NEUTROPHILS NFR BLD: 75 %
NRBC BLD-RTO: 0 /100 WBC
PLATELET # BLD AUTO: 300 K/UL
PMV BLD AUTO: 9.2 FL
RBC # BLD AUTO: 2.41 M/UL
WBC # BLD AUTO: 7.57 K/UL

## 2018-05-28 PROCEDURE — P9040 RBC LEUKOREDUCED IRRADIATED: HCPCS

## 2018-05-28 PROCEDURE — 86920 COMPATIBILITY TEST SPIN: CPT

## 2018-05-28 PROCEDURE — 36415 COLL VENOUS BLD VENIPUNCTURE: CPT

## 2018-05-28 PROCEDURE — 85025 COMPLETE CBC W/AUTO DIFF WBC: CPT

## 2018-05-28 PROCEDURE — 86850 RBC ANTIBODY SCREEN: CPT

## 2018-05-28 RX ORDER — FUROSEMIDE 10 MG/ML
20 INJECTION INTRAMUSCULAR; INTRAVENOUS
Status: CANCELLED | OUTPATIENT
Start: 2018-05-28

## 2018-05-28 RX ORDER — HYDROCODONE BITARTRATE AND ACETAMINOPHEN 500; 5 MG/1; MG/1
TABLET ORAL ONCE
Status: CANCELLED | OUTPATIENT
Start: 2018-05-28 | End: 2018-05-28

## 2018-05-28 NOTE — TELEPHONE ENCOUNTER
----- Message from Kishor Romo MA sent at 5/28/2018 12:49 PM CDT -----  Contact: Pt's  Wife Naheed Reis   Pt's  Wife Naheed Reis  Called and would like a call back from the nurse regarding her  Marie Morelarleen 10mgs med.Pt need a call back asap      Pt's  Wife Naheed Reis can be reached at [phone].      Thanks    Patient will come today to check CBC due to wife said that he is weak.

## 2018-05-30 ENCOUNTER — INFUSION (OUTPATIENT)
Dept: INFUSION THERAPY | Facility: HOSPITAL | Age: 63
End: 2018-05-30
Attending: INTERNAL MEDICINE
Payer: MEDICARE

## 2018-05-30 VITALS
DIASTOLIC BLOOD PRESSURE: 78 MMHG | SYSTOLIC BLOOD PRESSURE: 162 MMHG | TEMPERATURE: 98 F | HEART RATE: 86 BPM | RESPIRATION RATE: 18 BRPM

## 2018-05-30 DIAGNOSIS — D50.0 ANEMIA DUE TO CHRONIC BLOOD LOSS: ICD-10-CM

## 2018-05-30 PROCEDURE — P9040 RBC LEUKOREDUCED IRRADIATED: HCPCS

## 2018-05-30 PROCEDURE — 96374 THER/PROPH/DIAG INJ IV PUSH: CPT

## 2018-05-30 PROCEDURE — 63600175 PHARM REV CODE 636 W HCPCS: Performed by: INTERNAL MEDICINE

## 2018-05-30 PROCEDURE — 36430 TRANSFUSION BLD/BLD COMPNT: CPT

## 2018-05-30 RX ORDER — FUROSEMIDE 10 MG/ML
20 INJECTION INTRAMUSCULAR; INTRAVENOUS
Status: DISCONTINUED | OUTPATIENT
Start: 2018-05-30 | End: 2018-05-30 | Stop reason: HOSPADM

## 2018-05-30 RX ORDER — HYDROCODONE BITARTRATE AND ACETAMINOPHEN 500; 5 MG/1; MG/1
TABLET ORAL ONCE
Status: DISCONTINUED | OUTPATIENT
Start: 2018-05-30 | End: 2018-05-30 | Stop reason: HOSPADM

## 2018-05-30 RX ADMIN — FUROSEMIDE 20 MG: 10 INJECTION, SOLUTION INTRAVENOUS at 04:05

## 2018-05-30 NOTE — NURSING
1315:  [facility], VSS, assessment completed.  Weakness and marked BLE edema noted.  #22g PIV placed to right forearm flushes easily, brisk blood return noted.  1st of 2 units PRBC's released.  Will monitor.  Lasix to be given between units today.

## 2018-05-30 NOTE — PLAN OF CARE
Problem: Patient Care Overview  Goal: Plan of Care Review  Outcome: Ongoing (interventions implemented as appropriate)  1830:  Patient tolerated transfusion of 2 units PRBC's well.  VSS, NAD noted, was able to ambulate to restroom independently with no ill effects.  Released to home accompanied by his wife, in stable condition.

## 2018-06-11 ENCOUNTER — PATIENT MESSAGE (OUTPATIENT)
Dept: HEMATOLOGY/ONCOLOGY | Facility: CLINIC | Age: 63
End: 2018-06-11

## 2018-06-11 ENCOUNTER — LAB VISIT (OUTPATIENT)
Dept: LAB | Facility: HOSPITAL | Age: 63
End: 2018-06-11
Attending: INTERNAL MEDICINE
Payer: MEDICARE

## 2018-06-11 ENCOUNTER — TELEPHONE (OUTPATIENT)
Dept: NEPHROLOGY | Facility: CLINIC | Age: 63
End: 2018-06-11

## 2018-06-11 DIAGNOSIS — R80.9 PROTEINURIA: ICD-10-CM

## 2018-06-11 DIAGNOSIS — D47.2 MONOCLONAL GAMMOPATHY: ICD-10-CM

## 2018-06-11 DIAGNOSIS — N17.9 AKI (ACUTE KIDNEY INJURY): ICD-10-CM

## 2018-06-11 DIAGNOSIS — D50.0 ANEMIA DUE TO CHRONIC BLOOD LOSS: ICD-10-CM

## 2018-06-11 LAB
ABO + RH BLD: NORMAL
ALBUMIN SERPL BCP-MCNC: 2.7 G/DL
ANION GAP SERPL CALC-SCNC: 9 MMOL/L
BASOPHILS # BLD AUTO: 0.03 K/UL
BASOPHILS NFR BLD: 0.5 %
BLD GP AB SCN CELLS X3 SERPL QL: NORMAL
BUN SERPL-MCNC: 22 MG/DL
CALCIUM SERPL-MCNC: 8.8 MG/DL
CHLORIDE SERPL-SCNC: 106 MMOL/L
CO2 SERPL-SCNC: 23 MMOL/L
CREAT SERPL-MCNC: 2.2 MG/DL
DIFFERENTIAL METHOD: ABNORMAL
EOSINOPHIL # BLD AUTO: 0.3 K/UL
EOSINOPHIL NFR BLD: 5.3 %
ERYTHROCYTE [DISTWIDTH] IN BLOOD BY AUTOMATED COUNT: 15 %
EST. GFR  (AFRICAN AMERICAN): 36 ML/MIN/1.73 M^2
EST. GFR  (NON AFRICAN AMERICAN): 31.2 ML/MIN/1.73 M^2
GLUCOSE SERPL-MCNC: 249 MG/DL
HCT VFR BLD AUTO: 25.8 %
HGB BLD-MCNC: 8.3 G/DL
IMM GRANULOCYTES # BLD AUTO: 0.04 K/UL
IMM GRANULOCYTES NFR BLD AUTO: 0.7 %
LYMPHOCYTES # BLD AUTO: 1.2 K/UL
LYMPHOCYTES NFR BLD: 21 %
MCH RBC QN AUTO: 26.9 PG
MCHC RBC AUTO-ENTMCNC: 32.2 G/DL
MCV RBC AUTO: 84 FL
MONOCYTES # BLD AUTO: 0.4 K/UL
MONOCYTES NFR BLD: 7.5 %
NEUTROPHILS # BLD AUTO: 3.6 K/UL
NEUTROPHILS NFR BLD: 65 %
NRBC BLD-RTO: 0 /100 WBC
PHOSPHATE SERPL-MCNC: 3.7 MG/DL
PLATELET # BLD AUTO: 254 K/UL
PMV BLD AUTO: 9.7 FL
POTASSIUM SERPL-SCNC: 3.7 MMOL/L
RBC # BLD AUTO: 3.08 M/UL
SODIUM SERPL-SCNC: 138 MMOL/L
WBC # BLD AUTO: 5.61 K/UL

## 2018-06-11 PROCEDURE — 80069 RENAL FUNCTION PANEL: CPT

## 2018-06-11 PROCEDURE — 86901 BLOOD TYPING SEROLOGIC RH(D): CPT

## 2018-06-11 PROCEDURE — 85025 COMPLETE CBC W/AUTO DIFF WBC: CPT

## 2018-06-12 ENCOUNTER — OFFICE VISIT (OUTPATIENT)
Dept: CARDIOLOGY | Facility: CLINIC | Age: 63
End: 2018-06-12
Payer: MEDICARE

## 2018-06-12 ENCOUNTER — LAB VISIT (OUTPATIENT)
Dept: LAB | Facility: HOSPITAL | Age: 63
End: 2018-06-12
Attending: INTERNAL MEDICINE
Payer: MEDICARE

## 2018-06-12 VITALS
HEART RATE: 68 BPM | WEIGHT: 176.56 LBS | DIASTOLIC BLOOD PRESSURE: 57 MMHG | SYSTOLIC BLOOD PRESSURE: 115 MMHG | BODY MASS INDEX: 23.92 KG/M2 | HEIGHT: 72 IN

## 2018-06-12 DIAGNOSIS — N18.30 HYPERTENSIVE KIDNEY DISEASE WITH STAGE 3 CHRONIC KIDNEY DISEASE: ICD-10-CM

## 2018-06-12 DIAGNOSIS — C90.00 MULTIPLE MYELOMA NOT HAVING ACHIEVED REMISSION: ICD-10-CM

## 2018-06-12 DIAGNOSIS — R80.1 PERSISTENT PROTEINURIA: ICD-10-CM

## 2018-06-12 DIAGNOSIS — I50.32 CHRONIC DIASTOLIC HEART FAILURE: ICD-10-CM

## 2018-06-12 DIAGNOSIS — E11.22 TYPE 2 DIABETES MELLITUS WITH STAGE 3 CHRONIC KIDNEY DISEASE, WITH LONG-TERM CURRENT USE OF INSULIN: ICD-10-CM

## 2018-06-12 DIAGNOSIS — E11.59 HYPERTENSION ASSOCIATED WITH DIABETES: Primary | ICD-10-CM

## 2018-06-12 DIAGNOSIS — I12.9 HYPERTENSIVE KIDNEY DISEASE WITH STAGE 3 CHRONIC KIDNEY DISEASE: ICD-10-CM

## 2018-06-12 DIAGNOSIS — N18.30 TYPE 2 DIABETES MELLITUS WITH STAGE 3 CHRONIC KIDNEY DISEASE, WITH LONG-TERM CURRENT USE OF INSULIN: ICD-10-CM

## 2018-06-12 DIAGNOSIS — N17.9 AKI (ACUTE KIDNEY INJURY): ICD-10-CM

## 2018-06-12 DIAGNOSIS — I65.23 BILATERAL CAROTID ARTERY STENOSIS: ICD-10-CM

## 2018-06-12 DIAGNOSIS — I25.10 CORONARY ARTERY DISEASE DUE TO CALCIFIED CORONARY LESION: ICD-10-CM

## 2018-06-12 DIAGNOSIS — N25.81 SECONDARY HYPERPARATHYROIDISM: ICD-10-CM

## 2018-06-12 DIAGNOSIS — I15.2 HYPERTENSION ASSOCIATED WITH DIABETES: Primary | ICD-10-CM

## 2018-06-12 DIAGNOSIS — E78.5 DYSLIPIDEMIA: ICD-10-CM

## 2018-06-12 DIAGNOSIS — Z79.4 TYPE 2 DIABETES MELLITUS WITH STAGE 3 CHRONIC KIDNEY DISEASE, WITH LONG-TERM CURRENT USE OF INSULIN: ICD-10-CM

## 2018-06-12 DIAGNOSIS — R00.1 BRADYCARDIA: ICD-10-CM

## 2018-06-12 DIAGNOSIS — I25.84 CORONARY ARTERY DISEASE DUE TO CALCIFIED CORONARY LESION: ICD-10-CM

## 2018-06-12 LAB
ALBUMIN SERPL BCP-MCNC: 2.7 G/DL
ANION GAP SERPL CALC-SCNC: 11 MMOL/L
BUN SERPL-MCNC: 22 MG/DL
CALCIUM SERPL-MCNC: 8.8 MG/DL
CHLORIDE SERPL-SCNC: 106 MMOL/L
CO2 SERPL-SCNC: 25 MMOL/L
CREAT SERPL-MCNC: 2.3 MG/DL
EST. GFR  (AFRICAN AMERICAN): 34.2 ML/MIN/1.73 M^2
EST. GFR  (NON AFRICAN AMERICAN): 29.5 ML/MIN/1.73 M^2
GLUCOSE SERPL-MCNC: 101 MG/DL
PHOSPHATE SERPL-MCNC: 4.5 MG/DL
POTASSIUM SERPL-SCNC: 3.6 MMOL/L
SODIUM SERPL-SCNC: 142 MMOL/L

## 2018-06-12 PROCEDURE — 80069 RENAL FUNCTION PANEL: CPT

## 2018-06-12 PROCEDURE — 36415 COLL VENOUS BLD VENIPUNCTURE: CPT | Mod: PO

## 2018-06-12 PROCEDURE — 99999 PR PBB SHADOW E&M-EST. PATIENT-LVL III: CPT | Mod: PBBFAC,,, | Performed by: INTERNAL MEDICINE

## 2018-06-12 PROCEDURE — 99214 OFFICE O/P EST MOD 30 MIN: CPT | Mod: S$PBB,,, | Performed by: INTERNAL MEDICINE

## 2018-06-12 PROCEDURE — 99213 OFFICE O/P EST LOW 20 MIN: CPT | Mod: PBBFAC,PO | Performed by: INTERNAL MEDICINE

## 2018-06-12 NOTE — PROGRESS NOTES
Subjective:   Patient ID:  Marie Reis Jr. is a 62 y.o. male who presents for follow-up of Congestive Heart Failure      HPI: Patient with multiple medical problems routinely followed by hem-onc for MM and by nephrology presents for a follow up. He has no CV symptoms and seems to tolerate his meds well. Since Revlimid was stopped patient is doing better, but noticed more peripheral edema.  He requires periodic transfusions.  Diabetes is uncontrolled.  CKD is stable.    He lowered Carvedilol to 12.5mg BID due to low blood pressure and fatigue. He is also taking only 40 mg Lasix BID    Lab Results   Component Value Date     06/11/2018    K 3.7 06/11/2018     06/11/2018    CO2 23 06/11/2018    BUN 22 06/11/2018    CREATININE 2.2 (H) 06/11/2018     (H) 06/11/2018    HGBA1C 10.3 (H) 04/30/2018    MG 1.6 04/12/2018    AST 13 05/14/2018    ALT 16 05/14/2018    ALBUMIN 2.7 (L) 06/11/2018    PROT 6.0 05/14/2018    BILITOT 1.4 (H) 05/14/2018    WBC 5.61 06/11/2018    HGB 8.3 (L) 06/11/2018    HCT 25.8 (L) 06/11/2018    HCT 27 (L) 02/21/2018    MCV 84 06/11/2018     06/11/2018    INR 1.1 02/21/2018    PSA 1.5 06/26/2015    TSH 2.402 02/21/2018    CHOL 119 (L) 10/25/2017    HDL 32 (L) 10/25/2017    LDLCALC 68.6 10/25/2017    TRIG 92 10/25/2017       Review of Systems   Constitution: Positive for weakness and malaise/fatigue.   HENT: Negative.    Eyes: Negative.    Cardiovascular: Positive for dyspnea on exertion and leg swelling. Negative for chest pain, claudication, irregular heartbeat, near-syncope, palpitations and syncope.   Respiratory: Positive for cough. Negative for shortness of breath, snoring and wheezing.    Endocrine: Negative.  Negative for cold intolerance, heat intolerance, polydipsia, polyphagia and polyuria.   Skin: Negative.    Musculoskeletal: Positive for back pain and muscle weakness.   Gastrointestinal: Negative.    Genitourinary: Negative.    Neurological: Positive for dizziness,  light-headedness, loss of balance and paresthesias.   Psychiatric/Behavioral: Negative.        Objective:   Physical Exam   Constitutional: He is oriented to person, place, and time. He appears well-developed and well-nourished.   BP (!) 115/57   Pulse 68   Ht 6' (1.829 m)   Wt 80.1 kg (176 lb 9.4 oz)   BMI 23.95 kg/m²      HENT:   Head: Normocephalic.   Eyes: Pupils are equal, round, and reactive to light.   Neck: Normal range of motion. Neck supple. Carotid bruit is not present. No thyromegaly present.   Cardiovascular: Normal rate, regular rhythm, normal heart sounds and intact distal pulses.  Exam reveals no gallop and no friction rub.    No murmur heard.  Pulses:       Carotid pulses are 2+ on the right side, and 2+ on the left side.       Radial pulses are 2+ on the right side, and 2+ on the left side.        Femoral pulses are 2+ on the right side, and 2+ on the left side.       Popliteal pulses are 2+ on the right side, and 2+ on the left side.        Dorsalis pedis pulses are 2+ on the right side, and 2+ on the left side.        Posterior tibial pulses are 2+ on the right side, and 2+ on the left side.   Pulmonary/Chest: Effort normal and breath sounds normal. No respiratory distress. He has no wheezes. He has no rales. He exhibits no tenderness.   Abdominal: Soft. Bowel sounds are normal.   Musculoskeletal: Normal range of motion. He exhibits edema.   1+ bilaterally   Neurological: He is alert and oriented to person, place, and time.   Skin: Skin is warm and dry.   Psychiatric: He has a normal mood and affect.   Nursing note and vitals reviewed.        Assessment and Plan:     Problem List Items Addressed This Visit        Cardiology Problems    Hypertension associated with diabetes - Primary    Chronic diastolic heart failure    Coronary artery disease due to calcified coronary lesion    Bilateral carotid artery stenosis       Other    Dyslipidemia    Type 2 diabetes mellitus with stage 3 chronic  "kidney disease, with long-term current use of insulin    Persistent proteinuria    Multiple myeloma not having achieved remission    Bradycardia    Secondary hyperparathyroidism    Hypertensive CKD (chronic kidney disease)    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, with long-term current use of insulin          Patient's Medications   New Prescriptions    No medications on file   Previous Medications    AMLODIPINE (NORVASC) 10 MG TABLET    Take 1 tablet (10 mg total) by mouth once daily.    ATORVASTATIN (LIPITOR) 80 MG TABLET    Take 1 tablet (80 mg total) by mouth once daily.    BLOOD SUGAR DIAGNOSTIC STRP    1 strip by Misc.(Non-Drug; Combo Route) route after meals as needed.    CARVEDILOL (COREG) 25 MG TABLET    Take 1 tablet (25 mg total) by mouth 2 (two) times daily with meals.    CLOPIDOGREL (PLAVIX) 75 MG TABLET    Take 1 tablet (75 mg total) by mouth once daily.    FLUTICASONE (FLONASE) 50 MCG/ACTUATION NASAL SPRAY    1 spray by Each Nare route 2 (two) times daily as needed for Rhinitis.    FUROSEMIDE (LASIX) 40 MG TABLET    Pt to take 1 1/2 tablet twice a day per Dr. Salinas on 3/19/18.    GABAPENTIN (NEURONTIN) 300 MG CAPSULE    Take 1 capsule (300 mg total) by mouth 2 (two) times daily.    HYDRALAZINE (APRESOLINE) 25 MG TABLET    Take 25 mg by mouth every 12 (twelve) hours.     INSULIN ASPART (NOVOLOG) 100 UNIT/ML INPN PEN    Inject 8 units w/ meals plus scale 150-200 +2, 201-250 +4, 251-300 +6, 301-350 +8, >350 +10. Snack 4 units.    INSULIN GLARGINE (LANTUS SOLOSTAR U-100 INSULIN) 100 UNIT/ML (3 ML) INPN PEN    Inject 24 units at night.    INSULIN NEEDLES, DISPOSABLE, 31 X 5/16 " NDLE    Pt to use pen needle with Lantus daily    LIRAGLUTIDE 0.6 MG/0.1 ML, 18 MG/3 ML, SUBQ PNIJ (VICTOZA 2-LINCOLN) 0.6 MG/0.1 ML (18 MG/3 ML) PNIJ    Inject 0.6 mg daily x 1st week, 1.2 mg daily x 2nd week and on.    LISINOPRIL (PRINIVIL,ZESTRIL) 40 MG " TABLET    Take 1 tablet (40 mg total) by mouth once daily.    MECLIZINE (ANTIVERT) 25 MG TABLET    Take 1 tablet (25 mg total) by mouth 3 (three) times daily as needed for Dizziness.    METHYL SALICYLATE/MENTHOL (MENTHOLATUM DEEP HEAT RUB TOP)    Apply topically.    METOLAZONE (ZAROXOLYN) 2.5 MG TABLET    Take 1 tablet (2.5 mg total) by mouth once daily.    MIRTAZAPINE (REMERON) 30 MG TABLET    Take 30 mg by mouth every evening.     MONTELUKAST (SINGULAIR) 10 MG TABLET    Take 1 tablet (10 mg total) by mouth every evening.    NITROGLYCERIN (NITROSTAT) 0.4 MG SL TABLET    Place 1 tablet (0.4 mg total) under the tongue every 5 (five) minutes as needed for Chest pain.    ONDANSETRON (ZOFRAN-ODT) 8 MG TBDL    Take 1 tablet (8 mg total) by mouth every 6 (six) hours as needed.   Modified Medications    No medications on file   Discontinued Medications    No medications on file     No change in the present regimen.    Follow-up in about 6 months (around 12/12/2018).

## 2018-06-25 ENCOUNTER — LAB VISIT (OUTPATIENT)
Dept: LAB | Facility: HOSPITAL | Age: 63
End: 2018-06-25
Attending: INTERNAL MEDICINE
Payer: MEDICARE

## 2018-06-25 ENCOUNTER — TELEPHONE (OUTPATIENT)
Dept: HEMATOLOGY/ONCOLOGY | Facility: CLINIC | Age: 63
End: 2018-06-25

## 2018-06-25 DIAGNOSIS — D50.0 ANEMIA DUE TO CHRONIC BLOOD LOSS: Primary | ICD-10-CM

## 2018-06-25 DIAGNOSIS — D47.2 MONOCLONAL GAMMOPATHY: ICD-10-CM

## 2018-06-25 LAB
BASOPHILS # BLD AUTO: 0.02 K/UL
BASOPHILS NFR BLD: 0.4 %
DIFFERENTIAL METHOD: ABNORMAL
EOSINOPHIL # BLD AUTO: 0.1 K/UL
EOSINOPHIL NFR BLD: 2.9 %
ERYTHROCYTE [DISTWIDTH] IN BLOOD BY AUTOMATED COUNT: 17.3 %
HCT VFR BLD AUTO: 25.2 %
HGB BLD-MCNC: 7.7 G/DL
IMM GRANULOCYTES # BLD AUTO: 0.02 K/UL
IMM GRANULOCYTES NFR BLD AUTO: 0.4 %
LYMPHOCYTES # BLD AUTO: 0.8 K/UL
LYMPHOCYTES NFR BLD: 18.1 %
MCH RBC QN AUTO: 27 PG
MCHC RBC AUTO-ENTMCNC: 30.6 G/DL
MCV RBC AUTO: 88 FL
MONOCYTES # BLD AUTO: 0.4 K/UL
MONOCYTES NFR BLD: 8.9 %
NEUTROPHILS # BLD AUTO: 3.1 K/UL
NEUTROPHILS NFR BLD: 69.3 %
NRBC BLD-RTO: 0 /100 WBC
PLATELET # BLD AUTO: 200 K/UL
PMV BLD AUTO: 10.3 FL
RBC # BLD AUTO: 2.85 M/UL
WBC # BLD AUTO: 4.48 K/UL

## 2018-06-25 PROCEDURE — 36415 COLL VENOUS BLD VENIPUNCTURE: CPT | Mod: PO

## 2018-06-25 PROCEDURE — 85025 COMPLETE CBC W/AUTO DIFF WBC: CPT

## 2018-06-25 PROCEDURE — 86920 COMPATIBILITY TEST SPIN: CPT

## 2018-06-25 RX ORDER — FUROSEMIDE 10 MG/ML
20 INJECTION INTRAMUSCULAR; INTRAVENOUS
Status: CANCELLED | OUTPATIENT
Start: 2018-06-25

## 2018-06-25 RX ORDER — HYDROCODONE BITARTRATE AND ACETAMINOPHEN 500; 5 MG/1; MG/1
TABLET ORAL ONCE
Status: CANCELLED | OUTPATIENT
Start: 2018-06-25 | End: 2018-06-25

## 2018-06-26 ENCOUNTER — INFUSION (OUTPATIENT)
Dept: INFUSION THERAPY | Facility: HOSPITAL | Age: 63
End: 2018-06-26
Attending: INTERNAL MEDICINE
Payer: MEDICARE

## 2018-06-26 VITALS
RESPIRATION RATE: 18 BRPM | TEMPERATURE: 98 F | SYSTOLIC BLOOD PRESSURE: 180 MMHG | HEART RATE: 77 BPM | DIASTOLIC BLOOD PRESSURE: 84 MMHG

## 2018-06-26 DIAGNOSIS — D50.0 ANEMIA DUE TO CHRONIC BLOOD LOSS: ICD-10-CM

## 2018-06-26 DIAGNOSIS — D47.2 MONOCLONAL GAMMOPATHY: ICD-10-CM

## 2018-06-26 PROCEDURE — 36430 TRANSFUSION BLD/BLD COMPNT: CPT

## 2018-06-26 PROCEDURE — 63600175 PHARM REV CODE 636 W HCPCS: Performed by: INTERNAL MEDICINE

## 2018-06-26 PROCEDURE — 96374 THER/PROPH/DIAG INJ IV PUSH: CPT

## 2018-06-26 PROCEDURE — P9040 RBC LEUKOREDUCED IRRADIATED: HCPCS

## 2018-06-26 PROCEDURE — 25000003 PHARM REV CODE 250: Performed by: INTERNAL MEDICINE

## 2018-06-26 PROCEDURE — P9011 BLOOD SPLIT UNIT: HCPCS | Mod: 59

## 2018-06-26 PROCEDURE — 86985 SPLIT BLOOD OR PRODUCTS: CPT

## 2018-06-26 PROCEDURE — 27201040 HC RC 50 FILTER

## 2018-06-26 RX ORDER — FUROSEMIDE 10 MG/ML
20 INJECTION INTRAMUSCULAR; INTRAVENOUS
Status: DISCONTINUED | OUTPATIENT
Start: 2018-06-26 | End: 2018-06-26 | Stop reason: HOSPADM

## 2018-06-26 RX ORDER — HYDROCODONE BITARTRATE AND ACETAMINOPHEN 500; 5 MG/1; MG/1
TABLET ORAL ONCE
Status: COMPLETED | OUTPATIENT
Start: 2018-06-26 | End: 2018-06-26

## 2018-06-26 RX ADMIN — SODIUM CHLORIDE: 9 INJECTION, SOLUTION INTRAVENOUS at 10:06

## 2018-06-26 RX ADMIN — FUROSEMIDE 20 MG: 10 INJECTION, SOLUTION INTRAVENOUS at 11:06

## 2018-07-09 ENCOUNTER — LAB VISIT (OUTPATIENT)
Dept: LAB | Facility: HOSPITAL | Age: 63
End: 2018-07-09
Attending: INTERNAL MEDICINE
Payer: MEDICARE

## 2018-07-09 DIAGNOSIS — N18.30 TYPE 2 DIABETES MELLITUS WITH STAGE 3 CHRONIC KIDNEY DISEASE, WITH LONG-TERM CURRENT USE OF INSULIN: ICD-10-CM

## 2018-07-09 DIAGNOSIS — D47.2 MONOCLONAL GAMMOPATHY: ICD-10-CM

## 2018-07-09 DIAGNOSIS — Z79.4 TYPE 2 DIABETES MELLITUS WITH STAGE 3 CHRONIC KIDNEY DISEASE, WITH LONG-TERM CURRENT USE OF INSULIN: ICD-10-CM

## 2018-07-09 DIAGNOSIS — R80.9 PROTEINURIA: ICD-10-CM

## 2018-07-09 DIAGNOSIS — D50.0 ANEMIA DUE TO CHRONIC BLOOD LOSS: ICD-10-CM

## 2018-07-09 DIAGNOSIS — E11.22 TYPE 2 DIABETES MELLITUS WITH STAGE 3 CHRONIC KIDNEY DISEASE, WITH LONG-TERM CURRENT USE OF INSULIN: ICD-10-CM

## 2018-07-09 LAB
ABO + RH BLD: NORMAL
ALBUMIN SERPL BCP-MCNC: 3.1 G/DL
ANION GAP SERPL CALC-SCNC: 8 MMOL/L
BASOPHILS # BLD AUTO: 0.03 K/UL
BASOPHILS NFR BLD: 0.5 %
BLD GP AB SCN CELLS X3 SERPL QL: NORMAL
BUN SERPL-MCNC: 25 MG/DL
CALCIUM SERPL-MCNC: 9 MG/DL
CHLORIDE SERPL-SCNC: 107 MMOL/L
CO2 SERPL-SCNC: 23 MMOL/L
CREAT SERPL-MCNC: 2.3 MG/DL
DIFFERENTIAL METHOD: ABNORMAL
EOSINOPHIL # BLD AUTO: 0.2 K/UL
EOSINOPHIL NFR BLD: 3.3 %
ERYTHROCYTE [DISTWIDTH] IN BLOOD BY AUTOMATED COUNT: 14.8 %
EST. GFR  (AFRICAN AMERICAN): 34.2 ML/MIN/1.73 M^2
EST. GFR  (NON AFRICAN AMERICAN): 29.5 ML/MIN/1.73 M^2
GLUCOSE SERPL-MCNC: 250 MG/DL
HCT VFR BLD AUTO: 29.1 %
HGB BLD-MCNC: 9.5 G/DL
IMM GRANULOCYTES # BLD AUTO: 0.03 K/UL
IMM GRANULOCYTES NFR BLD AUTO: 0.5 %
LYMPHOCYTES # BLD AUTO: 1.1 K/UL
LYMPHOCYTES NFR BLD: 18.6 %
MCH RBC QN AUTO: 28.4 PG
MCHC RBC AUTO-ENTMCNC: 32.6 G/DL
MCV RBC AUTO: 87 FL
MONOCYTES # BLD AUTO: 0.5 K/UL
MONOCYTES NFR BLD: 8.1 %
NEUTROPHILS # BLD AUTO: 4 K/UL
NEUTROPHILS NFR BLD: 69 %
NRBC BLD-RTO: 0 /100 WBC
PHOSPHATE SERPL-MCNC: 3.5 MG/DL
PLATELET # BLD AUTO: 192 K/UL
PMV BLD AUTO: 10 FL
POTASSIUM SERPL-SCNC: 4.1 MMOL/L
RBC # BLD AUTO: 3.35 M/UL
SODIUM SERPL-SCNC: 138 MMOL/L
WBC # BLD AUTO: 5.8 K/UL

## 2018-07-09 PROCEDURE — 86901 BLOOD TYPING SEROLOGIC RH(D): CPT

## 2018-07-09 PROCEDURE — 80069 RENAL FUNCTION PANEL: CPT

## 2018-07-09 PROCEDURE — 36415 COLL VENOUS BLD VENIPUNCTURE: CPT

## 2018-07-09 PROCEDURE — 85025 COMPLETE CBC W/AUTO DIFF WBC: CPT

## 2018-07-10 ENCOUNTER — PROCEDURE VISIT (OUTPATIENT)
Dept: OPHTHALMOLOGY | Facility: CLINIC | Age: 63
End: 2018-07-10
Payer: MEDICARE

## 2018-07-10 VITALS — DIASTOLIC BLOOD PRESSURE: 78 MMHG | SYSTOLIC BLOOD PRESSURE: 165 MMHG | HEART RATE: 86 BPM

## 2018-07-10 DIAGNOSIS — H35.033 HYPERTENSIVE RETINOPATHY OF BOTH EYES: ICD-10-CM

## 2018-07-10 PROCEDURE — 92226 PR SPECIAL EYE EXAM, SUBSEQUENT: CPT | Mod: 50,S$PBB,, | Performed by: OPHTHALMOLOGY

## 2018-07-10 PROCEDURE — 92014 COMPRE OPH EXAM EST PT 1/>: CPT | Mod: S$PBB,,, | Performed by: OPHTHALMOLOGY

## 2018-07-10 PROCEDURE — 92226 PR SPECIAL EYE EXAM, SUBSEQUENT: CPT | Mod: 50,PBBFAC | Performed by: OPHTHALMOLOGY

## 2018-07-10 PROCEDURE — 92134 CPTRZ OPH DX IMG PST SGM RTA: CPT | Mod: PBBFAC | Performed by: OPHTHALMOLOGY

## 2018-07-10 NOTE — PROGRESS NOTES
HPI     12wk // OCT // Avastin   DLS- 04/17/2018 Dr. Al     Pt sts va has not changed since last visit. Denies pain.  (-)Flashes (+)Floaters occasionally see's hair line in OD started a couple   weeks ago comes and goes   (-)Photophobia  (-)Glare    Clear eyes PRN   thera tears        Prior FA -  Late macular leakage OU  NP OU with NV OU    OCT - VMT released OD , DME improved   Increased temporal DME OS vision, stable    A/P    1. Recurrent iritis  stable    2. PDR OU  - new small preretinal heme nasal OD  S/p PRP OD 03/27/2017  S/p PRP OS 05/09/2017    3. DME OU  - stable OS, temporal edema increase OD  S/p Avastin OD x 5 OS x 2  S/p Focal OS (12/16/14)  - mild worsening extrafoveal edema OD -  S/p Focal OD (5/24/16, 6/17)    Stable at 3 months - try observation    Get approved for Ozurdex - but doing well on anti VEGF extension      4. PCIOL OU  S/p phaco w/IOL OD 8/13/15  S/p phaco w/IOL OS 4/30/15        12 weeks OCT

## 2018-07-19 ENCOUNTER — LAB VISIT (OUTPATIENT)
Dept: LAB | Facility: HOSPITAL | Age: 63
End: 2018-07-19
Attending: INTERNAL MEDICINE
Payer: MEDICARE

## 2018-07-19 DIAGNOSIS — N17.9 AKI (ACUTE KIDNEY INJURY): ICD-10-CM

## 2018-07-19 DIAGNOSIS — Z00.00 ROUTINE GENERAL MEDICAL EXAMINATION AT A HEALTH CARE FACILITY: ICD-10-CM

## 2018-07-19 LAB
ALBUMIN SERPL BCP-MCNC: 3.3 G/DL
ALBUMIN SERPL BCP-MCNC: 3.3 G/DL
ALP SERPL-CCNC: 164 U/L
ALT SERPL W/O P-5'-P-CCNC: 15 U/L
ANION GAP SERPL CALC-SCNC: 9 MMOL/L
ANION GAP SERPL CALC-SCNC: 9 MMOL/L
AST SERPL-CCNC: 16 U/L
BILIRUB SERPL-MCNC: 0.5 MG/DL
BUN SERPL-MCNC: 29 MG/DL
BUN SERPL-MCNC: 29 MG/DL
CALCIUM SERPL-MCNC: 9 MG/DL
CALCIUM SERPL-MCNC: 9 MG/DL
CHLORIDE SERPL-SCNC: 107 MMOL/L
CHLORIDE SERPL-SCNC: 107 MMOL/L
CHOLEST SERPL-MCNC: 135 MG/DL
CHOLEST/HDLC SERPL: 3.1 {RATIO}
CO2 SERPL-SCNC: 23 MMOL/L
CO2 SERPL-SCNC: 23 MMOL/L
CREAT SERPL-MCNC: 2.5 MG/DL
CREAT SERPL-MCNC: 2.5 MG/DL
EST. GFR  (AFRICAN AMERICAN): 30.9 ML/MIN/1.73 M^2
EST. GFR  (AFRICAN AMERICAN): 30.9 ML/MIN/1.73 M^2
EST. GFR  (NON AFRICAN AMERICAN): 26.7 ML/MIN/1.73 M^2
EST. GFR  (NON AFRICAN AMERICAN): 26.7 ML/MIN/1.73 M^2
ESTIMATED AVG GLUCOSE: 203 MG/DL
GLUCOSE SERPL-MCNC: 248 MG/DL
GLUCOSE SERPL-MCNC: 248 MG/DL
HBA1C MFR BLD HPLC: 8.7 %
HDLC SERPL-MCNC: 43 MG/DL
HDLC SERPL: 31.9 %
LDLC SERPL CALC-MCNC: 77.6 MG/DL
NONHDLC SERPL-MCNC: 92 MG/DL
PHOSPHATE SERPL-MCNC: 3.5 MG/DL
POTASSIUM SERPL-SCNC: 3.6 MMOL/L
POTASSIUM SERPL-SCNC: 3.6 MMOL/L
PROT SERPL-MCNC: 6.8 G/DL
SODIUM SERPL-SCNC: 139 MMOL/L
SODIUM SERPL-SCNC: 139 MMOL/L
TRIGL SERPL-MCNC: 72 MG/DL

## 2018-07-19 PROCEDURE — 80069 RENAL FUNCTION PANEL: CPT

## 2018-07-19 PROCEDURE — 80061 LIPID PANEL: CPT

## 2018-07-19 PROCEDURE — 83036 HEMOGLOBIN GLYCOSYLATED A1C: CPT

## 2018-07-19 PROCEDURE — 80053 COMPREHEN METABOLIC PANEL: CPT

## 2018-07-19 PROCEDURE — 36415 COLL VENOUS BLD VENIPUNCTURE: CPT | Mod: PO

## 2018-07-20 ENCOUNTER — TELEPHONE (OUTPATIENT)
Dept: NEPHROLOGY | Facility: CLINIC | Age: 63
End: 2018-07-20

## 2018-07-23 ENCOUNTER — TELEPHONE (OUTPATIENT)
Dept: INTERNAL MEDICINE | Facility: CLINIC | Age: 63
End: 2018-07-23

## 2018-07-23 ENCOUNTER — OFFICE VISIT (OUTPATIENT)
Dept: INTERNAL MEDICINE | Facility: CLINIC | Age: 63
End: 2018-07-23
Payer: MEDICARE

## 2018-07-23 VITALS
HEIGHT: 72 IN | DIASTOLIC BLOOD PRESSURE: 90 MMHG | HEART RATE: 94 BPM | BODY MASS INDEX: 24.28 KG/M2 | WEIGHT: 179.25 LBS | SYSTOLIC BLOOD PRESSURE: 150 MMHG

## 2018-07-23 DIAGNOSIS — Z12.11 SCREEN FOR COLON CANCER: ICD-10-CM

## 2018-07-23 DIAGNOSIS — E11.22 TYPE 2 DIABETES MELLITUS WITH STAGE 3 CHRONIC KIDNEY DISEASE, WITH LONG-TERM CURRENT USE OF INSULIN: ICD-10-CM

## 2018-07-23 DIAGNOSIS — I10 ESSENTIAL HYPERTENSION: Primary | ICD-10-CM

## 2018-07-23 DIAGNOSIS — N18.30 TYPE 2 DIABETES MELLITUS WITH STAGE 3 CHRONIC KIDNEY DISEASE, WITH LONG-TERM CURRENT USE OF INSULIN: ICD-10-CM

## 2018-07-23 DIAGNOSIS — I12.9 HYPERTENSIVE KIDNEY DISEASE WITH STAGE 3 CHRONIC KIDNEY DISEASE: ICD-10-CM

## 2018-07-23 DIAGNOSIS — E11.59 HYPERTENSION ASSOCIATED WITH DIABETES: ICD-10-CM

## 2018-07-23 DIAGNOSIS — I10 HYPERTENSION, ESSENTIAL: ICD-10-CM

## 2018-07-23 DIAGNOSIS — I15.2 HYPERTENSION ASSOCIATED WITH DIABETES: ICD-10-CM

## 2018-07-23 DIAGNOSIS — Z79.4 TYPE 2 DIABETES MELLITUS WITH DIABETIC POLYNEUROPATHY, WITH LONG-TERM CURRENT USE OF INSULIN: ICD-10-CM

## 2018-07-23 DIAGNOSIS — N25.81 SECONDARY HYPERPARATHYROIDISM: ICD-10-CM

## 2018-07-23 DIAGNOSIS — Z86.73 HISTORY OF STROKE: ICD-10-CM

## 2018-07-23 DIAGNOSIS — H35.033 HYPERTENSIVE RETINOPATHY OF BOTH EYES: ICD-10-CM

## 2018-07-23 DIAGNOSIS — E78.5 DYSLIPIDEMIA: ICD-10-CM

## 2018-07-23 DIAGNOSIS — C90.00 MULTIPLE MYELOMA NOT HAVING ACHIEVED REMISSION: ICD-10-CM

## 2018-07-23 DIAGNOSIS — E11.42 TYPE 2 DIABETES MELLITUS WITH DIABETIC POLYNEUROPATHY, WITH LONG-TERM CURRENT USE OF INSULIN: ICD-10-CM

## 2018-07-23 DIAGNOSIS — Z79.4 TYPE 2 DIABETES MELLITUS WITH STAGE 3 CHRONIC KIDNEY DISEASE, WITH LONG-TERM CURRENT USE OF INSULIN: ICD-10-CM

## 2018-07-23 DIAGNOSIS — N18.30 HYPERTENSIVE KIDNEY DISEASE WITH STAGE 3 CHRONIC KIDNEY DISEASE: ICD-10-CM

## 2018-07-23 DIAGNOSIS — I50.32 CHRONIC DIASTOLIC HEART FAILURE: ICD-10-CM

## 2018-07-23 PROCEDURE — 99215 OFFICE O/P EST HI 40 MIN: CPT | Mod: S$PBB,,, | Performed by: INTERNAL MEDICINE

## 2018-07-23 PROCEDURE — 99214 OFFICE O/P EST MOD 30 MIN: CPT | Mod: PBBFAC,PO | Performed by: INTERNAL MEDICINE

## 2018-07-23 PROCEDURE — 99999 PR PBB SHADOW E&M-EST. PATIENT-LVL IV: CPT | Mod: PBBFAC,,, | Performed by: INTERNAL MEDICINE

## 2018-07-23 RX ORDER — CARVEDILOL 25 MG/1
25 TABLET ORAL 2 TIMES DAILY WITH MEALS
Qty: 180 TABLET | Refills: 1
Start: 2018-07-23 | End: 2020-01-01 | Stop reason: SDUPTHER

## 2018-07-23 NOTE — TELEPHONE ENCOUNTER
----- Message from Alissa Dotson MD sent at 7/20/2018  9:55 AM CDT -----  Kidney function has slightly worsened as a result of diabetes not under control.

## 2018-07-23 NOTE — PROGRESS NOTES
Subjective:       Patient ID: Marie Reis Jr. is a 62 y.o. male.    Chief Complaint: Follow-up    HPI 62-year-old male presents to clinic today for follow-up of hypertension dyslipidemia patient has multiple comorbidities including uncontrolled diabetes with complications of stage 3 kidney disease polyneuropathy and retinopathy.  Patient has upcoming appointments with his nephrologist, his oncologist and his ophthalmologist.  Next week he also has an appointment with Endocrinology for his diabetes management.  He did not tolerate chemotherapy for treatment of the multiple myeloma and currently he is on regular schedule monthly blood work and transfusions as needed received a transfusion in June.  His blood pressure is elevated today 1 review his carvedilol was decreased back in April by his cardiologist when his blood pressure is running low his requirements of increased since.  Review of Systems  otherwise negative  Objective:      Physical Exam  General: Well-appearing, well-nourished.  No distress  HEENT: conjunctivae are normal.  Pupils are equal and reative to light.  TM's are clear and intact bilaterally.  Hearing is grossly normal.  Nasopharynx is clear.  Oropharynx is clear.  Neck: Supple.  No thyroid megaly.  No bruits.  Lymph: No cervical or supraclavicular adenopathy.  Heart: Regular rate and rhythm, without murmur, rub or gallop.  Lungs: Clear to auscultation; respiratory effort normal.  Abdomen: Soft, nontender, nondistended.  Normoactive bowel sounds.  No hepatomegaly.  No masses.  Extremities: Good distal pulses.  No edema.  Psych: Oriented to time person place.  Judgment and insight seem unimpaired.  Mood and affect are appropriate.  Last A1c current value 8.7%  Assessment:       1. Essential hypertension    2. History of stroke    3. Dyslipidemia    4. Type 2 diabetes mellitus with diabetic polyneuropathy, with long-term current use of insulin    5. Hypertensive retinopathy of both eyes    6. Type 2  diabetes mellitus with stage 3 chronic kidney disease, with long-term current use of insulin    7. Hypertension associated with diabetes    8. Chronic diastolic heart failure    9. 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, with long-term current use of insulin    10. Hypertensive kidney disease with stage 3 chronic kidney disease    11. Secondary hyperparathyroidism    12. Multiple myeloma not having achieved remission    13. Hypertension, essential    14. Screen for colon cancer        Plan:       Marie was seen today for follow-up.    Diagnoses and all orders for this visit:    Essential hypertension  Uncontrolled increase carvedilol to 25 mg b.i.d. follow-up scheduled.  He also has several doctors visits over the next month reminded of his appointments.  Discussed goal blood pressure 120 over 70s.  He will contact me if his blood pressure is not within this range.  History of stroke  Continue risk factor management  Dyslipidemia  Controlled.  Continue current medical regimen.  Prescription refills addressed.  Followup advised. See after visit summary.  Type 2 diabetes mellitus with diabetic polyneuropathy, with long-term current use of insulin  Uncontrolled Endocrine appointment scheduled  Hypertensive retinopathy of both eyes  -     Comprehensive metabolic panel; Future  -     Lipid panel; Future    Type 2 diabetes mellitus with stage 3 chronic kidney disease, with long-term current use of insulin  Uncontrolled Endocrine appointment scheduled and renal function declining upcoming Nephrology appointment scheduled.  Hypertension associated with diabetes  Blood pressure medication address the above  Chronic diastolic heart failure  Asymptomatic chronic stable  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, with long-term current use of insulin  Followed by endocrine and  ophthalmology  Hypertensive kidney disease with stage 3 chronic kidney disease    Secondary hyperparathyroidism  Followed by Nephrology  Multiple myeloma not having achieved remission  Followed by oncology  Hypertension, essential  -     carvedilol (COREG) 25 MG tablet; Take 1 tablet (25 mg total) by mouth 2 (two) times daily with meals.    Screen for colon cancer  -     Fecal Immunochemical Test (iFOBT); Future

## 2018-07-25 ENCOUNTER — OFFICE VISIT (OUTPATIENT)
Dept: CARDIOLOGY | Facility: CLINIC | Age: 63
End: 2018-07-25
Payer: MEDICARE

## 2018-07-25 VITALS
DIASTOLIC BLOOD PRESSURE: 68 MMHG | BODY MASS INDEX: 24.31 KG/M2 | SYSTOLIC BLOOD PRESSURE: 151 MMHG | HEART RATE: 78 BPM | WEIGHT: 183.44 LBS | HEIGHT: 73 IN | OXYGEN SATURATION: 99 %

## 2018-07-25 DIAGNOSIS — I25.10 CORONARY ARTERY DISEASE DUE TO CALCIFIED CORONARY LESION: ICD-10-CM

## 2018-07-25 DIAGNOSIS — E78.5 DYSLIPIDEMIA: ICD-10-CM

## 2018-07-25 DIAGNOSIS — I25.84 CORONARY ARTERY DISEASE DUE TO CALCIFIED CORONARY LESION: ICD-10-CM

## 2018-07-25 DIAGNOSIS — E11.59 HYPERTENSION ASSOCIATED WITH DIABETES: Primary | ICD-10-CM

## 2018-07-25 DIAGNOSIS — I65.23 BILATERAL CAROTID ARTERY STENOSIS: ICD-10-CM

## 2018-07-25 DIAGNOSIS — I15.2 HYPERTENSION ASSOCIATED WITH DIABETES: Primary | ICD-10-CM

## 2018-07-25 PROCEDURE — 99999 PR PBB SHADOW E&M-EST. PATIENT-LVL IV: CPT | Mod: PBBFAC,,, | Performed by: INTERNAL MEDICINE

## 2018-07-25 PROCEDURE — 99214 OFFICE O/P EST MOD 30 MIN: CPT | Mod: PBBFAC | Performed by: INTERNAL MEDICINE

## 2018-07-25 PROCEDURE — 99212 OFFICE O/P EST SF 10 MIN: CPT | Mod: S$PBB,,, | Performed by: INTERNAL MEDICINE

## 2018-07-25 NOTE — PROGRESS NOTES
Subjective:    Patient ID:  Marie Reis Jr. is a 62 y.o. male who presents for follow-up of CAD.      HPI  His symptoms are essentially unchanged.    Review of Systems   Constitution: Negative for chills and fever.   Cardiovascular: Positive for dyspnea on exertion and leg swelling. Negative for chest pain, claudication, cyanosis, irregular heartbeat, orthopnea, palpitations and syncope.   Respiratory: Positive for shortness of breath.         Objective:    Physical Exam   Constitutional: He is oriented to person, place, and time. He appears well-developed and well-nourished. No distress.   Neck: No JVD present.   Cardiovascular: Normal rate, regular rhythm and intact distal pulses.    Pulmonary/Chest: Effort normal and breath sounds normal. No respiratory distress. He has no wheezes. He has no rales. He exhibits no tenderness.   Musculoskeletal: He exhibits edema.   Neurological: He is alert and oriented to person, place, and time.   Skin: Skin is warm and dry. He is not diaphoretic.         Assessment:       1. Hypertension associated with diabetes    2. Coronary artery disease due to calcified coronary lesion    3. Bilateral carotid artery stenosis    4. Dyslipidemia         Plan:       1.  Have discussed the increased risk of renal deterioration with contrast and in the face of stable symptoms will not proceed with angiography.

## 2018-08-01 ENCOUNTER — LAB VISIT (OUTPATIENT)
Dept: LAB | Facility: HOSPITAL | Age: 63
End: 2018-08-01
Attending: INTERNAL MEDICINE
Payer: MEDICARE

## 2018-08-01 DIAGNOSIS — N17.9 AKI (ACUTE KIDNEY INJURY): ICD-10-CM

## 2018-08-01 DIAGNOSIS — E11.42 TYPE 2 DIABETES MELLITUS WITH DIABETIC POLYNEUROPATHY, WITH LONG-TERM CURRENT USE OF INSULIN: ICD-10-CM

## 2018-08-01 DIAGNOSIS — E11.3513 TYPE 2 DIABETES MELLITUS WITH BOTH EYES AFFECTED BY PROLIFERATIVE RETINOPATHY AND MACULAR EDEMA, WITH LONG-TERM CURRENT USE OF INSULIN: ICD-10-CM

## 2018-08-01 DIAGNOSIS — Z79.4 TYPE 2 DIABETES MELLITUS WITH DIABETIC POLYNEUROPATHY, WITH LONG-TERM CURRENT USE OF INSULIN: ICD-10-CM

## 2018-08-01 DIAGNOSIS — Z79.4 TYPE 2 DIABETES MELLITUS WITH STAGE 4 CHRONIC KIDNEY DISEASE, WITH LONG-TERM CURRENT USE OF INSULIN: ICD-10-CM

## 2018-08-01 DIAGNOSIS — Z79.4 TYPE 2 DIABETES MELLITUS WITH BOTH EYES AFFECTED BY PROLIFERATIVE RETINOPATHY AND MACULAR EDEMA, WITH LONG-TERM CURRENT USE OF INSULIN: ICD-10-CM

## 2018-08-01 DIAGNOSIS — E11.22 TYPE 2 DIABETES MELLITUS WITH STAGE 4 CHRONIC KIDNEY DISEASE, WITH LONG-TERM CURRENT USE OF INSULIN: ICD-10-CM

## 2018-08-01 DIAGNOSIS — N18.4 TYPE 2 DIABETES MELLITUS WITH STAGE 4 CHRONIC KIDNEY DISEASE, WITH LONG-TERM CURRENT USE OF INSULIN: ICD-10-CM

## 2018-08-01 LAB
ALBUMIN SERPL BCP-MCNC: 3.1 G/DL
ANION GAP SERPL CALC-SCNC: 7 MMOL/L
BUN SERPL-MCNC: 22 MG/DL
CALCIUM SERPL-MCNC: 8.4 MG/DL
CHLORIDE SERPL-SCNC: 107 MMOL/L
CO2 SERPL-SCNC: 22 MMOL/L
CREAT SERPL-MCNC: 2.4 MG/DL
EST. GFR  (AFRICAN AMERICAN): 32.4 ML/MIN/1.73 M^2
EST. GFR  (NON AFRICAN AMERICAN): 28.1 ML/MIN/1.73 M^2
ESTIMATED AVG GLUCOSE: 189 MG/DL
GLUCOSE SERPL-MCNC: 276 MG/DL
HBA1C MFR BLD HPLC: 8.2 %
PHOSPHATE SERPL-MCNC: 3.7 MG/DL
POTASSIUM SERPL-SCNC: 3.6 MMOL/L
SODIUM SERPL-SCNC: 136 MMOL/L

## 2018-08-01 PROCEDURE — 80069 RENAL FUNCTION PANEL: CPT

## 2018-08-01 PROCEDURE — 83036 HEMOGLOBIN GLYCOSYLATED A1C: CPT

## 2018-08-01 PROCEDURE — 36415 COLL VENOUS BLD VENIPUNCTURE: CPT | Mod: PO

## 2018-08-07 NOTE — PROGRESS NOTES
CC: The primary encounter diagnosis was Type 2 diabetes mellitus with diabetic polyneuropathy, with long-term current use of insulin. Diagnoses of Type 2 diabetes mellitus with stage 3 chronic kidney disease, with long-term current use of insulin, 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, with long-term current use of insulin, Chronic diastolic heart failure, Dyslipidemia, Hypertension associated with diabetes, History of stroke, Coronary artery disease due to calcified coronary lesion, Multiple myeloma not having achieved remission, and Vitamin D deficiency were also pertinent to this visit.      HPI: Mr. Marie Reis Jr. is a 62 y.o. Black or  male who was diagnosed with Type 2 DM in 2006.     Pt was admitted on 4/11/18 for JAZLYN and in feb 2018 for sepsis.    Pt was last seen by me in May 2018.  a1c elevated. Decreased from 10.3 to 8.7% to 8.2%.     Lab Results   Component Value Date    HGBA1C 8.2 (H) 08/01/2018     BG readings are checked 4x/day (max) and readings---will mail after visit.  FBG    Lunch mid   Bedtime  Hypoglycemia: +mild (r/t not eating enough carbs)-uses oj for correction,  glucose tabs prn, symptomatic   Has had a severe hypoglycemia -EMS in the past few years.    Skipped/missed glycemic medications: No    Prandial injections taken with meals: Yes    Physical Activity: No    Skipping meals and/or snacking: The patient eats a regular, healthy diet.    CURRENT DIABETIC MEDS: novolog 8 units ac w/ mod dose correction scale 150-200+2, etc , lantus 24 units qhs, victoza 0.6 to 1.2 mg daily--off in the past few weeks  Uses Vials or Pens: pens  Type of Glucose Meter: up to date    Last Eye Exam: 4/17/18, 7/10/18  Dr. Al (procedure)  Last Podiatry Exam: 2018-[facility]     Last DM Education Attended:  4/27/16    REVIEW OF SYSTEMS  General: no weakness or + fatigue.   Eyes: no visual disturbances.   Cardiac: +  "intermittent chest pain-has nitro prn. +edema   Respiratory: no cough or dyspnea.   GI: no abdominal pain or nausea.   Skin: no rashes or itching.   Neuro: + numbness or tingling. +back pain, +knee pain  Musc: Wears (L) wrist brace-had accident/trauma  Endocrine: no polyuria, polydipsia, polyphagia.     Vital Signs  /70 (BP Location: Left arm, Patient Position: Sitting)   Pulse 72   Ht 6' 1" (1.854 m)   Wt 83.3 kg (183 lb 10.3 oz)   BMI 24.23 kg/m²     Hemoglobin A1C   Date Value Ref Range Status   08/01/2018 8.2 (H) 4.0 - 5.6 % Final     Comment:     ADA Screening Guidelines:  5.7-6.4%  Consistent with prediabetes  >or=6.5%  Consistent with diabetes  High levels of fetal hemoglobin interfere with the HbA1C  assay. Heterozygous hemoglobin variants (HbS, HgC, etc)do  not significantly interfere with this assay.   However, presence of multiple variants may affect accuracy.     07/19/2018 8.7 (H) 4.0 - 5.6 % Final     Comment:     ADA Screening Guidelines:  5.7-6.4%  Consistent with prediabetes  >or=6.5%  Consistent with diabetes  High levels of fetal hemoglobin interfere with the HbA1C  assay. Heterozygous hemoglobin variants (HbS, HgC, etc)do  not significantly interfere with this assay.   However, presence of multiple variants may affect accuracy.     04/30/2018 10.3 (H) 4.0 - 5.6 % Final     Comment:     According to ADA guidelines, hemoglobin A1c <7.0% represents  optimal control in non-pregnant diabetic patients. Different  metrics may apply to specific patient populations.   Standards of Medical Care in Diabetes-2016.  For the purpose of screening for the presence of diabetes:  <5.7%     Consistent with the absence of diabetes  5.7-6.4%  Consistent with increasing risk for diabetes   (prediabetes)  >or=6.5%  Consistent with diabetes  Currently, no consensus exists for use of hemoglobin A1c  for diagnosis of diabetes for children.  This Hemoglobin A1c assay has significant interference with fetal "   hemoglobin   (HbF). The results are invalid for patients with abnormal amounts of   HbF,   including those with known Hereditary Persistence   of Fetal Hemoglobin. Heterozygous hemoglobin variants (HbAS, HbAC,   HbAD, HbAE, HbA2) do not significantly interfere with this assay;   however, presence of multiple variants in a sample may impact the %   interference.         Chemistry        Component Value Date/Time     08/01/2018 0946     08/01/2018 0946    K 3.6 08/01/2018 0946    K 3.7 08/01/2018 0946     08/01/2018 0946     08/01/2018 0946    CO2 22 (L) 08/01/2018 0946    CO2 21 (L) 08/01/2018 0946    BUN 22 08/01/2018 0946    BUN 23 08/01/2018 0946    CREATININE 2.4 (H) 08/01/2018 0946    CREATININE 2.3 (H) 08/01/2018 0946     (H) 08/01/2018 0946     (H) 08/01/2018 0946        Component Value Date/Time    CALCIUM 8.4 (L) 08/01/2018 0946    CALCIUM 8.5 (L) 08/01/2018 0946    ALKPHOS 125 08/01/2018 0946    AST 13 08/01/2018 0946    ALT 9 (L) 08/01/2018 0946    BILITOT 1.5 (H) 08/01/2018 0946          Lab Results   Component Value Date    CHOL 135 07/19/2018    CHOL 119 (L) 10/25/2017    CHOL 144 09/17/2016     Lab Results   Component Value Date    HDL 43 07/19/2018    HDL 32 (L) 10/25/2017    HDL 27 (L) 09/17/2016     Lab Results   Component Value Date    LDLCALC 77.6 07/19/2018    LDLCALC 68.6 10/25/2017    LDLCALC 72.8 09/17/2016     Lab Results   Component Value Date    TRIG 72 07/19/2018    TRIG 92 10/25/2017    TRIG 221 (H) 09/17/2016     Lab Results   Component Value Date    CHOLHDL 31.9 07/19/2018    CHOLHDL 26.9 10/25/2017    CHOLHDL 18.8 (L) 09/17/2016       Lab Results   Component Value Date    TSH 2.402 02/21/2018       Lab Results   Component Value Date    MICALBCREAT 1987.0 (H) 10/25/2017       Vit D, 25-Hydroxy   Date Value Ref Range Status   04/30/2018 25 (L) 30 - 96 ng/mL Final     Comment:     Vitamin D deficiency.........<10 ng/mL                               Vitamin D insufficiency......10-29 ng/mL       Vitamin D sufficiency........> or equal to 30 ng/mL  Vitamin D toxicity............>100 ng/mL         PHYSICAL EXAMINATION  Constitutional: Appears well, no distress  Neck: Supple, trachea midline.   Respiratory: no wheezes, even and unlabored.  Cardiovascular: RRR  Lymph: +1 pedal edema  Skin: warm and dry; no injection site reactions, no acanthosis nigracans observed.  Neuro:patient alert and cooperative.    Feet: footwear appropriate  Protective Sensation (w/ 10 gram monofilament):  Right: Intact  Left: Intact    Visual Inspection:  Normal -  Bilateral and Onychomycosis -  Bilateral    Pedal Pulses:   Right: Diminshed  Left: Diminshed    Posterior tibialis:   Right:Diminshed  Left: Diminshed    Slightly decreased sensation to vibratory -bilaterally    Assessment/Plan  1. Type 2 diabetes mellitus with diabetic polyneuropathy, with long-term current use of insulin  Hemoglobin A1c next time   F/u in 3 mos  a1c goal less than 7.5%  Has improved, but not at goal.  D/c victoza, start tradjenta 5 mg daily-send rx  bg monitoring 4 times a day.  Envelop given to send other logs.   New logs given.     Counseling >35 mins      2. Type 2 diabetes mellitus with stage 3 chronic kidney disease, with long-term current use of insulin  stable   3. 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, with long-term current use of insulin  F/u with ophthalmology   4. Chronic diastolic heart failure  Avoid hypoglycemia, insulin stacking   5. Dyslipidemia  Lab Results   Component Value Date    LDLCALC 77.6 07/19/2018     At goal, atorvastatin 80 mg qhs   6. Hypertension associated with diabetes  Controlled, continue med(s)acei   7. History of stroke  F/u with pcp   8. Coronary artery disease due to calcified coronary lesion  Avoid hypoglycemia   9. Multiple myeloma not having achieved remission  F/u with hem/onc   10. Vitamin D  deficiency  Continue to monitor 4/2018  D3 2000 I U daily =rec       Follow-up in about 3 months (around 11/8/2018).     Orders Placed This Encounter   Procedures    Hemoglobin A1c     Standing Status:   Future     Standing Expiration Date:   10/6/2019

## 2018-08-08 ENCOUNTER — OFFICE VISIT (OUTPATIENT)
Dept: ENDOCRINOLOGY | Facility: CLINIC | Age: 63
End: 2018-08-08
Payer: MEDICARE

## 2018-08-08 VITALS
HEART RATE: 72 BPM | HEIGHT: 73 IN | DIASTOLIC BLOOD PRESSURE: 70 MMHG | BODY MASS INDEX: 24.34 KG/M2 | SYSTOLIC BLOOD PRESSURE: 116 MMHG | WEIGHT: 183.63 LBS

## 2018-08-08 DIAGNOSIS — I15.2 HYPERTENSION ASSOCIATED WITH DIABETES: ICD-10-CM

## 2018-08-08 DIAGNOSIS — E11.59 HYPERTENSION ASSOCIATED WITH DIABETES: ICD-10-CM

## 2018-08-08 DIAGNOSIS — Z86.73 HISTORY OF STROKE: ICD-10-CM

## 2018-08-08 DIAGNOSIS — E78.5 DYSLIPIDEMIA: ICD-10-CM

## 2018-08-08 DIAGNOSIS — C90.00 MULTIPLE MYELOMA NOT HAVING ACHIEVED REMISSION: ICD-10-CM

## 2018-08-08 DIAGNOSIS — N18.30 TYPE 2 DIABETES MELLITUS WITH STAGE 3 CHRONIC KIDNEY DISEASE, WITH LONG-TERM CURRENT USE OF INSULIN: ICD-10-CM

## 2018-08-08 DIAGNOSIS — E55.9 VITAMIN D DEFICIENCY: ICD-10-CM

## 2018-08-08 DIAGNOSIS — I25.10 CORONARY ARTERY DISEASE DUE TO CALCIFIED CORONARY LESION: ICD-10-CM

## 2018-08-08 DIAGNOSIS — Z79.4 TYPE 2 DIABETES MELLITUS WITH STAGE 3 CHRONIC KIDNEY DISEASE, WITH LONG-TERM CURRENT USE OF INSULIN: ICD-10-CM

## 2018-08-08 DIAGNOSIS — I25.84 CORONARY ARTERY DISEASE DUE TO CALCIFIED CORONARY LESION: ICD-10-CM

## 2018-08-08 DIAGNOSIS — E11.22 TYPE 2 DIABETES MELLITUS WITH STAGE 3 CHRONIC KIDNEY DISEASE, WITH LONG-TERM CURRENT USE OF INSULIN: ICD-10-CM

## 2018-08-08 DIAGNOSIS — Z79.4 TYPE 2 DIABETES MELLITUS WITH DIABETIC POLYNEUROPATHY, WITH LONG-TERM CURRENT USE OF INSULIN: Primary | ICD-10-CM

## 2018-08-08 DIAGNOSIS — I50.32 CHRONIC DIASTOLIC HEART FAILURE: ICD-10-CM

## 2018-08-08 DIAGNOSIS — E11.42 TYPE 2 DIABETES MELLITUS WITH DIABETIC POLYNEUROPATHY, WITH LONG-TERM CURRENT USE OF INSULIN: Primary | ICD-10-CM

## 2018-08-08 PROCEDURE — 99999 PR PBB SHADOW E&M-EST. PATIENT-LVL IV: CPT | Mod: PBBFAC,,, | Performed by: NURSE PRACTITIONER

## 2018-08-08 PROCEDURE — 99215 OFFICE O/P EST HI 40 MIN: CPT | Mod: S$PBB,,, | Performed by: NURSE PRACTITIONER

## 2018-08-08 PROCEDURE — 99214 OFFICE O/P EST MOD 30 MIN: CPT | Mod: PBBFAC | Performed by: NURSE PRACTITIONER

## 2018-08-08 RX ORDER — INSULIN ASPART 100 [IU]/ML
INJECTION, SOLUTION INTRAVENOUS; SUBCUTANEOUS
Qty: 1 BOX | Refills: 6
Start: 2018-08-08 | End: 2019-01-08

## 2018-08-08 RX ORDER — INSULIN GLARGINE 100 [IU]/ML
INJECTION, SOLUTION SUBCUTANEOUS
Qty: 15 ML | Refills: 6
Start: 2018-08-08 | End: 2019-01-08

## 2018-08-08 NOTE — PATIENT INSTRUCTIONS
Snacks can be an important part of a balanced, healthy meal plan. They allow you to eat more frequently, feeling full and satisfied throughout the day. Also, they allow you to spread carbohydrates evenly, which may stabilize blood sugars.  Plus, snacks are enjoyable!     The amount of carbohydrate needed at snacks varies. Generally, about 15-30 grams of carbohydrate per snack is recommended.  Below you will find some tasty treats.       0-5 gm carb   Crystal Light   Vitamin Water Zero   Herbal tea, unsweetened   2 tsp peanut butter on celery   1./2 cup sugar-free jell-o   1 sugar-free popsicle   ¼ cup blueberries   8oz Blue Amy unsweetened almond milk   5 baby carrots & celery sticks, cucumbers, bell peppers dipped in ¼ cup salsa, 2Tbsp light ranch dressing or 2Tbsp plain Greek yogurt   10 Goldfish crackers   ½ oz low-fat cheese or string cheese   1 closed handful of nuts, unsalted   1 Tbsp of sunflower seeds, unsalted   1 cup Smart Pop popcorn   1 whole grain brown rice cake        15 gm carb   1 small piece of fruit or ½ banana or 1/2 cup lite canned fruit   3 donna cracker squares   3 cups Smart Pop popcorn, top spray butter, Mijares lite salt or cinnamon and Truvia   5 Vanilla Wafers   ½ cup low fat, no added sugar ice cream or frozen yogurt (Blue bell, Blue Bunny, Weight Watchers, Skinny Cow)   ½ turkey, ham, or chicken sandwich   ½ c fruit with ½ c Cottage cheese   4-6 unsalted wheat crackers with 1 oz low fat cheese or 1 tbsp peanut butter    30-45 goldfish crackers (depending on flavor)    7-8 Evangelical mini brown rice cakes (caramel, apple cinnamon, chocolate)    12 Evangelical mini brown rice cakes (cheddar, bbq, ranch)    1/3 cup hummus dip with raw veg   1/2 whole wheat sourav, 1Tbsp hummus   Mini Pizza (1/2 whole wheat English muffin, low-fat  cheese, tomato sauce)   100 calorie snack pack (Oreo, Chips Ahoy, Ritz Mix, Baked Cheetos)   4-6 oz. light or Greek Style yogurt  ([place], [place], [place])   ½ cup sugar-free pudding     6 in. wheat tortilla or sourav oven toasted chips (topped with spray butter flavoring, cinnamon, Truvia OR spray butter, garlic powder, chili powder)    18 BBQ Popchips (available at [employer], [employer] Foods, Fresh Market)                 Diabetes Support Group Meetings         Date: Topic:   February 8 Health Promotion/Cooking Demo   March 8 Taking Care of Your Kidneys   April 12 Taking Care of Your Feet   May 10 Ease Your Mind with Diabetes   Alexandra 14 Summer Treats/Cooking Demo   July 12 Super Market Sweep   August 9 Taking Care of Your Eyes   Sept 13 Technology/ADA updates   October 11 Recipes & Treats/Cooking Demo   November 8 Heart Health/Pump it up!   December 13 Year-End Close Out        Meetings are held in the [facility] (A) of the [facility] located at [address] [place] [address]. Please call [phone] for additional information.    Free service, offered every 2nd Thursday of every month! Family members and/or friends are welcome as well!  Support group is for patients with type 1 or type 2 diabetes.    From 3:30p to 4:30p        Linagliptin oral tablets  What is this medicine?  Linagliptin (dheeraj a GLIP tin) helps to treat type 2 diabetes. It helps to control blood sugar. Treatment is combined with diet and exercise.  How should I use this medicine?  Take this medicine by mouth with a glass of water. Follow the directions on the prescription label. You can take it with or without food. Take your dose at the same time each day. Do not take more often than directed. Do not stop taking except on your doctor's advice.  A special MedGuide will be given to you by the pharmacist with each prescription and refill. Be sure to read this information carefully each time.  Talk to your pediatrician regarding the use of this medicine in children. Special care may be needed.  What side effects may I  notice from receiving this medicine?  Side effects that you should report to your doctor or health care professional as soon as possible:  · allergic reactions like skin rash, itching or hives, swelling of the face, lips, or tongue  · breathing problems  · fever, chills  · joint pain  · nausea, vomiting  · signs and symptoms of low blood sugar such as feeling anxious, confusion, dizziness, increased hunger, unusually weak or tired, sweating, shakiness, cold, irritable, headache, blurred vision, fast heartbeat, loss of consciousness  · unusual stomach pain or discomfort  · vomiting  Side effects that usually do not require medical attention (Report these to your doctor or health care professional if they continue or are bothersome.):  · headache  · sore throat  · stuffy or runny nose  What may interact with this medicine?  Do not take this medicine with any of the following medications:  · gatifloxacin  This medicine may also interact with the following medications:  · alcohol  · bosentan  · certain medicines for seizures like carbamazepine, phenobarbital, phenytoin  · rifabutin  · rifampin  · Landusky Wort  · sulfonylureas like glimepiride, glipizide, glyburide  What if I miss a dose?  If you miss a dose, take it as soon as you can. If it is almost time for your next dose, take only that dose. Do not take double or extra doses.  Where should I keep my medicine?  Keep out of the reach of children.  Store at room temperature between 15 and 30 degrees C (59 and 86 degrees F). Throw away any unused medicine after the expiration date.  What should I tell my health care provider before I take this medicine?  They need to know if you have any of these conditions:  · diabetic ketoacidosis  · type 1 diabetes  · an unusual or allergic reaction to linagliptin, other medicines, foods, dyes, or preservatives  · pregnant or trying to get pregnant  · breast-feeding  What should I watch for while using this medicine?  Visit your  doctor or health care professional for regular checks on your progress.  A test called the HbA1C (A1C) will be monitored. This is a simple blood test. It measures your blood sugar control over the last 2 to 3 months. You will receive this test every 3 to 6 months.  Learn how to check your blood sugar. Learn the symptoms of low and high blood sugar and how to manage them.  Always carry a quick-source of sugar with you in case you have symptoms of low blood sugar. Examples include hard sugar candy or glucose tablets. Make sure others know that you can choke if you eat or drink when you develop serious symptoms of low blood sugar, such as seizures or unconsciousness. They must get medical help at once.  Tell your doctor or health care professional if you have high blood sugar. You might need to change the dose of your medicine. If you are sick or exercising more than usual, you might need to change the dose of your medicine.  Do not skip meals. Ask your doctor or health care professional if you should avoid alcohol. Many nonprescription cough and cold products contain sugar or alcohol. These can affect blood sugar.  Wear a medical ID bracelet or chain, and carry a card that describes your disease and details of your medicine and dosage times.  NOTE:This sheet is a summary. It may not cover all possible information. If you have questions about this medicine, talk to your doctor, pharmacist, or health care provider. Copyright© 2017 [employer]

## 2018-08-09 ENCOUNTER — TELEPHONE (OUTPATIENT)
Dept: HEMATOLOGY/ONCOLOGY | Facility: CLINIC | Age: 63
End: 2018-08-09

## 2018-08-21 ENCOUNTER — OFFICE VISIT (OUTPATIENT)
Dept: NEPHROLOGY | Facility: CLINIC | Age: 63
End: 2018-08-21
Payer: MEDICARE

## 2018-08-21 VITALS
OXYGEN SATURATION: 99 % | HEART RATE: 87 BPM | HEIGHT: 73 IN | DIASTOLIC BLOOD PRESSURE: 90 MMHG | WEIGHT: 176 LBS | BODY MASS INDEX: 23.33 KG/M2 | SYSTOLIC BLOOD PRESSURE: 160 MMHG

## 2018-08-21 DIAGNOSIS — E11.22 TYPE 2 DIABETES MELLITUS WITH STAGE 3 CHRONIC KIDNEY DISEASE, WITH LONG-TERM CURRENT USE OF INSULIN: Primary | ICD-10-CM

## 2018-08-21 DIAGNOSIS — R80.9 DIABETES MELLITUS WITH PROTEINURIA: ICD-10-CM

## 2018-08-21 DIAGNOSIS — E11.29 DIABETES MELLITUS WITH PROTEINURIA: ICD-10-CM

## 2018-08-21 DIAGNOSIS — I10 ESSENTIAL HYPERTENSION: ICD-10-CM

## 2018-08-21 DIAGNOSIS — N18.30 HYPERTENSIVE KIDNEY DISEASE WITH STAGE 3 CHRONIC KIDNEY DISEASE: ICD-10-CM

## 2018-08-21 DIAGNOSIS — N25.81 SECONDARY HYPERPARATHYROIDISM: ICD-10-CM

## 2018-08-21 DIAGNOSIS — I12.9 HYPERTENSIVE KIDNEY DISEASE WITH STAGE 3 CHRONIC KIDNEY DISEASE: ICD-10-CM

## 2018-08-21 DIAGNOSIS — N18.30 TYPE 2 DIABETES MELLITUS WITH STAGE 3 CHRONIC KIDNEY DISEASE, WITH LONG-TERM CURRENT USE OF INSULIN: Primary | ICD-10-CM

## 2018-08-21 DIAGNOSIS — Z79.4 TYPE 2 DIABETES MELLITUS WITH STAGE 3 CHRONIC KIDNEY DISEASE, WITH LONG-TERM CURRENT USE OF INSULIN: Primary | ICD-10-CM

## 2018-08-21 DIAGNOSIS — E55.9 VITAMIN D DEFICIENCY: ICD-10-CM

## 2018-08-21 PROCEDURE — 99999 PR PBB SHADOW E&M-EST. PATIENT-LVL III: CPT | Mod: PBBFAC,,, | Performed by: INTERNAL MEDICINE

## 2018-08-21 PROCEDURE — 99213 OFFICE O/P EST LOW 20 MIN: CPT | Mod: PBBFAC | Performed by: INTERNAL MEDICINE

## 2018-08-21 PROCEDURE — 99214 OFFICE O/P EST MOD 30 MIN: CPT | Mod: S$PBB,,, | Performed by: INTERNAL MEDICINE

## 2018-08-21 RX ORDER — CAMPHOR, LIDOCAINE, AND METHYL SALICYLATE 2.5; 2; 4 MG/100MG; MG/100MG; MG/100MG
PATCH TOPICAL
Refills: 3 | COMMUNITY
Start: 2018-08-13 | End: 2019-01-01

## 2018-08-21 RX ORDER — CALCITRIOL 0.25 UG/1
0.5 CAPSULE ORAL
Qty: 30 CAPSULE | Refills: 6 | Status: SHIPPED | OUTPATIENT
Start: 2018-08-22 | End: 2019-01-15

## 2018-08-21 RX ORDER — DICLOFENAC SODIUM 16.05 MG/ML
SOLUTION TOPICAL
Refills: 3 | Status: ON HOLD | COMMUNITY
Start: 2018-08-13 | End: 2020-01-01 | Stop reason: HOSPADM

## 2018-08-21 RX ORDER — LIDOCAINE HYDROCHLORIDE 38.8 MG/G
CREAM TOPICAL
Refills: 3 | Status: ON HOLD | COMMUNITY
Start: 2018-08-13 | End: 2020-01-01 | Stop reason: HOSPADM

## 2018-08-21 NOTE — PROGRESS NOTES
Subjective:       Patient ID: Marie Reis Jr. is a 62 y.o. Black or  male who presents for follow up evaluation of Chronic Kidney Disease    HPI     Mr. Reis is seen in nephrology clinic for follow up evaluation for CKD. He arrived accompanied by his wife. He established care with me in 4/16, he followed until 5/16 and then he was lost for follow up until 10/17 when he saw Dr. Huerta in nephrology clinic. Since then again he did not return for follow up until 1/18. He was last followed on 5/16/18.     He arrived today accompanied by his wife, but she received a phone call and left the room at the very beginning of this visit. Pt stated he was doing around the same. He denied any nausea/ vomiting/ decreased urine output/ leg swelling/ flank pain/ cloudy urine/ dysuria. He did not bring his BP medicines from home, unclear if he has been checking it at home. He reports he received blood transfusion recently by his oncologist. Apparently he did not tolerate his chemo for the treatment of multiple myeloma.     He has had several episodes pf JAZLYN on CKD in the last few months. He has partial recovery. Stressed to follow BP at home and report to MD. His diuretic regimen is being managed by his cardiologist. Pt remains on lisinopril.     Pt has severely uncontrolled diabetes with A1C above 10 for several years. He has diabetic retinopathy, hypertension, NSAID use, diabetic polyneuropathy, prior h/o stroke, hyperlipidemia. Additionally he has chronic heart failure with recent acute exacerbation, multiple myeloma. He has progression of CKD and proteinuria. He has had prior episodes of JAZLYN. Prior urine studies from 03/15 show presence of micro albumin which now has progressed to overt proteinuria.     During prior visits he has acknowledged his worsening diabetes, fluid status but problem has been his poor memory which likely is impacting his ability to retain information about follow up, dietary changes etc. He  admits he has not been following diabetic diet at all. His most recent A1C was 8.2.      Renal Function:  Lab Results   Component Value Date     (H) 08/01/2018     (H) 08/01/2018     08/01/2018     08/01/2018    K 3.6 08/01/2018    K 3.7 08/01/2018     08/01/2018     08/01/2018    CO2 22 (L) 08/01/2018    CO2 21 (L) 08/01/2018    BUN 22 08/01/2018    BUN 23 08/01/2018    CALCIUM 8.4 (L) 08/01/2018    CALCIUM 8.5 (L) 08/01/2018    CREATININE 2.4 (H) 08/01/2018    CREATININE 2.3 (H) 08/01/2018    ALBUMIN 3.1 (L) 08/01/2018    ALBUMIN 3.2 (L) 08/01/2018    PHOS 3.7 08/01/2018    PHOS 3.5 07/19/2018    ESTGFRAFRICA 32.4 (A) 08/01/2018    ESTGFRAFRICA 34.2 (A) 08/01/2018    EGFRNONAA 28.1 (A) 08/01/2018    EGFRNONAA 29.5 (A) 08/01/2018       Urinalysis:  Lab Results   Component Value Date    APPEARANCEUA Clear 07/19/2018    PHUR 5.0 07/19/2018    SPECGRAV 1.010 07/19/2018    PROTEINUA 2+ (A) 07/19/2018    GLUCUA 3+ (A) 07/19/2018    OCCULTUA 1+ (A) 07/19/2018    NITRITE Negative 07/19/2018    LEUKOCYTESUR Negative 07/19/2018       Protein/Creatinine Ratio:  Lab Results   Component Value Date    PROTEINURINE 130 (H) 07/19/2018    CREATRANDUR 82.0 07/19/2018    UTPCR 1.59 (H) 07/19/2018       CBC:  Lab Results   Component Value Date    WBC 5.75 08/01/2018    HGB 8.7 (L) 08/01/2018    HCT 26.5 (L) 08/01/2018       .5  Vit D 25    Review of Systems   Constitutional: Negative for fever, chills, appetite change, positive for fatigue.   HENT: Negative for sneezing and sore throat.    Respiratory: Negative for cough, shortness of breath and wheezing. But reduced effort tolerance.  Cardiovascular: Positive for dizziness. Negative for chest pain.   Gastrointestinal: Negative for nausea, vomiting, abdominal pain and diarrhea.   Genitourinary: Negative for dysuria, frequency, hematuria and flank pain.   Musculoskeletal: Negative for myalgias and back pain.   Skin: Negative for pallor.    Neurological: Negative for dizziness, light-headedness and headaches.   Psychiatric/Behavioral: Negative for behavioral problems.       Objective:      Physical Exam   Constitutional: He is oriented to person, place, and time. He appears well-developed and well-nourished. No distress.   Mouth/Throat: Oropharynx is clear and moist.   Eyes: Conjunctivae are normal. Right eye exhibits no discharge. Left eye exhibits no discharge.   Neck: Normal range of motion. Neck supple.   Cardiovascular: Normal rate, regular rhythm and normal heart sounds.    Pulmonary/Chest: Effort normal and breath sounds normal. No respiratory distress. He has no wheezes. He has no rales.   Abdominal: Soft. Abdominal obesity.There is no tenderness. There is no guarding.   Musculoskeletal: Normal range of motion. He exhibits only trace edema. He exhibits no tenderness.   Neurological: He is alert and oriented to person, place, and time.   Skin: Skin is warm and dry. No erythema.   Psychiatric: He has a normal mood and affect.   Vitals reviewed.      Assessment:       1. Uncontrolled diabetes with stage 3 chronic kidney disease GFR 30-59    2. Proteinuria    3. Multiple myeloma   4. Essential hypertension    5. Uncontrolled diabetes mellitus    6. Anemia of chronic illness    7.      Secondary hyperparathyroidism  8.     Vit D deficiency     Plan:     He has had multiple episodes of JAZLYN superimposed on CKD III/IV due to osmotic diuresis due to severe hyperglycemia, likely over diuresis. Pt has very high risk of progression to ESRD.     Worsened proteinuria due to diabetic nephropathy, diabetes becoming uncontrolled again due to his dietary non compliance and multiple myeloma possibly.    CKD is due to longstanding and poorly controlled diabetes, hypertension, MM, cardiorenal causing decreased EABV. Unfortunately, his diabetes continues to remain poorly controlled.    Non compliance with recommendations, follow up, dietary restrictions, medicine  intake.    Memory deficit. Likely due to h/o stroke.     Mr. Reis has uncontrolled and longstanding diabetes with complications like polyneuropathy, retinopathy, has progression of CKD and proteinuria. He has hypertension, hyperlipidemia, prior stroke, prior documented micro albumin in the urine. Prior CT imaging has shown right ureteral stone with mild right hydronephrosis from 08/14. US from 04/16 noted for 10.3 and 10.4 cm kidney size, there was no hydronephrosis. At present it is very crucial that he gets better control of his diabetes and BP. He is at very high risk for rapid progression of his CKD to higher stages. Continue to follow with heme for myeloma and anemia management, per heme his anemia is likely multifactorial. Consider Epogen if Hb less than 10.     His worsened fatigue and anemia is likely due to chemo for MM. Management deferred to his oncology team.    Plan:    - will need renal panel every 6 to 8 weeks to follow on creatinine, eGFR, electrolytes, acid base status very closely  - high risk of progression to ESRD explained to both patient and his wife (during previous visit), will need arrangements with access creation, TOPS education if eGFR remains < 20 and persists   - strict glycemic control  - strict low salt diet, less than 2 grams per day  - avoid NSAID use ever  - continue lisinopril for RAAS blockade while monitoring K levels periodically  - continue to trend PTH, acid base disorder, corrected Ca. Start calcitriol at 0.25 mcg MWF dose and trend all these parameters closely   - medication compliance was stressed to him  - most recent US kidneys from 4/18 noted to show changes c/w CKD  - diuretic regimen per his cardiologist  - pt needs to follow BP closely at home. His BP control has been erratic and only close follow up on BP at home can help maintain it a stable level    Risk of progression of CKD to ESRD was discussed with him in detail again today. Stressed importance of monthly labs  for surveillance and very close follow up given above. He expressed understanding.    RTC 3 months, sooner if any new symptoms.  Plan, labs, recommendations were discussed with patient, his questions were answered to his satisfaction.   Renal panel every 6 to 8 weeks

## 2018-08-31 ENCOUNTER — OFFICE VISIT (OUTPATIENT)
Dept: OPHTHALMOLOGY | Facility: CLINIC | Age: 63
End: 2018-08-31
Payer: MEDICARE

## 2018-08-31 DIAGNOSIS — H43.11 VITREOUS HEMORRHAGE OF RIGHT EYE: ICD-10-CM

## 2018-08-31 PROCEDURE — 99999 PR PBB SHADOW E&M-EST. PATIENT-LVL III: CPT | Mod: PBBFAC,,, | Performed by: OPHTHALMOLOGY

## 2018-08-31 PROCEDURE — 92014 COMPRE OPH EXAM EST PT 1/>: CPT | Mod: S$PBB,25,, | Performed by: OPHTHALMOLOGY

## 2018-08-31 PROCEDURE — 92226 PR SPECIAL EYE EXAM, SUBSEQUENT: CPT | Mod: PBBFAC,LT | Performed by: OPHTHALMOLOGY

## 2018-08-31 PROCEDURE — 92226 PR SPECIAL EYE EXAM, SUBSEQUENT: CPT | Mod: S$PBB,59,LT, | Performed by: OPHTHALMOLOGY

## 2018-08-31 PROCEDURE — 76512 OPH US DX B-SCAN: CPT | Mod: 50,PBBFAC | Performed by: OPHTHALMOLOGY

## 2018-08-31 PROCEDURE — C9257 BEVACIZUMAB INJECTION: HCPCS | Mod: PBBFAC,JG | Performed by: OPHTHALMOLOGY

## 2018-08-31 PROCEDURE — 99213 OFFICE O/P EST LOW 20 MIN: CPT | Mod: PBBFAC,25 | Performed by: OPHTHALMOLOGY

## 2018-08-31 PROCEDURE — 67028 INJECTION EYE DRUG: CPT | Mod: S$PBB,RT,, | Performed by: OPHTHALMOLOGY

## 2018-08-31 PROCEDURE — 67028 INJECTION EYE DRUG: CPT | Mod: PBBFAC,RT | Performed by: OPHTHALMOLOGY

## 2018-08-31 RX ADMIN — BEVACIZUMAB 1.25 MG: 100 INJECTION, SOLUTION INTRAVENOUS at 02:08

## 2018-08-31 NOTE — PROGRESS NOTES
HPI     Decline in va   DLS- 09/26/2017 Dr. Al     Decline in va over 2 week period OD.  Started with black spot / floater   now vision just really hazy/foggy.  No flashes.  Can see shapes OD.  No   problems with OS.  Va good OS.   No f/f OS       Clear eyes PRN   thera tears     1. Recurrent iritis    2. PDR OU   --s/p PRP OD (3/27/2017)   S/p PRP OS complete 05/09/2017     3. DME OU   S/p Avastin OS x 2   S/p Avastin OD x 5 (04/17/2018)   S/p Focal OS (12/16/14)     3. PCIOL OU   S/p phaco w/IOL OD 8/13/15   S/p phaco w/IOL OS 4/30/15    Last edited by Carrington Thacker MD on 8/31/2018  5:07 PM. (History)            Assessment /Plan     For exam results, see Encounter Report.    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      New VH OD  Attached on Bscan OD  OS appears stable  Had been receiving Avastin OD for ME but not at last visit    Recommend Avastin OD today.  No view for laser.  Discussed obs as option  Pt agrees with inj  F/u Mazzulla 4 wks, sooner PRN any worsening vision    Patient identified.  Timeout performed.    Risks, benefits, and alternatives to treatment were discussed in detail with the patient, including bleeding/infection (endophthalmitis)/etc.  The patient voiced understanding and wished to proceed with the procedure.  See separate consent form.    Injection Procedure Note:  Diagnosis: PDR with VH Right Eye    Topical Proparacaine drop placed then topical 10% Betadine swabs to lids, lashes, and conjunctiva.  Sterile gloves used, and sterile lid speculum placed.  10% Betadine swabbed at injection site again prior to injection.  Avastin 1.25mg in 0.05cc Injected at 7:00 position 3.5-4mm posterior to the  limbus.  Complications: None  Va at least CF at 5 feet post injection.  Retina, ONH, IOP normal after injection.    Followup as above.  Patient should return immediately PRN.  Retinal Detachment and Endophthalmitis precautions given.

## 2018-08-31 NOTE — PATIENT INSTRUCTIONS
POST INTRAVITREAL INJECTION PATIENT INSTRUCTIONS   Below are some guidelines for what to expect after your treatment and additional care instructions.    You may experience mild discomfort in your eye after the treatment. Usually your eye feels better within 24 to 48 hours after the procedure.      You have been given drops that numb the surface of your eye. DO NOT rub or touch your eye. DO NOT wear contacts until numbness goes away.      You may experience small spots that appear in your field of vision. These are usually caused by an air bubble or from the medication. It takes a few hours or days for these to go away.      The use of sunglasses will help reduce light sensitivity and glare.      DO NOT swim or put sink water (tap water) in your eyes for at least 4 hours after the injection.      You may get a gritty, burning, irritating or stinging feeling in the injected eye as a result of the antiseptic used. Use artificial tears or eye lubricant to reduce the sensation. These are available over-the-counter from your local pharmacy. If you already have some at home, be sure to check the expiration date and to avoid contaminating your eye. A cool pack over your eye brow above the injected eye may also relieve discomfort.     Call us right away or go to the Emergency Department if you have a dramatic decrease in vision or extreme pain in the eye.   [facility] [phone]   Item: [id number]   Revised: 09/2015

## 2018-09-12 ENCOUNTER — OFFICE VISIT (OUTPATIENT)
Dept: HEMATOLOGY/ONCOLOGY | Facility: CLINIC | Age: 63
End: 2018-09-12
Payer: MEDICARE

## 2018-09-12 ENCOUNTER — LAB VISIT (OUTPATIENT)
Dept: LAB | Facility: HOSPITAL | Age: 63
End: 2018-09-12
Attending: INTERNAL MEDICINE
Payer: MEDICARE

## 2018-09-12 VITALS
WEIGHT: 179.25 LBS | BODY MASS INDEX: 24.28 KG/M2 | HEIGHT: 72 IN | SYSTOLIC BLOOD PRESSURE: 142 MMHG | DIASTOLIC BLOOD PRESSURE: 75 MMHG | HEART RATE: 84 BPM

## 2018-09-12 DIAGNOSIS — R80.9 PROTEINURIA: ICD-10-CM

## 2018-09-12 DIAGNOSIS — C90.00 MULTIPLE MYELOMA NOT HAVING ACHIEVED REMISSION: Primary | ICD-10-CM

## 2018-09-12 DIAGNOSIS — D50.0 ANEMIA DUE TO CHRONIC BLOOD LOSS: ICD-10-CM

## 2018-09-12 DIAGNOSIS — N18.30 HYPERTENSIVE KIDNEY DISEASE WITH STAGE 3 CHRONIC KIDNEY DISEASE: ICD-10-CM

## 2018-09-12 DIAGNOSIS — Z79.4 TYPE 2 DIABETES MELLITUS WITH STAGE 3 CHRONIC KIDNEY DISEASE, WITH LONG-TERM CURRENT USE OF INSULIN: ICD-10-CM

## 2018-09-12 DIAGNOSIS — I12.9 HYPERTENSIVE KIDNEY DISEASE WITH STAGE 3 CHRONIC KIDNEY DISEASE: ICD-10-CM

## 2018-09-12 DIAGNOSIS — D47.2 SMOLDERING MYELOMA: ICD-10-CM

## 2018-09-12 DIAGNOSIS — D47.2 MONOCLONAL GAMMOPATHY: ICD-10-CM

## 2018-09-12 DIAGNOSIS — N18.30 TYPE 2 DIABETES MELLITUS WITH STAGE 3 CHRONIC KIDNEY DISEASE, WITH LONG-TERM CURRENT USE OF INSULIN: ICD-10-CM

## 2018-09-12 DIAGNOSIS — I50.32 CHRONIC DIASTOLIC HEART FAILURE: ICD-10-CM

## 2018-09-12 DIAGNOSIS — E11.22 TYPE 2 DIABETES MELLITUS WITH STAGE 3 CHRONIC KIDNEY DISEASE, WITH LONG-TERM CURRENT USE OF INSULIN: ICD-10-CM

## 2018-09-12 LAB
ABO + RH BLD: NORMAL
ALBUMIN SERPL BCP-MCNC: 3.3 G/DL
ALP SERPL-CCNC: 129 U/L
ALT SERPL W/O P-5'-P-CCNC: 14 U/L
ANION GAP SERPL CALC-SCNC: 5 MMOL/L
AST SERPL-CCNC: 17 U/L
BASOPHILS # BLD AUTO: 0.03 K/UL
BASOPHILS NFR BLD: 0.5 %
BILIRUB SERPL-MCNC: 0.7 MG/DL
BLD GP AB SCN CELLS X3 SERPL QL: NORMAL
BLOOD GROUP ANTIBODIES SERPL: NORMAL
BUN SERPL-MCNC: 21 MG/DL
CALCIUM SERPL-MCNC: 8.9 MG/DL
CHLORIDE SERPL-SCNC: 111 MMOL/L
CO2 SERPL-SCNC: 24 MMOL/L
CREAT SERPL-MCNC: 2.4 MG/DL
DIFFERENTIAL METHOD: ABNORMAL
EOSINOPHIL # BLD AUTO: 0.1 K/UL
EOSINOPHIL NFR BLD: 2 %
ERYTHROCYTE [DISTWIDTH] IN BLOOD BY AUTOMATED COUNT: 13.9 %
EST. GFR  (AFRICAN AMERICAN): 32.2 ML/MIN/1.73 M^2
EST. GFR  (NON AFRICAN AMERICAN): 27.9 ML/MIN/1.73 M^2
GLUCOSE SERPL-MCNC: 117 MG/DL
HCT VFR BLD AUTO: 29.7 %
HGB BLD-MCNC: 9.9 G/DL
IGG SERPL-MCNC: 1401 MG/DL
IMM GRANULOCYTES # BLD AUTO: 0.03 K/UL
IMM GRANULOCYTES NFR BLD AUTO: 0.5 %
LYMPHOCYTES # BLD AUTO: 1.2 K/UL
LYMPHOCYTES NFR BLD: 18.6 %
MCH RBC QN AUTO: 29.2 PG
MCHC RBC AUTO-ENTMCNC: 33.3 G/DL
MCV RBC AUTO: 88 FL
MONOCYTES # BLD AUTO: 0.4 K/UL
MONOCYTES NFR BLD: 6.7 %
NEUTROPHILS # BLD AUTO: 4.7 K/UL
NEUTROPHILS NFR BLD: 71.7 %
NRBC BLD-RTO: 0 /100 WBC
PLATELET # BLD AUTO: 167 K/UL
PMV BLD AUTO: 10.7 FL
POTASSIUM SERPL-SCNC: 3.7 MMOL/L
PROT SERPL-MCNC: 6.7 G/DL
RBC # BLD AUTO: 3.39 M/UL
SODIUM SERPL-SCNC: 140 MMOL/L
WBC # BLD AUTO: 6.57 K/UL

## 2018-09-12 PROCEDURE — 86901 BLOOD TYPING SEROLOGIC RH(D): CPT

## 2018-09-12 PROCEDURE — 99999 PR PBB SHADOW E&M-EST. PATIENT-LVL III: CPT | Mod: PBBFAC,,, | Performed by: INTERNAL MEDICINE

## 2018-09-12 PROCEDURE — 99213 OFFICE O/P EST LOW 20 MIN: CPT | Mod: PBBFAC | Performed by: INTERNAL MEDICINE

## 2018-09-12 PROCEDURE — 85025 COMPLETE CBC W/AUTO DIFF WBC: CPT

## 2018-09-12 PROCEDURE — 86870 RBC ANTIBODY IDENTIFICATION: CPT | Mod: 59

## 2018-09-12 PROCEDURE — 99214 OFFICE O/P EST MOD 30 MIN: CPT | Mod: S$PBB,,, | Performed by: INTERNAL MEDICINE

## 2018-09-12 PROCEDURE — 82784 ASSAY IGA/IGD/IGG/IGM EACH: CPT

## 2018-09-12 PROCEDURE — 80053 COMPREHEN METABOLIC PANEL: CPT

## 2018-09-12 PROCEDURE — 83520 IMMUNOASSAY QUANT NOS NONAB: CPT | Mod: 59

## 2018-09-12 PROCEDURE — 84165 PROTEIN E-PHORESIS SERUM: CPT

## 2018-09-12 PROCEDURE — 84165 PROTEIN E-PHORESIS SERUM: CPT | Mod: 26,,, | Performed by: PATHOLOGY

## 2018-09-12 NOTE — PROGRESS NOTES
Mr. Reis is a 63-year-old man with plasma cell myeloma.  In May 2016, an IgG   kappa paraprotein measuring 0.86 g/dL was detected.  A 24-hour urine   contained 947 mg of protein, 3% of which was kappa light chains.  His bone   marrow had 15% plasma cells with the FISH panel showing trisomy 1, 7, 9 and 15.    A PET scan in January 2017 revealed no bone lesions.    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    However, only 4.6% of this was kappa light chains and 2.8% was IgG kappa.  His   anemia had become worse and he was requiring periodic transfusions.  The IgG   measured in electrophoresis was 1.23 g/dL.    Primarily because of the worsening anemia and the possibility that this was at   least partially caused by myeloma, I recommended a trial of therapy with   Revlimid 10 mg daily for 21 days every 28 days along with dexamethasone 12 mg   once weekly.  I thought this was one of the treatments that he would be most likely   to tolerate.  However, it did not go well.  There was a long delay in   obtaining Revlimid, but after he started it, he had a hospitalization for   worsening renal failure and hyperglycemia.  He had another hospitalization in   late March 2018 for encephalopathy, congestive heart failure, hypoglycemia and   worsening anemia that required red cell transfusions.  When he returned to see   me on 05/14/2018 with his wife, we agreed that he would stop the Revlimid and   dexamethasone because of these problems and because he continued to   require transfusion support.  He has substantial renal impairment and recent   creatinine values have been in the range of 2.3-2.5.  He continues to require   red cell transfusion support.  He had 2 units of red blood cells in late June 2018 when his hemoglobin was 7.7, and he was also transfused shortly after I saw   him in May with 2 units of red  blood cells when his hemoglobin fell to 6.5.    His last hemoglobin was 8.7 on 08/01/2018.    Mr. Reis has many other serious medical problems.  He had a stroke many years   ago, which left him with weakness of the left leg and arm.  He has severe   diabetes mellitus with renal, retinal and peripheral nerve complications.  He   has advanced cardiac disease with diastolic dysfunction, pulmonary hypertension   and cardiac valvular changes.  He also has a poor understanding of his medical   status, which is probably related to complications from the stroke.  His   compliance has been a serious problem.  His wife is away on a business trip   today and he is here with his son.    He reports that he is having no bone pain.  Since I last saw him, he has had no   infections.  He continues to be followed in the Department of Cardiology and in   the Department of Nephrology.  His appetite is good.  His activity level has not   changed in the last several months.    The very limited amount of Revlimid and dexamethasone did appear to have some   effect against his myeloma since the quantitative IgG fell from 1747 to 991 and   the paraprotein from 1.23 g/dL to 0.56.  These studies were repeated on   08/01/2018 and the paraprotein had increased to 0.82 and the quantitative IgG   to 1316.    ADDITIONAL PAST HISTORY, SYSTEM REVIEW, SOCIAL HISTORY AND FAMILY HISTORY:  Have   been reviewed and updated in the electronic record.    PHYSICAL EXAMINATION:  GENERAL APPEARANCE:  Well-developed, well-nourished man who can climb on to an   examination table without assistance.  EYES:  No jaundice or pallor.  NOSE:  Clear nares.  SINUSES:  No tenderness.  MOUTH AND THROAT:  No mucosal lesions.  Gingivitis.  Some teeth are missing.  NECK:  No masses or bruits.  No thyroid abnormalities.  LYMPH NODES:  No enlarged cervical, axillary or inguinal nodes.  CHEST AND LUNGS:  Normal respiratory effort.  Clear to auscultation and   percussion.  HEART:   Regular rate and rhythm without murmur or gallop.  ABDOMEN:  Soft without masses or tenderness.  No hepatosplenomegaly.  EXTREMITIES:  Trace pretibial and ankle edema bilaterally.  PERIPHERAL VASCULATURE:  Diminished pulses in the feet and ankles.  Adequate   femoral pulses.  NEUROLOGIC:  He is oriented to place and person.  Memory is poor.  There is   weakness of the left leg and mild weakness of the left arm.  SKIN:  No suspicious lesions in the areas examined.    LABORATORY STUDIES:  His last blood work available to me was from 08/01/2018, at   which time, his creatinine was 2.3 and BUN 23.  Glucose was 278.  Albumin was   3.2.  Blood counts included hemoglobin 8.7, WBC 5750 and platelets 156,000.    IMPRESSION:  1.  Plasma cell myeloma.  2.  Diabetes mellitus with neuropathy, nephropathy and retinal disease.  3.  Cardiomyopathy, probably ischemic with chronic diastolic heart failure.  4.  Prior stroke.    RECOMMENDATIONS:  1.  The patient is here with his son today.  I have recommended that we continue   his current management.  2.  He will have a CBC and type and cross every month and be transfused as   needed.  3.  Return visit (EPA appointment) in three months with CBC, CMP, serum protein   electrophoresis, free light chain analysis, IgG level and type and cross.  4.  When he comes next with his wife, I will discuss other possible therapy   that we might try.  That would include melphalan and prednisone, possibly   combined with daratumumab and bortezomib.  Those drugs have been approved now as   initial therapy for myeloma and have generally been given to elderly frail   patients.  My concerns about daratumumab and Velcade relate to obtaining his   cooperation with the lengthy time that is required for administration along with   the possibility of worsening of diabetes from the steroid premedication   and also neuropathy from Velcade.  We might simply chose, if any   therapy is chosen, melphalan and  prednisone.    Most of his 30-minute visit today was devoted to discussing aspects of anemia   and myeloma with him and his son but I am not sure that they really understood much of what I   had to explain.      HILDA/MARIA G  dd: 09/12/2018 09:53:24 (CDT)  td: 09/12/2018 23:01:33 (CDT)  Doc ID   #[id number]  Job ID #[id number]    CC:

## 2018-09-13 LAB
ALBUMIN SERPL ELPH-MCNC: 3.5 G/DL
ALPHA1 GLOB SERPL ELPH-MCNC: 0.29 G/DL
ALPHA2 GLOB SERPL ELPH-MCNC: 0.66 G/DL
B-GLOBULIN SERPL ELPH-MCNC: 0.7 G/DL
GAMMA GLOB SERPL ELPH-MCNC: 1.35 G/DL
KAPPA LC SER QL IA: 62.86 MG/DL
KAPPA LC/LAMBDA SER IA: 19.89
LAMBDA LC SER QL IA: 3.16 MG/DL
PATHOLOGIST INTERPRETATION SPE: NORMAL
PROT SERPL-MCNC: 6.5 G/DL

## 2018-10-05 ENCOUNTER — OFFICE VISIT (OUTPATIENT)
Dept: OPHTHALMOLOGY | Facility: CLINIC | Age: 63
End: 2018-10-05
Payer: MEDICARE

## 2018-10-05 VITALS — DIASTOLIC BLOOD PRESSURE: 76 MMHG | SYSTOLIC BLOOD PRESSURE: 159 MMHG | HEART RATE: 71 BPM

## 2018-10-05 DIAGNOSIS — H43.11 VITREOUS HEMORRHAGE, RIGHT: ICD-10-CM

## 2018-10-05 DIAGNOSIS — H35.033 HYPERTENSIVE RETINOPATHY OF BOTH EYES: ICD-10-CM

## 2018-10-05 PROCEDURE — 99999 PR PBB SHADOW E&M-EST. PATIENT-LVL III: CPT | Mod: PBBFAC,,, | Performed by: OPHTHALMOLOGY

## 2018-10-05 PROCEDURE — 92226 PR SPECIAL EYE EXAM, SUBSEQUENT: CPT | Mod: 50,S$PBB,, | Performed by: OPHTHALMOLOGY

## 2018-10-05 PROCEDURE — 92014 COMPRE OPH EXAM EST PT 1/>: CPT | Mod: S$PBB,,, | Performed by: OPHTHALMOLOGY

## 2018-10-05 PROCEDURE — 92134 CPTRZ OPH DX IMG PST SGM RTA: CPT | Mod: PBBFAC | Performed by: OPHTHALMOLOGY

## 2018-10-05 PROCEDURE — 92226 PR SPECIAL EYE EXAM, SUBSEQUENT: CPT | Mod: 50,PBBFAC | Performed by: OPHTHALMOLOGY

## 2018-10-05 PROCEDURE — 99213 OFFICE O/P EST LOW 20 MIN: CPT | Mod: PBBFAC,25 | Performed by: OPHTHALMOLOGY

## 2018-10-06 DIAGNOSIS — I10 ESSENTIAL HYPERTENSION: ICD-10-CM

## 2018-10-08 ENCOUNTER — LAB VISIT (OUTPATIENT)
Dept: LAB | Facility: HOSPITAL | Age: 63
End: 2018-10-08
Attending: INTERNAL MEDICINE
Payer: MEDICARE

## 2018-10-08 DIAGNOSIS — D50.0 ANEMIA DUE TO CHRONIC BLOOD LOSS: ICD-10-CM

## 2018-10-08 DIAGNOSIS — R80.9 PROTEINURIA: ICD-10-CM

## 2018-10-08 DIAGNOSIS — D47.2 MONOCLONAL GAMMOPATHY: ICD-10-CM

## 2018-10-08 LAB
ABO + RH BLD: NORMAL
BASOPHILS # BLD AUTO: 0.03 K/UL
BASOPHILS NFR BLD: 0.6 %
BLD GP AB SCN CELLS X3 SERPL QL: NORMAL
DIFFERENTIAL METHOD: ABNORMAL
EOSINOPHIL # BLD AUTO: 0.1 K/UL
EOSINOPHIL NFR BLD: 2.4 %
ERYTHROCYTE [DISTWIDTH] IN BLOOD BY AUTOMATED COUNT: 13.7 %
HCT VFR BLD AUTO: 28.6 %
HGB BLD-MCNC: 9.6 G/DL
IMM GRANULOCYTES # BLD AUTO: 0.02 K/UL
IMM GRANULOCYTES NFR BLD AUTO: 0.4 %
LYMPHOCYTES # BLD AUTO: 0.9 K/UL
LYMPHOCYTES NFR BLD: 16.7 %
MCH RBC QN AUTO: 29.3 PG
MCHC RBC AUTO-ENTMCNC: 33.6 G/DL
MCV RBC AUTO: 87 FL
MONOCYTES # BLD AUTO: 0.4 K/UL
MONOCYTES NFR BLD: 7.2 %
NEUTROPHILS # BLD AUTO: 3.9 K/UL
NEUTROPHILS NFR BLD: 72.7 %
NRBC BLD-RTO: 0 /100 WBC
PLATELET # BLD AUTO: 163 K/UL
PMV BLD AUTO: 11.5 FL
RBC # BLD AUTO: 3.28 M/UL
WBC # BLD AUTO: 5.39 K/UL

## 2018-10-08 PROCEDURE — 86850 RBC ANTIBODY SCREEN: CPT

## 2018-10-08 PROCEDURE — 85025 COMPLETE CBC W/AUTO DIFF WBC: CPT

## 2018-10-08 PROCEDURE — 36415 COLL VENOUS BLD VENIPUNCTURE: CPT | Mod: PO

## 2018-10-08 RX ORDER — LISINOPRIL 40 MG/1
40 TABLET ORAL DAILY
Qty: 90 TABLET | Refills: 0 | Status: SHIPPED | OUTPATIENT
Start: 2018-10-08 | End: 2019-01-09 | Stop reason: ALTCHOICE

## 2018-10-10 ENCOUNTER — TELEPHONE (OUTPATIENT)
Dept: CARDIOLOGY | Facility: CLINIC | Age: 63
End: 2018-10-10

## 2018-10-24 ENCOUNTER — TELEPHONE (OUTPATIENT)
Dept: NEPHROLOGY | Facility: CLINIC | Age: 63
End: 2018-10-24

## 2018-11-02 ENCOUNTER — OFFICE VISIT (OUTPATIENT)
Dept: INTERNAL MEDICINE | Facility: CLINIC | Age: 63
End: 2018-11-02
Payer: MEDICARE

## 2018-11-02 ENCOUNTER — PATIENT MESSAGE (OUTPATIENT)
Dept: ENDOCRINOLOGY | Facility: CLINIC | Age: 63
End: 2018-11-02

## 2018-11-02 VITALS
HEART RATE: 79 BPM | BODY MASS INDEX: 24.28 KG/M2 | DIASTOLIC BLOOD PRESSURE: 73 MMHG | WEIGHT: 179.25 LBS | SYSTOLIC BLOOD PRESSURE: 136 MMHG | HEIGHT: 72 IN

## 2018-11-02 DIAGNOSIS — Z00.00 PREVENTATIVE HEALTH CARE: ICD-10-CM

## 2018-11-02 DIAGNOSIS — N18.30 TYPE 2 DIABETES MELLITUS WITH STAGE 3 CHRONIC KIDNEY DISEASE, WITH LONG-TERM CURRENT USE OF INSULIN: ICD-10-CM

## 2018-11-02 DIAGNOSIS — E11.59 HYPERTENSION ASSOCIATED WITH DIABETES: ICD-10-CM

## 2018-11-02 DIAGNOSIS — Z79.4 TYPE 2 DIABETES MELLITUS WITH STAGE 3 CHRONIC KIDNEY DISEASE, WITH LONG-TERM CURRENT USE OF INSULIN: ICD-10-CM

## 2018-11-02 DIAGNOSIS — N25.81 SECONDARY HYPERPARATHYROIDISM: ICD-10-CM

## 2018-11-02 DIAGNOSIS — I12.9 HYPERTENSIVE KIDNEY DISEASE WITH STAGE 3 CHRONIC KIDNEY DISEASE: ICD-10-CM

## 2018-11-02 DIAGNOSIS — C90.00 MULTIPLE MYELOMA NOT HAVING ACHIEVED REMISSION: ICD-10-CM

## 2018-11-02 DIAGNOSIS — E11.22 TYPE 2 DIABETES MELLITUS WITH STAGE 3 CHRONIC KIDNEY DISEASE, WITH LONG-TERM CURRENT USE OF INSULIN: ICD-10-CM

## 2018-11-02 DIAGNOSIS — E11.42 TYPE 2 DIABETES MELLITUS WITH DIABETIC POLYNEUROPATHY, WITH LONG-TERM CURRENT USE OF INSULIN: ICD-10-CM

## 2018-11-02 DIAGNOSIS — E78.5 DYSLIPIDEMIA: ICD-10-CM

## 2018-11-02 DIAGNOSIS — I15.2 HYPERTENSION ASSOCIATED WITH DIABETES: ICD-10-CM

## 2018-11-02 DIAGNOSIS — Z79.4 TYPE 2 DIABETES MELLITUS WITH DIABETIC POLYNEUROPATHY, WITH LONG-TERM CURRENT USE OF INSULIN: ICD-10-CM

## 2018-11-02 DIAGNOSIS — N18.30 HYPERTENSIVE KIDNEY DISEASE WITH STAGE 3 CHRONIC KIDNEY DISEASE: ICD-10-CM

## 2018-11-02 PROCEDURE — 99213 OFFICE O/P EST LOW 20 MIN: CPT | Mod: PBBFAC,PO | Performed by: INTERNAL MEDICINE

## 2018-11-02 PROCEDURE — 99999 PR PBB SHADOW E&M-EST. PATIENT-LVL III: CPT | Mod: PBBFAC,,, | Performed by: INTERNAL MEDICINE

## 2018-11-02 PROCEDURE — 99215 OFFICE O/P EST HI 40 MIN: CPT | Mod: S$PBB,,, | Performed by: INTERNAL MEDICINE

## 2018-11-02 NOTE — PROGRESS NOTES
Subjective:       Patient ID: Marie Reis Jr. is a 63 y.o. male.    Chief Complaint: Annual Exam    HPI 63-year-old male presents to clinic today for annual physical and follow-up of hypertension associated stage 3 kidney disease diabetes complicated by retinopathy neuropathy and stage 3 kidney disease, hypertension and dyslipidemia associated diabetes.  Patient is followed by Nephrology for his stage 3 kidney disease and secondary hyperparathyroidism.  He is also followed by Oncology for multiple myeloma not in remission currently on surveillance with his lab work.  [facility].  He states Endocrinology discontinue Victoza and started him on Tradjenta at his last visit he was unable to fill is a local pharmacy due to cost and request a written prescription so [facility].  [facility] hemoglobin A1c 8.1% creatinine 2.7  PSA 1.8 labs are current from October 16th.  He also received his flu vaccine.  Review of Systems  otherwise negative  Objective:      Physical Exam  General: Well-appearing, well-nourished.  No distress  HEENT: conjunctivae are normal.  Pupils are equal and reative to light.  TM's are clear and intact bilaterally.  Hearing is grossly normal.  Nasopharynx is clear.  Oropharynx is clear.  Neck: Supple.  No thyroid megaly.  No bruits.  Lymph: No cervical or supraclavicular adenopathy.  Heart: Regular rate and rhythm, without murmur, rub or gallop.  Lungs: Clear to auscultation; respiratory effort normal.  Abdomen: Soft, nontender, nondistended.  Normoactive bowel sounds.  No hepatomegaly.  No masses.  Extremities: Good distal pulses.  No edema.  Psych: Oriented to time person place.  Judgment and insight seem unimpaired.  Mood and affect are appropriate.  Assessment:       1. 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 Type 2 diabetes mellitus with stage 3 chronic kidney disease, with long-term current use of insulin    3. Type 2 diabetes mellitus with diabetic polyneuropathy, with long-term current use of insulin    4. Hypertension associated with diabetes    5. Hypertensive kidney disease with stage 3 chronic kidney disease    6. Dyslipidemia    7. Preventative health care    8. Multiple myeloma not having achieved remission    9. Secondary hyperparathyroidism        Plan:       Marie was seen today for annual exam.    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  -     linagliptin (TRADJENTA) 5 mg Tab tablet; Take 1 tablet (5 mg total) by mouth once daily.  Follow-up planned with Endocrinology will go ahead and schedule it for him.  Type 2 diabetes mellitus with stage 3 chronic kidney disease, with long-term current use of insulin  Followed by Nephrology  Type 2 diabetes mellitus with diabetic polyneuropathy, with long-term current use of insulin    Hypertension associated with diabetes    Hypertensive kidney disease with stage 3 chronic kidney disease  Controlled.  Continue current medical regimen.  Prescription refills addressed.  Followup advised. See after visit summary.  Dyslipidemia  Controlled.  Continue current medical regimen.  Prescription refills addressed.  Followup advised. See after visit summary.  Preventative health care  Performed reviewed and updated today  Multiple myeloma not having achieved remission  Followed by Oncology  Secondary hyperparathyroidism

## 2018-11-07 ENCOUNTER — LAB VISIT (OUTPATIENT)
Dept: LAB | Facility: HOSPITAL | Age: 63
End: 2018-11-07
Attending: INTERNAL MEDICINE
Payer: MEDICARE

## 2018-11-07 DIAGNOSIS — R80.9 PROTEINURIA: ICD-10-CM

## 2018-11-07 DIAGNOSIS — D47.2 MONOCLONAL GAMMOPATHY: ICD-10-CM

## 2018-11-07 DIAGNOSIS — D50.0 ANEMIA DUE TO CHRONIC BLOOD LOSS: ICD-10-CM

## 2018-11-07 LAB
ABO + RH BLD: NORMAL
BASOPHILS # BLD AUTO: 0.03 K/UL
BASOPHILS NFR BLD: 0.4 %
BLD GP AB SCN CELLS X3 SERPL QL: NORMAL
DIFFERENTIAL METHOD: ABNORMAL
EOSINOPHIL # BLD AUTO: 0.2 K/UL
EOSINOPHIL NFR BLD: 2.9 %
ERYTHROCYTE [DISTWIDTH] IN BLOOD BY AUTOMATED COUNT: 12.3 %
HCT VFR BLD AUTO: 33.5 %
HGB BLD-MCNC: 11.3 G/DL
IMM GRANULOCYTES # BLD AUTO: 0.04 K/UL
IMM GRANULOCYTES NFR BLD AUTO: 0.5 %
LYMPHOCYTES # BLD AUTO: 1.3 K/UL
LYMPHOCYTES NFR BLD: 16.1 %
MCH RBC QN AUTO: 28.3 PG
MCHC RBC AUTO-ENTMCNC: 33.7 G/DL
MCV RBC AUTO: 84 FL
MONOCYTES # BLD AUTO: 0.5 K/UL
MONOCYTES NFR BLD: 5.6 %
NEUTROPHILS # BLD AUTO: 6 K/UL
NEUTROPHILS NFR BLD: 74.5 %
NRBC BLD-RTO: 0 /100 WBC
PLATELET # BLD AUTO: 189 K/UL
PMV BLD AUTO: 10.6 FL
RBC # BLD AUTO: 3.99 M/UL
WBC # BLD AUTO: 8 K/UL

## 2018-11-07 PROCEDURE — 85025 COMPLETE CBC W/AUTO DIFF WBC: CPT

## 2018-11-07 PROCEDURE — 86901 BLOOD TYPING SEROLOGIC RH(D): CPT

## 2018-11-07 PROCEDURE — 36415 COLL VENOUS BLD VENIPUNCTURE: CPT | Mod: PO

## 2018-12-11 ENCOUNTER — LAB VISIT (OUTPATIENT)
Dept: LAB | Facility: HOSPITAL | Age: 63
End: 2018-12-11
Attending: INTERNAL MEDICINE
Payer: MEDICARE

## 2018-12-11 DIAGNOSIS — R80.9 PROTEINURIA: ICD-10-CM

## 2018-12-11 DIAGNOSIS — D47.2 SMOLDERING MYELOMA: ICD-10-CM

## 2018-12-11 DIAGNOSIS — N17.9 AKI (ACUTE KIDNEY INJURY): ICD-10-CM

## 2018-12-11 DIAGNOSIS — D47.2 MONOCLONAL GAMMOPATHY: ICD-10-CM

## 2018-12-11 LAB
ALBUMIN SERPL BCP-MCNC: 3 G/DL
ALBUMIN SERPL BCP-MCNC: 3 G/DL
ALP SERPL-CCNC: 136 U/L
ALT SERPL W/O P-5'-P-CCNC: 10 U/L
ANION GAP SERPL CALC-SCNC: 8 MMOL/L
ANION GAP SERPL CALC-SCNC: 8 MMOL/L
AST SERPL-CCNC: 15 U/L
BASOPHILS # BLD AUTO: 0.02 K/UL
BASOPHILS NFR BLD: 0.3 %
BILIRUB SERPL-MCNC: 0.4 MG/DL
BUN SERPL-MCNC: 31 MG/DL
BUN SERPL-MCNC: 31 MG/DL
CALCIUM SERPL-MCNC: 8.7 MG/DL
CALCIUM SERPL-MCNC: 8.7 MG/DL
CHLORIDE SERPL-SCNC: 104 MMOL/L
CHLORIDE SERPL-SCNC: 104 MMOL/L
CO2 SERPL-SCNC: 24 MMOL/L
CO2 SERPL-SCNC: 24 MMOL/L
CREAT SERPL-MCNC: 3.1 MG/DL
CREAT SERPL-MCNC: 3.1 MG/DL
DIFFERENTIAL METHOD: ABNORMAL
EOSINOPHIL # BLD AUTO: 0.2 K/UL
EOSINOPHIL NFR BLD: 2.4 %
ERYTHROCYTE [DISTWIDTH] IN BLOOD BY AUTOMATED COUNT: 13 %
EST. GFR  (AFRICAN AMERICAN): 23.6 ML/MIN/1.73 M^2
EST. GFR  (AFRICAN AMERICAN): 23.6 ML/MIN/1.73 M^2
EST. GFR  (NON AFRICAN AMERICAN): 20.5 ML/MIN/1.73 M^2
EST. GFR  (NON AFRICAN AMERICAN): 20.5 ML/MIN/1.73 M^2
GLUCOSE SERPL-MCNC: 337 MG/DL
GLUCOSE SERPL-MCNC: 337 MG/DL
HCT VFR BLD AUTO: 30.1 %
HGB BLD-MCNC: 10.3 G/DL
IGG SERPL-MCNC: 1293 MG/DL
IMM GRANULOCYTES # BLD AUTO: 0.02 K/UL
IMM GRANULOCYTES NFR BLD AUTO: 0.3 %
LYMPHOCYTES # BLD AUTO: 1.1 K/UL
LYMPHOCYTES NFR BLD: 17.7 %
MCH RBC QN AUTO: 28.1 PG
MCHC RBC AUTO-ENTMCNC: 34.2 G/DL
MCV RBC AUTO: 82 FL
MONOCYTES # BLD AUTO: 0.4 K/UL
MONOCYTES NFR BLD: 6 %
NEUTROPHILS # BLD AUTO: 4.6 K/UL
NEUTROPHILS NFR BLD: 73.3 %
NRBC BLD-RTO: 0 /100 WBC
PHOSPHATE SERPL-MCNC: 3.5 MG/DL
PLATELET # BLD AUTO: 173 K/UL
PMV BLD AUTO: 11 FL
POTASSIUM SERPL-SCNC: 3.2 MMOL/L
POTASSIUM SERPL-SCNC: 3.2 MMOL/L
PROT SERPL-MCNC: 6.7 G/DL
RBC # BLD AUTO: 3.67 M/UL
SODIUM SERPL-SCNC: 136 MMOL/L
SODIUM SERPL-SCNC: 136 MMOL/L
WBC # BLD AUTO: 6.21 K/UL

## 2018-12-11 PROCEDURE — 36415 COLL VENOUS BLD VENIPUNCTURE: CPT | Mod: PO

## 2018-12-11 PROCEDURE — 82784 ASSAY IGA/IGD/IGG/IGM EACH: CPT

## 2018-12-11 PROCEDURE — 83520 IMMUNOASSAY QUANT NOS NONAB: CPT

## 2018-12-11 PROCEDURE — 80053 COMPREHEN METABOLIC PANEL: CPT

## 2018-12-11 PROCEDURE — 84165 PROTEIN E-PHORESIS SERUM: CPT

## 2018-12-11 PROCEDURE — 84165 PROTEIN E-PHORESIS SERUM: CPT | Mod: 26,,, | Performed by: PATHOLOGY

## 2018-12-11 PROCEDURE — 80069 RENAL FUNCTION PANEL: CPT

## 2018-12-11 PROCEDURE — 85025 COMPLETE CBC W/AUTO DIFF WBC: CPT

## 2018-12-12 LAB
ALBUMIN SERPL ELPH-MCNC: 3.57 G/DL
ALPHA1 GLOB SERPL ELPH-MCNC: 0.24 G/DL
ALPHA2 GLOB SERPL ELPH-MCNC: 0.68 G/DL
B-GLOBULIN SERPL ELPH-MCNC: 0.62 G/DL
GAMMA GLOB SERPL ELPH-MCNC: 1.18 G/DL
KAPPA LC SER QL IA: 65.52 MG/DL
KAPPA LC/LAMBDA SER IA: 24.09
LAMBDA LC SER QL IA: 2.72 MG/DL
PATHOLOGIST INTERPRETATION SPE: NORMAL
PROT SERPL-MCNC: 6.3 G/DL

## 2018-12-14 ENCOUNTER — TELEPHONE (OUTPATIENT)
Dept: NEPHROLOGY | Facility: CLINIC | Age: 63
End: 2018-12-14

## 2018-12-14 ENCOUNTER — TELEPHONE (OUTPATIENT)
Dept: ENDOCRINOLOGY | Facility: CLINIC | Age: 63
End: 2018-12-14

## 2018-12-14 ENCOUNTER — TELEPHONE (OUTPATIENT)
Dept: INTERNAL MEDICINE | Facility: CLINIC | Age: 63
End: 2018-12-14

## 2018-12-14 NOTE — TELEPHONE ENCOUNTER
----- Message from BELEM Gardiner, NAVI sent at 12/14/2018  9:28 AM CST -----  Yes, I'm working on this now.    ----- Message -----  From: Nancy Colón MD  Sent: 12/14/2018   7:55 AM  To: Dipika Hawkins MD, #     I wanted to see if we can do anything to accommodate this patient.  He is high risk patient with poorly controlled diabetes.  His last Endocrine appoint with endo was back in August.  Both myself, Nephrology and patient are requesting to get back in for endocrine follow-up due to very poor glycemic control.  Last A1c recently greater than 11%.  I understand patient is on a waiting list right now her last communication with medical assistant documented in the chart.   Is there anything we can do to accommodate in move this patient up given his risk factors and co-morbidities?  Thanks,  Nancy Colón  ----- Message -----  From: Alissa Dotson MD  Sent: 12/13/2018   1:01 PM  To: Nancy Colón MD, Mauri Maxwell Staff    Kidney function has worsened and so does his blood glucose levels. Please book an appointment for him in [facility] for December, if no availability then January clinic with me. Thanks.     Copy note to Dr. Colón

## 2018-12-14 NOTE — TELEPHONE ENCOUNTER
----- Message from BELEM Gardiner, EMILIEP sent at 12/14/2018  9:28 AM CST -----  Pt needs to be seen on override slot in dec at 1130 or 1p or maddi  Thanks  Gifty

## 2018-12-18 ENCOUNTER — LAB VISIT (OUTPATIENT)
Dept: LAB | Facility: HOSPITAL | Age: 63
End: 2018-12-18
Attending: SURGERY
Payer: MEDICARE

## 2018-12-18 ENCOUNTER — OFFICE VISIT (OUTPATIENT)
Dept: HEMATOLOGY/ONCOLOGY | Facility: CLINIC | Age: 63
End: 2018-12-18
Payer: MEDICARE

## 2018-12-18 ENCOUNTER — TELEPHONE (OUTPATIENT)
Dept: NEPHROLOGY | Facility: CLINIC | Age: 63
End: 2018-12-18

## 2018-12-18 VITALS
HEART RATE: 76 BPM | WEIGHT: 184.31 LBS | BODY MASS INDEX: 24.43 KG/M2 | HEIGHT: 73 IN | DIASTOLIC BLOOD PRESSURE: 76 MMHG | SYSTOLIC BLOOD PRESSURE: 138 MMHG

## 2018-12-18 DIAGNOSIS — C90.00 MULTIPLE MYELOMA NOT HAVING ACHIEVED REMISSION: Primary | ICD-10-CM

## 2018-12-18 DIAGNOSIS — I12.9 HYPERTENSIVE KIDNEY DISEASE WITH STAGE 4 CHRONIC KIDNEY DISEASE: ICD-10-CM

## 2018-12-18 DIAGNOSIS — R80.9 DIABETES MELLITUS WITH PROTEINURIA: ICD-10-CM

## 2018-12-18 DIAGNOSIS — E11.29 DIABETES MELLITUS WITH PROTEINURIA: ICD-10-CM

## 2018-12-18 DIAGNOSIS — N18.4 HYPERTENSIVE KIDNEY DISEASE WITH STAGE 4 CHRONIC KIDNEY DISEASE: ICD-10-CM

## 2018-12-18 DIAGNOSIS — D50.0 ANEMIA DUE TO CHRONIC BLOOD LOSS: ICD-10-CM

## 2018-12-18 DIAGNOSIS — D47.2 MONOCLONAL GAMMOPATHY: ICD-10-CM

## 2018-12-18 LAB
ABO + RH BLD: NORMAL
BASOPHILS # BLD AUTO: 0.03 K/UL
BASOPHILS NFR BLD: 0.4 %
BLD GP AB SCN CELLS X3 SERPL QL: NORMAL
DIFFERENTIAL METHOD: ABNORMAL
EOSINOPHIL # BLD AUTO: 0.2 K/UL
EOSINOPHIL NFR BLD: 2.5 %
ERYTHROCYTE [DISTWIDTH] IN BLOOD BY AUTOMATED COUNT: 12.8 %
HCT VFR BLD AUTO: 30.6 %
HGB BLD-MCNC: 10.7 G/DL
IMM GRANULOCYTES # BLD AUTO: 0.04 K/UL
IMM GRANULOCYTES NFR BLD AUTO: 0.6 %
LYMPHOCYTES # BLD AUTO: 1.1 K/UL
LYMPHOCYTES NFR BLD: 16.6 %
MCH RBC QN AUTO: 28.8 PG
MCHC RBC AUTO-ENTMCNC: 35 G/DL
MCV RBC AUTO: 83 FL
MONOCYTES # BLD AUTO: 0.5 K/UL
MONOCYTES NFR BLD: 7 %
NEUTROPHILS # BLD AUTO: 5 K/UL
NEUTROPHILS NFR BLD: 72.9 %
NRBC BLD-RTO: 0 /100 WBC
PLATELET # BLD AUTO: 168 K/UL
PMV BLD AUTO: 10.7 FL
RBC # BLD AUTO: 3.71 M/UL
WBC # BLD AUTO: 6.87 K/UL

## 2018-12-18 PROCEDURE — 36415 COLL VENOUS BLD VENIPUNCTURE: CPT

## 2018-12-18 PROCEDURE — 99213 OFFICE O/P EST LOW 20 MIN: CPT | Mod: PBBFAC,25 | Performed by: INTERNAL MEDICINE

## 2018-12-18 PROCEDURE — 86850 RBC ANTIBODY SCREEN: CPT

## 2018-12-18 PROCEDURE — 99999 PR PBB SHADOW E&M-EST. PATIENT-LVL III: CPT | Mod: PBBFAC,,, | Performed by: INTERNAL MEDICINE

## 2018-12-18 PROCEDURE — 99215 OFFICE O/P EST HI 40 MIN: CPT | Mod: S$PBB,,, | Performed by: INTERNAL MEDICINE

## 2018-12-18 PROCEDURE — 85025 COMPLETE CBC W/AUTO DIFF WBC: CPT

## 2018-12-18 NOTE — TELEPHONE ENCOUNTER
----- Message from Iliana Do sent at 12/18/2018  9:45 AM CST -----  Contact: Self  .Patient Returning Call from [facility]    Who Left Message for Patient: [email or web address]  Communication Preference: [phone]  Additional Information:

## 2018-12-18 NOTE — PROGRESS NOTES
HISTORY OF PRESENT ILLNESS:  Mr. Reis is a 63-year-old man with plasma cell   myeloma.  In May 2016, his serum protein electrophoresis revealed a paraprotein   measuring 0.86 g/dL; this was an IgG kappa.  A 24-hour urine contained 947 mg of   protein, 3% of which was kappa light chains.  His bone marrow had 15% plasma   cells and a FISH panel disclosed trisomy 1, 7, 9 and 15.  A PET scan in January 2017 revealed no bone lesions.    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  There had been a gradual increase in the   protein level and there was a substantial increase in the 24-hour urine protein   to 4500 mg.  However, only 4.6% of this was kappa light chain and 2.8% was IgG   kappa.  I suspect that most of the proteinuria is a consequence of diabetes   mellitus.  The IgG kappa paraprotein as measured in electrophoresis was 1.23   g/dL.    Mainly because of the worsening anemia and my concern that it might be at least   partially consequence of myeloma, I recommended a trial of therapy with Revlimid   10 mg daily for 21 days, every 28 days and dexamethasone 12 mg once a week.  I   suspected that he might tolerate this, but I was wrong.  There was a long delay   in obtaining Revlimid and after he started that he had worsening renal function   and was hospitalized for that problem and for hyperglycemia.  He had another   hospitalization in March 2018 for encephalopathy, congestive heart failure,   hypoglycemia and worsening anemia which required red cell transfusions.  In May   2018, the patient, his wife and I agreed that to stop Revlimid and   dexamethasone because of his recent problems.  There had been substantial   improvement in the protein levels with the paraprotein falling to 0.56 g/dL and   the quantitative IgG declining from 1747 to 991.  The kappa/lambda ratio fell   from  44 to 6.5.    I suspected that Mr. Reis would continue to require red cell transfusion   support.  His last transfusion was in early July 2018 and his hemoglobin was   7.7.  However, he has not required any transfusions since then.  Since mid   September 2018, hemoglobin values have been between 9.6 to 11.3.    He is not having any pain.  He has had no recent infections.  He had a flu   vaccination.  His energy level is stable.  He is not having fevers or sweats.    He has many other serious medical problems.  A stroke many years ago left him   with weakness of his left arm and leg and he has severe diabetes mellitus with   renal, retinal and peripheral nerve complications.  He has cardiac valvular   disease, pulmonary hypertension and diastolic dysfunction.  He also has a poor   understanding of his medical status, probably related to complications from the   stroke.    ADDITIONAL PAST HISTORY, SYSTEM REVIEW, SOCIAL HISTORY AND FAMILY HISTORY:  Have   been reviewed and updated in the electronic record.    PHYSICAL EXAMINATION:  GENERAL APPEARANCE:  Well-developed, well-nourished man who can easily climb on   to an examination table.  EYES:  No jaundice or pallor.  EARS:  Clear canals and membranes.  NOSE:  Clear nares.  SINUSES:  No tenderness.  MOUTH AND THROAT:  No mucosal lesions.  Gingivitis.  Some missing teeth.  NECK:  No thyroid abnormalities.  No masses or bruits.  LYMPH NODES:  No enlarged cervical, axillary or inguinal nodes.  CHEST AND LUNGS:  Normal respiratory effort.  Clear to auscultation and   percussion.  HEART:  1/6 systolic murmur.  S4.  Regular rate and rhythm.  ABDOMEN:  Soft without masses or tenderness.  No hepatosplenomegaly.  EXTREMITIES:  Trace pretibial and ankle edema bilaterally.  PERIPHERAL VASCULATURE:  Diminished pulses in the feet and ankles.  NEUROLOGIC:  Memory is impaired.  He is oriented to place and person.  Weakness   of the left leg and mild weakness of the left arm.  SKIN:   "No suspicious lesions in the areas examined.    LABORATORY STUDIES:  On 12/11/2018, the serum creatinine was 3.1, BUN 31 and   glucose 337.  Quantitative IgG was 1293 and the kappa/lambda ratio was 24.  The   paraprotein measured 0.88 g/dL.  Blood counts today include hemoglobin 10.7, WBC   6870 and platelets 168,000.    IMPRESSION:  1.  Plasma cell myeloma.  In view of recent findings, particularly the   improvement in his anemia, I think that he may once again be in the "smoldering"   phase of this disease.  2.  Diabetes mellitus with neuropathy, nephropathy, retinal disease.  3.  Cardiomyopathy with chronic diastolic heart failure.  4.  Prior stroke.    PLAN:  1.  Continue monthly CBC.  2.  Return visit (EPA appointment) in four months with CBC, CMP, IgG level,   serum protein electrophoresis and free light chain analysis drawn about one week   prior to his visit.    I talked to the patient in person and to his wife by phone about his current   status and the anemia issue.  We have agreed that he will be monitored now since   there is not unequivocal indication for therapy.  I remain very concerned about   his ability to tolerate treatment because of the severity of his other medical   problems and issues with compliance.  I would probably recommend melphalan and   prednisone if he requires treatment in the future.  Most of his 40-minute   appointment today was devoted to these discussions.      CLARISSE  dd: 12/18/2018 09:32:47 (CST)  td: 12/19/2018 07:35:58 (CST)  Doc ID   #[id number]  Job ID #[id number]    CC:       "

## 2019-01-01 ENCOUNTER — OFFICE VISIT (OUTPATIENT)
Dept: OPHTHALMOLOGY | Facility: CLINIC | Age: 64
End: 2019-01-01
Payer: COMMERCIAL

## 2019-01-01 ENCOUNTER — TELEPHONE (OUTPATIENT)
Dept: ADMINISTRATIVE | Facility: HOSPITAL | Age: 64
End: 2019-01-01

## 2019-01-01 ENCOUNTER — OFFICE VISIT (OUTPATIENT)
Dept: CARDIOLOGY | Facility: CLINIC | Age: 64
End: 2019-01-01
Payer: MEDICARE

## 2019-01-01 ENCOUNTER — INFUSION (OUTPATIENT)
Dept: INFUSION THERAPY | Facility: HOSPITAL | Age: 64
End: 2019-01-01
Attending: INTERNAL MEDICINE
Payer: MEDICARE

## 2019-01-01 ENCOUNTER — PATIENT OUTREACH (OUTPATIENT)
Dept: ADMINISTRATIVE | Facility: HOSPITAL | Age: 64
End: 2019-01-01

## 2019-01-01 ENCOUNTER — LAB VISIT (OUTPATIENT)
Dept: LAB | Facility: HOSPITAL | Age: 64
End: 2019-01-01
Payer: MEDICARE

## 2019-01-01 ENCOUNTER — TELEPHONE (OUTPATIENT)
Dept: HEMATOLOGY/ONCOLOGY | Facility: CLINIC | Age: 64
End: 2019-01-01

## 2019-01-01 ENCOUNTER — OFFICE VISIT (OUTPATIENT)
Dept: HEMATOLOGY/ONCOLOGY | Facility: CLINIC | Age: 64
End: 2019-01-01
Payer: MEDICARE

## 2019-01-01 ENCOUNTER — PATIENT OUTREACH (OUTPATIENT)
Dept: ADMINISTRATIVE | Facility: OTHER | Age: 64
End: 2019-01-01

## 2019-01-01 ENCOUNTER — PATIENT MESSAGE (OUTPATIENT)
Dept: ENDOCRINOLOGY | Facility: CLINIC | Age: 64
End: 2019-01-01

## 2019-01-01 ENCOUNTER — LAB VISIT (OUTPATIENT)
Dept: LAB | Facility: HOSPITAL | Age: 64
End: 2019-01-01
Attending: INTERNAL MEDICINE
Payer: MEDICARE

## 2019-01-01 ENCOUNTER — OFFICE VISIT (OUTPATIENT)
Dept: HEMATOLOGY/ONCOLOGY | Facility: CLINIC | Age: 64
End: 2019-01-01
Payer: COMMERCIAL

## 2019-01-01 ENCOUNTER — OFFICE VISIT (OUTPATIENT)
Dept: INTERNAL MEDICINE | Facility: CLINIC | Age: 64
End: 2019-01-01
Payer: COMMERCIAL

## 2019-01-01 ENCOUNTER — TELEPHONE (OUTPATIENT)
Dept: CARDIOLOGY | Facility: CLINIC | Age: 64
End: 2019-01-01

## 2019-01-01 ENCOUNTER — INFUSION (OUTPATIENT)
Dept: INFUSION THERAPY | Facility: HOSPITAL | Age: 64
End: 2019-01-01
Attending: INTERNAL MEDICINE
Payer: COMMERCIAL

## 2019-01-01 ENCOUNTER — OFFICE VISIT (OUTPATIENT)
Dept: NEPHROLOGY | Facility: CLINIC | Age: 64
End: 2019-01-01
Payer: COMMERCIAL

## 2019-01-01 ENCOUNTER — CLINICAL SUPPORT (OUTPATIENT)
Dept: CARDIOLOGY | Facility: CLINIC | Age: 64
End: 2019-01-01
Attending: INTERNAL MEDICINE
Payer: MEDICARE

## 2019-01-01 ENCOUNTER — OFFICE VISIT (OUTPATIENT)
Dept: INTERNAL MEDICINE | Facility: CLINIC | Age: 64
End: 2019-01-01
Payer: MEDICARE

## 2019-01-01 ENCOUNTER — TELEPHONE (OUTPATIENT)
Dept: INTERNAL MEDICINE | Facility: CLINIC | Age: 64
End: 2019-01-01

## 2019-01-01 ENCOUNTER — PATIENT MESSAGE (OUTPATIENT)
Dept: ADMINISTRATIVE | Facility: OTHER | Age: 64
End: 2019-01-01

## 2019-01-01 ENCOUNTER — OFFICE VISIT (OUTPATIENT)
Dept: NEPHROLOGY | Facility: CLINIC | Age: 64
End: 2019-01-01
Payer: MEDICARE

## 2019-01-01 ENCOUNTER — PATIENT MESSAGE (OUTPATIENT)
Dept: OPHTHALMOLOGY | Facility: CLINIC | Age: 64
End: 2019-01-01

## 2019-01-01 ENCOUNTER — CLINICAL SUPPORT (OUTPATIENT)
Dept: DIABETES | Facility: CLINIC | Age: 64
End: 2019-01-01
Payer: MEDICARE

## 2019-01-01 ENCOUNTER — TELEPHONE (OUTPATIENT)
Dept: NEPHROLOGY | Facility: CLINIC | Age: 64
End: 2019-01-01

## 2019-01-01 ENCOUNTER — TELEPHONE (OUTPATIENT)
Dept: GASTROENTEROLOGY | Facility: CLINIC | Age: 64
End: 2019-01-01

## 2019-01-01 ENCOUNTER — LAB VISIT (OUTPATIENT)
Dept: LAB | Facility: HOSPITAL | Age: 64
End: 2019-01-01
Attending: FAMILY MEDICINE
Payer: MEDICARE

## 2019-01-01 ENCOUNTER — TELEPHONE (OUTPATIENT)
Dept: ENDOCRINOLOGY | Facility: CLINIC | Age: 64
End: 2019-01-01

## 2019-01-01 ENCOUNTER — OFFICE VISIT (OUTPATIENT)
Dept: OPHTHALMOLOGY | Facility: CLINIC | Age: 64
End: 2019-01-01
Payer: MEDICARE

## 2019-01-01 ENCOUNTER — LAB VISIT (OUTPATIENT)
Dept: LAB | Facility: HOSPITAL | Age: 64
End: 2019-01-01
Attending: INTERNAL MEDICINE
Payer: COMMERCIAL

## 2019-01-01 ENCOUNTER — OFFICE VISIT (OUTPATIENT)
Dept: GASTROENTEROLOGY | Facility: CLINIC | Age: 64
End: 2019-01-01
Payer: COMMERCIAL

## 2019-01-01 ENCOUNTER — PATIENT MESSAGE (OUTPATIENT)
Dept: INTERNAL MEDICINE | Facility: CLINIC | Age: 64
End: 2019-01-01

## 2019-01-01 ENCOUNTER — TELEPHONE (OUTPATIENT)
Dept: ENDOCRINOLOGY | Facility: CLINIC | Age: 64
End: 2019-01-01
Payer: MEDICARE

## 2019-01-01 VITALS
HEART RATE: 80 BPM | RESPIRATION RATE: 18 BRPM | BODY MASS INDEX: 25.45 KG/M2 | TEMPERATURE: 98 F | OXYGEN SATURATION: 100 % | SYSTOLIC BLOOD PRESSURE: 178 MMHG | HEIGHT: 73 IN | DIASTOLIC BLOOD PRESSURE: 82 MMHG | WEIGHT: 192 LBS

## 2019-01-01 VITALS
HEART RATE: 72 BPM | SYSTOLIC BLOOD PRESSURE: 146 MMHG | BODY MASS INDEX: 25.33 KG/M2 | HEART RATE: 82 BPM | SYSTOLIC BLOOD PRESSURE: 175 MMHG | HEIGHT: 73 IN | TEMPERATURE: 98 F | DIASTOLIC BLOOD PRESSURE: 72 MMHG | DIASTOLIC BLOOD PRESSURE: 68 MMHG | WEIGHT: 191.13 LBS | RESPIRATION RATE: 18 BRPM

## 2019-01-01 VITALS
WEIGHT: 191 LBS | HEIGHT: 73 IN | BODY MASS INDEX: 25.31 KG/M2 | DIASTOLIC BLOOD PRESSURE: 74 MMHG | SYSTOLIC BLOOD PRESSURE: 108 MMHG

## 2019-01-01 VITALS
HEIGHT: 73 IN | WEIGHT: 195.13 LBS | SYSTOLIC BLOOD PRESSURE: 188 MMHG | HEART RATE: 79 BPM | TEMPERATURE: 99 F | SYSTOLIC BLOOD PRESSURE: 138 MMHG | BODY MASS INDEX: 25.86 KG/M2 | DIASTOLIC BLOOD PRESSURE: 70 MMHG | DIASTOLIC BLOOD PRESSURE: 85 MMHG | RESPIRATION RATE: 18 BRPM | WEIGHT: 197.56 LBS | SYSTOLIC BLOOD PRESSURE: 182 MMHG | DIASTOLIC BLOOD PRESSURE: 74 MMHG | HEART RATE: 68 BPM | HEART RATE: 83 BPM | TEMPERATURE: 98 F | HEIGHT: 73 IN | RESPIRATION RATE: 18 BRPM | OXYGEN SATURATION: 100 % | BODY MASS INDEX: 26.18 KG/M2

## 2019-01-01 VITALS
OXYGEN SATURATION: 99 % | BODY MASS INDEX: 24.45 KG/M2 | HEIGHT: 73 IN | HEART RATE: 73 BPM | DIASTOLIC BLOOD PRESSURE: 93 MMHG | SYSTOLIC BLOOD PRESSURE: 229 MMHG | WEIGHT: 184.5 LBS

## 2019-01-01 VITALS
HEIGHT: 73 IN | BODY MASS INDEX: 26.51 KG/M2 | SYSTOLIC BLOOD PRESSURE: 150 MMHG | WEIGHT: 200 LBS | DIASTOLIC BLOOD PRESSURE: 80 MMHG

## 2019-01-01 VITALS
WEIGHT: 198.19 LBS | SYSTOLIC BLOOD PRESSURE: 188 MMHG | BODY MASS INDEX: 26.21 KG/M2 | HEIGHT: 73 IN | BODY MASS INDEX: 26.53 KG/M2 | OXYGEN SATURATION: 95 % | SYSTOLIC BLOOD PRESSURE: 140 MMHG | DIASTOLIC BLOOD PRESSURE: 58 MMHG | BODY MASS INDEX: 26.76 KG/M2 | BODY MASS INDEX: 26.41 KG/M2 | SYSTOLIC BLOOD PRESSURE: 180 MMHG | HEART RATE: 72 BPM | RESPIRATION RATE: 18 BRPM | TEMPERATURE: 97 F | WEIGHT: 197.75 LBS | BODY MASS INDEX: 26.27 KG/M2 | OXYGEN SATURATION: 100 % | HEIGHT: 73 IN | WEIGHT: 200.19 LBS | SYSTOLIC BLOOD PRESSURE: 125 MMHG | DIASTOLIC BLOOD PRESSURE: 69 MMHG | DIASTOLIC BLOOD PRESSURE: 86 MMHG | WEIGHT: 200.19 LBS | DIASTOLIC BLOOD PRESSURE: 78 MMHG | WEIGHT: 201.94 LBS | SYSTOLIC BLOOD PRESSURE: 152 MMHG | HEIGHT: 73 IN | HEIGHT: 73 IN | DIASTOLIC BLOOD PRESSURE: 70 MMHG | RESPIRATION RATE: 16 BRPM | HEART RATE: 83 BPM | HEART RATE: 78 BPM

## 2019-01-01 VITALS
DIASTOLIC BLOOD PRESSURE: 82 MMHG | OXYGEN SATURATION: 99 % | RESPIRATION RATE: 17 BRPM | TEMPERATURE: 99 F | HEART RATE: 88 BPM | SYSTOLIC BLOOD PRESSURE: 187 MMHG | BODY MASS INDEX: 25.74 KG/M2 | WEIGHT: 195.13 LBS

## 2019-01-01 VITALS
BODY MASS INDEX: 25.13 KG/M2 | DIASTOLIC BLOOD PRESSURE: 80 MMHG | WEIGHT: 189.63 LBS | OXYGEN SATURATION: 100 % | HEIGHT: 73 IN | SYSTOLIC BLOOD PRESSURE: 120 MMHG | HEART RATE: 76 BPM

## 2019-01-01 VITALS
RESPIRATION RATE: 16 BRPM | SYSTOLIC BLOOD PRESSURE: 129 MMHG | TEMPERATURE: 98 F | DIASTOLIC BLOOD PRESSURE: 60 MMHG | HEART RATE: 77 BPM

## 2019-01-01 VITALS
DIASTOLIC BLOOD PRESSURE: 78 MMHG | WEIGHT: 192 LBS | BODY MASS INDEX: 25.45 KG/M2 | HEART RATE: 71 BPM | SYSTOLIC BLOOD PRESSURE: 164 MMHG | OXYGEN SATURATION: 98 % | HEIGHT: 73 IN

## 2019-01-01 VITALS
RESPIRATION RATE: 20 BRPM | BODY MASS INDEX: 26.47 KG/M2 | HEIGHT: 73 IN | TEMPERATURE: 98 F | SYSTOLIC BLOOD PRESSURE: 178 MMHG | WEIGHT: 199.75 LBS | DIASTOLIC BLOOD PRESSURE: 80 MMHG | HEART RATE: 79 BPM

## 2019-01-01 VITALS
HEIGHT: 73 IN | OXYGEN SATURATION: 100 % | WEIGHT: 192 LBS | SYSTOLIC BLOOD PRESSURE: 196 MMHG | HEART RATE: 78 BPM | DIASTOLIC BLOOD PRESSURE: 89 MMHG | BODY MASS INDEX: 25.45 KG/M2 | TEMPERATURE: 98 F | RESPIRATION RATE: 17 BRPM

## 2019-01-01 VITALS — DIASTOLIC BLOOD PRESSURE: 57 MMHG | HEART RATE: 69 BPM | SYSTOLIC BLOOD PRESSURE: 122 MMHG

## 2019-01-01 VITALS
TEMPERATURE: 98 F | DIASTOLIC BLOOD PRESSURE: 62 MMHG | SYSTOLIC BLOOD PRESSURE: 133 MMHG | RESPIRATION RATE: 20 BRPM | HEART RATE: 71 BPM

## 2019-01-01 VITALS
BODY MASS INDEX: 25.69 KG/M2 | DIASTOLIC BLOOD PRESSURE: 78 MMHG | WEIGHT: 193.88 LBS | HEART RATE: 70 BPM | HEIGHT: 73 IN | SYSTOLIC BLOOD PRESSURE: 170 MMHG

## 2019-01-01 VITALS
WEIGHT: 191 LBS | HEIGHT: 73 IN | SYSTOLIC BLOOD PRESSURE: 180 MMHG | BODY MASS INDEX: 25.31 KG/M2 | DIASTOLIC BLOOD PRESSURE: 84 MMHG

## 2019-01-01 VITALS — DIASTOLIC BLOOD PRESSURE: 78 MMHG | HEART RATE: 78 BPM | SYSTOLIC BLOOD PRESSURE: 163 MMHG

## 2019-01-01 DIAGNOSIS — E55.9 VITAMIN D DEFICIENCY: ICD-10-CM

## 2019-01-01 DIAGNOSIS — Z79.4 TYPE 2 DIABETES MELLITUS WITH DIABETIC POLYNEUROPATHY, WITH LONG-TERM CURRENT USE OF INSULIN: Primary | ICD-10-CM

## 2019-01-01 DIAGNOSIS — N18.4 TYPE 2 DIABETES MELLITUS WITH STAGE 4 CHRONIC KIDNEY DISEASE, WITH LONG-TERM CURRENT USE OF INSULIN: ICD-10-CM

## 2019-01-01 DIAGNOSIS — Z79.4 TYPE 2 DIABETES MELLITUS WITH STAGE 4 CHRONIC KIDNEY DISEASE, WITH LONG-TERM CURRENT USE OF INSULIN: ICD-10-CM

## 2019-01-01 DIAGNOSIS — C90.00 MULTIPLE MYELOMA NOT HAVING ACHIEVED REMISSION: ICD-10-CM

## 2019-01-01 DIAGNOSIS — E11.42 DIABETIC POLYNEUROPATHY ASSOCIATED WITH TYPE 2 DIABETES MELLITUS: ICD-10-CM

## 2019-01-01 DIAGNOSIS — I25.84 CORONARY ARTERY DISEASE DUE TO CALCIFIED CORONARY LESION: ICD-10-CM

## 2019-01-01 DIAGNOSIS — E11.29 DIABETES MELLITUS WITH PROTEINURIA: ICD-10-CM

## 2019-01-01 DIAGNOSIS — N18.4 STAGE 4 CHRONIC KIDNEY DISEASE: ICD-10-CM

## 2019-01-01 DIAGNOSIS — E11.22 TYPE 2 DIABETES MELLITUS WITH STAGE 4 CHRONIC KIDNEY DISEASE, WITH LONG-TERM CURRENT USE OF INSULIN: ICD-10-CM

## 2019-01-01 DIAGNOSIS — R80.9 DIABETES MELLITUS WITH PROTEINURIA: ICD-10-CM

## 2019-01-01 DIAGNOSIS — N25.81 SECONDARY HYPERPARATHYROIDISM: ICD-10-CM

## 2019-01-01 DIAGNOSIS — E78.5 DYSLIPIDEMIA: ICD-10-CM

## 2019-01-01 DIAGNOSIS — Z86.73 HISTORY OF STROKE: ICD-10-CM

## 2019-01-01 DIAGNOSIS — I50.32 CHRONIC DIASTOLIC HEART FAILURE: ICD-10-CM

## 2019-01-01 DIAGNOSIS — I50.32 CHRONIC DIASTOLIC CONGESTIVE HEART FAILURE: ICD-10-CM

## 2019-01-01 DIAGNOSIS — H35.033 HYPERTENSIVE RETINOPATHY OF BOTH EYES: ICD-10-CM

## 2019-01-01 DIAGNOSIS — E11.22 TYPE 2 DIABETES MELLITUS WITH STAGE 3 CHRONIC KIDNEY DISEASE, WITH LONG-TERM CURRENT USE OF INSULIN: ICD-10-CM

## 2019-01-01 DIAGNOSIS — N18.4 HYPERTENSIVE KIDNEY DISEASE WITH STAGE 4 CHRONIC KIDNEY DISEASE: ICD-10-CM

## 2019-01-01 DIAGNOSIS — R19.5 POSITIVE FIT (FECAL IMMUNOCHEMICAL TEST): Primary | ICD-10-CM

## 2019-01-01 DIAGNOSIS — E11.42 TYPE 2 DIABETES MELLITUS WITH DIABETIC POLYNEUROPATHY, WITH LONG-TERM CURRENT USE OF INSULIN: Primary | ICD-10-CM

## 2019-01-01 DIAGNOSIS — H43.11 VITREOUS HEMORRHAGE, RIGHT: ICD-10-CM

## 2019-01-01 DIAGNOSIS — I65.23 BILATERAL CAROTID ARTERY STENOSIS: ICD-10-CM

## 2019-01-01 DIAGNOSIS — E11.59 HYPERTENSION ASSOCIATED WITH DIABETES: ICD-10-CM

## 2019-01-01 DIAGNOSIS — I12.9 HYPERTENSIVE KIDNEY DISEASE WITH STAGE 4 CHRONIC KIDNEY DISEASE: ICD-10-CM

## 2019-01-01 DIAGNOSIS — C90.00 MULTIPLE MYELOMA NOT HAVING ACHIEVED REMISSION: Primary | ICD-10-CM

## 2019-01-01 DIAGNOSIS — Z86.73 HISTORY OF CVA (CEREBROVASCULAR ACCIDENT): ICD-10-CM

## 2019-01-01 DIAGNOSIS — G62.9 NEUROPATHY: ICD-10-CM

## 2019-01-01 DIAGNOSIS — I25.10 CORONARY ARTERY DISEASE DUE TO CALCIFIED CORONARY LESION: ICD-10-CM

## 2019-01-01 DIAGNOSIS — D64.89 ANEMIA DUE TO MULTIPLE MECHANISMS: ICD-10-CM

## 2019-01-01 DIAGNOSIS — N18.4 STAGE 4 CHRONIC KIDNEY DISEASE: Primary | ICD-10-CM

## 2019-01-01 DIAGNOSIS — E11.42 DIABETIC POLYNEUROPATHY ASSOCIATED WITH TYPE 2 DIABETES MELLITUS: Primary | ICD-10-CM

## 2019-01-01 DIAGNOSIS — I10 HYPERTENSION, ESSENTIAL: Primary | ICD-10-CM

## 2019-01-01 DIAGNOSIS — N18.4 TYPE 2 DIABETES MELLITUS WITH STAGE 4 CHRONIC KIDNEY DISEASE, WITH LONG-TERM CURRENT USE OF INSULIN: Primary | ICD-10-CM

## 2019-01-01 DIAGNOSIS — I73.9 PAD (PERIPHERAL ARTERY DISEASE): ICD-10-CM

## 2019-01-01 DIAGNOSIS — D69.6 THROMBOCYTOPENIA: ICD-10-CM

## 2019-01-01 DIAGNOSIS — I10 ESSENTIAL HYPERTENSION: ICD-10-CM

## 2019-01-01 DIAGNOSIS — I10 HYPERTENSION, ESSENTIAL: ICD-10-CM

## 2019-01-01 DIAGNOSIS — I63.9 CEREBROVASCULAR ACCIDENT (CVA), UNSPECIFIED MECHANISM: ICD-10-CM

## 2019-01-01 DIAGNOSIS — I15.2 HYPERTENSION ASSOCIATED WITH DIABETES: ICD-10-CM

## 2019-01-01 DIAGNOSIS — E11.9 TYPE 2 DIABETES MELLITUS: ICD-10-CM

## 2019-01-01 DIAGNOSIS — I70.0 AORTIC ATHEROSCLEROSIS: ICD-10-CM

## 2019-01-01 DIAGNOSIS — Z79.4 TYPE 2 DIABETES MELLITUS WITH STAGE 3 CHRONIC KIDNEY DISEASE, WITH LONG-TERM CURRENT USE OF INSULIN: ICD-10-CM

## 2019-01-01 DIAGNOSIS — E11.59 HYPERTENSION ASSOCIATED WITH DIABETES: Primary | ICD-10-CM

## 2019-01-01 DIAGNOSIS — E11.22 TYPE 2 DIABETES MELLITUS WITH STAGE 4 CHRONIC KIDNEY DISEASE, WITH LONG-TERM CURRENT USE OF INSULIN: Primary | ICD-10-CM

## 2019-01-01 DIAGNOSIS — Z12.11 SCREEN FOR COLON CANCER: ICD-10-CM

## 2019-01-01 DIAGNOSIS — I50.33 ACUTE ON CHRONIC DIASTOLIC HEART FAILURE: ICD-10-CM

## 2019-01-01 DIAGNOSIS — I50.32 CHRONIC DIASTOLIC HEART FAILURE: Primary | ICD-10-CM

## 2019-01-01 DIAGNOSIS — R60.9 EDEMA, UNSPECIFIED TYPE: ICD-10-CM

## 2019-01-01 DIAGNOSIS — I10 ESSENTIAL HYPERTENSION: Primary | ICD-10-CM

## 2019-01-01 DIAGNOSIS — R00.1 BRADYCARDIA: ICD-10-CM

## 2019-01-01 DIAGNOSIS — Z79.4 TYPE 2 DIABETES MELLITUS WITH STAGE 4 CHRONIC KIDNEY DISEASE, WITH LONG-TERM CURRENT USE OF INSULIN: Primary | ICD-10-CM

## 2019-01-01 DIAGNOSIS — Z12.5 SCREENING FOR PROSTATE CANCER: ICD-10-CM

## 2019-01-01 DIAGNOSIS — Z79.4 TYPE 2 DIABETES MELLITUS WITH DIABETIC POLYNEUROPATHY, WITH LONG-TERM CURRENT USE OF INSULIN: ICD-10-CM

## 2019-01-01 DIAGNOSIS — R80.9 PROTEINURIA: ICD-10-CM

## 2019-01-01 DIAGNOSIS — E11.42 TYPE 2 DIABETES MELLITUS WITH DIABETIC POLYNEUROPATHY, WITH LONG-TERM CURRENT USE OF INSULIN: ICD-10-CM

## 2019-01-01 DIAGNOSIS — N18.30 TYPE 2 DIABETES MELLITUS WITH STAGE 3 CHRONIC KIDNEY DISEASE, WITH LONG-TERM CURRENT USE OF INSULIN: ICD-10-CM

## 2019-01-01 DIAGNOSIS — D47.2 SMOLDERING MYELOMA: ICD-10-CM

## 2019-01-01 DIAGNOSIS — I27.9 CHRONIC PULMONARY HEART DISEASE: ICD-10-CM

## 2019-01-01 DIAGNOSIS — K02.9 DENTAL CARIES: ICD-10-CM

## 2019-01-01 DIAGNOSIS — R80.1 PERSISTENT PROTEINURIA: ICD-10-CM

## 2019-01-01 DIAGNOSIS — I15.2 HYPERTENSION ASSOCIATED WITH DIABETES: Primary | ICD-10-CM

## 2019-01-01 DIAGNOSIS — D47.2 MONOCLONAL GAMMOPATHY: ICD-10-CM

## 2019-01-01 LAB
25(OH)D3+25(OH)D2 SERPL-MCNC: 29 NG/ML (ref 30–96)
ABO + RH BLD: NORMAL
ABO + RH BLD: NORMAL
ALBUMIN SERPL BCP-MCNC: 3.3 G/DL (ref 3.5–5.2)
ALBUMIN SERPL BCP-MCNC: 3.4 G/DL (ref 3.5–5.2)
ALBUMIN SERPL BCP-MCNC: 3.5 G/DL (ref 3.5–5.2)
ALBUMIN SERPL BCP-MCNC: 3.5 G/DL (ref 3.5–5.2)
ALBUMIN SERPL BCP-MCNC: 3.6 G/DL (ref 3.5–5.2)
ALBUMIN SERPL BCP-MCNC: 3.7 G/DL (ref 3.5–5.2)
ALBUMIN SERPL BCP-MCNC: 3.8 G/DL (ref 3.5–5.2)
ALBUMIN SERPL ELPH-MCNC: 3.4 G/DL (ref 3.35–5.55)
ALBUMIN SERPL ELPH-MCNC: 3.92 G/DL (ref 3.35–5.55)
ALBUMIN SERPL ELPH-MCNC: 3.98 G/DL (ref 3.35–5.55)
ALP SERPL-CCNC: 100 U/L (ref 55–135)
ALP SERPL-CCNC: 101 U/L (ref 55–135)
ALP SERPL-CCNC: 102 U/L (ref 55–135)
ALP SERPL-CCNC: 105 U/L (ref 55–135)
ALP SERPL-CCNC: 118 U/L (ref 55–135)
ALP SERPL-CCNC: 119 U/L (ref 55–135)
ALP SERPL-CCNC: 119 U/L (ref 55–135)
ALP SERPL-CCNC: 132 U/L (ref 55–135)
ALP SERPL-CCNC: 138 U/L (ref 55–135)
ALP SERPL-CCNC: 86 U/L (ref 55–135)
ALP SERPL-CCNC: 92 U/L (ref 55–135)
ALP SERPL-CCNC: 99 U/L (ref 55–135)
ALPHA1 GLOB SERPL ELPH-MCNC: 0.22 G/DL (ref 0.17–0.41)
ALPHA1 GLOB SERPL ELPH-MCNC: 0.26 G/DL (ref 0.17–0.41)
ALPHA1 GLOB SERPL ELPH-MCNC: 0.27 G/DL (ref 0.17–0.41)
ALPHA2 GLOB SERPL ELPH-MCNC: 0.66 G/DL (ref 0.43–0.99)
ALPHA2 GLOB SERPL ELPH-MCNC: 0.68 G/DL (ref 0.43–0.99)
ALPHA2 GLOB SERPL ELPH-MCNC: 0.72 G/DL (ref 0.43–0.99)
ALT SERPL W/O P-5'-P-CCNC: 11 U/L (ref 10–44)
ALT SERPL W/O P-5'-P-CCNC: 14 U/L (ref 10–44)
ALT SERPL W/O P-5'-P-CCNC: 14 U/L (ref 10–44)
ALT SERPL W/O P-5'-P-CCNC: 15 U/L (ref 10–44)
ALT SERPL W/O P-5'-P-CCNC: 15 U/L (ref 10–44)
ALT SERPL W/O P-5'-P-CCNC: 16 U/L (ref 10–44)
ALT SERPL W/O P-5'-P-CCNC: 17 U/L (ref 10–44)
ALT SERPL W/O P-5'-P-CCNC: 18 U/L (ref 10–44)
ALT SERPL W/O P-5'-P-CCNC: 25 U/L (ref 10–44)
ANION GAP SERPL CALC-SCNC: 6 MMOL/L (ref 8–16)
ANION GAP SERPL CALC-SCNC: 7 MMOL/L (ref 8–16)
ANION GAP SERPL CALC-SCNC: 8 MMOL/L (ref 8–16)
ANION GAP SERPL CALC-SCNC: 9 MMOL/L (ref 8–16)
AST SERPL-CCNC: 10 U/L (ref 10–40)
AST SERPL-CCNC: 12 U/L (ref 10–40)
AST SERPL-CCNC: 13 U/L (ref 10–40)
AST SERPL-CCNC: 13 U/L (ref 10–40)
AST SERPL-CCNC: 14 U/L (ref 10–40)
AST SERPL-CCNC: 14 U/L (ref 10–40)
AST SERPL-CCNC: 16 U/L (ref 10–40)
AST SERPL-CCNC: 16 U/L (ref 10–40)
AST SERPL-CCNC: 17 U/L (ref 10–40)
AST SERPL-CCNC: 17 U/L (ref 10–40)
AST SERPL-CCNC: 20 U/L (ref 10–40)
AST SERPL-CCNC: 29 U/L (ref 10–40)
B-GLOBULIN SERPL ELPH-MCNC: 0.54 G/DL (ref 0.5–1.1)
B-GLOBULIN SERPL ELPH-MCNC: 0.66 G/DL (ref 0.5–1.1)
B-GLOBULIN SERPL ELPH-MCNC: 0.72 G/DL (ref 0.5–1.1)
BASOPHILS # BLD AUTO: 0.01 K/UL (ref 0–0.2)
BASOPHILS # BLD AUTO: 0.02 K/UL (ref 0–0.2)
BASOPHILS # BLD AUTO: 0.03 K/UL (ref 0–0.2)
BASOPHILS # BLD AUTO: 0.04 K/UL (ref 0–0.2)
BASOPHILS # BLD AUTO: 0.04 K/UL (ref 0–0.2)
BASOPHILS NFR BLD: 0.1 % (ref 0–1.9)
BASOPHILS NFR BLD: 0.2 % (ref 0–1.9)
BASOPHILS NFR BLD: 0.2 % (ref 0–1.9)
BASOPHILS NFR BLD: 0.3 % (ref 0–1.9)
BASOPHILS NFR BLD: 0.3 % (ref 0–1.9)
BASOPHILS NFR BLD: 0.4 % (ref 0–1.9)
BASOPHILS NFR BLD: 0.4 % (ref 0–1.9)
BASOPHILS NFR BLD: 0.5 % (ref 0–1.9)
BASOPHILS NFR BLD: 0.6 % (ref 0–1.9)
BILIRUB SERPL-MCNC: 0.4 MG/DL (ref 0.1–1)
BILIRUB SERPL-MCNC: 0.4 MG/DL (ref 0.1–1)
BILIRUB SERPL-MCNC: 0.5 MG/DL (ref 0.1–1)
BILIRUB SERPL-MCNC: 0.6 MG/DL (ref 0.1–1)
BILIRUB SERPL-MCNC: 0.7 MG/DL (ref 0.1–1)
BILIRUB SERPL-MCNC: 0.7 MG/DL (ref 0.1–1)
BLD GP AB SCN CELLS X3 SERPL QL: NORMAL
BLD GP AB SCN CELLS X3 SERPL QL: NORMAL
BUN SERPL-MCNC: 28 MG/DL (ref 8–23)
BUN SERPL-MCNC: 30 MG/DL (ref 8–23)
BUN SERPL-MCNC: 31 MG/DL (ref 8–23)
BUN SERPL-MCNC: 32 MG/DL (ref 8–23)
BUN SERPL-MCNC: 32 MG/DL (ref 8–23)
BUN SERPL-MCNC: 33 MG/DL (ref 8–23)
BUN SERPL-MCNC: 34 MG/DL (ref 8–23)
BUN SERPL-MCNC: 34 MG/DL (ref 8–23)
BUN SERPL-MCNC: 35 MG/DL (ref 8–23)
BUN SERPL-MCNC: 35 MG/DL (ref 8–23)
BUN SERPL-MCNC: 36 MG/DL (ref 8–23)
BUN SERPL-MCNC: 38 MG/DL (ref 8–23)
BUN SERPL-MCNC: 38 MG/DL (ref 8–23)
BUN SERPL-MCNC: 40 MG/DL (ref 8–23)
BUN SERPL-MCNC: 45 MG/DL (ref 8–23)
CALCIUM SERPL-MCNC: 8.5 MG/DL (ref 8.7–10.5)
CALCIUM SERPL-MCNC: 8.6 MG/DL (ref 8.7–10.5)
CALCIUM SERPL-MCNC: 8.7 MG/DL (ref 8.7–10.5)
CALCIUM SERPL-MCNC: 8.7 MG/DL (ref 8.7–10.5)
CALCIUM SERPL-MCNC: 8.8 MG/DL (ref 8.7–10.5)
CALCIUM SERPL-MCNC: 8.9 MG/DL (ref 8.7–10.5)
CALCIUM SERPL-MCNC: 9 MG/DL (ref 8.7–10.5)
CALCIUM SERPL-MCNC: 9 MG/DL (ref 8.7–10.5)
CALCIUM SERPL-MCNC: 9.1 MG/DL (ref 8.7–10.5)
CALCIUM SERPL-MCNC: 9.1 MG/DL (ref 8.7–10.5)
CALCIUM SERPL-MCNC: 9.2 MG/DL (ref 8.7–10.5)
CALCIUM SERPL-MCNC: 9.6 MG/DL (ref 8.7–10.5)
CHLORIDE SERPL-SCNC: 104 MMOL/L (ref 95–110)
CHLORIDE SERPL-SCNC: 105 MMOL/L (ref 95–110)
CHLORIDE SERPL-SCNC: 106 MMOL/L (ref 95–110)
CHLORIDE SERPL-SCNC: 107 MMOL/L (ref 95–110)
CHLORIDE SERPL-SCNC: 109 MMOL/L (ref 95–110)
CHLORIDE SERPL-SCNC: 111 MMOL/L (ref 95–110)
CHLORIDE SERPL-SCNC: 98 MMOL/L (ref 95–110)
CHOLEST SERPL-MCNC: 117 MG/DL (ref 120–199)
CHOLEST/HDLC SERPL: 4.3 {RATIO} (ref 2–5)
CO2 SERPL-SCNC: 22 MMOL/L (ref 23–29)
CO2 SERPL-SCNC: 22 MMOL/L (ref 23–29)
CO2 SERPL-SCNC: 23 MMOL/L (ref 23–29)
CO2 SERPL-SCNC: 23 MMOL/L (ref 23–29)
CO2 SERPL-SCNC: 24 MMOL/L (ref 23–29)
CO2 SERPL-SCNC: 25 MMOL/L (ref 23–29)
CO2 SERPL-SCNC: 26 MMOL/L (ref 23–29)
CREAT SERPL-MCNC: 2.8 MG/DL (ref 0.5–1.4)
CREAT SERPL-MCNC: 2.8 MG/DL (ref 0.5–1.4)
CREAT SERPL-MCNC: 3.1 MG/DL (ref 0.5–1.4)
CREAT SERPL-MCNC: 3.1 MG/DL (ref 0.5–1.4)
CREAT SERPL-MCNC: 3.2 MG/DL (ref 0.5–1.4)
CREAT SERPL-MCNC: 3.3 MG/DL (ref 0.5–1.4)
CREAT SERPL-MCNC: 3.6 MG/DL (ref 0.5–1.4)
CREAT SERPL-MCNC: 3.7 MG/DL (ref 0.5–1.4)
DIFFERENTIAL METHOD: ABNORMAL
EOSINOPHIL # BLD AUTO: 0 K/UL (ref 0–0.5)
EOSINOPHIL # BLD AUTO: 0.1 K/UL (ref 0–0.5)
EOSINOPHIL # BLD AUTO: 0.2 K/UL (ref 0–0.5)
EOSINOPHIL # BLD AUTO: 0.3 K/UL (ref 0–0.5)
EOSINOPHIL # BLD AUTO: 0.5 K/UL (ref 0–0.5)
EOSINOPHIL NFR BLD: 0.1 % (ref 0–8)
EOSINOPHIL NFR BLD: 0.6 % (ref 0–8)
EOSINOPHIL NFR BLD: 1.1 % (ref 0–8)
EOSINOPHIL NFR BLD: 1.3 % (ref 0–8)
EOSINOPHIL NFR BLD: 1.7 % (ref 0–8)
EOSINOPHIL NFR BLD: 1.8 % (ref 0–8)
EOSINOPHIL NFR BLD: 2 % (ref 0–8)
EOSINOPHIL NFR BLD: 2 % (ref 0–8)
EOSINOPHIL NFR BLD: 2.1 % (ref 0–8)
EOSINOPHIL NFR BLD: 3.2 % (ref 0–8)
EOSINOPHIL NFR BLD: 3.5 % (ref 0–8)
EOSINOPHIL NFR BLD: 6.9 % (ref 0–8)
ERYTHROCYTE [DISTWIDTH] IN BLOOD BY AUTOMATED COUNT: 12.7 % (ref 11.5–14.5)
ERYTHROCYTE [DISTWIDTH] IN BLOOD BY AUTOMATED COUNT: 13 % (ref 11.5–14.5)
ERYTHROCYTE [DISTWIDTH] IN BLOOD BY AUTOMATED COUNT: 13 % (ref 11.5–14.5)
ERYTHROCYTE [DISTWIDTH] IN BLOOD BY AUTOMATED COUNT: 13.2 % (ref 11.5–14.5)
ERYTHROCYTE [DISTWIDTH] IN BLOOD BY AUTOMATED COUNT: 13.2 % (ref 11.5–14.5)
ERYTHROCYTE [DISTWIDTH] IN BLOOD BY AUTOMATED COUNT: 13.3 % (ref 11.5–14.5)
ERYTHROCYTE [DISTWIDTH] IN BLOOD BY AUTOMATED COUNT: 13.4 % (ref 11.5–14.5)
ERYTHROCYTE [DISTWIDTH] IN BLOOD BY AUTOMATED COUNT: 13.4 % (ref 11.5–14.5)
ERYTHROCYTE [DISTWIDTH] IN BLOOD BY AUTOMATED COUNT: 13.5 % (ref 11.5–14.5)
ERYTHROCYTE [DISTWIDTH] IN BLOOD BY AUTOMATED COUNT: 13.5 % (ref 11.5–14.5)
ERYTHROCYTE [DISTWIDTH] IN BLOOD BY AUTOMATED COUNT: 13.8 % (ref 11.5–14.5)
ERYTHROCYTE [DISTWIDTH] IN BLOOD BY AUTOMATED COUNT: 14.1 % (ref 11.5–14.5)
EST. GFR  (AFRICAN AMERICAN): 19 ML/MIN/1.73 M^2
EST. GFR  (AFRICAN AMERICAN): 19.6 ML/MIN/1.73 M^2
EST. GFR  (AFRICAN AMERICAN): 19.6 ML/MIN/1.73 M^2
EST. GFR  (AFRICAN AMERICAN): 19.7 ML/MIN/1.73 M^2
EST. GFR  (AFRICAN AMERICAN): 19.7 ML/MIN/1.73 M^2
EST. GFR  (AFRICAN AMERICAN): 21.8 ML/MIN/1.73 M^2
EST. GFR  (AFRICAN AMERICAN): 22.6 ML/MIN/1.73 M^2
EST. GFR  (AFRICAN AMERICAN): 23.5 ML/MIN/1.73 M^2
EST. GFR  (AFRICAN AMERICAN): 23.6 ML/MIN/1.73 M^2
EST. GFR  (AFRICAN AMERICAN): 26.5 ML/MIN/1.73 M^2
EST. GFR  (AFRICAN AMERICAN): 27 ML/MIN/1.73 M^2
EST. GFR  (NON AFRICAN AMERICAN): 16.4 ML/MIN/1.73 M^2
EST. GFR  (NON AFRICAN AMERICAN): 16.9 ML/MIN/1.73 M^2
EST. GFR  (NON AFRICAN AMERICAN): 16.9 ML/MIN/1.73 M^2
EST. GFR  (NON AFRICAN AMERICAN): 17.1 ML/MIN/1.73 M^2
EST. GFR  (NON AFRICAN AMERICAN): 17.1 ML/MIN/1.73 M^2
EST. GFR  (NON AFRICAN AMERICAN): 18.8 ML/MIN/1.73 M^2
EST. GFR  (NON AFRICAN AMERICAN): 19.5 ML/MIN/1.73 M^2
EST. GFR  (NON AFRICAN AMERICAN): 20.3 ML/MIN/1.73 M^2
EST. GFR  (NON AFRICAN AMERICAN): 20.5 ML/MIN/1.73 M^2
EST. GFR  (NON AFRICAN AMERICAN): 23 ML/MIN/1.73 M^2
EST. GFR  (NON AFRICAN AMERICAN): 23 ML/MIN/1.73 M^2
ESTIMATED AVG GLUCOSE: 209 MG/DL (ref 68–131)
ESTIMATED AVG GLUCOSE: 217 MG/DL (ref 68–131)
FERRITIN SERPL-MCNC: 609 NG/ML (ref 20–300)
GAMMA GLOB SERPL ELPH-MCNC: 0.88 G/DL (ref 0.67–1.58)
GAMMA GLOB SERPL ELPH-MCNC: 1.41 G/DL (ref 0.67–1.58)
GAMMA GLOB SERPL ELPH-MCNC: 1.48 G/DL (ref 0.67–1.58)
GLUCOSE SERPL-MCNC: 148 MG/DL (ref 70–110)
GLUCOSE SERPL-MCNC: 154 MG/DL (ref 70–110)
GLUCOSE SERPL-MCNC: 166 MG/DL (ref 70–110)
GLUCOSE SERPL-MCNC: 170 MG/DL (ref 70–110)
GLUCOSE SERPL-MCNC: 206 MG/DL (ref 70–110)
GLUCOSE SERPL-MCNC: 212 MG/DL (ref 70–110)
GLUCOSE SERPL-MCNC: 214 MG/DL (ref 70–110)
GLUCOSE SERPL-MCNC: 222 MG/DL (ref 70–110)
GLUCOSE SERPL-MCNC: 228 MG/DL (ref 70–110)
GLUCOSE SERPL-MCNC: 266 MG/DL (ref 70–110)
GLUCOSE SERPL-MCNC: 279 MG/DL (ref 70–110)
GLUCOSE SERPL-MCNC: 345 MG/DL (ref 70–110)
GLUCOSE SERPL-MCNC: 479 MG/DL (ref 70–110)
GLUCOSE SERPL-MCNC: 87 MG/DL (ref 70–110)
GLUCOSE SERPL-MCNC: 95 MG/DL (ref 70–110)
HBA1C MFR BLD HPLC: 8.9 % (ref 4–5.6)
HBA1C MFR BLD HPLC: 9.2 % (ref 4–5.6)
HCT VFR BLD AUTO: 28.5 % (ref 40–54)
HCT VFR BLD AUTO: 29.3 % (ref 40–54)
HCT VFR BLD AUTO: 29.5 % (ref 40–54)
HCT VFR BLD AUTO: 29.9 % (ref 40–54)
HCT VFR BLD AUTO: 30.3 % (ref 40–54)
HCT VFR BLD AUTO: 30.6 % (ref 40–54)
HCT VFR BLD AUTO: 31 % (ref 40–54)
HCT VFR BLD AUTO: 31.1 % (ref 40–54)
HCT VFR BLD AUTO: 31.4 % (ref 40–54)
HCT VFR BLD AUTO: 31.4 % (ref 40–54)
HCT VFR BLD AUTO: 31.5 % (ref 40–54)
HCT VFR BLD AUTO: 32.2 % (ref 40–54)
HDLC SERPL-MCNC: 27 MG/DL (ref 40–75)
HDLC SERPL: 23.1 % (ref 20–50)
HEMOCCULT STL QL IA: POSITIVE
HGB BLD-MCNC: 10 G/DL (ref 14–18)
HGB BLD-MCNC: 10 G/DL (ref 14–18)
HGB BLD-MCNC: 10.1 G/DL (ref 14–18)
HGB BLD-MCNC: 10.1 G/DL (ref 14–18)
HGB BLD-MCNC: 10.2 G/DL (ref 14–18)
HGB BLD-MCNC: 10.4 G/DL (ref 14–18)
HGB BLD-MCNC: 10.5 G/DL (ref 14–18)
HGB BLD-MCNC: 10.5 G/DL (ref 14–18)
HGB BLD-MCNC: 10.7 G/DL (ref 14–18)
HGB BLD-MCNC: 9.6 G/DL (ref 14–18)
IGA SERPL-MCNC: 104 MG/DL (ref 40–350)
IGA SERPL-MCNC: 168 MG/DL (ref 40–350)
IGG SERPL-MCNC: 1030 MG/DL (ref 650–1600)
IGG SERPL-MCNC: 1533 MG/DL (ref 650–1600)
IGG SERPL-MCNC: 1601 MG/DL (ref 650–1600)
IGM SERPL-MCNC: 83 MG/DL (ref 50–300)
IGM SERPL-MCNC: 88 MG/DL (ref 50–300)
IMM GRANULOCYTES # BLD AUTO: 0.03 K/UL (ref 0–0.04)
IMM GRANULOCYTES # BLD AUTO: 0.03 K/UL (ref 0–0.04)
IMM GRANULOCYTES # BLD AUTO: 0.04 K/UL (ref 0–0.04)
IMM GRANULOCYTES # BLD AUTO: 0.05 K/UL (ref 0–0.04)
IMM GRANULOCYTES # BLD AUTO: 0.07 K/UL (ref 0–0.04)
IMM GRANULOCYTES # BLD AUTO: 0.08 K/UL (ref 0–0.04)
IMM GRANULOCYTES # BLD AUTO: 0.1 K/UL (ref 0–0.04)
IMM GRANULOCYTES # BLD AUTO: 0.1 K/UL (ref 0–0.04)
IMM GRANULOCYTES # BLD AUTO: 0.11 K/UL (ref 0–0.04)
IMM GRANULOCYTES # BLD AUTO: 0.13 K/UL (ref 0–0.04)
IMM GRANULOCYTES # BLD AUTO: 0.17 K/UL (ref 0–0.04)
IMM GRANULOCYTES NFR BLD AUTO: 0.3 % (ref 0–0.5)
IMM GRANULOCYTES NFR BLD AUTO: 0.4 % (ref 0–0.5)
IMM GRANULOCYTES NFR BLD AUTO: 0.6 % (ref 0–0.5)
IMM GRANULOCYTES NFR BLD AUTO: 0.7 % (ref 0–0.5)
IMM GRANULOCYTES NFR BLD AUTO: 0.8 % (ref 0–0.5)
IMM GRANULOCYTES NFR BLD AUTO: 0.9 % (ref 0–0.5)
IMM GRANULOCYTES NFR BLD AUTO: 1.1 % (ref 0–0.5)
IMM GRANULOCYTES NFR BLD AUTO: 1.1 % (ref 0–0.5)
IMM GRANULOCYTES NFR BLD AUTO: 1.2 % (ref 0–0.5)
IMM GRANULOCYTES NFR BLD AUTO: 1.5 % (ref 0–0.5)
IMM GRANULOCYTES NFR BLD AUTO: 2 % (ref 0–0.5)
IMMEDIATE ARM BP: 193 MMHG
IMMEDIATE LEFT ABI: 0.74
IMMEDIATE LEFT TIBIAL: 143 MMHG
IMMEDIATE RIGHT ABI: 0.77
IMMEDIATE RIGHT TIBIAL: 148 MMHG
IRON SERPL-MCNC: 56 UG/DL (ref 45–160)
KAPPA LC SER QL IA: 123.6 MG/DL (ref 0.33–1.94)
KAPPA LC SER QL IA: 37.35 MG/DL (ref 0.33–1.94)
KAPPA LC SER QL IA: 77.42 MG/DL (ref 0.33–1.94)
KAPPA LC/LAMBDA SER IA: 25.07 (ref 0.26–1.65)
KAPPA LC/LAMBDA SER IA: 28.15 (ref 0.26–1.65)
KAPPA LC/LAMBDA SER IA: 38.75 (ref 0.26–1.65)
LAMBDA LC SER QL IA: 1.49 MG/DL (ref 0.57–2.63)
LAMBDA LC SER QL IA: 2.75 MG/DL (ref 0.57–2.63)
LAMBDA LC SER QL IA: 3.19 MG/DL (ref 0.57–2.63)
LDLC SERPL CALC-MCNC: 73 MG/DL (ref 63–159)
LEFT ABI: 1.05
LEFT ARM BP: 192 MMHG
LEFT DORSALIS PEDIS: 208 MMHG
LEFT EYE DM RETINOPATHY: NORMAL
LEFT POSTERIOR TIBIAL: 213 MMHG
LYMPHOCYTES # BLD AUTO: 0.9 K/UL (ref 1–4.8)
LYMPHOCYTES # BLD AUTO: 0.9 K/UL (ref 1–4.8)
LYMPHOCYTES # BLD AUTO: 1 K/UL (ref 1–4.8)
LYMPHOCYTES # BLD AUTO: 1.1 K/UL (ref 1–4.8)
LYMPHOCYTES # BLD AUTO: 1.1 K/UL (ref 1–4.8)
LYMPHOCYTES # BLD AUTO: 1.3 K/UL (ref 1–4.8)
LYMPHOCYTES # BLD AUTO: 1.3 K/UL (ref 1–4.8)
LYMPHOCYTES # BLD AUTO: 1.4 K/UL (ref 1–4.8)
LYMPHOCYTES # BLD AUTO: 1.4 K/UL (ref 1–4.8)
LYMPHOCYTES # BLD AUTO: 1.5 K/UL (ref 1–4.8)
LYMPHOCYTES # BLD AUTO: 1.5 K/UL (ref 1–4.8)
LYMPHOCYTES # BLD AUTO: 1.6 K/UL (ref 1–4.8)
LYMPHOCYTES NFR BLD: 10.4 % (ref 18–48)
LYMPHOCYTES NFR BLD: 10.6 % (ref 18–48)
LYMPHOCYTES NFR BLD: 10.9 % (ref 18–48)
LYMPHOCYTES NFR BLD: 11.5 % (ref 18–48)
LYMPHOCYTES NFR BLD: 12.4 % (ref 18–48)
LYMPHOCYTES NFR BLD: 12.9 % (ref 18–48)
LYMPHOCYTES NFR BLD: 16 % (ref 18–48)
LYMPHOCYTES NFR BLD: 19 % (ref 18–48)
LYMPHOCYTES NFR BLD: 19.3 % (ref 18–48)
LYMPHOCYTES NFR BLD: 21.4 % (ref 18–48)
LYMPHOCYTES NFR BLD: 22.1 % (ref 18–48)
LYMPHOCYTES NFR BLD: 8.7 % (ref 18–48)
MCH RBC QN AUTO: 27.9 PG (ref 27–31)
MCH RBC QN AUTO: 28.1 PG (ref 27–31)
MCH RBC QN AUTO: 28.4 PG (ref 27–31)
MCH RBC QN AUTO: 28.5 PG (ref 27–31)
MCH RBC QN AUTO: 28.5 PG (ref 27–31)
MCH RBC QN AUTO: 28.6 PG (ref 27–31)
MCH RBC QN AUTO: 28.7 PG (ref 27–31)
MCH RBC QN AUTO: 28.8 PG (ref 27–31)
MCH RBC QN AUTO: 29.3 PG (ref 27–31)
MCHC RBC AUTO-ENTMCNC: 31.7 G/DL (ref 32–36)
MCHC RBC AUTO-ENTMCNC: 32.1 G/DL (ref 32–36)
MCHC RBC AUTO-ENTMCNC: 32.2 G/DL (ref 32–36)
MCHC RBC AUTO-ENTMCNC: 32.5 G/DL (ref 32–36)
MCHC RBC AUTO-ENTMCNC: 33 G/DL (ref 32–36)
MCHC RBC AUTO-ENTMCNC: 33 G/DL (ref 32–36)
MCHC RBC AUTO-ENTMCNC: 33.4 G/DL (ref 32–36)
MCHC RBC AUTO-ENTMCNC: 33.5 G/DL (ref 32–36)
MCHC RBC AUTO-ENTMCNC: 33.7 G/DL (ref 32–36)
MCHC RBC AUTO-ENTMCNC: 33.8 G/DL (ref 32–36)
MCHC RBC AUTO-ENTMCNC: 34 G/DL (ref 32–36)
MCHC RBC AUTO-ENTMCNC: 34.1 G/DL (ref 32–36)
MCV RBC AUTO: 84 FL (ref 82–98)
MCV RBC AUTO: 84 FL (ref 82–98)
MCV RBC AUTO: 85 FL (ref 82–98)
MCV RBC AUTO: 85 FL (ref 82–98)
MCV RBC AUTO: 86 FL (ref 82–98)
MCV RBC AUTO: 88 FL (ref 82–98)
MCV RBC AUTO: 90 FL (ref 82–98)
MCV RBC AUTO: 90 FL (ref 82–98)
MONOCYTES # BLD AUTO: 0.5 K/UL (ref 0.3–1)
MONOCYTES # BLD AUTO: 0.6 K/UL (ref 0.3–1)
MONOCYTES # BLD AUTO: 0.7 K/UL (ref 0.3–1)
MONOCYTES # BLD AUTO: 0.7 K/UL (ref 0.3–1)
MONOCYTES NFR BLD: 6 % (ref 4–15)
MONOCYTES NFR BLD: 6.2 % (ref 4–15)
MONOCYTES NFR BLD: 6.3 % (ref 4–15)
MONOCYTES NFR BLD: 6.5 % (ref 4–15)
MONOCYTES NFR BLD: 6.6 % (ref 4–15)
MONOCYTES NFR BLD: 6.7 % (ref 4–15)
MONOCYTES NFR BLD: 7.1 % (ref 4–15)
MONOCYTES NFR BLD: 7.3 % (ref 4–15)
MONOCYTES NFR BLD: 7.4 % (ref 4–15)
MONOCYTES NFR BLD: 8.4 % (ref 4–15)
NEUTROPHILS # BLD AUTO: 4.4 K/UL (ref 1.8–7.7)
NEUTROPHILS # BLD AUTO: 4.6 K/UL (ref 1.8–7.7)
NEUTROPHILS # BLD AUTO: 5 K/UL (ref 1.8–7.7)
NEUTROPHILS # BLD AUTO: 5.2 K/UL (ref 1.8–7.7)
NEUTROPHILS # BLD AUTO: 5.9 K/UL (ref 1.8–7.7)
NEUTROPHILS # BLD AUTO: 6.8 K/UL (ref 1.8–7.7)
NEUTROPHILS # BLD AUTO: 6.9 K/UL (ref 1.8–7.7)
NEUTROPHILS # BLD AUTO: 7.4 K/UL (ref 1.8–7.7)
NEUTROPHILS # BLD AUTO: 8 K/UL (ref 1.8–7.7)
NEUTROPHILS # BLD AUTO: 8.2 K/UL (ref 1.8–7.7)
NEUTROPHILS # BLD AUTO: 8.5 K/UL (ref 1.8–7.7)
NEUTROPHILS # BLD AUTO: 9.1 K/UL (ref 1.8–7.7)
NEUTROPHILS NFR BLD: 62.9 % (ref 38–73)
NEUTROPHILS NFR BLD: 66 % (ref 38–73)
NEUTROPHILS NFR BLD: 69.5 % (ref 38–73)
NEUTROPHILS NFR BLD: 70.1 % (ref 38–73)
NEUTROPHILS NFR BLD: 73.6 % (ref 38–73)
NEUTROPHILS NFR BLD: 78.6 % (ref 38–73)
NEUTROPHILS NFR BLD: 78.7 % (ref 38–73)
NEUTROPHILS NFR BLD: 79 % (ref 38–73)
NEUTROPHILS NFR BLD: 83.1 % (ref 38–73)
NEUTROPHILS NFR BLD: 83.6 % (ref 38–73)
NONHDLC SERPL-MCNC: 90 MG/DL
NRBC BLD-RTO: 0 /100 WBC
PATHOLOGIST INTERPRETATION SPE: NORMAL
PHOSPHATE SERPL-MCNC: 3.2 MG/DL (ref 2.7–4.5)
PHOSPHATE SERPL-MCNC: 3.3 MG/DL (ref 2.7–4.5)
PHOSPHATE SERPL-MCNC: 3.6 MG/DL (ref 2.7–4.5)
PLATELET # BLD AUTO: 104 K/UL (ref 150–350)
PLATELET # BLD AUTO: 108 K/UL (ref 150–350)
PLATELET # BLD AUTO: 112 K/UL (ref 150–350)
PLATELET # BLD AUTO: 119 K/UL (ref 150–350)
PLATELET # BLD AUTO: 129 K/UL (ref 150–350)
PLATELET # BLD AUTO: 129 K/UL (ref 150–350)
PLATELET # BLD AUTO: 137 K/UL (ref 150–350)
PLATELET # BLD AUTO: 151 K/UL (ref 150–350)
PLATELET # BLD AUTO: 160 K/UL (ref 150–350)
PLATELET # BLD AUTO: 161 K/UL (ref 150–350)
PLATELET # BLD AUTO: 171 K/UL (ref 150–350)
PLATELET # BLD AUTO: 216 K/UL (ref 150–350)
PMV BLD AUTO: 10 FL (ref 9.2–12.9)
PMV BLD AUTO: 10.1 FL (ref 9.2–12.9)
PMV BLD AUTO: 10.1 FL (ref 9.2–12.9)
PMV BLD AUTO: 10.8 FL (ref 9.2–12.9)
PMV BLD AUTO: 11.5 FL (ref 9.2–12.9)
PMV BLD AUTO: 11.5 FL (ref 9.2–12.9)
PMV BLD AUTO: 11.6 FL (ref 9.2–12.9)
PMV BLD AUTO: 11.9 FL (ref 9.2–12.9)
PMV BLD AUTO: 12.1 FL (ref 9.2–12.9)
PMV BLD AUTO: 12.1 FL (ref 9.2–12.9)
PMV BLD AUTO: 12.2 FL (ref 9.2–12.9)
PMV BLD AUTO: 12.6 FL (ref 9.2–12.9)
POTASSIUM SERPL-SCNC: 3.5 MMOL/L (ref 3.5–5.1)
POTASSIUM SERPL-SCNC: 3.6 MMOL/L (ref 3.5–5.1)
POTASSIUM SERPL-SCNC: 3.7 MMOL/L (ref 3.5–5.1)
POTASSIUM SERPL-SCNC: 3.7 MMOL/L (ref 3.5–5.1)
POTASSIUM SERPL-SCNC: 3.8 MMOL/L (ref 3.5–5.1)
POTASSIUM SERPL-SCNC: 3.8 MMOL/L (ref 3.5–5.1)
POTASSIUM SERPL-SCNC: 3.9 MMOL/L (ref 3.5–5.1)
POTASSIUM SERPL-SCNC: 4 MMOL/L (ref 3.5–5.1)
POTASSIUM SERPL-SCNC: 4 MMOL/L (ref 3.5–5.1)
POTASSIUM SERPL-SCNC: 4.1 MMOL/L (ref 3.5–5.1)
POTASSIUM SERPL-SCNC: 4.1 MMOL/L (ref 3.5–5.1)
POTASSIUM SERPL-SCNC: 4.3 MMOL/L (ref 3.5–5.1)
POTASSIUM SERPL-SCNC: 4.3 MMOL/L (ref 3.5–5.1)
PROT SERPL-MCNC: 5.7 G/DL (ref 6–8.4)
PROT SERPL-MCNC: 6.2 G/DL (ref 6–8.4)
PROT SERPL-MCNC: 6.3 G/DL (ref 6–8.4)
PROT SERPL-MCNC: 6.3 G/DL (ref 6–8.4)
PROT SERPL-MCNC: 6.5 G/DL (ref 6–8.4)
PROT SERPL-MCNC: 7 G/DL (ref 6–8.4)
PROT SERPL-MCNC: 7.1 G/DL (ref 6–8.4)
PROT SERPL-MCNC: 7.2 G/DL (ref 6–8.4)
PROT SERPL-MCNC: 7.3 G/DL (ref 6–8.4)
PROT SERPL-MCNC: 7.4 G/DL (ref 6–8.4)
PROT SERPL-MCNC: 7.4 G/DL (ref 6–8.4)
PROT SERPL-MCNC: 7.5 G/DL (ref 6–8.4)
PTH-INTACT SERPL-MCNC: 398 PG/ML (ref 9–77)
RBC # BLD AUTO: 3.33 M/UL (ref 4.6–6.2)
RBC # BLD AUTO: 3.34 M/UL (ref 4.6–6.2)
RBC # BLD AUTO: 3.34 M/UL (ref 4.6–6.2)
RBC # BLD AUTO: 3.51 M/UL (ref 4.6–6.2)
RBC # BLD AUTO: 3.54 M/UL (ref 4.6–6.2)
RBC # BLD AUTO: 3.58 M/UL (ref 4.6–6.2)
RBC # BLD AUTO: 3.58 M/UL (ref 4.6–6.2)
RBC # BLD AUTO: 3.59 M/UL (ref 4.6–6.2)
RBC # BLD AUTO: 3.59 M/UL (ref 4.6–6.2)
RBC # BLD AUTO: 3.62 M/UL (ref 4.6–6.2)
RBC # BLD AUTO: 3.66 M/UL (ref 4.6–6.2)
RBC # BLD AUTO: 3.74 M/UL (ref 4.6–6.2)
RIGHT ABI: 1.02
RIGHT ARM BP: 203 MMHG
RIGHT DORSALIS PEDIS: 180 MMHG
RIGHT EYE DM RETINOPATHY: NORMAL
RIGHT POSTERIOR TIBIAL: 207 MMHG
SATURATED IRON: 24 % (ref 20–50)
SODIUM SERPL-SCNC: 130 MMOL/L (ref 136–145)
SODIUM SERPL-SCNC: 135 MMOL/L (ref 136–145)
SODIUM SERPL-SCNC: 137 MMOL/L (ref 136–145)
SODIUM SERPL-SCNC: 138 MMOL/L (ref 136–145)
SODIUM SERPL-SCNC: 139 MMOL/L (ref 136–145)
SODIUM SERPL-SCNC: 140 MMOL/L (ref 136–145)
SODIUM SERPL-SCNC: 142 MMOL/L (ref 136–145)
TOE RAISES: 50
TOTAL IRON BINDING CAPACITY: 235 UG/DL (ref 250–450)
TRANSFERRIN SERPL-MCNC: 159 MG/DL (ref 200–375)
TRIGL SERPL-MCNC: 85 MG/DL (ref 30–150)
TSH SERPL DL<=0.005 MIU/L-ACNC: 3.86 UIU/ML (ref 0.4–4)
WBC # BLD AUTO: 10.07 K/UL (ref 3.9–12.7)
WBC # BLD AUTO: 10.22 K/UL (ref 3.9–12.7)
WBC # BLD AUTO: 11.55 K/UL (ref 3.9–12.7)
WBC # BLD AUTO: 6.64 K/UL (ref 3.9–12.7)
WBC # BLD AUTO: 7.16 K/UL (ref 3.9–12.7)
WBC # BLD AUTO: 7.29 K/UL (ref 3.9–12.7)
WBC # BLD AUTO: 7.52 K/UL (ref 3.9–12.7)
WBC # BLD AUTO: 7.98 K/UL (ref 3.9–12.7)
WBC # BLD AUTO: 8.67 K/UL (ref 3.9–12.7)
WBC # BLD AUTO: 8.72 K/UL (ref 3.9–12.7)
WBC # BLD AUTO: 9.42 K/UL (ref 3.9–12.7)
WBC # BLD AUTO: 9.95 K/UL (ref 3.9–12.7)

## 2019-01-01 PROCEDURE — 85025 COMPLETE CBC W/AUTO DIFF WBC: CPT

## 2019-01-01 PROCEDURE — 36415 COLL VENOUS BLD VENIPUNCTURE: CPT | Mod: PO

## 2019-01-01 PROCEDURE — 80053 COMPREHEN METABOLIC PANEL: CPT

## 2019-01-01 PROCEDURE — 99999 PR PBB SHADOW E&M-EST. PATIENT-LVL IV: CPT | Mod: PBBFAC,,, | Performed by: INTERNAL MEDICINE

## 2019-01-01 PROCEDURE — 99999 PR PBB SHADOW E&M-EST. PATIENT-LVL III: CPT | Mod: PBBFAC,,, | Performed by: INTERNAL MEDICINE

## 2019-01-01 PROCEDURE — 99213 OFFICE O/P EST LOW 20 MIN: CPT | Mod: PBBFAC,PO | Performed by: INTERNAL MEDICINE

## 2019-01-01 PROCEDURE — 96401 CHEMO ANTI-NEOPL SQ/IM: CPT

## 2019-01-01 PROCEDURE — 92134 CPTRZ OPH DX IMG PST SGM RTA: CPT | Mod: S$GLB,,, | Performed by: OPHTHALMOLOGY

## 2019-01-01 PROCEDURE — 99215 PR OFFICE/OUTPT VISIT, EST, LEVL V, 40-54 MIN: ICD-10-PCS | Mod: S$PBB,,, | Performed by: INTERNAL MEDICINE

## 2019-01-01 PROCEDURE — 99214 OFFICE O/P EST MOD 30 MIN: CPT | Mod: PBBFAC,25 | Performed by: INTERNAL MEDICINE

## 2019-01-01 PROCEDURE — 80069 RENAL FUNCTION PANEL: CPT

## 2019-01-01 PROCEDURE — 63600175 PHARM REV CODE 636 W HCPCS: Mod: JG | Performed by: INTERNAL MEDICINE

## 2019-01-01 PROCEDURE — 36415 COLL VENOUS BLD VENIPUNCTURE: CPT

## 2019-01-01 PROCEDURE — 3077F SYST BP >= 140 MM HG: CPT | Mod: CPTII,S$GLB,, | Performed by: INTERNAL MEDICINE

## 2019-01-01 PROCEDURE — 99999 PR PBB SHADOW E&M-EST. PATIENT-LVL V: CPT | Mod: PBBFAC,,, | Performed by: NURSE PRACTITIONER

## 2019-01-01 PROCEDURE — 99999 PR PBB SHADOW E&M-EST. PATIENT-LVL III: ICD-10-PCS | Mod: PBBFAC,,, | Performed by: INTERNAL MEDICINE

## 2019-01-01 PROCEDURE — 99999 PR PBB SHADOW E&M-EST. PATIENT-LVL IV: CPT | Mod: PBBFAC,,, | Performed by: NURSE PRACTITIONER

## 2019-01-01 PROCEDURE — 99214 OFFICE O/P EST MOD 30 MIN: CPT | Mod: S$GLB,,, | Performed by: NURSE PRACTITIONER

## 2019-01-01 PROCEDURE — 3008F BODY MASS INDEX DOCD: CPT | Mod: CPTII,S$GLB,, | Performed by: INTERNAL MEDICINE

## 2019-01-01 PROCEDURE — 99215 OFFICE O/P EST HI 40 MIN: CPT | Mod: PBBFAC | Performed by: NURSE PRACTITIONER

## 2019-01-01 PROCEDURE — 3079F DIAST BP 80-89 MM HG: CPT | Mod: CPTII,S$GLB,, | Performed by: NURSE PRACTITIONER

## 2019-01-01 PROCEDURE — 92014 PR EYE EXAM, EST PATIENT,COMPREHESV: ICD-10-PCS | Mod: S$GLB,,, | Performed by: OPHTHALMOLOGY

## 2019-01-01 PROCEDURE — 99214 PR OFFICE/OUTPT VISIT, EST, LEVL IV, 30-39 MIN: ICD-10-PCS | Mod: S$PBB,,, | Performed by: NURSE PRACTITIONER

## 2019-01-01 PROCEDURE — 99214 OFFICE O/P EST MOD 30 MIN: CPT | Mod: S$PBB,,, | Performed by: INTERNAL MEDICINE

## 2019-01-01 PROCEDURE — 3077F PR MOST RECENT SYSTOLIC BLOOD PRESSURE >= 140 MM HG: ICD-10-PCS | Mod: CPTII,S$GLB,, | Performed by: INTERNAL MEDICINE

## 2019-01-01 PROCEDURE — 99999 PR PBB SHADOW E&M-EST. PATIENT-LVL V: CPT | Mod: PBBFAC,,, | Performed by: INTERNAL MEDICINE

## 2019-01-01 PROCEDURE — 67028 INJECTION EYE DRUG: CPT | Mod: RT,S$GLB,, | Performed by: OPHTHALMOLOGY

## 2019-01-01 PROCEDURE — 3008F PR BODY MASS INDEX (BMI) DOCUMENTED: ICD-10-PCS | Mod: CPTII,S$GLB,, | Performed by: INTERNAL MEDICINE

## 2019-01-01 PROCEDURE — 84165 PROTEIN E-PHORESIS SERUM: CPT

## 2019-01-01 PROCEDURE — 86850 RBC ANTIBODY SCREEN: CPT

## 2019-01-01 PROCEDURE — 86901 BLOOD TYPING SEROLOGIC RH(D): CPT

## 2019-01-01 PROCEDURE — 82274 ASSAY TEST FOR BLOOD FECAL: CPT

## 2019-01-01 PROCEDURE — 92226 PR SPECIAL EYE EXAM, SUBSEQUENT: ICD-10-PCS | Mod: 50,S$PBB,, | Performed by: OPHTHALMOLOGY

## 2019-01-01 PROCEDURE — 82784 ASSAY IGA/IGD/IGG/IGM EACH: CPT | Mod: 59

## 2019-01-01 PROCEDURE — 80061 LIPID PANEL: CPT

## 2019-01-01 PROCEDURE — 83036 HEMOGLOBIN GLYCOSYLATED A1C: CPT

## 2019-01-01 PROCEDURE — 3046F HEMOGLOBIN A1C LEVEL >9.0%: CPT | Mod: CPTII,S$GLB,, | Performed by: NURSE PRACTITIONER

## 2019-01-01 PROCEDURE — 99214 OFFICE O/P EST MOD 30 MIN: CPT | Mod: S$PBB,,, | Performed by: NURSE PRACTITIONER

## 2019-01-01 PROCEDURE — 3078F DIAST BP <80 MM HG: CPT | Mod: CPTII,S$GLB,, | Performed by: INTERNAL MEDICINE

## 2019-01-01 PROCEDURE — 99215 OFFICE O/P EST HI 40 MIN: CPT | Mod: S$PBB,,, | Performed by: INTERNAL MEDICINE

## 2019-01-01 PROCEDURE — 99999 PR PBB SHADOW E&M-EST. PATIENT-LVL IV: ICD-10-PCS | Mod: PBBFAC,,, | Performed by: NURSE PRACTITIONER

## 2019-01-01 PROCEDURE — 99214 OFFICE O/P EST MOD 30 MIN: CPT | Mod: S$GLB,,, | Performed by: INTERNAL MEDICINE

## 2019-01-01 PROCEDURE — 99214 PR OFFICE/OUTPT VISIT, EST, LEVL IV, 30-39 MIN: ICD-10-PCS | Mod: S$GLB,,, | Performed by: NURSE PRACTITIONER

## 2019-01-01 PROCEDURE — 99214 PR OFFICE/OUTPT VISIT, EST, LEVL IV, 30-39 MIN: ICD-10-PCS | Mod: S$GLB,,, | Performed by: INTERNAL MEDICINE

## 2019-01-01 PROCEDURE — 99999 PR PBB SHADOW E&M-EST. PATIENT-LVL IV: ICD-10-PCS | Mod: PBBFAC,,, | Performed by: OPHTHALMOLOGY

## 2019-01-01 PROCEDURE — 92014 PR EYE EXAM, EST PATIENT,COMPREHESV: ICD-10-PCS | Mod: S$PBB,,, | Performed by: OPHTHALMOLOGY

## 2019-01-01 PROCEDURE — 92226 PR SPECIAL EYE EXAM, SUBSEQUENT: CPT | Mod: LT,S$GLB,, | Performed by: OPHTHALMOLOGY

## 2019-01-01 PROCEDURE — 67028 PR INJECT INTRAVITREAL PHARMCOLOGIC: ICD-10-PCS | Mod: RT,S$GLB,, | Performed by: OPHTHALMOLOGY

## 2019-01-01 PROCEDURE — G0109 DIAB MANAGE TRN IND/GROUP: HCPCS | Mod: PBBFAC,PO | Performed by: REGISTERED NURSE

## 2019-01-01 PROCEDURE — 84443 ASSAY THYROID STIM HORMONE: CPT

## 2019-01-01 PROCEDURE — 3077F SYST BP >= 140 MM HG: CPT | Mod: CPTII,S$GLB,, | Performed by: NURSE PRACTITIONER

## 2019-01-01 PROCEDURE — 99213 OFFICE O/P EST LOW 20 MIN: CPT | Mod: PBBFAC | Performed by: OPHTHALMOLOGY

## 2019-01-01 PROCEDURE — 3046F HEMOGLOBIN A1C LEVEL >9.0%: CPT | Mod: CPTII,S$GLB,, | Performed by: INTERNAL MEDICINE

## 2019-01-01 PROCEDURE — 84165 PROTEIN E-PHORESIS SERUM: CPT | Mod: 26,,, | Performed by: PATHOLOGY

## 2019-01-01 PROCEDURE — 99999 PR PBB SHADOW E&M-EST. PATIENT-LVL V: ICD-10-PCS | Mod: PBBFAC,,, | Performed by: INTERNAL MEDICINE

## 2019-01-01 PROCEDURE — 99999 PR PBB SHADOW E&M-EST. PATIENT-LVL IV: ICD-10-PCS | Mod: PBBFAC,,, | Performed by: INTERNAL MEDICINE

## 2019-01-01 PROCEDURE — 82306 VITAMIN D 25 HYDROXY: CPT

## 2019-01-01 PROCEDURE — 83520 IMMUNOASSAY QUANT NOS NONAB: CPT | Mod: 59

## 2019-01-01 PROCEDURE — 83520 IMMUNOASSAY QUANT NOS NONAB: CPT

## 2019-01-01 PROCEDURE — 92226 PR SPECIAL EYE EXAM, SUBSEQUENT: CPT | Mod: 50,PBBFAC | Performed by: OPHTHALMOLOGY

## 2019-01-01 PROCEDURE — 99999 PR PBB SHADOW E&M-EST. PATIENT-LVL III: CPT | Mod: PBBFAC,,, | Performed by: OPHTHALMOLOGY

## 2019-01-01 PROCEDURE — 99999 PR PBB SHADOW E&M-EST. PATIENT-LVL III: ICD-10-PCS | Mod: PBBFAC,,, | Performed by: OPHTHALMOLOGY

## 2019-01-01 PROCEDURE — 99214 PR OFFICE/OUTPT VISIT, EST, LEVL IV, 30-39 MIN: ICD-10-PCS | Mod: S$PBB,,, | Performed by: INTERNAL MEDICINE

## 2019-01-01 PROCEDURE — 95251 PR GLUCOSE MONITOR, 72 HOUR, PHYS INTERP: ICD-10-PCS | Mod: ,,, | Performed by: NURSE PRACTITIONER

## 2019-01-01 PROCEDURE — 99214 OFFICE O/P EST MOD 30 MIN: CPT | Mod: PBBFAC,25 | Performed by: NURSE PRACTITIONER

## 2019-01-01 PROCEDURE — 99999 PR PBB SHADOW E&M-EST. PATIENT-LVL IV: CPT | Mod: PBBFAC,,, | Performed by: OPHTHALMOLOGY

## 2019-01-01 PROCEDURE — 95251 CONT GLUC MNTR ANALYSIS I&R: CPT | Mod: ,,, | Performed by: NURSE PRACTITIONER

## 2019-01-01 PROCEDURE — 3078F PR MOST RECENT DIASTOLIC BLOOD PRESSURE < 80 MM HG: ICD-10-PCS | Mod: CPTII,S$GLB,, | Performed by: INTERNAL MEDICINE

## 2019-01-01 PROCEDURE — 82784 ASSAY IGA/IGD/IGG/IGM EACH: CPT

## 2019-01-01 PROCEDURE — 3079F PR MOST RECENT DIASTOLIC BLOOD PRESSURE 80-89 MM HG: ICD-10-PCS | Mod: CPTII,S$GLB,, | Performed by: NURSE PRACTITIONER

## 2019-01-01 PROCEDURE — 93924 LWR XTR VASC STDY BILAT: CPT | Mod: PBBFAC,PO | Performed by: INTERNAL MEDICINE

## 2019-01-01 PROCEDURE — 99999 PR PBB SHADOW E&M-EST. PATIENT-LVL V: ICD-10-PCS | Mod: PBBFAC,,, | Performed by: NURSE PRACTITIONER

## 2019-01-01 PROCEDURE — 92014 COMPRE OPH EXAM EST PT 1/>: CPT | Mod: S$PBB,,, | Performed by: OPHTHALMOLOGY

## 2019-01-01 PROCEDURE — 92014 COMPRE OPH EXAM EST PT 1/>: CPT | Mod: S$GLB,,, | Performed by: OPHTHALMOLOGY

## 2019-01-01 PROCEDURE — 99214 OFFICE O/P EST MOD 30 MIN: CPT | Mod: PBBFAC | Performed by: INTERNAL MEDICINE

## 2019-01-01 PROCEDURE — 99205 PR OFFICE/OUTPT VISIT, NEW, LEVL V, 60-74 MIN: ICD-10-PCS | Mod: S$GLB,,, | Performed by: INTERNAL MEDICINE

## 2019-01-01 PROCEDURE — 83540 ASSAY OF IRON: CPT

## 2019-01-01 PROCEDURE — 99215 OFFICE O/P EST HI 40 MIN: CPT | Mod: PBBFAC | Performed by: INTERNAL MEDICINE

## 2019-01-01 PROCEDURE — 3046F PR MOST RECENT HEMOGLOBIN A1C LEVEL > 9.0%: ICD-10-PCS | Mod: CPTII,S$GLB,, | Performed by: NURSE PRACTITIONER

## 2019-01-01 PROCEDURE — 82728 ASSAY OF FERRITIN: CPT

## 2019-01-01 PROCEDURE — 99205 OFFICE O/P NEW HI 60 MIN: CPT | Mod: S$GLB,,, | Performed by: INTERNAL MEDICINE

## 2019-01-01 PROCEDURE — 92226 PR SPECIAL EYE EXAM, SUBSEQUENT: CPT | Mod: 50,S$PBB,, | Performed by: OPHTHALMOLOGY

## 2019-01-01 PROCEDURE — 99213 OFFICE O/P EST LOW 20 MIN: CPT | Mod: PBBFAC | Performed by: INTERNAL MEDICINE

## 2019-01-01 PROCEDURE — 92226 PR SPECIAL EYE EXAM, SUBSEQUENT: ICD-10-PCS | Mod: RT,S$GLB,, | Performed by: OPHTHALMOLOGY

## 2019-01-01 PROCEDURE — 92134 POSTERIOR SEGMENT OCT RETINA (OCULAR COHERENCE TOMOGRAPHY)-BOTH EYES: ICD-10-PCS | Mod: 26,S$PBB,, | Performed by: OPHTHALMOLOGY

## 2019-01-01 PROCEDURE — 92014 COMPRE OPH EXAM EST PT 1/>: CPT | Mod: 25,S$GLB,, | Performed by: OPHTHALMOLOGY

## 2019-01-01 PROCEDURE — 84165 PATHOLOGIST INTERPRETATION SPE: ICD-10-PCS | Mod: 26,,, | Performed by: PATHOLOGY

## 2019-01-01 PROCEDURE — 92134 POSTERIOR SEGMENT OCT RETINA (OCULAR COHERENCE TOMOGRAPHY)-BOTH EYES: ICD-10-PCS | Mod: S$GLB,,, | Performed by: OPHTHALMOLOGY

## 2019-01-01 PROCEDURE — 3046F PR MOST RECENT HEMOGLOBIN A1C LEVEL > 9.0%: ICD-10-PCS | Mod: CPTII,S$GLB,, | Performed by: INTERNAL MEDICINE

## 2019-01-01 PROCEDURE — 3008F BODY MASS INDEX DOCD: CPT | Mod: CPTII,S$GLB,, | Performed by: NURSE PRACTITIONER

## 2019-01-01 PROCEDURE — 93924 CV US ANKLE / BRACHIAL INDICES W/ EXERCISE STRESS (CUPID ONLY): ICD-10-PCS | Mod: 26,S$PBB,, | Performed by: INTERNAL MEDICINE

## 2019-01-01 PROCEDURE — 92134 CPTRZ OPH DX IMG PST SGM RTA: CPT | Mod: PBBFAC | Performed by: OPHTHALMOLOGY

## 2019-01-01 PROCEDURE — 3077F PR MOST RECENT SYSTOLIC BLOOD PRESSURE >= 140 MM HG: ICD-10-PCS | Mod: CPTII,S$GLB,, | Performed by: NURSE PRACTITIONER

## 2019-01-01 PROCEDURE — 92014 PR EYE EXAM, EST PATIENT,COMPREHESV: ICD-10-PCS | Mod: 25,S$GLB,, | Performed by: OPHTHALMOLOGY

## 2019-01-01 PROCEDURE — 3008F PR BODY MASS INDEX (BMI) DOCUMENTED: ICD-10-PCS | Mod: CPTII,S$GLB,, | Performed by: NURSE PRACTITIONER

## 2019-01-01 PROCEDURE — 83970 ASSAY OF PARATHORMONE: CPT

## 2019-01-01 RX ORDER — HEPARIN 100 UNIT/ML
500 SYRINGE INTRAVENOUS
Status: CANCELLED | OUTPATIENT
Start: 2019-01-01

## 2019-01-01 RX ORDER — TELMISARTAN 80 MG/1
80 TABLET ORAL DAILY
Qty: 90 TABLET | Refills: 1 | Status: SHIPPED | OUTPATIENT
Start: 2019-01-01 | End: 2020-01-01 | Stop reason: SDUPTHER

## 2019-01-01 RX ORDER — SODIUM CHLORIDE 0.9 % (FLUSH) 0.9 %
10 SYRINGE (ML) INJECTION
Status: DISCONTINUED | OUTPATIENT
Start: 2019-01-01 | End: 2019-01-01 | Stop reason: HOSPADM

## 2019-01-01 RX ORDER — BORTEZOMIB 3.5 MG/1
1.5 INJECTION, POWDER, LYOPHILIZED, FOR SOLUTION INTRAVENOUS; SUBCUTANEOUS
Status: CANCELLED | OUTPATIENT
Start: 2019-01-01

## 2019-01-01 RX ORDER — BORTEZOMIB 3.5 MG/1
1.5 INJECTION, POWDER, LYOPHILIZED, FOR SOLUTION INTRAVENOUS; SUBCUTANEOUS
Status: COMPLETED | OUTPATIENT
Start: 2019-01-01 | End: 2019-01-01

## 2019-01-01 RX ORDER — ACYCLOVIR 400 MG/1
200 TABLET ORAL 2 TIMES DAILY
Qty: 60 TABLET | Refills: 5 | Status: ON HOLD | OUTPATIENT
Start: 2019-01-01 | End: 2020-01-01 | Stop reason: HOSPADM

## 2019-01-01 RX ORDER — SODIUM CHLORIDE 0.9 % (FLUSH) 0.9 %
10 SYRINGE (ML) INJECTION
Status: CANCELLED | OUTPATIENT
Start: 2019-01-01

## 2019-01-01 RX ORDER — METOLAZONE 2.5 MG/1
2.5 TABLET ORAL DAILY
Qty: 30 TABLET | Refills: 0 | Status: SHIPPED | OUTPATIENT
Start: 2019-01-01 | End: 2019-01-01 | Stop reason: SDUPTHER

## 2019-01-01 RX ORDER — SODIUM, POTASSIUM,MAG SULFATES 17.5-3.13G
1 SOLUTION, RECONSTITUTED, ORAL ORAL DAILY
Qty: 1 KIT | Refills: 0 | Status: SHIPPED | OUTPATIENT
Start: 2019-01-01 | End: 2019-01-01

## 2019-01-01 RX ORDER — METOLAZONE 2.5 MG/1
2.5 TABLET ORAL DAILY
Qty: 90 TABLET | Refills: 1 | Status: SHIPPED | OUTPATIENT
Start: 2019-01-01 | End: 2020-01-01

## 2019-01-01 RX ORDER — INSULIN ASPART 100 [IU]/ML
INJECTION, SOLUTION INTRAVENOUS; SUBCUTANEOUS
Qty: 1 BOX | Refills: 6
Start: 2019-01-01 | End: 2019-01-01

## 2019-01-01 RX ORDER — CLOPIDOGREL BISULFATE 75 MG/1
TABLET ORAL
Qty: 90 TABLET | Refills: 0 | Status: SHIPPED | OUTPATIENT
Start: 2019-01-01 | End: 2019-01-01 | Stop reason: SDUPTHER

## 2019-01-01 RX ORDER — DEXAMETHASONE 4 MG/1
20 TABLET ORAL SEE ADMIN INSTRUCTIONS
Qty: 120 TABLET | Refills: 0 | Status: SHIPPED | OUTPATIENT
Start: 2019-01-01 | End: 2020-01-01 | Stop reason: SDUPTHER

## 2019-01-01 RX ORDER — HEPARIN 100 UNIT/ML
500 SYRINGE INTRAVENOUS
Status: DISCONTINUED | OUTPATIENT
Start: 2019-01-01 | End: 2019-01-01 | Stop reason: HOSPADM

## 2019-01-01 RX ORDER — FUROSEMIDE 40 MG/1
TABLET ORAL
Qty: 270 TABLET | Refills: 3 | Status: SHIPPED | OUTPATIENT
Start: 2019-01-01 | End: 2020-01-01 | Stop reason: SDUPTHER

## 2019-01-01 RX ORDER — INSULIN GLARGINE 100 [IU]/ML
INJECTION, SOLUTION SUBCUTANEOUS
Qty: 15 ML | Refills: 6
Start: 2019-01-01 | End: 2020-01-01

## 2019-01-01 RX ORDER — INSULIN GLARGINE 100 [IU]/ML
INJECTION, SOLUTION SUBCUTANEOUS
Qty: 15 ML | Refills: 6
Start: 2019-01-01 | End: 2019-01-01

## 2019-01-01 RX ORDER — INSULIN GLARGINE 100 [IU]/ML
28 INJECTION, SOLUTION SUBCUTANEOUS NIGHTLY
Qty: 15 ML | Refills: 6
Start: 2019-01-01 | End: 2019-01-01

## 2019-01-01 RX ORDER — CLONIDINE HYDROCHLORIDE 0.1 MG/1
0.1 TABLET ORAL 2 TIMES DAILY
Qty: 60 TABLET | Refills: 5 | Status: ON HOLD | OUTPATIENT
Start: 2019-01-01 | End: 2020-01-01 | Stop reason: HOSPADM

## 2019-01-01 RX ORDER — INSULIN ASPART 100 [IU]/ML
INJECTION, SOLUTION INTRAVENOUS; SUBCUTANEOUS
Qty: 1 BOX | Refills: 6
Start: 2019-01-01 | End: 2020-01-01

## 2019-01-01 RX ORDER — CALCITRIOL 0.25 UG/1
0.5 CAPSULE ORAL
Qty: 30 CAPSULE | Refills: 6 | Status: SHIPPED | OUTPATIENT
Start: 2019-01-01 | End: 2019-01-01

## 2019-01-01 RX ORDER — CLOPIDOGREL BISULFATE 75 MG/1
TABLET ORAL
Qty: 90 TABLET | Refills: 0 | Status: SHIPPED | OUTPATIENT
Start: 2019-01-01 | End: 2020-01-01 | Stop reason: SDUPTHER

## 2019-01-01 RX ORDER — METOLAZONE 2.5 MG/1
TABLET ORAL
Qty: 30 TABLET | Refills: 0 | Status: SHIPPED | OUTPATIENT
Start: 2019-01-01 | End: 2019-01-01 | Stop reason: SDUPTHER

## 2019-01-01 RX ORDER — CALCITRIOL 0.25 UG/1
0.5 CAPSULE ORAL DAILY
Qty: 60 CAPSULE | Refills: 6 | Status: SHIPPED | OUTPATIENT
Start: 2019-01-01 | End: 2020-01-01 | Stop reason: SDUPTHER

## 2019-01-01 RX ORDER — CALCITRIOL 0.25 UG/1
0.5 CAPSULE ORAL
Qty: 90 CAPSULE | Refills: 3 | OUTPATIENT
Start: 2019-01-01

## 2019-01-01 RX ORDER — OMEPRAZOLE 20 MG/1
20 TABLET, DELAYED RELEASE ORAL DAILY
Qty: 30 TABLET | Refills: 11 | Status: SHIPPED | OUTPATIENT
Start: 2019-01-01 | End: 2020-10-08

## 2019-01-01 RX ORDER — GABAPENTIN 300 MG/1
300 CAPSULE ORAL DAILY PRN
Qty: 90 CAPSULE | Refills: 1
Start: 2019-01-01

## 2019-01-01 RX ORDER — CALCITRIOL 0.25 UG/1
0.5 CAPSULE ORAL
Qty: 30 CAPSULE | Refills: 6 | Status: SHIPPED | OUTPATIENT
Start: 2019-01-01 | End: 2019-01-01 | Stop reason: SDUPTHER

## 2019-01-01 RX ADMIN — BORTEZOMIB 3.2 MG: 3.5 INJECTION, POWDER, LYOPHILIZED, FOR SOLUTION INTRAVENOUS; SUBCUTANEOUS at 12:10

## 2019-01-01 RX ADMIN — BORTEZOMIB 3.2 MG: 3.5 INJECTION, POWDER, LYOPHILIZED, FOR SOLUTION INTRAVENOUS; SUBCUTANEOUS at 09:11

## 2019-01-01 RX ADMIN — BORTEZOMIB 3.2 MG: 3.5 INJECTION, POWDER, LYOPHILIZED, FOR SOLUTION INTRAVENOUS; SUBCUTANEOUS at 02:10

## 2019-01-01 RX ADMIN — BORTEZOMIB 3.2 MG: 3.5 INJECTION, POWDER, LYOPHILIZED, FOR SOLUTION INTRAVENOUS; SUBCUTANEOUS at 12:12

## 2019-01-01 RX ADMIN — BORTEZOMIB 3.2 MG: 3.5 INJECTION, POWDER, LYOPHILIZED, FOR SOLUTION INTRAVENOUS; SUBCUTANEOUS at 10:12

## 2019-01-01 RX ADMIN — BORTEZOMIB 3.2 MG: 3.5 INJECTION, POWDER, LYOPHILIZED, FOR SOLUTION INTRAVENOUS; SUBCUTANEOUS at 11:12

## 2019-01-01 RX ADMIN — Medication 1.25 MG: at 10:11

## 2019-01-01 RX ADMIN — BORTEZOMIB 3.2 MG: 3.5 INJECTION, POWDER, LYOPHILIZED, FOR SOLUTION INTRAVENOUS; SUBCUTANEOUS at 10:11

## 2019-01-01 RX ADMIN — BORTEZOMIB 3.2 MG: 3.5 INJECTION, POWDER, LYOPHILIZED, FOR SOLUTION INTRAVENOUS; SUBCUTANEOUS at 12:11

## 2019-01-07 ENCOUNTER — PES CALL (OUTPATIENT)
Dept: ADMINISTRATIVE | Facility: CLINIC | Age: 64
End: 2019-01-07

## 2019-01-08 ENCOUNTER — LAB VISIT (OUTPATIENT)
Dept: LAB | Facility: HOSPITAL | Age: 64
End: 2019-01-08
Payer: MEDICARE

## 2019-01-08 ENCOUNTER — OFFICE VISIT (OUTPATIENT)
Dept: ENDOCRINOLOGY | Facility: CLINIC | Age: 64
End: 2019-01-08
Payer: MEDICARE

## 2019-01-08 VITALS
RESPIRATION RATE: 16 BRPM | BODY MASS INDEX: 24.6 KG/M2 | DIASTOLIC BLOOD PRESSURE: 80 MMHG | SYSTOLIC BLOOD PRESSURE: 120 MMHG | WEIGHT: 185.63 LBS | HEIGHT: 73 IN

## 2019-01-08 DIAGNOSIS — R80.9 DIABETES MELLITUS WITH PROTEINURIA: ICD-10-CM

## 2019-01-08 DIAGNOSIS — I25.84 CORONARY ARTERY DISEASE DUE TO CALCIFIED CORONARY LESION: ICD-10-CM

## 2019-01-08 DIAGNOSIS — Z79.4 TYPE 2 DIABETES MELLITUS WITH STAGE 3 CHRONIC KIDNEY DISEASE, WITH LONG-TERM CURRENT USE OF INSULIN: ICD-10-CM

## 2019-01-08 DIAGNOSIS — E55.9 VITAMIN D DEFICIENCY: ICD-10-CM

## 2019-01-08 DIAGNOSIS — N18.30 TYPE 2 DIABETES MELLITUS WITH STAGE 3 CHRONIC KIDNEY DISEASE, WITH LONG-TERM CURRENT USE OF INSULIN: ICD-10-CM

## 2019-01-08 DIAGNOSIS — E78.5 DYSLIPIDEMIA: ICD-10-CM

## 2019-01-08 DIAGNOSIS — Z79.4 TYPE 2 DIABETES MELLITUS WITH DIABETIC POLYNEUROPATHY, WITH LONG-TERM CURRENT USE OF INSULIN: ICD-10-CM

## 2019-01-08 DIAGNOSIS — C90.00 MULTIPLE MYELOMA NOT HAVING ACHIEVED REMISSION: ICD-10-CM

## 2019-01-08 DIAGNOSIS — E11.42 TYPE 2 DIABETES MELLITUS WITH DIABETIC POLYNEUROPATHY, WITH LONG-TERM CURRENT USE OF INSULIN: ICD-10-CM

## 2019-01-08 DIAGNOSIS — I10 ESSENTIAL HYPERTENSION: ICD-10-CM

## 2019-01-08 DIAGNOSIS — E11.22 TYPE 2 DIABETES MELLITUS WITH STAGE 3 CHRONIC KIDNEY DISEASE, WITH LONG-TERM CURRENT USE OF INSULIN: ICD-10-CM

## 2019-01-08 DIAGNOSIS — Z86.73 HISTORY OF STROKE: ICD-10-CM

## 2019-01-08 DIAGNOSIS — E11.29 DIABETES MELLITUS WITH PROTEINURIA: ICD-10-CM

## 2019-01-08 DIAGNOSIS — I25.10 CORONARY ARTERY DISEASE DUE TO CALCIFIED CORONARY LESION: ICD-10-CM

## 2019-01-08 DIAGNOSIS — H35.033 HYPERTENSIVE RETINOPATHY OF BOTH EYES: ICD-10-CM

## 2019-01-08 LAB
25(OH)D3+25(OH)D2 SERPL-MCNC: 20 NG/ML
ESTIMATED AVG GLUCOSE: 286 MG/DL
HBA1C MFR BLD HPLC: 11.6 %

## 2019-01-08 PROCEDURE — 99214 OFFICE O/P EST MOD 30 MIN: CPT | Mod: S$PBB,,, | Performed by: NURSE PRACTITIONER

## 2019-01-08 PROCEDURE — 99999 PR PBB SHADOW E&M-EST. PATIENT-LVL V: CPT | Mod: PBBFAC,,, | Performed by: NURSE PRACTITIONER

## 2019-01-08 PROCEDURE — 82306 VITAMIN D 25 HYDROXY: CPT

## 2019-01-08 PROCEDURE — 99999 PR PBB SHADOW E&M-EST. PATIENT-LVL V: ICD-10-PCS | Mod: PBBFAC,,, | Performed by: NURSE PRACTITIONER

## 2019-01-08 PROCEDURE — 83036 HEMOGLOBIN GLYCOSYLATED A1C: CPT

## 2019-01-08 PROCEDURE — 36415 COLL VENOUS BLD VENIPUNCTURE: CPT

## 2019-01-08 PROCEDURE — 99215 OFFICE O/P EST HI 40 MIN: CPT | Mod: PBBFAC | Performed by: NURSE PRACTITIONER

## 2019-01-08 PROCEDURE — 99214 PR OFFICE/OUTPT VISIT, EST, LEVL IV, 30-39 MIN: ICD-10-PCS | Mod: S$PBB,,, | Performed by: NURSE PRACTITIONER

## 2019-01-08 RX ORDER — INSULIN GLARGINE 100 [IU]/ML
INJECTION, SOLUTION SUBCUTANEOUS
Qty: 15 ML | Refills: 6
Start: 2019-01-08 | End: 2019-02-11

## 2019-01-08 RX ORDER — INSULIN ASPART 100 [IU]/ML
INJECTION, SOLUTION INTRAVENOUS; SUBCUTANEOUS
Qty: 1 BOX | Refills: 6
Start: 2019-01-08 | End: 2019-05-01

## 2019-01-08 NOTE — PROGRESS NOTES
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 Diagnoses of Type 2 diabetes mellitus with stage 3 chronic kidney disease, with long-term current use of insulin, Type 2 diabetes mellitus with diabetic polyneuropathy, with long-term current use of insulin, Diabetes mellitus with proteinuria, Multiple myeloma not having achieved remission, Essential hypertension, Vitamin D deficiency, Dyslipidemia, Hypertensive retinopathy of both eyes, History of stroke, and Coronary artery disease due to calcified coronary lesion were also pertinent to this visit.      HPI: Mr. Marie Reis Jr. is a 63 y.o. Black or  male who was diagnosed with Type 2 DM in 2006.     Pt was admitted on 4/11/18 for JAZLYN and in feb 2018 for sepsis.    Pt was last seen by me August 2018.  Pt's a1c has improved over the past 6 mos, needs a1c today/next week.   Lab Results   Component Value Date    HGBA1C 8.2 (H) 08/01/2018     BG readings are checked 0-4x/day (max) and readings  Fluctuates 50s-200s  Left meter in the "country"    Hypoglycemia: +mild (r/t not eating enough carbs)-uses oj for correction,  glucose tabs prn, symptomatic     Has had a severe hypoglycemia -EMS in the past few years.    Skipped/missed glycemic medications: No    Prandial injections taken with meals: Yes    Physical Activity: No    Skipping meals and/or snacking: The patient eats a regular, healthy diet.    CURRENT DIABETIC MEDS: novolog 5-8 units ac w/ mod dose correction scale 150-200+2, etc , lantus 15 units qhs, tradjenta 5 mg daily -currently not on, previous on victoza.  Uses Vials or Pens: pens  Type of Glucose Meter: up to date    Last Eye Exam:  7/10/18  Dr. Al (procedure)  Last Podiatry Exam: 2018-[facility]     Last DM Education Attended:  4/27/16    REVIEW OF SYSTEMS  General: no weakness or + fatigue. " "  Eyes: no visual disturbances.   Cardiac: + intermittent chest pain-has nitro prn. +edema   Respiratory: no cough or dyspnea.   GI: no abdominal pain or nausea.   Skin: no rashes or itching.   Neuro: + numbness or tingling. +back pain, +knee pain  Musc: Wears (L) wrist brace-had accident/trauma  Endocrine: no polyuria, polydipsia, polyphagia.     Vital Signs  /80 (BP Location: Right arm, Patient Position: Sitting, BP Method: Medium (Manual))   Resp 16   Ht 6' 1" (1.854 m)   Wt 84.2 kg (185 lb 10 oz)   BMI 24.49 kg/m²     Hemoglobin A1C   Date Value Ref Range Status   08/01/2018 8.2 (H) 4.0 - 5.6 % Final     Comment:     ADA Screening Guidelines:  5.7-6.4%  Consistent with prediabetes  >or=6.5%  Consistent with diabetes  High levels of fetal hemoglobin interfere with the HbA1C  assay. Heterozygous hemoglobin variants (HbS, HgC, etc)do  not significantly interfere with this assay.   However, presence of multiple variants may affect accuracy.     07/19/2018 8.7 (H) 4.0 - 5.6 % Final     Comment:     ADA Screening Guidelines:  5.7-6.4%  Consistent with prediabetes  >or=6.5%  Consistent with diabetes  High levels of fetal hemoglobin interfere with the HbA1C  assay. Heterozygous hemoglobin variants (HbS, HgC, etc)do  not significantly interfere with this assay.   However, presence of multiple variants may affect accuracy.     04/30/2018 10.3 (H) 4.0 - 5.6 % Final     Comment:     According to ADA guidelines, hemoglobin A1c <7.0% represents  optimal control in non-pregnant diabetic patients. Different  metrics may apply to specific patient populations.   Standards of Medical Care in Diabetes-2016.  For the purpose of screening for the presence of diabetes:  <5.7%     Consistent with the absence of diabetes  5.7-6.4%  Consistent with increasing risk for diabetes   (prediabetes)  >or=6.5%  Consistent with diabetes  Currently, no consensus exists for use of hemoglobin A1c  for diagnosis of diabetes for " children.  This Hemoglobin A1c assay has significant interference with fetal   hemoglobin   (HbF). The results are invalid for patients with abnormal amounts of   HbF,   including those with known Hereditary Persistence   of Fetal Hemoglobin. Heterozygous hemoglobin variants (HbAS, HbAC,   HbAD, HbAE, HbA2) do not significantly interfere with this assay;   however, presence of multiple variants in a sample may impact the %   interference.         Chemistry        Component Value Date/Time     12/11/2018 0915     12/11/2018 0915    K 3.2 (L) 12/11/2018 0915    K 3.2 (L) 12/11/2018 0915     12/11/2018 0915     12/11/2018 0915    CO2 24 12/11/2018 0915    CO2 24 12/11/2018 0915    BUN 31 (H) 12/11/2018 0915    BUN 31 (H) 12/11/2018 0915    CREATININE 3.1 (H) 12/11/2018 0915    CREATININE 3.1 (H) 12/11/2018 0915     (H) 12/11/2018 0915     (H) 12/11/2018 0915        Component Value Date/Time    CALCIUM 8.7 12/11/2018 0915    CALCIUM 8.7 12/11/2018 0915    ALKPHOS 136 (H) 12/11/2018 0915    AST 15 12/11/2018 0915    ALT 10 12/11/2018 0915    BILITOT 0.4 12/11/2018 0915          Lab Results   Component Value Date    CHOL 135 07/19/2018    CHOL 119 (L) 10/25/2017    CHOL 144 09/17/2016     Lab Results   Component Value Date    HDL 43 07/19/2018    HDL 32 (L) 10/25/2017    HDL 27 (L) 09/17/2016     Lab Results   Component Value Date    LDLCALC 77.6 07/19/2018    LDLCALC 68.6 10/25/2017    LDLCALC 72.8 09/17/2016     Lab Results   Component Value Date    TRIG 72 07/19/2018    TRIG 92 10/25/2017    TRIG 221 (H) 09/17/2016     Lab Results   Component Value Date    CHOLHDL 31.9 07/19/2018    CHOLHDL 26.9 10/25/2017    CHOLHDL 18.8 (L) 09/17/2016       Lab Results   Component Value Date    TSH 2.402 02/21/2018       Lab Results   Component Value Date    MICALBCREAT 1987.0 (H) 10/25/2017       Vit D, 25-Hydroxy   Date Value Ref Range Status   04/30/2018 25 (L) 30 - 96 ng/mL Final     Comment:      Vitamin D deficiency.........<10 ng/mL                              Vitamin D insufficiency......10-29 ng/mL       Vitamin D sufficiency........> or equal to 30 ng/mL  Vitamin D toxicity............>100 ng/mL         PHYSICAL EXAMINATION  Constitutional: Appears well, no distress  Neck: Supple, trachea midline.   Respiratory: no wheezes, even and unlabored.  Cardiovascular: RRR  Lymph: +trace BLE/ pedal edema  Skin: warm and dry; no injection site reactions, no acanthosis nigracans observed.  Neuro:patient alert and cooperative.  Footwear appropriate.     Assessment/Plan  1. 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    2. Type 2 diabetes mellitus with stage 3 chronic kidney disease, with long-term current use of insulin  See above  Continue to monitor   3. Type 2 diabetes mellitus with diabetic polyneuropathy, with long-term current use of insulin  See above   4. Diabetes mellitus with proteinuria  See above   5. Multiple myeloma not having achieved remission  F/u with hem/onc   6. Essential hypertension  Controlled, continue med(s)   7. Vitamin D deficiency  Continue supplement      8. Dyslipidemia  Lab Results   Component Value Date    LDLCALC 77.6 07/19/2018     At goal      9. Hypertensive retinopathy of both eyes  F/u with ophthalmology    10. History of stroke  F/u w/ pcp, cards.   11. Coronary artery disease due to calcified coronary lesion  Avoid hypoglycemia   Follow-up in about 3 months (around 4/8/2019).     Orders Placed This Encounter   Procedures    Hemoglobin A1c     Standing Status:   Future     Standing Expiration Date:   3/8/2020    Vitamin D     Standing Status:    Future     Standing Expiration Date:   3/8/2020    Hemoglobin A1c     Standing Status:   Future     Standing Expiration Date:   3/8/2020    Ambulatory Referral to Diabetes Education     Referral Priority:   Routine     Referral Type:   Consultation     Referral Reason:   Specialty Services Required     Requested Specialty:   Endocrinology     Number of Visits Requested:   1     Expiration Date:   1/8/2020

## 2019-01-08 NOTE — PATIENT INSTRUCTIONS
Add tradjenta 5 mg daily     lantus 15 units at night  novolog 5 units w/ meals  Plus scale 180-230+2, 231-280+4, 281-330+6, 331-380+8, >380+10.        Snacks can be an important part of a balanced, healthy meal plan. They allow you to eat more frequently, feeling full and satisfied throughout the day. Also, they allow you to spread carbohydrates evenly, which may stabilize blood sugars.  Plus, snacks are enjoyable!     The amount of carbohydrate needed at snacks varies. Generally, about 15-30 grams of carbohydrate per snack is recommended.  Below you will find some tasty treats.       0-5 gm carb   Crystal Light   Vitamin Water Zero   Herbal tea, unsweetened   2 tsp peanut butter on celery   1./2 cup sugar-free jell-o   1 sugar-free popsicle   ¼ cup blueberries   8oz Blue Amy unsweetened almond milk   5 baby carrots & celery sticks, cucumbers, bell peppers dipped in ¼ cup salsa, 2Tbsp light ranch dressing or 2Tbsp plain Greek yogurt   10 Goldfish crackers   ½ oz low-fat cheese or string cheese   1 closed handful of nuts, unsalted   1 Tbsp of sunflower seeds, unsalted   1 cup Smart Pop popcorn   1 whole grain brown rice cake        15 gm carb   1 small piece of fruit or ½ banana or 1/2 cup lite canned fruit   3 donna cracker squares   3 cups Smart Pop popcorn, top spray butter, Mijares lite salt or cinnamon and Truvia   5 Vanilla Wafers   ½ cup low fat, no added sugar ice cream or frozen yogurt (Blue bell, Blue Bunny, Weight Watchers, Skinny Cow)   ½ turkey, ham, or chicken sandwich   ½ c fruit with ½ c Cottage cheese   4-6 unsalted wheat crackers with 1 oz low fat cheese or 1 tbsp peanut butter    30-45 goldfish crackers (depending on flavor)    7-8 Orthodox mini brown rice cakes (caramel, apple cinnamon, chocolate)    12 Orthodox mini brown rice cakes (cheddar, bbq, ranch)    1/3 cup hummus dip with raw veg   1/2 whole wheat sourav, 1Tbsp hummus   Mini Pizza (1/2 whole wheat English  muffin, low-fat  cheese, tomato sauce)   100 calorie snack pack (Oreo, Chips Ahoy, Ritz Mix, Baked Cheetos)   4-6 oz. light or Greek Style yogurt (Chobani, Yoplait, Okios, [place])   ½ cup sugar-free pudding     6 in. wheat tortilla or sourav oven toasted chips (topped with spray butter flavoring, cinnamon, Truvia OR spray butter, garlic powder, chili powder)    18 BBQ Popchips (available at [employer], [employer], Fresh Market)                   Diabetes Support Group Meetings         Date: Topic:   February 14 Eat Fit MAGNUS/Health Promotion   March 14 Taking Care of Your Smile   April 11 Spring into Healthy Eating/Cooking Demo   May 9 Ease Your Mind with Diabetes   Alexandra 13 Summer Treats/Cooking Demo   July 11 Eat Fit MAGNUS/Super Market Sweep   August 8 Taking Care of Your Eyes and Feet   Sept 12 Technology/ADA updates   October 10 Recipes & Treats/Cooking Demo   November 14 Heart Health/Pump it up!   December 12 Year-End Close Out        Meetings are held in the [facility] (A) of the [facility] located at [address] [place] [address]. Please call [phone] for additional information.    Free service, offered every 2nd Thursday of every month! Family members and/or friends are welcome as well!  Support group is for patients with type 1 or type 2 diabetes.    From 3:30p to 4:30p        Linagliptin oral tablets  What is this medicine?  Linagliptin (dheeraj a GLIP tin) helps to treat type 2 diabetes. It helps to control blood sugar. Treatment is combined with diet and exercise.  How should I use this medicine?  Take this medicine by mouth with a glass of water. Follow the directions on the prescription label. You can take it with or without food. Take your dose at the same time each day. Do not take more often than directed. Do not stop taking except on your doctor's advice.  A special MedGuide will be given to you by the pharmacist with each prescription and  refill. Be sure to read this information carefully each time.  Talk to your pediatrician regarding the use of this medicine in children. Special care may be needed.  What side effects may I notice from receiving this medicine?  Side effects that you should report to your doctor or health care professional as soon as possible:  · allergic reactions like skin rash, itching or hives, swelling of the face, lips, or tongue  · breathing problems  · fever, chills  · joint pain  · nausea, vomiting  · signs and symptoms of low blood sugar such as feeling anxious, confusion, dizziness, increased hunger, unusually weak or tired, sweating, shakiness, cold, irritable, headache, blurred vision, fast heartbeat, loss of consciousness  · unusual stomach pain or discomfort  · vomiting  Side effects that usually do not require medical attention (Report these to your doctor or health care professional if they continue or are bothersome.):  · headache  · sore throat  · stuffy or runny nose  What may interact with this medicine?  Do not take this medicine with any of the following medications:  · gatifloxacin  This medicine may also interact with the following medications:  · alcohol  · bosentan  · certain medicines for seizures like carbamazepine, phenobarbital, phenytoin  · rifabutin  · rifampin  · [place] Wort  · sulfonylureas like glimepiride, glipizide, glyburide  What if I miss a dose?  If you miss a dose, take it as soon as you can. If it is almost time for your next dose, take only that dose. Do not take double or extra doses.  Where should I keep my medicine?  Keep out of the reach of children.  Store at room temperature between 15 and 30 degrees C (59 and 86 degrees F). Throw away any unused medicine after the expiration date.  What should I tell my health care provider before I take this medicine?  They need to know if you have any of these conditions:  · diabetic ketoacidosis  · type 1 diabetes  · an unusual or allergic  reaction to linagliptin, other medicines, foods, dyes, or preservatives  · pregnant or trying to get pregnant  · breast-feeding  What should I watch for while using this medicine?  Visit your doctor or health care professional for regular checks on your progress.  A test called the HbA1C (A1C) will be monitored. This is a simple blood test. It measures your blood sugar control over the last 2 to 3 months. You will receive this test every 3 to 6 months.  Learn how to check your blood sugar. Learn the symptoms of low and high blood sugar and how to manage them.  Always carry a quick-source of sugar with you in case you have symptoms of low blood sugar. Examples include hard sugar candy or glucose tablets. Make sure others know that you can choke if you eat or drink when you develop serious symptoms of low blood sugar, such as seizures or unconsciousness. They must get medical help at once.  Tell your doctor or health care professional if you have high blood sugar. You might need to change the dose of your medicine. If you are sick or exercising more than usual, you might need to change the dose of your medicine.  Do not skip meals. Ask your doctor or health care professional if you should avoid alcohol. Many nonprescription cough and cold products contain sugar or alcohol. These can affect blood sugar.  Wear a medical ID bracelet or chain, and carry a card that describes your disease and details of your medicine and dosage times.  NOTE:This sheet is a summary. It may not cover all possible information. If you have questions about this medicine, talk to your doctor, pharmacist, or health care provider. Copyright© 2017 [employer]

## 2019-01-09 ENCOUNTER — OFFICE VISIT (OUTPATIENT)
Dept: CARDIOLOGY | Facility: CLINIC | Age: 64
End: 2019-01-09
Payer: MEDICARE

## 2019-01-09 VITALS
SYSTOLIC BLOOD PRESSURE: 152 MMHG | WEIGHT: 184.44 LBS | DIASTOLIC BLOOD PRESSURE: 78 MMHG | HEART RATE: 66 BPM | BODY MASS INDEX: 24.44 KG/M2 | HEIGHT: 73 IN

## 2019-01-09 DIAGNOSIS — I12.9 HYPERTENSIVE KIDNEY DISEASE WITH STAGE 4 CHRONIC KIDNEY DISEASE: ICD-10-CM

## 2019-01-09 DIAGNOSIS — I50.32 CHRONIC DIASTOLIC HEART FAILURE: ICD-10-CM

## 2019-01-09 DIAGNOSIS — I10 ESSENTIAL HYPERTENSION: ICD-10-CM

## 2019-01-09 DIAGNOSIS — E11.29 DIABETES MELLITUS WITH PROTEINURIA: ICD-10-CM

## 2019-01-09 DIAGNOSIS — R80.9 DIABETES MELLITUS WITH PROTEINURIA: ICD-10-CM

## 2019-01-09 DIAGNOSIS — Z86.73 HISTORY OF STROKE: ICD-10-CM

## 2019-01-09 DIAGNOSIS — E78.5 DYSLIPIDEMIA: ICD-10-CM

## 2019-01-09 DIAGNOSIS — R00.1 BRADYCARDIA: ICD-10-CM

## 2019-01-09 DIAGNOSIS — I25.84 CORONARY ARTERY DISEASE DUE TO CALCIFIED CORONARY LESION: Primary | ICD-10-CM

## 2019-01-09 DIAGNOSIS — C90.00 MULTIPLE MYELOMA NOT HAVING ACHIEVED REMISSION: ICD-10-CM

## 2019-01-09 DIAGNOSIS — N18.4 HYPERTENSIVE KIDNEY DISEASE WITH STAGE 4 CHRONIC KIDNEY DISEASE: ICD-10-CM

## 2019-01-09 DIAGNOSIS — I25.10 CORONARY ARTERY DISEASE DUE TO CALCIFIED CORONARY LESION: Primary | ICD-10-CM

## 2019-01-09 DIAGNOSIS — I65.23 BILATERAL CAROTID ARTERY STENOSIS: ICD-10-CM

## 2019-01-09 DIAGNOSIS — H35.033 HYPERTENSIVE RETINOPATHY OF BOTH EYES: ICD-10-CM

## 2019-01-09 PROCEDURE — 99999 PR PBB SHADOW E&M-EST. PATIENT-LVL III: ICD-10-PCS | Mod: PBBFAC,,, | Performed by: INTERNAL MEDICINE

## 2019-01-09 PROCEDURE — 99213 OFFICE O/P EST LOW 20 MIN: CPT | Mod: PBBFAC,PO | Performed by: INTERNAL MEDICINE

## 2019-01-09 PROCEDURE — 99999 PR PBB SHADOW E&M-EST. PATIENT-LVL III: CPT | Mod: PBBFAC,,, | Performed by: INTERNAL MEDICINE

## 2019-01-09 PROCEDURE — 99214 OFFICE O/P EST MOD 30 MIN: CPT | Mod: S$PBB,,, | Performed by: INTERNAL MEDICINE

## 2019-01-09 PROCEDURE — 99214 PR OFFICE/OUTPT VISIT, EST, LEVL IV, 30-39 MIN: ICD-10-PCS | Mod: S$PBB,,, | Performed by: INTERNAL MEDICINE

## 2019-01-09 RX ORDER — LOSARTAN POTASSIUM 25 MG/1
25 TABLET ORAL DAILY
Qty: 30 TABLET | Refills: 6 | Status: SHIPPED | OUTPATIENT
Start: 2019-01-09 | End: 2019-02-04

## 2019-01-09 RX ORDER — LOSARTAN POTASSIUM 25 MG/1
25 TABLET ORAL DAILY
COMMUNITY
End: 2019-01-09 | Stop reason: SDUPTHER

## 2019-01-09 NOTE — PROGRESS NOTES
Subjective:   Patient ID:  Marie Reis Jr. is a 63 y.o. male who presents for follow-up of Hypertension      HPI: Patient has no complaints.  However he admits to dietary indiscretions.  He also did not take his insulin for couple of days and did not take al lof his blood pressure meds today.    Lab Results   Component Value Date     12/11/2018     12/11/2018    K 3.2 (L) 12/11/2018    K 3.2 (L) 12/11/2018     12/11/2018     12/11/2018    CO2 24 12/11/2018    CO2 24 12/11/2018    BUN 31 (H) 12/11/2018    BUN 31 (H) 12/11/2018    CREATININE 3.1 (H) 12/11/2018    CREATININE 3.1 (H) 12/11/2018     (H) 12/11/2018     (H) 12/11/2018    HGBA1C 11.6 (H) 01/08/2019    MG 1.6 04/12/2018    AST 15 12/11/2018    ALT 10 12/11/2018    ALBUMIN 3.0 (L) 12/11/2018    ALBUMIN 3.0 (L) 12/11/2018    PROT 6.7 12/11/2018    BILITOT 0.4 12/11/2018    WBC 6.87 12/18/2018    HGB 10.7 (L) 12/18/2018    HCT 30.6 (L) 12/18/2018    HCT 27 (L) 02/21/2018    MCV 83 12/18/2018     12/18/2018    INR 1.1 02/21/2018    PSA 1.5 06/26/2015    TSH 2.402 02/21/2018    CHOL 135 07/19/2018    HDL 43 07/19/2018    LDLCALC 77.6 07/19/2018    TRIG 72 07/19/2018       Review of Systems   Constitution: Positive for malaise/fatigue and weight loss.   HENT: Negative.    Eyes: Negative.    Cardiovascular: Negative.  Negative for chest pain, claudication, dyspnea on exertion, irregular heartbeat, leg swelling, near-syncope, palpitations and syncope.   Respiratory: Positive for cough. Negative for shortness of breath, snoring and wheezing.    Endocrine: Positive for polydipsia and polyuria. Negative for cold intolerance and heat intolerance.   Skin: Negative.    Musculoskeletal: Positive for arthritis, back pain and muscle weakness.   Gastrointestinal: Negative.    Genitourinary: Negative.    Neurological: Positive for dizziness, focal weakness, loss of balance, numbness and paresthesias.   Psychiatric/Behavioral: The  "patient is nervous/anxious.        Objective:   Physical Exam   Constitutional: He is oriented to person, place, and time. He appears well-developed and well-nourished.   BP (!) 152/78   Pulse 66   Ht 6' 1" (1.854 m)   Wt 83.7 kg (184 lb 6.6 oz)   BMI 24.33 kg/m²      HENT:   Head: Normocephalic.   Eyes: Pupils are equal, round, and reactive to light.   Neck: Normal range of motion. Neck supple. Carotid bruit is present. No thyromegaly present.   Cardiovascular: Normal rate, regular rhythm, normal heart sounds and intact distal pulses. Exam reveals no gallop and no friction rub.   No murmur heard.  Pulses:       Carotid pulses are 2+ on the right side with bruit, and 2+ on the left side with bruit.       Radial pulses are 2+ on the right side, and 2+ on the left side.        Femoral pulses are 2+ on the right side, and 2+ on the left side.       Popliteal pulses are 2+ on the right side, and 2+ on the left side.        Dorsalis pedis pulses are 2+ on the right side, and 2+ on the left side.        Posterior tibial pulses are 2+ on the right side, and 2+ on the left side.   Pulmonary/Chest: Effort normal and breath sounds normal. No respiratory distress. He has no wheezes. He has no rales. He exhibits no tenderness.   Abdominal: Soft. Bowel sounds are normal.   Musculoskeletal: Normal range of motion. He exhibits no edema.   Neurological: He is alert and oriented to person, place, and time.   Skin: Skin is warm and dry.   Psychiatric: He has a normal mood and affect.   Nursing note and vitals reviewed.        Assessment and Plan:     Problem List Items Addressed This Visit        Cardiology Problems    Essential hypertension    Relevant Medications    losartan (COZAAR) 25 MG tablet    Other Relevant Orders    CV Ultrasound doppler carotid (Cupid Only)    Basic metabolic panel    Chronic diastolic heart failure    Relevant Medications    losartan (COZAAR) 25 MG tablet    Other Relevant Orders    CV Ultrasound " doppler carotid (Cupid Only)    Coronary artery disease due to calcified coronary lesion - Primary    Relevant Medications    losartan (COZAAR) 25 MG tablet    Other Relevant Orders    CV Ultrasound doppler carotid (Cupid Only)    Bilateral carotid artery stenosis    Relevant Medications    losartan (COZAAR) 25 MG tablet    Other Relevant Orders    CV Ultrasound doppler carotid (Cupid Only)       Other    History of stroke    Dyslipidemia    Hypertensive retinopathy of both eyes    Multiple myeloma not having achieved remission    Bradycardia    Hypertensive CKD (chronic kidney disease)    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 If you have any questions, ask your nurse or doctor.               CHANGE how you take these medications    fluticasone 50 mcg/actuation nasal spray  Commonly known as:  FLONASE  1 spray by Each Nare route 2 (two) times daily as needed for Rhinitis.  What changed:  when to take this     furosemide 40 MG tablet  Commonly known as:  LASIX  Pt to take 1 1/2 tablet twice a day per Dr. Salinas on 3/19/18.  What changed:    · when to take this  · additional instructions        CONTINUE taking these medications    amLODIPine 10 MG tablet  Commonly known as:  NORVASC  Take 1 tablet (10 mg total) by mouth once daily.     atorvastatin 80 MG tablet  Commonly known as:  LIPITOR  Take 1 tablet (80 mg total) by mouth once daily.     blood sugar diagnostic Strp     calcitRIOL 0.25 MCG Cap  Commonly known as:  ROCALTROL  Take 2 capsules (0.5 mcg total) by mouth every Mon, Wed, Fri.     carvedilol 25 MG tablet  Commonly known as:  COREG  Take 1 tablet (25 mg total) by mouth 2 (two) times daily with meals.     clopidogrel 75 mg tablet  Commonly known as:  PLAVIX  Take 1 tablet (75 mg total) by  "mouth once daily.     diclofenac sodium 1.5 % Drop     gabapentin 300 MG capsule  Commonly known as:  NEURONTIN  Take 1 capsule (300 mg total) by mouth 2 (two) times daily.     hydrALAZINE 25 MG tablet  Commonly known as:  APRESOLINE     insulin aspart U-100 100 unit/mL Inpn pen  Commonly known as:  NovoLOG  Inject 5 units w/ meals plus scale 180-230 +2, 231-280 +4, 281-330 +6, 331-380 +8, >380 +10. Snack 4 units.     insulin glargine 100 units/mL (3mL) SubQ pen  Commonly known as:  LANTUS SOLOSTAR U-100 INSULIN  Inject 15 units at night.     LIDOTRAL 3.88 % Crea  Generic drug:  lidocaine HCl     linagliptin 5 mg Tab tablet  Commonly known as:  TRADJENTA  Take 1 tablet (5 mg total) by mouth once daily.     losartan 25 MG tablet  Commonly known as:  COZAAR  Take 1 tablet (25 mg total) by mouth once daily.     meclizine 25 mg tablet  Commonly known as:  ANTIVERT  Take 1 tablet (25 mg total) by mouth 3 (three) times daily as needed for Dizziness.     MENTHOLATUM DEEP HEAT RUB TOP     metOLazone 2.5 MG tablet  Commonly known as:  ZAROXOLYN  Take 1 tablet (2.5 mg total) by mouth once daily.     mirtazapine 30 MG tablet  Commonly known as:  REMERON     montelukast 10 mg tablet  Commonly known as:  SINGULAIR  Take 1 tablet (10 mg total) by mouth every evening.     nitroGLYCERIN 0.4 MG SL tablet  Commonly known as:  NITROSTAT  Place 1 tablet (0.4 mg total) under the tongue every 5 (five) minutes as needed for Chest pain.     ondansetron 8 MG Tbdl  Commonly known as:  ZOFRAN-ODT  Take 1 tablet (8 mg total) by mouth every 6 (six) hours as needed.     pen needle, diabetic 31 gauge x 5/16" Ndle  Pt to use pen needle with Lantus daily     VIVA PATCH 2.5-4-2 % Ptmd  Generic drug:  lidocaine-methyl sal-camphor        STOP taking these medications    lisinopril 40 MG tablet  Commonly known as:  PRINIVIL,ZESTRIL  Stopped by:  Debbie Salinas MD           Where to Get Your Medications      These medications were sent to " [id number] - [place] - [address] AT [facility]  [address] [address]    Phone:  [phone]   · losartan 25 MG tablet     There appears to be poor comprehension of his medical status and poor compliance with medications.  Diabetes is not controlled.  Blood pressure is not at goal.  Change Lisinopril to Losartan and repeat BMP in 1 week.  Compliance and dietary restrictions reinforced.    Follow-up in about 6 months (around 7/9/2019).

## 2019-01-14 ENCOUNTER — LAB VISIT (OUTPATIENT)
Dept: LAB | Facility: HOSPITAL | Age: 64
End: 2019-01-14
Attending: INTERNAL MEDICINE
Payer: MEDICARE

## 2019-01-14 ENCOUNTER — TELEPHONE (OUTPATIENT)
Dept: CARDIOLOGY | Facility: CLINIC | Age: 64
End: 2019-01-14

## 2019-01-14 ENCOUNTER — CLINICAL SUPPORT (OUTPATIENT)
Dept: CARDIOLOGY | Facility: CLINIC | Age: 64
End: 2019-01-14
Attending: INTERNAL MEDICINE
Payer: MEDICARE

## 2019-01-14 DIAGNOSIS — I10 ESSENTIAL HYPERTENSION: ICD-10-CM

## 2019-01-14 DIAGNOSIS — I25.10 CORONARY ARTERY DISEASE DUE TO CALCIFIED CORONARY LESION: ICD-10-CM

## 2019-01-14 DIAGNOSIS — I50.32 CHRONIC DIASTOLIC HEART FAILURE: ICD-10-CM

## 2019-01-14 DIAGNOSIS — I65.23 BILATERAL CAROTID ARTERY STENOSIS: ICD-10-CM

## 2019-01-14 DIAGNOSIS — I25.84 CORONARY ARTERY DISEASE DUE TO CALCIFIED CORONARY LESION: ICD-10-CM

## 2019-01-14 LAB
ANION GAP SERPL CALC-SCNC: 5 MMOL/L
BUN SERPL-MCNC: 26 MG/DL
CALCIUM SERPL-MCNC: 9 MG/DL
CHLORIDE SERPL-SCNC: 103 MMOL/L
CO2 SERPL-SCNC: 26 MMOL/L
CREAT SERPL-MCNC: 3 MG/DL
EST. GFR  (AFRICAN AMERICAN): 24.6 ML/MIN/1.73 M^2
EST. GFR  (NON AFRICAN AMERICAN): 21.3 ML/MIN/1.73 M^2
GLUCOSE SERPL-MCNC: 391 MG/DL
LEFT ARM DIASTOLIC BLOOD PRESSURE: 75 MMHG
LEFT ARM SYSTOLIC BLOOD PRESSURE: 160 MMHG
LEFT CBA DIAS: 20 CM/S
LEFT CBA SYS: 97 CM/S
LEFT CCA DIST DIAS: 19 CM/S
LEFT CCA DIST SYS: 95 CM/S
LEFT CCA MID DIAS: 20 CM/S
LEFT CCA MID SYS: 106 CM/S
LEFT CCA PROX DIAS: 15 CM/S
LEFT CCA PROX SYS: 76 CM/S
LEFT ECA DIAS: 4 CM/S
LEFT ECA SYS: 130 CM/S
LEFT ICA DIST DIAS: 42 CM/S
LEFT ICA DIST SYS: 195 CM/S
LEFT ICA MID DIAS: 41 CM/S
LEFT ICA MID SYS: 156 CM/S
LEFT ICA PROX DIAS: 32 CM/S
LEFT ICA PROX SYS: 170 CM/S
LEFT VERTEBRAL DIAS: 9 CM/S
LEFT VERTEBRAL SYS: 69 CM/S
OHS CV CAROTID RIGHT ICA EDV HIGHEST: 31
OHS CV CAROTID ULTRASOUND LEFT ICA/CCA RATIO: 1.84
OHS CV CAROTID ULTRASOUND RIGHT ICA/CCA RATIO: 1.07
OHS CV PV CAROTID LEFT HIGHEST CCA: 106
OHS CV PV CAROTID LEFT HIGHEST ICA: 195
OHS CV PV CAROTID RIGHT HIGHEST CCA: 81
OHS CV PV CAROTID RIGHT HIGHEST ICA: 87
OHS CV US CAROTID LEFT HIGHEST EDV: 42
POTASSIUM SERPL-SCNC: 4.1 MMOL/L
RIGHT ARM DIASTOLIC BLOOD PRESSURE: 75 MMHG
RIGHT ARM SYSTOLIC BLOOD PRESSURE: 160 MMHG
RIGHT CBA DIAS: 12 CM/S
RIGHT CBA SYS: 80 CM/S
RIGHT CCA DIST DIAS: 13 CM/S
RIGHT CCA DIST SYS: 75 CM/S
RIGHT CCA MID DIAS: 13 CM/S
RIGHT CCA MID SYS: 81 CM/S
RIGHT CCA PROX DIAS: 58 CM/S
RIGHT CCA PROX SYS: 66 CM/S
RIGHT ECA DIAS: 9 CM/S
RIGHT ECA SYS: 150 CM/S
RIGHT ICA DIST DIAS: 31 CM/S
RIGHT ICA DIST SYS: 87 CM/S
RIGHT ICA MID DIAS: 21 CM/S
RIGHT ICA MID SYS: 67 CM/S
RIGHT ICA PROX DIAS: 12 CM/S
RIGHT ICA PROX SYS: 82 CM/S
RIGHT VERTEBRAL DIAS: 12 CM/S
RIGHT VERTEBRAL SYS: 75 CM/S
SODIUM SERPL-SCNC: 134 MMOL/L

## 2019-01-14 PROCEDURE — 93880 EXTRACRANIAL BILAT STUDY: CPT | Mod: PBBFAC,PO | Performed by: INTERNAL MEDICINE

## 2019-01-14 PROCEDURE — 80048 BASIC METABOLIC PNL TOTAL CA: CPT

## 2019-01-14 PROCEDURE — 36415 COLL VENOUS BLD VENIPUNCTURE: CPT | Mod: PO

## 2019-01-14 PROCEDURE — 93880 CV US DOPPLER CAROTID (CUPID ONLY): ICD-10-PCS | Mod: 26,S$PBB,, | Performed by: INTERNAL MEDICINE

## 2019-01-14 NOTE — TELEPHONE ENCOUNTER
----- Message from Debbie Salinas MD sent at 1/14/2019  3:58 PM CST -----  Mr. Salazar,   Please review results of your carotid duplex test. It showed mild-moderate blockages on the left and minimal on the right.  We will repeat this test in 6-12 months.

## 2019-01-14 NOTE — TELEPHONE ENCOUNTER
----- Message from Debbie Salinas MD sent at 1/14/2019  2:43 PM CST -----  Please inform the patient that kiodney status is unchanged and potassium is fine. Sugar is not controled, but it was probably not a fasting results. Nevertheless he should address uncontrolled diabetes with his PCP.

## 2019-01-15 ENCOUNTER — OFFICE VISIT (OUTPATIENT)
Dept: INTERNAL MEDICINE | Facility: CLINIC | Age: 64
End: 2019-01-15
Payer: MEDICARE

## 2019-01-15 VITALS
WEIGHT: 185.06 LBS | SYSTOLIC BLOOD PRESSURE: 116 MMHG | DIASTOLIC BLOOD PRESSURE: 74 MMHG | BODY MASS INDEX: 24.53 KG/M2 | HEIGHT: 73 IN | HEART RATE: 76 BPM

## 2019-01-15 DIAGNOSIS — I12.9 HYPERTENSIVE KIDNEY DISEASE WITH STAGE 4 CHRONIC KIDNEY DISEASE: ICD-10-CM

## 2019-01-15 DIAGNOSIS — Z00.00 ENCOUNTER FOR PREVENTIVE HEALTH EXAMINATION: Primary | ICD-10-CM

## 2019-01-15 DIAGNOSIS — I15.2 HYPERTENSION ASSOCIATED WITH DIABETES: ICD-10-CM

## 2019-01-15 DIAGNOSIS — Z79.4 TYPE 2 DIABETES MELLITUS WITH DIABETIC POLYNEUROPATHY, WITH LONG-TERM CURRENT USE OF INSULIN: ICD-10-CM

## 2019-01-15 DIAGNOSIS — E78.5 DYSLIPIDEMIA: ICD-10-CM

## 2019-01-15 DIAGNOSIS — C90.00 MULTIPLE MYELOMA NOT HAVING ACHIEVED REMISSION: ICD-10-CM

## 2019-01-15 DIAGNOSIS — E55.9 VITAMIN D DEFICIENCY: ICD-10-CM

## 2019-01-15 DIAGNOSIS — H35.033 HYPERTENSIVE RETINOPATHY OF BOTH EYES: ICD-10-CM

## 2019-01-15 DIAGNOSIS — I50.32 CHRONIC DIASTOLIC HEART FAILURE: ICD-10-CM

## 2019-01-15 DIAGNOSIS — E11.59 HYPERTENSION ASSOCIATED WITH DIABETES: ICD-10-CM

## 2019-01-15 DIAGNOSIS — I27.9 CHRONIC PULMONARY HEART DISEASE: ICD-10-CM

## 2019-01-15 DIAGNOSIS — Z79.4 TYPE 2 DIABETES MELLITUS WITH STAGE 4 CHRONIC KIDNEY DISEASE, WITH LONG-TERM CURRENT USE OF INSULIN: ICD-10-CM

## 2019-01-15 DIAGNOSIS — E11.22 TYPE 2 DIABETES MELLITUS WITH STAGE 4 CHRONIC KIDNEY DISEASE, WITH LONG-TERM CURRENT USE OF INSULIN: ICD-10-CM

## 2019-01-15 DIAGNOSIS — M43.16 SPONDYLOLISTHESIS OF LUMBAR REGION: ICD-10-CM

## 2019-01-15 DIAGNOSIS — I70.0 AORTIC ATHEROSCLEROSIS: ICD-10-CM

## 2019-01-15 DIAGNOSIS — N25.81 SECONDARY HYPERPARATHYROIDISM: ICD-10-CM

## 2019-01-15 DIAGNOSIS — Z86.73 HISTORY OF STROKE: ICD-10-CM

## 2019-01-15 DIAGNOSIS — I25.10 CORONARY ARTERY DISEASE DUE TO CALCIFIED CORONARY LESION: ICD-10-CM

## 2019-01-15 DIAGNOSIS — H43.11 VITREOUS HEMORRHAGE, RIGHT: ICD-10-CM

## 2019-01-15 DIAGNOSIS — E11.42 DIABETIC POLYNEUROPATHY ASSOCIATED WITH TYPE 2 DIABETES MELLITUS: ICD-10-CM

## 2019-01-15 DIAGNOSIS — I25.84 CORONARY ARTERY DISEASE DUE TO CALCIFIED CORONARY LESION: ICD-10-CM

## 2019-01-15 DIAGNOSIS — N18.4 STAGE 4 CHRONIC KIDNEY DISEASE: ICD-10-CM

## 2019-01-15 DIAGNOSIS — I65.23 BILATERAL CAROTID ARTERY STENOSIS: ICD-10-CM

## 2019-01-15 DIAGNOSIS — R80.9 DIABETES MELLITUS WITH PROTEINURIA: ICD-10-CM

## 2019-01-15 DIAGNOSIS — N18.4 TYPE 2 DIABETES MELLITUS WITH STAGE 4 CHRONIC KIDNEY DISEASE, WITH LONG-TERM CURRENT USE OF INSULIN: ICD-10-CM

## 2019-01-15 DIAGNOSIS — E11.42 TYPE 2 DIABETES MELLITUS WITH DIABETIC POLYNEUROPATHY, WITH LONG-TERM CURRENT USE OF INSULIN: ICD-10-CM

## 2019-01-15 DIAGNOSIS — E11.29 DIABETES MELLITUS WITH PROTEINURIA: ICD-10-CM

## 2019-01-15 DIAGNOSIS — I10 ESSENTIAL HYPERTENSION: ICD-10-CM

## 2019-01-15 DIAGNOSIS — N18.4 HYPERTENSIVE KIDNEY DISEASE WITH STAGE 4 CHRONIC KIDNEY DISEASE: ICD-10-CM

## 2019-01-15 PROCEDURE — 99215 OFFICE O/P EST HI 40 MIN: CPT | Mod: PBBFAC | Performed by: NURSE PRACTITIONER

## 2019-01-15 PROCEDURE — 99999 PR PBB SHADOW E&M-EST. PATIENT-LVL V: CPT | Mod: PBBFAC,,, | Performed by: NURSE PRACTITIONER

## 2019-01-15 PROCEDURE — 99999 PR PBB SHADOW E&M-EST. PATIENT-LVL V: ICD-10-PCS | Mod: PBBFAC,,, | Performed by: NURSE PRACTITIONER

## 2019-01-15 PROCEDURE — G0439 PR MEDICARE ANNUAL WELLNESS SUBSEQUENT VISIT: ICD-10-PCS | Mod: S$GLB,,, | Performed by: NURSE PRACTITIONER

## 2019-01-15 PROCEDURE — G0439 PPPS, SUBSEQ VISIT: HCPCS | Mod: S$GLB,,, | Performed by: NURSE PRACTITIONER

## 2019-01-15 RX ORDER — CALCITRIOL 0.25 UG/1
0.5 CAPSULE ORAL
Qty: 30 CAPSULE | Refills: 6 | Status: SHIPPED | OUTPATIENT
Start: 2019-01-16 | End: 2019-01-01 | Stop reason: SDUPTHER

## 2019-01-15 NOTE — PATIENT INSTRUCTIONS
Counseling and Referral of Other Preventative  (Italic type indicates deductible and co-insurance are waived)    Patient Name: Marie Reis  Today's Date: 1/15/2019    Health Maintenance       Date Due Completion Date    Zoster Vaccine 09/10/2016 ---    Hemoglobin A1c 07/08/2019 1/8/2019    Override on 10/25/2017: Done    Lipid Panel 07/19/2019 7/19/2018    Override on 10/25/2017: Done    Fecal Occult Blood Test (FOBT)/FitKit 07/23/2019 7/23/2018 (Done)    Override on 7/23/2018: Done    Foot Exam 08/08/2019 8/8/2018    Override on 8/8/2018: Done    Override on 10/25/2017: Done    [place] on 7/5/[place]: [place]    Override on 6/26/2015: Done    Eye Exam 10/05/2019 10/5/2018    High Dose Statin 01/09/2020 1/9/2019    Pneumococcal Vaccine (Highest Risk) (3 of 3 - PPSV23) 07/08/2021 7/8/2016    TETANUS VACCINE 09/03/2023 9/3/2013        No orders of the defined types were placed in this encounter.    The following information is provided to all patients.  This information is to help you find resources for any of the problems found today that may be affecting your health:                Living healthy guide: www.[facility].[place].gov      Understanding Diabetes: www.diabetes.org      Eating healthy: www.cdc.gov/healthyweight      CDC home safety checklist: www.cdc.gov/steadi/patient.html      Agency on Aging: www.goea.[place].[facility]      Alcoholics anonymous (AA): www.aa.org      Physical Activity: www.yoselyn.nih.gov/cr4zjbl      Tobacco use: www.quitwithusla.org

## 2019-01-16 NOTE — PROGRESS NOTES
"Marie Reis presented for a  Medicare AWV and comprehensive Health Risk Assessment today. The following components were reviewed and updated:    · Medical history  · Family History  · Social history  · Allergies and Current Medications  · Health Risk Assessment  · Health Maintenance  · Care Team     ** See Completed Assessments for Annual Wellness Visit within the encounter summary.**       The following assessments were completed:  · Living Situation  · CAGE  · Depression Screening  · Timed Get Up and Go  · Whisper Test  · Cognitive Function Screening  · Nutrition Screening  · ADL Screening  · PAQ Screening        Vitals:    01/15/19 1215   BP: 116/74   BP Location: Left arm   Patient Position: Sitting   Pulse: 76   Weight: 84 kg (185 lb 1.2 oz)   Height: 6' 1" (1.854 m)     Body mass index is 24.42 kg/m².  Physical Exam   Constitutional: He is oriented to person, place, and time. He appears well-developed and well-nourished.   Ambulating with cane   HENT:   Head: Normocephalic and atraumatic.   Nose: Nose normal.   Neck: Normal range of motion. Neck supple.   Cardiovascular: Normal rate, regular rhythm, normal heart sounds and intact distal pulses.   Pulmonary/Chest: Effort normal and breath sounds normal. No respiratory distress.   Abdominal: He exhibits no distension.   Musculoskeletal: Normal range of motion. He exhibits edema.   Edema noted to bilateral ankles   Neurological: He is alert and oriented to person, place, and time.   Skin: Skin is warm and dry.   Psychiatric: He has a normal mood and affect. His behavior is normal.       Diagnoses and health risks identified today and associated recommendations/orders:    1. Encounter for preventive health examination  Assessments completed  Preventive health recommendations reviewed    2. 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     3. Diabetes mellitus with proteinuria   Uncontrolled, last hgba1c was 11.6   Currently on novolog, lantus and tradjenta  Followed by endocrine and nephrology    4. Type 2 diabetes mellitus with diabetic polyneuropathy, with long-term current use of insulin  Uncontrolled, last hgba1c was 11.6   Currently on novolog, lantus and tradjenta  Followed by endocrine.     5. Diabetic polyneuropathy associated with type 2 diabetes mellitus  Stable.   Controlled with current medical therapy  Followed by PCP and endocrine.     6. Type 2 diabetes mellitus with stage 4 chronic kidney disease, with long-term current use of insulin  Uncontrolled, last hgba1c was 11.6   Currently on novolog, lantus and tradjenta  Followed by endocrine and nephrology.     7. Stage 4 chronic kidney disease  Stable.   Followed by nephrology.     8. Aortic atherosclerosis  Stable. Seen on CT from 08/12/14  Controlled with current medical therapy  Followed by PCP.     9. Hypertension associated with diabetes  Stable.   Controlled with current medical therapy  Followed by PCP.     10. Hypertensive kidney disease with stage 4 chronic kidney disease  Stable.   Followed by nephrology and pcp.     11. Multiple myeloma not having achieved remission  Stable.   Followed by hem/onc.     12. Chronic pulmonary heart disease  Stable. Seen on echo from 10/26/17  Controlled with current medical therapy  Followed by cardiology     13. Chronic diastolic heart failure  Stable.   Controlled with current medical therapy  Followed by cardiology.     14. Coronary artery disease due to calcified coronary lesion  Stable.   Controlled with current medical therapy  Followed by cardiology.     15. Secondary hyperparathyroidism  Stable.   Controlled with current medical therapy  Followed by nephrology.       Per patient, he never received his calcitriol.  [facility] not  [facility] anymore.  [facility]     - calcitRIOL (ROCALTROL) 0.25 MCG Cap; Take 2 capsules (0.5 mcg total) by mouth every Mon, Wed, Fri.  Dispense: 30 capsule; Refill: 6    16. Vitreous hemorrhage, right  Stable.   Controlled with current medical therapy  Followed by ophthalmology     17. Bilateral carotid artery stenosis  Stable.   Controlled with current medical therapy  Followed by PCP.     18. History of stroke  Stable.   Followed by PCP.     19. Hypertensive retinopathy of both eyes  Stable.   Controlled with current medical therapy  Followed by ophthalmology.     20. Essential hypertension  Stable.   Controlled with current medical therapy  Followed by PCP.     21. Dyslipidemia  Stable.   Followed by PCP.     22. Vitamin D deficiency  Stable.   Followed by PCP.     23. Spondylolisthesis of lumbar region  Stable.   Controlled with current medical therapy  Followed by PCP.     Provided Point with a 5-10 year written screening schedule and personal prevention plan. Recommendations were developed using the USPSTF age appropriate recommendations. Education, counseling, and referrals were provided as needed. After Visit Summary printed and given to patient which includes a list of additional screenings\tests needed.    Follow-up in 4 months (on 5/3/2019) for a routine visit with your primary care provdier or sooner if problems arise. .    Renata Munroe NP

## 2019-01-21 ENCOUNTER — LAB VISIT (OUTPATIENT)
Dept: LAB | Facility: HOSPITAL | Age: 64
End: 2019-01-21
Attending: INTERNAL MEDICINE
Payer: MEDICARE

## 2019-01-21 ENCOUNTER — TELEPHONE (OUTPATIENT)
Dept: HEMATOLOGY/ONCOLOGY | Facility: CLINIC | Age: 64
End: 2019-01-21

## 2019-01-21 DIAGNOSIS — D47.2 MONOCLONAL GAMMOPATHY: ICD-10-CM

## 2019-01-21 LAB
BASOPHILS # BLD AUTO: 0.02 K/UL
BASOPHILS NFR BLD: 0.3 %
DIFFERENTIAL METHOD: ABNORMAL
EOSINOPHIL # BLD AUTO: 0.2 K/UL
EOSINOPHIL NFR BLD: 2.2 %
ERYTHROCYTE [DISTWIDTH] IN BLOOD BY AUTOMATED COUNT: 13.2 %
HCT VFR BLD AUTO: 27 %
HGB BLD-MCNC: 9.2 G/DL
IMM GRANULOCYTES # BLD AUTO: 0.03 K/UL
IMM GRANULOCYTES NFR BLD AUTO: 0.4 %
LYMPHOCYTES # BLD AUTO: 1.1 K/UL
LYMPHOCYTES NFR BLD: 15.2 %
MCH RBC QN AUTO: 28.7 PG
MCHC RBC AUTO-ENTMCNC: 34.1 G/DL
MCV RBC AUTO: 84 FL
MONOCYTES # BLD AUTO: 0.5 K/UL
MONOCYTES NFR BLD: 6.7 %
NEUTROPHILS # BLD AUTO: 5.2 K/UL
NEUTROPHILS NFR BLD: 75.2 %
NRBC BLD-RTO: 0 /100 WBC
PLATELET # BLD AUTO: 156 K/UL
PMV BLD AUTO: 10.9 FL
RBC # BLD AUTO: 3.21 M/UL
WBC # BLD AUTO: 6.91 K/UL

## 2019-01-21 PROCEDURE — 85025 COMPLETE CBC W/AUTO DIFF WBC: CPT

## 2019-01-21 PROCEDURE — 36415 COLL VENOUS BLD VENIPUNCTURE: CPT | Mod: PO

## 2019-01-21 NOTE — TELEPHONE ENCOUNTER
----- Message from Artur Guaman Jr., MD sent at 1/21/2019  3:49 PM CST -----  Does not need a transfusion  Continue monthly cbc

## 2019-01-22 ENCOUNTER — OFFICE VISIT (OUTPATIENT)
Dept: OPHTHALMOLOGY | Facility: CLINIC | Age: 64
End: 2019-01-22
Payer: MEDICARE

## 2019-01-22 VITALS — DIASTOLIC BLOOD PRESSURE: 79 MMHG | HEART RATE: 90 BPM | SYSTOLIC BLOOD PRESSURE: 158 MMHG

## 2019-01-22 DIAGNOSIS — H43.11 VITREOUS HEMORRHAGE, RIGHT: ICD-10-CM

## 2019-01-22 PROCEDURE — 92235 FLUORESCEIN ANGIOGRAPHY - OU - BOTH EYES: ICD-10-PCS | Mod: 26,S$PBB,, | Performed by: OPHTHALMOLOGY

## 2019-01-22 PROCEDURE — 92235 FLUORESCEIN ANGRPH MLTIFRAME: CPT | Mod: PBBFAC,50 | Performed by: OPHTHALMOLOGY

## 2019-01-22 PROCEDURE — 92134 CPTRZ OPH DX IMG PST SGM RTA: CPT | Mod: PBBFAC | Performed by: OPHTHALMOLOGY

## 2019-01-22 PROCEDURE — 92014 PR EYE EXAM, EST PATIENT,COMPREHESV: ICD-10-PCS | Mod: S$PBB,,, | Performed by: OPHTHALMOLOGY

## 2019-01-22 PROCEDURE — 99999 PR PBB SHADOW E&M-EST. PATIENT-LVL III: ICD-10-PCS | Mod: PBBFAC,,, | Performed by: OPHTHALMOLOGY

## 2019-01-22 PROCEDURE — 99213 OFFICE O/P EST LOW 20 MIN: CPT | Mod: PBBFAC,25 | Performed by: OPHTHALMOLOGY

## 2019-01-22 PROCEDURE — 99999 PR PBB SHADOW E&M-EST. PATIENT-LVL III: CPT | Mod: PBBFAC,,, | Performed by: OPHTHALMOLOGY

## 2019-01-22 PROCEDURE — 92134 POSTERIOR SEGMENT OCT RETINA (OCULAR COHERENCE TOMOGRAPHY)-BOTH EYES: ICD-10-PCS | Mod: 26,S$PBB,, | Performed by: OPHTHALMOLOGY

## 2019-01-22 PROCEDURE — 92014 COMPRE OPH EXAM EST PT 1/>: CPT | Mod: S$PBB,,, | Performed by: OPHTHALMOLOGY

## 2019-01-22 NOTE — PATIENT INSTRUCTIONS
Fluorescein Angiogram procedure explained to pt. FA handout attached to AVS below.      Fluorescein Angiography  Fluorescein angiography is an eye test. It is done to look at the back of your eye, including:  · The blood vessels in your eye  · The layer of tissue at the back of your eye (the retina)  · The center of your retina (the macula)  · The optic nerve  This test can diagnose diseases found in these areas. It can also diagnose other conditions that affect these areas. To do this test, a dye called fluorescein is shot (injected) into your arm. The dye goes into your bloodstream and up into the blood vessels in your eyes. A special camera is then used to take images (angiograms) of your eyes.     A fluorescein angiogram of the retina   Getting ready for your test  Tell your healthcare provider if you:  · Are pregnant or think you may be pregnant  · Are breastfeeding  · Have a history of severe allergic reactions, including to X-ray dye or other medicines  · Have kidney problems  Tell your provider about any medicines you are taking. You may need to stop taking all or some of these before the test. This includes:  · All prescription medicines  · Over-the-counter medicines such as aspirin or ibuprofen  · Street drugs  · Herbs, vitamins, and other supplements  You should arrange for an adult family member or friend to drive you home after your test. Your vision will be blurry for up to 12 hours.  Follow any other instructions from your healthcare provider.  During your test  · You are given eye drops to enlarge (dilate) your pupils.  · You then sit in front of a special camera. You place your chin on the chin rest and look into the camera.  · Images are taken of your eyes, one eye at a time.  · Fluorescein dye is then injected into your arm. The lights in the room are turned off. You may have mild nausea. You may have a warm feeling in your arm or upper body. Tell your provider if your skin feels itchy or if you  are having trouble breathing. If so, you could be having an allergic reaction to the dye.  · More pictures of your eyes are taken over 15 to 30 minutes. The camera shines a bright light into your eyes. Try to keep your head still and your eyes open.  · When enough images have been taken, the test is over.  After your test  Your vision will be blurry for up to 4 to 12 hours. This is because of your dilated pupils. Your eye will be more sensitive to light for up to 12 hours. You may want to wear sunglasses during this time. Do not drive if your vision is very blurry. You may also find it uncomfortable to read. Your skin may look yellow for a few hours. This is from the dye. Your urine will be bright yellow or orange for 24 to 48 hours after the test.     Risks and possible complications  All procedures have some risks. Possible risks of fluorescein angiography include:  · Upset stomach (nausea) and vomiting  · Leaking dye around the injection site that causes pain and swelling  · Metallic taste in your mouth  · Infection at injection site  · Allergic reaction to the dye  · Dry mouth or too much saliva  · Faster heart rate  · Sweating  · Lower back pain   © 9172-0116 [employer]. [address] [place] [address], [place] [address]. All rights reserved. This information is not intended as a substitute for professional medical care. Always follow your healthcare professional's instructions.

## 2019-01-31 ENCOUNTER — LAB VISIT (OUTPATIENT)
Dept: LAB | Facility: HOSPITAL | Age: 64
End: 2019-01-31
Attending: INTERNAL MEDICINE
Payer: MEDICARE

## 2019-01-31 DIAGNOSIS — E11.22 TYPE 2 DIABETES MELLITUS WITH STAGE 3 CHRONIC KIDNEY DISEASE, WITH LONG-TERM CURRENT USE OF INSULIN: ICD-10-CM

## 2019-01-31 DIAGNOSIS — Z79.4 TYPE 2 DIABETES MELLITUS WITH STAGE 3 CHRONIC KIDNEY DISEASE, WITH LONG-TERM CURRENT USE OF INSULIN: ICD-10-CM

## 2019-01-31 DIAGNOSIS — N18.30 TYPE 2 DIABETES MELLITUS WITH STAGE 3 CHRONIC KIDNEY DISEASE, WITH LONG-TERM CURRENT USE OF INSULIN: ICD-10-CM

## 2019-01-31 LAB
25(OH)D3+25(OH)D2 SERPL-MCNC: 25 NG/ML
ALBUMIN SERPL BCP-MCNC: 2.9 G/DL
ANION GAP SERPL CALC-SCNC: 8 MMOL/L
BUN SERPL-MCNC: 23 MG/DL
CALCIUM SERPL-MCNC: 8.6 MG/DL
CHLORIDE SERPL-SCNC: 107 MMOL/L
CO2 SERPL-SCNC: 22 MMOL/L
CREAT SERPL-MCNC: 3 MG/DL
EST. GFR  (AFRICAN AMERICAN): 24.6 ML/MIN/1.73 M^2
EST. GFR  (NON AFRICAN AMERICAN): 21.3 ML/MIN/1.73 M^2
GLUCOSE SERPL-MCNC: 287 MG/DL
PHOSPHATE SERPL-MCNC: 3.3 MG/DL
POTASSIUM SERPL-SCNC: 3.6 MMOL/L
SODIUM SERPL-SCNC: 137 MMOL/L

## 2019-01-31 PROCEDURE — 36415 COLL VENOUS BLD VENIPUNCTURE: CPT | Mod: PO

## 2019-01-31 PROCEDURE — 80069 RENAL FUNCTION PANEL: CPT

## 2019-01-31 PROCEDURE — 83970 ASSAY OF PARATHORMONE: CPT

## 2019-01-31 PROCEDURE — 82306 VITAMIN D 25 HYDROXY: CPT

## 2019-02-01 LAB — PTH-INTACT SERPL-MCNC: 431 PG/ML

## 2019-02-04 ENCOUNTER — LAB VISIT (OUTPATIENT)
Dept: LAB | Facility: HOSPITAL | Age: 64
End: 2019-02-04
Attending: FAMILY MEDICINE
Payer: MEDICARE

## 2019-02-04 ENCOUNTER — HOSPITAL ENCOUNTER (OUTPATIENT)
Dept: RADIOLOGY | Facility: HOSPITAL | Age: 64
Discharge: HOME | End: 2019-02-04
Attending: FAMILY MEDICINE
Payer: MEDICARE

## 2019-02-04 ENCOUNTER — OFFICE VISIT (OUTPATIENT)
Dept: FAMILY MEDICINE | Facility: CLINIC | Age: 64
End: 2019-02-04
Payer: MEDICARE

## 2019-02-04 VITALS
SYSTOLIC BLOOD PRESSURE: 140 MMHG | DIASTOLIC BLOOD PRESSURE: 80 MMHG | BODY MASS INDEX: 24.83 KG/M2 | WEIGHT: 187.38 LBS | HEART RATE: 75 BPM | HEIGHT: 73 IN

## 2019-02-04 DIAGNOSIS — I27.9 CHRONIC PULMONARY HEART DISEASE: ICD-10-CM

## 2019-02-04 DIAGNOSIS — R06.02 SOB (SHORTNESS OF BREATH): ICD-10-CM

## 2019-02-04 DIAGNOSIS — N18.4 STAGE 4 CHRONIC KIDNEY DISEASE: ICD-10-CM

## 2019-02-04 DIAGNOSIS — R42 DIZZINESS: ICD-10-CM

## 2019-02-04 DIAGNOSIS — I10 ESSENTIAL HYPERTENSION: ICD-10-CM

## 2019-02-04 DIAGNOSIS — D63.8 CHRONIC DISEASE ANEMIA: ICD-10-CM

## 2019-02-04 DIAGNOSIS — R42 DIZZINESS: Primary | ICD-10-CM

## 2019-02-04 DIAGNOSIS — I50.32 CHRONIC DIASTOLIC CONGESTIVE HEART FAILURE: ICD-10-CM

## 2019-02-04 DIAGNOSIS — F33.1 MODERATE EPISODE OF RECURRENT MAJOR DEPRESSIVE DISORDER: ICD-10-CM

## 2019-02-04 LAB
ALBUMIN SERPL BCP-MCNC: 2.7 G/DL
ANION GAP SERPL CALC-SCNC: 8 MMOL/L
BASOPHILS # BLD AUTO: 0.03 K/UL
BASOPHILS NFR BLD: 0.4 %
BNP SERPL-MCNC: 2934 PG/ML
BUN SERPL-MCNC: 26 MG/DL
CALCIUM SERPL-MCNC: 8.8 MG/DL
CHLORIDE SERPL-SCNC: 106 MMOL/L
CO2 SERPL-SCNC: 21 MMOL/L
CREAT SERPL-MCNC: 2.9 MG/DL
DIFFERENTIAL METHOD: ABNORMAL
EOSINOPHIL # BLD AUTO: 0.2 K/UL
EOSINOPHIL NFR BLD: 2.1 %
ERYTHROCYTE [DISTWIDTH] IN BLOOD BY AUTOMATED COUNT: 14.6 %
EST. GFR  (AFRICAN AMERICAN): 25.6 ML/MIN/1.73 M^2
EST. GFR  (NON AFRICAN AMERICAN): 22.2 ML/MIN/1.73 M^2
GLUCOSE SERPL-MCNC: 223 MG/DL
HCT VFR BLD AUTO: 22 %
HGB BLD-MCNC: 7.1 G/DL
IMM GRANULOCYTES # BLD AUTO: 0.04 K/UL
IMM GRANULOCYTES NFR BLD AUTO: 0.5 %
LYMPHOCYTES # BLD AUTO: 1.1 K/UL
LYMPHOCYTES NFR BLD: 14.5 %
MCH RBC QN AUTO: 28.6 PG
MCHC RBC AUTO-ENTMCNC: 32.3 G/DL
MCV RBC AUTO: 89 FL
MONOCYTES # BLD AUTO: 0.5 K/UL
MONOCYTES NFR BLD: 6.3 %
NEUTROPHILS # BLD AUTO: 6 K/UL
NEUTROPHILS NFR BLD: 76.2 %
NRBC BLD-RTO: 0 /100 WBC
PHOSPHATE SERPL-MCNC: 2.9 MG/DL
PLATELET # BLD AUTO: 192 K/UL
PMV BLD AUTO: 10.9 FL
POTASSIUM SERPL-SCNC: 3.6 MMOL/L
RBC # BLD AUTO: 2.48 M/UL
SODIUM SERPL-SCNC: 135 MMOL/L
TSH SERPL DL<=0.005 MIU/L-ACNC: 2.91 UIU/ML
WBC # BLD AUTO: 7.8 K/UL

## 2019-02-04 PROCEDURE — 93010 ELECTROCARDIOGRAM REPORT: CPT | Mod: S$PBB,,, | Performed by: INTERNAL MEDICINE

## 2019-02-04 PROCEDURE — 99214 OFFICE O/P EST MOD 30 MIN: CPT | Mod: S$PBB,,, | Performed by: FAMILY MEDICINE

## 2019-02-04 PROCEDURE — 93010 EKG 12-LEAD: ICD-10-PCS | Mod: S$PBB,,, | Performed by: INTERNAL MEDICINE

## 2019-02-04 PROCEDURE — 36415 COLL VENOUS BLD VENIPUNCTURE: CPT | Mod: PO

## 2019-02-04 PROCEDURE — 84443 ASSAY THYROID STIM HORMONE: CPT

## 2019-02-04 PROCEDURE — 71046 XR CHEST PA AND LATERAL: ICD-10-PCS | Mod: 26,,, | Performed by: RADIOLOGY

## 2019-02-04 PROCEDURE — 99999 PR PBB SHADOW E&M-EST. PATIENT-LVL III: ICD-10-PCS | Mod: PBBFAC,,, | Performed by: FAMILY MEDICINE

## 2019-02-04 PROCEDURE — 93005 ELECTROCARDIOGRAM TRACING: CPT | Mod: PBBFAC,PO | Performed by: INTERNAL MEDICINE

## 2019-02-04 PROCEDURE — 99999 PR PBB SHADOW E&M-EST. PATIENT-LVL III: CPT | Mod: PBBFAC,,, | Performed by: FAMILY MEDICINE

## 2019-02-04 PROCEDURE — 99213 OFFICE O/P EST LOW 20 MIN: CPT | Mod: PBBFAC,PO | Performed by: FAMILY MEDICINE

## 2019-02-04 PROCEDURE — 85025 COMPLETE CBC W/AUTO DIFF WBC: CPT

## 2019-02-04 PROCEDURE — 71046 X-RAY EXAM CHEST 2 VIEWS: CPT | Mod: TC,FY,PO

## 2019-02-04 PROCEDURE — 99214 PR OFFICE/OUTPT VISIT, EST, LEVL IV, 30-39 MIN: ICD-10-PCS | Mod: S$PBB,,, | Performed by: FAMILY MEDICINE

## 2019-02-04 PROCEDURE — 83880 ASSAY OF NATRIURETIC PEPTIDE: CPT

## 2019-02-04 PROCEDURE — 71046 X-RAY EXAM CHEST 2 VIEWS: CPT | Mod: 26,,, | Performed by: RADIOLOGY

## 2019-02-04 PROCEDURE — 80069 RENAL FUNCTION PANEL: CPT

## 2019-02-04 RX ORDER — TELMISARTAN 40 MG/1
40 TABLET ORAL DAILY
Qty: 90 TABLET | Refills: 0 | Status: SHIPPED | OUTPATIENT
Start: 2019-02-04 | End: 2019-05-05 | Stop reason: SDUPTHER

## 2019-02-04 NOTE — PATIENT INSTRUCTIONS
Chronic Kidney Disease (CKD)     The role of the kidneys is to remove waste products and extra water from the blood.  When the kidneys do not work as they should, waste products begin to build up in the blood. This is called chronic kidney disease (CKD). CKD means that you have kidney damage or a decrease in kidney function lasting at least 3 months. CKD allows extra water, waste, and toxins to build up in the body. This can eventually become life-threatening. You might need dialysis or a kidney transplant to stay alive. This most severe form is called end stage renal disease.  Diabetes is the leading causes of chronic renal failure. Other causes include high blood pressure, hardening of the arteries (atherosclerosis), lupus, inflammation of the blood vessels (vasculitis), and past viral or bacterial infections. Certain over-the-counter pain medicines can cause renal failure when taken often over a long period of time. These include aspirin, ibuprofen, and related anti-inflammatory medicines called NSAIDs (nonsteroidal anti-inflammatory drugs).  Home care  The following guidelines will help you care for yourself at home:  · If you have diabetes, talk with your healthcare provider about keeping your blood sugar under control. Ask if you need to make and changes to your diet, lifestyle, or medicines.  · If you have high blood pressure:  ¨ Take prescribed medicine to lower your blood pressure to the recommended goal of less than 130/80.  ¨ Start a regular exercise program that you enjoy. Check with your healthcare provider to be sure your planned exercise program is right for you.  ¨ Eat less salt (sodium). Your healthcare provider can tell you how much salt per day is safe for you.  · If you are overweight, talk with your healthcare provider about a weight loss plan.  · If you smoke, you must quit. Smoking makes kidney disease worse. Talk with your healthcare provider about ways to help you quit.  For more  information, visit the following links:  ¨ www.smokefree.gov/sites/default/files/pdf/clearing-the-air-accessible.pdf  ¨ www.smokefree.gov  ¨ www.cancer.org/healthy/stayawayfromtobacco/guidetoquittingsmoking/  · Most people with CKD need to follow a special diet.  Be sure you understand yours. In general, you will need to limit protein, salt, potassium, and phosphorus. You also need to limit how much fluid you drink.   · CKD is a risk factor for heart disease. Talk with your healthcare provider about any other risk factors you might have and what you can do to lessen them.  · Talk with your healthcare provider about any medicines you are taking to find out if they need to be reduced or stopped.  · Don't use the following over-the-counter medicines, or consult your healthcare provider before using:  ¨ Aspirin and NSAIDs such as ibuprofen or naproxen. Using acetaminophen for fever or pain is OK.  ¨ Laxatives and antacids containing magnesium or aluminum  ¨ Fleet or phospho soda enemas containing phosphorus  ¨ Certain stomach acid-blocking medicine such as cimetidine or ranitidine   ¨ Decongestants containing pseudoephedrine   ¨ Herbal supplements  Follow-up care  Follow up with your healthcare provider, or as advised. Contact one of the following for more information:  · American Association of Kidney Patients 337-010-5417 www.aakp.org  · National Kidney Foundation 514-663-4168 www.kidney.org  · American Kidney Fund 312-252-2299 www.kidneyfund.org  · National Kidney Disease Education Program 866-4KIDNEY www.nkdep.nih.gov  If an X-ray, ECG (cardiogram), or other diagnostic test was taken, you will be told of any new findings that may affect your care.  Call 911  Call 911 if you have any of the following:  · Severe weakness, dizziness, fainting, drowsiness, or confusion  · Chest pain or shortness of breath  · Heart beating fast, slow, or irregularly  When to seek medical advice  Call your healthcare provider right away  if any of these occur:  · Nausea or vomiting  · Fever of 100.4°F (38°C) or higher, or as directed by your healthcare provider  · Unexpected weight gain or swelling in the legs, ankles, or around the eyes  · Decrease or absent urine output  Date Last Reviewed: 9/1/2016  © 8237-0980 [employer]. [address] [place] [address], [place] [address]. All rights reserved. This information is not intended as a substitute for professional medical care. Always follow your healthcare professional's instructions.        Heart Failure: Tracking Your Weight  You have a condition called heart failure. When you have heart failure, a sudden weight gain or a steady rise in weight is a warning sign that your body is retaining too much water and salt. This could mean your heart failure is getting worse. If left untreated, it can cause problems for your lungs and result in shortness of breath. Weighing yourself each day is the best way to know if youre retaining water. If your weight goes up quickly, call your doctor. You will be given instructions on how to get rid of the excess water. You will likely need medicines and to avoid salt. This will help your heart work better.  Call your doctor if you gain more than 2 pounds in 1 day, more than 5 pounds in 1 week, or whatever weight gain you were told to report by your doctor. This is often a sign of worsening heart failure and needs to be evaluated and treated. Your doctor will tell you what to do next.   Tips for weighing yourself    · Weigh yourself at the same time each morning, wearing the same clothes. Weigh yourself after urinating and before eating.  · Use the same scale each day. Make sure the numbers are easy to read. Put the scale on a flat, hard surface -- not on a rug or carpet.  · Do not stop weighing yourself. If you forget one day, weigh again the next morning.  How to use your weight chart  · Keep your weight chart near the scale. Write your weight on the chart as  soon as you get off the scale.  · Fill in the month and the start date on the chart. Then write down your weight each day. Your chart will look like this:    · If you miss a day, leave the space blank. Weigh yourself the next day and write your weight in the next space.  · Take your weight chart with you when you go to see your doctor.  Date Last Reviewed: 3/20/2016  © 5997-9629 [employer]. [address] [place] [address], [place] [address]. All rights reserved. This information is not intended as a substitute for professional medical care. Always follow your healthcare professional's instructions.

## 2019-02-04 NOTE — PROGRESS NOTES
Subjective:       Patient ID: Marie Reis Jr. is a 63 y.o. male.    Chief Complaint: Dizziness    63 years old male came to the clinic with significant dizziness associated with episodic shortness of breath.  Patient also reports left upper back pain. No palpitation, orthopnea or PND.  Patient taking Lasix right now.  Patient is not monitoring his weight at home.  Last blood work with evidence of significant anemia associated with chronic kidney disease stage 4.  He was not able to tolerate losartan .  He states that the blood pressure was high taking the medicine.  Patient with significant depression after the death of his son and grandson in a car accident.  No suicidal or homicidal ideations.      Review of Systems   Constitutional: Negative.    HENT: Negative.    Eyes: Negative.    Respiratory: Positive for shortness of breath.    Cardiovascular: Negative.    Gastrointestinal: Negative.    Genitourinary: Negative.    Musculoskeletal: Negative.    Skin: Negative.    Neurological: Positive for dizziness.   Psychiatric/Behavioral: Positive for dysphoric mood.       Objective:      Physical Exam   Constitutional: He is oriented to person, place, and time. He appears well-developed and well-nourished. No distress.   HENT:   Head: Normocephalic and atraumatic.   Right Ear: External ear normal.   Left Ear: External ear normal.   Nose: Nose normal.   Mouth/Throat: Oropharynx is clear and moist. No oropharyngeal exudate.   Eyes: Conjunctivae and EOM are normal. Pupils are equal, round, and reactive to light. Right eye exhibits no discharge. Left eye exhibits no discharge. No scleral icterus.   Neck: Normal range of motion. Neck supple. No JVD present. No tracheal deviation present. No thyromegaly present.   Cardiovascular: Normal rate, regular rhythm, normal heart sounds and intact distal pulses. Exam reveals no gallop and no friction rub.   No murmur heard.  Pulmonary/Chest: Effort normal and breath sounds normal. No  stridor. No respiratory distress. He has no wheezes. He has no rales. He exhibits no tenderness.   Abdominal: Soft. Bowel sounds are normal. He exhibits no distension and no mass. There is no tenderness. There is no rebound and no guarding.   Musculoskeletal: Normal range of motion. He exhibits no edema or tenderness.   Lymphadenopathy:     He has no cervical adenopathy.   Neurological: He is alert and oriented to person, place, and time. He has normal reflexes. He displays normal reflexes. No cranial nerve deficit. He exhibits normal muscle tone. Coordination normal.   Skin: Skin is warm and dry. No rash noted. He is not diaphoretic. No erythema. No pallor.   Psychiatric: His behavior is normal. Judgment and thought content normal. His mood appears anxious. His affect is not angry, not blunt, not labile and not inappropriate. He exhibits a depressed mood.   Nursing note and vitals reviewed.      Assessment:       1. Dizziness    2. SOB (shortness of breath)    3. Stage 4 chronic kidney disease    4. Chronic pulmonary heart disease    5. Chronic disease anemia    6. Chronic diastolic congestive heart failure    7. Essential hypertension    8. Moderate episode of recurrent major depressive disorder        Plan:         Marie was seen today for dizziness.    Diagnoses and all orders for this visit:    Dizziness  -     IN OFFICE EKG 12-LEAD (to Muse)  -     Renal function panel; Future  -     CBC auto differential; Future  -     TSH; Future    SOB (shortness of breath)  -     Brain natriuretic peptide; Future  -     X-Ray Chest PA And Lateral; Future    Stage 4 chronic kidney disease  -     Renal function panel; Future    Chronic pulmonary heart disease  -     Renal function panel; Future    Chronic disease anemia  -     CBC auto differential; Future    Chronic diastolic congestive heart failure  -     telmisartan (MICARDIS) 40 MG Tab; Take 1 tablet (40 mg total) by mouth once daily.    Essential hypertension  -      CBC auto differential; Future  -     TSH; Future  -     telmisartan (MICARDIS) 40 MG Tab; Take 1 tablet (40 mg total) by mouth once daily.    Moderate episode of recurrent major depressive disorder     Patient taking Remeron for the appetite and depression.  Start monitoring weight at home.

## 2019-02-05 ENCOUNTER — TELEPHONE (OUTPATIENT)
Dept: HEMATOLOGY/ONCOLOGY | Facility: CLINIC | Age: 64
End: 2019-02-05

## 2019-02-05 ENCOUNTER — TELEPHONE (OUTPATIENT)
Dept: FAMILY MEDICINE | Facility: CLINIC | Age: 64
End: 2019-02-05

## 2019-02-05 ENCOUNTER — INFUSION (OUTPATIENT)
Dept: INFUSION THERAPY | Facility: HOSPITAL | Age: 64
End: 2019-02-05
Attending: FAMILY MEDICINE
Payer: MEDICARE

## 2019-02-05 VITALS
RESPIRATION RATE: 18 BRPM | HEART RATE: 88 BPM | SYSTOLIC BLOOD PRESSURE: 185 MMHG | DIASTOLIC BLOOD PRESSURE: 93 MMHG | TEMPERATURE: 98 F

## 2019-02-05 DIAGNOSIS — C90.00 MULTIPLE MYELOMA NOT HAVING ACHIEVED REMISSION: Primary | ICD-10-CM

## 2019-02-05 DIAGNOSIS — C90.00 MULTIPLE MYELOMA NOT HAVING ACHIEVED REMISSION: ICD-10-CM

## 2019-02-05 PROCEDURE — 96375 TX/PRO/DX INJ NEW DRUG ADDON: CPT

## 2019-02-05 PROCEDURE — 96374 THER/PROPH/DIAG INJ IV PUSH: CPT

## 2019-02-05 PROCEDURE — 36430 TRANSFUSION BLD/BLD COMPNT: CPT

## 2019-02-05 PROCEDURE — P9038 RBC IRRADIATED: HCPCS

## 2019-02-05 PROCEDURE — 63600175 PHARM REV CODE 636 W HCPCS: Performed by: INTERNAL MEDICINE

## 2019-02-05 PROCEDURE — 86922 COMPATIBILITY TEST ANTIGLOB: CPT

## 2019-02-05 PROCEDURE — 25000003 PHARM REV CODE 250: Performed by: INTERNAL MEDICINE

## 2019-02-05 PROCEDURE — 96376 TX/PRO/DX INJ SAME DRUG ADON: CPT

## 2019-02-05 PROCEDURE — 27201040 HC RC 50 FILTER

## 2019-02-05 RX ORDER — FUROSEMIDE 10 MG/ML
20 INJECTION INTRAMUSCULAR; INTRAVENOUS
Status: DISCONTINUED | OUTPATIENT
Start: 2019-02-05 | End: 2019-02-05 | Stop reason: HOSPADM

## 2019-02-05 RX ORDER — HYDROCODONE BITARTRATE AND ACETAMINOPHEN 500; 5 MG/1; MG/1
TABLET ORAL ONCE
Status: COMPLETED | OUTPATIENT
Start: 2019-02-05 | End: 2019-02-05

## 2019-02-05 RX ORDER — HYDROCODONE BITARTRATE AND ACETAMINOPHEN 500; 5 MG/1; MG/1
TABLET ORAL ONCE
Status: CANCELLED | OUTPATIENT
Start: 2019-02-05 | End: 2019-02-05

## 2019-02-05 RX ORDER — FUROSEMIDE 10 MG/ML
20 INJECTION INTRAMUSCULAR; INTRAVENOUS
Status: CANCELLED | OUTPATIENT
Start: 2019-02-05

## 2019-02-05 RX ADMIN — FUROSEMIDE 20 MG: 10 INJECTION, SOLUTION INTRAMUSCULAR; INTRAVENOUS at 04:02

## 2019-02-05 RX ADMIN — FUROSEMIDE 20 MG: 10 INJECTION, SOLUTION INTRAMUSCULAR; INTRAVENOUS at 06:02

## 2019-02-05 RX ADMIN — SODIUM CHLORIDE: 0.9 INJECTION, SOLUTION INTRAVENOUS at 01:02

## 2019-02-05 NOTE — PLAN OF CARE
Problem: Anemia  Goal: Anemia Symptom Improvement    Intervention: Monitor and Manage Anemia  Labs , hx, and medications reviewed. Assessment completed. Discussed plan of care with patient. Patient in agreement. VSS.  Chair reclined and warm blanket and snack offered. Will cont to monitor

## 2019-02-05 NOTE — TELEPHONE ENCOUNTER
Pt informed his labs shows significant anemia which is lower than previous labs , per dr mays pt informed he needs to go to er to be evaluated for possible blood transfusion, verb understanding

## 2019-02-05 NOTE — TELEPHONE ENCOUNTER
Blood work with evidence of significant Anemia(lower than before) associated with heart failure and chronic kidney disease.     Patient needs ER evaluation to consider blood transfusion.     Please notify the patient to go to the emergency room for evaluation today.

## 2019-02-06 NOTE — PLAN OF CARE
Problem: Adult Inpatient Plan of Care  Goal: Plan of Care Review  Outcome: Ongoing (interventions implemented as appropriate)  Pt tolerated 2u PRBC with 20 mg lasix after each unit without adverse effects. BP elevated pt took own BP meds & will recheck at home. Verbalized understanding of RTC date. DC with family ambulating independently.

## 2019-02-07 ENCOUNTER — OFFICE VISIT (OUTPATIENT)
Dept: NEPHROLOGY | Facility: CLINIC | Age: 64
End: 2019-02-07
Payer: MEDICARE

## 2019-02-07 VITALS
DIASTOLIC BLOOD PRESSURE: 100 MMHG | BODY MASS INDEX: 24.69 KG/M2 | SYSTOLIC BLOOD PRESSURE: 180 MMHG | HEIGHT: 73 IN | HEART RATE: 92 BPM | OXYGEN SATURATION: 96 % | WEIGHT: 186.31 LBS

## 2019-02-07 DIAGNOSIS — E11.22 TYPE 2 DIABETES MELLITUS WITH STAGE 4 CHRONIC KIDNEY DISEASE, WITH LONG-TERM CURRENT USE OF INSULIN: ICD-10-CM

## 2019-02-07 DIAGNOSIS — N18.4 HYPERTENSIVE KIDNEY DISEASE WITH STAGE 4 CHRONIC KIDNEY DISEASE: ICD-10-CM

## 2019-02-07 DIAGNOSIS — R80.9 DIABETES MELLITUS WITH PROTEINURIA: ICD-10-CM

## 2019-02-07 DIAGNOSIS — E55.9 VITAMIN D DEFICIENCY: ICD-10-CM

## 2019-02-07 DIAGNOSIS — I12.9 HYPERTENSIVE KIDNEY DISEASE WITH STAGE 4 CHRONIC KIDNEY DISEASE: ICD-10-CM

## 2019-02-07 DIAGNOSIS — N25.81 SECONDARY HYPERPARATHYROIDISM: ICD-10-CM

## 2019-02-07 DIAGNOSIS — Z79.4 TYPE 2 DIABETES MELLITUS WITH STAGE 4 CHRONIC KIDNEY DISEASE, WITH LONG-TERM CURRENT USE OF INSULIN: ICD-10-CM

## 2019-02-07 DIAGNOSIS — E11.29 DIABETES MELLITUS WITH PROTEINURIA: ICD-10-CM

## 2019-02-07 DIAGNOSIS — N18.4 TYPE 2 DIABETES MELLITUS WITH STAGE 4 CHRONIC KIDNEY DISEASE, WITH LONG-TERM CURRENT USE OF INSULIN: ICD-10-CM

## 2019-02-07 PROCEDURE — 99213 OFFICE O/P EST LOW 20 MIN: CPT | Mod: PBBFAC | Performed by: INTERNAL MEDICINE

## 2019-02-07 PROCEDURE — 99214 OFFICE O/P EST MOD 30 MIN: CPT | Mod: S$PBB,,, | Performed by: INTERNAL MEDICINE

## 2019-02-07 PROCEDURE — 99214 PR OFFICE/OUTPT VISIT, EST, LEVL IV, 30-39 MIN: ICD-10-PCS | Mod: S$PBB,,, | Performed by: INTERNAL MEDICINE

## 2019-02-07 PROCEDURE — 99999 PR PBB SHADOW E&M-EST. PATIENT-LVL III: ICD-10-PCS | Mod: PBBFAC,,, | Performed by: INTERNAL MEDICINE

## 2019-02-07 PROCEDURE — 99999 PR PBB SHADOW E&M-EST. PATIENT-LVL III: CPT | Mod: PBBFAC,,, | Performed by: INTERNAL MEDICINE

## 2019-02-07 NOTE — Clinical Note
Mr. Reis continues to be non compliant with his medicines, diet. He had just taken his morning antihypertensive medicines at noon so at 1 p.m. Appointment his BP control looked poor. I advised him to check it back in 3-4 hours and to call in if SBP still very high. I was wondering if he can be enrolled into Digital BP monitoring program. I am unable to refer as our clinic is apparently not connected with the program. Thanks. Overall he has very high risk of progression to ESRD, most recent A1C is > 11.

## 2019-02-07 NOTE — PROGRESS NOTES
"Subjective:       Patient ID: Marie Reis Jr. is a 62 y.o. Black or  male who presents for follow up evaluation of Chronic Kidney Disease    HPI     Mr. Reis is seen in nephrology clinic for follow up evaluation for CKD. He arrived accompanied by his wife. He established care with me in 4/16, he followed until 5/16 and then he was lost for follow up until 10/17 when he saw Dr. Huerta in nephrology clinic. Since then again he did not return for follow up until 1/18. He was last followed on 8/21/18.     He arrived today accompanied by his wife. He was noted to have uncontrolled hypertension. But on further questioning he reported he had not taken his BP medicines for the morning until around noon time and it was only in the past hour or so he had taken his antihypertensive medicines. He kept saying " I don't think the pill is working" but his wife reported he has not been consistent in taking his medicines doses. She admitted he does not check BP at home regularly. Noted some of the office visits in the past few months showed uncontrolled HTN but on couple of times it was actually tightly controlled. He did have changes to his regimen, especially ARB per his PCP office in the past month.    His multiple myeloma is being managed by his hematologist. They report he had low blood counts and received blood transfusion recently. Of note, his diabetes and glucose control have worsened again and most recent A1C is 11. His wife reported he does drink soda every day thinking "it prevents low blood sugars". Pt was very tangential throughout the visit but he could still recollect a lot of things we had discussed during prior visits like using water as the primary drink, keeping blood sugars under control etc.    I reviewed serial labs with both of them and brought to his attention of his slowly progressing CKD. Stressed importance of having better diabetes and BP control and keeping consistency in taking " medicines, following BP routinely, improving on dietary pattern etc. Explained higher risk of progression of CKD to ESRD.     He denied any nausea/ vomiting/ decreased urine output/ leg swelling/ flank pain/ cloudy urine/ dysuria. He has had several episodes pf JAZLYN on CKD in the last few months. He has partial recovery. Stressed to follow BP at home and report to MD. His diuretic regimen is being managed by his cardiologist.     Pt has severely uncontrolled diabetes with A1C above 10 for several years. He has diabetic and hypertensive retinopathy, hypertension, prior NSAID use, diabetic polyneuropathy, prior h/o stroke, hyperlipidemia. Additionally he has chronic heart failure with acute exacerbation, multiple myeloma. He has progression of CKD and proteinuria. He has had prior episodes of JAZLYN. Prior urine studies from 03/15 show presence of micro albumin which now has progressed to overt proteinuria.     During prior visits he has acknowledged his worsening diabetes, fluid status but problem has been his poor memory which likely is impacting his ability to retain information about follow up, dietary changes etc. He admits he has not been following diabetic diet at all.       Renal Function:  Lab Results   Component Value Date     (H) 02/04/2019     (H) 01/31/2019     (L) 02/04/2019     01/31/2019    K 3.6 02/04/2019    K 3.6 01/31/2019     02/04/2019     01/31/2019    CO2 21 (L) 02/04/2019    CO2 22 (L) 01/31/2019    BUN 26 (H) 02/04/2019    BUN 23 01/31/2019    CALCIUM 8.8 02/04/2019    CALCIUM 8.6 (L) 01/31/2019    CREATININE 2.9 (H) 02/04/2019    CREATININE 3.0 (H) 01/31/2019    ALBUMIN 2.7 (L) 02/04/2019    ALBUMIN 2.9 (L) 01/31/2019    PHOS 2.9 02/04/2019    PHOS 3.3 01/31/2019    ESTGFRAFRICA 25.6 (A) 02/04/2019    ESTGFRAFRICA 24.6 (A) 01/31/2019    EGFRNONAA 22.2 (A) 02/04/2019    EGFRNONAA 21.3 (A) 01/31/2019       Urinalysis:  Lab Results   Component Value Date     APPEARANCEUA Clear 01/31/2019    PHUR 5.0 01/31/2019    SPECGRAV 1.015 01/31/2019    PROTEINUA 3+ (A) 01/31/2019    GLUCUA 3+ (A) 01/31/2019    OCCULTUA 1+ (A) 01/31/2019    NITRITE Negative 01/31/2019    LEUKOCYTESUR Negative 01/31/2019       Protein/Creatinine Ratio:  Lab Results   Component Value Date    PROTEINURINE 472 (H) 01/31/2019    CREATRANDUR 154.0 01/31/2019    UTPCR 3.06 (H) 01/31/2019       CBC:  Lab Results   Component Value Date    WBC 7.80 02/04/2019    HGB 7.1 (L) 02/04/2019    HCT 22.0 (L) 02/04/2019         Vit D 25    Review of Systems   Constitutional: Negative for fever, chills, appetite change, positive for fatigue.   HENT: Negative for sneezing and sore throat.    Respiratory: Negative for cough, shortness of breath and wheezing. reduced effort tolerance.  Cardiovascular: Positive for dizziness. Negative for chest pain.   Gastrointestinal: Negative for nausea, vomiting, abdominal pain and diarrhea.   Genitourinary: Negative for dysuria, frequency, hematuria and flank pain.   Musculoskeletal: Negative for myalgias and back pain.   Skin: Negative for pallor.   Neurological: Negative for dizziness, light-headedness and headaches.   Psychiatric/Behavioral: Negative for behavioral problems.       Objective:      Physical Exam   Constitutional: He is oriented to person, place, and time. He appears well-developed and well-nourished. No distress.   Mouth/Throat: Oropharynx is clear and moist.   Eyes: Conjunctivae are normal. Right eye exhibits no discharge. Left eye exhibits no discharge.   Neck: Normal range of motion. Neck supple.   Cardiovascular: Normal rate, regular rhythm and normal heart sounds.    Pulmonary/Chest: Effort normal and breath sounds normal. No respiratory distress. He has no wheezes. He has no rales.   Abdominal: Soft. Abdominal obesity.There is no tenderness. There is no guarding.   Musculoskeletal: Normal range of motion. He exhibits only trace edema. He exhibits no  tenderness.   Neurological: He is alert and oriented to person, place, and time.   Skin: Skin is warm and dry. No erythema.   Psychiatric: He has a normal mood and affect.   Vitals reviewed.      Assessment:       1. Uncontrolled diabetes with stage 3 chronic kidney disease GFR 30-59    2. Proteinuria    3. Multiple myeloma   4. Essential hypertension    5. Uncontrolled diabetes mellitus    6. Anemia of chronic illness    7.      Secondary hyperparathyroidism  8.      Vit D deficiency     Plan:     He has had multiple episodes of JAZLYN superimposed on CKD III/IV due to osmotic diuresis due to severe hyperglycemia, likely over diuresis. Pt has very high risk of progression to ESRD.     Worsened proteinuria due to diabetic nephropathy, diabetes becoming uncontrolled again due to his dietary non compliance.    Hypertension is uncontrolled but apparently he had just taken his medicines prior to this clinic visit. So I advised him to check BP on return home and to call office if SBP remains > 170. But overall he needs closer supervision of his HTN control including his intake of medicines, compliance with medicines as well as diet. He may benefit by enrolling into Digital BP monitoring program which is run through Primary care office. Will copy note to his PCP.     CKD is due to longstanding and poorly controlled diabetes, hypertension, MM, cardiorenal causing decreased EABV. Unfortunately, his diabetes continues to remain poorly controlled.    Non compliance with recommendations, follow up, dietary restrictions, medicine intake.    Memory deficit. Likely due to h/o stroke.     Mr. Reis has uncontrolled and longstanding diabetes with complications like polyneuropathy, retinopathy, has progression of CKD and proteinuria. He has hypertension, hyperlipidemia, prior stroke, prior documented micro albumin in the urine. Prior CT imaging has shown right ureteral stone with mild right hydronephrosis from 08/14. US from 04/16 noted  for 10.3 and 10.4 cm kidney size, there was no hydronephrosis. At present it is very crucial that he gets better control of his diabetes and BP. He is at very high risk for rapid progression of his CKD to higher stages. Continue to follow with heme for myeloma and anemia management, per heme his anemia is likely multifactorial.     His worsened fatigue and anemia is likely due to chemo for MM. Management deferred to his heme oncology team.    Plan:    - will need renal panel every 6 to 8 weeks to follow on creatinine, eGFR, electrolytes, acid base status very closely  - high risk of progression to ESRD explained to both patient and his wife, will need arrangements with access creation, TOPS education if eGFR remains < 20 and persists   - strict glycemic control  - strict low salt diet, less than 2 grams per day  - avoid NSAID use ever  - continue ARB for RAAS blockade while monitoring K levels periodically  - continue to trend PTH, acid base disorder, corrected Ca. Continue calcitriol at current dose. Corrected Ca is borderline high, so dose increase may not work  - medication compliance was stressed to him  - most recent US kidneys from 4/18 noted to show changes c/w CKD  - diuretic regimen per his cardiologist  - pt needs to follow BP closely at home. His BP control has been erratic and only close follow up on BP at home can help maintain it a stable level    Risk of progression of CKD to ESRD was discussed with him in detail again today. Stressed importance of monthly labs for surveillance and very close follow up given above. He expressed understanding.    RTC 4 months, sooner if any new symptoms.  Plan, labs, recommendations were discussed with patient, his questions were answered to his satisfaction.   Renal panel every 6 to 8 weeks

## 2019-02-08 ENCOUNTER — TELEPHONE (OUTPATIENT)
Dept: FAMILY MEDICINE | Facility: CLINIC | Age: 64
End: 2019-02-08

## 2019-02-08 DIAGNOSIS — E11.22 TYPE 2 DIABETES MELLITUS WITH STAGE 4 CHRONIC KIDNEY DISEASE, WITH LONG-TERM CURRENT USE OF INSULIN: ICD-10-CM

## 2019-02-08 DIAGNOSIS — N18.4 TYPE 2 DIABETES MELLITUS WITH STAGE 4 CHRONIC KIDNEY DISEASE, WITH LONG-TERM CURRENT USE OF INSULIN: ICD-10-CM

## 2019-02-08 DIAGNOSIS — Z79.4 TYPE 2 DIABETES MELLITUS WITH STAGE 4 CHRONIC KIDNEY DISEASE, WITH LONG-TERM CURRENT USE OF INSULIN: ICD-10-CM

## 2019-02-08 DIAGNOSIS — I10 ESSENTIAL HYPERTENSION: Primary | ICD-10-CM

## 2019-02-08 NOTE — TELEPHONE ENCOUNTER
Referral to digital programs were ordered.    Please advise the patient to come for  Follow  Up with his primary care physician.

## 2019-02-11 ENCOUNTER — PATIENT MESSAGE (OUTPATIENT)
Dept: ADMINISTRATIVE | Facility: OTHER | Age: 64
End: 2019-02-11

## 2019-02-11 ENCOUNTER — OFFICE VISIT (OUTPATIENT)
Dept: INTERNAL MEDICINE | Facility: CLINIC | Age: 64
End: 2019-02-11
Payer: MEDICARE

## 2019-02-11 VITALS
HEART RATE: 78 BPM | DIASTOLIC BLOOD PRESSURE: 60 MMHG | OXYGEN SATURATION: 98 % | SYSTOLIC BLOOD PRESSURE: 138 MMHG | BODY MASS INDEX: 25.25 KG/M2 | WEIGHT: 190.5 LBS | HEIGHT: 73 IN

## 2019-02-11 DIAGNOSIS — Z91.199 PATIENT NON ADHERENCE: ICD-10-CM

## 2019-02-11 DIAGNOSIS — I12.9 HYPERTENSIVE KIDNEY DISEASE WITH STAGE 4 CHRONIC KIDNEY DISEASE: ICD-10-CM

## 2019-02-11 DIAGNOSIS — E11.42 DIABETIC POLYNEUROPATHY ASSOCIATED WITH TYPE 2 DIABETES MELLITUS: ICD-10-CM

## 2019-02-11 DIAGNOSIS — E11.9 TYPE 2 DIABETES MELLITUS: ICD-10-CM

## 2019-02-11 DIAGNOSIS — I25.84 CORONARY ARTERY DISEASE DUE TO CALCIFIED CORONARY LESION: ICD-10-CM

## 2019-02-11 DIAGNOSIS — Z79.4 TYPE 2 DIABETES MELLITUS WITH STAGE 4 CHRONIC KIDNEY DISEASE, WITH LONG-TERM CURRENT USE OF INSULIN: ICD-10-CM

## 2019-02-11 DIAGNOSIS — E11.29 DIABETES MELLITUS WITH PROTEINURIA: ICD-10-CM

## 2019-02-11 DIAGNOSIS — N18.4 HYPERTENSIVE KIDNEY DISEASE WITH STAGE 4 CHRONIC KIDNEY DISEASE: ICD-10-CM

## 2019-02-11 DIAGNOSIS — Z79.4 TYPE 2 DIABETES MELLITUS WITH DIABETIC POLYNEUROPATHY, WITH LONG-TERM CURRENT USE OF INSULIN: Primary | ICD-10-CM

## 2019-02-11 DIAGNOSIS — E11.22 TYPE 2 DIABETES MELLITUS WITH STAGE 4 CHRONIC KIDNEY DISEASE, WITH LONG-TERM CURRENT USE OF INSULIN: ICD-10-CM

## 2019-02-11 DIAGNOSIS — I50.32 CHRONIC DIASTOLIC HEART FAILURE: ICD-10-CM

## 2019-02-11 DIAGNOSIS — N25.81 SECONDARY HYPERPARATHYROIDISM: ICD-10-CM

## 2019-02-11 DIAGNOSIS — E11.59 HYPERTENSION ASSOCIATED WITH DIABETES: ICD-10-CM

## 2019-02-11 DIAGNOSIS — C90.00 MULTIPLE MYELOMA NOT HAVING ACHIEVED REMISSION: ICD-10-CM

## 2019-02-11 DIAGNOSIS — I15.2 HYPERTENSION ASSOCIATED WITH DIABETES: ICD-10-CM

## 2019-02-11 DIAGNOSIS — I25.10 CORONARY ARTERY DISEASE DUE TO CALCIFIED CORONARY LESION: ICD-10-CM

## 2019-02-11 DIAGNOSIS — N18.4 TYPE 2 DIABETES MELLITUS WITH STAGE 4 CHRONIC KIDNEY DISEASE, WITH LONG-TERM CURRENT USE OF INSULIN: ICD-10-CM

## 2019-02-11 DIAGNOSIS — E11.42 TYPE 2 DIABETES MELLITUS WITH DIABETIC POLYNEUROPATHY, WITH LONG-TERM CURRENT USE OF INSULIN: Primary | ICD-10-CM

## 2019-02-11 DIAGNOSIS — N18.4 STAGE 4 CHRONIC KIDNEY DISEASE: ICD-10-CM

## 2019-02-11 DIAGNOSIS — R80.9 DIABETES MELLITUS WITH PROTEINURIA: ICD-10-CM

## 2019-02-11 PROCEDURE — 99215 PR OFFICE/OUTPT VISIT, EST, LEVL V, 40-54 MIN: ICD-10-PCS | Mod: S$PBB,,, | Performed by: INTERNAL MEDICINE

## 2019-02-11 PROCEDURE — 99215 OFFICE O/P EST HI 40 MIN: CPT | Mod: S$PBB,,, | Performed by: INTERNAL MEDICINE

## 2019-02-11 PROCEDURE — 99999 PR PBB SHADOW E&M-EST. PATIENT-LVL III: CPT | Mod: PBBFAC,,, | Performed by: INTERNAL MEDICINE

## 2019-02-11 PROCEDURE — 99213 OFFICE O/P EST LOW 20 MIN: CPT | Mod: PBBFAC,PO | Performed by: INTERNAL MEDICINE

## 2019-02-11 PROCEDURE — 99999 PR PBB SHADOW E&M-EST. PATIENT-LVL III: ICD-10-PCS | Mod: PBBFAC,,, | Performed by: INTERNAL MEDICINE

## 2019-02-11 RX ORDER — INSULIN GLARGINE 100 [IU]/ML
18 INJECTION, SOLUTION SUBCUTANEOUS NIGHTLY
Qty: 15 ML | Refills: 6
Start: 2019-02-11 | End: 2019-05-01

## 2019-02-11 NOTE — Clinical Note
SUSY I am not sure if you're aware of his last A1c greater than 11% on the same day of her last office visit.  Patient has not filled prescription provided at the his last visit.  As you know adherence to medication regimen has always been difficulty with this patient.  I went ahead and increased his Lantus to 18 units and advised him to actually use his sliding scale which he is not.  Could you please follow up with patient?

## 2019-02-11 NOTE — PROGRESS NOTES
Subjective:       Patient ID: Marie Reis Jr. is a 63 y.o. male.    Chief Complaint: Follow-up (3 mos); Diabetes; and Hypertension    HPI 63-year-old male presents to clinic today for followup patient was recently seen by his nephrologist to reported that he had significant hypertension and reported back that he has been noncompliant with his medications and that he would benefit possibly from digital Medicine program.  Patient has a longstanding history of lack of adherence to follow-up and medical management.  He states he has been home over the last 2 months not really getting out much she denies depression but states he has not really been doing his medicines as he should.  Diabetes is uncontrolled as well as his blood pressure.  He is having increased weight gain and increased edema. His A1c is back greater than 11% and uncontrolled.  He has not taking the medication prescribed at his last Endocrine appointment.  Review of Systems  otherwise negative  Objective:      Physical Exam  General: Well-appearing, well-nourished.  No distress  HEENT: conjunctivae are normal.  Pupils are equal and reative to light.  TM's are clear and intact bilaterally.  Hearing is grossly normal.  Nasopharynx is clear.  Oropharynx is clear.  Neck: Supple.  No thyroid megaly.  No bruits.  Lymph: No cervical or supraclavicular adenopathy.  Heart: Regular rate and rhythm, without murmur, rub or gallop.  Lungs: Clear to auscultation; respiratory effort normal.  Abdomen: Soft, nontender, nondistended.  Normoactive bowel sounds.  No hepatomegaly.  No masses.  Extremities: Good distal pulses.  1+ the edema.  Psych: Oriented to time person place.  Judgment and insight seem unimpaired.  Mood and affect are appropriate.  Assessment:       1. Type 2 diabetes mellitus with diabetic polyneuropathy, with long-term current use of insulin    2. Uncontrolled diabetes mellitus with polyneuropathy    3. Type 2 diabetes mellitus with stage 4 chronic  kidney disease, with long-term current use of insulin    4. Multiple myeloma not having achieved remission    5. Hypertension associated with diabetes    6. Coronary artery disease due to calcified coronary lesion    7. 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, with long-term current use of insulin    8. Diabetes mellitus with proteinuria    9. Stage 4 chronic kidney disease    10. Diabetic polyneuropathy associated with type 2 diabetes mellitus    11. Hypertensive kidney disease with stage 4 chronic kidney disease    12. Secondary hyperparathyroidism    13. Chronic diastolic heart failure        Plan:       Marie was seen today for follow-up, diabetes and hypertension.    Diagnoses and all orders for this visit:    Type 2 diabetes mellitus with diabetic polyneuropathy, with long-term current use of insulin    Uncontrolled diabetes mellitus with polyneuropathy  -     insulin (LANTUS SOLOSTAR U-100 INSULIN) glargine 100 units/mL (3mL) SubQ pen; Inject 18 Units into the skin every evening. Inject 15 units at night.  Increase insulin to 18 units and also stressed the importance of compliance with this short-term mealtime insulin and sliding scale.  Follow-up recommended with endocrine advise.  Will also communicate back with him that he has not received the prescription or able to get filled the prescription provided the last visit.  We  Type 2 diabetes mellitus with stage 4 chronic kidney disease, with long-term current use of insulin    Multiple myeloma not having achieved remission  Followed by Oncology Hematology he  Hypertension associated with diabetes  Hypertension uncontrolled recommended starting the Micardis.  He is advised to start this medication that was prescribed at last visit by colleague.  Also Nephrology is presuming that he is on this medication according to last note and documentation.  Coronary artery disease due to calcified  coronary lesion  Continue risk factor management  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, with long-term current use of insulin  Followed by Ophthalmology  Diabetes mellitus with proteinuria  Followed by Nephrology  Stage 4 chronic kidney disease  Followed by Nephrology  Diabetic polyneuropathy associated with type 2 diabetes mellitus    Hypertensive kidney disease with stage 4 chronic kidney disease  Followed by Nephrology  Secondary hyperparathyroidism  Followed by Nephrology  Chronic diastolic heart failure  Patient's weight is up and edema recommend compliance with his diuretic regimen and salt restriction.    Lack of adherence to medication regimen and treatment.  Wife will start managing his medications again.  Counseled on complications and risk of continued complications comorbidity and death

## 2019-02-12 ENCOUNTER — OFFICE VISIT (OUTPATIENT)
Dept: OPHTHALMOLOGY | Facility: CLINIC | Age: 64
End: 2019-02-12
Payer: MEDICARE

## 2019-02-12 ENCOUNTER — PATIENT MESSAGE (OUTPATIENT)
Dept: ADMINISTRATIVE | Facility: OTHER | Age: 64
End: 2019-02-12

## 2019-02-12 ENCOUNTER — TELEPHONE (OUTPATIENT)
Dept: ENDOCRINOLOGY | Facility: CLINIC | Age: 64
End: 2019-02-12

## 2019-02-12 VITALS — HEART RATE: 76 BPM | DIASTOLIC BLOOD PRESSURE: 67 MMHG | SYSTOLIC BLOOD PRESSURE: 128 MMHG

## 2019-02-12 DIAGNOSIS — Z79.4 TYPE 2 DIABETES MELLITUS WITH STAGE 4 CHRONIC KIDNEY DISEASE, WITH LONG-TERM CURRENT USE OF INSULIN: Primary | ICD-10-CM

## 2019-02-12 DIAGNOSIS — N18.4 TYPE 2 DIABETES MELLITUS WITH STAGE 4 CHRONIC KIDNEY DISEASE, WITH LONG-TERM CURRENT USE OF INSULIN: Primary | ICD-10-CM

## 2019-02-12 DIAGNOSIS — E11.22 TYPE 2 DIABETES MELLITUS WITH STAGE 4 CHRONIC KIDNEY DISEASE, WITH LONG-TERM CURRENT USE OF INSULIN: Primary | ICD-10-CM

## 2019-02-12 PROCEDURE — 99212 OFFICE O/P EST SF 10 MIN: CPT | Mod: PBBFAC | Performed by: OPHTHALMOLOGY

## 2019-02-12 PROCEDURE — 67228 TREATMENT X10SV RETINOPATHY: CPT | Mod: PBBFAC,LT | Performed by: OPHTHALMOLOGY

## 2019-02-12 PROCEDURE — 99499 NO LOS: ICD-10-PCS | Mod: S$PBB,,, | Performed by: OPHTHALMOLOGY

## 2019-02-12 PROCEDURE — 99499 UNLISTED E&M SERVICE: CPT | Mod: S$PBB,,, | Performed by: OPHTHALMOLOGY

## 2019-02-12 PROCEDURE — 67228 PR TREATMENT EXTENSIVE RETINOPATHY, PHOTOCOAGULATION: ICD-10-PCS | Mod: S$PBB,LT,, | Performed by: OPHTHALMOLOGY

## 2019-02-12 PROCEDURE — 99999 PR PBB SHADOW E&M-EST. PATIENT-LVL II: ICD-10-PCS | Mod: PBBFAC,,, | Performed by: OPHTHALMOLOGY

## 2019-02-12 PROCEDURE — 67228 TREATMENT X10SV RETINOPATHY: CPT | Mod: S$PBB,LT,, | Performed by: OPHTHALMOLOGY

## 2019-02-12 PROCEDURE — 99999 PR PBB SHADOW E&M-EST. PATIENT-LVL II: CPT | Mod: PBBFAC,,, | Performed by: OPHTHALMOLOGY

## 2019-02-12 NOTE — TELEPHONE ENCOUNTER
Called and spoke with patient about his Education appointment that is schedule the same as his F/u. Patient stated that he will call us Friday with his readings.  Also patient is taking his long acting  18 units and use correction scale.

## 2019-02-12 NOTE — TELEPHONE ENCOUNTER
----- Message from BELEM Gardiner, EMILIEP sent at 2/12/2019  7:46 AM CST -----  Pt needs DE appt  Preferably on same day 3/1  Please call pt today for blood glucose readings  Make sure he is administrating long acting 18 units and using correction scale  Thanks  Gifty

## 2019-02-12 NOTE — PROGRESS NOTES
HPI     Eye Problem      Additional comments: laser              Comments     DLS 1/22/19- Patient here for PRP laser OS                 FA -  Late macular leakage OU  NP OU with NV OU    OCT - VMT released OD , DME improved   Increased temporal DME OS vision, stable    A/P    1. Recurrent iritis  stable    2. PDR OU  - new small preretinal heme nasal OD  S/p PRP OD 03/27/2017  S/p PRP OS 05/09/2017 8/18 - increased VH - Avastin with Dr. CRUZ    PRP OS today, RTC OD    3. DME OU  - stable OS, temporal edema increase OD  S/p Avastin OD x 5 OS x 2  S/p Focal OS (12/16/14)  - mild worsening extrafoveal edema OD -  S/p Focal OD (5/24/16, 6/17)      4. PCIOL OU  S/p phaco w/IOL OD 8/13/15  S/p phaco w/IOL OS 4/30/15        2-3 weeks PRP fill in OD    Risks, benefits, and alternatives to treatment discussed in detail with the patient.  The patient voiced understanding and wished to proceed with the procedure    Laser Procedure Note  Dx: PDR OS  Laser: PRP OS  Argon  Spot: 200  Power: 150-160  Dur: 0.100  #:  [phone]  Complications: None  F/U as above

## 2019-02-14 RX ORDER — FUROSEMIDE 20 MG/1
TABLET ORAL
Qty: 30 TABLET | Refills: 0 | OUTPATIENT
Start: 2019-02-14

## 2019-02-18 ENCOUNTER — LAB VISIT (OUTPATIENT)
Dept: LAB | Facility: HOSPITAL | Age: 64
End: 2019-02-18
Attending: INTERNAL MEDICINE
Payer: MEDICARE

## 2019-02-18 DIAGNOSIS — D47.2 MONOCLONAL GAMMOPATHY: ICD-10-CM

## 2019-02-18 LAB
BASOPHILS # BLD AUTO: 0.02 K/UL
BASOPHILS NFR BLD: 0.3 %
DIFFERENTIAL METHOD: ABNORMAL
EOSINOPHIL # BLD AUTO: 0.2 K/UL
EOSINOPHIL NFR BLD: 3.4 %
ERYTHROCYTE [DISTWIDTH] IN BLOOD BY AUTOMATED COUNT: 13.2 %
HCT VFR BLD AUTO: 30.1 %
HGB BLD-MCNC: 9.8 G/DL
IMM GRANULOCYTES # BLD AUTO: 0.03 K/UL
IMM GRANULOCYTES NFR BLD AUTO: 0.4 %
LYMPHOCYTES # BLD AUTO: 1 K/UL
LYMPHOCYTES NFR BLD: 15 %
MCH RBC QN AUTO: 28.5 PG
MCHC RBC AUTO-ENTMCNC: 32.6 G/DL
MCV RBC AUTO: 88 FL
MONOCYTES # BLD AUTO: 0.5 K/UL
MONOCYTES NFR BLD: 6.9 %
NEUTROPHILS # BLD AUTO: 5 K/UL
NEUTROPHILS NFR BLD: 74 %
NRBC BLD-RTO: 0 /100 WBC
PLATELET # BLD AUTO: 161 K/UL
PMV BLD AUTO: 11 FL
RBC # BLD AUTO: 3.44 M/UL
WBC # BLD AUTO: 6.81 K/UL

## 2019-02-18 PROCEDURE — 36415 COLL VENOUS BLD VENIPUNCTURE: CPT | Mod: PO

## 2019-02-18 PROCEDURE — 85025 COMPLETE CBC W/AUTO DIFF WBC: CPT

## 2019-03-18 ENCOUNTER — LAB VISIT (OUTPATIENT)
Dept: LAB | Facility: HOSPITAL | Age: 64
End: 2019-03-18
Attending: INTERNAL MEDICINE
Payer: MEDICARE

## 2019-03-18 DIAGNOSIS — D47.2 MONOCLONAL GAMMOPATHY: ICD-10-CM

## 2019-03-18 LAB
BASOPHILS # BLD AUTO: 0.03 K/UL
BASOPHILS NFR BLD: 0.4 %
DIFFERENTIAL METHOD: ABNORMAL
EOSINOPHIL # BLD AUTO: 0.3 K/UL
EOSINOPHIL NFR BLD: 2.9 %
ERYTHROCYTE [DISTWIDTH] IN BLOOD BY AUTOMATED COUNT: 11.9 %
HCT VFR BLD AUTO: 30.7 %
HGB BLD-MCNC: 10.5 G/DL
IMM GRANULOCYTES # BLD AUTO: 0.03 K/UL
IMM GRANULOCYTES NFR BLD AUTO: 0.4 %
LYMPHOCYTES # BLD AUTO: 1.4 K/UL
LYMPHOCYTES NFR BLD: 16.3 %
MCH RBC QN AUTO: 28.7 PG
MCHC RBC AUTO-ENTMCNC: 34.2 G/DL
MCV RBC AUTO: 84 FL
MONOCYTES # BLD AUTO: 0.6 K/UL
MONOCYTES NFR BLD: 6.9 %
NEUTROPHILS # BLD AUTO: 6.3 K/UL
NEUTROPHILS NFR BLD: 73.1 %
NRBC BLD-RTO: 0 /100 WBC
PLATELET # BLD AUTO: 166 K/UL
PMV BLD AUTO: 10.9 FL
RBC # BLD AUTO: 3.66 M/UL
WBC # BLD AUTO: 8.55 K/UL

## 2019-03-18 PROCEDURE — 85025 COMPLETE CBC W/AUTO DIFF WBC: CPT

## 2019-03-18 PROCEDURE — 36415 COLL VENOUS BLD VENIPUNCTURE: CPT | Mod: PO

## 2019-03-19 ENCOUNTER — OFFICE VISIT (OUTPATIENT)
Dept: OPHTHALMOLOGY | Facility: CLINIC | Age: 64
End: 2019-03-19
Payer: MEDICARE

## 2019-03-19 VITALS — HEART RATE: 82 BPM | SYSTOLIC BLOOD PRESSURE: 161 MMHG | DIASTOLIC BLOOD PRESSURE: 82 MMHG

## 2019-03-19 PROCEDURE — 99499 NO LOS: ICD-10-PCS | Mod: S$PBB,,, | Performed by: OPHTHALMOLOGY

## 2019-03-19 PROCEDURE — 67228 PR TREATMENT EXTENSIVE RETINOPATHY, PHOTOCOAGULATION: ICD-10-PCS | Mod: S$PBB,RT,, | Performed by: OPHTHALMOLOGY

## 2019-03-19 PROCEDURE — 67228 TREATMENT X10SV RETINOPATHY: CPT | Mod: PBBFAC,RT | Performed by: OPHTHALMOLOGY

## 2019-03-19 PROCEDURE — 99999 PR PBB SHADOW E&M-EST. PATIENT-LVL III: ICD-10-PCS | Mod: PBBFAC,,, | Performed by: OPHTHALMOLOGY

## 2019-03-19 PROCEDURE — 99499 UNLISTED E&M SERVICE: CPT | Mod: S$PBB,,, | Performed by: OPHTHALMOLOGY

## 2019-03-19 PROCEDURE — 99213 OFFICE O/P EST LOW 20 MIN: CPT | Mod: PBBFAC | Performed by: OPHTHALMOLOGY

## 2019-03-19 PROCEDURE — 67228 TREATMENT X10SV RETINOPATHY: CPT | Mod: S$PBB,RT,, | Performed by: OPHTHALMOLOGY

## 2019-03-19 PROCEDURE — 99999 PR PBB SHADOW E&M-EST. PATIENT-LVL III: CPT | Mod: PBBFAC,,, | Performed by: OPHTHALMOLOGY

## 2019-03-19 NOTE — PROGRESS NOTES
HPI     Eye Problem      Additional comments: laser              FA -  Late macular leakage OU  NP OU with NV OU    OCT - VMT released OD , DME improved   Increased temporal DME OS vision, stable    A/P    1. Recurrent iritis  stable    2. PDR OU  - new small preretinal heme nasal OD  S/p PRP OD 03/27/2017  S/p PRP OS 05/09/2017 8/18 - increased VH - Avastin with Dr. CRUZ  2/12/19 - PRP fill OS.     PRP OD today. Boat heme OS, symptoms began after last laser. Improving. Observe.     3. DME OU  - stable OS, temporal edema increase OD  S/p Avastin OD x 5 OS x 2  S/p Focal OS (12/16/14)  - mild worsening extrafoveal edema OD -  S/p Focal OD (5/24/16, 6/17)      4. PCIOL OU  S/p phaco w/IOL OD 8/13/15  S/p phaco w/IOL OS 4/30/15      1 month DFE OCT    Risks, benefits, and alternatives to treatment discussed in detail with the patient.  The patient voiced understanding and wished to proceed with the procedure    Laser Procedure Note  Dx: PDR OD  Laser: PRP OD  Argon  Spot: 200  Power: 150-160  Dur: 0.100  #:  [phone]  Complications: None  F/U as above

## 2019-04-11 DIAGNOSIS — Z86.73 HISTORY OF CVA (CEREBROVASCULAR ACCIDENT): ICD-10-CM

## 2019-04-11 DIAGNOSIS — I63.9 CEREBROVASCULAR ACCIDENT (CVA), UNSPECIFIED MECHANISM: ICD-10-CM

## 2019-04-12 DIAGNOSIS — E11.9 TYPE 2 DIABETES MELLITUS: ICD-10-CM

## 2019-04-12 RX ORDER — CLOPIDOGREL BISULFATE 75 MG/1
75 TABLET ORAL DAILY
Qty: 90 TABLET | Refills: 0 | Status: SHIPPED | OUTPATIENT
Start: 2019-04-12 | End: 2019-01-01 | Stop reason: SDUPTHER

## 2019-04-12 RX ORDER — FUROSEMIDE 20 MG/1
TABLET ORAL
Qty: 30 TABLET | Refills: 0 | Status: SHIPPED | OUTPATIENT
Start: 2019-04-12 | End: 2019-01-01 | Stop reason: SDUPTHER

## 2019-04-15 DIAGNOSIS — E11.9 TYPE 2 DIABETES MELLITUS: ICD-10-CM

## 2019-04-18 DIAGNOSIS — E11.9 TYPE 2 DIABETES MELLITUS: ICD-10-CM

## 2019-04-19 DIAGNOSIS — E11.9 TYPE 2 DIABETES MELLITUS: ICD-10-CM

## 2019-04-22 DIAGNOSIS — E11.9 TYPE 2 DIABETES MELLITUS: ICD-10-CM

## 2019-04-23 ENCOUNTER — OFFICE VISIT (OUTPATIENT)
Dept: OPHTHALMOLOGY | Facility: CLINIC | Age: 64
End: 2019-04-23
Payer: MEDICARE

## 2019-04-23 VITALS — DIASTOLIC BLOOD PRESSURE: 76 MMHG | SYSTOLIC BLOOD PRESSURE: 148 MMHG | HEART RATE: 74 BPM

## 2019-04-23 DIAGNOSIS — H35.033 HYPERTENSIVE RETINOPATHY OF BOTH EYES: ICD-10-CM

## 2019-04-23 PROCEDURE — 92134 CPTRZ OPH DX IMG PST SGM RTA: CPT | Mod: PBBFAC | Performed by: OPHTHALMOLOGY

## 2019-04-23 PROCEDURE — 92134 POSTERIOR SEGMENT OCT RETINA (OCULAR COHERENCE TOMOGRAPHY)-BOTH EYES: ICD-10-PCS | Mod: 26,S$PBB,, | Performed by: OPHTHALMOLOGY

## 2019-04-23 PROCEDURE — 92226 PR SPECIAL EYE EXAM, SUBSEQUENT: ICD-10-PCS | Mod: 50,S$PBB,, | Performed by: OPHTHALMOLOGY

## 2019-04-23 PROCEDURE — 92226 PR SPECIAL EYE EXAM, SUBSEQUENT: CPT | Mod: 50,PBBFAC | Performed by: OPHTHALMOLOGY

## 2019-04-23 PROCEDURE — 99999 PR PBB SHADOW E&M-EST. PATIENT-LVL III: ICD-10-PCS | Mod: PBBFAC,,, | Performed by: OPHTHALMOLOGY

## 2019-04-23 PROCEDURE — 92014 PR EYE EXAM, EST PATIENT,COMPREHESV: ICD-10-PCS | Mod: S$PBB,,, | Performed by: OPHTHALMOLOGY

## 2019-04-23 PROCEDURE — 99213 OFFICE O/P EST LOW 20 MIN: CPT | Mod: PBBFAC | Performed by: OPHTHALMOLOGY

## 2019-04-23 PROCEDURE — 99999 PR PBB SHADOW E&M-EST. PATIENT-LVL III: CPT | Mod: PBBFAC,,, | Performed by: OPHTHALMOLOGY

## 2019-04-23 PROCEDURE — 92226 PR SPECIAL EYE EXAM, SUBSEQUENT: CPT | Mod: 50,S$PBB,, | Performed by: OPHTHALMOLOGY

## 2019-04-23 PROCEDURE — 92014 COMPRE OPH EXAM EST PT 1/>: CPT | Mod: S$PBB,,, | Performed by: OPHTHALMOLOGY

## 2019-04-23 NOTE — PROGRESS NOTES
HPI     1 mo / OCT  Dls- 03/19/2019 Dr. Al    Pt sts no change in va since last visit denies pain   (-)Flashes (+)Floaters occasionally but has gotten better  (+)Photophobia  (-)Glare    Thera tears PRN            Prior FA -  Late macular leakage OU  NP OU with NV OU    OCT - VMT released OD , DME improved   Increased temporal DME OS vision, stable    A/P    1. Recurrent iritis  stable    2. PDR OU  - new small preretinal heme nasal OD  S/p PRP OD 03/27/2017  S/p PRP OS 05/09/2017 8/18 - increased VH - Avastin with Dr. CRUZ  2/12/19 - PRP fill OS  3/19 PRP fill OD.     Boat heme OS ASx, monitor    3. DME OU  - stable OS, temporal edema increase OD  S/p Avastin OD x 5 OS x 2  S/p Focal OS (12/16/14)  - mild worsening extrafoveal edema OD -  S/p Focal OD (5/24/16, 6/17)      4. PCIOL OU  S/p phaco w/IOL OD 8/13/15  S/p phaco w/IOL OS 4/30/15        3 months OCT

## 2019-04-24 ENCOUNTER — LAB VISIT (OUTPATIENT)
Dept: LAB | Facility: HOSPITAL | Age: 64
End: 2019-04-24
Attending: FAMILY MEDICINE
Payer: MEDICARE

## 2019-04-24 DIAGNOSIS — Z79.4 TYPE 2 DIABETES MELLITUS WITH DIABETIC POLYNEUROPATHY, WITH LONG-TERM CURRENT USE OF INSULIN: ICD-10-CM

## 2019-04-24 DIAGNOSIS — E11.22 TYPE 2 DIABETES MELLITUS WITH STAGE 3 CHRONIC KIDNEY DISEASE, WITH LONG-TERM CURRENT USE OF INSULIN: ICD-10-CM

## 2019-04-24 DIAGNOSIS — N18.30 TYPE 2 DIABETES MELLITUS WITH STAGE 3 CHRONIC KIDNEY DISEASE, WITH LONG-TERM CURRENT USE OF INSULIN: ICD-10-CM

## 2019-04-24 DIAGNOSIS — E11.42 TYPE 2 DIABETES MELLITUS WITH DIABETIC POLYNEUROPATHY, WITH LONG-TERM CURRENT USE OF INSULIN: ICD-10-CM

## 2019-04-24 DIAGNOSIS — Z79.4 TYPE 2 DIABETES MELLITUS WITH STAGE 3 CHRONIC KIDNEY DISEASE, WITH LONG-TERM CURRENT USE OF INSULIN: ICD-10-CM

## 2019-04-24 LAB
ESTIMATED AVG GLUCOSE: ABNORMAL MG/DL (ref 68–131)
HBA1C MFR BLD HPLC: >14 % (ref 4–5.6)

## 2019-04-24 PROCEDURE — 36415 COLL VENOUS BLD VENIPUNCTURE: CPT | Mod: PO

## 2019-04-24 PROCEDURE — 83036 HEMOGLOBIN GLYCOSYLATED A1C: CPT

## 2019-05-01 ENCOUNTER — OFFICE VISIT (OUTPATIENT)
Dept: ENDOCRINOLOGY | Facility: CLINIC | Age: 64
End: 2019-05-01
Payer: MEDICARE

## 2019-05-01 VITALS
HEART RATE: 76 BPM | BODY MASS INDEX: 23.96 KG/M2 | WEIGHT: 180.75 LBS | SYSTOLIC BLOOD PRESSURE: 127 MMHG | HEIGHT: 73 IN | DIASTOLIC BLOOD PRESSURE: 74 MMHG

## 2019-05-01 DIAGNOSIS — R80.9 DIABETES MELLITUS WITH PROTEINURIA: ICD-10-CM

## 2019-05-01 DIAGNOSIS — Z86.73 HISTORY OF STROKE: ICD-10-CM

## 2019-05-01 DIAGNOSIS — E55.9 VITAMIN D DEFICIENCY: ICD-10-CM

## 2019-05-01 DIAGNOSIS — N18.4 STAGE 4 CHRONIC KIDNEY DISEASE: ICD-10-CM

## 2019-05-01 DIAGNOSIS — I25.84 CORONARY ARTERY DISEASE DUE TO CALCIFIED CORONARY LESION: ICD-10-CM

## 2019-05-01 DIAGNOSIS — I10 ESSENTIAL HYPERTENSION: ICD-10-CM

## 2019-05-01 DIAGNOSIS — I50.32 CHRONIC DIASTOLIC HEART FAILURE: ICD-10-CM

## 2019-05-01 DIAGNOSIS — E11.29 DIABETES MELLITUS WITH PROTEINURIA: ICD-10-CM

## 2019-05-01 DIAGNOSIS — I25.10 CORONARY ARTERY DISEASE DUE TO CALCIFIED CORONARY LESION: ICD-10-CM

## 2019-05-01 DIAGNOSIS — E11.42 DIABETIC POLYNEUROPATHY ASSOCIATED WITH TYPE 2 DIABETES MELLITUS: Primary | ICD-10-CM

## 2019-05-01 DIAGNOSIS — E78.5 DYSLIPIDEMIA: ICD-10-CM

## 2019-05-01 DIAGNOSIS — C90.00 MULTIPLE MYELOMA NOT HAVING ACHIEVED REMISSION: ICD-10-CM

## 2019-05-01 PROCEDURE — 99215 OFFICE O/P EST HI 40 MIN: CPT | Mod: PBBFAC | Performed by: NURSE PRACTITIONER

## 2019-05-01 PROCEDURE — 99999 PR PBB SHADOW E&M-EST. PATIENT-LVL V: CPT | Mod: PBBFAC,,, | Performed by: NURSE PRACTITIONER

## 2019-05-01 PROCEDURE — 99999 PR PBB SHADOW E&M-EST. PATIENT-LVL V: ICD-10-PCS | Mod: PBBFAC,,, | Performed by: NURSE PRACTITIONER

## 2019-05-01 PROCEDURE — 99214 PR OFFICE/OUTPT VISIT, EST, LEVL IV, 30-39 MIN: ICD-10-PCS | Mod: S$PBB,,, | Performed by: NURSE PRACTITIONER

## 2019-05-01 PROCEDURE — 99214 OFFICE O/P EST MOD 30 MIN: CPT | Mod: S$PBB,,, | Performed by: NURSE PRACTITIONER

## 2019-05-01 RX ORDER — INSULIN ASPART 100 [IU]/ML
INJECTION, SOLUTION INTRAVENOUS; SUBCUTANEOUS
Qty: 1 BOX | Refills: 6
Start: 2019-05-01 | End: 2019-01-01

## 2019-05-01 RX ORDER — INSULIN GLARGINE 100 [IU]/ML
22 INJECTION, SOLUTION SUBCUTANEOUS NIGHTLY
Qty: 15 ML | Refills: 6
Start: 2019-05-01 | End: 2019-01-01

## 2019-05-01 NOTE — PATIENT INSTRUCTIONS
Snacks can be an important part of a balanced, healthy meal plan. They allow you to eat more frequently, feeling full and satisfied throughout the day. Also, they allow you to spread carbohydrates evenly, which may stabilize blood sugars.  Plus, snacks are enjoyable!     The amount of carbohydrate needed at snacks varies. Generally, about 15-30 grams of carbohydrate per snack is recommended.  Below you will find some tasty treats.       0-5 gm carb   Crystal Light   Vitamin Water Zero   Herbal tea, unsweetened   2 tsp peanut butter on celery   1./2 cup sugar-free jell-o   1 sugar-free popsicle   ¼ cup blueberries   8oz Blue Amy unsweetened almond milk   5 baby carrots & celery sticks, cucumbers, bell peppers dipped in ¼ cup salsa, 2Tbsp light ranch dressing or 2Tbsp plain Greek yogurt   10 Goldfish crackers   ½ oz low-fat cheese or string cheese   1 closed handful of nuts, unsalted   1 Tbsp of sunflower seeds, unsalted   1 cup Smart Pop popcorn   1 whole grain brown rice cake        15 gm carb   1 small piece of fruit or ½ banana or 1/2 cup lite canned fruit   3 donna cracker squares   3 cups Smart Pop popcorn, top spray butter, Mijares lite salt or cinnamon and Truvia   5 Vanilla Wafers   ½ cup low fat, no added sugar ice cream or frozen yogurt (Blue bell, Blue Bunny, Weight Watchers, Skinny Cow)   ½ turkey, ham, or chicken sandwich   ½ c fruit with ½ c Cottage cheese   4-6 unsalted wheat crackers with 1 oz low fat cheese or 1 tbsp peanut butter    30-45 goldfish crackers (depending on flavor)    7-8 Holiness mini brown rice cakes (caramel, apple cinnamon, chocolate)    12 Holiness mini brown rice cakes (cheddar, bbq, ranch)    1/3 cup hummus dip with raw veg   1/2 whole wheat sourav, 1Tbsp hummus   Mini Pizza (1/2 whole wheat English muffin, low-fat  cheese, tomato sauce)   100 calorie snack pack (Oreo, Chips Ahoy, Ritz Mix, Baked Cheetos)   4-6 oz. light or Greek Style yogurt  (Mata, [place], [place], [facility])   ½ cup sugar-free pudding     6 in. wheat tortilla or sourav oven toasted chips (topped with spray butter flavoring, cinnamon, Truvia OR spray butter, garlic powder, chili powder)    18 BBQ Popchips (available at [employer], Whole Foods, Fresh Market)                   Diabetes Support Group Meetings         Date: Topic:   February 14 Eat Fit MAGNUS/Health Promotion   March 14 Taking Care of Your Smile   April 11 Spring into Healthy Eating/Cooking Demo   May 9 Ease Your Mind with Diabetes   Alexandra 13 Summer Treats/Cooking Demo   July 11 Eat Fit MAGNUS/Super Market Sweep   August 8 Taking Care of Your Eyes and Feet   Sept 12 Technology/ADA updates   October 10 Recipes & Treats/Cooking Demo   November 14 Heart Health/Pump it up!   December 12 Year-End Close Out        Meetings are held in the [facility] (A) of the [facility] located at [address] [place] [address]. Please call [phone] for additional information.    Free service, offered every 2nd Thursday of every month! Family members and/or friends are welcome as well!  Support group is for patients with type 1 or type 2 diabetes.    From 3:30p to 4:30p

## 2019-05-01 NOTE — PROGRESS NOTES
CC: The primary encounter diagnosis was Diabetic polyneuropathy associated with type 2 diabetes mellitus. 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      HPI: Mr. Marie Reis Jr. is a 63 y.o. Black or  male who was diagnosed with Type 2 DM in 2006.     Pt was admitted on 4/11/18 for JAZLYN and in feb 2018 for sepsis.  Pt was last seen by me in Jan 2019.   A1c has gone up, uncontrolled, eating a lot of carb heavy foods.  Accompanied by wife.    Lab Results   Component Value Date    HGBA1C >14.0 (H) 04/24/2019     BG readings are checked 4x/day (max) and readings  Injection 4 times a day.  Fbg 180s-mid 200s  100-hi   Hypoglycemia: 70, 79 +mild (r/t not eating enough carbs)-uses oj for correction,  glucose tabs prn, symptomatic --3 x a week, varies     Has had a severe hypoglycemia -EMS in the past 3 years    Skipped/missed glycemic medications: No    Prandial injections taken with meals: Yes    Physical Activity: No    Skipping meals and/or snacking: The patient eats a regular, healthy diet.    CURRENT DIABETIC MEDS: novolog 8-10 units ac w/ mod dose correction scale 180-230+2, etc , lantus 18 units qhs, tradjenta 5 mg daily     Uses Vials or Pens: pens  Type of Glucose Meter: up to date    Last Eye Exam:  4/2019  Dr. Al (procedure)  Last Podiatry Exam: 2019-[facility]   Last DM Education Attended:  2019 q 3 mos    REVIEW OF SYSTEMS  General: no weakness or + fatigue.   Eyes: no visual disturbances.   Cardiac: no  intermittent chest pain-has nitro prn. + mild edema  "  Respiratory: no cough or dyspnea.   GI: no abdominal pain or nausea.   Skin: no rashes or itching.   Neuro: + numbness or tingling. +back pain, +knee pain  Musc: gait steady  Endocrine: no polyuria, polydipsia, polyphagia.     Vital Signs  /74 (BP Location: Left arm, Patient Position: Sitting, BP Method: Medium (Manual))   Pulse 76   Ht 6' 1" (1.854 m)   Wt 82 kg (180 lb 12.4 oz)   BMI 23.85 kg/m²     Hemoglobin A1C   Date Value Ref Range Status   04/24/2019 >14.0 (H) 4.0 - 5.6 % Final     Comment:     ADA Screening Guidelines:  5.7-6.4%  Consistent with prediabetes  >or=6.5%  Consistent with diabetes  High levels of fetal hemoglobin interfere with the HbA1C  assay. Heterozygous hemoglobin variants (HbS, HgC, etc)do  not significantly interfere with this assay.   However, presence of multiple variants may affect accuracy.     01/08/2019 11.6 (H) 4.0 - 5.6 % Final     Comment:     ADA Screening Guidelines:  5.7-6.4%  Consistent with prediabetes  >or=6.5%  Consistent with diabetes  High levels of fetal hemoglobin interfere with the HbA1C  assay. Heterozygous hemoglobin variants (HbS, HgC, etc)do  not significantly interfere with this assay.   However, presence of multiple variants may affect accuracy.     08/01/2018 8.2 (H) 4.0 - 5.6 % Final     Comment:     ADA Screening Guidelines:  5.7-6.4%  Consistent with prediabetes  >or=6.5%  Consistent with diabetes  High levels of fetal hemoglobin interfere with the HbA1C  assay. Heterozygous hemoglobin variants (HbS, HgC, etc)do  not significantly interfere with this assay.   However, presence of multiple variants may affect accuracy.         Chemistry        Component Value Date/Time     (L) 02/04/2019 1344    K 3.6 02/04/2019 1344     02/04/2019 1344    CO2 21 (L) 02/04/2019 1344    BUN 26 (H) 02/04/2019 1344    CREATININE 2.9 (H) 02/04/2019 1344     (H) 02/04/2019 1344        Component Value Date/Time    CALCIUM 8.8 02/04/2019 1344    " ALKPHOS 136 (H) 12/11/2018 0915    AST 15 12/11/2018 0915    ALT 10 12/11/2018 0915    BILITOT 0.4 12/11/2018 0915          Lab Results   Component Value Date    CHOL 135 07/19/2018    CHOL 119 (L) 10/25/2017    CHOL 144 09/17/2016     Lab Results   Component Value Date    HDL 43 07/19/2018    HDL 32 (L) 10/25/2017    HDL 27 (L) 09/17/2016     Lab Results   Component Value Date    LDLCALC 77.6 07/19/2018    LDLCALC 68.6 10/25/2017    LDLCALC 72.8 09/17/2016     Lab Results   Component Value Date    TRIG 72 07/19/2018    TRIG 92 10/25/2017    TRIG 221 (H) 09/17/2016     Lab Results   Component Value Date    CHOLHDL 31.9 07/19/2018    CHOLHDL 26.9 10/25/2017    CHOLHDL 18.8 (L) 09/17/2016       Lab Results   Component Value Date    TSH 2.909 02/04/2019       Lab Results   Component Value Date    MICALBCREAT 1987.0 (H) 10/25/2017       Vit D, 25-Hydroxy   Date Value Ref Range Status   01/31/2019 25 (L) 30 - 96 ng/mL Final     Comment:     Vitamin D deficiency.........<10 ng/mL                              Vitamin D insufficiency......10-29 ng/mL       Vitamin D sufficiency........> or equal to 30 ng/mL  Vitamin D toxicity............>100 ng/mL         PHYSICAL EXAMINATION  Constitutional: Appears well, no distress  Neck: Supple, trachea midline.   Respiratory: no wheezes, even and unlabored.  Cardiovascular: RRR  Lymph: +trace BLE/ pedal edema-myrna hoses/diabetic socks on   Skin: warm and dry; no injection site reactions, no acanthosis nigracans observed.  Neuro:patient alert and cooperative. (L) wrist brace intact  Footwear appropriate.     Assessment/Plan    1. Diabetic polyneuropathy associated with type 2 diabetes mellitus  Hemoglobin A1c    Ambulatory Referral to Diabetes Education    F/u in 4 mos  a1c goal less than 7.5%    Increase novolog 9 units ac w/ scale 180-230+2, etc  Increase lantus 22 units qhs  novolog 4 units w/ snack >20 gm   Continue tradjenta 5 mg daily     Discussed vgo 20, pt declined   2.  Diabetes mellitus with proteinuria  Hemoglobin A1c    Ambulatory Referral to Diabetes Education  F/u with nephrology      3. 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 Stage 4 chronic kidney disease  F/u with nephrology    5. Chronic diastolic heart failure  Avoid hypoglycemia, insulin stacking    6. Essential hypertension  Controlled, continue med(s)   7. Coronary artery disease due to calcified coronary lesion  Avoid hypoglycemia    8. Dyslipidemia  Lipid panel  Lab Results   Component Value Date    LDLCALC 77.6 07/19/2018         9. Multiple myeloma not having achieved remission  F/u with hem/onc    10. History of stroke  F/u with pcp    11. Vitamin D deficiency  Continue to monitor        Follow up in about 4 months (around 9/1/2019).     Orders Placed This Encounter   Procedures    Hemoglobin A1c     Standing Status:   Future     Standing Expiration Date:   6/29/2020    Lipid panel     Standing Status:   Future     Standing Expiration Date:   6/29/2020    Ambulatory Referral to Diabetes Education     Referral Priority:   Routine     Referral Type:   Consultation     Referral Reason:   Specialty Services Required     Requested Specialty:   Endocrinology     Number of Visits Requested:   1     Expiration Date:   5/1/2020    Glucose Monitoring Continuous Min 72 Hours     Standing Status:   Future     Standing Expiration Date:   5/1/2020

## 2019-05-03 ENCOUNTER — TELEPHONE (OUTPATIENT)
Dept: CARDIOLOGY | Facility: CLINIC | Age: 64
End: 2019-05-03

## 2019-05-03 NOTE — TELEPHONE ENCOUNTER
Pt notified, verbalized understanding. Karlos at pt's [facility] notified to remove Losartan 25 mg from pt's profile and that pt should be taking Telmisartan, verbalized understanding.

## 2019-05-03 NOTE — TELEPHONE ENCOUNTER
I spoke w/pt regarding fax from [facility] stating he needs a refill on Losartan 25mg. I advised pt the medication is not listed on his medcard and that Dr. Neff gave him Telmisartan 2/2019 because he stated he couldn't tolerate Losartan and his bp was high. Pt stated  he continued to take Losartan and has been taking it every morning and taking Telmisartan in the evenings. I advised pt he should not be taking both of these medications. Pt stated he feels dizzy when he takes Losartan. Pt stated yesterday before taking Losartan his bp was 110/58 P74 and yesterday afternoon was 97/53 P83. Pt would like to know which medication he should be taking. Please advise.

## 2019-05-05 DIAGNOSIS — I10 ESSENTIAL HYPERTENSION: ICD-10-CM

## 2019-05-05 DIAGNOSIS — I50.32 CHRONIC DIASTOLIC CONGESTIVE HEART FAILURE: ICD-10-CM

## 2019-05-05 RX ORDER — TELMISARTAN 40 MG/1
TABLET ORAL
Qty: 90 TABLET | Refills: 3 | Status: SHIPPED | OUTPATIENT
Start: 2019-05-05 | End: 2019-01-01

## 2019-05-07 ENCOUNTER — TELEPHONE (OUTPATIENT)
Dept: NEPHROLOGY | Facility: CLINIC | Age: 64
End: 2019-05-07

## 2019-05-07 ENCOUNTER — CLINICAL SUPPORT (OUTPATIENT)
Dept: DIABETES | Facility: CLINIC | Age: 64
End: 2019-05-07
Payer: MEDICARE

## 2019-05-07 DIAGNOSIS — R80.9 DIABETES MELLITUS WITH PROTEINURIA: ICD-10-CM

## 2019-05-07 DIAGNOSIS — E11.29 DIABETES MELLITUS WITH PROTEINURIA: ICD-10-CM

## 2019-05-07 PROCEDURE — 99999 PR PBB SHADOW E&M-EST. PATIENT-LVL I: ICD-10-PCS | Mod: PBBFAC,,,

## 2019-05-07 PROCEDURE — 99999 PR PBB SHADOW E&M-EST. PATIENT-LVL I: CPT | Mod: PBBFAC,,,

## 2019-05-07 PROCEDURE — 99211 OFF/OP EST MAY X REQ PHY/QHP: CPT | Mod: PBBFAC,PO | Performed by: REGISTERED NURSE

## 2019-05-07 PROCEDURE — G0108 DIAB MANAGE TRN  PER INDIV: HCPCS | Mod: PBBFAC,PO | Performed by: REGISTERED NURSE

## 2019-05-08 VITALS — WEIGHT: 185 LBS | BODY MASS INDEX: 24.41 KG/M2

## 2019-05-08 NOTE — PROGRESS NOTES
Diabetes Education  Author: Vance Heart RN  Date: 5/8/2019  Diabetes Care Management Summary  Diabetes Education Record Assessment/Progress: Initial  Current Diabetes Risk Level: High   Last A1c:   Lab Results   Component Value Date    HGBA1C >14.0 (H) 04/24/2019     Last Visit with Diabetes Educator: : 05/07/2019  Last OPCM Referral: : Not Found   Enrolled in OPCM: No    Diabetes Type  Diabetes Type : Type II  Diabetes History  Diabetes Diagnosis: >10 years  Current Treatment: Oral Medication, Diet, Injectable, Exercise, Insulin  Health Maintenance was reviewed today with patient. Discussed with patient importance of routine eye exams, foot exams/foot care, blood work (i.e.: A1c, microalbumin, and lipid), dental visits, yearly flu vaccine, and pneumonia vaccine as indicated by PCP. Patient verbalized understanding.     Health Maintenance Topics with due status: Not Due       Topic Last Completion Date    TETANUS VACCINE 09/03/2013    Pneumococcal Vaccine (Highest Risk) 07/08/2016    Lipid Panel 07/19/2018    Fecal Occult Blood Test (FOBT)/FitKit 07/23/2018    Foot Exam 08/08/2018    Influenza Vaccine 10/16/2018    Eye Exam 04/23/2019    Hemoglobin A1c 04/24/2019    High Dose Statin 05/01/2019    Aspirin/Antiplatelet Therapy 05/01/2019     There are no preventive care reminders to display for this patient.  Nutrition  Meal Planning: drinks regular soda, water, 3 meals per day, eats out seldom, snacks between meal  What type of beverages do you drink?: regular soda/tea, water  Meal Plan 24 Hour Recall - Breakfast: cereal  Meal Plan 24 Hour Recall - Lunch: red beans and rice  Meal Plan 24 Hour Recall - Dinner: red beans and rice  Meal Plan 24 Hour Recall - Snack: cookies, milk  Monitoring   Self Monitoring : pt has a meter and is testing 3 times a day,. instructed pt to test additionally before Lantus at night. instructed pt on the normal bs ranges. instructed pt to keep a record of his bs's and to bring the  record to his md visits.  Blood Glucose Logs: No  Do you use a personal continuous glucose monitor?: No  In the last month, how often have you had a low blood sugar reaction?: never  Can you tell when your blood sugar is too high?: no  Exercise   Exercise Type: none  Current Diabetes Treatment   Current Treatment: Oral Medication, Diet, Injectable, Exercise, Insulin  Social History  Preferred Learning Method: Face to Face, Demonstration, Reading Materials  Primary Support: Spouse  Educational Level: College Graduate  Smoking Status: Never a Smoker  Alcohol Use: Never  DDS-2 Score  ( > 3 = SIGNIFICANT DISTRESS): 2     Barriers to Change  Barriers to Change: None  Learning Challenges : None  Readiness to Learn   Readiness to Learn : Acceptance  Cultural Influences  Cultural Influences: None  Diabetes Education Assessment/Progress  Diabetes Disease Process (diabetes disease process and treatment options): Discussion, Instructed, Demonstrates Understanding/Competency(verbalizes/demonstrates), Individual Session, Written Materials Provided  Nutrition (Incorporating nutritional management into one's lifestyle): Discussion, Demonstration, Instructed, Demonstrates Understanding/Competency (verbalizes/demonstrates), Individual Session, Written Materials Provided  Physical Activity (incorporating physical activity into one's lifestyle): Discussion, Instructed, Demonstrates Understanding/Competency (verbalizes/demonstrates), Individual Session, Written Materials Provided  Medications (states correct name, dose, onset, peak, duration, side effects & timing of meds): Discussion, Instructed, Demonstrates Understanding/Competency(verbalizes/demonstrates), Individual Session, Written Materials Provided  Monitoring (monitoring blood glucose/other parameters & using results): Discussion, Instructed, Demonstrates Understanding/Competency (verbalizes/demonstrates), Individual Session, Written Materials Provided  Acute Complications  (preventing, detecting, and treating acute complications): Discussion, Instructed, Demonstrates Understanding/Competency (verbalizes/demonstrates), Individual Session, Written Materials Provided  Chronic Complications (preventing, detecting, and treating chronic complications): Discussion, Instructed, Demonstrates Understanding/Competency (verbalizes/demonstrates), Individual Session, Written Materials Provided  Clinical (diabetes, other pertinent medical history, and relevant comorbidities reviewed during visit): Discussion, Instructed, Demonstrates Understanding/Competency (verbalizes/demonstrates), Individual Session  Cognitive (knowledge of self-management skills, functional health literacy): Discussion, Instructed, Demonstrates Understanding/Competency (verbalizes/demonstrates), Individual Session  Psychosocial (emotional response to diabetes): Discussion, Instructed, Demonstrates Understanding/Competency (verbalizes/demonstrates), Individual Session  Diabetes Distress and Support Systems: Discussion, Instructed, Demonstrates Understanding/Competency (verbalizes/demonstrates), Individual Session  Behavioral (readiness for change, lifestyle practices, self-care behaviors): Discussion, Instructed, Demonstrates Understanding/Competency (verbalizes/demonstrates), Individual Session  Goals  Patient has selected/evaluated goals during today's session: Yes, selected  Healthy Eating: Set  Start Date: 05/07/19  Target Date: 08/08/19  Monitoring: Set  Start Date: 05/07/19  Target Date: 08/08/19  Diabetes Care Plan/Intervention  Education Plan/Intervention: Group Follow-Up DSMT, Dental Exam  Diabetes Meal Plan  Restrictions: Restricted Carbohydrate  Calories: 1800  Carbohydrate Per Meal: 45-60g  Carbohydrate Per Snack : 15-20g  Instructed pt on the food groups, how to read labels and count carbs. Pt was given sample menus and meal plans as examples. Discussed with pt the importance of eating 3 balanced and portioned meals  per day. Discussed with pt how to put his meals together. Discussed with pt the importance of eating enough food to supplement the insulin that he is taking. Discussed with pt foods that he likes that he could include in his meal plan. Discussed with pt the importance of measuring, portioning and planning his meals. Pt had good understanding of meal plan and compliance is hoped for. Pt was advised to call for any problems or questions.   Today's Self-Management Care Plan was developed with the patient's input and is based on barriers identified during today's assessment.    The long and short-term goals in the care plan were written with the patient/caregiver's input. The patient has agreed to work toward these goals to improve his overall diabetes control.      The patient received a copy of today's self-management plan and verbalized understanding of the care plan, goals, and all of today's instructions.      The patient was encouraged to communicate with his physician and care team regarding his condition(s) and treatment.  I provided the patient with my contact information today and encouraged him to contact me via phone or patient portal as needed.     Education Units of Time   Time Spent: 60 min

## 2019-05-10 ENCOUNTER — TELEPHONE (OUTPATIENT)
Dept: CARDIOLOGY | Facility: CLINIC | Age: 64
End: 2019-05-10

## 2019-05-10 DIAGNOSIS — N18.4 HYPERTENSIVE KIDNEY DISEASE WITH STAGE 4 CHRONIC KIDNEY DISEASE: ICD-10-CM

## 2019-05-10 DIAGNOSIS — N18.30 TYPE 2 DIABETES MELLITUS WITH STAGE 3 CHRONIC KIDNEY DISEASE, WITH LONG-TERM CURRENT USE OF INSULIN: ICD-10-CM

## 2019-05-10 DIAGNOSIS — E11.22 TYPE 2 DIABETES MELLITUS WITH STAGE 3 CHRONIC KIDNEY DISEASE, WITH LONG-TERM CURRENT USE OF INSULIN: ICD-10-CM

## 2019-05-10 DIAGNOSIS — E11.29 DIABETES MELLITUS WITH PROTEINURIA: ICD-10-CM

## 2019-05-10 DIAGNOSIS — H35.033 HYPERTENSIVE RETINOPATHY OF BOTH EYES: ICD-10-CM

## 2019-05-10 DIAGNOSIS — I25.84 CORONARY ARTERY DISEASE DUE TO CALCIFIED CORONARY LESION: ICD-10-CM

## 2019-05-10 DIAGNOSIS — I50.32 CHRONIC DIASTOLIC HEART FAILURE: ICD-10-CM

## 2019-05-10 DIAGNOSIS — R80.9 DIABETES MELLITUS WITH PROTEINURIA: ICD-10-CM

## 2019-05-10 DIAGNOSIS — Z79.4 TYPE 2 DIABETES MELLITUS WITH STAGE 3 CHRONIC KIDNEY DISEASE, WITH LONG-TERM CURRENT USE OF INSULIN: ICD-10-CM

## 2019-05-10 DIAGNOSIS — I25.10 CORONARY ARTERY DISEASE DUE TO CALCIFIED CORONARY LESION: ICD-10-CM

## 2019-05-10 DIAGNOSIS — R00.1 BRADYCARDIA: ICD-10-CM

## 2019-05-10 DIAGNOSIS — I10 ESSENTIAL HYPERTENSION: Primary | ICD-10-CM

## 2019-05-10 DIAGNOSIS — I12.9 HYPERTENSIVE KIDNEY DISEASE WITH STAGE 4 CHRONIC KIDNEY DISEASE: ICD-10-CM

## 2019-05-10 DIAGNOSIS — I65.23 BILATERAL CAROTID ARTERY STENOSIS: ICD-10-CM

## 2019-05-10 DIAGNOSIS — C90.00 MULTIPLE MYELOMA NOT HAVING ACHIEVED REMISSION: ICD-10-CM

## 2019-05-10 DIAGNOSIS — E78.5 DYSLIPIDEMIA: ICD-10-CM

## 2019-05-13 ENCOUNTER — TELEPHONE (OUTPATIENT)
Dept: ENDOCRINOLOGY | Facility: CLINIC | Age: 64
End: 2019-05-13

## 2019-05-13 DIAGNOSIS — I15.2 HYPERTENSION ASSOCIATED WITH DIABETES: ICD-10-CM

## 2019-05-13 DIAGNOSIS — E78.5 DYSLIPIDEMIA: ICD-10-CM

## 2019-05-13 DIAGNOSIS — Z12.5 SCREENING FOR PROSTATE CANCER: ICD-10-CM

## 2019-05-13 DIAGNOSIS — Z79.4 TYPE 2 DIABETES MELLITUS WITH DIABETIC POLYNEUROPATHY, WITH LONG-TERM CURRENT USE OF INSULIN: Primary | ICD-10-CM

## 2019-05-13 DIAGNOSIS — E11.59 HYPERTENSION ASSOCIATED WITH DIABETES: ICD-10-CM

## 2019-05-13 DIAGNOSIS — Z00.00 ENCOUNTER FOR PREVENTIVE HEALTH EXAMINATION: ICD-10-CM

## 2019-05-13 DIAGNOSIS — H35.033 HYPERTENSIVE RETINOPATHY OF BOTH EYES: ICD-10-CM

## 2019-05-13 DIAGNOSIS — I10 ESSENTIAL HYPERTENSION: ICD-10-CM

## 2019-05-13 DIAGNOSIS — E11.42 TYPE 2 DIABETES MELLITUS WITH DIABETIC POLYNEUROPATHY, WITH LONG-TERM CURRENT USE OF INSULIN: Primary | ICD-10-CM

## 2019-05-13 DIAGNOSIS — Z86.73 HISTORY OF STROKE: ICD-10-CM

## 2019-05-13 NOTE — TELEPHONE ENCOUNTER
Called and spoke with patient about his CGMS monitor .     Patient stated when he took a shower and his monitor stop working and then it started working again . Patient will be here tomorrow to take it off.

## 2019-05-13 NOTE — TELEPHONE ENCOUNTER
----- Message from Eladia Lentz sent at 5/13/2019  4:10 PM CDT -----  Contact: pt  Pt calling    Got a monitor  And put it on himself  Today    Having issues   It works than stops and starts again     Please call pt  Missed appt for monitor on last week       [phone]     Thanks

## 2019-05-14 ENCOUNTER — TELEPHONE (OUTPATIENT)
Dept: ENDOCRINOLOGY | Facility: CLINIC | Age: 64
End: 2019-05-14

## 2019-05-24 NOTE — PROGRESS NOTES
Diabetes Education  Author: Vance Heart RN  Date: 5/24/2019  Diabetes Care Management Summary  Diabetes Education Record Assessment/Progress: Comprehensive/Group   Last A1c:   Lab Results   Component Value Date    HGBA1C >14.0 (H) 04/24/2019     Last Visit with Diabetes Educator: : 05/24/2019  Last OPCM Referral: : Not Found   Enrolled in OPCM: No  Diabetes Type  Diabetes Type : Type II  Health Maintenance was reviewed today with patient. Discussed with patient importance of routine eye exams, foot exams/foot care, blood work (i.e.: A1c, microalbumin, and lipid), dental visits, yearly flu vaccine, and pneumonia vaccine as indicated by PCP. Patient verbalized understanding.     Health Maintenance Topics with due status: Not Due       Topic Last Completion Date    TETANUS VACCINE 09/03/2013    Pneumococcal Vaccine (Highest Risk) 07/08/2016    Lipid Panel 07/19/2018    Fecal Occult Blood Test (FOBT)/FitKit 07/23/2018    Foot Exam 08/08/2018    Influenza Vaccine 10/16/2018    Eye Exam 04/23/2019    Hemoglobin A1c 04/24/2019    High Dose Statin 05/01/2019    Aspirin/Antiplatelet Therapy 05/01/2019     There are no preventive care reminders to display for this patient.     Diabetes Education Assessment/Progress  Diabetes Disease Process (diabetes disease process and treatment options): Discussion, Lecture, Instructed, Class, Demonstrates Understanding/Competency(verbalizes/demonstrates)  Nutrition (Incorporating nutritional management into one's lifestyle): Discussion, Demonstration, Lecture, Instructed, Class, Demonstrates Understanding/Competency (verbalizes/demonstrates), Written Materials Provided  Physical Activity (incorporating physical activity into one's lifestyle): Not Covered/Deferred  Medications (states correct name, dose, onset, peak, duration, side effects & timing of meds): Not Covered/Deferred  Monitoring (monitoring blood glucose/other parameters & using results): Not Covered/Deferred  Acute  Complications (preventing, detecting, and treating acute complications): Not Covered/Deferred  Chronic Complications (preventing, detecting, and treating chronic complications): Not Covered/Deferred  Clinical (diabetes, other pertinent medical history, and relevant comorbidities reviewed during visit): Not Covered/Deferred  Cognitive (knowledge of self-management skills, functional health literacy): Discussion, Lecture, Instructed, Class, Demonstrates Understanding/Competency (verbalizes/demonstrates)  Psychosocial (emotional response to diabetes): Lecture, Instructed, Class, Demonstrates Understanding/Competency (verbalizes/demonstrates)  Diabetes Distress and Support Systems: Discussion, Lecture, Instructed, Class, Demonstrates Understanding/Competency (verbalizes/demonstrates)  Behavioral (readiness for change, lifestyle practices, self-care behaviors): Discussion, Lecture, Instructed, Class, Demonstrates Understanding/Competency (verbalizes/demonstrates)     Post class test score- 92.  Today's Self-Management Care Plan was developed with the patient's input and is based on barriers identified during today's assessment.    The long and short-term goals in the care plan were written with the patient/caregiver's input. The patient has agreed to work toward these goals to improve his overall diabetes control.      The patient received a copy of today's self-management plan and verbalized understanding of the care plan, goals, and all of today's instructions.      The patient was encouraged to communicate with his physician and care team regarding his condition(s) and treatment.  I provided the patient with my contact information today and encouraged him to contact me via phone or patient portal as needed.     Education Units of Time   Time Spent: 180 min

## 2019-05-28 NOTE — PROGRESS NOTES
CC: Multiple myeloma, follow up    HPI: Mr. Reis is a 63-year-old man with multiple myeloma. He was a patient of Dr.Archie Guaman.  In May 2016, his serum protein electrophoresis revealed a paraprotein measuring 0.86 g/dL; this was an IgG kappa.  A 24-hour urine contained 947 mg of protein, 3% of which was kappa light chains.  His bone marrow had 15% plasma cells and a FISH panel disclosed trisomy 1, 7, 9 and 15.  A PET scan in January 2017 revealed no bone lesions.     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 He was followed with periodic examinations and laboratory studies until early 2018 when his anemia worsened.  There had been a gradual increase in the   protein level and there was a substantial increase in the 24-hour urine protein to 4500 mg.  However, only 4.6% of this was kappa light chain and 2.8% was IgG kappa.  Most of the proteinuria was believed to have been secondary to diabetes   mellitus.  The IgG kappa paraprotein as measured in electrophoresis was 1.23 g/dL.     Mainly because of the worsening anemia and concern that it might have been least partially due to  Myeloma,he was recommended a trial of therapy with Revlimid  10 mg daily for 21 days, every 28 days and dexamethasone 12 mg once a week. There was a long delay in obtaining Revlimid and after he started that he had worsening renal function and was hospitalized for that problem and for hyperglycemia.  He had another hospitalization in March 2018 for encephalopathy, congestive heart failure,   hypoglycemia and worsening anemia which required red cell transfusions.      In May  2018,  Revlimid and dexamethasone were stopped due to the above mentioned problems.  There had been substantial improvement in the protein levels with the paraprotein falling to 0.56 g/dL and   the quantitative IgG declining from 1747 to 991.  The kappa/lambda ratio fell from 44 to 6.5.     He was transfused in early July 2018 and his hemoglobin was 7.7. Since mid  September 2018, hemoglobin values were between 9.6 to 11.3.     His other medical problems include a stroke many years ago that left him with weakness of his left arm and leg and he has severe diabetes mellitus with renal, retinal and peripheral nerve complications.  He has cardiac valvular disease, pulmonary hypertension and diastolic dysfunction.  He also has a poor understanding of his medical status, probably related to complications from the stroke.    Review of Systems   Constitutional: Positive for malaise/fatigue. Negative for chills, fever and weight loss.   HENT: Negative for ear discharge, ear pain and nosebleeds.    Eyes: Negative for blurred vision, double vision and photophobia.   Respiratory: Negative for cough, hemoptysis and sputum production.    Cardiovascular: Negative for chest pain, palpitations, orthopnea and claudication.   Gastrointestinal: Negative for blood in stool, diarrhea, heartburn, nausea and vomiting.   Genitourinary: Negative for dysuria, frequency and urgency.   Musculoskeletal: Negative for back pain, myalgias and neck pain.   Skin: Negative for rash.   Neurological: Negative for dizziness, tingling, tremors and sensory change.   Endo/Heme/Allergies: Does not bruise/bleed easily.   Psychiatric/Behavioral: Negative for depression, hallucinations and substance abuse.         Past Medical History:   Diagnosis Date    Binocular vision disorder with diplopia 9/22/2016    Bradycardia     Cataract     Cervical spondylosis 10/1/2015    Chronic diastolic heart failure 1/11/2018    Coronary artery disease due to calcified coronary lesion 3/19/2018    Dyslipidemia 6/26/2015    Hearing impairment 10/25/2013    History of stroke 3/23/2015    Hypertension associated with diabetes 1/11/2018    Hypertensive retinopathy of both eyes 9/26/2017    Lumbar spondylosis 10/1/2015    Multiple myeloma not having achieved remission 1/16/2018    Persistent proteinuria 1/11/2018     Spondylolisthesis of lumbar region 10/1/2015    Strabismus     Stroke 2014    Thoracic spondylosis 10/1/2015    Type 2 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, with long-term current use of insulin 9/26/2017    Type 2 diabetes mellitus with diabetic polyneuropathy, with long-term current use of insulin 9/28/2015    Type 2 diabetes mellitus with stage 4 chronic kidney disease, with long-term current use of insulin 1/11/2018         Past Surgical History:   Procedure Laterality Date    CATARACT EXTRACTION W/  INTRAOCULAR LENS IMPLANT Left 4/30/15    Diane    EYE SURGERY      cataract removal left eye    HAND SURGERY  2/2012    DR. OLIVIA MOLINA    INSERTION-INTRAOCULAR LENS (IOL) Right 8/13/2015    Performed by Spring George [facility] at [facility] OR [address] [place] [address]    INSERTION-INTRAOCULAR LENS (IOL) Left 4/30/2015    Performed by Spring George MD at [facility] OR [address] [place] [address]    left hand fracture sx      LT EYE   5/2/15    DR KULLMAN [facility]    PHACOEMULSIFICATION-ASPIRATION-CATARACT Right 8/13/2015    Performed by [facility] at [facility] OR [address] [place] [address]    PHACOEMULSIFICATION-ASPIRATION-CATARACT Left 4/30/2015    Performed by Spring George MD at [facility] OR [address] [place] [address]       Family History   Problem Relation Age of Onset    Diabetes Mother     Cancer Mother     Kidney disease Mother     Diabetes Father     Heart attack Father     Diabetes Sister     Diabetes Brother     No Known Problems Maternal Aunt     No Known Problems Maternal Uncle     No Known Problems Paternal Aunt     No Known Problems Paternal Uncle     No Known Problems Maternal Grandmother     No Known Problems Maternal Grandfather     No Known Problems Paternal Grandmother     No Known Problems Paternal Grandfather     Blindness Neg Hx     Anesthesia problems Neg Hx     Amblyopia Neg Hx     Cataracts Neg Hx     Glaucoma Neg Hx     Hypertension Neg Hx     Macular degeneration Neg Hx     Retinal  detachment Neg Hx     Strabismus Neg Hx     Stroke Neg Hx     Thyroid disease Neg Hx     Heart disease Neg Hx     Heart failure Neg Hx     Hyperlipidemia Neg Hx          Social History     Socioeconomic History    Marital status:      Spouse name: Not on file    Number of children: Not on file    Years of education: Not on file    Highest education level: Not on file   Occupational History    Not on file   Social Needs    Financial resource strain: Not on file    Food insecurity:     Worry: Not on file     Inability: Not on file    Transportation needs:     Medical: Not on file     Non-medical: Not on file   Tobacco Use    Smoking status: Never Smoker    Smokeless tobacco: Never Used   Substance and Sexual Activity    Alcohol use: No    Drug use: No    Sexual activity: Never   Lifestyle    Physical activity:     Days per week: Not on file     Minutes per session: Not on file    Stress: Not on file   Relationships    Social connections:     Talks on phone: Not on file     Gets together: Not on file     Attends Alevism service: Not on file     Active member of club or organization: Not on file     Attends meetings of clubs or organizations: Not on file     Relationship status: Not on file   Other Topics Concern    Not on file   Social History Narrative    Not on file         Review of patient's allergies indicates:   Allergen Reactions    Hydralazine analogues      Increase swelling and throat itching and tightness with use.  Symptoms correlate only with this medication onset in hospital.         Current Outpatient Medications   Medication Sig    amLODIPine (NORVASC) 10 MG tablet Take 1 tablet (10 mg total) by mouth once daily.    atorvastatin (LIPITOR) 80 MG tablet Take 1 tablet (80 mg total) by mouth once daily.    blood sugar diagnostic Strp 1 strip by Misc.(Non-Drug; Combo Route) route after meals as needed.    calcitRIOL (ROCALTROL) 0.25 MCG Cap Take 2 capsules (0.5 mcg  "total) by mouth every Mon, Wed, Fri.    carvedilol (COREG) 25 MG tablet Take 1 tablet (25 mg total) by mouth 2 (two) times daily with meals.    clopidogrel (PLAVIX) 75 mg tablet TAKE 1 TABLET (75 MG TOTAL) BY MOUTH ONCE DAILY.    diclofenac sodium 1.5 % Drop APPLY 40 DROPS TO AFFECTED AREA(S) FOUR TIMES DAILY    flash glucose scanning reader (FREESTYLE CLAUDIO 14 DAY READER) [facility] Use as directed.    flash glucose sensor (FREESTYLE CLAUDIO 14 DAY SENSOR) Kit Change every 14 days.    fluticasone (FLONASE) 50 mcg/actuation nasal spray 1 spray by Each Nare route 2 (two) times daily as needed for Rhinitis. (Patient taking differently: 1 spray by Each Nare route as needed for Rhinitis. )    furosemide (LASIX) 20 MG tablet TAKE 1 TABLET (20 MG TOTAL) BY MOUTH 2 (TWO) TIMES DAILY. DO NOT TAKE THE PM DOSE AFTER 3 PM    furosemide (LASIX) 40 MG tablet Pt to take 1 1/2 tablet twice a day per Dr. Salinas on 3/19/18. (Patient taking differently: once daily. Pt to take 1 1/2 tablet twice a day per Dr. Salinas on 3/19/18.)    gabapentin (NEURONTIN) 300 MG capsule Take 1 capsule (300 mg total) by mouth 2 (two) times daily.    insulin (LANTUS SOLOSTAR U-100 INSULIN) glargine 100 units/mL (3mL) SubQ pen Inject 22 Units into the skin every evening. Inject 15 units at night.    insulin aspart U-100 (NOVOLOG) 100 unit/mL (3 mL) InPn pen Inject 9 units w/ meals plus scale 180-230 +2, 231-280 +4, 281-330 +6, 331-380 +8, >380 +10. Snack 4 units.    insulin needles, disposable, 31 X 5/16 " Ndle Pt to use pen needle with Lantus daily    LIDOTRAL 3.88 % Crea APPLY TO THE AFFECTED AREA 2 TIMES TO 3 TIMES DAILY    linagliptin (TRADJENTA) 5 mg Tab tablet Take 1 tablet (5 mg total) by mouth once daily.    meclizine (ANTIVERT) 25 mg tablet Take 1 tablet (25 mg total) by mouth 3 (three) times daily as needed for Dizziness.    METHYL SALICYLATE/MENTHOL (MENTHOLATUM DEEP HEAT RUB TOP) Apply topically.    metOLazone (ZAROXOLYN) 2.5 MG " tablet Take 1 tablet (2.5 mg total) by mouth once daily.    mirtazapine (REMERON) 30 MG tablet Take 30 mg by mouth every evening.     montelukast (SINGULAIR) 10 mg tablet Take 1 tablet (10 mg total) by mouth every evening.    nitroGLYCERIN (NITROSTAT) 0.4 MG SL tablet Place 1 tablet (0.4 mg total) under the tongue every 5 (five) minutes as needed for Chest pain.    ondansetron (ZOFRAN-ODT) 8 MG TbDL Take 1 tablet (8 mg total) by mouth every 6 (six) hours as needed.    telmisartan (MICARDIS) 40 MG Tab TAKE 1 TABLET BY MOUTH EVERY DAY    VIVA PATCH 2.5-4-2 % PtMd APPLY UP TO 3 PATCHES TO THE AFFECTED AREA TWICE DAILY     No current facility-administered medications for this visit.        Vitals:    05/28/19 1039   BP: (!) 229/93   Pulse: 73     Physical Exam   Constitutional: He is oriented to person, place, and time. He appears well-developed.   HENT:   Head: Normocephalic and atraumatic.   Mouth/Throat: No oropharyngeal exudate.   Eyes: Pupils are equal, round, and reactive to light. No scleral icterus.   Neck: Normal range of motion.   Cardiovascular: Normal rate.   No murmur heard.  Pulmonary/Chest: Effort normal and breath sounds normal. No respiratory distress. He has no wheezes.   Abdominal: Soft. He exhibits no distension. There is no tenderness. There is no rebound.   Musculoskeletal:   He uses cane to ambulate   Lymphadenopathy:     He has no cervical adenopathy.   Neurological: He is oriented to person, place, and time. No cranial nerve deficit.   Skin: Skin is warm.       Component      Latest Ref Rng & Units 5/21/2019   WBC      3.90 - 12.70 K/uL 10.22   RBC      4.60 - 6.20 M/uL 3.58 (L)   Hemoglobin      14.0 - 18.0 g/dL 10.0 (L)   Hematocrit      40.0 - 54.0 % 30.3 (L)   MCV      82 - 98 fL 85   MCH      27.0 - 31.0 pg 27.9   MCHC      32.0 - 36.0 g/dL 33.0   RDW      11.5 - 14.5 % 13.2   Platelets      150 - 350 K/uL 171   MPV      9.2 - 12.9 fL 10.8   Immature Granulocytes      0.0 - 0.5 % 0.3    Gran # (ANC)      1.8 - 7.7 K/uL 8.5 (H)   Immature Grans (Abs)      0.00 - 0.04 K/uL 0.03   Lymph #      1.0 - 4.8 K/uL 1.1   Mono #      0.3 - 1.0 K/uL 0.6   Eos #      0.0 - 0.5 K/uL 0.0   Baso #      0.00 - 0.20 K/uL 0.01   nRBC      0 /100 WBC 0   Gran%      38.0 - 73.0 % 83.1 (H)   Lymph%      18.0 - 48.0 % 10.4 (L)   Mono%      4.0 - 15.0 % 6.0   Eosinophil%      0.0 - 8.0 % 0.1   Basophil%      0.0 - 1.9 % 0.1   Differential Method       Automated   Sodium      136 - 145 mmol/L 130 (L)   Potassium      3.5 - 5.1 mmol/L 3.9   Chloride      95 - 110 mmol/L 98   CO2      23 - 29 mmol/L 25   Glucose      70 - 110 mg/dL 345 (H)   BUN, Bld      8 - 23 mg/dL 45 (H)   Creatinine      0.5 - 1.4 mg/dL 3.6 (H)   Calcium      8.7 - 10.5 mg/dL 9.6   PROTEIN TOTAL      6.0 - 8.4 g/dL 7.5   Albumin      3.5 - 5.2 g/dL 3.6   BILIRUBIN TOTAL      0.1 - 1.0 mg/dL 0.6   Alkaline Phosphatase      55 - 135 U/L 118   AST      10 - 40 U/L 16   ALT      10 - 44 U/L 17   Anion Gap      8 - 16 mmol/L 7 (L)   eGFR if African American      >60 mL/min/1.73 m:2 19.7 (A)   eGFR if non African American      >60 mL/min/1.73 m:2 17.1 (A)   Kappa Free Light Chains      0.33 - 1.94 mg/dL 77.42 (H)   Lambda Free Light Chains      0.57 - 2.63 mg/dL 2.75 (H)   Kappa/Lambda FLC Ratio      0.26 - 1.65 28.15 (H)   IgG - Serum      650 - 1600 mg/dL 1533       5/21/19 SPEP :Normal total protein. Normal gamma globulins are decreased. There is a paraprotein band in gamma measuring 1.13 g/dL, previously 0.88 g/dL.       Assessment:    1.IgG kappa multiple myeloma   2. Anemia secondary to CKD   3. Anemia secondary to multiple myeloma   4.  Diabetes mellitus with neuropathy, nephropathy, retinal disease.  5.  Cardiomyopathy with chronic diastolic heart failure.  6.  h/o CVA  7. Essential hypertension  8. Hyperglycemia  9. Chronic fatigue     PLAN:    1. He did not tolerate treatment with lenadiolmide/Deacdron well. It is unclear if he has renal  dysfunction from multiple myeloma as well, in addition to diabetic nephropathy and hypertensive nephropathy.    SPEP on 5/21/19 shows slight increase in M protein from Dec 2018. He is not a transplant candidate ( poor insight into his health condition, renal dysfunction, poorly controled hypertension). Serum free light chains have increased, with the ratio in May at 28.15 (24.09 in December 2018). Creatinine has increased from 3.1 in Dec 2018 to 3.6 now. Calcium is normal. No bone pain / back pain.  His hemoglobin is currently 10g/dl and has not required any transfusions recently.  He will continue to have CBC, CMP every  Month, and follow here in 3 months with repeat myeloma specific labs.      2,3.  Continue monthly CBC.    4. On long term insulin. Follows with his primary care physician    5.  Follows with cardiology. On Coreg, Lipkimberly fowler    7. BP today 192/87 mm Hg, on repeat testing . He takes Amlodipine, Carvedilol. He has not taken his AM anti-HT medications today.       Return visit (EPA appointment) in 3 months with CBC, CMP,  serum protein electrophoresis and free light chain analysis drawn about one week  prior to his visit.

## 2019-06-14 NOTE — TELEPHONE ENCOUNTER
Left message for patient to call us back.  Phone number was provided.    Also message patient through patient portal.

## 2019-06-14 NOTE — TELEPHONE ENCOUNTER
Continuous Glucose Sensor Repor    Interpreting Provider: Gifty Massey NP     Date of this outpatient clinic study: This sensor was put in place on 6/8/19 and taken off on 6/14/19     Reason for CGMS: Currently on anti-hyperglycemic therapy and failing to achieve glycemic goals.    Lab Results   Component Value Date    HGBA1C >14.0 (H) 04/24/2019       Type of diabetes: type 2    Current diabetes medications and doses: lantus 22 units at night, novolog 9 units w/ meals, snack dose 4 units     Blood glucose, food and exercise log filled out consistently:  No    ______________________________________________________________  Patterns/ Events:     Hypoglycemia: none reported via obed, noted 60s around 6a twice on one day     Possible precipitating events: too much basal     Hyperglycemia:  Yes, 62%  Possible precipitating events: carb selections, possible missed insulin     __________________________________________________________________  SUMMARY of KEY FINDINGS:      Marie Reis Jr. is a 63 y.o. year old male with diagnosis of  type 2 Diabetes      Medication/Diet/Exercise-related patterns/events:    Medication adherence: some issues    Diet: not sure    Exercise patterns: no formal   _____________________      RECOMMENDATIONS:  vgo 20  3 clicks with meals  1 click with snack  DE appt  Continue lantus at 20 units at night  novolog 9 units w/ large meals  6 units w/ smaller meals  Scale 150-200+1, etc   Cover w/ insulin throughout day  Wide fluctuations 128-332 mg/dl mostly

## 2019-06-14 NOTE — TELEPHONE ENCOUNTER
----- Message from BELEM Gardiner, EMILIEP sent at 6/14/2019 12:52 PM CDT -----  Please call pt     Continue lantus at 20 units at night  novolog 9 units w/ large meals  6 units w/ smaller meals  Scale 150-200+1, etc   Cover w/ insulin throughout day  Wide fluctuations 128-332 mg/dl mostly    Consider:  vgo 20  3 clicks with meals  1 click with snack  DE appt needed, please make sure appt is in system if not   Message me    Thanks  Gifty

## 2019-07-01 NOTE — TELEPHONE ENCOUNTER
----- Message from BELEM Gardiner, FNP sent at 7/1/2019  4:46 PM CDT -----  Please check to see if pt is taking 9 units w/ meals  22 units of long acting   If now  Have him start with 9 units w/ each meal first   And increase long acting to 25 units  Cgm reports still very high ~60% of the time.  Gifty

## 2019-07-02 NOTE — TELEPHONE ENCOUNTER
Called and spoke with patient about his readings and new directions.    start with 9 units w/ each meal first   And increase long acting to 25 units  Cgm reports still very high ~60% of the time.    Patient understands

## 2019-07-23 NOTE — PROGRESS NOTES
HPI     DLS:  4/23/19    Thera Tears PRN OU        Prior FA -  Late macular leakage OU  NP OU with NV OU    OCT - VMT released OD , DME improved   Increased temporal DME OS vision, stable    A/P    1. Recurrent iritis  stable    2. PDR OU  - new small preretinal heme nasal OD  S/p PRP OD 03/27/2017  S/p PRP OS 05/09/2017 8/18 - increased VH - Avastin with Dr. CRUZ  2/12/19 - PRP fill OS  3/19 PRP fill OD.   4/19 - small boat heme - ASx - observed    Stable today, monitor    3. DME OU  - stable OS, temporal edema increase OD  S/p Avastin OD x 5 OS x 2  S/p Focal OS (12/16/14)  - mild worsening extrafoveal edema OD -  S/p Focal OD (5/24/16, 6/17)      4. PCIOL OU  S/p phaco w/IOL OD 8/13/15  S/p phaco w/IOL OS 4/30/15        6 months OCT

## 2019-08-06 NOTE — TELEPHONE ENCOUNTER
----- Message from Debbie Salinas MD sent at 8/6/2019  1:38 PM CDT -----  Please review.  We will discuss the results during your upcoming visit with me. Diabetes is still not at goal, but much better controlled

## 2019-08-12 NOTE — PROGRESS NOTES
Subjective:   Patient ID:  Marie Reis Jr. is a 63 y.o. male who presents for follow-up of Hypertension      HPI: Except for worsened peripheral edema and leg fatigue while walking no other complaints. In the past patient responded well to Zaroxolyn prior to Lssix. Blood work suggest better diabetes control.  Patient states he is complaint with medications. BP is not at goal today. Since the last visit patient gained 10 lbs.  He is also anemic. Patient is eligible for digital BP program.     Lab Results   Component Value Date     08/05/2019    K 3.5 08/05/2019     08/05/2019    CO2 22 (L) 08/05/2019    BUN 34 (H) 08/05/2019    CREATININE 3.1 (H) 08/05/2019    GLU 87 08/05/2019    HGBA1C 8.9 (H) 08/05/2019    MG 1.6 04/12/2018    AST 20 08/05/2019    ALT 14 08/05/2019    ALBUMIN 3.5 08/05/2019    PROT 7.0 08/05/2019    BILITOT 0.6 08/05/2019    WBC 6.64 06/28/2019    HGB 9.6 (L) 06/28/2019    HCT 28.5 (L) 06/28/2019    HCT 27 (L) 02/21/2018    MCV 86 06/28/2019     06/28/2019    INR 1.1 02/21/2018    PSA 1.5 06/26/2015    TSH 3.861 08/05/2019    CHOL 117 (L) 08/05/2019    HDL 27 (L) 08/05/2019    LDLCALC 73.0 08/05/2019    TRIG 85 08/05/2019       Review of Systems   Constitution: Positive for weight gain.   HENT: Negative.    Eyes: Negative.    Cardiovascular: Positive for dyspnea on exertion and leg swelling. Negative for chest pain, claudication, near-syncope, palpitations and syncope.   Respiratory: Negative.  Negative for cough, shortness of breath, snoring and wheezing.    Endocrine: Negative.  Negative for cold intolerance, heat intolerance, polydipsia, polyphagia and polyuria.   Skin: Negative.    Musculoskeletal: Positive for back pain, joint swelling and muscle cramps.   Gastrointestinal: Negative.    Genitourinary: Negative.    Neurological: Positive for excessive daytime sleepiness, dizziness, light-headedness and numbness.   Psychiatric/Behavioral: Negative.        Objective:   Physical  "Exam   Constitutional: He is oriented to person, place, and time. He appears well-developed and well-nourished.   BP (!) 170/78   Pulse 70   Ht 6' 1" (1.854 m)   Wt 87.9 kg (193 lb 14.3 oz)   BMI 25.58 kg/m²      HENT:   Head: Normocephalic.   Eyes: Pupils are equal, round, and reactive to light.   Neck: Normal range of motion. Neck supple. Carotid bruit is not present. No thyromegaly present.   Cardiovascular: Normal rate, regular rhythm, normal heart sounds and intact distal pulses. Exam reveals no gallop and no friction rub.   No murmur heard.  Pulses:       Carotid pulses are 2+ on the right side, and 2+ on the left side.       Radial pulses are 2+ on the right side, and 2+ on the left side.        Femoral pulses are 2+ on the right side, and 2+ on the left side.       Popliteal pulses are 2+ on the right side, and 2+ on the left side.        Dorsalis pedis pulses are 2+ on the right side, and 2+ on the left side.        Posterior tibial pulses are 2+ on the right side, and 2+ on the left side.   Pulmonary/Chest: Effort normal and breath sounds normal. No respiratory distress. He has no wheezes. He has no rales. He exhibits no tenderness.   Abdominal: Soft. Bowel sounds are normal.   Musculoskeletal: Normal range of motion. He exhibits edema.   2+ bilateral   Neurological: He is alert and oriented to person, place, and time.   Skin: Skin is warm and dry.   Psychiatric: He has a normal mood and affect.   Nursing note and vitals reviewed.        Assessment and Plan:     Problem List Items Addressed This Visit        Cardiology Problems    Essential hypertension - Primary    Relevant Orders    CV Exercise GREG    Chronic diastolic heart failure    Relevant Medications    metOLazone (ZAROXOLYN) 2.5 MG tablet    Other Relevant Orders    CV Exercise GREG    Coronary artery disease due to calcified coronary lesion    Relevant Orders    CV Exercise GREG    Bilateral carotid artery stenosis    Relevant Orders    CV " Exercise GREG    Aortic atherosclerosis       Other    History of stroke    Relevant Orders    CV Exercise GREG    Dyslipidemia    Relevant Orders    CV Exercise GREG    Type 2 diabetes mellitus with diabetic polyneuropathy, with long-term current use of insulin    Relevant Orders    CV Exercise GREG    Multiple myeloma not having achieved remission    Bradycardia    Relevant Orders    CV Exercise GREG    Secondary hyperparathyroidism    Hypertensive CKD (chronic kidney disease)    Relevant Orders    CV Exercise GREG      Other Visit Diagnoses     Hypertension, essential        Relevant Medications    furosemide (LASIX) 40 MG tablet    Other Relevant Orders    Hypertension Digital Medicine (HDMP) Enrollment Order (Completed)    CV Exercise GREG    Acute on chronic diastolic heart failure        Relevant Medications    metOLazone (ZAROXOLYN) 2.5 MG tablet    Other Relevant Orders    CV Exercise GREG    Edema, unspecified type        Relevant Medications    metOLazone (ZAROXOLYN) 2.5 MG tablet    Other Relevant Orders    CV Exercise GREG          Patient's Medications   New Prescriptions    No medications on file   Previous Medications    AMLODIPINE (NORVASC) 10 MG TABLET    Take 1 tablet (10 mg total) by mouth once daily.    ATORVASTATIN (LIPITOR) 80 MG TABLET    Take 1 tablet (80 mg total) by mouth once daily.    BLOOD SUGAR DIAGNOSTIC STRP    1 strip by Misc.(Non-Drug; Combo Route) route after meals as needed.    CALCITRIOL (ROCALTROL) 0.25 MCG CAP    Take 2 capsules (0.5 mcg total) by mouth every Mon, Wed, Fri.    CARVEDILOL (COREG) 25 MG TABLET    Take 1 tablet (25 mg total) by mouth 2 (two) times daily with meals.    CLOPIDOGREL (PLAVIX) 75 MG TABLET    TAKE 1 TABLET BY MOUTH EVERY DAY    DICLOFENAC SODIUM 1.5 % DROP    APPLY 40 DROPS TO AFFECTED AREA(S) FOUR TIMES DAILY    FLASH GLUCOSE SCANNING READER (SimpleeSTYLE CLAUDIO 14 DAY READER) [facility] [place] [facility] [place]    Use as directed.    FLASH GLUCOSE SENSOR (FREESTYLE CLAUDIO 14 DAY SENSOR) KIT     "Change every 14 days.    FLUTICASONE (FLONASE) 50 MCG/ACTUATION NASAL SPRAY    1 spray by Each Nare route 2 (two) times daily as needed for Rhinitis.    GABAPENTIN (NEURONTIN) 300 MG CAPSULE    Take 1 capsule (300 mg total) by mouth 2 (two) times daily.    INSULIN (LANTUS SOLOSTAR U-100 INSULIN) GLARGINE 100 UNITS/ML (3ML) SUBQ PEN    Inject 22 Units into the skin every evening. Inject 15 units at night.    INSULIN ASPART U-100 (NOVOLOG) 100 UNIT/ML (3 ML) INPN PEN    Inject 9 units w/ meals plus scale 180-230 +2, 231-280 +4, 281-330 +6, 331-380 +8, >380 +10. Snack 4 units.    INSULIN NEEDLES, DISPOSABLE, 31 X 5/16 " NDLE    Pt to use pen needle with Lantus daily    LIDOTRAL 3.88 % CREA    APPLY TO THE AFFECTED AREA 2 TIMES TO 3 TIMES DAILY    LINAGLIPTIN (TRADJENTA) 5 MG TAB TABLET    Take 1 tablet (5 mg total) by mouth once daily.    MECLIZINE (ANTIVERT) 25 MG TABLET    Take 1 tablet (25 mg total) by mouth 3 (three) times daily as needed for Dizziness.    MIRTAZAPINE (REMERON) 30 MG TABLET    Take 30 mg by mouth every evening.     MONTELUKAST (SINGULAIR) 10 MG TABLET    Take 1 tablet (10 mg total) by mouth every evening.    NITROGLYCERIN (NITROSTAT) 0.4 MG SL TABLET    Place 1 tablet (0.4 mg total) under the tongue every 5 (five) minutes as needed for Chest pain.    ONDANSETRON (ZOFRAN-ODT) 8 MG TBDL    Take 1 tablet (8 mg total) by mouth every 6 (six) hours as needed.    TELMISARTAN (MICARDIS) 40 MG TAB    TAKE 1 TABLET BY MOUTH EVERY DAY   Modified Medications    Modified Medication Previous Medication    FUROSEMIDE (LASIX) 40 MG TABLET furosemide (LASIX) 40 MG tablet       Pt to take 1 1/2 tablet twice a day per Dr. Salinas on 3/19/18.    Pt to take 1 1/2 tablet twice a day per Dr. Salinas on 3/19/18.    METOLAZONE (ZAROXOLYN) 2.5 MG TABLET metOLazone (ZAROXOLYN) 2.5 MG tablet       Take 1 tablet (2.5 mg total) by mouth once daily.    Take 1 tablet (2.5 mg total) by mouth once daily.   Discontinued " Medications    FUROSEMIDE (LASIX) 20 MG TABLET    TAKE 1 TABLET (20 MG TOTAL) BY MOUTH 2 (TWO) TIMES DAILY. DO NOT TAKE THE PM DOSE AFTER 3 PM    METHYL SALICYLATE/MENTHOL (MENTHOLATUM DEEP HEAT RUB TOP)    Apply topically.    VIVA PATCH 2.5-4-2 % PTMD    APPLY UP TO 3 PATCHES TO THE AFFECTED AREA TWICE DAILY     3 days of Zaroxolyn 30 min prior to Lasix prescribed. BMP  In 1 week.  Compliance with salt restriction, elevation and support socks encouraged.  Anemia may be contributing to his symptoms.  Review GREG's and advise.  Continue on the other meds and sign up for digital BP program  Follow up in about 6 months (around 2/12/2020).

## 2019-08-13 NOTE — TELEPHONE ENCOUNTER
----- Message from Debbie Salinas MD sent at 8/13/2019  2:08 PM CDT -----  Please inform the patient that his GREG's show mild bilateral blockages. Walking is the exercise for it. If his symptoms continue I will refer him to Dr. Wilhelm ( vascular medicine).

## 2019-08-20 NOTE — TELEPHONE ENCOUNTER
----- Message from Debbie Salinas MD sent at 8/20/2019 11:07 AM CDT -----  Please inform the patient that blood work is acceptable. He should be off Zaroxolyn now.

## 2019-08-23 NOTE — TELEPHONE ENCOUNTER
----- Message from Debibe Salinas MD sent at 8/23/2019  3:36 PM CDT -----  Please inform the patient that his blood work is unchanged. Hope his swelling has improved. Please encourage to keep law salt diet and follow digital medicine program.

## 2019-08-28 PROBLEM — I73.9 PAD (PERIPHERAL ARTERY DISEASE): Status: ACTIVE | Noted: 2019-01-01

## 2019-08-28 NOTE — PROGRESS NOTES
Subjective:       Patient ID: Marie Reis Jr. is a 63 y.o. male.    Chief Complaint: Follow-up (6 mos ); Diabetes; and Hypertension    HPI 63-year-old male presents to clinic today for follow-up hypertension associated diabetes patient has diabetes advanced with multiple complications including stage 4 kidney disease, neuropathy and retinopathy.  Patient also has multiple myeloma followed by Oncology not in remission.  He also has chronic diastolic heart failure and peripheral artery disease. Patient is followed by Cardiology and Nephrology.  He has upcoming appointments.  Patient's blood pressure is elevated today he does report that is been elevated over the last few office visits.  He is asymptomatic.  He has upcoming plans to follow up with Endocrinology in feels like he is doing overall better with his blood sugar control and certainly better with his surveillance and monitoring.  Review of Systems  otherwise negative  Objective:      Physical Exam  General: Well-appearing, well-nourished.  No distress  HEENT: conjunctivae are normal.  Pupils are equal and reative to light.  TM's are clear and intact bilaterally.  Hearing is grossly normal.  Nasopharynx is clear.  Oropharynx is clear.  Neck: Supple.  No thyroid megaly.  No bruits.  Lymph: No cervical or supraclavicular adenopathy.  Heart: Regular rate and rhythm, without murmur, rub or gallop.  Lungs: Clear to auscultation; respiratory effort normal.  Abdomen: Soft, nontender, nondistended.  Normoactive bowel sounds.  No hepatomegaly.  No masses.  Extremities: Good distal pulses.  No edema.  Psych: Oriented to time person place.  Judgment and insight seem unimpaired.  Mood and affect are appropriate.  Assessment:       1. Hypertension associated with diabetes    2. 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 PAD (peripheral artery disease)    4. Type 2  diabetes mellitus with diabetic polyneuropathy, with long-term current use of insulin    5. Type 2 diabetes mellitus with stage 4 chronic kidney disease, with long-term current use of insulin    6. Chronic diastolic heart failure    7. Multiple myeloma not having achieved remission    8. Hypertensive kidney disease with stage 4 chronic kidney disease    9. Secondary hyperparathyroidism    10. Screen for colon cancer    11. Screening for prostate cancer        Plan:       Marie was seen today for follow-up, diabetes and hypertension.    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  Positive reinforcement given on his regular monitoring and surveillance advised him to keep his follow-up plans with Endocrinology.  PAD (peripheral artery disease)  -     Comprehensive metabolic panel; Standing  -     Hemoglobin A1c; Standing    Type 2 diabetes mellitus with diabetic polyneuropathy, with long-term current use of insulin  Continue current regimen continue Neurology follow-up  Type 2 diabetes mellitus with stage 4 chronic kidney disease, with long-term current use of insulin    Chronic diastolic heart failure  Chronic stable followed by  Cardiology  Multiple myeloma not having achieved remission    Hypertensive kidney disease with stage 4 chronic kidney disease  Uncontrolled hypertension will communicate with his cardiologist has upcoming Nephrology scheduled  Secondary hyperparathyroidism    Screen for colon cancer  -     Fecal Immunochemical Test (iFOBT); Future    Screening for prostate cancer  -     PSA, Screening; Standing

## 2019-08-28 NOTE — Clinical Note
I just wanted to touch base with you.  Our patient's blood pressure is not been optimally controlled.  Do you have something that you would like for me to initiate given that his blood pressures been elevated at his last few visits?  I just wanted to include you in the decision process given this high risk patient.

## 2019-08-30 NOTE — TELEPHONE ENCOUNTER
Please inform patient to increase Micardis to 80 mg new prescription sent to pharmacy can take to the 40s for now.  Please schedule him for a BMP in 2 weeks.  Please inform him that I reviewed this with his cardiologist.  Please remind him to go to his patient portal and answer the questions so he can get involved in the digital Hypertension program.

## 2019-08-30 NOTE — TELEPHONE ENCOUNTER
----- Message from Debbie Salinas MD sent at 8/29/2019  8:40 AM CDT -----  Samir Cruz,  We have enrolled him in the digital BP program, but he never followed up with them. I could enroll him again, but doubt he will be complaint.  I would recommend to increase his Micardis to 80 mg daily and recheck BMP in 1-2 weeks.  Unfortunately, we may have difficulties controlling his BP due to poor compliance.  Debbie  ----- Message -----  From: Nancy Colón MD  Sent: 8/28/2019   6:00 PM  To: Debbie Salinas MD    I just wanted to touch base with you.  Our patient's blood pressure is not been optimally controlled.  Do you have something that you would like for me to initiate given that his blood pressures been elevated at his last few visits?  I just wanted to include you in the decision process given this high risk patient.

## 2019-09-09 PROBLEM — D64.89 ANEMIA DUE TO MULTIPLE MECHANISMS: Status: ACTIVE | Noted: 2019-01-01

## 2019-09-10 NOTE — Clinical Note
Hi Dr. Colón,Evidently his kidney function has declined further with his uncontrolled diabetes, HTN. I explained implications to him and his wife. He needs to re-establish care with oncology. Would not yet refer him for kidney Tx evaluation until we know he shows better compliance. I just wanted to request dose reduction in gabapentin to 300 mg per day given his worsening renal function.

## 2019-09-10 NOTE — PROGRESS NOTES
"Subjective:       Patient ID: Marie Reis Jr. is a 62 y.o. Black or  male who presents for follow up evaluation of Chronic Kidney Disease    HPI     Mr. Reis is seen in nephrology clinic for follow up evaluation for CKD. He arrived accompanied by his wife. He established care with me in 4/16, he followed until 5/16 and then he was lost for follow up until 10/17 when he saw Dr. Huerta in nephrology clinic. Since then again he did not return for follow up until 1/18. He was last followed on 2/7/19.     Interim per chart review noted patient has had several office visits with severely uncontrolled hypertension, at times in the range of 190-200's. Noted per PCP and his cardiologist there were attempts to enroll him into Digital BP monitoring program but apparently patient did not comply with the recommendations. Patient remains noncompliant with medical advise, medications, dietary restrictions.    Of note his A1C was > 14 in April 2019.    Per hematology he is not a candidate for transplant due to this as well as his severely uncontrolled diabetes, hypertension. Previously he received Revelimid but developed JAZLYN, uncontrolled diabetes. He continues to have anemia.    His kidney function continue to decline slowly.     Per previous notes:  He arrived today accompanied by his wife. He was noted to have uncontrolled hypertension. But on further questioning he reported he had not taken his BP medicines for the morning until around noon time and it was only in the past hour or so he had taken his antihypertensive medicines. He kept saying " I don't think the pill is working" but his wife reported he has not been consistent in taking his medicines doses. She admitted he does not check BP at home regularly. Noted some of the office visits in the past few months showed uncontrolled HTN but on couple of times it was actually tightly controlled. He did have changes to his regimen, especially ARB per his PCP office " "in the past month.    His multiple myeloma is being managed by his hematologist. They report he had low blood counts and received blood transfusion recently. Of note, his diabetes and glucose control have worsened again and most recent A1C is 11. His wife reported he does drink soda every day thinking "it prevents low blood sugars". Pt was very tangential throughout the visit but he could still recollect a lot of things we had discussed during prior visits like using water as the primary drink, keeping blood sugars under control etc.    I reviewed serial labs with both of them and brought to his attention of his slowly progressing CKD. Stressed importance of having better diabetes and BP control and keeping consistency in taking medicines, following BP routinely, improving on dietary pattern etc. Explained higher risk of progression of CKD to ESRD.     He denied any nausea/ vomiting/ decreased urine output/ leg swelling/ flank pain/ cloudy urine/ dysuria. He has had several episodes pf JAZLYN on CKD in the last few months. He has partial recovery. Stressed to follow BP at home and report to MD. His diuretic regimen is being managed by his cardiologist.     Pt has severely uncontrolled diabetes with A1C above 10 for several years. He has diabetic and hypertensive retinopathy, hypertension, prior NSAID use, diabetic polyneuropathy, prior h/o stroke, hyperlipidemia. Additionally he has chronic heart failure with acute exacerbation, multiple myeloma. He has progression of CKD and proteinuria. He has had prior episodes of JAZLYN. Prior urine studies from 03/15 show presence of micro albumin which now has progressed to overt proteinuria.     During prior visits he has acknowledged his worsening diabetes, fluid status but problem has been his poor memory which likely is impacting his ability to retain information about follow up, dietary changes etc. He admits he has not been following diabetic diet at all.       Renal " Function:  Lab Results   Component Value Date     (H) 08/23/2019     (H) 08/23/2019     (H) 08/23/2019     08/23/2019     08/23/2019     08/23/2019    K 3.6 08/23/2019    K 3.6 08/23/2019    K 3.6 08/23/2019     08/23/2019     08/23/2019     08/23/2019    CO2 24 08/23/2019    CO2 24 08/23/2019    CO2 24 08/23/2019    BUN 30 (H) 08/23/2019    BUN 30 (H) 08/23/2019    BUN 30 (H) 08/23/2019    CALCIUM 9.2 08/23/2019    CALCIUM 9.2 08/23/2019    CALCIUM 9.2 08/23/2019    CREATININE 3.5 (H) 08/23/2019    CREATININE 3.5 (H) 08/23/2019    CREATININE 3.5 (H) 08/23/2019    ALBUMIN 3.6 08/23/2019    ALBUMIN 3.6 08/23/2019    ALBUMIN 3.6 08/23/2019    PHOS 3.3 08/23/2019    PHOS 2.9 02/04/2019    ESTGFRAFRICA 20.4 (A) 08/23/2019    ESTGFRAFRICA 20.4 (A) 08/23/2019    ESTGFRAFRICA 20.4 (A) 08/23/2019    EGFRNONAA 17.7 (A) 08/23/2019    EGFRNONAA 17.7 (A) 08/23/2019    EGFRNONAA 17.7 (A) 08/23/2019       Urinalysis:  Lab Results   Component Value Date    APPEARANCEUA Clear 08/23/2019    PHUR 6.0 08/23/2019    SPECGRAV 1.010 08/23/2019    PROTEINUA 2+ (A) 08/23/2019    GLUCUA 2+ (A) 08/23/2019    OCCULTUA 1+ (A) 08/23/2019    NITRITE Negative 08/23/2019    LEUKOCYTESUR Negative 08/23/2019       Protein/Creatinine Ratio:  Lab Results   Component Value Date    PROTEINURINE 137 (H) 08/23/2019    CREATRANDUR 80.0 08/23/2019    UTPCR 1.71 (H) 08/23/2019       CBC:  Lab Results   Component Value Date    WBC 8.53 08/23/2019    HGB 10.1 (L) 08/23/2019    HCT 31.3 (L) 08/23/2019         Vit D 25    Review of Systems   Constitutional: Negative for fever, chills, appetite change, positive for fatigue.   HENT: Negative for sneezing and sore throat.    Respiratory: Negative for cough, shortness of breath and wheezing. reduced effort tolerance.  Cardiovascular: negative for palpitations. Negative for chest pain.   Gastrointestinal: Negative for nausea, vomiting, abdominal pain and  diarrhea.   Genitourinary: Negative for dysuria, frequency, hematuria and flank pain.   Musculoskeletal: Negative for myalgias and back pain.   Skin: Negative for pallor.   Neurological: Negative for dizziness, light-headedness and headaches.   Psychiatric/Behavioral: Negative for behavioral problems.       Objective:      Physical Exam   Constitutional: He is oriented to person, place, and time. He appears well-developed and well-nourished. No distress.   Mouth/Throat: Oropharynx is clear and moist.   Eyes: Conjunctivae are normal. Right eye exhibits no discharge. Left eye exhibits no discharge.   Neck: Normal range of motion. Neck supple.   Cardiovascular: Normal rate, regular rhythm and normal heart sounds.    Pulmonary/Chest: Effort normal and breath sounds normal. No respiratory distress. He has no wheezes. He has no rales.   Abdominal: Soft. Abdominal obesity.There is no tenderness. Musculoskeletal: Normal range of motion. He exhibits only trace edema. He exhibits no tenderness.   Neurological: He is alert and oriented to person, place, and time.   Skin: Skin is warm and dry. No erythema.   Psychiatric: He has a normal mood and affect.   Vitals reviewed.      Assessment:       1. Uncontrolled diabetes with stage 3 chronic kidney disease GFR 30-59    2. Proteinuria    3. Multiple myeloma   4. Essential hypertension    5. Uncontrolled diabetes mellitus    6. Anemia of chronic illness    7.      Secondary hyperparathyroidism  8.      Vit D deficiency     Plan:     Mr. Reis has uncontrolled and longstanding diabetes with complications like polyneuropathy, retinopathy, has progression of CKD and proteinuria. He has hypertension, hyperlipidemia, prior stroke, prior documented micro albumin in the urine. Prior CT imaging has shown right ureteral stone with mild right hydronephrosis from 08/14. US from 04/16 noted for 10.3 and 10.4 cm kidney size, there was no hydronephrosis. At present it is very crucial that he gets  better control of his diabetes and BP. He is at very high risk for rapid progression of his CKD to higher stages. Continue to follow with heme for myeloma and anemia management, per heme his anemia is likely multifactorial.     CKD is due to longstanding and poorly controlled diabetes, hypertension, MM, cardiorenal causing decreased EABV. Unfortunately, his diabetes continues to remain poorly controlled.    He has had multiple episodes of JAZLYN superimposed on CKD IV due to osmotic diuresis due to severe hyperglycemia, likely over diuresis. Pt has very high risk of progression to ESRD.     Worsened proteinuria due to diabetic nephropathy, diabetes becoming uncontrolled again due to his dietary non compliance.    Hypertension is uncontrolled, he needs closer supervision of his HTN control including his intake of medicines, compliance with medicines as well as diet.      Non compliance with recommendations, follow up, dietary restrictions, medicine intake.    Memory deficit. Likely due to h/o stroke.     His worsened fatigue and anemia is likely due to chemo for MM. Management deferred to his heme oncology team.    Plan:    - will need renal panel every 6 to 8 weeks to follow on creatinine, eGFR, electrolytes, acid base status very closely  - high risk of progression to ESRD explained to both patient and his wife, will need arrangements with access creation, TOPS education if eGFR remains < 20 and persists   - strict glycemic control  - strict low salt diet, less than 2 grams per day  - avoid NSAID use ever  - continue ARB for RAAS blockade while monitoring K levels periodically  - continue to trend PTH, acid base disorder, corrected Ca. Continue calcitriol at current dose. Corrected Ca is borderline high, so dose increase may not work  - medication compliance was stressed to him  - diuretic regimen per his cardiologist    Pt had ran out of calcitriol. He admitted he did not request his pharmacy for refills. New refills  sent. Explained to him and his wife of his worsening renal function, need for better DM, HTN control and to follow closely in oncology clinic. Will refer to [facility] for education on dialysis modality. Vascular referral if eGFR continues to decline. Made them aware of it.    Risk of progression of CKD to ESRD was discussed with him in detail again today. Stressed importance of monthly labs for surveillance and very close follow up given above. He expressed understanding.    Reduce the dose of Gabapentin to 300 mg per day. Defer to PCP.    RTC 3 months, sooner if any new symptoms.  Plan, labs, recommendations were discussed with patient, his questions were answered to his satisfaction.   Renal panel every 4 to 6  weeks

## 2019-09-11 NOTE — PROGRESS NOTES
CC: Multiple myeloma, follow up     HPI: Mr. Reis is a 64-year-old man with multiple myeloma. He was a patient of Dr.Archie Guaman.  In May 2016, his serum protein electrophoresis revealed a paraprotein measuring 0.86 g/dL; this was an IgG kappa.  A 24-hour urine contained 947 mg of protein, 3% of which was kappa light chains.  His bone marrow had 15% plasma cells and a FISH panel disclosed trisomy 1, 7, 9 and 15.  A PET scan in January 2017 revealed no bone lesions.     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 He was followed with periodic examinations and laboratory studies until early 2018 when his anemia worsened.  There had been a gradual increase in the   protein level and there was a substantial increase in the 24-hour urine protein to 4500 mg.  However, only 4.6% of this was kappa light chain and 2.8% was IgG kappa.  Most of the proteinuria was believed to have been secondary to diabetes   mellitus.  The IgG kappa paraprotein as measured in electrophoresis was 1.23 g/dL.     Mainly because of the worsening anemia and concern that it might have been least partially due to  Myeloma,he was recommended a trial of therapy with Revlimid  10 mg daily for 21 days, every 28 days and dexamethasone 12 mg once a week. There was a long delay in obtaining Revlimid and after he started that he had worsening renal function and was hospitalized for that problem and for hyperglycemia.  He had another hospitalization in March 2018 for encephalopathy, congestive heart failure,   hypoglycemia and worsening anemia which required red cell transfusions.       In May  2018,  Revlimid and dexamethasone were stopped due to the above mentioned problems.  There had been substantial improvement in the protein levels with the paraprotein falling to 0.56 g/dL and   the quantitative IgG declining from 1747 to 991.  The kappa/lambda ratio fell from 44 to 6.5.     He was transfused in early July 2018 and his hemoglobin was 7.7. Since mid  September 2018, hemoglobin values were between 9.6 to 11.3.     His other medical problems include a stroke many years ago that left him with weakness of his left arm and leg and he has severe diabetes mellitus with renal, retinal and peripheral nerve complications.  He has cardiac valvular disease, pulmonary hypertension and diastolic dysfunction.  He also has a poor understanding of his medical status, probably related to complications from the stroke.      Review of Systems   Constitutional: Positive for malaise/fatigue. Negative for chills and fever.   HENT: Negative for congestion, ear discharge and nosebleeds.    Eyes: Negative for double vision and pain.   Respiratory: Negative for hemoptysis, sputum production and shortness of breath.    Cardiovascular: Negative for chest pain, palpitations and orthopnea.   Gastrointestinal: Negative for abdominal pain, diarrhea, heartburn, nausea and vomiting.   Genitourinary: Negative for dysuria, frequency, hematuria and urgency.   Musculoskeletal: Negative for back pain, joint pain, myalgias and neck pain.   Skin: Negative for rash.   Neurological: Negative for dizziness, tingling, tremors, sensory change and speech change.   Endo/Heme/Allergies: Negative for environmental allergies. Does not bruise/bleed easily.   Psychiatric/Behavioral: Negative for depression, hallucinations, substance abuse and suicidal ideas. The patient is not nervous/anxious.          Current Outpatient Medications   Medication Sig    amLODIPine (NORVASC) 10 MG tablet Take 1 tablet (10 mg total) by mouth once daily.    atorvastatin (LIPITOR) 80 MG tablet Take 1 tablet (80 mg total) by mouth once daily.    blood sugar diagnostic Strp 1 strip by Misc.(Non-Drug; Combo Route) route after meals as needed.    calcitRIOL (ROCALTROL) 0.25 MCG Cap Take 2 capsules (0.5 mcg total) by mouth every Mon, Wed, Fri.    carvedilol (COREG) 25 MG tablet Take 1 tablet (25 mg total) by mouth 2 (two) times daily  "with meals.    clopidogrel (PLAVIX) 75 mg tablet TAKE 1 TABLET BY MOUTH EVERY DAY    diclofenac sodium 1.5 % Drop APPLY 40 DROPS TO AFFECTED AREA(S) FOUR TIMES DAILY    flash glucose scanning reader (FREESTYLE CLAUDIO 14 DAY READER) [facility] [place] directed.    flash glucose sensor (FREESTYLE CLAUDIO 14 DAY SENSOR) Kit Change every 14 days.    fluticasone (FLONASE) 50 mcg/actuation nasal spray 1 spray by Each Nare route 2 (two) times daily as needed for Rhinitis. (Patient taking differently: 1 spray by Each Nare route as needed for Rhinitis. )    furosemide (LASIX) 40 MG tablet Pt to take 1 1/2 tablet twice a day per Dr. Salinas on 3/19/18.    gabapentin (NEURONTIN) 300 MG capsule Take 1 capsule (300 mg total) by mouth 2 (two) times daily.    insulin (LANTUS SOLOSTAR U-100 INSULIN) glargine 100 units/mL (3mL) SubQ pen Inject 22 Units into the skin every evening. Inject 15 units at night. (Patient taking differently: Inject 22 Units into the skin every evening. Pt taking 25 units at night.)    insulin aspart U-100 (NOVOLOG) 100 unit/mL (3 mL) InPn pen Inject 9 units w/ meals plus scale 180-230 +2, 231-280 +4, 281-330 +6, 331-380 +8, >380 +10. Snack 4 units.    insulin needles, disposable, 31 X 5/16 " Ndle Pt to use pen needle with Lantus daily    LIDOTRAL 3.88 % Crea APPLY TO THE AFFECTED AREA 2 TIMES TO 3 TIMES DAILY    linagliptin (TRADJENTA) 5 mg Tab tablet Take 1 tablet (5 mg total) by mouth once daily.    meclizine (ANTIVERT) 25 mg tablet Take 1 tablet (25 mg total) by mouth 3 (three) times daily as needed for Dizziness.    metOLazone (ZAROXOLYN) 2.5 MG tablet TAKE 1 TABLET BY MOUTH EVERY DAY    mirtazapine (REMERON) 30 MG tablet Take 30 mg by mouth every evening.     montelukast (SINGULAIR) 10 mg tablet Take 1 tablet (10 mg total) by mouth every evening.    nitroGLYCERIN (NITROSTAT) 0.4 MG SL tablet Place 1 tablet (0.4 mg total) under the tongue every 5 (five) minutes as needed for Chest pain.    " ondansetron (ZOFRAN-ODT) 8 MG TbDL Take 1 tablet (8 mg total) by mouth every 6 (six) hours as needed.    telmisartan (MICARDIS) 80 MG Tab Take 1 tablet (80 mg total) by mouth once daily.     No current facility-administered medications for this visit.          Vitals:    09/11/19 1012   BP: (!) 178/82   Pulse: 80   Resp: 18   Temp: 97.8 °F (36.6 °C)       Physical Exam   Constitutional: He appears well-developed.   HENT:   Head: Normocephalic and atraumatic.   Eyes: No scleral icterus.   Neck: Normal range of motion.   Cardiovascular: Normal rate.   Pulmonary/Chest: Effort normal. No respiratory distress. He has no rales.   Abdominal: He exhibits no distension. There is no tenderness.   Musculoskeletal: He exhibits no edema.   He uses a cane to ambulate   Lymphadenopathy:     He has no cervical adenopathy.   Neurological: He is alert.   Skin: Skin is warm.   Psychiatric: He has a normal mood and affect.         5/21/19 SPEP :Normal total protein. Normal gamma globulins are decreased. There is a paraprotein band in gamma measuring 1.13 g/dL, previously 0.88 g/dL.               Component      Latest Ref Rng & Units 9/11/2019   WBC      3.90 - 12.70 K/uL 7.29   RBC      4.60 - 6.20 M/uL 3.66 (L)   Hemoglobin      14.0 - 18.0 g/dL 10.5 (L)   Hematocrit      40.0 - 54.0 % 31.1 (L)   MCV      82 - 98 fL 85   MCH      27.0 - 31.0 pg 28.7   MCHC      32.0 - 36.0 g/dL 33.8   RDW      11.5 - 14.5 % 12.7   Platelets      150 - 350 K/uL 161   MPV      9.2 - 12.9 fL 10.0   Immature Granulocytes      0.0 - 0.5 % 0.4   Gran # (ANC)      1.8 - 7.7 K/uL 4.6   Immature Grans (Abs)      0.00 - 0.04 K/uL 0.03   Lymph #      1.0 - 4.8 K/uL 1.6   Mono #      0.3 - 1.0 K/uL 0.5   Eos #      0.0 - 0.5 K/uL 0.5   Baso #      0.00 - 0.20 K/uL 0.03   nRBC      0 /100 WBC 0   Gran%      38.0 - 73.0 % 62.9   Lymph%      18.0 - 48.0 % 22.1   Mono%      4.0 - 15.0 % 7.3   Eosinophil%      0.0 - 8.0 % 6.9   Basophil%      0.0 - 1.9 % 0.4    Differential Method       Automated   Sodium      136 - 145 mmol/L 142   Potassium      3.5 - 5.1 mmol/L 3.7   Chloride      95 - 110 mmol/L 111 (H)   CO2      23 - 29 mmol/L 23   Glucose      70 - 110 mg/dL 95   BUN, Bld      8 - 23 mg/dL 28 (H)   Creatinine      0.5 - 1.4 mg/dL 3.1 (H)   Calcium      8.7 - 10.5 mg/dL 9.2   PROTEIN TOTAL      6.0 - 8.4 g/dL 7.4   Albumin      3.5 - 5.2 g/dL 3.8   BILIRUBIN TOTAL      0.1 - 1.0 mg/dL 0.5   Alkaline Phosphatase      55 - 135 U/L 99   AST      10 - 40 U/L 17   ALT      10 - 44 U/L 11   Anion Gap      8 - 16 mmol/L 8   eGFR if African American      >60 mL/min/1.73 m:2 23.5 (A)   eGFR if non African American      >60 mL/min/1.73 m:2 20.3 (A)     Assessment:     1.IgG kappa multiple myeloma not in remission  2. Anemia secondary to CKD   3. Anemia secondary to multiple myeloma   4.  Diabetes mellitus with neuropathy, nephropathy, retinal disease.  5.  Cardiomyopathy with chronic diastolic heart failure.  6.  h/o CVA  7. Essential hypertension  8. Hyperglycemia  9. Chronic fatigue     PLAN:     1. He did not tolerate treatment with lenadiolmide/Decadron well. It is unclear if he has renal dysfunction from multiple myeloma as well, in addition to diabetic nephropathy and hypertensive nephropathy.    SPEP on 5/21/19 showed slight increase in M protein from Dec 2018 (1.13g/dL versus 0.88g/dL). He is not a transplant candidate ( poor insight into his health condition, renal dysfunction, poorly controled hypertension). Serum free light chains have increased, with the ratio in May at 28.15 (24.09 in December 2018). Creatinine has increased from 3.1 in Dec 2018 to 3.6 now. Calcium is normal. No bone pain / back pain.  His hemoglobin is currently 10g/dl and has not required any transfusions recently.   I discussed resuming chemotherapy with Velcade and low dose Decadron ( 12mg weekly due to hyperglycemia and type 2 DM). Risks and benefits discussed.  He will think about it and  let us know.        2,3.  Continue monthly CBC.     4. On long term insulin. Follows with his primary care physician     5.  Follows with cardiology. On Coreg, Lipitor, lasix     7. BP today 178/82 mm Hg, on repeat testing . He takes Amlodipine, Carvedilol. He has not taken his AM anti-HT medications today.

## 2019-09-11 NOTE — TELEPHONE ENCOUNTER
----- Message from Alissa Dotson MD sent at 9/10/2019 10:12 AM CDT -----  Hi Dr. Colón,    Evidently his kidney function has declined further with his uncontrolled diabetes, HTN. I explained implications to him and his wife. He needs to re-establish care with oncology. Would not yet refer him for kidney Tx evaluation until we know he shows better compliance. I just wanted to request dose reduction in gabapentin to 300 mg per day given his worsening renal function.

## 2019-09-11 NOTE — TELEPHONE ENCOUNTER
Please contact patient and inform MIs contacted directly by his kidney doctor who is very concerned about his declining kidney function.  Please let him know that we need to decrease his gabapentin to 300 mg daily.

## 2019-09-12 NOTE — PROGRESS NOTES
CC: The primary encounter diagnosis was Type 2 diabetes mellitus with diabetic polyneuropathy, with long-term current use of insulin. Diagnoses of Type 2 diabetes mellitus with stage 4 chronic kidney disease, with long-term current use of insulin, Diabetic polyneuropathy associated with type 2 diabetes mellitus, Hypertension associated with diabetes, History of stroke, Chronic diastolic heart failure, Essential hypertension, Dyslipidemia, Uncontrolled diabetes mellitus with polyneuropathy, and Uncontrolled type 2 diabetes mellitus with peripheral neuropathy were also pertinent to this visit.      HPI: Mr. Marie Reis Jr. is a 64 y.o. Black or  male who was diagnosed with Type 2 DM in 2006.   Pt is accompanied by wife.  Has freestyle obed.  See media, avg bg 194 mg/dl.  47% time in range  Less hypoglycemic events, lowest for the past  2 weeks, 91 mg/dl.    Admission: 4/11/18 [facility]  2/2018 sepsis  Severe hypoglycemia -EMS in the past 3 years      Pt was last seen by me in May 2019 and is now being seen by me again.   a1c is improving,14% 4 mos ago, 8.9% to 8.3%.     Lab Results   Component Value Date    HGBA1C 8.3 (H) 08/23/2019     BG readings are checked 6x/day (max) and readings=  Freestyle obed  Injection 4 times a day.  See media   bg 102-257 mg/dl    Skipped/missed glycemic medications: No    Prandial injections taken with meals: Yes    Physical Activity: No    Skipping meals and/or snacking: The patient eats a regular, healthy diet.    CURRENT DIABETIC MEDS: novolog 8-10 units ac w/ mod dose correction scale 180-230+2, etc , lantus 18 units qhs, tradjenta 5 mg daily     Uses Vials or Pens: pens  Type of Glucose Meter: up to date    Diabetes Management Status    Statin: Taking  ACE/ARB: Taking    Screening or Prevention Patient's value Goal Complete/Controlled?   HgA1C Testing and Control   Lab Results   Component Value Date    HGBA1C 8.3 (H) 08/23/2019      Annually/Less than 8% No   Lipid  "profile : 08/23/2019 Annually Yes   LDL control Lab Results   Component Value Date    LDLCALC 85.2 08/23/2019    Annually/Less than 100 mg/dl  Yes   Nephropathy screening Lab Results   Component Value Date    LABMICR 4431.0 10/25/2017     Lab Results   Component Value Date    PROTEINUA 2+ (A) 08/23/2019    Annually Yes   Blood pressure BP Readings from Last 1 Encounters:   09/12/19 (!) 180/84    Less than 140/90 No   Dilated retinal exam : 07/23/2019 Annually Yes   Foot exam   : 08/28/2019 Annually Yes     Last Eye Exam:  4/2019  Dr. Al (procedure)  Last Podiatry Exam: 2019-[facility]   Last DM Education Attended:  2019 q 3 mos    REVIEW OF SYSTEMS  General: no weakness or + fatigue.   Eyes: no visual disturbances.   Cardiac: no  intermittent chest pain-has nitro prn. + mild edema   Respiratory: no cough or dyspnea.   GI: no abdominal pain or nausea.   Skin: no rashes or itching.   Neuro: + numbness or tingling. +back pain, +knee pain  Musc: gait steady  Endocrine: no polyuria, polydipsia, polyphagia.     Vital Signs  BP (!) 180/84 (BP Location: Left arm, Patient Position: Sitting, BP Method: Large (Manual))   Ht 6' 1" (1.854 m)   Wt 86.6 kg (191 lb)   BMI 25.20 kg/m²     Hemoglobin A1C   Date Value Ref Range Status   08/23/2019 8.3 (H) 4.0 - 5.6 % Final     Comment:     ADA Screening Guidelines:  5.7-6.4%  Consistent with prediabetes  >or=6.5%  Consistent with diabetes  High levels of fetal hemoglobin interfere with the HbA1C  assay. Heterozygous hemoglobin variants (HbS, HgC, etc)do  not significantly interfere with this assay.   However, presence of multiple variants may affect accuracy.     08/05/2019 8.9 (H) 4.0 - 5.6 % Final     Comment:     ADA Screening Guidelines:  5.7-6.4%  Consistent with prediabetes  >or=6.5%  Consistent with diabetes  High levels of fetal hemoglobin interfere with the HbA1C  assay. Heterozygous hemoglobin variants (HbS, HgC, etc)do  not significantly interfere with this assay. "   However, presence of multiple variants may affect accuracy.     04/24/2019 >14.0 (H) 4.0 - 5.6 % Final     Comment:     ADA Screening Guidelines:  5.7-6.4%  Consistent with prediabetes  >or=6.5%  Consistent with diabetes  High levels of fetal hemoglobin interfere with the HbA1C  assay. Heterozygous hemoglobin variants (HbS, HgC, etc)do  not significantly interfere with this assay.   However, presence of multiple variants may affect accuracy.         Chemistry        Component Value Date/Time     09/11/2019 0900    K 3.7 09/11/2019 0900     (H) 09/11/2019 0900    CO2 23 09/11/2019 0900    BUN 28 (H) 09/11/2019 0900    CREATININE 3.1 (H) 09/11/2019 0900    GLU 95 09/11/2019 0900        Component Value Date/Time    CALCIUM 9.2 09/11/2019 0900    ALKPHOS 99 09/11/2019 0900    AST 17 09/11/2019 0900    ALT 11 09/11/2019 0900    BILITOT 0.5 09/11/2019 0900          Lab Results   Component Value Date    CHOL 137 08/23/2019    CHOL 117 (L) 08/05/2019    CHOL 135 07/19/2018     Lab Results   Component Value Date    HDL 30 (L) 08/23/2019    HDL 27 (L) 08/05/2019    HDL 43 07/19/2018     Lab Results   Component Value Date    LDLCALC 85.2 08/23/2019    LDLCALC 73.0 08/05/2019    LDLCALC 77.6 07/19/2018     Lab Results   Component Value Date    TRIG 109 08/23/2019    TRIG 85 08/05/2019    TRIG 72 07/19/2018     Lab Results   Component Value Date    CHOLHDL 21.9 08/23/2019    CHOLHDL 23.1 08/05/2019    CHOLHDL 31.9 07/19/2018       Lab Results   Component Value Date    TSH 2.512 08/23/2019       Lab Results   Component Value Date    MICALBCREAT 1987.0 (H) 10/25/2017       Vit D, 25-Hydroxy   Date Value Ref Range Status   01/31/2019 25 (L) 30 - 96 ng/mL Final     Comment:     Vitamin D deficiency.........<10 ng/mL                              Vitamin D insufficiency......10-29 ng/mL       Vitamin D sufficiency........> or equal to 30 ng/mL  Vitamin D toxicity............>100 ng/mL         PHYSICAL  EXAMINATION  Constitutional: Appears well, no distress  Neck: Supple, trachea midline.   Respiratory: no wheezes, even and unlabored.  Cardiovascular: RRR  Lymph: +trace BLE/ pedal edema-myrna hoses/diabetic socks on   Skin: warm and dry; no injection site reactions, no acanthosis nigracans observed.  Neuro:patient alert and cooperative.   Footwear appropriate. Gait steady w/ cane use.     Assessment/Plan    1. Type 2 diabetes mellitus with diabetic polyneuropathy, with long-term current use of insulin  Hemoglobin A1c    Ambulatory Referral to Diabetes Education   2. Type 2 diabetes mellitus with stage 4 chronic kidney disease, with long-term current use of insulin  Hemoglobin A1c    Ambulatory Referral to Diabetes Education   3. Diabetic polyneuropathy associated with type 2 diabetes mellitus  Hemoglobin A1c    Ambulatory Referral to Diabetes Education   4. Hypertension associated with diabetes     5. History of stroke     6. Chronic diastolic heart failure     7. Essential hypertension     8. Dyslipidemia     9. Uncontrolled diabetes mellitus with polyneuropathy  insulin (LANTUS SOLOSTAR U-100 INSULIN) glargine 100 units/mL (3mL) SubQ pen   10. Uncontrolled type 2 diabetes mellitus with peripheral neuropathy  insulin aspart U-100 (NOVOLOG) 100 unit/mL (3 mL) InPn pen   Follow up in about 3 months (around 12/12/2019).     1. F/u in 3 mos w/ Irielle  a1c goal less than 7.5%  a1c has improved over the past 4 mos   De in 3 mos  a1c next time  Discussed dietary habits/snack options  Change lantus 23 units at night  Change novolog 10 units ac w/ scale 150-200+2, etc  Continue tradjenta 5 mg daily   New contact info given    May have to increase insulin >20-30% for prandial, increase basal by 10-15%     Message office if steroid therapy induced w/ hem/onc  2. F/u with nephrology  3. Optimize bg will help with condition  4. Uncontrolled, continue med(s)-just took meds  5. Avoid hypoglycemia  6. Avoid hypoglycemia  F/u with  pcp, cards prn   7. See above  8. See above  9. See above      Orders Placed This Encounter   Procedures    Hemoglobin A1c     Standing Status:   Future     Standing Expiration Date:   11/10/2020    Ambulatory Referral to Diabetes Education     Standing Status:   Future     Standing Expiration Date:   9/12/2020     Referral Priority:   Routine     Referral Type:   Consultation     Referral Reason:   Specialty Services Required     Requested Specialty:   Endocrinology     Number of Visits Requested:   1     Expiration Date:   9/12/2020    Ambulatory Referral to Diabetes Education     Standing Status:   Future     Standing Expiration Date:   9/12/2020     Referral Priority:   Routine     Referral Type:   Consultation     Referral Reason:   Specialty Services Required     Requested Specialty:   Endocrinology     Number of Visits Requested:   1     Expiration Date:   9/12/2020

## 2019-09-12 NOTE — PATIENT INSTRUCTIONS
Snacks can be an important part of a balanced, healthy meal plan. They allow you to eat more frequently, feeling full and satisfied throughout the day. Also, they allow you to spread carbohydrates evenly, which may stabilize blood sugars.  Plus, snacks are enjoyable!     The amount of carbohydrate needed at snacks varies. Generally, about 15-30 grams of carbohydrate per snack is recommended.  Below you will find some tasty treats.       0-5 gm carb   Crystal Light   Vitamin Water Zero   Herbal tea, unsweetened   2 tsp peanut butter on celery   1./2 cup sugar-free jell-o   1 sugar-free popsicle   ¼ cup blueberries   8oz Blue Amy unsweetened almond milk   5 baby carrots & celery sticks, cucumbers, bell peppers dipped in ¼ cup salsa, 2Tbsp light ranch dressing or 2Tbsp plain Greek yogurt   10 Goldfish crackers   ½ oz low-fat cheese or string cheese   1 closed handful of nuts, unsalted   1 Tbsp of sunflower seeds, unsalted   1 cup Smart Pop popcorn   1 whole grain brown rice cake        15 gm carb   1 small piece of fruit or ½ banana or 1/2 cup lite canned fruit   3 donna cracker squares   3 cups Smart Pop popcorn, top spray butter, Mijares lite salt or cinnamon and Truvia   5 Vanilla Wafers   ½ cup low fat, no added sugar ice cream or frozen yogurt (Blue bell, Blue Bunny, Weight Watchers, Skinny Cow)   ½ turkey, ham, or chicken sandwich   ½ c fruit with ½ c Cottage cheese   4-6 unsalted wheat crackers with 1 oz low fat cheese or 1 tbsp peanut butter    30-45 goldfish crackers (depending on flavor)    7-8 Mandaen mini brown rice cakes (caramel, apple cinnamon, chocolate)    12 Mandaen mini brown rice cakes (cheddar, bbq, ranch)    1/3 cup hummus dip with raw veg   1/2 whole wheat sourav, 1Tbsp hummus   Mini Pizza (1/2 whole wheat English muffin, low-fat  cheese, tomato sauce)   100 calorie snack pack (Oreo, Chips Ahoy, Ritz Mix, Baked Cheetos)   4-6 oz. light or Greek Style yogurt  (Mata, Carley, [place], [facility])   ½ cup sugar-free pudding     6 in. wheat tortilla or sourav oven toasted chips (topped with spray butter flavoring, cinnamon, Truvia OR spray butter, garlic powder, chili powder)    18 BBQ Popchips (available at [employer], Whole Foods, Fresh Market)                   Diabetes Support Group Meetings         Date: Topic:   February 14 Eat Fit MAGNUS/Health Promotion   March 14 Taking Care of Your Smile   April 11 Spring into Healthy Eating/Cooking Demo   May 9 Ease Your Mind with Diabetes   Alexandra 13 Summer Treats/Cooking Demo   July 11 Eat Fit MAGNUS/Super Market Sweep   August 8 Taking Care of Your Eyes and Feet   Sept 12 Technology/ADA updates   October 10 Recipes & Treats/Cooking Demo   November 14 Heart Health/Pump it up!   December 12 Year-End Close Out        Meetings are held in the [facility] (A) of the [facility] located at [address] [place] [address]. Please call [phone] for additional information.    Free service, offered every 2nd Thursday of every month! Family members and/or friends are welcome as well!  Support group is for patients with type 1 or type 2 diabetes.    From 3:30p to 4:30p

## 2019-10-09 NOTE — TELEPHONE ENCOUNTER
Kidney filtration is at around 19, potassium levels stable. Slowly kidney function is worsening. Please continue with current medicines, inform if any new symptoms. Continue monthly renal panel, standing orders in. Please inform his wife.

## 2019-10-09 NOTE — PROGRESS NOTES
CC: Multiple myeloma, follow up     HPI: Mr. Reis is a 64-year-old man with multiple myeloma. He was a patient of Dr.Archie Guaman.  In May 2016, his serum protein electrophoresis revealed a paraprotein measuring 0.86 g/dL; this was an IgG kappa.  A 24-hour urine contained 947 mg of protein, 3% of which was kappa light chains.  His bone marrow had 15% plasma cells and a FISH panel disclosed trisomy 1, 7, 9 and 15.  A PET scan in January 2017 revealed no bone lesions.     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 He was followed with periodic examinations and laboratory studies until early 2018 when his anemia worsened.  There had been a gradual increase in the   protein level and there was a substantial increase in the 24-hour urine protein to 4500 mg.  However, only 4.6% of this was kappa light chain and 2.8% was IgG kappa.  Most of the proteinuria was believed to have been secondary to diabetes   mellitus.  The IgG kappa paraprotein as measured in electrophoresis was 1.23 g/dL.     Mainly because of the worsening anemia and concern that it might have been least partially due to  Myeloma,he was recommended a trial of therapy with Revlimid  10 mg daily for 21 days, every 28 days and dexamethasone 12 mg once a week.   There was a long delay in obtaining Revlimid and after he started that he had worsening renal function and was hospitalized for that problem and for hyperglycemia.    He had another hospitalization in March 2018 for encephalopathy, congestive heart failure, hypoglycemia and worsening anemia which required red cell transfusions.       In May  2018,  Revlimid and dexamethasone were stopped due to the above mentioned problems.  There had been substantial improvement in the protein levels with the paraprotein falling to 0.56 g/dL and   the quantitative IgG declining from 1747 to 991.  The kappa/lambda ratio fell from 44 to 6.5.     He was transfused in early July 2018 and his hemoglobin was 7.7. Since mid  September 2018, hemoglobin values were between 9.6 to 11.3.     His other medical problems include a stroke many years ago that left him with weakness of his left arm and leg and he has severe diabetes mellitus with renal, retinal and peripheral nerve complications.  He has cardiac valvular disease, pulmonary hypertension and diastolic dysfunction.  He also has a poor understanding of his medical status, probably related to complications from the stroke.      Review of Systems   Constitutional: Negative for chills, fever and weight loss.   HENT: Negative for congestion, ear discharge, ear pain, nosebleeds and sinus pain.    Eyes: Negative for double vision, photophobia and pain.   Respiratory: Negative for cough, sputum production and stridor.    Cardiovascular: Negative for chest pain, palpitations, orthopnea, claudication and leg swelling.   Gastrointestinal: Negative for abdominal pain, blood in stool, constipation, diarrhea, heartburn and vomiting.   Genitourinary: Negative for dysuria, frequency and urgency.   Musculoskeletal: Negative for back pain, myalgias and neck pain.   Skin: Negative for rash.   Neurological: Negative for dizziness, tingling, focal weakness and headaches.   Endo/Heme/Allergies: Does not bruise/bleed easily.   Psychiatric/Behavioral: Negative for depression, hallucinations, substance abuse and suicidal ideas.       Current Outpatient Medications   Medication Sig    amLODIPine (NORVASC) 10 MG tablet Take 1 tablet (10 mg total) by mouth once daily.    atorvastatin (LIPITOR) 80 MG tablet Take 1 tablet (80 mg total) by mouth once daily.    blood sugar diagnostic Strp 1 strip by Misc.(Non-Drug; Combo Route) route after meals as needed.    calcitRIOL (ROCALTROL) 0.25 MCG Cap Take 2 capsules (0.5 mcg total) by mouth every Mon, Wed, Fri.    carvedilol (COREG) 25 MG tablet Take 1 tablet (25 mg total) by mouth 2 (two) times daily with meals.    clopidogrel (PLAVIX) 75 mg tablet TAKE 1 TABLET  "BY MOUTH EVERY DAY    diclofenac sodium 1.5 % Drop APPLY 40 DROPS TO AFFECTED AREA(S) FOUR TIMES DAILY    flash glucose scanning reader (FREESTYLE CLAUDIO 14 DAY READER) [facility] Use as directed.    flash glucose sensor (FREESTYLE CLAUDIO 14 DAY SENSOR) Kit Change every 14 days.    fluticasone (FLONASE) 50 mcg/actuation nasal spray 1 spray by Each Nare route 2 (two) times daily as needed for Rhinitis. (Patient taking differently: 1 spray by Each Nare route as needed for Rhinitis. )    furosemide (LASIX) 40 MG tablet Pt to take 1 1/2 tablet twice a day per Dr. Salinas on 3/19/18.    gabapentin (NEURONTIN) 300 MG capsule Take 1 capsule (300 mg total) by mouth daily as needed.    insulin (LANTUS SOLOSTAR U-100 INSULIN) glargine 100 units/mL (3mL) SubQ pen Inject 23 units at night.    insulin aspart U-100 (NOVOLOG) 100 unit/mL (3 mL) InPn pen Inject 10 units w/ meals plus scale 150-200 +2, 201-250 +4, 251-300 +6, 301-350 +8, >350 +10. Snack 4 units.    insulin needles, disposable, 31 X 5/16 " Ndle Pt to use pen needle with Lantus daily    LIDOTRAL 3.88 % Crea APPLY TO THE AFFECTED AREA 2 TIMES TO 3 TIMES DAILY    linagliptin (TRADJENTA) 5 mg Tab tablet Take 1 tablet (5 mg total) by mouth once daily.    meclizine (ANTIVERT) 25 mg tablet Take 1 tablet (25 mg total) by mouth 3 (three) times daily as needed for Dizziness.    metOLazone (ZAROXOLYN) 2.5 MG tablet TAKE 1 TABLET BY MOUTH EVERY DAY    mirtazapine (REMERON) 30 MG tablet Take 30 mg by mouth every evening.     montelukast (SINGULAIR) 10 mg tablet Take 1 tablet (10 mg total) by mouth every evening.    nitroGLYCERIN (NITROSTAT) 0.4 MG SL tablet Place 1 tablet (0.4 mg total) under the tongue every 5 (five) minutes as needed for Chest pain.    ondansetron (ZOFRAN-ODT) 8 MG TbDL Take 1 tablet (8 mg total) by mouth every 6 (six) hours as needed.    telmisartan (MICARDIS) 80 MG Tab Take 1 tablet (80 mg total) by mouth once daily.     No current " facility-administered medications for this visit.        Vitals:    10/09/19 1031   BP: (!) 196/89   Pulse: 78   Resp: 17   Temp: 97.6 °F (36.4 °C)       Physical Exam   Constitutional: He is oriented to person, place, and time. He appears well-developed.   HENT:   Head: Normocephalic and atraumatic.   Mouth/Throat: No oropharyngeal exudate.   Eyes: No scleral icterus.   Cardiovascular: Normal rate.   No murmur heard.  Pulmonary/Chest: Effort normal. No respiratory distress.   Abdominal: Soft. He exhibits no distension. There is no tenderness.   Musculoskeletal: He exhibits no edema.   Lymphadenopathy:     He has no cervical adenopathy.   Neurological: He is alert and oriented to person, place, and time. No cranial nerve deficit.   Skin: Skin is warm.   Psychiatric: He has a normal mood and affect.       5/21/19 SPEP :Normal total protein. Normal gamma globulins are decreased. There is a paraprotein band in gamma measuring 1.13 g/dL, previously 0.88 g/dL.      Component      Latest Ref Rng & Units 10/9/2019 9/11/2019   WBC      3.90 - 12.70 K/uL 7.52    RBC      4.60 - 6.20 M/uL 3.74 (L)    Hemoglobin      14.0 - 18.0 g/dL 10.7 (L)    Hematocrit      40.0 - 54.0 % 31.5 (L)    MCV      82 - 98 fL 84    MCH      27.0 - 31.0 pg 28.6    MCHC      32.0 - 36.0 g/dL 34.0    RDW      11.5 - 14.5 % 13.2    Platelets      150 - 350 K/uL 160    MPV      9.2 - 12.9 fL 10.1    Immature Granulocytes      0.0 - 0.5 % 0.7 (H)    Gran # (ANC)      1.8 - 7.7 K/uL 5.2    Immature Grans (Abs)      0.00 - 0.04 K/uL 0.05 (H)    Lymph #      1.0 - 4.8 K/uL 1.5    Mono #      0.3 - 1.0 K/uL 0.5    Eos #      0.0 - 0.5 K/uL 0.3    Baso #      0.00 - 0.20 K/uL 0.04    nRBC      0 /100 WBC 0    Gran%      38.0 - 73.0 % 69.5    Lymph%      18.0 - 48.0 % 19.3    Mono%      4.0 - 15.0 % 6.5    Eosinophil%      0.0 - 8.0 % 3.5    Basophil%      0.0 - 1.9 % 0.5    Differential Method       Automated    Sodium      136 - 145 mmol/L 139     Potassium      3.5 - 5.1 mmol/L 4.1    Chloride      95 - 110 mmol/L 107    CO2      23 - 29 mmol/L 25    Glucose      70 - 110 mg/dL 228 (H)    BUN, Bld      8 - 23 mg/dL 33 (H)    Creatinine      0.5 - 1.4 mg/dL 3.3 (H)    Calcium      8.7 - 10.5 mg/dL 8.8    PROTEIN TOTAL      6.0 - 8.4 g/dL 7.4    Albumin      3.5 - 5.2 g/dL 3.6    BILIRUBIN TOTAL      0.1 - 1.0 mg/dL 0.7    Alkaline Phosphatase      55 - 135 U/L 100    AST      10 - 40 U/L 16    ALT      10 - 44 U/L 11    Anion Gap      8 - 16 mmol/L 7 (L)    eGFR if African American      >60 mL/min/1.73 m:2 21.8 (A)    eGFR if non African American      >60 mL/min/1.73 m:2 18.8 (A)    Kappa Free Light Chains      0.33 - 1.94 mg/dL  123.60 (H)   Lambda Free Light Chains      0.57 - 2.63 mg/dL  3.19 (H)   Kappa/Lambda FLC Ratio      0.26 - 1.65  38.75 (H)   IgG - Serum      650 - 1600 mg/dL  1601 (H)   IgA      40 - 350 mg/dL  168   IgM      50 - 300 mg/dL  88     9/11/19 SPEP: Normal total protein. Normal gamma globulins are decreased. There is a paraprotein band in gamma measuring 1.24 g/dL, previously 1.13 g/dL.     Assessment:     1.IgG kappa multiple myeloma not in remission  2. Anemia secondary to CKD   3. Anemia secondary to multiple myeloma   4.  Diabetes mellitus with neuropathy, nephropathy, retinal disease.  5.  Cardiomyopathy with chronic diastolic heart failure.  6.  h/o CVA  7. Essential hypertension  8. Hyperglycemia  9. Chronic fatigue     PLAN:     1. He did not tolerate treatment with lenadiolmide/Decadron well. It is unclear if he has renal dysfunction from multiple myeloma as well, in addition to diabetic nephropathy and hypertensive nephropathy.    SPEP on 5/21/19 showed slight increase in M protein from Dec 2018 (1.13g/dL versus 0.88g/dL). It is 1.24g/dL on 9/11/19.   He is not a transplant candidate ( poor insight into his health condition, renal dysfunction, poorly controled hypertension). Serum free light chains have increased, with the  ratio in May at 28.15 (24.09 in December 2018) and to 38.75 in Sept 2019. Creatinine had increased from 3.1 in Dec 2018 to 3.6 in May 2019. It os 3.3 today.   Calcium is normal. No bone pain / back pain.  His hemoglobin is currently 10.7 g/dl and has not required any transfusions recently.   I discussed resuming chemotherapy with Velcade and low dose Decadron ( 12 mg weekly due to hyperglycemia and type 2 DM) as well as Zolendronic acid. Risks and benefits discussed.he is planning to see his dentist.   He is still undecided at this time.           2,3.  Continue monthly CBC.     4. On long term insulin. Follows with his primary care physician     5.  Follows with cardiology. On Coreg, Lipitor, lasix     7. BP today 196/89 mm Hg, on repeat testing . He takes Amlodipine, Carvedilol. He has not taken his AM anti-HT medications today.

## 2019-10-09 NOTE — Clinical Note
velcade treatment in, to start next wk-Cbc on day of chemo and weekty cbc, cmp-Weekly velcade-cbc, cmp, f/u in 4 wks

## 2019-10-16 PROBLEM — K02.9 DENTAL CARIES: Status: ACTIVE | Noted: 2019-01-01

## 2019-10-16 NOTE — NURSING
Patient here to begin treatment with velcade-teaching to patient and wife with printout on velcade-advised to check blood sugars after taking Decadron-to return next week.

## 2019-10-16 NOTE — Clinical Note
Pt has concerns w/ dental decay-teeth were not cleaned or worked on via dental office-wife is hesitant about starting velcade, please reach out- scheduled today 10/16

## 2019-10-16 NOTE — PATIENT INSTRUCTIONS
Snacks can be an important part of a balanced, healthy meal plan. They allow you to eat more frequently, feeling full and satisfied throughout the day. Also, they allow you to spread carbohydrates evenly, which may stabilize blood sugars.  Plus, snacks are enjoyable!     The amount of carbohydrate needed at snacks varies. Generally, about 15-30 grams of carbohydrate per snack is recommended.  Below you will find some tasty treats.       0-5 gm carb   Crystal Light   Vitamin Water Zero   Herbal tea, unsweetened   2 tsp peanut butter on celery   1./2 cup sugar-free jell-o   1 sugar-free popsicle   ¼ cup blueberries   8oz Blue Amy unsweetened almond milk   5 baby carrots & celery sticks, cucumbers, bell peppers dipped in ¼ cup salsa, 2Tbsp light ranch dressing or 2Tbsp plain Greek yogurt   10 Goldfish crackers   ½ oz low-fat cheese or string cheese   1 closed handful of nuts, unsalted   1 Tbsp of sunflower seeds, unsalted   1 cup Smart Pop popcorn   1 whole grain brown rice cake        15 gm carb   1 small piece of fruit or ½ banana or 1/2 cup lite canned fruit   3 donna cracker squares   3 cups Smart Pop popcorn, top spray butter, Mijares lite salt or cinnamon and Truvia   5 Vanilla Wafers   ½ cup low fat, no added sugar ice cream or frozen yogurt (Blue bell, Blue Bunny, Weight Watchers, Skinny Cow)   ½ turkey, ham, or chicken sandwich   ½ c fruit with ½ c Cottage cheese   4-6 unsalted wheat crackers with 1 oz low fat cheese or 1 tbsp peanut butter    30-45 goldfish crackers (depending on flavor)    7-8 Latter day mini brown rice cakes (caramel, apple cinnamon, chocolate)    12 Latter day mini brown rice cakes (cheddar, bbq, ranch)    1/3 cup hummus dip with raw veg   1/2 whole wheat sourav, 1Tbsp hummus   Mini Pizza (1/2 whole wheat English muffin, low-fat  cheese, tomato sauce)   100 calorie snack pack (Oreo, Chips Ahoy, Ritz Mix, Baked Cheetos)   4-6 oz. light or Greek Style yogurt  (Mata, [place], [place], [facility])   ½ cup sugar-free pudding     6 in. wheat tortilla or sourav oven toasted chips (topped with spray butter flavoring, cinnamon, Truvia OR spray butter, garlic powder, chili powder)    18 BBQ Popchips (available at [employer], [employer] Foods, Fresh Market)                   Diabetes Support Group Meetings         Date: Topic:   February 14 Eat Fit MAGNUS/Health Promotion   March 14 Taking Care of Your Smile   April 11 Spring into Healthy Eating/Cooking Demo   May 9 Ease Your Mind with Diabetes   Alexandra 13 Summer Treats/Cooking Demo   July 11 Eat Fit MAGNUS/Super Market Sweep   August 8 Taking Care of Your Eyes and Feet   Sept 12 Technology/ADA updates   October 10 Recipes & Treats/Cooking Demo   November 14 Heart Health/Pump it up!   December 12 Year-End Close Out        Meetings are held in the [facility] (A) of the [facility] located at [address] [place] [address]. Please call [phone] for additional information.    Free service, offered every 2nd Thursday of every month! Family members and/or friends are welcome as well!  Support group is for patients with type 1 or type 2 diabetes.    From 3:30p to 4:30p

## 2019-10-16 NOTE — PROGRESS NOTES
CC: The primary encounter diagnosis was Type 2 diabetes mellitus with diabetic polyneuropathy, with long-term current use of insulin. Diagnoses of Type 2 diabetes mellitus with stage 4 chronic kidney disease, with long-term current use of insulin, Stage 4 chronic kidney disease, 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, with long-term current use of insulin, Dental caries, Diabetes mellitus with proteinuria, Chronic diastolic heart failure, Multiple myeloma not having achieved remission, Chronic pulmonary heart disease, Essential hypertension, and Dyslipidemia were also pertinent to this visit.      HPI: Mr. Marie Reis Jr. is a 64 y.o. Black or  male who was diagnosed with Type 2 DM in 2006.   Pt is accompanied by wife.  Has freestyle obed. 55% target, 45% >180 mg/dl, postprandial excursions 12n to dinner time.  See media avg 188 mg/dl   Had appt w/ hem/oncology on 10/9/19.  Currently has pending treatment plan for multiple myeloma  ---bortezomib (velcade) or zoledronic acid (reclast).  Recently had dentist appt, has dental caries, decay.  Not able to do teeth cleaning or removal teeth----may need implants  Pt and wife have concerns about SEs-worsening of teeth decay, kidney functions.  Pt was last seen by me on 9/12/19.     Admission: 4/11/18 [facility]  2/2018 sepsis  Severe hypoglycemia -EMS in the past 3 years    F/u with pcp 11/15/19  Has scheduled velcade-starting today, weekly per Dr. Cantrell-canceled per pt  Labs due today cbc, cmp---canceled    Lab Results   Component Value Date    HGBA1C 8.3 (H) 08/23/2019     BG readings are checked 6x/day (max) and readings=  Freestyle obed  Injection 4 times a day.  See media   147-236 mg/dl     Skipped/missed glycemic medications: No    Prandial injections taken with meals: Yes    Physical Activity: No    Skipping meals and/or snacking: The patient eats a regular, healthy diet.    CURRENT  "DIABETIC MEDS: novolog 10 units ac w/ mod dose correction scale 180-230+2, etc , lantus 23 units qhs, tradjenta 5 mg daily     Uses Vials or Pens: pens  Type of Glucose Meter: up to date    Diabetes Management Status    Statin: Taking  ACE/ARB: Taking    Screening or Prevention Patient's value Goal Complete/Controlled?   HgA1C Testing and Control   Lab Results   Component Value Date    HGBA1C 8.3 (H) 08/23/2019      Annually/Less than 8% No   Lipid profile : 08/23/2019 Annually Yes   LDL control Lab Results   Component Value Date    LDLCALC 85.2 08/23/2019    Annually/Less than 100 mg/dl  Yes   Nephropathy screening Lab Results   Component Value Date    LABMICR 4431.0 10/25/2017     Lab Results   Component Value Date    PROTEINUA 2+ (A) 08/23/2019    Annually Yes   Blood pressure BP Readings from Last 1 Encounters:   10/16/19 108/74    Less than 140/90 No   Dilated retinal exam : 07/23/2019 Annually Yes   Foot exam   : 08/28/2019 Annually Yes   [facility]  Last DM Education Attended:  2019 q 3 mos    REVIEW OF SYSTEMS  General: no weakness  + fatigue.   Eyes: no visual disturbances.   Cardiac: no  intermittent chest pain-has nitro prn. + mild edema   Respiratory: no cough or dyspnea.   GI: no abdominal pain or nausea.   Skin: no rashes or itching.   Neuro: + numbness or tingling. +back pain, +knee pain  Musc: gait steady  Endocrine: no polyuria, polydipsia, polyphagia. +sweats w/ eating foods, +heat/cold intolerance    Vital Signs  /74   Ht 6' 1" (1.854 m)   Wt 86.6 kg (191 lb)   BMI 25.20 kg/m²     Hemoglobin A1C   Date Value Ref Range Status   08/23/2019 8.3 (H) 4.0 - 5.6 % Final     Comment:     ADA Screening Guidelines:  5.7-6.4%  Consistent with prediabetes  >or=6.5%  Consistent with diabetes  High levels of fetal hemoglobin interfere with the HbA1C  assay. Heterozygous hemoglobin variants (HbS, HgC, etc)do  not significantly interfere with this assay.   However, presence of multiple " variants may affect accuracy.     08/05/2019 8.9 (H) 4.0 - 5.6 % Final     Comment:     ADA Screening Guidelines:  5.7-6.4%  Consistent with prediabetes  >or=6.5%  Consistent with diabetes  High levels of fetal hemoglobin interfere with the HbA1C  assay. Heterozygous hemoglobin variants (HbS, HgC, etc)do  not significantly interfere with this assay.   However, presence of multiple variants may affect accuracy.     04/24/2019 >14.0 (H) 4.0 - 5.6 % Final     Comment:     ADA Screening Guidelines:  5.7-6.4%  Consistent with prediabetes  >or=6.5%  Consistent with diabetes  High levels of fetal hemoglobin interfere with the HbA1C  assay. Heterozygous hemoglobin variants (HbS, HgC, etc)do  not significantly interfere with this assay.   However, presence of multiple variants may affect accuracy.         Chemistry        Component Value Date/Time     10/09/2019 0939    K 4.1 10/09/2019 0939     10/09/2019 0939    CO2 25 10/09/2019 0939    BUN 33 (H) 10/09/2019 0939    CREATININE 3.3 (H) 10/09/2019 0939     (H) 10/09/2019 0939        Component Value Date/Time    CALCIUM 8.8 10/09/2019 0939    ALKPHOS 100 10/09/2019 0939    AST 16 10/09/2019 0939    ALT 11 10/09/2019 0939    BILITOT 0.7 10/09/2019 0939          Lab Results   Component Value Date    CHOL 137 08/23/2019    CHOL 117 (L) 08/05/2019    CHOL 135 07/19/2018     Lab Results   Component Value Date    HDL 30 (L) 08/23/2019    HDL 27 (L) 08/05/2019    HDL 43 07/19/2018     Lab Results   Component Value Date    LDLCALC 85.2 08/23/2019    LDLCALC 73.0 08/05/2019    LDLCALC 77.6 07/19/2018     Lab Results   Component Value Date    TRIG 109 08/23/2019    TRIG 85 08/05/2019    TRIG 72 07/19/2018     Lab Results   Component Value Date    CHOLHDL 21.9 08/23/2019    CHOLHDL 23.1 08/05/2019    CHOLHDL 31.9 07/19/2018       Lab Results   Component Value Date    TSH 2.512 08/23/2019       Lab Results   Component Value Date    MICALBCREAT 1987.0 (H) 10/25/2017        Vit D, 25-Hydroxy   Date Value Ref Range Status   01/31/2019 25 (L) 30 - 96 ng/mL Final     Comment:     Vitamin D deficiency.........<10 ng/mL                              Vitamin D insufficiency......10-29 ng/mL       Vitamin D sufficiency........> or equal to 30 ng/mL  Vitamin D toxicity............>100 ng/mL         PHYSICAL EXAMINATION  Constitutional: Appears fairly well, no distress  Neck: Supple, trachea midline.   Respiratory: no wheezes, even and unlabored.  Cardiovascular: RRR  Lymph: +trace BLE/ pedal edema-myrna hoses/diabetic socks on   Skin: warm and dry; no injection site reactions, no acanthosis nigracans observed.  Neuro:patient alert and cooperative.   Footwear appropriate. Gait steady w/ cane use.     Assessment/Plan    1. Type 2 diabetes mellitus with diabetic polyneuropathy, with long-term current use of insulin     2. Type 2 diabetes mellitus with stage 4 chronic kidney disease, with long-term current use of insulin     3. Stage 4 chronic kidney disease     4. 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, with long-term current use of insulin     5. Dental caries     6. Diabetes mellitus with proteinuria     7. Chronic diastolic heart failure     8. Multiple myeloma not having achieved remission     9. Chronic pulmonary heart disease     10. Essential hypertension     11. Dyslipidemia     1-2. Decrease lantus-- 20 units at night  novolog 12-14-12 units w/ scale 180-230+2, etc  Continue tradjenta 5 mg daily  Keep appt on 12/19/19 and labs 12/10/19  Message Dr. Cantrell   a1c is improving   Goal less than 7%  3. F/u with nephrology, hem/onc  Frequent cmp   4. See above  5. Dental appt pending   [facility]  6. Continue to monitor  Urine mac   7. Avoid hypoglycemia  8. F/u with hem/onc  9. Avoid hypoglycemia  10. Controlled, continue med(s)  11.   Lab Results   Component Value Date    LDLCALC 85.2 08/23/2019     At goal

## 2019-10-18 NOTE — TELEPHONE ENCOUNTER
Pt notified, verbalized understanding. Clearance faxed to Dr. Tariq at [phone]. Pt asked that I notify Dr. Salinas that he is on chemo. Dr. Salinas made aware.

## 2019-10-18 NOTE — TELEPHONE ENCOUNTER
Pt is having extractions and other treatment w/local anesthesia, scaling and root planning. Pt is having this done at [facility] w/Dr. Zheng Tariq. Procedures has not been scheduled yet. Can you clear pt by chart? Please advise.

## 2019-10-23 NOTE — NURSING
Patient arrived for velcade injection to the abdomen, pt voiced no new complaints today. Pt tolerated injection well, NAD noted. Pt d/c home

## 2019-11-07 NOTE — PROGRESS NOTES
"HPI     Diabetic Eye Exam      Additional comments: Floater OS x's 2 day              Comments     Patient states he started seeing a "spot" in his OD va 2 days ago. He   states it has become larger since then. No flashes.    Thera Tears PRN OU        Prior FA -  Late macular leakage OU  NP OU with NV OU    OCT - VMT released OD , DME improved   Increased temporal DME OS vision, stable    A/P    1. Recurrent iritis  stable    2. PDR OU  - new small preretinal heme nasal OD  S/p PRP OD 03/27/2017  S/p PRP OS 05/09/2017 8/18 - increased VH - Avastin with Dr. CRUZ  2/12/19 - PRP fill OS  3/19 PRP fill OD.   4/19 - small boat heme - ASx - observed  11/19 - increased VH OD    Stable today, monitor    3. DME OU  - stable OS, temporal edema increase OD  S/p Avastin OD x 5 OS x 2  S/p Focal OS (12/16/14)  - mild worsening extrafoveal edema OD -  S/p Focal OD (5/24/16, 6/17)      4. PCIOL OU  S/p phaco w/IOL OD 8/13/15  S/p phaco w/IOL OS 4/30/15    5. Multiple myeloma        1 month     Risks, benefits, and alternatives to treatment discussed in detail with the patient.  The patient voiced understanding and wished to proceed with the procedure    Injection Procedure Note:  Diagnosis: PDR with VH    Patient Identified and Time Out complete  Pt Prefers to be marked with sticker rather than ink marker ([email or web address] [email or web address])  Topical Proparacaine and Betadine.  Inject Avastin OD at 6:00 @ 3.5-4mm posterior to limbus  Post Operative Dx: Same  Complications: None  Follow up as above.    "

## 2019-11-07 NOTE — PATIENT INSTRUCTIONS

## 2019-11-13 NOTE — Clinical Note
velcade todayCbc, cmp weekly, velcade weeklyCbc, cmp, spep, serum freelight chains, igg, igm, iga, f/u in 4 weeks

## 2019-11-13 NOTE — PROGRESS NOTES
CC: Multiple myeloma, follow up     HPI: Mr. Reis is a 64-year-old man with multiple myeloma. He was a patient of Dr.Archie Guaman.  In May 2016, his serum protein electrophoresis revealed a paraprotein measuring 0.86 g/dL; this was an IgG kappa.  A 24-hour urine contained 947 mg of protein, 3% of which was kappa light chains.  His bone marrow had 15% plasma cells and a FISH panel disclosed trisomy 1, 7, 9 and 15.  A PET scan in January 2017 revealed no bone lesions.     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 He was followed with periodic examinations and laboratory studies until early 2018 when his anemia worsened.  There had been a gradual increase in the   protein level and there was a substantial increase in the 24-hour urine protein to 4500 mg.  However, only 4.6% of this was kappa light chain and 2.8% was IgG kappa.  Most of the proteinuria was believed to have been secondary to diabetes   mellitus.  The IgG kappa paraprotein as measured in electrophoresis was 1.23 g/dL.     Mainly because of the worsening anemia and concern that it might have been least partially due to  Myeloma,he was recommended a trial of therapy with Revlimid  10 mg daily for 21 days, every 28 days and dexamethasone 12 mg once a week.   There was a long delay in obtaining Revlimid and after he started that he had worsening renal function and was hospitalized for that problem and for hyperglycemia.    He had another hospitalization in March 2018 for encephalopathy, congestive heart failure, hypoglycemia and worsening anemia which required red cell transfusions.       In May  2018,  Revlimid and dexamethasone were stopped due to the above mentioned problems.  There had been substantial improvement in the protein levels with the paraprotein falling to 0.56 g/dL and   the quantitative IgG declining from 1747 to 991.  The kappa/lambda ratio fell from 44 to 6.5.     He was transfused in early July 2018 and his hemoglobin was 7.7. Since mid  September 2018, hemoglobin values were between 9.6 to 11.3.     His other medical problems include a stroke many years ago that left him with weakness of his left arm and leg and he has severe diabetes mellitus with renal, retinal and peripheral nerve complications.  He has cardiac valvular disease, pulmonary hypertension and diastolic dysfunction.  He also has a poor understanding of his medical status, probably related to complications from the stroke.    Interval history: He started Bortezomib and Decadron from 10/16/19. He had hiccups after each velcade treatment.     Review of Systems   Constitutional: Positive for malaise/fatigue. Negative for chills, fever and weight loss.   HENT: Negative for ear pain, sinus pain and tinnitus.    Eyes: Negative for blurred vision, double vision and photophobia.   Respiratory: Negative for cough and sputum production.    Cardiovascular: Negative for chest pain, palpitations, orthopnea and claudication.   Gastrointestinal: Negative for nausea and vomiting.   Genitourinary: Negative for dysuria, frequency and urgency.   Musculoskeletal: Negative for back pain, myalgias and neck pain.   Skin: Negative for rash.   Neurological: Positive for tingling. Negative for dizziness, tremors and sensory change.   Endo/Heme/Allergies: Negative for environmental allergies. Does not bruise/bleed easily.   Psychiatric/Behavioral: Negative for depression, hallucinations, substance abuse and suicidal ideas.         Current Outpatient Medications   Medication Sig    acyclovir (ZOVIRAX) 400 MG tablet Take 0.5 tablets (200 mg total) by mouth 2 (two) times daily.    amLODIPine (NORVASC) 10 MG tablet Take 1 tablet (10 mg total) by mouth once daily.    atorvastatin (LIPITOR) 80 MG tablet Take 1 tablet (80 mg total) by mouth once daily.    blood sugar diagnostic Strp 1 strip by Misc.(Non-Drug; Combo Route) route after meals as needed.    calcitRIOL (ROCALTROL) 0.25 MCG Cap Take 2 capsules (0.5 mcg  "total) by mouth every Mon, Wed, Fri.    carvedilol (COREG) 25 MG tablet Take 1 tablet (25 mg total) by mouth 2 (two) times daily with meals.    clopidogrel (PLAVIX) 75 mg tablet TAKE 1 TABLET BY MOUTH EVERY DAY    dexAMETHasone (DECADRON) 4 MG Tab Take 5 tablets (20 mg total) by mouth As instructed. Take once weekly with Velcade injection    diclofenac sodium 1.5 % Drop APPLY 40 DROPS TO AFFECTED AREA(S) FOUR TIMES DAILY    flash glucose scanning reader (FREESTYLE CLAUDIO 14 DAY READER) [facility] Use as directed.    flash glucose sensor (FREESTYLE CLAUDIO 14 DAY SENSOR) Kit Change every 14 days.    fluticasone (FLONASE) 50 mcg/actuation nasal spray 1 spray by Each Nare route 2 (two) times daily as needed for Rhinitis. (Patient taking differently: 1 spray by Each Nare route as needed for Rhinitis. )    furosemide (LASIX) 40 MG tablet Pt to take 1 1/2 tablet twice a day per Dr. Salinas on 3/19/18.    gabapentin (NEURONTIN) 300 MG capsule Take 1 capsule (300 mg total) by mouth daily as needed.    insulin (LANTUS SOLOSTAR U-100 INSULIN) glargine 100 units/mL (3mL) SubQ pen Inject 20 units at night.    insulin aspart U-100 (NOVOLOG) 100 unit/mL (3 mL) InPn pen Inject 12 units w/ breakfast and dinner, 14 units at lunch scale 180-230+2, 231-280+4, 281-330+6, 331-380+8.snack dose 5 units.    insulin needles, disposable, 31 X 5/16 " Ndle Pt to use pen needle with Lantus daily    LIDOTRAL 3.88 % Crea APPLY TO THE AFFECTED AREA 2 TIMES TO 3 TIMES DAILY    linagliptin (TRADJENTA) 5 mg Tab tablet Take 1 tablet (5 mg total) by mouth once daily.    meclizine (ANTIVERT) 25 mg tablet Take 1 tablet (25 mg total) by mouth 3 (three) times daily as needed for Dizziness.    metOLazone (ZAROXOLYN) 2.5 MG tablet TAKE 1 TABLET BY MOUTH EVERY DAY    mirtazapine (REMERON) 30 MG tablet Take 30 mg by mouth every evening.     montelukast (SINGULAIR) 10 mg tablet Take 1 tablet (10 mg total) by mouth every evening.    nitroGLYCERIN " (NITROSTAT) 0.4 MG SL tablet Place 1 tablet (0.4 mg total) under the tongue every 5 (five) minutes as needed for Chest pain.    omeprazole (PRILOSEC OTC) 20 MG tablet Take 1 tablet (20 mg total) by mouth once daily.    ondansetron (ZOFRAN-ODT) 8 MG TbDL Take 1 tablet (8 mg total) by mouth every 6 (six) hours as needed.    telmisartan (MICARDIS) 80 MG Tab Take 1 tablet (80 mg total) by mouth once daily.     No current facility-administered medications for this visit.        Vitals:    11/13/19 0841   BP: (!) 187/82   Pulse: 88   Resp: 17   Temp: 98.7 °F (37.1 °C)         Physical Exam   Constitutional: He is oriented to person, place, and time. He appears well-developed. No distress.   HENT:   Head: Normocephalic and atraumatic.   Mouth/Throat: No oropharyngeal exudate.   Eyes: No scleral icterus.   Cardiovascular: Normal rate.   Murmur heard.  Pulmonary/Chest: Effort normal. No respiratory distress. He has no wheezes.   Abdominal: Soft. He exhibits no distension. There is no tenderness. There is no rebound.   Musculoskeletal: He exhibits no edema.   Lymphadenopathy:     He has no cervical adenopathy.   Neurological: He is oriented to person, place, and time. No cranial nerve deficit.   Skin: Skin is warm.   Psychiatric: He has a normal mood and affect.     9/11/19 SPEP: Normal total protein. Normal gamma globulins are decreased. There is a paraprotein band in gamma measuring 1.24 g/dL, previously 1.13 g/dL.        Component      Latest Ref Rng & Units 11/12/2019   WBC      3.90 - 12.70 K/uL 7.89   RBC      4.60 - 6.20 M/uL 3.49 (L)   Hemoglobin      14.0 - 18.0 g/dL 10.0 (L)   Hematocrit      40.0 - 54.0 % 30.4 (L)   MCV      82 - 98 fL 87   MCH      27.0 - 31.0 pg 28.7   MCHC      32.0 - 36.0 g/dL 32.9   RDW      11.5 - 14.5 % 13.0   Platelets      150 - 350 K/uL 132 (L)   MPV      9.2 - 12.9 fL 12.2   Immature Granulocytes      0.0 - 0.5 % 1.0 (H)   Gran # (ANC)      1.8 - 7.7 K/uL 5.8   Immature Grans (Abs)       0.00 - 0.04 K/uL 0.08 (H)   Lymph #      1.0 - 4.8 K/uL 1.2   Mono #      0.3 - 1.0 K/uL 0.6   Eos #      0.0 - 0.5 K/uL 0.2   Baso #      0.00 - 0.20 K/uL 0.03   nRBC      0 /100 WBC 0   Gran%      38.0 - 73.0 % 73.2 (H)   Lymph%      18.0 - 48.0 % 14.8 (L)   Mono%      4.0 - 15.0 % 7.6   Eosinophil%      0.0 - 8.0 % 3.0   Basophil%      0.0 - 1.9 % 0.4   Differential Method       Automated   Sodium      136 - 145 mmol/L 139   Potassium      3.5 - 5.1 mmol/L 3.9   Chloride      95 - 110 mmol/L 107   CO2      23 - 29 mmol/L 25   Glucose      70 - 110 mg/dL 201 (H)   BUN, Bld      8 - 23 mg/dL 38 (H)   Creatinine      0.5 - 1.4 mg/dL 3.7 (H)   Calcium      8.7 - 10.5 mg/dL 9.0   PROTEIN TOTAL      6.0 - 8.4 g/dL 7.0   Albumin      3.5 - 5.2 g/dL 3.5   BILIRUBIN TOTAL      0.1 - 1.0 mg/dL 0.5   Alkaline Phosphatase      55 - 135 U/L 126   AST      10 - 40 U/L 15   ALT      10 - 44 U/L 21   Anion Gap      8 - 16 mmol/L 7 (L)   eGFR if African American      >60 mL/min/1.73 m:2 19.0 (A)   eGFR if non African American      >60 mL/min/1.73 m:2 16.4 (A)     Assessment:     1.IgG kappa multiple myeloma not in remission  2. Anemia secondary to CKD   3. Anemia secondary to multiple myeloma   4.  Diabetes mellitus with neuropathy, nephropathy, retinal disease.  5.  Cardiomyopathy with chronic diastolic heart failure.  6.  h/o CVA  7. Essential hypertension  8. Hyperglycemia  9. Chronic fatigue     PLAN:     1. He did not tolerate treatment with lenalidomide/Decadron well. It is unclear if he has renal dysfunction from multiple myeloma as well, in addition to diabetic nephropathy and hypertensive nephropathy.    SPEP on 5/21/19 showed slight increase in M protein from Dec 2018 (1.13g/dL versus 0.88g/dL). It was 1.24g/dL on 9/11/19.   He is not a transplant candidate ( poor insight into his health condition, renal dysfunction from non myeloma conditions, poorly controled hypertension). Serum free light chains have increased,  with the ratio in May at 28.15 (24.09 in December 2018) and to 38.75 in Sept 2019. Creatinine had increased from 3.1 in Dec 2018 to 3.6 in May 2019.Calcium is normal. No bone pain / back pain.  His hemoglobin is currently 10.7 g/dl and has not required any transfusions recently.   I discussed resuming chemotherapy with Velcade and low dose Decadron ( 12 mg weekly due to hyperglycemia and type 2 DM) as well as Zolendronic acid. Risks and benefits discussed.he is planning to see his dentist.   He started weekly velcade and weekly Decadron 12mg from 10/15/19.           2,3.  Continue monthly CBC.     4. On long term insulin. Follows with his primary care physician     5.  Follows with cardiology. On Coreg, Lipitor, lasix     7. BP today 187/82 mm Hg, on repeat testing . He takes Amlodipine, Carvedilol. He has not taken his AM anti-HT medications today.

## 2019-11-18 NOTE — PROGRESS NOTES
Subjective:       Patient ID: Marie Reis Jr. is a 64 y.o. male.    Chief Complaint: Annual Exam    HPI 64-year-old male presents to clinic today for annual physical exam.  Patient has extensive chronic medical conditions followed by multiple specialist including type 2 diabetes complicated by stage 4 kidney disease neuropathy, multiple myeloma undergoing current treatment.  He also has chronic diastolic heart failure.  Patient has had difficulty with compliance a treatment regimen previously.  He returns today also not taking all of his blood pressure medication   Review of Systems  otherwise negative  Objective:      Physical Exam    Assessment:       1. Type 2 diabetes mellitus with diabetic polyneuropathy, with long-term current use of insulin    2. Type 2 diabetes mellitus with stage 4 chronic kidney disease, with long-term current use of insulin    3. Multiple myeloma not having achieved remission    4. Diabetes mellitus with proteinuria    5. Stage 4 chronic kidney disease    6. Anemia due to multiple mechanisms    7. Hypertension associated with diabetes    8. Uncontrolled diabetes mellitus with polyneuropathy    9. Uncontrolled type 2 diabetes mellitus with peripheral neuropathy    10. Acute on chronic diastolic heart failure    11. Chronic diastolic heart failure    12. Edema, unspecified type    13. Chronic diastolic congestive heart failure        Plan:       Marie was seen today for annual exam.    Diagnoses and all orders for this visit:    Type 2 diabetes mellitus with diabetic polyneuropathy, with long-term current use of insulin  Increased insulin to 28 units long-acting and 16 units with meals short acting  Type 2 diabetes mellitus with stage 4 chronic kidney disease, with long-term current use of insulin    Multiple myeloma not having achieved remission  Followed by Hematology-Oncology  Diabetes mellitus with proteinuria    Stage 4 chronic kidney disease  Followed by Nephrology  Anemia due to  multiple mechanisms    Hypertension associated with diabetes  -     cloNIDine (CATAPRES) 0.1 MG tablet; Take 1 tablet (0.1 mg total) by mouth 2 (two) times daily.  Advised to start to optimize blood pressure control  Uncontrolled diabetes mellitus with polyneuropathy  -     insulin (LANTUS SOLOSTAR U-100 INSULIN) glargine 100 units/mL (3mL) SubQ pen; Inject 28 Units into the skin every evening. .  Followed by endocrine  Uncontrolled type 2 diabetes mellitus with peripheral neuropathy  -     insulin aspart U-100 (NOVOLOG) 100 unit/mL (3 mL) InPn pen; 16 units with meals plus 180-230+2, 231-280+4, 281-330+6, 331-380+8.snack dose 5 units.    Acute on chronic diastolic heart failure  -     metOLazone (ZAROXOLYN) 2.5 MG tablet; Take 1 tablet (2.5 mg total) by mouth once daily.    Chronic diastolic heart failure  -     metOLazone (ZAROXOLYN) 2.5 MG tablet; Take 1 tablet (2.5 mg total) by mouth once daily.    Edema, unspecified type  -     metOLazone (ZAROXOLYN) 2.5 MG tablet; Take 1 tablet (2.5 mg total) by mouth once daily.    Chronic diastolic congestive heart failure       followed by Cardiology

## 2019-12-09 NOTE — PROGRESS NOTES
Subjective:       Patient ID: Marie Reis Jr. is a 62 y.o. Black or  male who presents for follow up evaluation of Chronic Kidney Disease    HPI     Mr. Reis is seen in nephrology clinic for follow up evaluation for CKD. He arrived accompanied by his wife. He established care with me in 4/16, he followed until 5/16 and then he was lost for follow up until 10/17 when he saw Dr. Huerta in nephrology clinic. Since then again he did not return for follow up until 1/18. He was last followed on 9/10/19.     Interim per chart review noted patient has had visits at [facility] with severely uncontrolled hypertension, at times in the range of 170-180's. He has been started back on Bortezomib and Decadron since October 2019. Noted per PCP and his cardiologist there were attempts to enroll him into Digital BP monitoring program but apparently patient did not comply with the recommendations. Patient remains noncompliant with medical advise, medications, dietary restrictions. During PCP visit in 11/2019 noted he was not taking all of his blood pressure medicines.    He has been started on clonidine in the past few days due to severely uncontrolled hypertension. Noted he has severely uncontrolled BP per his home BP readings over last few weeks, with SBP ranging between 160-170 to at times 200's. Pt reports because of these readings he was started on clonidine. BP obtained in the clinic is 140/70 today.    Of note his A1C was > 14 in April 2019. It is improved but still elevated at 8.3. His kidney function continue to decline slowly.     Per hematology he is not a candidate for transplant due to this as well as his severely uncontrolled diabetes, hypertension. Previously he received Revelimid but developed JAZLYN, uncontrolled diabetes. He continues to have anemia.    Per previous notes:  He arrived today accompanied by his wife. He was noted to have uncontrolled hypertension. But on further questioning he reported  "he had not taken his BP medicines for the morning until around noon time and it was only in the past hour or so he had taken his antihypertensive medicines. He kept saying " I don't think the pill is working" but his wife reported he has not been consistent in taking his medicines doses. She admitted he does not check BP at home regularly. Noted some of the office visits in the past few months showed uncontrolled HTN but on couple of times it was actually tightly controlled. He did have changes to his regimen, especially ARB per his PCP office in the past month.    His multiple myeloma is being managed by his hematologist. They report he had low blood counts and received blood transfusion recently. Of note, his diabetes and glucose control have worsened again and most recent A1C is 11. His wife reported he does drink soda every day thinking "it prevents low blood sugars". Pt was very tangential throughout the visit but he could still recollect a lot of things we had discussed during prior visits like using water as the primary drink, keeping blood sugars under control etc.    I reviewed serial labs with both of them and brought to his attention of his slowly progressing CKD. Stressed importance of having better diabetes and BP control and keeping consistency in taking medicines, following BP routinely, improving on dietary pattern etc. Explained higher risk of progression of CKD to ESRD.     He denied any nausea/ vomiting/ decreased urine output/ leg swelling/ flank pain/ cloudy urine/ dysuria. He has had several episodes pf JAZLYN on CKD in the last few months. He has partial recovery. Stressed to follow BP at home and report to MD. His diuretic regimen is being managed by his cardiologist.     Pt has severely uncontrolled diabetes with A1C above 10 for several years. He has diabetic and hypertensive retinopathy, hypertension, prior NSAID use, diabetic polyneuropathy, prior h/o stroke, hyperlipidemia. Additionally he " has chronic heart failure with acute exacerbation, multiple myeloma. He has progression of CKD and proteinuria. He has had prior episodes of JAZLYN. Prior urine studies from 03/15 show presence of micro albumin which now has progressed to overt proteinuria.     During prior visits he has acknowledged his worsening diabetes, fluid status but problem has been his poor memory which likely is impacting his ability to retain information about follow up, dietary changes etc. He admits he has not been following diabetic diet at all.       Renal Function:  Lab Results   Component Value Date     (H) 12/04/2019     (H) 12/03/2019     12/04/2019     12/03/2019    K 3.8 12/04/2019    K 3.7 12/03/2019     12/04/2019     12/03/2019    CO2 24 12/04/2019    CO2 24 12/03/2019    BUN 30 (H) 12/04/2019    BUN 32 (H) 12/03/2019    CALCIUM 8.6 (L) 12/04/2019    CALCIUM 8.9 12/03/2019    CREATININE 3.2 (H) 12/04/2019    CREATININE 2.8 (H) 12/03/2019    ALBUMIN 3.3 (L) 12/04/2019    ALBUMIN 3.4 (L) 12/03/2019    PHOS 3.3 12/03/2019    PHOS 3.6 11/05/2019    ESTGFRAFRICA 22.6 (A) 12/04/2019    ESTGFRAFRICA 26.5 (A) 12/03/2019    EGFRNONAA 19.5 (A) 12/04/2019    EGFRNONAA 23.0 (A) 12/03/2019       Urinalysis:  Lab Results   Component Value Date    APPEARANCEUA Clear 12/03/2019    PHUR 6.0 12/03/2019    SPECGRAV 1.010 12/03/2019    PROTEINUA 2+ (A) 12/03/2019    GLUCUA 3+ (A) 12/03/2019    OCCULTUA 1+ (A) 12/03/2019    NITRITE Negative 12/03/2019    LEUKOCYTESUR Negative 12/03/2019       Protein/Creatinine Ratio:  Lab Results   Component Value Date    PROTEINURINE 131 (H) 12/03/2019    CREATRANDUR 51.0 12/03/2019    UTPCR 2.57 (H) 12/03/2019       CBC:  Lab Results   Component Value Date    WBC 9.42 12/04/2019    HGB 9.6 (L) 12/04/2019    HCT 29.5 (L) 12/04/2019        from 431  Vit D 29    Prior CT imaging has shown right ureteral stone with mild right hydronephrosis from 08/14. US from 04/16 noted  for 10.3 and 10.4 cm kidney size, there was no hydronephrosis.     US kidneys 4/2018  11.1 and 11.4 cm kidneys, changes of CKD, no mass/ stone/ hydronephrosis    Review of Systems   Constitutional: Negative for fever, chills, appetite change, positive for fatigue.   HENT: Negative for sneezing and sore throat.    Respiratory: Negative for cough, shortness of breath and wheezing. reduced effort tolerance.  Cardiovascular: negative for palpitations. Negative for chest pain.   Gastrointestinal: Negative for nausea, vomiting, abdominal pain and diarrhea.   Genitourinary: Negative for dysuria, frequency, hematuria and flank pain.   Musculoskeletal: Negative for myalgias and back pain.   Skin: Negative for pallor.   Neurological: Negative for dizziness, light-headedness and headaches.   Psychiatric/Behavioral: Negative for behavioral problems.       Objective:      Physical Exam   Constitutional: He is oriented to person, place, and time. He appears well-developed and well-nourished. No distress.   Mouth/Throat: Oropharynx is clear and moist.   Eyes: Conjunctivae are normal. Right eye exhibits no discharge. Left eye exhibits no discharge.   Neck: Normal range of motion. Neck supple.   Cardiovascular: Normal rate, regular rhythm and normal heart sounds.    Pulmonary/Chest: Effort normal and breath sounds normal. No respiratory distress. He has no wheezes. He has no rales.   Abdominal: Soft. Abdominal obesity.There is no tenderness. Musculoskeletal: Normal range of motion. He exhibits only trace edema. He exhibits no tenderness.   Neurological: He is alert and oriented to person, place, and time.   Skin: Skin is warm and dry. No erythema.   Psychiatric: He has a normal mood and affect.   Vitals reviewed.      Assessment:       1. Uncontrolled diabetes with stage 4 chronic kidney disease GFR 30-59    2. Proteinuria    3. Multiple myeloma   4. Hypertensive ckd iv    5. Uncontrolled diabetes mellitus    6. Anemia of chronic  illness    7.      Secondary hyperparathyroidism  8.      Vit D deficiency     Plan:     Mr. Reis has uncontrolled and longstanding diabetes with complications like polyneuropathy, retinopathy, has progression of CKD and proteinuria. He has hypertension, hyperlipidemia, prior stroke, prior documented micro albumin in the urine. At present it is very crucial that he gets better control of his diabetes and BP. He is at very high risk for rapid progression of his CKD to higher stages. Continue to follow with heme for myeloma and anemia management, per heme his anemia is likely multifactorial. 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 Worsened due to poorly controlled such risk factors and likely also due to MM. But kidney problem is far advanced now. He is at very high risk of need for dialysis in the future. This was explained again to him and his wife. Per heme he is not a transplant candidate due to multiple serious and poorly controlled comorbid conditions.     CKD is due to longstanding and poorly controlled diabetes, hypertension, MM, cardiorenal causing decreased EABV. Unfortunately, his diabetes continues to remain poorly controlled.    He has had multiple episodes of JAZLYN superimposed on CKD IV due to osmotic diuresis due to severe hyperglycemia, likely over diuresis. Pt has very high risk of progression to ESRD.     Worsened proteinuria due to diabetic nephropathy, diabetes becoming uncontrolled again due to his dietary non compliance.    Hypertension is uncontrolled, he needs closer supervision of his HTN control including his intake of medicines, compliance with medicines as well as diet.      Non compliance with recommendations, follow up, dietary restrictions, medicine intake.    Memory deficit. Likely due to h/o stroke.     His worsened fatigue and anemia is likely due to  chemo for MM. Management deferred to his heme oncology team.    Plan:    - will need renal panel every 6 to 8 weeks to follow on creatinine, eGFR, electrolytes, acid base status very closely  - high risk of progression to ESRD explained to both patient and his wife, will need arrangements with access creation, TOPS education if eGFR remains < 20 and persists   - strict glycemic control  - refer to nutritionist   - strict low salt diet, less than 2 grams per day  - avoid NSAID use ever  - continue ARB for RAAS blockade while monitoring K levels closely   - continue to trend PTH, acid base disorder, corrected Ca. Increase calcitriol dose to 0.5 mcg daily from 0.5 mcg MWF.   - medication compliance was stressed to him  - diuretic regimen per his cardiologist  - Vascular referral if eGFR continues to decline. Made them aware of it.  - anemia is multifactorial. Given underlying myeloma for which he is being treated at present, management deferred to his oncology team for now    Risk of progression of CKD to ESRD was discussed with him in detail again today. Stressed importance of monthly labs for surveillance and very close follow up given above. He and his wife expressed understanding.    Total time spent was 45 minutes with >50% time spent in face to face evaluation, counseling about possible etiology, work up, monitoring, natural course of illness, recommendations.     RTC 3 months   Renal panel every 4 to 6  weeks

## 2019-12-11 PROBLEM — D69.6 THROMBOCYTOPENIA: Status: ACTIVE | Noted: 2019-01-01

## 2019-12-11 NOTE — PROGRESS NOTES
CC: Multiple myeloma, follow up     HPI: Mr. Reis is a 64-year-old man with multiple myeloma. He was a patient of Dr.Archie Guaman.  In May 2016, his serum protein electrophoresis revealed a paraprotein measuring 0.86 g/dL; this was an IgG kappa.  A 24-hour urine contained 947 mg of protein, 3% of which was kappa light chains.  His bone marrow had 15% plasma cells and a FISH panel disclosed trisomy 1, 7, 9 and 15.  A PET scan in January 2017 revealed no bone lesions.     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 He was followed with periodic examinations and laboratory studies until early 2018 when his anemia worsened.  There had been a gradual increase in the   protein level and there was a substantial increase in the 24-hour urine protein to 4500 mg.  However, only 4.6% of this was kappa light chain and 2.8% was IgG kappa.  Most of the proteinuria was believed to have been secondary to diabetes   mellitus.  The IgG kappa paraprotein as measured in electrophoresis was 1.23 g/dL.     Mainly because of the worsening anemia and concern that it might have been least partially due to  Myeloma,he was recommended a trial of therapy with Revlimid  10 mg daily for 21 days, every 28 days and dexamethasone 12 mg once a week.   There was a long delay in obtaining Revlimid and after he started that he had worsening renal function and was hospitalized for that problem and for hyperglycemia.    He had another hospitalization in March 2018 for encephalopathy, congestive heart failure, hypoglycemia and worsening anemia which required red cell transfusions.       In May  2018,  Revlimid and dexamethasone were stopped due to the above mentioned problems.  There had been substantial improvement in the protein levels with the paraprotein falling to 0.56 g/dL and   the quantitative IgG declining from 1747 to 991.  The kappa/lambda ratio fell from 44 to 6.5.     He was transfused in early July 2018 and his hemoglobin was 7.7. Since mid  September 2018, hemoglobin values were between 9.6 to 11.3.     His other medical problems include a stroke many years ago that left him with weakness of his left arm and leg and he has severe diabetes mellitus with renal, retinal and peripheral nerve complications.  He has cardiac valvular disease, pulmonary hypertension and diastolic dysfunction.  He also has a poor understanding of his medical status, probably related to complications from the stroke.     Interval history: He started Bortezomib and Decadron from 10/16/19. he has finished 2 cycles. He had hiccups after each velcade treatment.     Review of Systems   Constitutional: Positive for malaise/fatigue. Negative for chills, fever and weight loss.   HENT: Negative for congestion, ear pain and sinus pain.    Eyes: Negative for blurred vision, double vision, photophobia and pain.   Respiratory: Negative for cough and sputum production.    Cardiovascular: Negative for chest pain, claudication and leg swelling.   Gastrointestinal: Negative for abdominal pain, blood in stool, constipation, diarrhea, heartburn, melena, nausea and vomiting.   Genitourinary: Negative for dysuria, frequency and urgency.   Musculoskeletal: Positive for back pain. Negative for myalgias.   Skin: Negative for rash.   Neurological: Positive for tingling. Negative for dizziness, sensory change, speech change, seizures, loss of consciousness and headaches.   Endo/Heme/Allergies: Negative for environmental allergies. Does not bruise/bleed easily.   Psychiatric/Behavioral: Negative for depression and substance abuse.       Current Outpatient Medications   Medication Sig    amLODIPine (NORVASC) 10 MG tablet Take 1 tablet (10 mg total) by mouth once daily.    atorvastatin (LIPITOR) 80 MG tablet Take 1 tablet (80 mg total) by mouth once daily.    blood sugar diagnostic Strp 1 strip by Misc.(Non-Drug; Combo Route) route after meals as needed.    calcitRIOL (ROCALTROL) 0.25 MCG Cap Take 2  "capsules (0.5 mcg total) by mouth once daily.    carvedilol (COREG) 25 MG tablet Take 1 tablet (25 mg total) by mouth 2 (two) times daily with meals.    cloNIDine (CATAPRES) 0.1 MG tablet Take 1 tablet (0.1 mg total) by mouth 2 (two) times daily.    clopidogrel (PLAVIX) 75 mg tablet TAKE 1 TABLET BY MOUTH EVERY DAY    dexAMETHasone (DECADRON) 4 MG Tab Take 5 tablets (20 mg total) by mouth As instructed. Take once weekly with Velcade injection    diclofenac sodium 1.5 % Drop APPLY 40 DROPS TO AFFECTED AREA(S) FOUR TIMES DAILY    flash glucose scanning reader (FREESTYLE CLAUDIO 14 DAY READER) [facility] Use as directed.    flash glucose sensor (FREESTYLE CLAUDIO 14 DAY SENSOR) Kit Change every 14 days.    fluticasone (FLONASE) 50 mcg/actuation nasal spray 1 spray by Each Nare route 2 (two) times daily as needed for Rhinitis. (Patient taking differently: 1 spray by Each Nare route as needed for Rhinitis. )    furosemide (LASIX) 40 MG tablet Pt to take 1 1/2 tablet twice a day per Dr. Salinas on 3/19/18.    gabapentin (NEURONTIN) 300 MG capsule Take 1 capsule (300 mg total) by mouth daily as needed.    insulin (LANTUS SOLOSTAR U-100 INSULIN) glargine 100 units/mL (3mL) SubQ pen Inject 28 Units into the skin every evening. .    insulin aspart U-100 (NOVOLOG) 100 unit/mL (3 mL) InPn pen 16 units with meals plus 180-230+2, 231-280+4, 281-330+6, 331-380+8.snack dose 5 units.    insulin needles, disposable, 31 X 5/16 " Ndle Pt to use pen needle with Lantus daily    LIDOTRAL 3.88 % Crea APPLY TO THE AFFECTED AREA 2 TIMES TO 3 TIMES DAILY    linagliptin (TRADJENTA) 5 mg Tab tablet Take 1 tablet (5 mg total) by mouth once daily.    meclizine (ANTIVERT) 25 mg tablet Take 1 tablet (25 mg total) by mouth 3 (three) times daily as needed for Dizziness.    metOLazone (ZAROXOLYN) 2.5 MG tablet Take 1 tablet (2.5 mg total) by mouth once daily.    mirtazapine (REMERON) 30 MG tablet Take 30 mg by mouth every evening.     " montelukast (SINGULAIR) 10 mg tablet Take 1 tablet (10 mg total) by mouth every evening.    nitroGLYCERIN (NITROSTAT) 0.4 MG SL tablet Place 1 tablet (0.4 mg total) under the tongue every 5 (five) minutes as needed for Chest pain.    omeprazole (PRILOSEC OTC) 20 MG tablet Take 1 tablet (20 mg total) by mouth once daily.    ondansetron (ZOFRAN-ODT) 8 MG TbDL Take 1 tablet (8 mg total) by mouth every 6 (six) hours as needed.    telmisartan (MICARDIS) 80 MG Tab Take 1 tablet (80 mg total) by mouth once daily.    acyclovir (ZOVIRAX) 400 MG tablet Take 0.5 tablets (200 mg total) by mouth 2 (two) times daily.     No current facility-administered medications for this visit.                Vitals:    12/11/19 1041   BP: (!) 180/78   Pulse: 72   Resp: 16   Temp: 97.4 °F (36.3 °C)       Physical Exam   Constitutional: He is oriented to person, place, and time. He appears well-developed.   HENT:   Head: Normocephalic.   Mouth/Throat: No oropharyngeal exudate.   Eyes: No scleral icterus.   Cardiovascular: Normal rate and regular rhythm.   Murmur heard.  Pulmonary/Chest: Effort normal and breath sounds normal. No respiratory distress.   Abdominal: Soft. He exhibits no distension. There is no tenderness. There is no rebound.   Musculoskeletal: He exhibits no edema.   Lymphadenopathy:     He has no cervical adenopathy.   Neurological: He is alert and oriented to person, place, and time. No cranial nerve deficit.   Skin: Skin is warm.   Psychiatric: He has a normal mood and affect.       Component      Latest Ref Rng & Units 12/10/2019   WBC      3.90 - 12.70 K/uL 9.95   RBC      4.60 - 6.20 M/uL 3.51 (L)   Hemoglobin      14.0 - 18.0 g/dL 10.0 (L)   Hematocrit      40.0 - 54.0 % 29.3 (L)   MCV      82 - 98 fL 84   MCH      27.0 - 31.0 pg 28.5   MCHC      32.0 - 36.0 g/dL 34.1   RDW      11.5 - 14.5 % 13.5   Platelets      150 - 350 K/uL 108 (L)   MPV      9.2 - 12.9 fL 11.5   Gran # (ANC)      1.8 - 7.7 K/uL 8.2 (H)   Lymph  #      1.0 - 4.8 K/uL 0.9 (L)   Mono #      0.3 - 1.0 K/uL 0.7   Eos #      0.0 - 0.5 K/uL 0.1   Baso #      0.00 - 0.20 K/uL 0.01   Gran%      38.0 - 73.0 % 83.6 (H)   Lymph%      18.0 - 48.0 % 8.7 (L)   Mono%      4.0 - 15.0 % 6.5   Eosinophil%      0.0 - 8.0 % 1.1   Basophil%      0.0 - 1.9 % 0.1   Differential Method       Automated   Sodium      136 - 145 mmol/L 139   Potassium      3.5 - 5.1 mmol/L 3.9   Chloride      95 - 110 mmol/L 107   CO2      23 - 29 mmol/L 25   Glucose      70 - 110 mg/dL 212 (H)   BUN, Bld      8 - 23 mg/dL 31 (H)   Creatinine      0.5 - 1.4 mg/dL 2.8 (H)   Calcium      8.7 - 10.5 mg/dL 8.7   PROTEIN TOTAL      6.0 - 8.4 g/dL 6.2   Albumin      3.5 - 5.2 g/dL 3.3 (L)   BILIRUBIN TOTAL      0.1 - 1.0 mg/dL 0.6   Alkaline Phosphatase      55 - 135 U/L 86   AST      10 - 40 U/L 12   ALT      10 - 44 U/L 15   Anion Gap      8 - 16 mmol/L 7 (L)   eGFR if African American      >60 mL/min/1.73 m:2 27 (A)   eGFR if non African American      >60 mL/min/1.73 m:2 23 (A)   IgG - Serum      650 - 1600 mg/dL 1030   IgA      40 - 350 mg/dL 104   IgM      50 - 300 mg/dL 83     Assessment:     1.IgG kappa multiple myeloma not in remission  2. Anemia secondary to CKD   3. Anemia secondary to multiple myeloma   4.  Diabetes mellitus with neuropathy, nephropathy, retinal disease.  5.  Cardiomyopathy with chronic diastolic heart failure.  6.  h/o CVA  7. Essential hypertension  8. Hyperglycemia  9. Chronic fatigue  10. Thrombocytopenia  11. Supportive care     PLAN:     1. He did not tolerate treatment with lenalidomide/Decadron well. It is unclear if he has renal dysfunction from multiple myeloma as well, in addition to diabetic nephropathy and hypertensive nephropathy.    SPEP on 5/21/19 showed slight increase in M protein from Dec 2018 (1.13g/dL versus 0.88g/dL). It was 1.24g/dL on 9/11/19.   He is not a transplant candidate ( poor insight into his health condition, renal dysfunction from non myeloma  conditions, poorly controled hypertension). Serum free light chains have increased, with the ratio in May at 28.15 (24.09 in December 2018) and to 38.75 in Sept 2019. Creatinine had increased from 3.1 in Dec 2018 to 3.6 in May 2019.Calcium is normal. No bone pain / back pain.  His hemoglobin is currently 10 g/dl and has not required any transfusions recently.   I discussed resuming chemotherapy with Velcade and low dose Decadron ( 12 mg weekly due to hyperglycemia and type 2 DM) as well as Zolendronic acid. Risks and benefits discussed.He was evaluated by a dentist in nov 2019. It is not clear if he has more dental procedures that are planned. He started weekly velcade and weekly Decadron 12mg ( due to co-morbidities) from 10/15/19.           2,3.  Continue weekly  CBC.     4. On long term insulin. Follows with his primary care physician     5.  Follows with cardiology. On Coreg, Lipitor, lasix     7. BP today 180/78 mm Hg, on repeat testing . He takes Amlodipine, Carvedilol. He has not taken his AM anti-HT medications today.     8. secondary to chemotherapy    11. On Acyclovir prophylaxis. Zometa still on hold as he is awaiting dental clearance. He is on calcium-vit D

## 2019-12-12 NOTE — PROGRESS NOTES
HPI     Diabetic Eye Exam      Additional comments: 1 mon chk              Comments         Thera Tears PRN OU        Prior FA -  Late macular leakage OU  NP OU with NV OU    Prior OCT - VMT released OD , DME improved   Increased temporal DME OS vision, stable    A/P    1. Recurrent iritis  stable    2. PDR OU  - new small preretinal heme nasal OD  S/p PRP OD 03/27/2017  S/p PRP OS 05/09/2017 8/18 - increased VH - Avastin with Dr. CRUZ  2/12/19 - PRP fill OS  3/19 PRP fill OD.   4/19 - small boat heme - ASx - observed  11/19 - increased VH OD    Stable today, monitor  If recurrent heme, then will resume Avastin maintenance during MM tx    3. DME OU  - stable OS, temporal edema increase OD  S/p Avastin OD x 5 OS x 2  S/p Focal OS (12/16/14)  - mild worsening extrafoveal edema OD -  S/p Focal OD (5/24/16, 6/17)      4. PCIOL OU  S/p phaco w/IOL OD 8/13/15  S/p phaco w/IOL OS 4/30/15    5. Multiple myeloma        3 months

## 2019-12-19 NOTE — PROGRESS NOTES
CC: The primary encounter diagnosis was Diabetic polyneuropathy associated with type 2 diabetes mellitus. Diagnoses of 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, with long-term current use of insulin, Type 2 diabetes mellitus with stage 4 chronic kidney disease, with long-term current use of insulin, Multiple myeloma not having achieved remission, Persistent proteinuria, PAD (peripheral artery disease), Hypertension associated with diabetes, Dental caries, Chronic pulmonary heart disease, and Chronic diastolic heart failure were also pertinent to this visit.      HPI: Mr. Marie Reis Jr. is a 64 y.o. Black or  male who was diagnosed with Type 2 DM in 2006.   Pt is accompanied by wife.  Has freestyle obed.  See media avg-see below  Other readings see below- scroll down from 12/11/19 to 12/19/19  Currently 206 mg/dl          Had appt w/ hem/oncology on 10/9/19.  Currently has pending treatment plan for multiple myeloma  ---bortezomib (velcade) or zoledronic acid (reclast).  Recently had dentist appt, has dental caries, decay.    Not able to do teeth cleaning or removal teeth----may need implants-jan 2020=[facility]   Pt and wife have concerns about SEs-worsening of teeth decay, kidney functions.  Currently doing chemo treatment via infusion: 12/4, 12/11, 12/18, next 12/26      Pt was last seen by me on 10/2019    Target 33%  241 mg/dl     Admission: 4/11/18 [facility]  2/2018 sepsis  Severe hypoglycemia -EMS in the past 3 years    F/u with pcp 11/15/19    Has scheduled velcade- weekly per Dr. Cantrell-canceled per pt    a1c went up from 8.3% to 9.2% -affects of chemo/pretreatment of steroids/etc    Lab Results   Component Value Date    HGBA1C 9.2 (H) 12/10/2019     BG readings are checked 6x/day (max) and readings=  Freestyle obed  Injection 4 times a day.  See media     Skipped/missed glycemic medications: No    Prandial injections taken  "with meals: Yes    Physical Activity: No    Skipping meals and/or snacking: The patient eats a regular, healthy diet.    CURRENT DIABETIC MEDS: novolog 14-14-14 units ac w/ mod dose correction scale 180-230+2, etc , lantus 25 units qhs, tradjenta 5 mg daily     Uses Vials or Pens: pens  Type of Glucose Meter: up to date    Diabetes Management Status    Statin: Taking  ACE/ARB: Taking    Screening or Prevention Patient's value Goal Complete/Controlled?   HgA1C Testing and Control   Lab Results   Component Value Date    HGBA1C 9.2 (H) 12/10/2019      Annually/Less than 8% No   Lipid profile : 11/12/2019 Annually Yes   LDL control Lab Results   Component Value Date    LDLCALC 87.8 11/12/2019    Annually/Less than 100 mg/dl  Yes   Nephropathy screening Lab Results   Component Value Date    LABMICR 4431.0 10/25/2017     Lab Results   Component Value Date    PROTEINUA 2+ (A) 12/03/2019    Annually Yes   Blood pressure BP Readings from Last 1 Encounters:   12/18/19 129/60    Less than 140/90 No   Dilated retinal exam : 12/12/2019 Annually Yes   Foot exam   : 08/28/2019 Annually Yes   [facility]  Last DM Education Attended:  2019 q 3 mos    REVIEW OF SYSTEMS  General: no weakness  + fatigue.   Eyes: no visual disturbances.   Cardiac: no  intermittent chest pain-has nitro prn. + mild edema   Respiratory: no cough or dyspnea.   GI: no abdominal pain or nausea.   Skin: no rashes or itching.   Neuro: + numbness or tingling. +back pain, +knee pain  Musc: gait steady  Endocrine: no polyuria, polydipsia, polyphagia. +sweats w/ eating foods, +heat/cold intolerance    Vital Signs  BP (!) 150/80   Ht 6' 1" (1.854 m)   Wt 90.7 kg (200 lb)   BMI 26.39 kg/m²     Hemoglobin A1C   Date Value Ref Range Status   12/10/2019 9.2 (H) 4.0 - 5.6 % Final     Comment:     ADA Screening Guidelines:  5.7-6.4%  Consistent with prediabetes  >or=6.5%  Consistent with diabetes  High levels of fetal hemoglobin interfere with the " HbA1C  assay. Heterozygous hemoglobin variants (HbS, HgC, etc)do  not significantly interfere with this assay.   However, presence of multiple variants may affect accuracy.     11/12/2019 8.3 (H) 4.0 - 5.6 % Final     Comment:     ADA Screening Guidelines:  5.7-6.4%  Consistent with prediabetes  >or=6.5%  Consistent with diabetes  High levels of fetal hemoglobin interfere with the HbA1C  assay. Heterozygous hemoglobin variants (HbS, HgC, etc)do  not significantly interfere with this assay.   However, presence of multiple variants may affect accuracy.     08/23/2019 8.3 (H) 4.0 - 5.6 % Final     Comment:     ADA Screening Guidelines:  5.7-6.4%  Consistent with prediabetes  >or=6.5%  Consistent with diabetes  High levels of fetal hemoglobin interfere with the HbA1C  assay. Heterozygous hemoglobin variants (HbS, HgC, etc)do  not significantly interfere with this assay.   However, presence of multiple variants may affect accuracy.         Chemistry        Component Value Date/Time     12/18/2019 0954    K 4.0 12/18/2019 0954     12/18/2019 0954    CO2 24 12/18/2019 0954    BUN 38 (H) 12/18/2019 0954    CREATININE 3.2 (H) 12/18/2019 0954     (H) 12/18/2019 0954        Component Value Date/Time    CALCIUM 8.9 12/18/2019 0954    ALKPHOS 119 12/18/2019 0954    AST 10 12/18/2019 0954    ALT 14 12/18/2019 0954    BILITOT 0.4 12/18/2019 0954          Lab Results   Component Value Date    CHOL 141 11/12/2019    CHOL 137 08/23/2019    CHOL 117 (L) 08/05/2019     Lab Results   Component Value Date    HDL 29 (L) 11/12/2019    HDL 30 (L) 08/23/2019    HDL 27 (L) 08/05/2019     Lab Results   Component Value Date    LDLCALC 87.8 11/12/2019    LDLCALC 85.2 08/23/2019    LDLCALC 73.0 08/05/2019     Lab Results   Component Value Date    TRIG 121 11/12/2019    TRIG 109 08/23/2019    TRIG 85 08/05/2019     Lab Results   Component Value Date    CHOLHDL 20.6 11/12/2019    CHOLHDL 21.9 08/23/2019    CHOLHDL 23.1 08/05/2019        Lab Results   Component Value Date    TSH 3.277 11/12/2019       Lab Results   Component Value Date    MICALBCREAT 1987.0 (H) 10/25/2017       Vit D, 25-Hydroxy   Date Value Ref Range Status   12/03/2019 29 (L) 30 - 96 ng/mL Final     Comment:     Vitamin D deficiency.........<10 ng/mL                              Vitamin D insufficiency......10-29 ng/mL       Vitamin D sufficiency........> or equal to 30 ng/mL  Vitamin D toxicity............>100 ng/mL         PHYSICAL EXAMINATION  Constitutional: Appears fairly well, no distress  Neck: Supple, trachea midline.   Respiratory: no wheezes, even and unlabored.  Cardiovascular: RRR  Lymph: +trace BLE/ pedal edema-myrna hoses/diabetic socks on   Skin: warm and dry; no injection site reactions, no acanthosis nigracans observed.  Neuro:patient alert and cooperative.   Footwear appropriate. Gait steady w/ cane use.     Assessment/Plan    1. Diabetic polyneuropathy associated with type 2 diabetes mellitus  Hemoglobin A1c    Ambulatory Referral to Diabetes Education    Microalbumin/creatinine urine ratio   2. 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, with long-term current use of insulin  Hemoglobin A1c    Ambulatory Referral to Diabetes Education   3. Type 2 diabetes mellitus with stage 4 chronic kidney disease, with long-term current use of insulin  Hemoglobin A1c    Ambulatory Referral to Diabetes Education   4. Multiple myeloma not having achieved remission     5. Persistent proteinuria     6. PAD (peripheral artery disease)     7. Hypertension associated with diabetes  Microalbumin/creatinine urine ratio   8. Dental caries     9. Chronic pulmonary heart disease     10. Chronic diastolic heart failure     11. Uncontrolled diabetes mellitus with polyneuropathy  insulin (LANTUS SOLOSTAR U-100 INSULIN) glargine 100 units/mL (3mL) SubQ pen   12. Uncontrolled type 2 diabetes mellitus with peripheral  neuropathy  insulin aspart U-100 (NOVOLOG) 100 unit/mL (3 mL) InPn pen       1.-3. F/u in 3 mos w/ me  Change lantus to 24 units qhs   Change novolog to 18-16-16 units w/ meals 150-200+2, etc  6 units w/ snack  Continue tradjenta 5 mg daily  a1c goal less than 7.5%  Continue use of freestyle obed   Time in range >50 closer to 70%    4. F/u with hem/onc  Chemo weekly   5. Urine mac next time   F/u with nephrology   6. F/u with podiatry  7. Elevated this visit, continue med(s)  8. F/u with dentist  9. F/u with pcp  10. Avoid hypoglycemia   11. See above   12. See above

## 2019-12-19 NOTE — PATIENT INSTRUCTIONS
Snacks can be an important part of a balanced, healthy meal plan. They allow you to eat more frequently, feeling full and satisfied throughout the day. Also, they allow you to spread carbohydrates evenly, which may stabilize blood sugars.  Plus, snacks are enjoyable!     The amount of carbohydrate needed at snacks varies. Generally, about 15-30 grams of carbohydrate per snack is recommended.  Below you will find some tasty treats.       0-5 gm carb   Crystal Light   Vitamin Water Zero   Herbal tea, unsweetened   2 tsp peanut butter on celery   1./2 cup sugar-free jell-o   1 sugar-free popsicle   ¼ cup blueberries   8oz Blue Amy unsweetened almond milk   5 baby carrots & celery sticks, cucumbers, bell peppers dipped in ¼ cup salsa, 2Tbsp light ranch dressing or 2Tbsp plain Greek yogurt   10 Goldfish crackers   ½ oz low-fat cheese or string cheese   1 closed handful of nuts, unsalted   1 Tbsp of sunflower seeds, unsalted   1 cup Smart Pop popcorn   1 whole grain brown rice cake        15 gm carb   1 small piece of fruit or ½ banana or 1/2 cup lite canned fruit   3 donna cracker squares   3 cups Smart Pop popcorn, top spray butter, Mijares lite salt or cinnamon and Truvia   5 Vanilla Wafers   ½ cup low fat, no added sugar ice cream or frozen yogurt (Blue bell, Blue Bunny, Weight Watchers, Skinny Cow)   ½ turkey, ham, or chicken sandwich   ½ c fruit with ½ c Cottage cheese   4-6 unsalted wheat crackers with 1 oz low fat cheese or 1 tbsp peanut butter    30-45 goldfish crackers (depending on flavor)    7-8 Anabaptist mini brown rice cakes (caramel, apple cinnamon, chocolate)    12 Anabaptist mini brown rice cakes (cheddar, bbq, ranch)    1/3 cup hummus dip with raw veg   1/2 whole wheat sourav, 1Tbsp hummus   Mini Pizza (1/2 whole wheat English muffin, low-fat  cheese, tomato sauce)   100 calorie snack pack (Oreo, Chips Ahoy, Ritz Mix, Baked Cheetos)   4-6 oz. light or Greek Style yogurt  (Mata, Carley, [place], [facility])   ½ cup sugar-free pudding     6 in. wheat tortilla or sourav oven toasted chips (topped with spray butter flavoring, cinnamon, Truvia OR spray butter, garlic powder, chili powder)    18 BBQ Popchips (available at Target, Whole Foods, Fresh Market)                   Diabetes Support Group Meetings         Date: Topic:   February 14 Eat Fit MAGNUS/Health Promotion   March 14 Taking Care of Your Smile   April 11 Spring into Healthy Eating/Cooking Demo   May 9 Ease Your Mind with Diabetes   Alexandra 13 Summer Treats/Cooking Demo   July 11 Eat Fit MAGNUS/Super Market Sweep   August 8 Taking Care of Your Eyes and Feet   Sept 12 Technology/ADA updates   October 10 Recipes & Treats/Cooking Demo   November 14 Heart Health/Pump it up!   December 12 Year-End Close Out        Meetings are held in the [facility] (A) of the [facility] located at [address] [place] [address]. Please call [phone] for additional information.    Free service, offered every 2nd Thursday of every month! Family members and/or friends are welcome as well!  Support group is for patients with type 1 or type 2 diabetes.    From 3:30p to 4:30p

## 2019-12-20 NOTE — PATIENT INSTRUCTIONS
SUPREP Instructions    You are scheduled for a colonoscopy with Dr. Espinosa on 1/20/2020 at [facility]  To ensure that your test is accurate and complete, you MUST follow these instructions listed below.  If you have any questions, please call our office at [phone].  Plan on being at the hospital for your procedure for 3-4 hours.    1.  Follow a CLEAR LIQUID DIET for the entire day before your scheduled colonoscopy.  This means no solid food the entire day starting when you wake.  You may have as much of the clear liquids as you want throughout the day.   CLEAR LIQUID DIET:   - Avoid Red, Orange, Purple, and/or Blue food coloring   - NO DAIRY   - You can have:  Coffee with sugar (no creamer), tea, water, soda, apple or white grape juice, chicken or beef broth/bouillon (no meat, noodles, or veggies), green/yellow popsicles, green/yellow Jell-O, lemonade.    2.  AT 5 pm the evening before your colonoscopy, POUR ONE (1) BOTTLE OF SUPREP INTO THE MIXING CONTAINER, PROVIDED INSIDE THE BOX.  ADD WATER TO THE LINE ON THE CONTAINER AND MIX IT WELL.  DRINK THE ENTIRE CONTAINER AND THEN DRINK TWO (2) MORE CONTAINERS OF WATER OVER THE NEXT 1 HOUR.  This is sometimes easier to drink if this solution is cold, so you can mix the solution 20 minutes ahead of time and place in the refrigerator prior to drinking.  You have to drink the solution within 30-45 minutes of mixing it.  Do NOT put this solution over ice.  It IS ok to drink with a straw.    3.  The endoscopy department will call you 1 day before your colonoscopy to tell you the exact time to arrive, AND to tell you the exact time to drink the 2nd portion of your prep (which will be FIVE HOURS BEFORE YOUR ARRIVAL TIME).  At this time given to you, POUR ONE (1) BOTTLE OF SUPREP INTO THE MIXING CONTAINER, PROVIDED INSIDE THE BOX.  ADD WATER TO THE LINE ON THE CONTAINER AND MIX IT WELL.  DRINK THE ENTIRE CONTAINER AND THEN DRINK TWO (2) MORE CONTAINERS OF WATER OVER  THE NEXT 1 HOUR.  This is sometimes easier to drink if this solution is cold, so you can mix the solution 20 minutes ahead of time and place in the refrigerator prior to drinking.  You have to drink the solution within 30-45 minutes of mixing it.  Do NOT put this solution over ice.  It IS ok to drink with a straw.  Once this is complete, you may not have ANYTHING else by mouth!    4.  You must have someone with you to DRIVE YOU HOME since you will be receiving IV sedation for the colonoscopy.    5.  It is ok to take your heart, blood pressure, and seizure medications in the morning of your test with a SIP of water.  Hold other medications until after your procedure.  Do NOT have anything else to eat or drink the morning of your colonoscopy.  It is ok to brush your teeth.    6.  If you are on blood thinners THAT YOU HAVE BEEN INSTRUCTED TO HOLD BY YOUR DOCTOR FOR THIS PROCEDURE, then do NOT take this the morning of your colonoscopy.  Do NOT stop these medications on your own, they must be approved to be held by your doctor.  Your colonoscopy can NOT be done if you are on these medications.  Examples of blood thinners include: Coumadin, Aggrenox, Plavix, Pradaxa, Reapro, Pletal, Xarelto, Ticagrelor, Brilinta, Eliquis, and high dose aspirin (325 mg).  You do not have to stop baby aspirin 81 mg.    7.  IF YOU ARE DIABETIC:  NO INSULIN OR ORAL MEDICATIONS THE MORNING OF THE COLONOSCOPY.  TAKE ONLY HALF THE DOSE OF YOUR INSULIN THE DAY BEFORE THE COLONOSCOPY.  DO NOT TAKE ANY ORAL DIABETIC MEDICATIONS THE DAY BEFORE THE COLONOSCOPY.  IF YOU ARE AN INSULIN DEPENDENT DIABETIC WITH UNSTABLE BLOOD SUGARS, NOTIFY YOUR PRIMARY CARE PHYSICIAN FOR INSTRUCTIONS.

## 2019-12-20 NOTE — LETTER
December 20, 2019      Nancy Colón MD  [address] [facility] [facility] [address]  [place] [address]           [facility]  [address]  [place] [address]  Phone: [phone]          Patient: Marie Reis Jr.   MR Number: [id number]   YOB: 1955   Date of Visit: 12/20/2019       Dear Dr. Nancy Colón:    Thank you for referring Marie Reis to me for evaluation. Attached you will find relevant portions of my assessment and plan of care.    If you have questions, please do not hesitate to call me. I look forward to following Marie Reis along with you.    Sincerely,    Salvador Espinosa MD    Enclosure  CC:  No Recipients    If you would like to receive this communication electronically, please contact [email or web address] or [phone] to request more information on [employer] Link access.    For providers and/or their staff who would like to refer a patient to [facility], please contact us through our one-stop-shop provider referral line, [facility], at [phone].    If you feel you have received this communication in error or would no longer like to receive these types of communications, please e-mail [email or web address]

## 2019-12-20 NOTE — TELEPHONE ENCOUNTER
Patient is scheduled for a colonoscopy on 1/20/2020 with Dr. Espinosa. Can patient hold his Plavix 5 days before his procedure?

## 2019-12-20 NOTE — PROGRESS NOTES
Subjective:       Patient ID: Marie Reis Jr. is a 64 y.o. male.    Chief Complaint: Rectal Bleeding (positive Fit Kit)    Patient referred today with aforementioned complaints.  Patient reports occasional blood in stool he attributes to hemorrhoids.  He denies change in bowel habits or unexplained weight loss.  He does report having prior colonoscopy greater than 10 years ago which was normal. Denies family history of colon cancer.  Denies upper GI complaints of heartburn, dysphagia/odynophagia, nausea/vomiting, or abdominal pain. He is on Plavix for history of CVA      Patient is accompanied by his wife who corroborates above history.    Past Medical History:   Diagnosis Date    Binocular vision disorder with diplopia 9/22/2016    Bradycardia     Cataract     Cervical spondylosis 10/1/2015    Chronic diastolic heart failure 1/11/2018    Coronary artery disease due to calcified coronary lesion 3/19/2018    Dyslipidemia 6/26/2015    Hearing impairment 10/25/2013    History of stroke 3/23/2015    Hypertension associated with diabetes 1/11/2018    Hypertensive retinopathy of both eyes 9/26/2017    Lumbar spondylosis 10/1/2015    Multiple myeloma not having achieved remission 1/16/2018    Persistent proteinuria 1/11/2018    Spondylolisthesis of lumbar region 10/1/2015    Strabismus     Stroke 2014    Thoracic spondylosis 10/1/2015    Type 2 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, with long-term current use of insulin 9/26/2017    Type 2 diabetes mellitus with diabetic polyneuropathy, with long-term current use of insulin 9/28/2015    Type 2 diabetes mellitus with stage 4 chronic kidney disease, with long-term current use of insulin 1/11/2018       Past Surgical History:   Procedure Laterality Date    CATARACT EXTRACTION W/  [facility]    EYE SURGERY      cataract removal left eye    [facility]  2/2012      [facility]    left hand fracture sx      LT EYE   5/2/15    DR KULLMAN [facility]       Social History  Social History     Tobacco Use    Smoking status: Never Smoker    Smokeless tobacco: Never Used   Substance Use Topics    Alcohol use: No    Drug use: No       Family History   Problem Relation Age of Onset    Diabetes Mother     Cancer Mother     Kidney disease Mother     Diabetes Father     Heart attack Father     Diabetes Sister     Diabetes Brother     No Known Problems Maternal Aunt     No Known Problems Maternal Uncle     No Known Problems Paternal Aunt     No Known Problems Paternal Uncle     No Known Problems Maternal Grandmother     No Known Problems Maternal Grandfather     No Known Problems Paternal Grandmother     No Known Problems Paternal Grandfather     Blindness Neg Hx     Anesthesia problems Neg Hx     Amblyopia Neg Hx     Cataracts Neg Hx     Glaucoma Neg Hx     Hypertension Neg Hx     Macular degeneration Neg Hx     Retinal detachment Neg Hx     Strabismus Neg Hx     Stroke Neg Hx     Thyroid disease Neg Hx     Heart disease Neg Hx     Heart failure Neg Hx     Hyperlipidemia Neg Hx        Review of Systems   Constitutional: Negative for chills, fever and unexpected weight change.   HENT: Negative for congestion and trouble swallowing.    Eyes: Negative for photophobia and visual disturbance.   Respiratory: Negative for cough and shortness of breath.    Cardiovascular: Negative for chest pain and leg swelling.   Gastrointestinal: Positive for anal bleeding. Negative for abdominal distention, abdominal pain, blood in stool, constipation, diarrhea, nausea and vomiting.   Genitourinary: Negative for dysuria and hematuria.   Musculoskeletal: Negative for arthralgias and myalgias.   Skin: Negative for color change and rash.   Neurological: Negative for dizziness, light-headedness and numbness.   Psychiatric/Behavioral: Negative for agitation and confusion.            Objective:      Physical Exam   Constitutional: He is oriented to person, place, and time. He appears well-developed and well-nourished.   HENT:   Head: Normocephalic and atraumatic.   Eyes: Pupils are equal, round, and reactive to light. EOM are normal. No scleral icterus.   Neck: Normal range of motion. Neck supple.   Cardiovascular: Normal rate, regular rhythm, normal heart sounds and intact distal pulses.   Pulmonary/Chest: Effort normal and breath sounds normal. No respiratory distress.   Abdominal: Soft. Bowel sounds are normal. He exhibits no distension. There is no tenderness.   Musculoskeletal: Normal range of motion. He exhibits no edema.   Neurological: He is alert and oriented to person, place, and time.   No asterixis   Skin: Skin is warm and dry. No rash noted. No erythema.   Psychiatric: He has a normal mood and affect. His behavior is normal.   Nursing note and vitals reviewed.        Pertinent labs and imaging studies reviewed    Assessment:       1. Positive FIT (fecal immunochemical test)    2. History of stroke        Plan:       Schedule colonoscopy  Will need to hold Plavix 5 days prior to procedure    (Portions of this note were dictated using voice recognition software and may contain dictation related errors in spelling/grammar/syntax not found on text review)

## 2019-12-26 NOTE — TELEPHONE ENCOUNTER
Spoke to patient, who states that his cbg monitor shows that his current glucose is 387. He states that he has not taken his insulin yet this morning but will take it when he gets home. Does not report any symptoms associated with hyperglycemia. instrcuted patient to monitor sugar through the afternoon and call clinic if new symptoms develop

## 2020-01-01 ENCOUNTER — HOSPITAL ENCOUNTER (OUTPATIENT)
Dept: RADIOLOGY | Facility: HOSPITAL | Age: 65
Discharge: HOME | End: 2020-01-13
Attending: INTERNAL MEDICINE
Payer: MEDICARE

## 2020-01-01 ENCOUNTER — TELEPHONE (OUTPATIENT)
Dept: INTERNAL MEDICINE | Facility: CLINIC | Age: 65
End: 2020-01-01

## 2020-01-01 ENCOUNTER — HOSPITAL ENCOUNTER (INPATIENT)
Facility: HOSPITAL | Age: 65
LOS: 13 days | Discharge: NURSING, REHAB OR LONG-TERM CARE | DRG: 393 | End: 2020-05-01
Attending: EMERGENCY MEDICINE | Admitting: HOSPITALIST
Payer: MEDICARE

## 2020-01-01 ENCOUNTER — DOCUMENTATION ONLY (OUTPATIENT)
Dept: PRIMARY CARE CLINIC | Facility: CLINIC | Age: 65
End: 2020-01-01

## 2020-01-01 ENCOUNTER — INFUSION (OUTPATIENT)
Dept: INFUSION THERAPY | Facility: HOSPITAL | Age: 65
End: 2020-01-01
Attending: INTERNAL MEDICINE
Payer: MEDICARE

## 2020-01-01 ENCOUNTER — HOSPITAL ENCOUNTER (INPATIENT)
Facility: HOSPITAL | Age: 65
LOS: 6 days | Discharge: HOME | DRG: 871 | End: 2020-03-27
Attending: EMERGENCY MEDICINE | Admitting: INTERNAL MEDICINE
Payer: MEDICARE

## 2020-01-01 ENCOUNTER — OFFICE VISIT (OUTPATIENT)
Dept: HEMATOLOGY/ONCOLOGY | Facility: CLINIC | Age: 65
End: 2020-01-01
Payer: MEDICARE

## 2020-01-01 ENCOUNTER — TELEPHONE (OUTPATIENT)
Dept: CARDIOLOGY | Facility: CLINIC | Age: 65
End: 2020-01-01

## 2020-01-01 ENCOUNTER — PATIENT MESSAGE (OUTPATIENT)
Dept: NEPHROLOGY | Facility: CLINIC | Age: 65
End: 2020-01-01

## 2020-01-01 ENCOUNTER — ANESTHESIA (OUTPATIENT)
Dept: ENDOSCOPY | Facility: HOSPITAL | Age: 65
DRG: 393 | End: 2020-01-01
Payer: MEDICARE

## 2020-01-01 ENCOUNTER — OFFICE VISIT (OUTPATIENT)
Dept: NEUROLOGY | Facility: CLINIC | Age: 65
End: 2020-01-01
Payer: MEDICARE

## 2020-01-01 ENCOUNTER — ANESTHESIA EVENT (OUTPATIENT)
Dept: ENDOSCOPY | Facility: HOSPITAL | Age: 65
DRG: 393 | End: 2020-01-01
Payer: MEDICARE

## 2020-01-01 ENCOUNTER — PATIENT MESSAGE (OUTPATIENT)
Dept: INTERNAL MEDICINE | Facility: CLINIC | Age: 65
End: 2020-01-01

## 2020-01-01 ENCOUNTER — PATIENT OUTREACH (OUTPATIENT)
Dept: DIABETES | Facility: CLINIC | Age: 65
End: 2020-01-01

## 2020-01-01 ENCOUNTER — PATIENT MESSAGE (OUTPATIENT)
Dept: HEMATOLOGY/ONCOLOGY | Facility: CLINIC | Age: 65
End: 2020-01-01

## 2020-01-01 ENCOUNTER — TELEPHONE (OUTPATIENT)
Dept: NEPHROLOGY | Facility: CLINIC | Age: 65
End: 2020-01-01

## 2020-01-01 ENCOUNTER — LAB VISIT (OUTPATIENT)
Dept: LAB | Facility: HOSPITAL | Age: 65
End: 2020-01-01
Attending: NURSE PRACTITIONER
Payer: MEDICARE

## 2020-01-01 ENCOUNTER — TELEPHONE (OUTPATIENT)
Dept: PRIMARY CARE CLINIC | Facility: CLINIC | Age: 65
End: 2020-01-01

## 2020-01-01 ENCOUNTER — HOSPITAL ENCOUNTER (OUTPATIENT)
Dept: RADIOLOGY | Facility: HOSPITAL | Age: 65
Discharge: HOME | End: 2020-03-17
Attending: INTERNAL MEDICINE
Payer: MEDICARE

## 2020-01-01 ENCOUNTER — ANESTHESIA (OUTPATIENT)
Dept: ENDOSCOPY | Facility: HOSPITAL | Age: 65
End: 2020-01-01
Payer: MEDICARE

## 2020-01-01 ENCOUNTER — HOSPITAL ENCOUNTER (OUTPATIENT)
Facility: HOSPITAL | Age: 65
Discharge: HOME | End: 2020-01-20
Attending: INTERNAL MEDICINE | Admitting: INTERNAL MEDICINE
Payer: MEDICARE

## 2020-01-01 ENCOUNTER — OFFICE VISIT (OUTPATIENT)
Dept: INTERNAL MEDICINE | Facility: CLINIC | Age: 65
End: 2020-01-01
Payer: MEDICARE

## 2020-01-01 ENCOUNTER — HOSPITAL ENCOUNTER (INPATIENT)
Facility: HOSPITAL | Age: 65
LOS: 17 days | Discharge: ANOTHER HOSPITAL | DRG: 177 | End: 2020-04-18
Attending: INTERNAL MEDICINE | Admitting: INTERNAL MEDICINE
Payer: MEDICARE

## 2020-01-01 ENCOUNTER — OUTPATIENT CASE MANAGEMENT (OUTPATIENT)
Dept: ADMINISTRATIVE | Facility: OTHER | Age: 65
End: 2020-01-01

## 2020-01-01 ENCOUNTER — LAB VISIT (OUTPATIENT)
Dept: LAB | Facility: HOSPITAL | Age: 65
End: 2020-01-01
Attending: INTERNAL MEDICINE
Payer: MEDICARE

## 2020-01-01 ENCOUNTER — HOSPITAL ENCOUNTER (INPATIENT)
Facility: HOSPITAL | Age: 65
LOS: 4 days | Discharge: NURSING, REHAB OR LONG-TERM CARE | DRG: 682 | End: 2020-04-01
Attending: EMERGENCY MEDICINE | Admitting: INTERNAL MEDICINE
Payer: MEDICARE

## 2020-01-01 ENCOUNTER — OFFICE VISIT (OUTPATIENT)
Dept: OPHTHALMOLOGY | Facility: CLINIC | Age: 65
End: 2020-01-01
Payer: MEDICARE

## 2020-01-01 ENCOUNTER — LAB VISIT (OUTPATIENT)
Dept: LAB | Facility: HOSPITAL | Age: 65
End: 2020-01-01
Payer: MEDICARE

## 2020-01-01 ENCOUNTER — OFFICE VISIT (OUTPATIENT)
Dept: CARDIOLOGY | Facility: CLINIC | Age: 65
End: 2020-01-01
Payer: MEDICARE

## 2020-01-01 ENCOUNTER — TELEPHONE (OUTPATIENT)
Dept: ENDOSCOPY | Facility: HOSPITAL | Age: 65
End: 2020-01-01

## 2020-01-01 ENCOUNTER — HOSPITAL ENCOUNTER (EMERGENCY)
Facility: HOSPITAL | Age: 65
Discharge: NURSING, REHAB OR LONG-TERM CARE | End: 2020-04-11
Attending: EMERGENCY MEDICINE
Payer: MEDICARE

## 2020-01-01 ENCOUNTER — EXTERNAL HOME HEALTH (OUTPATIENT)
Dept: HOME HEALTH SERVICES | Facility: HOSPITAL | Age: 65
End: 2020-01-01

## 2020-01-01 ENCOUNTER — HOSPITAL ENCOUNTER (OUTPATIENT)
Dept: RADIOLOGY | Facility: HOSPITAL | Age: 65
Discharge: HOME | End: 2020-03-04
Attending: INTERNAL MEDICINE
Payer: MEDICARE

## 2020-01-01 ENCOUNTER — HOSPITAL ENCOUNTER (EMERGENCY)
Facility: HOSPITAL | Age: 65
End: 2020-05-21
Attending: EMERGENCY MEDICINE
Payer: MEDICARE

## 2020-01-01 ENCOUNTER — HOSPITAL ENCOUNTER (OUTPATIENT)
Dept: RADIOLOGY | Facility: HOSPITAL | Age: 65
Discharge: HOME | End: 2020-02-21
Attending: INTERNAL MEDICINE
Payer: MEDICARE

## 2020-01-01 ENCOUNTER — ANESTHESIA EVENT (OUTPATIENT)
Dept: ENDOSCOPY | Facility: HOSPITAL | Age: 65
End: 2020-01-01

## 2020-01-01 ENCOUNTER — HOSPITAL ENCOUNTER (OUTPATIENT)
Facility: HOSPITAL | Age: 65
Discharge: HOME | End: 2020-02-26
Attending: EMERGENCY MEDICINE | Admitting: INTERNAL MEDICINE
Payer: MEDICARE

## 2020-01-01 ENCOUNTER — ANESTHESIA EVENT (OUTPATIENT)
Dept: ENDOSCOPY | Facility: HOSPITAL | Age: 65
End: 2020-01-01
Payer: MEDICARE

## 2020-01-01 ENCOUNTER — TELEPHONE (OUTPATIENT)
Dept: HEMATOLOGY/ONCOLOGY | Facility: CLINIC | Age: 65
End: 2020-01-01

## 2020-01-01 VITALS
HEIGHT: 70 IN | SYSTOLIC BLOOD PRESSURE: 136 MMHG | DIASTOLIC BLOOD PRESSURE: 62 MMHG | TEMPERATURE: 99 F | OXYGEN SATURATION: 100 % | WEIGHT: 140 LBS | HEART RATE: 65 BPM | RESPIRATION RATE: 18 BRPM | BODY MASS INDEX: 20.04 KG/M2

## 2020-01-01 VITALS
DIASTOLIC BLOOD PRESSURE: 64 MMHG | RESPIRATION RATE: 18 BRPM | OXYGEN SATURATION: 98 % | SYSTOLIC BLOOD PRESSURE: 138 MMHG | TEMPERATURE: 97 F | BODY MASS INDEX: 20.04 KG/M2 | HEIGHT: 70 IN | HEART RATE: 71 BPM | WEIGHT: 140 LBS

## 2020-01-01 VITALS
HEIGHT: 73 IN | WEIGHT: 183.44 LBS | DIASTOLIC BLOOD PRESSURE: 60 MMHG | RESPIRATION RATE: 17 BRPM | BODY MASS INDEX: 24.31 KG/M2 | TEMPERATURE: 98 F | SYSTOLIC BLOOD PRESSURE: 128 MMHG | OXYGEN SATURATION: 99 % | HEART RATE: 75 BPM

## 2020-01-01 VITALS
HEART RATE: 76 BPM | TEMPERATURE: 100 F | BODY MASS INDEX: 24.89 KG/M2 | SYSTOLIC BLOOD PRESSURE: 128 MMHG | RESPIRATION RATE: 20 BRPM | OXYGEN SATURATION: 94 % | WEIGHT: 187.81 LBS | DIASTOLIC BLOOD PRESSURE: 59 MMHG | HEIGHT: 73 IN

## 2020-01-01 VITALS
HEART RATE: 78 BPM | HEIGHT: 72 IN | OXYGEN SATURATION: 99 % | WEIGHT: 201.94 LBS | BODY MASS INDEX: 27.35 KG/M2 | HEART RATE: 75 BPM | DIASTOLIC BLOOD PRESSURE: 54 MMHG | DIASTOLIC BLOOD PRESSURE: 86 MMHG | RESPIRATION RATE: 17 BRPM | TEMPERATURE: 98 F | SYSTOLIC BLOOD PRESSURE: 110 MMHG | SYSTOLIC BLOOD PRESSURE: 188 MMHG

## 2020-01-01 VITALS
HEART RATE: 75 BPM | SYSTOLIC BLOOD PRESSURE: 111 MMHG | BODY MASS INDEX: 24.98 KG/M2 | RESPIRATION RATE: 16 BRPM | HEIGHT: 73 IN | BODY MASS INDEX: 26.78 KG/M2 | SYSTOLIC BLOOD PRESSURE: 131 MMHG | TEMPERATURE: 99 F | WEIGHT: 197.75 LBS | DIASTOLIC BLOOD PRESSURE: 61 MMHG | HEART RATE: 76 BPM | OXYGEN SATURATION: 98 % | WEIGHT: 188.5 LBS | HEIGHT: 72 IN | DIASTOLIC BLOOD PRESSURE: 60 MMHG | SYSTOLIC BLOOD PRESSURE: 120 MMHG | DIASTOLIC BLOOD PRESSURE: 59 MMHG | HEART RATE: 75 BPM

## 2020-01-01 VITALS
DIASTOLIC BLOOD PRESSURE: 66 MMHG | TEMPERATURE: 97 F | RESPIRATION RATE: 11 BRPM | SYSTOLIC BLOOD PRESSURE: 126 MMHG | BODY MASS INDEX: 19.38 KG/M2 | HEIGHT: 72 IN | WEIGHT: 143.06 LBS | OXYGEN SATURATION: 99 % | HEART RATE: 72 BPM

## 2020-01-01 VITALS
BODY MASS INDEX: 24.73 KG/M2 | HEIGHT: 73 IN | HEART RATE: 83 BPM | DIASTOLIC BLOOD PRESSURE: 65 MMHG | WEIGHT: 186.63 LBS | SYSTOLIC BLOOD PRESSURE: 133 MMHG

## 2020-01-01 VITALS — DIASTOLIC BLOOD PRESSURE: 87 MMHG | SYSTOLIC BLOOD PRESSURE: 193 MMHG | HEART RATE: 82 BPM

## 2020-01-01 VITALS
RESPIRATION RATE: 18 BRPM | WEIGHT: 196.63 LBS | OXYGEN SATURATION: 98 % | SYSTOLIC BLOOD PRESSURE: 133 MMHG | TEMPERATURE: 98 F | HEIGHT: 73 IN | BODY MASS INDEX: 26.06 KG/M2 | HEART RATE: 72 BPM | DIASTOLIC BLOOD PRESSURE: 73 MMHG

## 2020-01-01 VITALS
TEMPERATURE: 98 F | BODY MASS INDEX: 25.73 KG/M2 | OXYGEN SATURATION: 98 % | HEIGHT: 72 IN | WEIGHT: 190 LBS | RESPIRATION RATE: 14 BRPM | DIASTOLIC BLOOD PRESSURE: 61 MMHG | HEART RATE: 77 BPM | SYSTOLIC BLOOD PRESSURE: 133 MMHG

## 2020-01-01 VITALS
DIASTOLIC BLOOD PRESSURE: 64 MMHG | RESPIRATION RATE: 16 BRPM | HEIGHT: 73 IN | HEART RATE: 72 BPM | BODY MASS INDEX: 26.51 KG/M2 | TEMPERATURE: 98 F | HEART RATE: 71 BPM | SYSTOLIC BLOOD PRESSURE: 130 MMHG | DIASTOLIC BLOOD PRESSURE: 60 MMHG | SYSTOLIC BLOOD PRESSURE: 129 MMHG | WEIGHT: 200 LBS | RESPIRATION RATE: 17 BRPM

## 2020-01-01 VITALS
HEIGHT: 73 IN | HEART RATE: 67 BPM | TEMPERATURE: 97 F | RESPIRATION RATE: 19 BRPM | BODY MASS INDEX: 21.54 KG/M2 | DIASTOLIC BLOOD PRESSURE: 56 MMHG | SYSTOLIC BLOOD PRESSURE: 111 MMHG | WEIGHT: 162.5 LBS | OXYGEN SATURATION: 100 %

## 2020-01-01 VITALS
WEIGHT: 191.13 LBS | SYSTOLIC BLOOD PRESSURE: 126 MMHG | OXYGEN SATURATION: 96 % | HEART RATE: 100 BPM | DIASTOLIC BLOOD PRESSURE: 74 MMHG | HEIGHT: 73 IN | BODY MASS INDEX: 25.33 KG/M2

## 2020-01-01 VITALS
OXYGEN SATURATION: 98 % | HEIGHT: 73 IN | HEART RATE: 74 BPM | DIASTOLIC BLOOD PRESSURE: 68 MMHG | SYSTOLIC BLOOD PRESSURE: 134 MMHG | WEIGHT: 182.75 LBS | BODY MASS INDEX: 24.22 KG/M2

## 2020-01-01 VITALS
RESPIRATION RATE: 18 BRPM | OXYGEN SATURATION: 100 % | DIASTOLIC BLOOD PRESSURE: 73 MMHG | SYSTOLIC BLOOD PRESSURE: 157 MMHG | HEART RATE: 70 BPM | TEMPERATURE: 99 F

## 2020-01-01 VITALS — DIASTOLIC BLOOD PRESSURE: 83 MMHG | RESPIRATION RATE: 18 BRPM | HEART RATE: 77 BPM | SYSTOLIC BLOOD PRESSURE: 186 MMHG

## 2020-01-01 VITALS — BODY MASS INDEX: 18.99 KG/M2 | WEIGHT: 140 LBS

## 2020-01-01 DIAGNOSIS — I12.9 HYPERTENSIVE KIDNEY DISEASE WITH STAGE 4 CHRONIC KIDNEY DISEASE: Primary | ICD-10-CM

## 2020-01-01 DIAGNOSIS — I25.10 CORONARY ARTERY DISEASE INVOLVING NATIVE CORONARY ARTERY OF NATIVE HEART WITHOUT ANGINA PECTORIS: ICD-10-CM

## 2020-01-01 DIAGNOSIS — R79.89 TROPONIN I ABOVE REFERENCE RANGE: ICD-10-CM

## 2020-01-01 DIAGNOSIS — I50.42 CHRONIC COMBINED SYSTOLIC AND DIASTOLIC HEART FAILURE: ICD-10-CM

## 2020-01-01 DIAGNOSIS — N18.4 ACUTE RENAL FAILURE SUPERIMPOSED ON STAGE 4 CHRONIC KIDNEY DISEASE, UNSPECIFIED ACUTE RENAL FAILURE TYPE: ICD-10-CM

## 2020-01-01 DIAGNOSIS — N18.4 TYPE 2 DIABETES MELLITUS WITH STAGE 4 CHRONIC KIDNEY DISEASE, WITH LONG-TERM CURRENT USE OF INSULIN: ICD-10-CM

## 2020-01-01 DIAGNOSIS — N17.9 ACUTE RENAL FAILURE SUPERIMPOSED ON CHRONIC KIDNEY DISEASE, UNSPECIFIED CKD STAGE, UNSPECIFIED ACUTE RENAL FAILURE TYPE: ICD-10-CM

## 2020-01-01 DIAGNOSIS — I21.4 NSTEMI (NON-ST ELEVATED MYOCARDIAL INFARCTION): ICD-10-CM

## 2020-01-01 DIAGNOSIS — E16.2 HYPOGLYCEMIA: ICD-10-CM

## 2020-01-01 DIAGNOSIS — N18.4 HYPERTENSIVE KIDNEY DISEASE WITH STAGE 4 CHRONIC KIDNEY DISEASE: Primary | ICD-10-CM

## 2020-01-01 DIAGNOSIS — Z71.89 ADVANCED CARE PLANNING/COUNSELING DISCUSSION: ICD-10-CM

## 2020-01-01 DIAGNOSIS — N18.9 ACUTE KIDNEY INJURY SUPERIMPOSED ON CKD: ICD-10-CM

## 2020-01-01 DIAGNOSIS — J06.9 ACUTE RESPIRATORY DISEASE DUE TO SEVERE ACUTE RESPIRATORY SYNDROME CORONAVIRUS 2 (SARS-COV-2): ICD-10-CM

## 2020-01-01 DIAGNOSIS — C90.00 MULTIPLE MYELOMA NOT HAVING ACHIEVED REMISSION: Primary | ICD-10-CM

## 2020-01-01 DIAGNOSIS — R00.2 PALPITATIONS: ICD-10-CM

## 2020-01-01 DIAGNOSIS — Z86.73 HISTORY OF CVA (CEREBROVASCULAR ACCIDENT): ICD-10-CM

## 2020-01-01 DIAGNOSIS — R53.1 WEAKNESS: ICD-10-CM

## 2020-01-01 DIAGNOSIS — Z79.4 TYPE 2 DIABETES MELLITUS WITH STAGE 4 CHRONIC KIDNEY DISEASE, WITH LONG-TERM CURRENT USE OF INSULIN: ICD-10-CM

## 2020-01-01 DIAGNOSIS — E80.6 BILIRUBINEMIA: ICD-10-CM

## 2020-01-01 DIAGNOSIS — E11.22 TYPE 2 DIABETES MELLITUS WITH STAGE 4 CHRONIC KIDNEY DISEASE, WITH LONG-TERM CURRENT USE OF INSULIN: ICD-10-CM

## 2020-01-01 DIAGNOSIS — N25.81 SECONDARY HYPERPARATHYROIDISM: ICD-10-CM

## 2020-01-01 DIAGNOSIS — R55 SYNCOPE AND COLLAPSE: ICD-10-CM

## 2020-01-01 DIAGNOSIS — M79.605 PAIN OF LEFT LOWER EXTREMITY: ICD-10-CM

## 2020-01-01 DIAGNOSIS — C90.00 MULTIPLE MYELOMA NOT HAVING ACHIEVED REMISSION: ICD-10-CM

## 2020-01-01 DIAGNOSIS — N18.30 BENIGN HYPERTENSION WITH CHRONIC KIDNEY DISEASE, STAGE III: ICD-10-CM

## 2020-01-01 DIAGNOSIS — I10 HYPERTENSION, ESSENTIAL: ICD-10-CM

## 2020-01-01 DIAGNOSIS — I69.354 HEMIPLEGIA AND HEMIPARESIS FOLLOWING CEREBRAL INFARCTION AFFECTING LEFT NON-DOMINANT SIDE: ICD-10-CM

## 2020-01-01 DIAGNOSIS — H35.033 HYPERTENSIVE RETINOPATHY OF BOTH EYES: ICD-10-CM

## 2020-01-01 DIAGNOSIS — D64.9 FATIGUE ASSOCIATED WITH ANEMIA: ICD-10-CM

## 2020-01-01 DIAGNOSIS — G93.40 ACUTE ENCEPHALOPATHY: ICD-10-CM

## 2020-01-01 DIAGNOSIS — I50.32 CHRONIC DIASTOLIC CONGESTIVE HEART FAILURE: ICD-10-CM

## 2020-01-01 DIAGNOSIS — Z79.4 TYPE 2 DIABETES MELLITUS WITH DIABETIC POLYNEUROPATHY, WITH LONG-TERM CURRENT USE OF INSULIN: ICD-10-CM

## 2020-01-01 DIAGNOSIS — D64.9 SYMPTOMATIC ANEMIA: ICD-10-CM

## 2020-01-01 DIAGNOSIS — U07.1 ACUTE RESPIRATORY DISEASE DUE TO SEVERE ACUTE RESPIRATORY SYNDROME CORONAVIRUS 2 (SARS-COV-2): ICD-10-CM

## 2020-01-01 DIAGNOSIS — I50.32 CHRONIC DIASTOLIC HEART FAILURE: ICD-10-CM

## 2020-01-01 DIAGNOSIS — R65.20 SEVERE SEPSIS: ICD-10-CM

## 2020-01-01 DIAGNOSIS — C90.00 MULTIPLE MYELOMA, REMISSION STATUS UNSPECIFIED: ICD-10-CM

## 2020-01-01 DIAGNOSIS — E11.42 DIABETIC POLYNEUROPATHY ASSOCIATED WITH TYPE 2 DIABETES MELLITUS: ICD-10-CM

## 2020-01-01 DIAGNOSIS — I15.2 HYPERTENSION ASSOCIATED WITH DIABETES: ICD-10-CM

## 2020-01-01 DIAGNOSIS — I46.9 CARDIAC ARREST: Primary | ICD-10-CM

## 2020-01-01 DIAGNOSIS — N17.9 AKI (ACUTE KIDNEY INJURY): ICD-10-CM

## 2020-01-01 DIAGNOSIS — I73.9 PAD (PERIPHERAL ARTERY DISEASE): ICD-10-CM

## 2020-01-01 DIAGNOSIS — N17.9 ACUTE KIDNEY INJURY SUPERIMPOSED ON CKD: ICD-10-CM

## 2020-01-01 DIAGNOSIS — Z51.5 PALLIATIVE CARE ENCOUNTER: ICD-10-CM

## 2020-01-01 DIAGNOSIS — Z86.73 HISTORY OF STROKE: Primary | ICD-10-CM

## 2020-01-01 DIAGNOSIS — F33.1 MODERATE EPISODE OF RECURRENT MAJOR DEPRESSIVE DISORDER: ICD-10-CM

## 2020-01-01 DIAGNOSIS — I65.23 BILATERAL CAROTID ARTERY STENOSIS: ICD-10-CM

## 2020-01-01 DIAGNOSIS — Z86.73 HISTORY OF STROKE: ICD-10-CM

## 2020-01-01 DIAGNOSIS — G93.41 ACUTE METABOLIC ENCEPHALOPATHY: ICD-10-CM

## 2020-01-01 DIAGNOSIS — I12.9 HYPERTENSIVE KIDNEY DISEASE WITH STAGE 4 CHRONIC KIDNEY DISEASE: ICD-10-CM

## 2020-01-01 DIAGNOSIS — I35.0 MODERATE TO SEVERE AORTIC STENOSIS: ICD-10-CM

## 2020-01-01 DIAGNOSIS — K59.00 CONSTIPATION: ICD-10-CM
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
## 2020-01-01 DIAGNOSIS — E11.42 TYPE 2 DIABETES MELLITUS WITH DIABETIC POLYNEUROPATHY, WITH LONG-TERM CURRENT USE OF INSULIN: ICD-10-CM

## 2020-01-01 DIAGNOSIS — K59.00 CONSTIPATION, UNSPECIFIED CONSTIPATION TYPE: ICD-10-CM

## 2020-01-01 DIAGNOSIS — R79.1 ABNORMAL COAGULATION PROFILE: ICD-10-CM

## 2020-01-01 DIAGNOSIS — N18.4 HYPERTENSIVE KIDNEY DISEASE WITH STAGE 4 CHRONIC KIDNEY DISEASE: ICD-10-CM

## 2020-01-01 DIAGNOSIS — N17.9 ACUTE RENAL FAILURE SUPERIMPOSED ON CHRONIC KIDNEY DISEASE, UNSPECIFIED CKD STAGE, UNSPECIFIED ACUTE RENAL FAILURE TYPE: Primary | ICD-10-CM

## 2020-01-01 DIAGNOSIS — Z20.822 SUSPECTED SEVERE ACUTE RESPIRATORY SYNDROME CORONAVIRUS 2 (SARS-COV-2) INFECTION: ICD-10-CM

## 2020-01-01 DIAGNOSIS — U07.1 COVID-19 VIRUS INFECTION: Primary | ICD-10-CM

## 2020-01-01 DIAGNOSIS — I25.84 CORONARY ARTERY DISEASE DUE TO CALCIFIED CORONARY LESION: ICD-10-CM

## 2020-01-01 DIAGNOSIS — I10 ESSENTIAL HYPERTENSION: ICD-10-CM

## 2020-01-01 DIAGNOSIS — H43.11 VITREOUS HEMORRHAGE, RIGHT: ICD-10-CM

## 2020-01-01 DIAGNOSIS — U07.1 COVID-19 VIRUS INFECTION: ICD-10-CM

## 2020-01-01 DIAGNOSIS — M47.816 LUMBAR SPONDYLOSIS: ICD-10-CM

## 2020-01-01 DIAGNOSIS — M47.814 THORACIC SPONDYLOSIS: ICD-10-CM

## 2020-01-01 DIAGNOSIS — Z71.89 COUNSELING REGARDING ADVANCE CARE PLANNING AND GOALS OF CARE: ICD-10-CM

## 2020-01-01 DIAGNOSIS — N18.4 STAGE 4 CHRONIC KIDNEY DISEASE: ICD-10-CM

## 2020-01-01 DIAGNOSIS — J96.01 ACUTE RESPIRATORY FAILURE WITH HYPOXIA: ICD-10-CM

## 2020-01-01 DIAGNOSIS — I25.10 CORONARY ARTERY DISEASE DUE TO CALCIFIED CORONARY LESION: ICD-10-CM

## 2020-01-01 DIAGNOSIS — N17.9 ACUTE RENAL FAILURE SUPERIMPOSED ON STAGE 4 CHRONIC KIDNEY DISEASE, UNSPECIFIED ACUTE RENAL FAILURE TYPE: ICD-10-CM

## 2020-01-01 DIAGNOSIS — E55.9 VITAMIN D DEFICIENCY: ICD-10-CM

## 2020-01-01 DIAGNOSIS — I50.43 ACUTE ON CHRONIC COMBINED SYSTOLIC AND DIASTOLIC HEART FAILURE: ICD-10-CM

## 2020-01-01 DIAGNOSIS — I69.354 HEMIPLEGIA AND HEMIPARESIS FOLLOWING CEREBRAL INFARCTION AFFECTING LEFT NON-DOMINANT SIDE: Primary | ICD-10-CM

## 2020-01-01 DIAGNOSIS — N18.9 ACUTE RENAL FAILURE SUPERIMPOSED ON CHRONIC KIDNEY DISEASE, UNSPECIFIED CKD STAGE, UNSPECIFIED ACUTE RENAL FAILURE TYPE: Primary | ICD-10-CM

## 2020-01-01 DIAGNOSIS — R41.82 ALTERED MENTAL STATUS, UNSPECIFIED ALTERED MENTAL STATUS TYPE: ICD-10-CM

## 2020-01-01 DIAGNOSIS — Z13.9 SCREENING FOR CONDITION: Primary | ICD-10-CM

## 2020-01-01 DIAGNOSIS — R53.1 GENERALIZED WEAKNESS: Primary | ICD-10-CM

## 2020-01-01 DIAGNOSIS — R41.82 ALTERED MENTAL STATUS: ICD-10-CM

## 2020-01-01 DIAGNOSIS — N18.9 ACUTE RENAL FAILURE SUPERIMPOSED ON CHRONIC KIDNEY DISEASE, UNSPECIFIED CKD STAGE, UNSPECIFIED ACUTE RENAL FAILURE TYPE: ICD-10-CM

## 2020-01-01 DIAGNOSIS — E11.42 DIABETIC POLYNEUROPATHY ASSOCIATED WITH TYPE 2 DIABETES MELLITUS: Primary | ICD-10-CM

## 2020-01-01 DIAGNOSIS — R74.01 TRANSAMINITIS: ICD-10-CM

## 2020-01-01 DIAGNOSIS — J18.9 PNEUMONIA OF RIGHT LOWER LOBE DUE TO INFECTIOUS ORGANISM: ICD-10-CM

## 2020-01-01 DIAGNOSIS — R80.1 PERSISTENT PROTEINURIA: ICD-10-CM

## 2020-01-01 DIAGNOSIS — R79.89 LIVER FUNCTION TEST ABNORMALITY: ICD-10-CM

## 2020-01-01 DIAGNOSIS — R94.31 PROLONGED Q-T INTERVAL ON ECG: ICD-10-CM

## 2020-01-01 DIAGNOSIS — E11.59 HYPERTENSION ASSOCIATED WITH DIABETES: ICD-10-CM

## 2020-01-01 DIAGNOSIS — R80.9 PROTEINURIA, UNSPECIFIED TYPE: ICD-10-CM

## 2020-01-01 DIAGNOSIS — I12.9 BENIGN HYPERTENSION WITH CHRONIC KIDNEY DISEASE, STAGE III: ICD-10-CM

## 2020-01-01 DIAGNOSIS — R07.81 PAIN IN RIB: ICD-10-CM

## 2020-01-01 DIAGNOSIS — R53.81 PHYSICAL DECONDITIONING: ICD-10-CM

## 2020-01-01 DIAGNOSIS — R07.9 CHEST PAIN: ICD-10-CM

## 2020-01-01 DIAGNOSIS — K94.23 PEG TUBE MALFUNCTION: ICD-10-CM

## 2020-01-01 DIAGNOSIS — A41.9 SEVERE SEPSIS: ICD-10-CM

## 2020-01-01 DIAGNOSIS — I63.9 ACUTE CVA (CEREBROVASCULAR ACCIDENT): ICD-10-CM

## 2020-01-01 DIAGNOSIS — Z86.73 HISTORY OF RIGHT PCA STROKE: Primary | ICD-10-CM

## 2020-01-01 DIAGNOSIS — E78.5 DYSLIPIDEMIA: ICD-10-CM

## 2020-01-01 DIAGNOSIS — K64.8 INTERNAL HEMORRHOIDS: ICD-10-CM

## 2020-01-01 DIAGNOSIS — D64.89 ANEMIA DUE TO MULTIPLE MECHANISMS: ICD-10-CM

## 2020-01-01 DIAGNOSIS — R93.41 ABNORMAL CT SCAN, BLADDER: ICD-10-CM

## 2020-01-01 DIAGNOSIS — R53.81 PHYSICAL DEBILITY: ICD-10-CM

## 2020-01-01 DIAGNOSIS — I24.9 ACS (ACUTE CORONARY SYNDROME): Primary | ICD-10-CM

## 2020-01-01 DIAGNOSIS — U07.1 COVID-19 VIRUS DETECTED: ICD-10-CM

## 2020-01-01 DIAGNOSIS — R62.7 FAILURE TO THRIVE IN ADULT: Primary | ICD-10-CM

## 2020-01-01 DIAGNOSIS — I27.20 PULMONARY HYPERTENSION: ICD-10-CM

## 2020-01-01 DIAGNOSIS — A41.9 SEPSIS: ICD-10-CM

## 2020-01-01 DIAGNOSIS — J12.82 PNEUMONIA DUE TO COVID-19 VIRUS: ICD-10-CM

## 2020-01-01 DIAGNOSIS — R19.5 POSITIVE FIT (FECAL IMMUNOCHEMICAL TEST): Primary | ICD-10-CM

## 2020-01-01 DIAGNOSIS — D63.8 ANEMIA, CHRONIC DISEASE: ICD-10-CM
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
## 2020-01-01 DIAGNOSIS — R46.89 ALTERED BEHAVIOR: ICD-10-CM

## 2020-01-01 DIAGNOSIS — Z79.4 TYPE 2 DIABETES MELLITUS WITH HYPERGLYCEMIA, WITH LONG-TERM CURRENT USE OF INSULIN: ICD-10-CM

## 2020-01-01 DIAGNOSIS — U07.1 PNEUMONIA DUE TO COVID-19 VIRUS: ICD-10-CM

## 2020-01-01 DIAGNOSIS — U07.1 MYOCARDITIS DUE TO COVID-19 VIRUS: ICD-10-CM

## 2020-01-01 DIAGNOSIS — I27.9 CHRONIC PULMONARY HEART DISEASE: Primary | ICD-10-CM

## 2020-01-01 DIAGNOSIS — E11.65 TYPE 2 DIABETES MELLITUS WITH HYPERGLYCEMIA, WITH LONG-TERM CURRENT USE OF INSULIN: ICD-10-CM

## 2020-01-01 DIAGNOSIS — I40.0 MYOCARDITIS DUE TO COVID-19 VIRUS: ICD-10-CM

## 2020-01-01 LAB
25(OH)D3+25(OH)D2 SERPL-MCNC: 29 NG/ML (ref 30–96)
25(OH)D3+25(OH)D2 SERPL-MCNC: 39 NG/ML (ref 30–96)
ABO + RH BLD: NORMAL
ABO + RH BLD: NORMAL
ACETONE SERPL-MCNC: NOT DETECTED MG/DL
ALBUMIN SERPL BCP-MCNC: 2.2 G/DL (ref 3.5–5.2)
ALBUMIN SERPL BCP-MCNC: 2.4 G/DL (ref 3.5–5.2)
ALBUMIN SERPL BCP-MCNC: 2.4 G/DL (ref 3.5–5.2)
ALBUMIN SERPL BCP-MCNC: 2.5 G/DL (ref 3.5–5.2)
ALBUMIN SERPL BCP-MCNC: 2.6 G/DL (ref 3.5–5.2)
ALBUMIN SERPL BCP-MCNC: 2.7 G/DL (ref 3.5–5.2)
ALBUMIN SERPL BCP-MCNC: 2.8 G/DL (ref 3.5–5.2)
ALBUMIN SERPL BCP-MCNC: 2.9 G/DL (ref 3.5–5.2)
ALBUMIN SERPL BCP-MCNC: 3 G/DL (ref 3.5–5.2)
ALBUMIN SERPL BCP-MCNC: 3.2 G/DL (ref 3.5–5.2)
ALBUMIN SERPL BCP-MCNC: 3.2 G/DL (ref 3.5–5.2)
ALBUMIN/CREAT UR: 574 UG/MG (ref 0–30)
ALLENS TEST: ABNORMAL
ALP SERPL-CCNC: 108 U/L (ref 55–135)
ALP SERPL-CCNC: 114 U/L (ref 55–135)
ALP SERPL-CCNC: 116 U/L (ref 55–135)
ALP SERPL-CCNC: 134 U/L (ref 55–135)
ALP SERPL-CCNC: 63 U/L (ref 55–135)
ALP SERPL-CCNC: 63 U/L (ref 55–135)
ALP SERPL-CCNC: 66 U/L (ref 55–135)
ALP SERPL-CCNC: 66 U/L (ref 55–135)
ALP SERPL-CCNC: 69 U/L (ref 55–135)
ALP SERPL-CCNC: 73 U/L (ref 55–135)
ALP SERPL-CCNC: 73 U/L (ref 55–135)
ALP SERPL-CCNC: 74 U/L (ref 55–135)
ALP SERPL-CCNC: 77 U/L (ref 55–135)
ALP SERPL-CCNC: 85 U/L (ref 55–135)
ALP SERPL-CCNC: 85 U/L (ref 55–135)
ALP SERPL-CCNC: 91 U/L (ref 55–135)
ALP SERPL-CCNC: 94 U/L (ref 55–135)
ALP SERPL-CCNC: 96 U/L (ref 55–135)
ALT SERPL W/O P-5'-P-CCNC: 10 U/L (ref 10–44)
ALT SERPL W/O P-5'-P-CCNC: 12 U/L (ref 10–44)
ALT SERPL W/O P-5'-P-CCNC: 13 U/L (ref 10–44)
ALT SERPL W/O P-5'-P-CCNC: 14 U/L (ref 10–44)
ALT SERPL W/O P-5'-P-CCNC: 15 U/L (ref 10–44)
ALT SERPL W/O P-5'-P-CCNC: 22 U/L (ref 10–44)
ALT SERPL W/O P-5'-P-CCNC: 25 U/L (ref 10–44)
ALT SERPL W/O P-5'-P-CCNC: 26 U/L (ref 10–44)
ALT SERPL W/O P-5'-P-CCNC: 26 U/L (ref 10–44)
ALT SERPL W/O P-5'-P-CCNC: 296 U/L (ref 10–44)
ALT SERPL W/O P-5'-P-CCNC: 30 U/L (ref 10–44)
ALT SERPL W/O P-5'-P-CCNC: 496 U/L (ref 10–44)
ALT SERPL W/O P-5'-P-CCNC: 579 U/L (ref 10–44)
ALT SERPL W/O P-5'-P-CCNC: 62 U/L (ref 10–44)
ALT SERPL W/O P-5'-P-CCNC: 79 U/L (ref 10–44)
ALT SERPL W/O P-5'-P-CCNC: 8 U/L (ref 10–44)
AMMONIA PLAS-SCNC: 126 UMOL/L (ref 10–50)
AMMONIA PLAS-SCNC: 32 UMOL/L (ref 10–50)
AMMONIA PLAS-SCNC: 39 UMOL/L (ref 10–50)
AMORPH CRY UR QL COMP ASSIST: NORMAL
AMPHET+METHAMPHET UR QL: NEGATIVE
ANION GAP SERPL CALC-SCNC: 10 MMOL/L (ref 8–16)
ANION GAP SERPL CALC-SCNC: 11 MMOL/L (ref 8–16)
ANION GAP SERPL CALC-SCNC: 12 MMOL/L (ref 8–16)
ANION GAP SERPL CALC-SCNC: 14 MMOL/L (ref 8–16)
ANION GAP SERPL CALC-SCNC: 15 MMOL/L (ref 8–16)
ANION GAP SERPL CALC-SCNC: 15 MMOL/L (ref 8–16)
ANION GAP SERPL CALC-SCNC: 16 MMOL/L (ref 8–16)
ANION GAP SERPL CALC-SCNC: 18 MMOL/L (ref 8–16)
ANION GAP SERPL CALC-SCNC: 7 MMOL/L (ref 8–16)
ANION GAP SERPL CALC-SCNC: 8 MMOL/L (ref 8–16)
ANION GAP SERPL CALC-SCNC: 9 MMOL/L (ref 8–16)
AORTIC ROOT ANNULUS: 3.07 CM
AORTIC VALVE CUSP SEPERATION: 0.64 CM
APAP SERPL-MCNC: <3 UG/ML (ref 10–20)
APTT BLDCRRT: 29.9 SEC (ref 21–32)
APTT BLDCRRT: 31.2 SEC (ref 21–32)
APTT BLDCRRT: 35.7 SEC (ref 21–32)
APTT BLDCRRT: 42.7 SEC (ref 21–32)
APTT BLDCRRT: 46.5 SEC (ref 21–32)
APTT BLDCRRT: 46.7 SEC (ref 21–32)
APTT BLDCRRT: 52.7 SEC (ref 21–32)
APTT BLDCRRT: 74.1 SEC (ref 21–32)
APTT BLDCRRT: 84.3 SEC (ref 21–32)
AST SERPL-CCNC: 11 U/L (ref 10–40)
AST SERPL-CCNC: 12 U/L (ref 10–40)
AST SERPL-CCNC: 124 U/L (ref 10–40)
AST SERPL-CCNC: 13 U/L (ref 10–40)
AST SERPL-CCNC: 16 U/L (ref 10–40)
AST SERPL-CCNC: 18 U/L (ref 10–40)
AST SERPL-CCNC: 19 U/L (ref 10–40)
AST SERPL-CCNC: 20 U/L (ref 10–40)
AST SERPL-CCNC: 208 U/L (ref 10–40)
AST SERPL-CCNC: 22 U/L (ref 10–40)
AST SERPL-CCNC: 24 U/L (ref 10–40)
AST SERPL-CCNC: 273 U/L (ref 10–40)
AST SERPL-CCNC: 33 U/L (ref 10–40)
AST SERPL-CCNC: 37 U/L (ref 10–40)
AST SERPL-CCNC: 40 U/L (ref 10–40)
AST SERPL-CCNC: 41 U/L (ref 10–40)
AST SERPL-CCNC: 42 U/L (ref 10–40)
AST SERPL-CCNC: 588 U/L (ref 10–40)
AV INDEX (PROSTH): 0.24
AV MEAN GRADIENT: 16 MMHG
AV PEAK GRADIENT: 27 MMHG
AV VALVE AREA: 1.02 CM2
AV VELOCITY RATIO: 0.24
B-OH-BUTYR BLD STRIP-SCNC: 1.1 MMOL/L (ref 0–0.5)
BACTERIA #/AREA URNS AUTO: ABNORMAL /HPF
BACTERIA #/AREA URNS AUTO: NORMAL /HPF
BACTERIA #/AREA URNS HPF: ABNORMAL /HPF
BACTERIA #/AREA URNS HPF: ABNORMAL /HPF
BACTERIA BLD CULT: NORMAL
BACTERIA UR CULT: NORMAL
BARBITURATES UR QL SCN>200 NG/ML: NEGATIVE
BASOPHILS # BLD AUTO: 0 K/UL (ref 0–0.2)
BASOPHILS # BLD AUTO: 0.01 K/UL (ref 0–0.2)
BASOPHILS # BLD AUTO: 0.02 K/UL (ref 0–0.2)
BASOPHILS # BLD AUTO: 0.03 K/UL (ref 0–0.2)
BASOPHILS # BLD AUTO: 0.04 K/UL (ref 0–0.2)
BASOPHILS # BLD AUTO: 0.04 K/UL (ref 0–0.2)
BASOPHILS # BLD AUTO: 0.05 K/UL (ref 0–0.2)
BASOPHILS NFR BLD: 0 % (ref 0–1.9)
BASOPHILS NFR BLD: 0.1 % (ref 0–1.9)
BASOPHILS NFR BLD: 0.1 % (ref 0–1.9)
BASOPHILS NFR BLD: 0.2 % (ref 0–1.9)
BASOPHILS NFR BLD: 0.3 % (ref 0–1.9)
BASOPHILS NFR BLD: 0.4 % (ref 0–1.9)
BASOPHILS NFR BLD: 0.5 % (ref 0–1.9)
BASOPHILS NFR BLD: 0.6 % (ref 0–1.9)
BENZODIAZ UR QL SCN>200 NG/ML: NEGATIVE
BILIRUB SERPL-MCNC: 0.4 MG/DL (ref 0.1–1)
BILIRUB SERPL-MCNC: 0.5 MG/DL (ref 0.1–1)
BILIRUB SERPL-MCNC: 0.5 MG/DL (ref 0.1–1)
BILIRUB SERPL-MCNC: 0.6 MG/DL (ref 0.1–1)
BILIRUB SERPL-MCNC: 0.7 MG/DL (ref 0.1–1)
BILIRUB SERPL-MCNC: 0.8 MG/DL (ref 0.1–1)
BILIRUB SERPL-MCNC: 0.9 MG/DL (ref 0.1–1)
BILIRUB SERPL-MCNC: 1 MG/DL (ref 0.1–1)
BILIRUB SERPL-MCNC: 1 MG/DL (ref 0.1–1)
BILIRUB SERPL-MCNC: 1.1 MG/DL (ref 0.1–1)
BILIRUB SERPL-MCNC: 1.2 MG/DL (ref 0.1–1)
BILIRUB SERPL-MCNC: 1.5 MG/DL (ref 0.1–1)
BILIRUB UR QL STRIP: NEGATIVE
BLD GP AB SCN CELLS X3 SERPL QL: NORMAL
BLD GP AB SCN CELLS X3 SERPL QL: NORMAL
BLD PROD TYP BPU: NORMAL
BLD PROD TYP BPU: NORMAL
BLOOD UNIT EXPIRATION DATE: NORMAL
BLOOD UNIT EXPIRATION DATE: NORMAL
BLOOD UNIT TYPE CODE: 9500
BLOOD UNIT TYPE CODE: 9500
BLOOD UNIT TYPE: NORMAL
BLOOD UNIT TYPE: NORMAL
BNP SERPL-MCNC: 1850 PG/ML (ref 0–99)
BNP SERPL-MCNC: 283 PG/ML (ref 0–99)
BNP SERPL-MCNC: 294 PG/ML (ref 0–99)
BNP SERPL-MCNC: 3893 PG/ML (ref 0–99)
BNP SERPL-MCNC: 4035 PG/ML (ref 0–99)
BUN SERPL-MCNC: 17 MG/DL (ref 8–23)
BUN SERPL-MCNC: 22 MG/DL (ref 8–23)
BUN SERPL-MCNC: 26 MG/DL (ref 8–23)
BUN SERPL-MCNC: 26 MG/DL (ref 8–23)
BUN SERPL-MCNC: 27 MG/DL (ref 8–23)
BUN SERPL-MCNC: 27 MG/DL (ref 8–23)
BUN SERPL-MCNC: 32 MG/DL (ref 8–23)
BUN SERPL-MCNC: 33 MG/DL (ref 8–23)
BUN SERPL-MCNC: 33 MG/DL (ref 8–23)
BUN SERPL-MCNC: 34 MG/DL (ref 8–23)
BUN SERPL-MCNC: 37 MG/DL (ref 8–23)
BUN SERPL-MCNC: 37 MG/DL (ref 8–23)
BUN SERPL-MCNC: 38 MG/DL (ref 8–23)
BUN SERPL-MCNC: 39 MG/DL (ref 8–23)
BUN SERPL-MCNC: 41 MG/DL (ref 8–23)
BUN SERPL-MCNC: 45 MG/DL (ref 8–23)
BUN SERPL-MCNC: 46 MG/DL (ref 8–23)
BUN SERPL-MCNC: 51 MG/DL (ref 8–23)
BUN SERPL-MCNC: 51 MG/DL (ref 8–23)
BUN SERPL-MCNC: 52 MG/DL (ref 8–23)
BUN SERPL-MCNC: 52 MG/DL (ref 8–23)
BUN SERPL-MCNC: 54 MG/DL (ref 8–23)
BUN SERPL-MCNC: 56 MG/DL (ref 8–23)
BUN SERPL-MCNC: 58 MG/DL (ref 8–23)
BUN SERPL-MCNC: 61 MG/DL (ref 8–23)
BUN SERPL-MCNC: 62 MG/DL (ref 8–23)
BUN SERPL-MCNC: 63 MG/DL (ref 8–23)
BUN SERPL-MCNC: 64 MG/DL (ref 8–23)
BUN SERPL-MCNC: 65 MG/DL (ref 8–23)
BUN SERPL-MCNC: 66 MG/DL (ref 8–23)
BUN SERPL-MCNC: 66 MG/DL (ref 8–23)
BUN SERPL-MCNC: 68 MG/DL (ref 8–23)
BZE UR QL SCN: NEGATIVE
CALCIUM SERPL-MCNC: 7.9 MG/DL (ref 8.7–10.5)
CALCIUM SERPL-MCNC: 7.9 MG/DL (ref 8.7–10.5)
CALCIUM SERPL-MCNC: 8 MG/DL (ref 8.7–10.5)
CALCIUM SERPL-MCNC: 8.1 MG/DL (ref 8.7–10.5)
CALCIUM SERPL-MCNC: 8.2 MG/DL (ref 8.7–10.5)
CALCIUM SERPL-MCNC: 8.3 MG/DL (ref 8.7–10.5)
CALCIUM SERPL-MCNC: 8.4 MG/DL (ref 8.7–10.5)
CALCIUM SERPL-MCNC: 8.4 MG/DL (ref 8.7–10.5)
CALCIUM SERPL-MCNC: 8.6 MG/DL (ref 8.7–10.5)
CALCIUM SERPL-MCNC: 8.7 MG/DL (ref 8.7–10.5)
CALCIUM SERPL-MCNC: 8.8 MG/DL (ref 8.7–10.5)
CALCIUM SERPL-MCNC: 8.9 MG/DL (ref 8.7–10.5)
CALCIUM SERPL-MCNC: 9.2 MG/DL (ref 8.7–10.5)
CALCIUM SERPL-MCNC: 9.2 MG/DL (ref 8.7–10.5)
CALCIUM SERPL-MCNC: 9.3 MG/DL (ref 8.7–10.5)
CANNABINOIDS UR QL SCN: NEGATIVE
CHLORIDE SERPL-SCNC: 103 MMOL/L (ref 95–110)
CHLORIDE SERPL-SCNC: 104 MMOL/L (ref 95–110)
CHLORIDE SERPL-SCNC: 105 MMOL/L (ref 95–110)
CHLORIDE SERPL-SCNC: 105 MMOL/L (ref 95–110)
CHLORIDE SERPL-SCNC: 106 MMOL/L (ref 95–110)
CHLORIDE SERPL-SCNC: 107 MMOL/L (ref 95–110)
CHLORIDE SERPL-SCNC: 108 MMOL/L (ref 95–110)
CHLORIDE SERPL-SCNC: 109 MMOL/L (ref 95–110)
CHLORIDE SERPL-SCNC: 110 MMOL/L (ref 95–110)
CHLORIDE SERPL-SCNC: 110 MMOL/L (ref 95–110)
CHLORIDE SERPL-SCNC: 111 MMOL/L (ref 95–110)
CHLORIDE SERPL-SCNC: 112 MMOL/L (ref 95–110)
CHLORIDE SERPL-SCNC: 113 MMOL/L (ref 95–110)
CHLORIDE SERPL-SCNC: 114 MMOL/L (ref 95–110)
CHLORIDE SERPL-SCNC: 99 MMOL/L (ref 95–110)
CHOLEST SERPL-MCNC: 120 MG/DL (ref 120–199)
CHOLEST SERPL-MCNC: 157 MG/DL (ref 120–199)
CHOLEST/HDLC SERPL: 2.6 {RATIO} (ref 2–5)
CHOLEST/HDLC SERPL: 5.2 {RATIO} (ref 2–5)
CK SERPL-CCNC: 120 U/L (ref 20–200)
CK SERPL-CCNC: 125 U/L (ref 20–200)
CK SERPL-CCNC: 60 U/L (ref 20–200)
CLARITY UR REFRACT.AUTO: ABNORMAL
CLARITY UR: ABNORMAL
CLARITY UR: CLEAR
CLARITY UR: CLEAR
CMV DNA SERPL NAA+PROBE-ACNC: NORMAL IU/ML
CO2 SERPL-SCNC: 14 MMOL/L (ref 23–29)
CO2 SERPL-SCNC: 14 MMOL/L (ref 23–29)
CO2 SERPL-SCNC: 16 MMOL/L (ref 23–29)
CO2 SERPL-SCNC: 17 MMOL/L (ref 23–29)
CO2 SERPL-SCNC: 17 MMOL/L (ref 23–29)
CO2 SERPL-SCNC: 18 MMOL/L (ref 23–29)
CO2 SERPL-SCNC: 19 MMOL/L (ref 23–29)
CO2 SERPL-SCNC: 20 MMOL/L (ref 23–29)
CO2 SERPL-SCNC: 21 MMOL/L (ref 23–29)
CO2 SERPL-SCNC: 22 MMOL/L (ref 23–29)
CO2 SERPL-SCNC: 23 MMOL/L (ref 23–29)
CO2 SERPL-SCNC: 23 MMOL/L (ref 23–29)
CO2 SERPL-SCNC: 24 MMOL/L (ref 23–29)
CO2 SERPL-SCNC: 25 MMOL/L (ref 23–29)
CODING SYSTEM: NORMAL
CODING SYSTEM: NORMAL
COLOR UR AUTO: YELLOW
COLOR UR: ABNORMAL
COLOR UR: YELLOW
COLOR UR: YELLOW
CREAT SERPL-MCNC: 2 MG/DL (ref 0.5–1.4)
CREAT SERPL-MCNC: 2.1 MG/DL (ref 0.5–1.4)
CREAT SERPL-MCNC: 2.2 MG/DL (ref 0.5–1.4)
CREAT SERPL-MCNC: 2.3 MG/DL (ref 0.5–1.4)
CREAT SERPL-MCNC: 2.4 MG/DL (ref 0.5–1.4)
CREAT SERPL-MCNC: 2.7 MG/DL (ref 0.5–1.4)
CREAT SERPL-MCNC: 2.7 MG/DL (ref 0.5–1.4)
CREAT SERPL-MCNC: 2.9 MG/DL (ref 0.5–1.4)
CREAT SERPL-MCNC: 3.2 MG/DL (ref 0.5–1.4)
CREAT SERPL-MCNC: 3.2 MG/DL (ref 0.5–1.4)
CREAT SERPL-MCNC: 3.4 MG/DL (ref 0.5–1.4)
CREAT SERPL-MCNC: 3.5 MG/DL (ref 0.5–1.4)
CREAT SERPL-MCNC: 3.6 MG/DL (ref 0.5–1.4)
CREAT SERPL-MCNC: 3.6 MG/DL (ref 0.5–1.4)
CREAT SERPL-MCNC: 3.8 MG/DL (ref 0.5–1.4)
CREAT SERPL-MCNC: 3.8 MG/DL (ref 0.5–1.4)
CREAT SERPL-MCNC: 3.9 MG/DL (ref 0.5–1.4)
CREAT SERPL-MCNC: 4 MG/DL (ref 0.5–1.4)
CREAT SERPL-MCNC: 4 MG/DL (ref 0.5–1.4)
CREAT SERPL-MCNC: 4.1 MG/DL (ref 0.5–1.4)
CREAT SERPL-MCNC: 4.2 MG/DL (ref 0.5–1.4)
CREAT SERPL-MCNC: 4.3 MG/DL (ref 0.5–1.4)
CREAT SERPL-MCNC: 4.4 MG/DL (ref 0.5–1.4)
CREAT SERPL-MCNC: 4.8 MG/DL (ref 0.5–1.4)
CREAT SERPL-MCNC: 4.9 MG/DL (ref 0.5–1.4)
CREAT SERPL-MCNC: 5.6 MG/DL (ref 0.5–1.4)
CREAT UR-MCNC: 143.7 MG/DL (ref 23–375)
CREAT UR-MCNC: 143.7 MG/DL (ref 23–375)
CREAT UR-MCNC: 223 MG/DL (ref 23–375)
CRP SERPL-MCNC: 68.3 MG/L (ref 0–8.2)
CRP SERPL-MCNC: 7.9 MG/L (ref 0–8.2)
CRP SERPL-MCNC: 70.1 MG/L (ref 0–8.2)
CRP SERPL-MCNC: 93.6 MG/L (ref 0–8.2)
CV ECHO LV RWT: 0.55 CM
D DIMER PPP IA.FEU-MCNC: 0.78 MG/L FEU
DELSYS: ABNORMAL
DIFFERENTIAL METHOD: ABNORMAL
DISPENSE STATUS: NORMAL
DISPENSE STATUS: NORMAL
DOP CALC AO PEAK VEL: 2.58 M/S
DOP CALC AO VTI: 49.54 CM
DOP CALC LVOT AREA: 4.2 CM2
DOP CALC LVOT DIAMETER: 2.32 CM
DOP CALC LVOT PEAK VEL: 0.61 M/S
DOP CALC LVOT STROKE VOLUME: 50.32 CM3
DOP CALCLVOT PEAK VEL VTI: 11.91 CM
E WAVE DECELERATION TIME: 154.62 MSEC
E/A RATIO: 1.7
ECHO LV POSTERIOR WALL: 1.42 CM (ref 0.6–1.1)
EOSINOPHIL # BLD AUTO: 0 K/UL (ref 0–0.5)
EOSINOPHIL # BLD AUTO: 0.1 K/UL (ref 0–0.5)
EOSINOPHIL # BLD AUTO: 0.3 K/UL (ref 0–0.5)
EOSINOPHIL # BLD AUTO: 0.4 K/UL (ref 0–0.5)
EOSINOPHIL # BLD AUTO: 0.5 K/UL (ref 0–0.5)
EOSINOPHIL # BLD AUTO: 0.5 K/UL (ref 0–0.5)
EOSINOPHIL NFR BLD: 0 % (ref 0–8)
EOSINOPHIL NFR BLD: 0 % (ref 0–8)
EOSINOPHIL NFR BLD: 0.2 % (ref 0–8)
EOSINOPHIL NFR BLD: 0.3 % (ref 0–8)
EOSINOPHIL NFR BLD: 0.4 % (ref 0–8)
EOSINOPHIL NFR BLD: 0.5 % (ref 0–8)
EOSINOPHIL NFR BLD: 0.6 % (ref 0–8)
EOSINOPHIL NFR BLD: 0.6 % (ref 0–8)
EOSINOPHIL NFR BLD: 1.2 % (ref 0–8)
EOSINOPHIL NFR BLD: 1.2 % (ref 0–8)
EOSINOPHIL NFR BLD: 1.5 % (ref 0–8)
EOSINOPHIL NFR BLD: 1.7 % (ref 0–8)
EOSINOPHIL NFR BLD: 2 % (ref 0–8)
EOSINOPHIL NFR BLD: 3.4 % (ref 0–8)
EOSINOPHIL NFR BLD: 3.8 % (ref 0–8)
EOSINOPHIL NFR BLD: 4.6 % (ref 0–8)
EOSINOPHIL NFR BLD: 4.6 % (ref 0–8)
EOSINOPHIL NFR BLD: 4.7 % (ref 0–8)
EOSINOPHIL NFR BLD: 4.7 % (ref 0–8)
EOSINOPHIL NFR BLD: 4.9 % (ref 0–8)
EOSINOPHIL NFR BLD: 5.2 % (ref 0–8)
EOSINOPHIL NFR BLD: 5.7 % (ref 0–8)
EOSINOPHIL NFR BLD: 5.7 % (ref 0–8)
EOSINOPHIL NFR BLD: 5.9 % (ref 0–8)
EOSINOPHIL NFR BLD: 6.5 % (ref 0–8)
EOSINOPHIL NFR BLD: 6.8 % (ref 0–8)
EOSINOPHIL URNS QL WRIGHT STN: NORMAL
ERYTHROCYTE [DISTWIDTH] IN BLOOD BY AUTOMATED COUNT: 12.9 % (ref 11.5–14.5)
ERYTHROCYTE [DISTWIDTH] IN BLOOD BY AUTOMATED COUNT: 13.2 % (ref 11.5–14.5)
ERYTHROCYTE [DISTWIDTH] IN BLOOD BY AUTOMATED COUNT: 13.2 % (ref 11.5–14.5)
ERYTHROCYTE [DISTWIDTH] IN BLOOD BY AUTOMATED COUNT: 13.3 % (ref 11.5–14.5)
ERYTHROCYTE [DISTWIDTH] IN BLOOD BY AUTOMATED COUNT: 13.4 % (ref 11.5–14.5)
ERYTHROCYTE [DISTWIDTH] IN BLOOD BY AUTOMATED COUNT: 13.5 % (ref 11.5–14.5)
ERYTHROCYTE [DISTWIDTH] IN BLOOD BY AUTOMATED COUNT: 13.7 % (ref 11.5–14.5)
ERYTHROCYTE [DISTWIDTH] IN BLOOD BY AUTOMATED COUNT: 13.7 % (ref 11.5–14.5)
ERYTHROCYTE [DISTWIDTH] IN BLOOD BY AUTOMATED COUNT: 13.8 % (ref 11.5–14.5)
ERYTHROCYTE [DISTWIDTH] IN BLOOD BY AUTOMATED COUNT: 14 % (ref 11.5–14.5)
ERYTHROCYTE [DISTWIDTH] IN BLOOD BY AUTOMATED COUNT: 14 % (ref 11.5–14.5)
ERYTHROCYTE [DISTWIDTH] IN BLOOD BY AUTOMATED COUNT: 14.6 % (ref 11.5–14.5)
ERYTHROCYTE [DISTWIDTH] IN BLOOD BY AUTOMATED COUNT: 14.8 % (ref 11.5–14.5)
ERYTHROCYTE [DISTWIDTH] IN BLOOD BY AUTOMATED COUNT: 14.9 % (ref 11.5–14.5)
ERYTHROCYTE [DISTWIDTH] IN BLOOD BY AUTOMATED COUNT: 15 % (ref 11.5–14.5)
ERYTHROCYTE [DISTWIDTH] IN BLOOD BY AUTOMATED COUNT: 15.4 % (ref 11.5–14.5)
ERYTHROCYTE [DISTWIDTH] IN BLOOD BY AUTOMATED COUNT: 15.5 % (ref 11.5–14.5)
ERYTHROCYTE [DISTWIDTH] IN BLOOD BY AUTOMATED COUNT: 15.5 % (ref 11.5–14.5)
ERYTHROCYTE [DISTWIDTH] IN BLOOD BY AUTOMATED COUNT: 15.7 % (ref 11.5–14.5)
ERYTHROCYTE [DISTWIDTH] IN BLOOD BY AUTOMATED COUNT: 15.9 % (ref 11.5–14.5)
ERYTHROCYTE [SEDIMENTATION RATE] IN BLOOD BY WESTERGREN METHOD: 69 MM/HR (ref 0–23)
EST. GFR  (AFRICAN AMERICAN): 11 ML/MIN/1.73 M^2
EST. GFR  (AFRICAN AMERICAN): 13.5 ML/MIN/1.73 M^2
EST. GFR  (AFRICAN AMERICAN): 14 ML/MIN/1.73 M^2
EST. GFR  (AFRICAN AMERICAN): 15 ML/MIN/1.73 M^2
EST. GFR  (AFRICAN AMERICAN): 15.8 ML/MIN/1.73 M^2
EST. GFR  (AFRICAN AMERICAN): 16 ML/MIN/1.73 M^2
EST. GFR  (AFRICAN AMERICAN): 17 ML/MIN/1.73 M^2
EST. GFR  (AFRICAN AMERICAN): 18 ML/MIN/1.73 M^2
EST. GFR  (AFRICAN AMERICAN): 19.6 ML/MIN/1.73 M^2
EST. GFR  (AFRICAN AMERICAN): 19.6 ML/MIN/1.73 M^2
EST. GFR  (AFRICAN AMERICAN): 20.3 ML/MIN/1.73 M^2
EST. GFR  (AFRICAN AMERICAN): 21 ML/MIN/1.73 M^2
EST. GFR  (AFRICAN AMERICAN): 22.6 ML/MIN/1.73 M^2
EST. GFR  (AFRICAN AMERICAN): 23 ML/MIN/1.73 M^2
EST. GFR  (AFRICAN AMERICAN): 25.4 ML/MIN/1.73 M^2
EST. GFR  (AFRICAN AMERICAN): 27.7 ML/MIN/1.73 M^2
EST. GFR  (AFRICAN AMERICAN): 27.7 ML/MIN/1.73 M^2
EST. GFR  (AFRICAN AMERICAN): 32 ML/MIN/1.73 M^2
EST. GFR  (AFRICAN AMERICAN): 33.7 ML/MIN/1.73 M^2
EST. GFR  (AFRICAN AMERICAN): 35.5 ML/MIN/1.73 M^2
EST. GFR  (AFRICAN AMERICAN): 37.6 ML/MIN/1.73 M^2
EST. GFR  (AFRICAN AMERICAN): 39.9 ML/MIN/1.73 M^2
EST. GFR  (NON AFRICAN AMERICAN): 10 ML/MIN/1.73 M^2
EST. GFR  (NON AFRICAN AMERICAN): 11.7 ML/MIN/1.73 M^2
EST. GFR  (NON AFRICAN AMERICAN): 12 ML/MIN/1.73 M^2
EST. GFR  (NON AFRICAN AMERICAN): 13 ML/MIN/1.73 M^2
EST. GFR  (NON AFRICAN AMERICAN): 13.7 ML/MIN/1.73 M^2
EST. GFR  (NON AFRICAN AMERICAN): 14 ML/MIN/1.73 M^2
EST. GFR  (NON AFRICAN AMERICAN): 14 ML/MIN/1.73 M^2
EST. GFR  (NON AFRICAN AMERICAN): 15 ML/MIN/1.73 M^2
EST. GFR  (NON AFRICAN AMERICAN): 16 ML/MIN/1.73 M^2
EST. GFR  (NON AFRICAN AMERICAN): 16 ML/MIN/1.73 M^2
EST. GFR  (NON AFRICAN AMERICAN): 16.9 ML/MIN/1.73 M^2
EST. GFR  (NON AFRICAN AMERICAN): 16.9 ML/MIN/1.73 M^2
EST. GFR  (NON AFRICAN AMERICAN): 17.5 ML/MIN/1.73 M^2
EST. GFR  (NON AFRICAN AMERICAN): 18.2 ML/MIN/1.73 M^2
EST. GFR  (NON AFRICAN AMERICAN): 19.5 ML/MIN/1.73 M^2
EST. GFR  (NON AFRICAN AMERICAN): 20 ML/MIN/1.73 M^2
EST. GFR  (NON AFRICAN AMERICAN): 22 ML/MIN/1.73 M^2
EST. GFR  (NON AFRICAN AMERICAN): 24 ML/MIN/1.73 M^2
EST. GFR  (NON AFRICAN AMERICAN): 24 ML/MIN/1.73 M^2
EST. GFR  (NON AFRICAN AMERICAN): 27.7 ML/MIN/1.73 M^2
EST. GFR  (NON AFRICAN AMERICAN): 29.1 ML/MIN/1.73 M^2
EST. GFR  (NON AFRICAN AMERICAN): 30.7 ML/MIN/1.73 M^2
EST. GFR  (NON AFRICAN AMERICAN): 32.5 ML/MIN/1.73 M^2
EST. GFR  (NON AFRICAN AMERICAN): 34.5 ML/MIN/1.73 M^2
ESTIMATED AVG GLUCOSE: 166 MG/DL (ref 68–131)
ESTIMATED AVG GLUCOSE: 166 MG/DL (ref 68–131)
ESTIMATED AVG GLUCOSE: 214 MG/DL (ref 68–131)
ETHANOL SERPL-MCNC: NOT DETECTED MG/DL
FERRITIN SERPL-MCNC: 1795 NG/ML (ref 20–300)
FERRITIN SERPL-MCNC: 5977 NG/ML (ref 20–300)
FINAL PATHOLOGIC DIAGNOSIS: NORMAL
FOLATE SERPL-MCNC: 12.8 NG/ML (ref 4–24)
FOLATE SERPL-MCNC: 7.7 NG/ML (ref 4–24)
FRACTIONAL SHORTENING: 21 % (ref 28–44)
GLUCOSE SERPL-MCNC: 106 MG/DL (ref 70–110)
GLUCOSE SERPL-MCNC: 114 MG/DL (ref 70–110)
GLUCOSE SERPL-MCNC: 120 MG/DL (ref 70–110)
GLUCOSE SERPL-MCNC: 123 MG/DL (ref 70–110)
GLUCOSE SERPL-MCNC: 125 MG/DL (ref 70–110)
GLUCOSE SERPL-MCNC: 127 MG/DL (ref 70–110)
GLUCOSE SERPL-MCNC: 129 MG/DL (ref 70–110)
GLUCOSE SERPL-MCNC: 130 MG/DL (ref 70–110)
GLUCOSE SERPL-MCNC: 135 MG/DL (ref 70–110)
GLUCOSE SERPL-MCNC: 139 MG/DL (ref 70–110)
GLUCOSE SERPL-MCNC: 143 MG/DL (ref 70–110)
GLUCOSE SERPL-MCNC: 149 MG/DL (ref 70–110)
GLUCOSE SERPL-MCNC: 176 MG/DL (ref 70–110)
GLUCOSE SERPL-MCNC: 177 MG/DL (ref 70–110)
GLUCOSE SERPL-MCNC: 180 MG/DL (ref 70–110)
GLUCOSE SERPL-MCNC: 182 MG/DL (ref 70–110)
GLUCOSE SERPL-MCNC: 204 MG/DL (ref 70–110)
GLUCOSE SERPL-MCNC: 210 MG/DL (ref 70–110)
GLUCOSE SERPL-MCNC: 220 MG/DL (ref 70–110)
GLUCOSE SERPL-MCNC: 221 MG/DL (ref 70–110)
GLUCOSE SERPL-MCNC: 226 MG/DL (ref 70–110)
GLUCOSE SERPL-MCNC: 235 MG/DL (ref 70–110)
GLUCOSE SERPL-MCNC: 244 MG/DL (ref 70–110)
GLUCOSE SERPL-MCNC: 261 MG/DL (ref 70–110)
GLUCOSE SERPL-MCNC: 279 MG/DL (ref 70–110)
GLUCOSE SERPL-MCNC: 288 MG/DL (ref 70–110)
GLUCOSE SERPL-MCNC: 288 MG/DL (ref 70–110)
GLUCOSE SERPL-MCNC: 400 MG/DL (ref 70–110)
GLUCOSE SERPL-MCNC: 79 MG/DL (ref 70–110)
GLUCOSE SERPL-MCNC: 93 MG/DL (ref 70–110)
GLUCOSE SERPL-MCNC: 94 MG/DL (ref 70–110)
GLUCOSE SERPL-MCNC: 97 MG/DL (ref 70–110)
GLUCOSE SERPL-MCNC: 97 MG/DL (ref 70–110)
GLUCOSE UR QL STRIP: ABNORMAL
GLUCOSE-6-PHOSPHATE DEHYDROGENASE QUAL: NORMAL
GROSS: NORMAL
HAPTOGLOB SERPL-MCNC: 372 MG/DL (ref 30–250)
HAV IGM SERPL QL IA: NEGATIVE
HBA1C MFR BLD HPLC: 7.4 % (ref 4–5.6)
HBA1C MFR BLD HPLC: 7.4 % (ref 4–5.6)
HBA1C MFR BLD HPLC: 9.1 % (ref 4–5.6)
HBV CORE IGM SERPL QL IA: NEGATIVE
HBV SURFACE AG SERPL QL IA: NEGATIVE
HCO3 UR-SCNC: 17.8 MMOL/L (ref 24–28)
HCO3 UR-SCNC: 18.4 MMOL/L (ref 24–28)
HCO3 UR-SCNC: 19 MMOL/L (ref 24–28)
HCO3 UR-SCNC: 19.2 MMOL/L (ref 24–28)
HCO3 UR-SCNC: 21 MMOL/L (ref 24–28)
HCT VFR BLD AUTO: 21.4 % (ref 40–54)
HCT VFR BLD AUTO: 22.7 % (ref 40–54)
HCT VFR BLD AUTO: 23.4 % (ref 40–54)
HCT VFR BLD AUTO: 23.7 % (ref 40–54)
HCT VFR BLD AUTO: 23.9 % (ref 40–54)
HCT VFR BLD AUTO: 24.1 % (ref 40–54)
HCT VFR BLD AUTO: 24.3 % (ref 40–54)
HCT VFR BLD AUTO: 24.9 % (ref 40–54)
HCT VFR BLD AUTO: 24.9 % (ref 40–54)
HCT VFR BLD AUTO: 25.1 % (ref 40–54)
HCT VFR BLD AUTO: 25.1 % (ref 40–54)
HCT VFR BLD AUTO: 25.4 % (ref 40–54)
HCT VFR BLD AUTO: 25.8 % (ref 40–54)
HCT VFR BLD AUTO: 26.2 % (ref 40–54)
HCT VFR BLD AUTO: 26.3 % (ref 40–54)
HCT VFR BLD AUTO: 26.4 % (ref 40–54)
HCT VFR BLD AUTO: 26.7 % (ref 40–54)
HCT VFR BLD AUTO: 27.9 % (ref 40–54)
HCT VFR BLD AUTO: 28.4 % (ref 40–54)
HCT VFR BLD AUTO: 28.9 % (ref 40–54)
HCT VFR BLD AUTO: 28.9 % (ref 40–54)
HCT VFR BLD AUTO: 29 % (ref 40–54)
HCT VFR BLD AUTO: 29.4 % (ref 40–54)
HCT VFR BLD AUTO: 29.8 % (ref 40–54)
HCT VFR BLD AUTO: 30.4 % (ref 40–54)
HCT VFR BLD AUTO: 32.2 % (ref 40–54)
HCT VFR BLD AUTO: 32.2 % (ref 40–54)
HCT VFR BLD AUTO: 34.1 % (ref 40–54)
HCT VFR BLD CALC: 21 %PCV (ref 36–54)
HCV AB SERPL QL IA: NEGATIVE
HDLC SERPL-MCNC: 30 MG/DL (ref 40–75)
HDLC SERPL-MCNC: 46 MG/DL (ref 40–75)
HDLC SERPL: 19.1 % (ref 20–50)
HDLC SERPL: 38.3 % (ref 20–50)
HGB BLD-MCNC: 10.7 G/DL (ref 14–18)
HGB BLD-MCNC: 6.9 G/DL (ref 14–18)
HGB BLD-MCNC: 7 G/DL
HGB BLD-MCNC: 7.3 G/DL (ref 14–18)
HGB BLD-MCNC: 7.4 G/DL (ref 14–18)
HGB BLD-MCNC: 7.5 G/DL (ref 14–18)
HGB BLD-MCNC: 7.6 G/DL (ref 14–18)
HGB BLD-MCNC: 7.9 G/DL (ref 14–18)
HGB BLD-MCNC: 7.9 G/DL (ref 14–18)
HGB BLD-MCNC: 8 G/DL (ref 14–18)
HGB BLD-MCNC: 8.1 G/DL (ref 14–18)
HGB BLD-MCNC: 8.1 G/DL (ref 14–18)
HGB BLD-MCNC: 8.2 G/DL (ref 14–18)
HGB BLD-MCNC: 8.2 G/DL (ref 14–18)
HGB BLD-MCNC: 8.4 G/DL (ref 14–18)
HGB BLD-MCNC: 8.5 G/DL (ref 14–18)
HGB BLD-MCNC: 8.6 G/DL (ref 14–18)
HGB BLD-MCNC: 8.7 G/DL (ref 14–18)
HGB BLD-MCNC: 8.9 G/DL (ref 14–18)
HGB BLD-MCNC: 9.2 G/DL (ref 14–18)
HGB BLD-MCNC: 9.2 G/DL (ref 14–18)
HGB BLD-MCNC: 9.4 G/DL (ref 14–18)
HGB BLD-MCNC: 9.5 G/DL (ref 14–18)
HGB BLD-MCNC: 9.9 G/DL (ref 14–18)
HGB BLD-MCNC: 9.9 G/DL (ref 14–18)
HGB UR QL STRIP: ABNORMAL
HGB UR QL STRIP: NEGATIVE
HSV-1 DNA BY PCR: NEGATIVE
HSV-2 DNA BY PCR: NEGATIVE
HYALINE CASTS #/AREA URNS LPF: 0 /LPF
HYALINE CASTS #/AREA URNS LPF: 0 /LPF
HYALINE CASTS UR QL AUTO: 0 /LPF
HYALINE CASTS UR QL AUTO: 0 /LPF
IMM GRANULOCYTES # BLD AUTO: 0.02 K/UL (ref 0–0.04)
IMM GRANULOCYTES # BLD AUTO: 0.03 K/UL (ref 0–0.04)
IMM GRANULOCYTES # BLD AUTO: 0.04 K/UL (ref 0–0.04)
IMM GRANULOCYTES # BLD AUTO: 0.05 K/UL (ref 0–0.04)
IMM GRANULOCYTES # BLD AUTO: 0.07 K/UL (ref 0–0.04)
IMM GRANULOCYTES # BLD AUTO: 0.12 K/UL (ref 0–0.04)
IMM GRANULOCYTES # BLD AUTO: 0.24 K/UL (ref 0–0.04)
IMM GRANULOCYTES # BLD AUTO: 0.24 K/UL (ref 0–0.04)
IMM GRANULOCYTES # BLD AUTO: 0.27 K/UL (ref 0–0.04)
IMM GRANULOCYTES # BLD AUTO: 0.33 K/UL (ref 0–0.04)
IMM GRANULOCYTES # BLD AUTO: 0.34 K/UL (ref 0–0.04)
IMM GRANULOCYTES NFR BLD AUTO: 0.2 % (ref 0–0.5)
IMM GRANULOCYTES NFR BLD AUTO: 0.3 % (ref 0–0.5)
IMM GRANULOCYTES NFR BLD AUTO: 0.4 % (ref 0–0.5)
IMM GRANULOCYTES NFR BLD AUTO: 0.5 % (ref 0–0.5)
IMM GRANULOCYTES NFR BLD AUTO: 0.5 % (ref 0–0.5)
IMM GRANULOCYTES NFR BLD AUTO: 0.6 % (ref 0–0.5)
IMM GRANULOCYTES NFR BLD AUTO: 0.7 % (ref 0–0.5)
IMM GRANULOCYTES NFR BLD AUTO: 0.7 % (ref 0–0.5)
IMM GRANULOCYTES NFR BLD AUTO: 0.8 % (ref 0–0.5)
IMM GRANULOCYTES NFR BLD AUTO: 0.8 % (ref 0–0.5)
IMM GRANULOCYTES NFR BLD AUTO: 1.2 % (ref 0–0.5)
IMM GRANULOCYTES NFR BLD AUTO: 1.3 % (ref 0–0.5)
IMM GRANULOCYTES NFR BLD AUTO: 1.7 % (ref 0–0.5)
IMM GRANULOCYTES NFR BLD AUTO: 2 % (ref 0–0.5)
IMM GRANULOCYTES NFR BLD AUTO: 2 % (ref 0–0.5)
IMM GRANULOCYTES NFR BLD AUTO: 2.1 % (ref 0–0.5)
IMM GRANULOCYTES NFR BLD AUTO: 2.3 % (ref 0–0.5)
INFLUENZA A, MOLECULAR: NEGATIVE
INFLUENZA A, MOLECULAR: NEGATIVE
INFLUENZA B, MOLECULAR: NEGATIVE
INFLUENZA B, MOLECULAR: NEGATIVE
INR PPP: 1 (ref 0.8–1.2)
INR PPP: 1.1 (ref 0.8–1.2)
INR PPP: 1.2 (ref 0.8–1.2)
INR PPP: 1.3 (ref 0.8–1.2)
INR PPP: 1.3 (ref 0.8–1.2)
INTERVENTRICULAR SEPTUM: 1.89 CM (ref 0.6–1.1)
IRON SERPL-MCNC: 52 UG/DL (ref 45–160)
ISOPROPANOL SERPL-MCNC: NOT DETECTED MG/DL
KETONES UR QL STRIP: NEGATIVE
LA MAJOR: 6.82 CM
LA MINOR: 6.12 CM
LA WIDTH: 4.93 CM
LACTATE SERPL-SCNC: 1.2 MMOL/L (ref 0.5–2.2)
LACTATE SERPL-SCNC: 1.3 MMOL/L (ref 0.5–2.2)
LACTATE SERPL-SCNC: 1.6 MMOL/L (ref 0.5–2.2)
LACTATE SERPL-SCNC: 2.1 MMOL/L (ref 0.5–2.2)
LACTATE SERPL-SCNC: 3.5 MMOL/L (ref 0.5–2.2)
LDH SERPL L TO P-CCNC: 1523 U/L (ref 110–260)
LDH SERPL L TO P-CCNC: 228 U/L (ref 110–260)
LDH SERPL L TO P-CCNC: 231 U/L (ref 110–260)
LDLC SERPL CALC-MCNC: 60.2 MG/DL (ref 63–159)
LDLC SERPL CALC-MCNC: 87.2 MG/DL (ref 63–159)
LEFT ATRIUM SIZE: 4.31 CM
LEFT ATRIUM VOLUME INDEX: 54.5 ML/M2
LEFT ATRIUM VOLUME: 116.51 CM3
LEFT INTERNAL DIMENSION IN SYSTOLE: 4.1 CM (ref 2.1–4)
LEFT VENTRICLE DIASTOLIC VOLUME INDEX: 60.48 ML/M2
LEFT VENTRICLE DIASTOLIC VOLUME: 129.22 ML
LEFT VENTRICLE MASS INDEX: 186 G/M2
LEFT VENTRICLE SYSTOLIC VOLUME INDEX: 34.7 ML/M2
LEFT VENTRICLE SYSTOLIC VOLUME: 74.12 ML
LEFT VENTRICULAR INTERNAL DIMENSION IN DIASTOLE: 5.2 CM (ref 3.5–6)
LEFT VENTRICULAR MASS: 396.37 G
LEUKOCYTE ESTERASE UR QL STRIP: ABNORMAL
LEUKOCYTE ESTERASE UR QL STRIP: NEGATIVE
LIPASE SERPL-CCNC: 70 U/L (ref 4–60)
LYMPHOCYTES # BLD AUTO: 0.5 K/UL (ref 1–4.8)
LYMPHOCYTES # BLD AUTO: 0.6 K/UL (ref 1–4.8)
LYMPHOCYTES # BLD AUTO: 0.6 K/UL (ref 1–4.8)
LYMPHOCYTES # BLD AUTO: 0.7 K/UL (ref 1–4.8)
LYMPHOCYTES # BLD AUTO: 0.8 K/UL (ref 1–4.8)
LYMPHOCYTES # BLD AUTO: 0.8 K/UL (ref 1–4.8)
LYMPHOCYTES # BLD AUTO: 0.9 K/UL (ref 1–4.8)
LYMPHOCYTES # BLD AUTO: 1 K/UL (ref 1–4.8)
LYMPHOCYTES # BLD AUTO: 1.3 K/UL (ref 1–4.8)
LYMPHOCYTES # BLD AUTO: 1.4 K/UL (ref 1–4.8)
LYMPHOCYTES NFR BLD: 10.3 % (ref 18–48)
LYMPHOCYTES NFR BLD: 10.5 % (ref 18–48)
LYMPHOCYTES NFR BLD: 10.7 % (ref 18–48)
LYMPHOCYTES NFR BLD: 10.7 % (ref 18–48)
LYMPHOCYTES NFR BLD: 11.2 % (ref 18–48)
LYMPHOCYTES NFR BLD: 11.6 % (ref 18–48)
LYMPHOCYTES NFR BLD: 12 % (ref 18–48)
LYMPHOCYTES NFR BLD: 12 % (ref 18–48)
LYMPHOCYTES NFR BLD: 12.9 % (ref 18–48)
LYMPHOCYTES NFR BLD: 14.1 % (ref 18–48)
LYMPHOCYTES NFR BLD: 14.3 % (ref 18–48)
LYMPHOCYTES NFR BLD: 14.4 % (ref 18–48)
LYMPHOCYTES NFR BLD: 14.9 % (ref 18–48)
LYMPHOCYTES NFR BLD: 15 % (ref 18–48)
LYMPHOCYTES NFR BLD: 15.2 % (ref 18–48)
LYMPHOCYTES NFR BLD: 15.2 % (ref 18–48)
LYMPHOCYTES NFR BLD: 15.6 % (ref 18–48)
LYMPHOCYTES NFR BLD: 16.6 % (ref 18–48)
LYMPHOCYTES NFR BLD: 17.1 % (ref 18–48)
LYMPHOCYTES NFR BLD: 17.7 % (ref 18–48)
LYMPHOCYTES NFR BLD: 4.8 % (ref 18–48)
LYMPHOCYTES NFR BLD: 5.5 % (ref 18–48)
LYMPHOCYTES NFR BLD: 5.9 % (ref 18–48)
LYMPHOCYTES NFR BLD: 5.9 % (ref 18–48)
LYMPHOCYTES NFR BLD: 6.6 % (ref 18–48)
LYMPHOCYTES NFR BLD: 7 % (ref 18–48)
LYMPHOCYTES NFR BLD: 9 % (ref 18–48)
LYMPHOCYTES NFR BLD: 9.1 % (ref 18–48)
MAGNESIUM SERPL-MCNC: 1.6 MG/DL (ref 1.6–2.6)
MAGNESIUM SERPL-MCNC: 2.1 MG/DL (ref 1.6–2.6)
MAGNESIUM SERPL-MCNC: 2.1 MG/DL (ref 1.6–2.6)
MAGNESIUM SERPL-MCNC: 2.2 MG/DL (ref 1.6–2.6)
MAGNESIUM SERPL-MCNC: 2.3 MG/DL (ref 1.6–2.6)
MAGNESIUM SERPL-MCNC: 2.4 MG/DL (ref 1.6–2.6)
MAGNESIUM SERPL-MCNC: 2.5 MG/DL (ref 1.6–2.6)
MCH RBC QN AUTO: 26 PG (ref 27–31)
MCH RBC QN AUTO: 26.1 PG (ref 27–31)
MCH RBC QN AUTO: 26.1 PG (ref 27–31)
MCH RBC QN AUTO: 26.2 PG (ref 27–31)
MCH RBC QN AUTO: 26.3 PG (ref 27–31)
MCH RBC QN AUTO: 26.4 PG (ref 27–31)
MCH RBC QN AUTO: 26.5 PG (ref 27–31)
MCH RBC QN AUTO: 26.6 PG (ref 27–31)
MCH RBC QN AUTO: 26.6 PG (ref 27–31)
MCH RBC QN AUTO: 26.7 PG (ref 27–31)
MCH RBC QN AUTO: 26.8 PG (ref 27–31)
MCH RBC QN AUTO: 26.9 PG (ref 27–31)
MCH RBC QN AUTO: 27.1 PG (ref 27–31)
MCH RBC QN AUTO: 27.2 PG (ref 27–31)
MCH RBC QN AUTO: 27.8 PG (ref 27–31)
MCH RBC QN AUTO: 27.8 PG (ref 27–31)
MCH RBC QN AUTO: 27.9 PG (ref 27–31)
MCH RBC QN AUTO: 28 PG (ref 27–31)
MCH RBC QN AUTO: 28.2 PG (ref 27–31)
MCH RBC QN AUTO: 28.4 PG (ref 27–31)
MCHC RBC AUTO-ENTMCNC: 29.2 G/DL (ref 32–36)
MCHC RBC AUTO-ENTMCNC: 30.4 G/DL (ref 32–36)
MCHC RBC AUTO-ENTMCNC: 30.7 G/DL (ref 32–36)
MCHC RBC AUTO-ENTMCNC: 30.7 G/DL (ref 32–36)
MCHC RBC AUTO-ENTMCNC: 30.8 G/DL (ref 32–36)
MCHC RBC AUTO-ENTMCNC: 30.9 G/DL (ref 32–36)
MCHC RBC AUTO-ENTMCNC: 30.9 G/DL (ref 32–36)
MCHC RBC AUTO-ENTMCNC: 31.2 G/DL (ref 32–36)
MCHC RBC AUTO-ENTMCNC: 31.3 G/DL (ref 32–36)
MCHC RBC AUTO-ENTMCNC: 31.3 G/DL (ref 32–36)
MCHC RBC AUTO-ENTMCNC: 31.4 G/DL (ref 32–36)
MCHC RBC AUTO-ENTMCNC: 31.4 G/DL (ref 32–36)
MCHC RBC AUTO-ENTMCNC: 31.5 G/DL (ref 32–36)
MCHC RBC AUTO-ENTMCNC: 31.7 G/DL (ref 32–36)
MCHC RBC AUTO-ENTMCNC: 31.8 G/DL (ref 32–36)
MCHC RBC AUTO-ENTMCNC: 31.9 G/DL (ref 32–36)
MCHC RBC AUTO-ENTMCNC: 32.1 G/DL (ref 32–36)
MCHC RBC AUTO-ENTMCNC: 32.2 G/DL (ref 32–36)
MCHC RBC AUTO-ENTMCNC: 32.3 G/DL (ref 32–36)
MCHC RBC AUTO-ENTMCNC: 32.3 G/DL (ref 32–36)
MCHC RBC AUTO-ENTMCNC: 32.5 G/DL (ref 32–36)
MCHC RBC AUTO-ENTMCNC: 32.5 G/DL (ref 32–36)
MCHC RBC AUTO-ENTMCNC: 32.6 G/DL (ref 32–36)
MCV RBC AUTO: 82 FL (ref 82–98)
MCV RBC AUTO: 82 FL (ref 82–98)
MCV RBC AUTO: 83 FL (ref 82–98)
MCV RBC AUTO: 84 FL (ref 82–98)
MCV RBC AUTO: 85 FL (ref 82–98)
MCV RBC AUTO: 86 FL (ref 82–98)
MCV RBC AUTO: 87 FL (ref 82–98)
MCV RBC AUTO: 88 FL (ref 82–98)
MCV RBC AUTO: 88 FL (ref 82–98)
MCV RBC AUTO: 89 FL (ref 82–98)
MCV RBC AUTO: 90 FL (ref 82–98)
METHADONE UR QL SCN>300 NG/ML: NEGATIVE
METHANOL SERPL-MCNC: NOT DETECTED MG/DL
MICROALBUMIN UR DL<=1MG/L-MCNC: 1280 UG/ML
MICROSCOPIC COMMENT: ABNORMAL
MICROSCOPIC COMMENT: NORMAL
MONOCYTES # BLD AUTO: 0.3 K/UL (ref 0.3–1)
MONOCYTES # BLD AUTO: 0.4 K/UL (ref 0.3–1)
MONOCYTES # BLD AUTO: 0.5 K/UL (ref 0.3–1)
MONOCYTES # BLD AUTO: 0.6 K/UL (ref 0.3–1)
MONOCYTES # BLD AUTO: 0.7 K/UL (ref 0.3–1)
MONOCYTES # BLD AUTO: 0.8 K/UL (ref 0.3–1)
MONOCYTES # BLD AUTO: 0.9 K/UL (ref 0.3–1)
MONOCYTES NFR BLD: 10.1 % (ref 4–15)
MONOCYTES NFR BLD: 4.6 % (ref 4–15)
MONOCYTES NFR BLD: 4.6 % (ref 4–15)
MONOCYTES NFR BLD: 4.9 % (ref 4–15)
MONOCYTES NFR BLD: 5.3 % (ref 4–15)
MONOCYTES NFR BLD: 5.3 % (ref 4–15)
MONOCYTES NFR BLD: 5.5 % (ref 4–15)
MONOCYTES NFR BLD: 5.8 % (ref 4–15)
MONOCYTES NFR BLD: 6 % (ref 4–15)
MONOCYTES NFR BLD: 6.2 % (ref 4–15)
MONOCYTES NFR BLD: 6.4 % (ref 4–15)
MONOCYTES NFR BLD: 6.6 % (ref 4–15)
MONOCYTES NFR BLD: 6.6 % (ref 4–15)
MONOCYTES NFR BLD: 7 % (ref 4–15)
MONOCYTES NFR BLD: 7.2 % (ref 4–15)
MONOCYTES NFR BLD: 7.4 % (ref 4–15)
MONOCYTES NFR BLD: 7.6 % (ref 4–15)
MONOCYTES NFR BLD: 7.7 % (ref 4–15)
MONOCYTES NFR BLD: 7.9 % (ref 4–15)
MONOCYTES NFR BLD: 7.9 % (ref 4–15)
MONOCYTES NFR BLD: 8.1 % (ref 4–15)
MONOCYTES NFR BLD: 8.3 % (ref 4–15)
MONOCYTES NFR BLD: 8.3 % (ref 4–15)
MONOCYTES NFR BLD: 8.7 % (ref 4–15)
MONOCYTES NFR BLD: 9.1 % (ref 4–15)
MONOCYTES NFR BLD: 9.2 % (ref 4–15)
MV PEAK A VEL: 0.6 M/S
MV PEAK E VEL: 1.02 M/S
NEUTROPHILS # BLD AUTO: 10 K/UL (ref 1.8–7.7)
NEUTROPHILS # BLD AUTO: 10.3 K/UL (ref 1.8–7.7)
NEUTROPHILS # BLD AUTO: 10.3 K/UL (ref 1.8–7.7)
NEUTROPHILS # BLD AUTO: 12.9 K/UL (ref 1.8–7.7)
NEUTROPHILS # BLD AUTO: 17.3 K/UL (ref 1.8–7.7)
NEUTROPHILS # BLD AUTO: 3.7 K/UL (ref 1.8–7.7)
NEUTROPHILS # BLD AUTO: 4 K/UL (ref 1.8–7.7)
NEUTROPHILS # BLD AUTO: 4.1 K/UL (ref 1.8–7.7)
NEUTROPHILS # BLD AUTO: 4.1 K/UL (ref 1.8–7.7)
NEUTROPHILS # BLD AUTO: 4.2 K/UL (ref 1.8–7.7)
NEUTROPHILS # BLD AUTO: 4.3 K/UL (ref 1.8–7.7)
NEUTROPHILS # BLD AUTO: 4.3 K/UL (ref 1.8–7.7)
NEUTROPHILS # BLD AUTO: 4.5 K/UL (ref 1.8–7.7)
NEUTROPHILS # BLD AUTO: 4.7 K/UL (ref 1.8–7.7)
NEUTROPHILS # BLD AUTO: 4.8 K/UL (ref 1.8–7.7)
NEUTROPHILS # BLD AUTO: 4.8 K/UL (ref 1.8–7.7)
NEUTROPHILS # BLD AUTO: 4.9 K/UL (ref 1.8–7.7)
NEUTROPHILS # BLD AUTO: 4.9 K/UL (ref 1.8–7.7)
NEUTROPHILS # BLD AUTO: 5 K/UL (ref 1.8–7.7)
NEUTROPHILS # BLD AUTO: 5.7 K/UL (ref 1.8–7.7)
NEUTROPHILS # BLD AUTO: 6 K/UL (ref 1.8–7.7)
NEUTROPHILS # BLD AUTO: 6 K/UL (ref 1.8–7.7)
NEUTROPHILS # BLD AUTO: 6.1 K/UL (ref 1.8–7.7)
NEUTROPHILS # BLD AUTO: 6.2 K/UL (ref 1.8–7.7)
NEUTROPHILS # BLD AUTO: 6.9 K/UL (ref 1.8–7.7)
NEUTROPHILS # BLD AUTO: 6.9 K/UL (ref 1.8–7.7)
NEUTROPHILS # BLD AUTO: 7.3 K/UL (ref 1.8–7.7)
NEUTROPHILS # BLD AUTO: 7.6 K/UL (ref 1.8–7.7)
NEUTROPHILS NFR BLD: 67.8 % (ref 38–73)
NEUTROPHILS NFR BLD: 68.4 % (ref 38–73)
NEUTROPHILS NFR BLD: 70.3 % (ref 38–73)
NEUTROPHILS NFR BLD: 70.8 % (ref 38–73)
NEUTROPHILS NFR BLD: 70.9 % (ref 38–73)
NEUTROPHILS NFR BLD: 71.6 % (ref 38–73)
NEUTROPHILS NFR BLD: 71.8 % (ref 38–73)
NEUTROPHILS NFR BLD: 72.1 % (ref 38–73)
NEUTROPHILS NFR BLD: 72.6 % (ref 38–73)
NEUTROPHILS NFR BLD: 72.9 % (ref 38–73)
NEUTROPHILS NFR BLD: 73 % (ref 38–73)
NEUTROPHILS NFR BLD: 76.4 % (ref 38–73)
NEUTROPHILS NFR BLD: 76.6 % (ref 38–73)
NEUTROPHILS NFR BLD: 76.7 % (ref 38–73)
NEUTROPHILS NFR BLD: 77.9 % (ref 38–73)
NEUTROPHILS NFR BLD: 78.7 % (ref 38–73)
NEUTROPHILS NFR BLD: 78.7 % (ref 38–73)
NEUTROPHILS NFR BLD: 78.9 % (ref 38–73)
NEUTROPHILS NFR BLD: 80.2 % (ref 38–73)
NEUTROPHILS NFR BLD: 81.4 % (ref 38–73)
NEUTROPHILS NFR BLD: 82.7 % (ref 38–73)
NEUTROPHILS NFR BLD: 83.6 % (ref 38–73)
NEUTROPHILS NFR BLD: 84.4 % (ref 38–73)
NEUTROPHILS NFR BLD: 84.9 % (ref 38–73)
NEUTROPHILS NFR BLD: 85.8 % (ref 38–73)
NEUTROPHILS NFR BLD: 86 % (ref 38–73)
NEUTROPHILS NFR BLD: 86 % (ref 38–73)
NEUTROPHILS NFR BLD: 88.1 % (ref 38–73)
NITRITE UR QL STRIP: NEGATIVE
NONHDLC SERPL-MCNC: 127 MG/DL
NONHDLC SERPL-MCNC: 74 MG/DL
NRBC BLD-RTO: 0 /100 WBC
NRBC BLD-RTO: 1 /100 WBC
NRBC BLD-RTO: 2 /100 WBC
NRBC BLD-RTO: 2 /100 WBC
NUM UNITS TRANS PACKED RBC: NORMAL
NUM UNITS TRANS PACKED RBC: NORMAL
OB PNL STL: NEGATIVE
OPIATES UR QL SCN: NEGATIVE
PCO2 BLDA: 27.5 MMHG (ref 35–45)
PCO2 BLDA: 29.1 MMHG (ref 35–45)
PCO2 BLDA: 29.3 MMHG (ref 35–45)
PCO2 BLDA: 30.1 MMHG (ref 35–45)
PCO2 BLDA: 33.2 MMHG (ref 35–45)
PCP UR QL SCN>25 NG/ML: NEGATIVE
PH SMN: 7.37 [PH] (ref 7.35–7.45)
PH SMN: 7.41 [PH] (ref 7.35–7.45)
PH SMN: 7.42 [PH] (ref 7.35–7.45)
PH SMN: 7.42 [PH] (ref 7.35–7.45)
PH SMN: 7.45 [PH] (ref 7.35–7.45)
PH UR STRIP: 5 [PH] (ref 5–8)
PH UR STRIP: 6 [PH] (ref 5–8)
PHOSPHATE SERPL-MCNC: 2.1 MG/DL (ref 2.7–4.5)
PHOSPHATE SERPL-MCNC: 2.2 MG/DL (ref 2.7–4.5)
PHOSPHATE SERPL-MCNC: 2.3 MG/DL (ref 2.7–4.5)
PHOSPHATE SERPL-MCNC: 2.4 MG/DL (ref 2.7–4.5)
PHOSPHATE SERPL-MCNC: 2.4 MG/DL (ref 2.7–4.5)
PHOSPHATE SERPL-MCNC: 2.5 MG/DL (ref 2.7–4.5)
PHOSPHATE SERPL-MCNC: 2.5 MG/DL (ref 2.7–4.5)
PHOSPHATE SERPL-MCNC: 2.6 MG/DL (ref 2.7–4.5)
PHOSPHATE SERPL-MCNC: 2.7 MG/DL (ref 2.7–4.5)
PHOSPHATE SERPL-MCNC: 2.9 MG/DL (ref 2.7–4.5)
PHOSPHATE SERPL-MCNC: 3 MG/DL (ref 2.7–4.5)
PHOSPHATE SERPL-MCNC: 3.1 MG/DL (ref 2.7–4.5)
PHOSPHATE SERPL-MCNC: 3.2 MG/DL (ref 2.7–4.5)
PHOSPHATE SERPL-MCNC: 3.8 MG/DL (ref 2.7–4.5)
PHOSPHATE SERPL-MCNC: 3.8 MG/DL (ref 2.7–4.5)
PHOSPHATE SERPL-MCNC: 3.9 MG/DL (ref 2.7–4.5)
PHOSPHATE SERPL-MCNC: 4.8 MG/DL (ref 2.7–4.5)
PISA TR MAX VEL: 3.28 M/S
PLATELET # BLD AUTO: 204 K/UL (ref 150–350)
PLATELET # BLD AUTO: 204 K/UL (ref 150–350)
PLATELET # BLD AUTO: 210 K/UL (ref 150–350)
PLATELET # BLD AUTO: 215 K/UL (ref 150–350)
PLATELET # BLD AUTO: 217 K/UL (ref 150–350)
PLATELET # BLD AUTO: 221 K/UL (ref 150–350)
PLATELET # BLD AUTO: 230 K/UL (ref 150–350)
PLATELET # BLD AUTO: 236 K/UL (ref 150–350)
PLATELET # BLD AUTO: 237 K/UL (ref 150–350)
PLATELET # BLD AUTO: 241 K/UL (ref 150–350)
PLATELET # BLD AUTO: 244 K/UL (ref 150–350)
PLATELET # BLD AUTO: 247 K/UL (ref 150–350)
PLATELET # BLD AUTO: 259 K/UL (ref 150–350)
PLATELET # BLD AUTO: 262 K/UL (ref 150–350)
PLATELET # BLD AUTO: 266 K/UL (ref 150–350)
PLATELET # BLD AUTO: 267 K/UL (ref 150–350)
PLATELET # BLD AUTO: 273 K/UL (ref 150–350)
PLATELET # BLD AUTO: 276 K/UL (ref 150–350)
PLATELET # BLD AUTO: 296 K/UL (ref 150–350)
PLATELET # BLD AUTO: 309 K/UL (ref 150–350)
PLATELET # BLD AUTO: 313 K/UL (ref 150–350)
PLATELET # BLD AUTO: 327 K/UL (ref 150–350)
PLATELET # BLD AUTO: 362 K/UL (ref 150–350)
PLATELET # BLD AUTO: 367 K/UL (ref 150–350)
PLATELET # BLD AUTO: 381 K/UL (ref 150–350)
PLATELET # BLD AUTO: 387 K/UL (ref 150–350)
PLATELET # BLD AUTO: 438 K/UL (ref 150–350)
PLATELET # BLD AUTO: 438 K/UL (ref 150–350)
PMV BLD AUTO: 10 FL (ref 9.2–12.9)
PMV BLD AUTO: 10 FL (ref 9.2–12.9)
PMV BLD AUTO: 10.1 FL (ref 9.2–12.9)
PMV BLD AUTO: 10.2 FL (ref 9.2–12.9)
PMV BLD AUTO: 10.3 FL (ref 9.2–12.9)
PMV BLD AUTO: 10.3 FL (ref 9.2–12.9)
PMV BLD AUTO: 10.4 FL (ref 9.2–12.9)
PMV BLD AUTO: 10.6 FL (ref 9.2–12.9)
PMV BLD AUTO: 11.1 FL (ref 9.2–12.9)
PMV BLD AUTO: 11.1 FL (ref 9.2–12.9)
PMV BLD AUTO: 9.4 FL (ref 9.2–12.9)
PMV BLD AUTO: 9.6 FL (ref 9.2–12.9)
PMV BLD AUTO: 9.7 FL (ref 9.2–12.9)
PMV BLD AUTO: 9.8 FL (ref 9.2–12.9)
PMV BLD AUTO: 9.9 FL (ref 9.2–12.9)
PO2 BLDA: 22 MMHG (ref 40–60)
PO2 BLDA: 24 MMHG (ref 40–60)
PO2 BLDA: 49 MMHG (ref 80–100)
PO2 BLDA: 61 MMHG (ref 80–100)
PO2 BLDA: 72 MMHG (ref 80–100)
POC BE: -3 MMOL/L
POC BE: -5 MMOL/L
POC BE: -6 MMOL/L
POC BE: -6 MMOL/L
POC BE: -7 MMOL/L
POC IONIZED CALCIUM: 1.18 MMOL/L (ref 1.06–1.42)
POC SATURATED O2: 38 % (ref 95–100)
POC SATURATED O2: 41 % (ref 95–100)
POC SATURATED O2: 87 % (ref 95–100)
POC SATURATED O2: 92 % (ref 95–100)
POC SATURATED O2: 95 % (ref 95–100)
POC TCO2: 19 MMOL/L (ref 23–27)
POC TCO2: 19 MMOL/L (ref 24–29)
POC TCO2: 20 MMOL/L (ref 23–27)
POC TCO2: 20 MMOL/L (ref 24–29)
POC TCO2: 22 MMOL/L (ref 23–27)
POCT GLUCOSE: 100 MG/DL (ref 70–110)
POCT GLUCOSE: 101 MG/DL (ref 70–110)
POCT GLUCOSE: 102 MG/DL (ref 70–110)
POCT GLUCOSE: 102 MG/DL (ref 70–110)
POCT GLUCOSE: 103 MG/DL (ref 70–110)
POCT GLUCOSE: 105 MG/DL (ref 70–110)
POCT GLUCOSE: 105 MG/DL (ref 70–110)
POCT GLUCOSE: 108 MG/DL (ref 70–110)
POCT GLUCOSE: 110 MG/DL (ref 70–110)
POCT GLUCOSE: 112 MG/DL (ref 70–110)
POCT GLUCOSE: 112 MG/DL (ref 70–110)
POCT GLUCOSE: 113 MG/DL (ref 70–110)
POCT GLUCOSE: 113 MG/DL (ref 70–110)
POCT GLUCOSE: 114 MG/DL (ref 70–110)
POCT GLUCOSE: 116 MG/DL (ref 70–110)
POCT GLUCOSE: 117 MG/DL (ref 70–110)
POCT GLUCOSE: 119 MG/DL (ref 70–110)
POCT GLUCOSE: 120 MG/DL (ref 70–110)
POCT GLUCOSE: 120 MG/DL (ref 70–110)
POCT GLUCOSE: 122 MG/DL (ref 70–110)
POCT GLUCOSE: 122 MG/DL (ref 70–110)
POCT GLUCOSE: 123 MG/DL (ref 70–110)
POCT GLUCOSE: 123 MG/DL (ref 70–110)
POCT GLUCOSE: 124 MG/DL (ref 70–110)
POCT GLUCOSE: 126 MG/DL (ref 70–110)
POCT GLUCOSE: 127 MG/DL (ref 70–110)
POCT GLUCOSE: 128 MG/DL (ref 70–110)
POCT GLUCOSE: 131 MG/DL (ref 70–110)
POCT GLUCOSE: 132 MG/DL (ref 70–110)
POCT GLUCOSE: 132 MG/DL (ref 70–110)
POCT GLUCOSE: 134 MG/DL (ref 70–110)
POCT GLUCOSE: 136 MG/DL (ref 70–110)
POCT GLUCOSE: 136 MG/DL (ref 70–110)
POCT GLUCOSE: 139 MG/DL (ref 70–110)
POCT GLUCOSE: 139 MG/DL (ref 70–110)
POCT GLUCOSE: 140 MG/DL (ref 70–110)
POCT GLUCOSE: 140 MG/DL (ref 70–110)
POCT GLUCOSE: 141 MG/DL (ref 70–110)
POCT GLUCOSE: 141 MG/DL (ref 70–110)
POCT GLUCOSE: 142 MG/DL (ref 70–110)
POCT GLUCOSE: 144 MG/DL (ref 70–110)
POCT GLUCOSE: 148 MG/DL (ref 70–110)
POCT GLUCOSE: 149 MG/DL (ref 70–110)
POCT GLUCOSE: 151 MG/DL (ref 70–110)
POCT GLUCOSE: 157 MG/DL (ref 70–110)
POCT GLUCOSE: 159 MG/DL (ref 70–110)
POCT GLUCOSE: 162 MG/DL (ref 70–110)
POCT GLUCOSE: 165 MG/DL (ref 70–110)
POCT GLUCOSE: 174 MG/DL (ref 70–110)
POCT GLUCOSE: 175 MG/DL (ref 70–110)
POCT GLUCOSE: 176 MG/DL (ref 70–110)
POCT GLUCOSE: 178 MG/DL (ref 70–110)
POCT GLUCOSE: 179 MG/DL (ref 70–110)
POCT GLUCOSE: 179 MG/DL (ref 70–110)
POCT GLUCOSE: 180 MG/DL (ref 70–110)
POCT GLUCOSE: 183 MG/DL (ref 70–110)
POCT GLUCOSE: 184 MG/DL (ref 70–110)
POCT GLUCOSE: 185 MG/DL (ref 70–110)
POCT GLUCOSE: 186 MG/DL (ref 70–110)
POCT GLUCOSE: 186 MG/DL (ref 70–110)
POCT GLUCOSE: 187 MG/DL (ref 70–110)
POCT GLUCOSE: 190 MG/DL (ref 70–110)
POCT GLUCOSE: 190 MG/DL (ref 70–110)
POCT GLUCOSE: 193 MG/DL (ref 70–110)
POCT GLUCOSE: 193 MG/DL (ref 70–110)
POCT GLUCOSE: 194 MG/DL (ref 70–110)
POCT GLUCOSE: 196 MG/DL (ref 70–110)
POCT GLUCOSE: 204 MG/DL (ref 70–110)
POCT GLUCOSE: 204 MG/DL (ref 70–110)
POCT GLUCOSE: 205 MG/DL (ref 70–110)
POCT GLUCOSE: 206 MG/DL (ref 70–110)
POCT GLUCOSE: 207 MG/DL (ref 70–110)
POCT GLUCOSE: 210 MG/DL (ref 70–110)
POCT GLUCOSE: 210 MG/DL (ref 70–110)
POCT GLUCOSE: 211 MG/DL (ref 70–110)
POCT GLUCOSE: 211 MG/DL (ref 70–110)
POCT GLUCOSE: 213 MG/DL (ref 70–110)
POCT GLUCOSE: 216 MG/DL (ref 70–110)
POCT GLUCOSE: 222 MG/DL (ref 70–110)
POCT GLUCOSE: 226 MG/DL (ref 70–110)
POCT GLUCOSE: 227 MG/DL (ref 70–110)
POCT GLUCOSE: 229 MG/DL (ref 70–110)
POCT GLUCOSE: 231 MG/DL (ref 70–110)
POCT GLUCOSE: 233 MG/DL (ref 70–110)
POCT GLUCOSE: 235 MG/DL (ref 70–110)
POCT GLUCOSE: 240 MG/DL (ref 70–110)
POCT GLUCOSE: 246 MG/DL (ref 70–110)
POCT GLUCOSE: 246 MG/DL (ref 70–110)
POCT GLUCOSE: 249 MG/DL (ref 70–110)
POCT GLUCOSE: 252 MG/DL (ref 70–110)
POCT GLUCOSE: 258 MG/DL (ref 70–110)
POCT GLUCOSE: 259 MG/DL (ref 70–110)
POCT GLUCOSE: 260 MG/DL (ref 70–110)
POCT GLUCOSE: 261 MG/DL (ref 70–110)
POCT GLUCOSE: 268 MG/DL (ref 70–110)
POCT GLUCOSE: 269 MG/DL (ref 70–110)
POCT GLUCOSE: 273 MG/DL (ref 70–110)
POCT GLUCOSE: 274 MG/DL (ref 70–110)
POCT GLUCOSE: 279 MG/DL (ref 70–110)
POCT GLUCOSE: 282 MG/DL (ref 70–110)
POCT GLUCOSE: 295 MG/DL (ref 70–110)
POCT GLUCOSE: 295 MG/DL (ref 70–110)
POCT GLUCOSE: 296 MG/DL (ref 70–110)
POCT GLUCOSE: 308 MG/DL (ref 70–110)
POCT GLUCOSE: 310 MG/DL (ref 70–110)
POCT GLUCOSE: 317 MG/DL (ref 70–110)
POCT GLUCOSE: 323 MG/DL (ref 70–110)
POCT GLUCOSE: 367 MG/DL (ref 70–110)
POCT GLUCOSE: 43 MG/DL (ref 70–110)
POCT GLUCOSE: 461 MG/DL (ref 70–110)
POCT GLUCOSE: 57 MG/DL (ref 70–110)
POCT GLUCOSE: 73 MG/DL (ref 70–110)
POCT GLUCOSE: 75 MG/DL (ref 70–110)
POCT GLUCOSE: 78 MG/DL (ref 70–110)
POCT GLUCOSE: 79 MG/DL (ref 70–110)
POCT GLUCOSE: 80 MG/DL (ref 70–110)
POCT GLUCOSE: 80 MG/DL (ref 70–110)
POCT GLUCOSE: 81 MG/DL (ref 70–110)
POCT GLUCOSE: 81 MG/DL (ref 70–110)
POCT GLUCOSE: 82 MG/DL (ref 70–110)
POCT GLUCOSE: 83 MG/DL (ref 70–110)
POCT GLUCOSE: 85 MG/DL (ref 70–110)
POCT GLUCOSE: 87 MG/DL (ref 70–110)
POCT GLUCOSE: 87 MG/DL (ref 70–110)
POCT GLUCOSE: 88 MG/DL (ref 70–110)
POCT GLUCOSE: 89 MG/DL (ref 70–110)
POCT GLUCOSE: 89 MG/DL (ref 70–110)
POCT GLUCOSE: 90 MG/DL (ref 70–110)
POCT GLUCOSE: 91 MG/DL (ref 70–110)
POCT GLUCOSE: 91 MG/DL (ref 70–110)
POCT GLUCOSE: 96 MG/DL (ref 70–110)
POCT GLUCOSE: 98 MG/DL (ref 70–110)
POCT GLUCOSE: 99 MG/DL (ref 70–110)
POCT GLUCOSE: 99 MG/DL (ref 70–110)
POTASSIUM BLD-SCNC: 3.7 MMOL/L (ref 3.5–5.1)
POTASSIUM SERPL-SCNC: 2.6 MMOL/L (ref 3.5–5.1)
POTASSIUM SERPL-SCNC: 2.9 MMOL/L (ref 3.5–5.1)
POTASSIUM SERPL-SCNC: 3.3 MMOL/L (ref 3.5–5.1)
POTASSIUM SERPL-SCNC: 3.3 MMOL/L (ref 3.5–5.1)
POTASSIUM SERPL-SCNC: 3.4 MMOL/L (ref 3.5–5.1)
POTASSIUM SERPL-SCNC: 3.4 MMOL/L (ref 3.5–5.1)
POTASSIUM SERPL-SCNC: 3.5 MMOL/L (ref 3.5–5.1)
POTASSIUM SERPL-SCNC: 3.5 MMOL/L (ref 3.5–5.1)
POTASSIUM SERPL-SCNC: 3.6 MMOL/L (ref 3.5–5.1)
POTASSIUM SERPL-SCNC: 3.7 MMOL/L (ref 3.5–5.1)
POTASSIUM SERPL-SCNC: 3.8 MMOL/L (ref 3.5–5.1)
POTASSIUM SERPL-SCNC: 3.9 MMOL/L (ref 3.5–5.1)
POTASSIUM SERPL-SCNC: 4 MMOL/L (ref 3.5–5.1)
POTASSIUM SERPL-SCNC: 4.1 MMOL/L (ref 3.5–5.1)
POTASSIUM SERPL-SCNC: 4.2 MMOL/L (ref 3.5–5.1)
POTASSIUM SERPL-SCNC: 4.3 MMOL/L (ref 3.5–5.1)
POTASSIUM SERPL-SCNC: 4.4 MMOL/L (ref 3.5–5.1)
PROCALCITONIN SERPL IA-MCNC: 0.16 NG/ML
PROCALCITONIN SERPL IA-MCNC: 0.38 NG/ML
PROT SERPL-MCNC: 5.3 G/DL (ref 6–8.4)
PROT SERPL-MCNC: 5.3 G/DL (ref 6–8.4)
PROT SERPL-MCNC: 5.5 G/DL (ref 6–8.4)
PROT SERPL-MCNC: 5.6 G/DL (ref 6–8.4)
PROT SERPL-MCNC: 5.9 G/DL (ref 6–8.4)
PROT SERPL-MCNC: 6 G/DL (ref 6–8.4)
PROT SERPL-MCNC: 6.2 G/DL (ref 6–8.4)
PROT SERPL-MCNC: 6.3 G/DL (ref 6–8.4)
PROT SERPL-MCNC: 6.4 G/DL (ref 6–8.4)
PROT SERPL-MCNC: 6.4 G/DL (ref 6–8.4)
PROT SERPL-MCNC: 6.5 G/DL (ref 6–8.4)
PROT SERPL-MCNC: 6.8 G/DL (ref 6–8.4)
PROT SERPL-MCNC: 6.8 G/DL (ref 6–8.4)
PROT SERPL-MCNC: 7.1 G/DL (ref 6–8.4)
PROT UR QL STRIP: ABNORMAL
PROTHROMBIN TIME: 10.6 SEC (ref 9–12.5)
PROTHROMBIN TIME: 10.9 SEC (ref 9–12.5)
PROTHROMBIN TIME: 12.9 SEC (ref 9–12.5)
PROTHROMBIN TIME: 14 SEC (ref 9–12.5)
PROTHROMBIN TIME: 14.3 SEC (ref 9–12.5)
PTH-INTACT SERPL-MCNC: 161 PG/ML (ref 9–77)
PULM VEIN S/D RATIO: 2.65
PV PEAK D VEL: 0.23 M/S
PV PEAK S VEL: 0.61 M/S
PV PEAK VELOCITY: 2.07 CM/S
RA MAJOR: 5.83 CM
RA PRESSURE: 15 MMHG
RA WIDTH: 4.75 CM
RBC # BLD AUTO: 2.47 M/UL (ref 4.6–6.2)
RBC # BLD AUTO: 2.71 M/UL (ref 4.6–6.2)
RBC # BLD AUTO: 2.76 M/UL (ref 4.6–6.2)
RBC # BLD AUTO: 2.8 M/UL (ref 4.6–6.2)
RBC # BLD AUTO: 2.81 M/UL (ref 4.6–6.2)
RBC # BLD AUTO: 2.86 M/UL (ref 4.6–6.2)
RBC # BLD AUTO: 2.86 M/UL (ref 4.6–6.2)
RBC # BLD AUTO: 2.91 M/UL (ref 4.6–6.2)
RBC # BLD AUTO: 2.91 M/UL (ref 4.6–6.2)
RBC # BLD AUTO: 2.92 M/UL (ref 4.6–6.2)
RBC # BLD AUTO: 3 M/UL (ref 4.6–6.2)
RBC # BLD AUTO: 3.02 M/UL (ref 4.6–6.2)
RBC # BLD AUTO: 3.05 M/UL (ref 4.6–6.2)
RBC # BLD AUTO: 3.09 M/UL (ref 4.6–6.2)
RBC # BLD AUTO: 3.15 M/UL (ref 4.6–6.2)
RBC # BLD AUTO: 3.17 M/UL (ref 4.6–6.2)
RBC # BLD AUTO: 3.24 M/UL (ref 4.6–6.2)
RBC # BLD AUTO: 3.28 M/UL (ref 4.6–6.2)
RBC # BLD AUTO: 3.31 M/UL (ref 4.6–6.2)
RBC # BLD AUTO: 3.34 M/UL (ref 4.6–6.2)
RBC # BLD AUTO: 3.35 M/UL (ref 4.6–6.2)
RBC # BLD AUTO: 3.41 M/UL (ref 4.6–6.2)
RBC # BLD AUTO: 3.41 M/UL (ref 4.6–6.2)
RBC # BLD AUTO: 3.46 M/UL (ref 4.6–6.2)
RBC # BLD AUTO: 3.49 M/UL (ref 4.6–6.2)
RBC # BLD AUTO: 3.71 M/UL (ref 4.6–6.2)
RBC # BLD AUTO: 3.71 M/UL (ref 4.6–6.2)
RBC # BLD AUTO: 3.79 M/UL (ref 4.6–6.2)
RBC #/AREA URNS AUTO: 1 /HPF (ref 0–4)
RBC #/AREA URNS AUTO: 1 /HPF (ref 0–4)
RBC #/AREA URNS HPF: 0 /HPF (ref 0–4)
RBC #/AREA URNS HPF: 3 /HPF (ref 0–4)
RETICS/RBC NFR AUTO: 3.1 % (ref 0.4–2)
SALICYLATES SERPL-MCNC: <5 MG/DL (ref 15–30)
SAMPLE: ABNORMAL
SARS-COV-2 RNA RESP QL NAA+PROBE: DETECTED
SATURATED IRON: 28 % (ref 20–50)
SITE: ABNORMAL
SODIUM BLD-SCNC: 140 MMOL/L (ref 136–145)
SODIUM SERPL-SCNC: 136 MMOL/L (ref 136–145)
SODIUM SERPL-SCNC: 137 MMOL/L (ref 136–145)
SODIUM SERPL-SCNC: 138 MMOL/L (ref 136–145)
SODIUM SERPL-SCNC: 139 MMOL/L (ref 136–145)
SODIUM SERPL-SCNC: 140 MMOL/L (ref 136–145)
SODIUM SERPL-SCNC: 141 MMOL/L (ref 136–145)
SODIUM SERPL-SCNC: 142 MMOL/L (ref 136–145)
SODIUM SERPL-SCNC: 143 MMOL/L (ref 136–145)
SODIUM SERPL-SCNC: 144 MMOL/L (ref 136–145)
SODIUM SERPL-SCNC: 144 MMOL/L (ref 136–145)
SODIUM UR-SCNC: 32 MMOL/L (ref 20–250)
SP GR UR STRIP: 1.01 (ref 1–1.03)
SP GR UR STRIP: 1.02 (ref 1–1.03)
SP GR UR STRIP: >=1.03 (ref 1–1.03)
SP GR UR STRIP: >=1.03 (ref 1–1.03)
SPECIMEN SOURCE: NORMAL
SPECIMEN SOURCE: NORMAL
SQUAMOUS #/AREA URNS AUTO: 0 /HPF
SQUAMOUS #/AREA URNS AUTO: 0 /HPF
TOTAL IRON BINDING CAPACITY: 185 UG/DL (ref 250–450)
TOXICOLOGY INFORMATION: NORMAL
TR MAX PG: 43 MMHG
TRANSFERRIN SERPL-MCNC: 125 MG/DL (ref 200–375)
TRICUSPID ANNULAR PLANE SYSTOLIC EXCURSION: 1.67 CM
TRIGL SERPL-MCNC: 199 MG/DL (ref 30–150)
TRIGL SERPL-MCNC: 69 MG/DL (ref 30–150)
TROPONIN I SERPL DL<=0.01 NG/ML-MCNC: 0.04 NG/ML (ref 0–0.03)
TROPONIN I SERPL DL<=0.01 NG/ML-MCNC: 0.04 NG/ML (ref 0–0.03)
TROPONIN I SERPL DL<=0.01 NG/ML-MCNC: 0.28 NG/ML (ref 0–0.03)
TROPONIN I SERPL DL<=0.01 NG/ML-MCNC: 0.29 NG/ML (ref 0–0.03)
TROPONIN I SERPL DL<=0.01 NG/ML-MCNC: 0.31 NG/ML (ref 0–0.03)
TROPONIN I SERPL DL<=0.01 NG/ML-MCNC: 0.34 NG/ML (ref 0–0.03)
TROPONIN I SERPL DL<=0.01 NG/ML-MCNC: 0.37 NG/ML (ref 0–0.03)
TROPONIN I SERPL DL<=0.01 NG/ML-MCNC: 1.22 NG/ML (ref 0–0.03)
TROPONIN I SERPL DL<=0.01 NG/ML-MCNC: 1.9 NG/ML (ref 0–0.03)
TROPONIN I SERPL DL<=0.01 NG/ML-MCNC: 11.19 NG/ML (ref 0–0.03)
TROPONIN I SERPL DL<=0.01 NG/ML-MCNC: 5.61 NG/ML (ref 0–0.03)
TROPONIN I SERPL DL<=0.01 NG/ML-MCNC: 8.78 NG/ML (ref 0–0.03)
TROPONIN I SERPL DL<=0.01 NG/ML-MCNC: 9.06 NG/ML (ref 0–0.03)
TSH SERPL DL<=0.005 MIU/L-ACNC: 1.16 UIU/ML (ref 0.4–4)
TSH SERPL DL<=0.005 MIU/L-ACNC: 1.87 UIU/ML (ref 0.4–4)
TV REST PULMONARY ARTERY PRESSURE: 58 MMHG
URN SPEC COLLECT METH UR: ABNORMAL
UROBILINOGEN UR STRIP-ACNC: NEGATIVE EU/DL
UUN UR-MCNC: 842 MG/DL (ref 140–1050)
VIT B12 SERPL-MCNC: 559 PG/ML (ref 210–950)
VIT B12 SERPL-MCNC: 639 PG/ML (ref 210–950)
VOLATILE SCREEN SERUM, CHAIN OF CUSTODY: NORMAL
VOLATILES SERPL: NORMAL
WBC # BLD AUTO: 11.62 K/UL (ref 3.9–12.7)
WBC # BLD AUTO: 11.99 K/UL (ref 3.9–12.7)
WBC # BLD AUTO: 11.99 K/UL (ref 3.9–12.7)
WBC # BLD AUTO: 15.39 K/UL (ref 3.9–12.7)
WBC # BLD AUTO: 19.64 K/UL (ref 3.9–12.7)
WBC # BLD AUTO: 5.16 K/UL (ref 3.9–12.7)
WBC # BLD AUTO: 5.31 K/UL (ref 3.9–12.7)
WBC # BLD AUTO: 5.5 K/UL (ref 3.9–12.7)
WBC # BLD AUTO: 5.71 K/UL (ref 3.9–12.7)
WBC # BLD AUTO: 5.79 K/UL (ref 3.9–12.7)
WBC # BLD AUTO: 5.89 K/UL (ref 3.9–12.7)
WBC # BLD AUTO: 5.91 K/UL (ref 3.9–12.7)
WBC # BLD AUTO: 5.99 K/UL (ref 3.9–12.7)
WBC # BLD AUTO: 6 K/UL (ref 3.9–12.7)
WBC # BLD AUTO: 6 K/UL (ref 3.9–12.7)
WBC # BLD AUTO: 6.67 K/UL (ref 3.9–12.7)
WBC # BLD AUTO: 6.68 K/UL (ref 3.9–12.7)
WBC # BLD AUTO: 6.72 K/UL (ref 3.9–12.7)
WBC # BLD AUTO: 6.73 K/UL (ref 3.9–12.7)
WBC # BLD AUTO: 7.61 K/UL (ref 3.9–12.7)
WBC # BLD AUTO: 7.84 K/UL (ref 3.9–12.7)
WBC # BLD AUTO: 7.96 K/UL (ref 3.9–12.7)
WBC # BLD AUTO: 8.2 K/UL (ref 3.9–12.7)
WBC # BLD AUTO: 8.3 K/UL (ref 3.9–12.7)
WBC # BLD AUTO: 8.38 K/UL (ref 3.9–12.7)
WBC # BLD AUTO: 8.58 K/UL (ref 3.9–12.7)
WBC # BLD AUTO: 9.23 K/UL (ref 3.9–12.7)
WBC # BLD AUTO: 9.37 K/UL (ref 3.9–12.7)
WBC #/AREA URNS AUTO: 1 /HPF (ref 0–5)
WBC #/AREA URNS AUTO: 1 /HPF (ref 0–5)
WBC #/AREA URNS HPF: 1 /HPF (ref 0–5)
WBC #/AREA URNS HPF: 2 /HPF (ref 0–5)
YEAST UR QL AUTO: ABNORMAL
YEAST URNS QL MICRO: ABNORMAL

## 2020-01-01 PROCEDURE — 11000004 HC SNF PRIVATE

## 2020-01-01 PROCEDURE — D9220A PRA ANESTHESIA: Mod: CRNA,,, | Performed by: NURSE ANESTHETIST, CERTIFIED REGISTERED

## 2020-01-01 PROCEDURE — 94761 N-INVAS EAR/PLS OXIMETRY MLT: CPT

## 2020-01-01 PROCEDURE — 99999 PR PBB SHADOW E&M-EST. PATIENT-LVL IV: ICD-10-PCS | Mod: PBBFAC,,, | Performed by: INTERNAL MEDICINE

## 2020-01-01 PROCEDURE — 25000003 PHARM REV CODE 250: Performed by: STUDENT IN AN ORGANIZED HEALTH CARE EDUCATION/TRAINING PROGRAM

## 2020-01-01 PROCEDURE — 97530 THERAPEUTIC ACTIVITIES: CPT

## 2020-01-01 PROCEDURE — 25000003 PHARM REV CODE 250: Performed by: HOSPITALIST

## 2020-01-01 PROCEDURE — 96375 TX/PRO/DX INJ NEW DRUG ADDON: CPT

## 2020-01-01 PROCEDURE — 73552 X-RAY EXAM OF FEMUR 2/>: CPT | Mod: 26,LT,, | Performed by: RADIOLOGY

## 2020-01-01 PROCEDURE — 63600175 PHARM REV CODE 636 W HCPCS: Performed by: STUDENT IN AN ORGANIZED HEALTH CARE EDUCATION/TRAINING PROGRAM

## 2020-01-01 PROCEDURE — 99498 ADVNCD CARE PLAN ADDL 30 MIN: CPT | Mod: ,,, | Performed by: CLINICAL NURSE SPECIALIST

## 2020-01-01 PROCEDURE — 97530 THERAPEUTIC ACTIVITIES: CPT | Mod: CQ

## 2020-01-01 PROCEDURE — 99285 PR EMERGENCY DEPT VISIT,LEVEL V: ICD-10-PCS | Mod: ,,, | Performed by: EMERGENCY MEDICINE

## 2020-01-01 PROCEDURE — 96401 CHEMO ANTI-NEOPL SQ/IM: CPT

## 2020-01-01 PROCEDURE — 85025 COMPLETE CBC W/AUTO DIFF WBC: CPT

## 2020-01-01 PROCEDURE — 3075F PR MOST RECENT SYSTOLIC BLOOD PRESS GE 130-139MM HG: ICD-10-PCS | Mod: CPTII,S$GLB,, | Performed by: INTERNAL MEDICINE

## 2020-01-01 PROCEDURE — 83735 ASSAY OF MAGNESIUM: CPT

## 2020-01-01 PROCEDURE — 80069 RENAL FUNCTION PANEL: CPT

## 2020-01-01 PROCEDURE — 96374 THER/PROPH/DIAG INJ IV PUSH: CPT

## 2020-01-01 PROCEDURE — 93010 ELECTROCARDIOGRAM REPORT: CPT | Mod: ,,, | Performed by: INTERNAL MEDICINE

## 2020-01-01 PROCEDURE — 99232 SBSQ HOSP IP/OBS MODERATE 35: CPT | Mod: ,,, | Performed by: HOSPITALIST

## 2020-01-01 PROCEDURE — 36415 COLL VENOUS BLD VENIPUNCTURE: CPT

## 2020-01-01 PROCEDURE — 80061 LIPID PANEL: CPT

## 2020-01-01 PROCEDURE — 85610 PROTHROMBIN TIME: CPT

## 2020-01-01 PROCEDURE — 82306 VITAMIN D 25 HYDROXY: CPT

## 2020-01-01 PROCEDURE — 25000003 PHARM REV CODE 250: Performed by: INTERNAL MEDICINE

## 2020-01-01 PROCEDURE — 27100092 HC HIGH FLOW DELIVERY CANNULA

## 2020-01-01 PROCEDURE — 82960 TEST FOR G6PD ENZYME: CPT

## 2020-01-01 PROCEDURE — 3074F SYST BP LT 130 MM HG: CPT | Mod: CPTII,S$GLB,, | Performed by: INTERNAL MEDICINE

## 2020-01-01 PROCEDURE — 96376 TX/PRO/DX INJ SAME DRUG ADON: CPT

## 2020-01-01 PROCEDURE — 37000008 HC ANESTHESIA 1ST 15 MINUTES: Performed by: INTERNAL MEDICINE

## 2020-01-01 PROCEDURE — 3008F PR BODY MASS INDEX (BMI) DOCUMENTED: ICD-10-PCS | Mod: CPTII,S$GLB,, | Performed by: INTERNAL MEDICINE

## 2020-01-01 PROCEDURE — 80053 COMPREHEN METABOLIC PANEL: CPT

## 2020-01-01 PROCEDURE — 99284 EMERGENCY DEPT VISIT MOD MDM: CPT | Mod: ,,, | Performed by: EMERGENCY MEDICINE

## 2020-01-01 PROCEDURE — 45385 COLONOSCOPY W/LESION REMOVAL: CPT | Mod: ,,, | Performed by: INTERNAL MEDICINE

## 2020-01-01 PROCEDURE — 99214 OFFICE O/P EST MOD 30 MIN: CPT | Mod: S$GLB,,, | Performed by: INTERNAL MEDICINE

## 2020-01-01 PROCEDURE — 96372 THER/PROPH/DIAG INJ SC/IM: CPT | Mod: 59

## 2020-01-01 PROCEDURE — 99222 PR INITIAL HOSPITAL CARE,LEVL II: ICD-10-PCS | Mod: GC,,, | Performed by: INTERNAL MEDICINE

## 2020-01-01 PROCEDURE — 99900035 HC TECH TIME PER 15 MIN (STAT)

## 2020-01-01 PROCEDURE — 72131 CT LUMBAR SPINE W/O DYE: CPT | Mod: TC

## 2020-01-01 PROCEDURE — 11000001 HC ACUTE MED/SURG PRIVATE ROOM

## 2020-01-01 PROCEDURE — 80048 BASIC METABOLIC PNL TOTAL CA: CPT

## 2020-01-01 PROCEDURE — 99999 PR PBB SHADOW E&M-EST. PATIENT-LVL V: ICD-10-PCS | Mod: PBBFAC,,, | Performed by: INTERNAL MEDICINE

## 2020-01-01 PROCEDURE — 99214 PR OFFICE/OUTPT VISIT, EST, LEVL IV, 30-39 MIN: ICD-10-PCS | Mod: S$GLB,,, | Performed by: INTERNAL MEDICINE

## 2020-01-01 PROCEDURE — 93010 EKG 12-LEAD: ICD-10-PCS | Mod: ,,, | Performed by: INTERNAL MEDICINE

## 2020-01-01 PROCEDURE — 18500000 HC LEAVE OF ABSENCE HOSPITAL SERVICES

## 2020-01-01 PROCEDURE — 71110 X-RAY EXAM RIBS BIL 3 VIEWS: CPT | Mod: 26,,, | Performed by: RADIOLOGY

## 2020-01-01 PROCEDURE — 84100 ASSAY OF PHOSPHORUS: CPT

## 2020-01-01 PROCEDURE — 92202 PR OPHTHALMOSCOPY, EXT, W/DRAW OPTIC NERVE/MACULA, I&R, UNI/BI: ICD-10-PCS | Mod: S$GLB,,, | Performed by: OPHTHALMOLOGY

## 2020-01-01 PROCEDURE — 95813 PR EEG, EXTENDED, 61-119 MINS: ICD-10-PCS | Mod: 26,,, | Performed by: PSYCHIATRY & NEUROLOGY

## 2020-01-01 PROCEDURE — 3046F PR MOST RECENT HEMOGLOBIN A1C LEVEL > 9.0%: ICD-10-PCS | Mod: CPTII,S$GLB,, | Performed by: INTERNAL MEDICINE

## 2020-01-01 PROCEDURE — 84484 ASSAY OF TROPONIN QUANT: CPT

## 2020-01-01 PROCEDURE — 87502 INFLUENZA DNA AMP PROBE: CPT

## 2020-01-01 PROCEDURE — C9399 UNCLASSIFIED DRUGS OR BIOLOG: HCPCS | Performed by: STUDENT IN AN ORGANIZED HEALTH CARE EDUCATION/TRAINING PROGRAM

## 2020-01-01 PROCEDURE — 97535 SELF CARE MNGMENT TRAINING: CPT

## 2020-01-01 PROCEDURE — 99214 PR OFFICE/OUTPT VISIT, EST, LEVL IV, 30-39 MIN: ICD-10-PCS | Mod: 95,,, | Performed by: NURSE PRACTITIONER

## 2020-01-01 PROCEDURE — 25000242 PHARM REV CODE 250 ALT 637 W/ HCPCS: Performed by: STUDENT IN AN ORGANIZED HEALTH CARE EDUCATION/TRAINING PROGRAM

## 2020-01-01 PROCEDURE — 93010 EKG 12-LEAD: ICD-10-PCS | Mod: 76,,, | Performed by: STUDENT IN AN ORGANIZED HEALTH CARE EDUCATION/TRAINING PROGRAM

## 2020-01-01 PROCEDURE — 83615 LACTATE (LD) (LDH) ENZYME: CPT

## 2020-01-01 PROCEDURE — 87040 BLOOD CULTURE FOR BACTERIA: CPT | Mod: 59

## 2020-01-01 PROCEDURE — D9220A PRA ANESTHESIA: Mod: ANES,,, | Performed by: ANESTHESIOLOGY

## 2020-01-01 PROCEDURE — 74176 CT ABD & PELVIS W/O CONTRAST: CPT | Mod: TC

## 2020-01-01 PROCEDURE — P9038 RBC IRRADIATED: HCPCS

## 2020-01-01 PROCEDURE — 80320 DRUG SCREEN QUANTALCOHOLS: CPT

## 2020-01-01 PROCEDURE — 63600175 PHARM REV CODE 636 W HCPCS: Mod: JG | Performed by: INTERNAL MEDICINE

## 2020-01-01 PROCEDURE — 85730 THROMBOPLASTIN TIME PARTIAL: CPT

## 2020-01-01 PROCEDURE — 36415 COLL VENOUS BLD VENIPUNCTURE: CPT | Mod: PO

## 2020-01-01 PROCEDURE — 63600175 PHARM REV CODE 636 W HCPCS: Performed by: INTERNAL MEDICINE

## 2020-01-01 PROCEDURE — 86850 RBC ANTIBODY SCREEN: CPT

## 2020-01-01 PROCEDURE — 93005 ELECTROCARDIOGRAM TRACING: CPT

## 2020-01-01 PROCEDURE — 25000003 PHARM REV CODE 250: Performed by: NURSE PRACTITIONER

## 2020-01-01 PROCEDURE — 82803 BLOOD GASES ANY COMBINATION: CPT

## 2020-01-01 PROCEDURE — 99214 PR OFFICE/OUTPT VISIT, EST, LEVL IV, 30-39 MIN: ICD-10-PCS | Mod: GC,,, | Performed by: INTERNAL MEDICINE

## 2020-01-01 PROCEDURE — 3078F DIAST BP <80 MM HG: CPT | Mod: CPTII,S$GLB,, | Performed by: INTERNAL MEDICINE

## 2020-01-01 PROCEDURE — 99232 PR SUBSEQUENT HOSPITAL CARE,LEVL II: ICD-10-PCS | Mod: ,,, | Performed by: HOSPITALIST

## 2020-01-01 PROCEDURE — 96365 THER/PROPH/DIAG IV INF INIT: CPT

## 2020-01-01 PROCEDURE — 82607 VITAMIN B-12: CPT

## 2020-01-01 PROCEDURE — 63600175 PHARM REV CODE 636 W HCPCS: Performed by: HOSPITALIST

## 2020-01-01 PROCEDURE — 96372 THER/PROPH/DIAG INJ SC/IM: CPT

## 2020-01-01 PROCEDURE — 25000003 PHARM REV CODE 250: Performed by: ANESTHESIOLOGY

## 2020-01-01 PROCEDURE — 99292 CRITICAL CARE ADDL 30 MIN: CPT

## 2020-01-01 PROCEDURE — 72128 CT CHEST SPINE W/O DYE: CPT | Mod: 26,,, | Performed by: RADIOLOGY

## 2020-01-01 PROCEDURE — 36600 WITHDRAWAL OF ARTERIAL BLOOD: CPT

## 2020-01-01 PROCEDURE — G0378 HOSPITAL OBSERVATION PER HR: HCPCS

## 2020-01-01 PROCEDURE — 84443 ASSAY THYROID STIM HORMONE: CPT

## 2020-01-01 PROCEDURE — 81000 URINALYSIS NONAUTO W/SCOPE: CPT

## 2020-01-01 PROCEDURE — 99215 OFFICE O/P EST HI 40 MIN: CPT | Mod: S$GLB,,, | Performed by: INTERNAL MEDICINE

## 2020-01-01 PROCEDURE — 92507 TX SP LANG VOICE COMM INDIV: CPT

## 2020-01-01 PROCEDURE — 80307 DRUG TEST PRSMV CHEM ANLYZR: CPT

## 2020-01-01 PROCEDURE — 99215 OFFICE O/P EST HI 40 MIN: CPT | Mod: GC,,, | Performed by: INTERNAL MEDICINE

## 2020-01-01 PROCEDURE — 87529 HSV DNA AMP PROBE: CPT

## 2020-01-01 PROCEDURE — 86140 C-REACTIVE PROTEIN: CPT

## 2020-01-01 PROCEDURE — 84207 ASSAY OF VITAMIN B-6: CPT

## 2020-01-01 PROCEDURE — 63600175 PHARM REV CODE 636 W HCPCS: Performed by: EMERGENCY MEDICINE

## 2020-01-01 PROCEDURE — 88305 TISSUE EXAM BY PATHOLOGIST: ICD-10-PCS | Mod: 26,,, | Performed by: PATHOLOGY

## 2020-01-01 PROCEDURE — 27201089 HC SNARE, DISP (ANY): Performed by: INTERNAL MEDICINE

## 2020-01-01 PROCEDURE — 87086 URINE CULTURE/COLONY COUNT: CPT

## 2020-01-01 PROCEDURE — 82962 GLUCOSE BLOOD TEST: CPT

## 2020-01-01 PROCEDURE — 97162 PT EVAL MOD COMPLEX 30 MIN: CPT

## 2020-01-01 PROCEDURE — 3078F PR MOST RECENT DIASTOLIC BLOOD PRESSURE < 80 MM HG: ICD-10-PCS | Mod: CPTII,S$GLB,, | Performed by: INTERNAL MEDICINE

## 2020-01-01 PROCEDURE — 27201018 HC KIT, PEG (ANY): Performed by: INTERNAL MEDICINE

## 2020-01-01 PROCEDURE — 84300 ASSAY OF URINE SODIUM: CPT

## 2020-01-01 PROCEDURE — 86580 TB INTRADERMAL TEST: CPT | Performed by: HOSPITALIST

## 2020-01-01 PROCEDURE — 74176 CT ABD & PELVIS W/O CONTRAST: CPT | Mod: 26,,, | Performed by: RADIOLOGY

## 2020-01-01 PROCEDURE — 99233 PR SUBSEQUENT HOSPITAL CARE,LEVL III: ICD-10-PCS | Mod: ,,, | Performed by: HOSPITALIST

## 2020-01-01 PROCEDURE — 37000009 HC ANESTHESIA EA ADD 15 MINS: Performed by: INTERNAL MEDICINE

## 2020-01-01 PROCEDURE — 81000 URINALYSIS NONAUTO W/SCOPE: CPT | Mod: PO

## 2020-01-01 PROCEDURE — 83690 ASSAY OF LIPASE: CPT

## 2020-01-01 PROCEDURE — 94660 CPAP INITIATION&MGMT: CPT

## 2020-01-01 PROCEDURE — 97116 GAIT TRAINING THERAPY: CPT

## 2020-01-01 PROCEDURE — 84540 ASSAY OF URINE/UREA-N: CPT

## 2020-01-01 PROCEDURE — 95813 EEG EXTND MNTR 61-119 MIN: CPT

## 2020-01-01 PROCEDURE — 3074F PR MOST RECENT SYSTOLIC BLOOD PRESSURE < 130 MM HG: ICD-10-PCS | Mod: CPTII,S$GLB,, | Performed by: INTERNAL MEDICINE

## 2020-01-01 PROCEDURE — D9220A PRA ANESTHESIA: ICD-10-PCS | Mod: CRNA,,, | Performed by: NURSE ANESTHETIST, CERTIFIED REGISTERED

## 2020-01-01 PROCEDURE — 97161 PT EVAL LOW COMPLEX 20 MIN: CPT

## 2020-01-01 PROCEDURE — 72131 CT LUMBAR SPINE W/O DYE: CPT | Mod: 26,,, | Performed by: RADIOLOGY

## 2020-01-01 PROCEDURE — 97803 MED NUTRITION INDIV SUBSEQ: CPT

## 2020-01-01 PROCEDURE — 25000003 PHARM REV CODE 250: Performed by: NURSE ANESTHETIST, CERTIFIED REGISTERED

## 2020-01-01 PROCEDURE — 99999 PR PBB SHADOW E&M-EST. PATIENT-LVL V: CPT | Mod: PBBFAC,,, | Performed by: INTERNAL MEDICINE

## 2020-01-01 PROCEDURE — 99999 PR PBB SHADOW E&M-EST. PATIENT-LVL III: CPT | Mod: PBBFAC,,, | Performed by: INTERNAL MEDICINE

## 2020-01-01 PROCEDURE — U0002 COVID-19 LAB TEST NON-CDC: HCPCS

## 2020-01-01 PROCEDURE — 3008F BODY MASS INDEX DOCD: CPT | Mod: CPTII,S$GLB,, | Performed by: INTERNAL MEDICINE

## 2020-01-01 PROCEDURE — 27100171 HC OXYGEN HIGH FLOW UP TO 24 HOURS

## 2020-01-01 PROCEDURE — 99498 PR ADVNCD CARE PLAN ADDL 30 MIN: ICD-10-PCS | Mod: ,,, | Performed by: CLINICAL NURSE SPECIALIST

## 2020-01-01 PROCEDURE — 99999 PR PBB SHADOW E&M-EST. PATIENT-LVL III: ICD-10-PCS | Mod: PBBFAC,,, | Performed by: INTERNAL MEDICINE

## 2020-01-01 PROCEDURE — 83880 ASSAY OF NATRIURETIC PEPTIDE: CPT

## 2020-01-01 PROCEDURE — 71110 X-RAY EXAM RIBS BIL 3 VIEWS: CPT | Mod: TC,FY,PO

## 2020-01-01 PROCEDURE — 3075F SYST BP GE 130 - 139MM HG: CPT | Mod: CPTII,S$GLB,, | Performed by: INTERNAL MEDICINE

## 2020-01-01 PROCEDURE — 82746 ASSAY OF FOLIC ACID SERUM: CPT

## 2020-01-01 PROCEDURE — 82550 ASSAY OF CK (CPK): CPT

## 2020-01-01 PROCEDURE — 70450 CT HEAD/BRAIN W/O DYE: CPT | Mod: TC

## 2020-01-01 PROCEDURE — C9399 UNCLASSIFIED DRUGS OR BIOLOG: HCPCS | Performed by: HOSPITALIST

## 2020-01-01 PROCEDURE — 84484 ASSAY OF TROPONIN QUANT: CPT | Mod: 91

## 2020-01-01 PROCEDURE — 97110 THERAPEUTIC EXERCISES: CPT

## 2020-01-01 PROCEDURE — 70450 CT HEAD WITHOUT CONTRAST: ICD-10-PCS | Mod: 26,,, | Performed by: RADIOLOGY

## 2020-01-01 PROCEDURE — 92523 SPEECH SOUND LANG COMPREHEN: CPT

## 2020-01-01 PROCEDURE — 27000221 HC OXYGEN, UP TO 24 HOURS

## 2020-01-01 PROCEDURE — 88305 TISSUE EXAM BY PATHOLOGIST: CPT | Mod: 59 | Performed by: PATHOLOGY

## 2020-01-01 PROCEDURE — 99222 PR INITIAL HOSPITAL CARE,LEVL II: ICD-10-PCS | Mod: AI,GC,, | Performed by: HOSPITALIST

## 2020-01-01 PROCEDURE — 63600175 PHARM REV CODE 636 W HCPCS: Mod: JW,JG | Performed by: INTERNAL MEDICINE

## 2020-01-01 PROCEDURE — 80329 ANALGESICS NON-OPIOID 1 OR 2: CPT

## 2020-01-01 PROCEDURE — 99222 1ST HOSP IP/OBS MODERATE 55: CPT | Mod: AI,GC,, | Performed by: HOSPITALIST

## 2020-01-01 PROCEDURE — 63600175 PHARM REV CODE 636 W HCPCS

## 2020-01-01 PROCEDURE — 70551 MRI BRAIN STEM W/O DYE: CPT | Mod: TC

## 2020-01-01 PROCEDURE — 83036 HEMOGLOBIN GLYCOSYLATED A1C: CPT

## 2020-01-01 PROCEDURE — 99283 EMERGENCY DEPT VISIT LOW MDM: CPT

## 2020-01-01 PROCEDURE — 83605 ASSAY OF LACTIC ACID: CPT

## 2020-01-01 PROCEDURE — 99205 OFFICE O/P NEW HI 60 MIN: CPT | Mod: 95,,, | Performed by: PSYCHIATRY & NEUROLOGY

## 2020-01-01 PROCEDURE — 86922 COMPATIBILITY TEST ANTIGLOB: CPT

## 2020-01-01 PROCEDURE — 97116 GAIT TRAINING THERAPY: CPT | Mod: CQ

## 2020-01-01 PROCEDURE — 27000190 HC CPAP FULL FACE MASK W/VALVE

## 2020-01-01 PROCEDURE — 82140 ASSAY OF AMMONIA: CPT

## 2020-01-01 PROCEDURE — 99999 PR PBB SHADOW E&M-EST. PATIENT-LVL IV: CPT | Mod: PBBFAC,,, | Performed by: INTERNAL MEDICINE

## 2020-01-01 PROCEDURE — 83010 ASSAY OF HAPTOGLOBIN QUANT: CPT

## 2020-01-01 PROCEDURE — 86901 BLOOD TYPING SEROLOGIC RH(D): CPT

## 2020-01-01 PROCEDURE — 97110 THERAPEUTIC EXERCISES: CPT | Mod: CQ

## 2020-01-01 PROCEDURE — 12000002 HC ACUTE/MED SURGE SEMI-PRIVATE ROOM

## 2020-01-01 PROCEDURE — 99222 1ST HOSP IP/OBS MODERATE 55: CPT | Mod: GC,,, | Performed by: INTERNAL MEDICINE

## 2020-01-01 PROCEDURE — 97802 MEDICAL NUTRITION INDIV IN: CPT

## 2020-01-01 PROCEDURE — 70551 MRI BRAIN WITHOUT CONTRAST: ICD-10-PCS | Mod: 26,,, | Performed by: RADIOLOGY

## 2020-01-01 PROCEDURE — 3046F HEMOGLOBIN A1C LEVEL >9.0%: CPT | Mod: CPTII,S$GLB,, | Performed by: INTERNAL MEDICINE

## 2020-01-01 PROCEDURE — 45385 PR COLONOSCOPY,REMV LESN,SNARE: ICD-10-PCS | Mod: ,,, | Performed by: INTERNAL MEDICINE

## 2020-01-01 PROCEDURE — 73552 X-RAY EXAM OF FEMUR 2/>: CPT | Mod: TC,FY,PO,LT

## 2020-01-01 PROCEDURE — 71110 XR RIBS 3 VIEWS BILATERAL: ICD-10-PCS | Mod: 26,,, | Performed by: RADIOLOGY

## 2020-01-01 PROCEDURE — 99999 PR PBB SHADOW E&M-EST. PATIENT-LVL III: ICD-10-PCS | Mod: PBBFAC,,, | Performed by: OPHTHALMOLOGY

## 2020-01-01 PROCEDURE — 36430 TRANSFUSION BLD/BLD COMPNT: CPT

## 2020-01-01 PROCEDURE — 93010 ELECTROCARDIOGRAM REPORT: CPT | Mod: 76,,, | Performed by: STUDENT IN AN ORGANIZED HEALTH CARE EDUCATION/TRAINING PROGRAM

## 2020-01-01 PROCEDURE — 81000 URINALYSIS NONAUTO W/SCOPE: CPT | Mod: 59

## 2020-01-01 PROCEDURE — 25000003 PHARM REV CODE 250

## 2020-01-01 PROCEDURE — 63600175 PHARM REV CODE 636 W HCPCS: Performed by: ANESTHESIOLOGY

## 2020-01-01 PROCEDURE — 82140 ASSAY OF AMMONIA: CPT | Mod: 91

## 2020-01-01 PROCEDURE — 99999 PR PBB SHADOW E&M-EST. PATIENT-LVL III: CPT | Mod: PBBFAC,,, | Performed by: OPHTHALMOLOGY

## 2020-01-01 PROCEDURE — 82010 KETONE BODYS QUAN: CPT

## 2020-01-01 PROCEDURE — 43246 EGD PLACE GASTROSTOMY TUBE: CPT | Performed by: INTERNAL MEDICINE

## 2020-01-01 PROCEDURE — 99285 EMERGENCY DEPT VISIT HI MDM: CPT | Mod: ,,, | Performed by: EMERGENCY MEDICINE

## 2020-01-01 PROCEDURE — D9220A PRA ANESTHESIA: ICD-10-PCS | Mod: ANES,,, | Performed by: ANESTHESIOLOGY

## 2020-01-01 PROCEDURE — 27201040 HC RC 50 FILTER: Mod: 91

## 2020-01-01 PROCEDURE — 45385 COLONOSCOPY W/LESION REMOVAL: CPT | Performed by: INTERNAL MEDICINE

## 2020-01-01 PROCEDURE — G0180 MD CERTIFICATION HHA PATIENT: HCPCS | Mod: ,,, | Performed by: INTERNAL MEDICINE

## 2020-01-01 PROCEDURE — 99285 EMERGENCY DEPT VISIT HI MDM: CPT | Mod: 25

## 2020-01-01 PROCEDURE — 99215 PR OFFICE/OUTPT VISIT, EST, LEVL V, 40-54 MIN: ICD-10-PCS | Mod: S$GLB,,, | Performed by: INTERNAL MEDICINE

## 2020-01-01 PROCEDURE — C9399 UNCLASSIFIED DRUGS OR BIOLOG: HCPCS | Performed by: INTERNAL MEDICINE

## 2020-01-01 PROCEDURE — 72131 CT LUMBAR SPINE WITHOUT CONTRAST: ICD-10-PCS | Mod: 26,,, | Performed by: RADIOLOGY

## 2020-01-01 PROCEDURE — 85045 AUTOMATED RETICULOCYTE COUNT: CPT

## 2020-01-01 PROCEDURE — 85652 RBC SED RATE AUTOMATED: CPT

## 2020-01-01 PROCEDURE — 85730 THROMBOPLASTIN TIME PARTIAL: CPT | Mod: 91

## 2020-01-01 PROCEDURE — 70450 CT HEAD/BRAIN W/O DYE: CPT | Mod: 26,,, | Performed by: RADIOLOGY

## 2020-01-01 PROCEDURE — 3077F SYST BP >= 140 MM HG: CPT | Mod: CPTII,S$GLB,, | Performed by: INTERNAL MEDICINE

## 2020-01-01 PROCEDURE — 99497 ADVNCD CARE PLAN 30 MIN: CPT | Mod: ,,, | Performed by: CLINICAL NURSE SPECIALIST

## 2020-01-01 PROCEDURE — 96366 THER/PROPH/DIAG IV INF ADDON: CPT

## 2020-01-01 PROCEDURE — 85379 FIBRIN DEGRADATION QUANT: CPT

## 2020-01-01 PROCEDURE — 82962 GLUCOSE BLOOD TEST: CPT | Performed by: INTERNAL MEDICINE

## 2020-01-01 PROCEDURE — G0180 PR HOME HEALTH MD CERTIFICATION: ICD-10-PCS | Mod: ,,, | Performed by: INTERNAL MEDICINE

## 2020-01-01 PROCEDURE — 43246 EGD PLACE GASTROSTOMY TUBE: CPT | Mod: GC,,, | Performed by: INTERNAL MEDICINE

## 2020-01-01 PROCEDURE — 30200315 PPD INTRADERMAL TEST REV CODE 302: Performed by: HOSPITALIST

## 2020-01-01 PROCEDURE — 80074 ACUTE HEPATITIS PANEL: CPT

## 2020-01-01 PROCEDURE — 74176 CT ABDOMEN PELVIS WITHOUT CONTRAST: ICD-10-PCS | Mod: 26,,, | Performed by: RADIOLOGY

## 2020-01-01 PROCEDURE — 73552 XR FEMUR 2 VIEW LEFT: ICD-10-PCS | Mod: 26,LT,, | Performed by: RADIOLOGY

## 2020-01-01 PROCEDURE — 99306 PR NURSING FACILITY CARE, INIT, HIGH SEVERITY: ICD-10-PCS | Mod: AI,,, | Performed by: INTERNAL MEDICINE

## 2020-01-01 PROCEDURE — 99215 PR OFFICE/OUTPT VISIT, EST, LEVL V, 40-54 MIN: ICD-10-PCS | Mod: GC,,, | Performed by: INTERNAL MEDICINE

## 2020-01-01 PROCEDURE — 63600175 PHARM REV CODE 636 W HCPCS: Performed by: NURSE ANESTHETIST, CERTIFIED REGISTERED

## 2020-01-01 PROCEDURE — 97165 OT EVAL LOW COMPLEX 30 MIN: CPT

## 2020-01-01 PROCEDURE — 72128 CT CHEST SPINE W/O DYE: CPT | Mod: TC

## 2020-01-01 PROCEDURE — 99316 PR NURSING FAC DISCHRGE DAY,MORE 30 MIN: ICD-10-PCS | Mod: ,,, | Performed by: INTERNAL MEDICINE

## 2020-01-01 PROCEDURE — 95813 EEG EXTND MNTR 61-119 MIN: CPT | Mod: 26,,, | Performed by: PSYCHIATRY & NEUROLOGY

## 2020-01-01 PROCEDURE — 99284 PR EMERGENCY DEPT VISIT,LEVEL IV: ICD-10-PCS | Mod: ,,, | Performed by: EMERGENCY MEDICINE

## 2020-01-01 PROCEDURE — 84145 PROCALCITONIN (PCT): CPT

## 2020-01-01 PROCEDURE — 3079F PR MOST RECENT DIASTOLIC BLOOD PRESSURE 80-89 MM HG: ICD-10-PCS | Mod: CPTII,S$GLB,, | Performed by: INTERNAL MEDICINE

## 2020-01-01 PROCEDURE — 63700000 PHARM REV CODE 250 ALT 637 W/O HCPCS: Performed by: HOSPITALIST

## 2020-01-01 PROCEDURE — 81001 URINALYSIS AUTO W/SCOPE: CPT

## 2020-01-01 PROCEDURE — 82728 ASSAY OF FERRITIN: CPT

## 2020-01-01 PROCEDURE — 82043 UR ALBUMIN QUANTITATIVE: CPT

## 2020-01-01 PROCEDURE — 99239 HOSP IP/OBS DSCHRG MGMT >30: CPT | Mod: ,,, | Performed by: INTERNAL MEDICINE

## 2020-01-01 PROCEDURE — 92014 COMPRE OPH EXAM EST PT 1/>: CPT | Mod: S$GLB,,, | Performed by: OPHTHALMOLOGY

## 2020-01-01 PROCEDURE — 92202 OPSCPY EXTND ON/MAC DRAW: CPT | Mod: S$GLB,,, | Performed by: OPHTHALMOLOGY

## 2020-01-01 PROCEDURE — 82272 OCCULT BLD FECES 1-3 TESTS: CPT

## 2020-01-01 PROCEDURE — 3046F HEMOGLOBIN A1C LEVEL >9.0%: CPT | Mod: CPTII,GT,, | Performed by: NURSE PRACTITIONER

## 2020-01-01 PROCEDURE — 83540 ASSAY OF IRON: CPT

## 2020-01-01 PROCEDURE — 25000003 PHARM REV CODE 250: Performed by: EMERGENCY MEDICINE

## 2020-01-01 PROCEDURE — 97166 OT EVAL MOD COMPLEX 45 MIN: CPT

## 2020-01-01 PROCEDURE — 72128 CT THORACIC SPINE WITHOUT CONTRAST: ICD-10-PCS | Mod: 26,,, | Performed by: RADIOLOGY

## 2020-01-01 PROCEDURE — 99306 1ST NF CARE HIGH MDM 50: CPT | Mod: AI,,, | Performed by: INTERNAL MEDICINE

## 2020-01-01 PROCEDURE — 92610 EVALUATE SWALLOWING FUNCTION: CPT

## 2020-01-01 PROCEDURE — 3046F PR MOST RECENT HEMOGLOBIN A1C LEVEL > 9.0%: ICD-10-PCS | Mod: CPTII,GT,, | Performed by: NURSE PRACTITIONER

## 2020-01-01 PROCEDURE — 3077F PR MOST RECENT SYSTOLIC BLOOD PRESSURE >= 140 MM HG: ICD-10-PCS | Mod: CPTII,S$GLB,, | Performed by: INTERNAL MEDICINE

## 2020-01-01 PROCEDURE — 99900058 HC 022 PAID UNDER SNF PPS

## 2020-01-01 PROCEDURE — 99214 OFFICE O/P EST MOD 30 MIN: CPT | Mod: GC,,, | Performed by: INTERNAL MEDICINE

## 2020-01-01 PROCEDURE — 97530 THERAPEUTIC ACTIVITIES: CPT | Mod: CO

## 2020-01-01 PROCEDURE — 96361 HYDRATE IV INFUSION ADD-ON: CPT

## 2020-01-01 PROCEDURE — 99316 NF DSCHRG MGMT 30 MIN+: CPT | Mod: ,,, | Performed by: INTERNAL MEDICINE

## 2020-01-01 PROCEDURE — 99214 OFFICE O/P EST MOD 30 MIN: CPT | Mod: ,,, | Performed by: INTERNAL MEDICINE

## 2020-01-01 PROCEDURE — 99239 PR HOSPITAL DISCHARGE DAY,>30 MIN: ICD-10-PCS | Mod: ,,, | Performed by: INTERNAL MEDICINE

## 2020-01-01 PROCEDURE — 3079F DIAST BP 80-89 MM HG: CPT | Mod: CPTII,S$GLB,, | Performed by: INTERNAL MEDICINE

## 2020-01-01 PROCEDURE — 70551 MRI BRAIN STEM W/O DYE: CPT | Mod: 26,,, | Performed by: RADIOLOGY

## 2020-01-01 PROCEDURE — 93010 ELECTROCARDIOGRAM REPORT: CPT | Mod: ,,, | Performed by: STUDENT IN AN ORGANIZED HEALTH CARE EDUCATION/TRAINING PROGRAM

## 2020-01-01 PROCEDURE — 99214 PR OFFICE/OUTPT VISIT, EST, LEVL IV, 30-39 MIN: ICD-10-PCS | Mod: ,,, | Performed by: INTERNAL MEDICINE

## 2020-01-01 PROCEDURE — 87205 SMEAR GRAM STAIN: CPT

## 2020-01-01 PROCEDURE — 99291 CRITICAL CARE FIRST HOUR: CPT | Mod: 25

## 2020-01-01 PROCEDURE — 92014 PR EYE EXAM, EST PATIENT,COMPREHESV: ICD-10-PCS | Mod: S$GLB,,, | Performed by: OPHTHALMOLOGY

## 2020-01-01 PROCEDURE — 99233 SBSQ HOSP IP/OBS HIGH 50: CPT | Mod: ,,, | Performed by: HOSPITALIST

## 2020-01-01 PROCEDURE — 83970 ASSAY OF PARATHORMONE: CPT

## 2020-01-01 PROCEDURE — 43246 PR EGD, FLEX, W/PLCMT, GASTROSTOMY TUBE: ICD-10-PCS | Mod: GC,,, | Performed by: INTERNAL MEDICINE

## 2020-01-01 PROCEDURE — 99214 OFFICE O/P EST MOD 30 MIN: CPT | Mod: 95,,, | Performed by: NURSE PRACTITIONER

## 2020-01-01 PROCEDURE — 99205 PR OFFICE/OUTPT VISIT, NEW, LEVL V, 60-74 MIN: ICD-10-PCS | Mod: 95,,, | Performed by: PSYCHIATRY & NEUROLOGY

## 2020-01-01 PROCEDURE — 88305 TISSUE EXAM BY PATHOLOGIST: CPT | Mod: 26,,, | Performed by: PATHOLOGY

## 2020-01-01 PROCEDURE — 99497 PR ADVNCD CARE PLAN 30 MIN: ICD-10-PCS | Mod: ,,, | Performed by: CLINICAL NURSE SPECIALIST

## 2020-01-01 RX ORDER — POLYETHYLENE GLYCOL 3350 17 G/17G
17 POWDER, FOR SOLUTION ORAL DAILY
Refills: 0
Start: 2020-01-01

## 2020-01-01 RX ORDER — BORTEZOMIB 3.5 MG/1
1.5 INJECTION, POWDER, LYOPHILIZED, FOR SOLUTION INTRAVENOUS; SUBCUTANEOUS
Status: CANCELLED | OUTPATIENT
Start: 2020-01-01

## 2020-01-01 RX ORDER — HEPARIN SODIUM,PORCINE/D5W 25000/250
12 INTRAVENOUS SOLUTION INTRAVENOUS CONTINUOUS
Status: DISCONTINUED | OUTPATIENT
Start: 2020-01-01 | End: 2020-01-01

## 2020-01-01 RX ORDER — BENZONATATE 100 MG/1
100 CAPSULE ORAL 3 TIMES DAILY PRN
Status: DISCONTINUED | OUTPATIENT
Start: 2020-01-01 | End: 2020-01-01 | Stop reason: HOSPADM

## 2020-01-01 RX ORDER — ETOMIDATE 2 MG/ML
INJECTION INTRAVENOUS
Status: DISCONTINUED | OUTPATIENT
Start: 2020-01-01 | End: 2020-01-01

## 2020-01-01 RX ORDER — ONDANSETRON 2 MG/ML
4 INJECTION INTRAMUSCULAR; INTRAVENOUS EVERY 8 HOURS PRN
Status: DISCONTINUED | OUTPATIENT
Start: 2020-01-01 | End: 2020-01-01 | Stop reason: HOSPADM

## 2020-01-01 RX ORDER — AMOXICILLIN 250 MG
1 CAPSULE ORAL 2 TIMES DAILY
Status: CANCELLED | OUTPATIENT
Start: 2020-01-01

## 2020-01-01 RX ORDER — HEPARIN 100 UNIT/ML
500 SYRINGE INTRAVENOUS
Status: CANCELLED | OUTPATIENT
Start: 2020-01-01

## 2020-01-01 RX ORDER — PANTOPRAZOLE SODIUM 40 MG/1
40 TABLET, DELAYED RELEASE ORAL DAILY
Status: DISCONTINUED | OUTPATIENT
Start: 2020-01-01 | End: 2020-01-01 | Stop reason: HOSPADM

## 2020-01-01 RX ORDER — PHENYLEPHRINE HYDROCHLORIDE 10 MG/ML
INJECTION INTRAVENOUS
Status: DISCONTINUED | OUTPATIENT
Start: 2020-01-01 | End: 2020-01-01

## 2020-01-01 RX ORDER — GLUCAGON 1 MG
1 KIT INJECTION
Status: DISCONTINUED | OUTPATIENT
Start: 2020-01-01 | End: 2020-01-01

## 2020-01-01 RX ORDER — CLOPIDOGREL BISULFATE 75 MG/1
75 TABLET ORAL DAILY
Status: CANCELLED | OUTPATIENT
Start: 2020-01-01

## 2020-01-01 RX ORDER — AMOXICILLIN 250 MG
1 CAPSULE ORAL DAILY
Start: 2020-01-01

## 2020-01-01 RX ORDER — INSULIN ASPART 100 [IU]/ML
2 INJECTION, SOLUTION INTRAVENOUS; SUBCUTANEOUS 3 TIMES DAILY
Refills: 0
Start: 2020-01-01 | End: 2021-04-30

## 2020-01-01 RX ORDER — BORTEZOMIB 3.5 MG/1
1.5 INJECTION, POWDER, LYOPHILIZED, FOR SOLUTION INTRAVENOUS; SUBCUTANEOUS
Status: COMPLETED | OUTPATIENT
Start: 2020-01-01 | End: 2020-01-01

## 2020-01-01 RX ORDER — FUROSEMIDE 10 MG/ML
40 INJECTION INTRAMUSCULAR; INTRAVENOUS ONCE
Status: COMPLETED | OUTPATIENT
Start: 2020-01-01 | End: 2020-01-01

## 2020-01-01 RX ORDER — SODIUM CHLORIDE, SODIUM LACTATE, POTASSIUM CHLORIDE, CALCIUM CHLORIDE 600; 310; 30; 20 MG/100ML; MG/100ML; MG/100ML; MG/100ML
INJECTION, SOLUTION INTRAVENOUS CONTINUOUS
Status: ACTIVE | OUTPATIENT
Start: 2020-01-01 | End: 2020-01-01

## 2020-01-01 RX ORDER — GLUCAGON 1 MG
1 KIT INJECTION
Status: DISCONTINUED | OUTPATIENT
Start: 2020-01-01 | End: 2020-01-01 | Stop reason: HOSPADM

## 2020-01-01 RX ORDER — CLOPIDOGREL BISULFATE 75 MG/1
75 TABLET ORAL DAILY
Status: DISCONTINUED | OUTPATIENT
Start: 2020-01-01 | End: 2020-01-01

## 2020-01-01 RX ORDER — LACTULOSE 10 G/15ML
200 SOLUTION ORAL; RECTAL 3 TIMES DAILY
Status: DISCONTINUED | OUTPATIENT
Start: 2020-01-01 | End: 2020-01-01

## 2020-01-01 RX ORDER — SODIUM CHLORIDE 0.9 % (FLUSH) 0.9 %
10 SYRINGE (ML) INJECTION
Status: CANCELLED | OUTPATIENT
Start: 2020-01-01

## 2020-01-01 RX ORDER — HYDROXYCHLOROQUINE SULFATE 200 MG/1
400 TABLET, FILM COATED ORAL 2 TIMES DAILY
Status: COMPLETED | OUTPATIENT
Start: 2020-01-01 | End: 2020-01-01

## 2020-01-01 RX ORDER — AMOXICILLIN 250 MG
1 CAPSULE ORAL 2 TIMES DAILY
Status: DISCONTINUED | OUTPATIENT
Start: 2020-01-01 | End: 2020-01-01

## 2020-01-01 RX ORDER — CARVEDILOL 25 MG/1
25 TABLET ORAL 2 TIMES DAILY WITH MEALS
Status: DISCONTINUED | OUTPATIENT
Start: 2020-01-01 | End: 2020-01-01 | Stop reason: HOSPADM

## 2020-01-01 RX ORDER — CLOPIDOGREL BISULFATE 75 MG/1
75 TABLET ORAL DAILY
Status: DISCONTINUED | OUTPATIENT
Start: 2020-01-01 | End: 2020-01-01 | Stop reason: HOSPADM

## 2020-01-01 RX ORDER — INSULIN ASPART 100 [IU]/ML
INJECTION, SOLUTION INTRAVENOUS; SUBCUTANEOUS
Qty: 18 ML | Refills: 6
Start: 2020-01-01 | End: 2020-01-01

## 2020-01-01 RX ORDER — FLUOXETINE 10 MG/1
20 CAPSULE ORAL DAILY
Status: DISCONTINUED | OUTPATIENT
Start: 2020-01-01 | End: 2020-01-01

## 2020-01-01 RX ORDER — INSULIN ASPART 100 [IU]/ML
0-5 INJECTION, SOLUTION INTRAVENOUS; SUBCUTANEOUS
Status: DISCONTINUED | OUTPATIENT
Start: 2020-01-01 | End: 2020-01-01 | Stop reason: HOSPADM

## 2020-01-01 RX ORDER — IBUPROFEN 200 MG
24 TABLET ORAL
Status: DISCONTINUED | OUTPATIENT
Start: 2020-01-01 | End: 2020-01-01 | Stop reason: HOSPADM

## 2020-01-01 RX ORDER — SODIUM CHLORIDE 9 MG/ML
INJECTION, SOLUTION INTRAVENOUS CONTINUOUS
Status: DISCONTINUED | OUTPATIENT
Start: 2020-01-01 | End: 2020-01-01 | Stop reason: HOSPADM

## 2020-01-01 RX ORDER — ACETAMINOPHEN 650 MG/1
650 SUPPOSITORY RECTAL
Status: COMPLETED | OUTPATIENT
Start: 2020-01-01 | End: 2020-01-01

## 2020-01-01 RX ORDER — SODIUM CHLORIDE 0.9 % (FLUSH) 0.9 %
10 SYRINGE (ML) INJECTION
Status: DISCONTINUED | OUTPATIENT
Start: 2020-01-01 | End: 2020-01-01 | Stop reason: HOSPADM

## 2020-01-01 RX ORDER — FUROSEMIDE 10 MG/ML
INJECTION INTRAMUSCULAR; INTRAVENOUS
Status: COMPLETED
Start: 2020-01-01 | End: 2020-01-01

## 2020-01-01 RX ORDER — PANTOPRAZOLE SODIUM 40 MG/1
40 TABLET, DELAYED RELEASE ORAL DAILY
Status: CANCELLED | OUTPATIENT
Start: 2020-01-01

## 2020-01-01 RX ORDER — ASPIRIN 325 MG
325 TABLET ORAL DAILY
Status: DISCONTINUED | OUTPATIENT
Start: 2020-01-01 | End: 2020-01-01

## 2020-01-01 RX ORDER — INSULIN ASPART 100 [IU]/ML
0-5 INJECTION, SOLUTION INTRAVENOUS; SUBCUTANEOUS
Status: CANCELLED | OUTPATIENT
Start: 2020-01-01

## 2020-01-01 RX ORDER — ACETAMINOPHEN 325 MG/1
650 TABLET ORAL EVERY 6 HOURS PRN
Status: DISCONTINUED | OUTPATIENT
Start: 2020-01-01 | End: 2020-01-01

## 2020-01-01 RX ORDER — NAPROXEN SODIUM 220 MG/1
81 TABLET, FILM COATED ORAL DAILY
Status: DISCONTINUED | OUTPATIENT
Start: 2020-01-01 | End: 2020-01-01 | Stop reason: HOSPADM

## 2020-01-01 RX ORDER — PROPOFOL 10 MG/ML
VIAL (ML) INTRAVENOUS
Status: DISCONTINUED | OUTPATIENT
Start: 2020-01-01 | End: 2020-01-01

## 2020-01-01 RX ORDER — ACETAMINOPHEN 650 MG/1
650 SUPPOSITORY RECTAL EVERY 6 HOURS PRN
Status: DISCONTINUED | OUTPATIENT
Start: 2020-01-01 | End: 2020-01-01 | Stop reason: HOSPADM

## 2020-01-01 RX ORDER — METHYL SALICYLATE 250 MG/G
CREAM TOPICAL
Status: ON HOLD | COMMUNITY
Start: 2020-01-01 | End: 2020-01-01 | Stop reason: HOSPADM

## 2020-01-01 RX ORDER — CEFAZOLIN SODIUM 1 G/3ML
1 INJECTION, POWDER, FOR SOLUTION INTRAMUSCULAR; INTRAVENOUS
Status: DISCONTINUED | OUTPATIENT
Start: 2020-01-01 | End: 2020-01-01

## 2020-01-01 RX ORDER — ASPIRIN 81 MG/1
81 TABLET ORAL DAILY
Status: DISCONTINUED | OUTPATIENT
Start: 2020-01-01 | End: 2020-01-01 | Stop reason: HOSPADM

## 2020-01-01 RX ORDER — ATORVASTATIN CALCIUM 40 MG/1
40 TABLET, FILM COATED ORAL NIGHTLY
Status: CANCELLED | OUTPATIENT
Start: 2020-01-01

## 2020-01-01 RX ORDER — ACETAMINOPHEN 325 MG/1
650 TABLET ORAL EVERY 6 HOURS PRN
Status: CANCELLED | OUTPATIENT
Start: 2020-01-01

## 2020-01-01 RX ORDER — BENZONATATE 100 MG/1
100 CAPSULE ORAL 3 TIMES DAILY PRN
Status: DISCONTINUED | OUTPATIENT
Start: 2020-01-01 | End: 2020-01-01

## 2020-01-01 RX ORDER — INSULIN ASPART 100 [IU]/ML
0-5 INJECTION, SOLUTION INTRAVENOUS; SUBCUTANEOUS EVERY 6 HOURS PRN
Status: DISCONTINUED | OUTPATIENT
Start: 2020-01-01 | End: 2020-01-01 | Stop reason: HOSPADM

## 2020-01-01 RX ORDER — LIDOCAINE 50 MG/G
OINTMENT TOPICAL
Status: ON HOLD | COMMUNITY
Start: 2020-01-01 | End: 2020-01-01 | Stop reason: HOSPADM

## 2020-01-01 RX ORDER — THIAMINE HCL 100 MG
100 TABLET ORAL DAILY
Status: DISCONTINUED | OUTPATIENT
Start: 2020-01-01 | End: 2020-01-01 | Stop reason: HOSPADM

## 2020-01-01 RX ORDER — OSELTAMIVIR PHOSPHATE 30 MG/1
30 CAPSULE ORAL DAILY
Status: DISCONTINUED | OUTPATIENT
Start: 2020-01-01 | End: 2020-01-01

## 2020-01-01 RX ORDER — MONTELUKAST SODIUM 10 MG/1
10 TABLET ORAL NIGHTLY
Status: DISCONTINUED | OUTPATIENT
Start: 2020-01-01 | End: 2020-01-01 | Stop reason: HOSPADM

## 2020-01-01 RX ORDER — IBUPROFEN 200 MG
24 TABLET ORAL
Status: CANCELLED | OUTPATIENT
Start: 2020-01-01

## 2020-01-01 RX ORDER — CALCIUM CARBONATE 200(500)MG
500 TABLET,CHEWABLE ORAL 2 TIMES DAILY PRN
Status: CANCELLED | OUTPATIENT
Start: 2020-01-01

## 2020-01-01 RX ORDER — KETAMINE HCL IN 0.9 % NACL 50 MG/5 ML
SYRINGE (ML) INTRAVENOUS
Status: DISCONTINUED | OUTPATIENT
Start: 2020-01-01 | End: 2020-01-01

## 2020-01-01 RX ORDER — FLUTICASONE PROPIONATE 50 MCG
1 SPRAY, SUSPENSION (ML) NASAL
Status: CANCELLED | OUTPATIENT
Start: 2020-01-01

## 2020-01-01 RX ORDER — HYDROXYCHLOROQUINE SULFATE 200 MG/1
400 TABLET, FILM COATED ORAL DAILY
Status: COMPLETED | OUTPATIENT
Start: 2020-01-01 | End: 2020-01-01

## 2020-01-01 RX ORDER — CARVEDILOL 25 MG/1
25 TABLET ORAL 2 TIMES DAILY WITH MEALS
Status: DISCONTINUED | OUTPATIENT
Start: 2020-01-01 | End: 2020-01-01

## 2020-01-01 RX ORDER — FUROSEMIDE 40 MG/1
80 TABLET ORAL 2 TIMES DAILY
Status: DISCONTINUED | OUTPATIENT
Start: 2020-01-01 | End: 2020-01-01 | Stop reason: HOSPADM

## 2020-01-01 RX ORDER — ASPIRIN 81 MG/1
81 TABLET ORAL DAILY
Qty: 30 TABLET | Refills: 11 | Status: SHIPPED | OUTPATIENT
Start: 2020-01-01 | End: 2021-03-28

## 2020-01-01 RX ORDER — ASPIRIN 81 MG/1
81 TABLET ORAL DAILY
Status: DISCONTINUED | OUTPATIENT
Start: 2020-01-01 | End: 2020-01-01

## 2020-01-01 RX ORDER — AMLODIPINE BESYLATE 10 MG/1
10 TABLET ORAL DAILY
Status: DISCONTINUED | OUTPATIENT
Start: 2020-01-01 | End: 2020-01-01 | Stop reason: HOSPADM

## 2020-01-01 RX ORDER — FUROSEMIDE 10 MG/ML
120 INJECTION INTRAMUSCULAR; INTRAVENOUS EVERY 8 HOURS
Status: DISCONTINUED | OUTPATIENT
Start: 2020-01-01 | End: 2020-01-01

## 2020-01-01 RX ORDER — ATORVASTATIN CALCIUM 40 MG/1
40 TABLET, FILM COATED ORAL NIGHTLY
Qty: 90 TABLET | Refills: 3 | Status: SHIPPED | OUTPATIENT
Start: 2020-01-01 | End: 2021-03-27

## 2020-01-01 RX ORDER — BENZONATATE 100 MG/1
100 CAPSULE ORAL 3 TIMES DAILY PRN
Status: CANCELLED | OUTPATIENT
Start: 2020-01-01

## 2020-01-01 RX ORDER — SODIUM,POTASSIUM PHOSPHATES 280-250MG
1 POWDER IN PACKET (EA) ORAL 2 TIMES DAILY
Status: COMPLETED | OUTPATIENT
Start: 2020-01-01 | End: 2020-01-01

## 2020-01-01 RX ORDER — ONDANSETRON 2 MG/ML
4 INJECTION INTRAMUSCULAR; INTRAVENOUS EVERY 6 HOURS PRN
Status: DISCONTINUED | OUTPATIENT
Start: 2020-01-01 | End: 2020-01-01 | Stop reason: HOSPADM

## 2020-01-01 RX ORDER — SODIUM BICARBONATE 650 MG/1
650 TABLET ORAL DAILY
Qty: 30 TABLET | Refills: 11
Start: 2020-01-01 | End: 2021-04-27

## 2020-01-01 RX ORDER — AMLODIPINE BESYLATE 10 MG/1
10 TABLET ORAL DAILY
Qty: 90 TABLET | Refills: 3 | Status: SHIPPED | OUTPATIENT
Start: 2020-01-01

## 2020-01-01 RX ORDER — FLUTICASONE PROPIONATE 50 MCG
1 SPRAY, SUSPENSION (ML) NASAL
Status: DISCONTINUED | OUTPATIENT
Start: 2020-01-01 | End: 2020-01-01 | Stop reason: HOSPADM

## 2020-01-01 RX ORDER — PROPOFOL 10 MG/ML
INJECTION, EMULSION INTRAVENOUS
Status: DISCONTINUED | OUTPATIENT
Start: 2020-01-01 | End: 2020-01-01

## 2020-01-01 RX ORDER — FLUTICASONE PROPIONATE 50 MCG
1 SPRAY, SUSPENSION (ML) NASAL 2 TIMES DAILY
Status: DISCONTINUED | OUTPATIENT
Start: 2020-01-01 | End: 2020-01-01 | Stop reason: HOSPADM

## 2020-01-01 RX ORDER — SODIUM CHLORIDE 9 MG/ML
INJECTION, SOLUTION INTRAVENOUS CONTINUOUS
Status: DISCONTINUED | OUTPATIENT
Start: 2020-01-01 | End: 2020-01-01

## 2020-01-01 RX ORDER — ACETAMINOPHEN 325 MG/1
650 TABLET ORAL EVERY 4 HOURS PRN
Status: DISCONTINUED | OUTPATIENT
Start: 2020-01-01 | End: 2020-01-01 | Stop reason: HOSPADM

## 2020-01-01 RX ORDER — POTASSIUM CHLORIDE 7.45 MG/ML
10 INJECTION INTRAVENOUS
Status: COMPLETED | OUTPATIENT
Start: 2020-01-01 | End: 2020-01-01

## 2020-01-01 RX ORDER — ENOXAPARIN SODIUM 100 MG/ML
30 INJECTION SUBCUTANEOUS
Status: DISCONTINUED | OUTPATIENT
Start: 2020-01-01 | End: 2020-01-01

## 2020-01-01 RX ORDER — ENOXAPARIN SODIUM 100 MG/ML
40 INJECTION SUBCUTANEOUS EVERY 24 HOURS
Status: DISCONTINUED | OUTPATIENT
Start: 2020-01-01 | End: 2020-01-01

## 2020-01-01 RX ORDER — FENTANYL CITRATE 50 UG/ML
INJECTION, SOLUTION INTRAMUSCULAR; INTRAVENOUS
Status: DISCONTINUED | OUTPATIENT
Start: 2020-01-01 | End: 2020-01-01

## 2020-01-01 RX ORDER — CHOLECALCIFEROL (VITAMIN D3) 25 MCG
1000 TABLET ORAL DAILY
COMMUNITY

## 2020-01-01 RX ORDER — IBUPROFEN 200 MG
16 TABLET ORAL
Status: DISCONTINUED | OUTPATIENT
Start: 2020-01-01 | End: 2020-01-01 | Stop reason: HOSPADM

## 2020-01-01 RX ORDER — INSULIN ASPART 100 [IU]/ML
2 INJECTION, SOLUTION INTRAVENOUS; SUBCUTANEOUS
Status: DISCONTINUED | OUTPATIENT
Start: 2020-01-01 | End: 2020-01-01 | Stop reason: HOSPADM

## 2020-01-01 RX ORDER — BENZONATATE 100 MG/1
100 CAPSULE ORAL 3 TIMES DAILY PRN
Qty: 30 CAPSULE | Refills: 0 | Status: SHIPPED | OUTPATIENT
Start: 2020-01-01 | End: 2020-01-01

## 2020-01-01 RX ORDER — INSULIN GLARGINE 100 [IU]/ML
INJECTION, SOLUTION SUBCUTANEOUS
Qty: 15 ML | Refills: 6 | Status: ON HOLD | OUTPATIENT
Start: 2020-01-01 | End: 2020-01-01 | Stop reason: SDUPTHER

## 2020-01-01 RX ORDER — CARVEDILOL 25 MG/1
25 TABLET ORAL 2 TIMES DAILY WITH MEALS
Qty: 180 TABLET | Refills: 3 | Status: SHIPPED | OUTPATIENT
Start: 2020-01-01

## 2020-01-01 RX ORDER — TALC
6 POWDER (GRAM) TOPICAL NIGHTLY PRN
Status: CANCELLED | OUTPATIENT
Start: 2020-01-01

## 2020-01-01 RX ORDER — SUCCINYLCHOLINE CHLORIDE 20 MG/ML
INJECTION INTRAMUSCULAR; INTRAVENOUS
Status: DISCONTINUED | OUTPATIENT
Start: 2020-01-01 | End: 2020-01-01

## 2020-01-01 RX ORDER — NITROGLYCERIN 0.4 MG/1
0.4 TABLET SUBLINGUAL EVERY 5 MIN PRN
Qty: 30 TABLET | Refills: 3 | Status: ON HOLD | OUTPATIENT
Start: 2020-01-01 | End: 2020-01-01 | Stop reason: HOSPADM

## 2020-01-01 RX ORDER — AZITHROMYCIN 250 MG/1
500 TABLET, FILM COATED ORAL DAILY
Status: DISCONTINUED | OUTPATIENT
Start: 2020-01-01 | End: 2020-01-01 | Stop reason: HOSPADM

## 2020-01-01 RX ORDER — AMOXICILLIN 250 MG
1 CAPSULE ORAL DAILY
Status: DISCONTINUED | OUTPATIENT
Start: 2020-01-01 | End: 2020-01-01 | Stop reason: HOSPADM

## 2020-01-01 RX ORDER — ONDANSETRON 4 MG/1
4 TABLET, FILM COATED ORAL EVERY 6 HOURS PRN
Qty: 30 TABLET | Refills: 3 | Status: ON HOLD | OUTPATIENT
Start: 2020-01-01 | End: 2020-01-01 | Stop reason: HOSPADM

## 2020-01-01 RX ORDER — BISACODYL 10 MG
10 SUPPOSITORY, RECTAL RECTAL DAILY PRN
Refills: 0 | COMMUNITY
Start: 2020-01-01

## 2020-01-01 RX ORDER — INSULIN ASPART 100 [IU]/ML
1-10 INJECTION, SOLUTION INTRAVENOUS; SUBCUTANEOUS
Status: DISCONTINUED | OUTPATIENT
Start: 2020-01-01 | End: 2020-01-01 | Stop reason: HOSPADM

## 2020-01-01 RX ORDER — CALCITRIOL 0.25 UG/1
0.5 CAPSULE ORAL DAILY
Qty: 60 CAPSULE | Refills: 6 | Status: SHIPPED | OUTPATIENT
Start: 2020-01-01

## 2020-01-01 RX ORDER — HEPARIN SODIUM 5000 [USP'U]/ML
5000 INJECTION, SOLUTION INTRAVENOUS; SUBCUTANEOUS EVERY 8 HOURS
Status: DISCONTINUED | OUTPATIENT
Start: 2020-01-01 | End: 2020-01-01 | Stop reason: HOSPADM

## 2020-01-01 RX ORDER — MUPIROCIN 20 MG/G
OINTMENT TOPICAL 2 TIMES DAILY
Status: DISCONTINUED | OUTPATIENT
Start: 2020-01-01 | End: 2020-01-01 | Stop reason: HOSPADM

## 2020-01-01 RX ORDER — CLONIDINE HYDROCHLORIDE 0.1 MG/1
0.1 TABLET ORAL DAILY
Status: DISCONTINUED | OUTPATIENT
Start: 2020-01-01 | End: 2020-01-01

## 2020-01-01 RX ORDER — SODIUM CHLORIDE, SODIUM LACTATE, POTASSIUM CHLORIDE, CALCIUM CHLORIDE 600; 310; 30; 20 MG/100ML; MG/100ML; MG/100ML; MG/100ML
INJECTION, SOLUTION INTRAVENOUS CONTINUOUS
Status: DISCONTINUED | OUTPATIENT
Start: 2020-01-01 | End: 2020-01-01

## 2020-01-01 RX ORDER — CARVEDILOL 25 MG/1
25 TABLET ORAL 2 TIMES DAILY WITH MEALS
Status: CANCELLED | OUTPATIENT
Start: 2020-01-01

## 2020-01-01 RX ORDER — POLYETHYLENE GLYCOL 3350 17 G/17G
17 POWDER, FOR SOLUTION ORAL DAILY
Status: DISCONTINUED | OUTPATIENT
Start: 2020-01-01 | End: 2020-01-01 | Stop reason: HOSPADM

## 2020-01-01 RX ORDER — AMOXICILLIN 250 MG
CAPSULE ORAL
Status: DISPENSED
Start: 2020-01-01 | End: 2020-01-01

## 2020-01-01 RX ORDER — POTASSIUM CHLORIDE 20 MEQ/1
20 TABLET, EXTENDED RELEASE ORAL
Status: COMPLETED | OUTPATIENT
Start: 2020-01-01 | End: 2020-01-01

## 2020-01-01 RX ORDER — ONDANSETRON 2 MG/ML
4 INJECTION INTRAMUSCULAR; INTRAVENOUS EVERY 6 HOURS PRN
Status: DISCONTINUED | OUTPATIENT
Start: 2020-01-01 | End: 2020-01-01

## 2020-01-01 RX ORDER — ASCORBIC ACID 250 MG
250 TABLET ORAL DAILY
Status: DISCONTINUED | OUTPATIENT
Start: 2020-01-01 | End: 2020-01-01

## 2020-01-01 RX ORDER — ATORVASTATIN CALCIUM 40 MG/1
40 TABLET, FILM COATED ORAL NIGHTLY
Status: DISCONTINUED | OUTPATIENT
Start: 2020-01-01 | End: 2020-01-01 | Stop reason: HOSPADM

## 2020-01-01 RX ORDER — FLUOXETINE 10 MG/1
10 CAPSULE ORAL DAILY
Status: DISCONTINUED | OUTPATIENT
Start: 2020-01-01 | End: 2020-01-01

## 2020-01-01 RX ORDER — ONDANSETRON 8 MG/1
8 TABLET, ORALLY DISINTEGRATING ORAL EVERY 8 HOURS PRN
Status: DISCONTINUED | OUTPATIENT
Start: 2020-01-01 | End: 2020-01-01 | Stop reason: HOSPADM

## 2020-01-01 RX ORDER — AMLODIPINE BESYLATE 10 MG/1
10 TABLET ORAL DAILY
Status: DISCONTINUED | OUTPATIENT
Start: 2020-01-01 | End: 2020-01-01

## 2020-01-01 RX ORDER — MONTELUKAST SODIUM 10 MG/1
10 TABLET ORAL NIGHTLY
Status: DISCONTINUED | OUTPATIENT
Start: 2020-01-01 | End: 2020-01-01

## 2020-01-01 RX ORDER — CALCITRIOL 0.25 UG/1
0.5 CAPSULE ORAL DAILY
Status: DISCONTINUED | OUTPATIENT
Start: 2020-01-01 | End: 2020-01-01 | Stop reason: HOSPADM

## 2020-01-01 RX ORDER — CLOPIDOGREL BISULFATE 75 MG/1
75 TABLET ORAL DAILY
Qty: 90 TABLET | Refills: 3 | Status: SHIPPED | OUTPATIENT
Start: 2020-01-01

## 2020-01-01 RX ORDER — TELMISARTAN 80 MG/1
80 TABLET ORAL DAILY
Qty: 90 TABLET | Refills: 3 | Status: ON HOLD | OUTPATIENT
Start: 2020-01-01 | End: 2020-01-01 | Stop reason: HOSPADM

## 2020-01-01 RX ORDER — ATORVASTATIN CALCIUM 80 MG/1
80 TABLET, FILM COATED ORAL DAILY
Qty: 90 TABLET | Refills: 3 | Status: ON HOLD | OUTPATIENT
Start: 2020-01-01 | End: 2020-01-01 | Stop reason: HOSPADM

## 2020-01-01 RX ORDER — CARVEDILOL 12.5 MG/1
12.5 TABLET ORAL 2 TIMES DAILY WITH MEALS
Status: DISCONTINUED | OUTPATIENT
Start: 2020-01-01 | End: 2020-01-01

## 2020-01-01 RX ORDER — FUROSEMIDE 40 MG/1
TABLET ORAL
Qty: 270 TABLET | Refills: 3 | Status: ON HOLD | OUTPATIENT
Start: 2020-01-01 | End: 2020-01-01 | Stop reason: SDUPTHER

## 2020-01-01 RX ORDER — GLUCAGON 1 MG
1 KIT INJECTION
Status: CANCELLED | OUTPATIENT
Start: 2020-01-01

## 2020-01-01 RX ORDER — INSULIN ASPART 100 [IU]/ML
5 INJECTION, SOLUTION INTRAVENOUS; SUBCUTANEOUS
Status: DISCONTINUED | OUTPATIENT
Start: 2020-01-01 | End: 2020-01-01 | Stop reason: HOSPADM

## 2020-01-01 RX ORDER — CARVEDILOL 25 MG/1
TABLET ORAL
Status: COMPLETED
Start: 2020-01-01 | End: 2020-01-01

## 2020-01-01 RX ORDER — NITROGLYCERIN 0.4 MG/1
0.4 TABLET SUBLINGUAL EVERY 5 MIN PRN
Status: DISCONTINUED | OUTPATIENT
Start: 2020-01-01 | End: 2020-01-01 | Stop reason: HOSPADM

## 2020-01-01 RX ORDER — ASPIRIN 300 MG/1
300 SUPPOSITORY RECTAL
Status: DISCONTINUED | OUTPATIENT
Start: 2020-01-01 | End: 2020-01-01

## 2020-01-01 RX ORDER — ACETAMINOPHEN 325 MG/1
650 TABLET ORAL EVERY 6 HOURS PRN
Status: DISCONTINUED | OUTPATIENT
Start: 2020-01-01 | End: 2020-01-01 | Stop reason: HOSPADM

## 2020-01-01 RX ORDER — INSULIN GLARGINE 100 [IU]/ML
INJECTION, SOLUTION SUBCUTANEOUS
Qty: 15 ML | Refills: 6 | Status: ON HOLD | OUTPATIENT
Start: 2020-01-01 | End: 2020-01-01 | Stop reason: HOSPADM

## 2020-01-01 RX ORDER — BISACODYL 10 MG
10 SUPPOSITORY, RECTAL RECTAL DAILY PRN
Status: DISCONTINUED | OUTPATIENT
Start: 2020-01-01 | End: 2020-01-01 | Stop reason: HOSPADM

## 2020-01-01 RX ORDER — PROPOFOL 10 MG/ML
INJECTION, EMULSION INTRAVENOUS CONTINUOUS PRN
Status: DISCONTINUED | OUTPATIENT
Start: 2020-01-01 | End: 2020-01-01

## 2020-01-01 RX ORDER — IBUPROFEN 200 MG
16 TABLET ORAL
Refills: 12 | Status: ON HOLD | COMMUNITY
Start: 2020-01-01 | End: 2020-01-01 | Stop reason: HOSPADM

## 2020-01-01 RX ORDER — AMLODIPINE BESYLATE 5 MG/1
10 TABLET ORAL DAILY
Status: CANCELLED | OUTPATIENT
Start: 2020-01-01

## 2020-01-01 RX ORDER — IBUPROFEN 200 MG
24 TABLET ORAL
Status: DISCONTINUED | OUTPATIENT
Start: 2020-01-01 | End: 2020-01-01

## 2020-01-01 RX ORDER — CHOLECALCIFEROL (VITAMIN D3) 25 MCG
1000 TABLET ORAL DAILY
Status: DISCONTINUED | OUTPATIENT
Start: 2020-01-01 | End: 2020-01-01

## 2020-01-01 RX ORDER — LABETALOL HCL 20 MG/4 ML
10 SYRINGE (ML) INTRAVENOUS
Status: DISCONTINUED | OUTPATIENT
Start: 2020-01-01 | End: 2020-01-01 | Stop reason: HOSPADM

## 2020-01-01 RX ORDER — HEPARIN SODIUM 5000 [USP'U]/ML
5000 INJECTION, SOLUTION INTRAVENOUS; SUBCUTANEOUS EVERY 8 HOURS
Status: DISCONTINUED | OUTPATIENT
Start: 2020-01-01 | End: 2020-01-01

## 2020-01-01 RX ORDER — HEPARIN SODIUM 5000 [USP'U]/ML
5000 INJECTION, SOLUTION INTRAVENOUS; SUBCUTANEOUS EVERY 8 HOURS
Status: CANCELLED | OUTPATIENT
Start: 2020-01-01

## 2020-01-01 RX ORDER — NAPROXEN SODIUM 220 MG/1
81 TABLET, FILM COATED ORAL DAILY
Status: DISCONTINUED | OUTPATIENT
Start: 2020-01-01 | End: 2020-01-01

## 2020-01-01 RX ORDER — ATORVASTATIN CALCIUM 20 MG/1
40 TABLET, FILM COATED ORAL NIGHTLY
Status: DISCONTINUED | OUTPATIENT
Start: 2020-01-01 | End: 2020-01-01

## 2020-01-01 RX ORDER — CALCIUM CARBONATE 200(500)MG
500 TABLET,CHEWABLE ORAL 2 TIMES DAILY PRN
Status: DISCONTINUED | OUTPATIENT
Start: 2020-01-01 | End: 2020-01-01 | Stop reason: HOSPADM

## 2020-01-01 RX ORDER — FUROSEMIDE 40 MG/1
40 TABLET ORAL 2 TIMES DAILY
Status: DISCONTINUED | OUTPATIENT
Start: 2020-01-01 | End: 2020-01-01

## 2020-01-01 RX ORDER — HEPARIN SODIUM 5000 [USP'U]/ML
5000 INJECTION, SOLUTION INTRAVENOUS; SUBCUTANEOUS EVERY 12 HOURS
Status: DISCONTINUED | OUTPATIENT
Start: 2020-01-01 | End: 2020-01-01 | Stop reason: HOSPADM

## 2020-01-01 RX ORDER — POTASSIUM CHLORIDE 20 MEQ/15ML
40 SOLUTION ORAL EVERY 4 HOURS
Status: COMPLETED | OUTPATIENT
Start: 2020-01-01 | End: 2020-01-01

## 2020-01-01 RX ORDER — DEXTROSE 50 % IN WATER (D50W) INTRAVENOUS SYRINGE
12.5
Status: DISCONTINUED | OUTPATIENT
Start: 2020-01-01 | End: 2020-01-01 | Stop reason: HOSPADM

## 2020-01-01 RX ORDER — OLANZAPINE 10 MG/2ML
5 INJECTION, POWDER, FOR SOLUTION INTRAMUSCULAR 2 TIMES DAILY PRN
Status: DISCONTINUED | OUTPATIENT
Start: 2020-01-01 | End: 2020-01-01 | Stop reason: HOSPADM

## 2020-01-01 RX ORDER — FUROSEMIDE 40 MG/1
40 TABLET ORAL 2 TIMES DAILY
Qty: 270 TABLET | Refills: 3 | Status: ON HOLD
Start: 2020-01-01 | End: 2020-01-01 | Stop reason: SDUPTHER

## 2020-01-01 RX ORDER — AMLODIPINE BESYLATE 5 MG/1
10 TABLET ORAL ONCE
Status: COMPLETED | OUTPATIENT
Start: 2020-01-01 | End: 2020-01-01

## 2020-01-01 RX ORDER — AMLODIPINE BESYLATE 5 MG/1
10 TABLET ORAL DAILY
Status: DISCONTINUED | OUTPATIENT
Start: 2020-01-01 | End: 2020-01-01 | Stop reason: HOSPADM

## 2020-01-01 RX ORDER — ACETAMINOPHEN 325 MG/1
650 TABLET ORAL ONCE
Status: COMPLETED | OUTPATIENT
Start: 2020-01-01 | End: 2020-01-01

## 2020-01-01 RX ORDER — FENTANYL CITRATE 50 UG/ML
25 INJECTION, SOLUTION INTRAMUSCULAR; INTRAVENOUS EVERY 5 MIN PRN
Status: CANCELLED | OUTPATIENT
Start: 2020-01-01

## 2020-01-01 RX ORDER — FUROSEMIDE 80 MG/1
80 TABLET ORAL 2 TIMES DAILY
Qty: 60 TABLET | Refills: 11 | Status: ON HOLD | OUTPATIENT
Start: 2020-01-01 | End: 2020-01-01 | Stop reason: SDUPTHER

## 2020-01-01 RX ORDER — CARVEDILOL 12.5 MG/1
12.5 TABLET ORAL 2 TIMES DAILY WITH MEALS
Status: DISCONTINUED | OUTPATIENT
Start: 2020-01-01 | End: 2020-01-01 | Stop reason: HOSPADM

## 2020-01-01 RX ORDER — MEGESTROL ACETATE 40 MG/1
40 TABLET ORAL
Status: DISCONTINUED | OUTPATIENT
Start: 2020-01-01 | End: 2020-01-01

## 2020-01-01 RX ORDER — POTASSIUM CHLORIDE 20 MEQ/1
40 TABLET, EXTENDED RELEASE ORAL ONCE
Status: COMPLETED | OUTPATIENT
Start: 2020-01-01 | End: 2020-01-01

## 2020-01-01 RX ORDER — MODAFINIL 100 MG/1
100 TABLET ORAL
Status: DISCONTINUED | OUTPATIENT
Start: 2020-01-01 | End: 2020-01-01 | Stop reason: HOSPADM

## 2020-01-01 RX ORDER — ASCORBIC ACID 500 MG
500 TABLET ORAL 2 TIMES DAILY
Status: DISCONTINUED | OUTPATIENT
Start: 2020-01-01 | End: 2020-01-01 | Stop reason: HOSPADM

## 2020-01-01 RX ORDER — DEXAMETHASONE 4 MG/1
20 TABLET ORAL SEE ADMIN INSTRUCTIONS
Qty: 120 TABLET | Refills: 2 | Status: ON HOLD | OUTPATIENT
Start: 2020-01-01 | End: 2020-01-01 | Stop reason: HOSPADM

## 2020-01-01 RX ORDER — MAGNESIUM SULFATE HEPTAHYDRATE 40 MG/ML
2 INJECTION, SOLUTION INTRAVENOUS ONCE
Status: COMPLETED | OUTPATIENT
Start: 2020-01-01 | End: 2020-01-01

## 2020-01-01 RX ORDER — AZITHROMYCIN 500 MG/1
500 TABLET, FILM COATED ORAL DAILY
Qty: 2 TABLET | Refills: 0 | Status: ON HOLD | OUTPATIENT
Start: 2020-01-01 | End: 2020-01-01 | Stop reason: HOSPADM

## 2020-01-01 RX ORDER — SODIUM CHLORIDE 9 MG/ML
INJECTION, SOLUTION INTRAVENOUS CONTINUOUS
Status: ACTIVE | OUTPATIENT
Start: 2020-01-01 | End: 2020-01-01

## 2020-01-01 RX ORDER — CEFEPIME HYDROCHLORIDE 2 G/50ML
2 INJECTION, SOLUTION INTRAVENOUS
Status: COMPLETED | OUTPATIENT
Start: 2020-01-01 | End: 2020-01-01

## 2020-01-01 RX ORDER — LIDOCAINE HYDROCHLORIDE 20 MG/ML
INJECTION INTRAVENOUS
Status: DISCONTINUED | OUTPATIENT
Start: 2020-01-01 | End: 2020-01-01

## 2020-01-01 RX ORDER — ATORVASTATIN CALCIUM 20 MG/1
40 TABLET, FILM COATED ORAL NIGHTLY
Status: DISCONTINUED | OUTPATIENT
Start: 2020-01-01 | End: 2020-01-01 | Stop reason: HOSPADM

## 2020-01-01 RX ORDER — MIRTAZAPINE 15 MG/1
30 TABLET, FILM COATED ORAL NIGHTLY
Status: DISCONTINUED | OUTPATIENT
Start: 2020-01-01 | End: 2020-01-01

## 2020-01-01 RX ORDER — TALC
6 POWDER (GRAM) TOPICAL NIGHTLY PRN
Status: DISCONTINUED | OUTPATIENT
Start: 2020-01-01 | End: 2020-01-01

## 2020-01-01 RX ORDER — CLONIDINE HYDROCHLORIDE 0.1 MG/1
0.1 TABLET ORAL ONCE
Status: COMPLETED | OUTPATIENT
Start: 2020-01-01 | End: 2020-01-01

## 2020-01-01 RX ORDER — ASCORBIC ACID 500 MG
500 TABLET ORAL 2 TIMES DAILY
COMMUNITY
Start: 2020-01-01

## 2020-01-01 RX ORDER — IBUPROFEN 200 MG
16 TABLET ORAL
Status: DISCONTINUED | OUTPATIENT
Start: 2020-01-01 | End: 2020-01-01

## 2020-01-01 RX ORDER — KETOROLAC TROMETHAMINE 30 MG/ML
15 INJECTION, SOLUTION INTRAMUSCULAR; INTRAVENOUS
Status: COMPLETED | OUTPATIENT
Start: 2020-01-01 | End: 2020-01-01

## 2020-01-01 RX ORDER — POTASSIUM CHLORIDE 20 MEQ/15ML
40 SOLUTION ORAL ONCE
Status: COMPLETED | OUTPATIENT
Start: 2020-01-01 | End: 2020-01-01

## 2020-01-01 RX ORDER — HEPARIN 100 UNIT/ML
500 SYRINGE INTRAVENOUS
Status: DISCONTINUED | OUTPATIENT
Start: 2020-01-01 | End: 2020-01-01 | Stop reason: HOSPADM

## 2020-01-01 RX ORDER — LIDOCAINE 50 MG/G
PATCH TOPICAL
Status: ON HOLD | COMMUNITY
Start: 2019-01-01 | End: 2020-01-01 | Stop reason: HOSPADM

## 2020-01-01 RX ORDER — ENOXAPARIN SODIUM 100 MG/ML
30 INJECTION SUBCUTANEOUS EVERY 24 HOURS
Status: DISCONTINUED | OUTPATIENT
Start: 2020-01-01 | End: 2020-01-01

## 2020-01-01 RX ORDER — GABAPENTIN 300 MG/1
300 CAPSULE ORAL 3 TIMES DAILY
Status: DISCONTINUED | OUTPATIENT
Start: 2020-01-01 | End: 2020-01-01 | Stop reason: HOSPADM

## 2020-01-01 RX ORDER — MONTELUKAST SODIUM 10 MG/1
10 TABLET ORAL NIGHTLY
Status: CANCELLED | OUTPATIENT
Start: 2020-01-01

## 2020-01-01 RX ORDER — MODAFINIL 100 MG/1
100 TABLET ORAL DAILY
Status: DISCONTINUED | OUTPATIENT
Start: 2020-01-01 | End: 2020-01-01

## 2020-01-01 RX ORDER — FUROSEMIDE 40 MG/1
40 TABLET ORAL 2 TIMES DAILY
Qty: 60 TABLET | Refills: 11 | Status: ON HOLD | OUTPATIENT
Start: 2020-01-01 | End: 2020-01-01 | Stop reason: HOSPADM

## 2020-01-01 RX ORDER — SODIUM BICARBONATE 650 MG/1
650 TABLET ORAL 2 TIMES DAILY
Status: DISCONTINUED | OUTPATIENT
Start: 2020-01-01 | End: 2020-01-01 | Stop reason: HOSPADM

## 2020-01-01 RX ORDER — HYDROCODONE BITARTRATE AND ACETAMINOPHEN 500; 5 MG/1; MG/1
TABLET ORAL
Status: DISCONTINUED | OUTPATIENT
Start: 2020-01-01 | End: 2020-01-01 | Stop reason: HOSPADM

## 2020-01-01 RX ORDER — ASPIRIN 81 MG/1
81 TABLET ORAL DAILY
Status: CANCELLED | OUTPATIENT
Start: 2020-01-01

## 2020-01-01 RX ORDER — LIDOCAINE HCL/PF 100 MG/5ML
SYRINGE (ML) INTRAVENOUS
Status: DISCONTINUED | OUTPATIENT
Start: 2020-01-01 | End: 2020-01-01

## 2020-01-01 RX ORDER — INSULIN ASPART 100 [IU]/ML
INJECTION, SOLUTION INTRAVENOUS; SUBCUTANEOUS
Qty: 18 ML | Refills: 6 | Status: ON HOLD
Start: 2020-01-01 | End: 2020-01-01 | Stop reason: HOSPADM

## 2020-01-01 RX ORDER — TALC
6 POWDER (GRAM) TOPICAL NIGHTLY
Status: DISCONTINUED | OUTPATIENT
Start: 2020-01-01 | End: 2020-01-01 | Stop reason: HOSPADM

## 2020-01-01 RX ORDER — ENOXAPARIN SODIUM 150 MG/ML
1 INJECTION SUBCUTANEOUS
Status: DISCONTINUED | OUTPATIENT
Start: 2020-01-01 | End: 2020-01-01 | Stop reason: HOSPADM

## 2020-01-01 RX ORDER — ONDANSETRON 2 MG/ML
4 INJECTION INTRAMUSCULAR; INTRAVENOUS
Status: COMPLETED | OUTPATIENT
Start: 2020-01-01 | End: 2020-01-01

## 2020-01-01 RX ORDER — CHOLECALCIFEROL (VITAMIN D3) 25 MCG
1000 TABLET ORAL DAILY
Status: DISCONTINUED | OUTPATIENT
Start: 2020-01-01 | End: 2020-01-01 | Stop reason: HOSPADM

## 2020-01-01 RX ORDER — PANTOPRAZOLE SODIUM 40 MG/1
40 TABLET, DELAYED RELEASE ORAL DAILY
Status: DISCONTINUED | OUTPATIENT
Start: 2020-01-01 | End: 2020-01-01

## 2020-01-01 RX ORDER — CHOLECALCIFEROL (VITAMIN D3) 25 MCG
2000 TABLET ORAL DAILY
Status: DISCONTINUED | OUTPATIENT
Start: 2020-01-01 | End: 2020-01-01 | Stop reason: HOSPADM

## 2020-01-01 RX ORDER — ENOXAPARIN SODIUM 100 MG/ML
30 INJECTION SUBCUTANEOUS EVERY 24 HOURS
Status: DISCONTINUED | OUTPATIENT
Start: 2020-01-01 | End: 2020-01-01 | Stop reason: HOSPADM

## 2020-01-01 RX ORDER — LABETALOL HYDROCHLORIDE 5 MG/ML
10 INJECTION, SOLUTION INTRAVENOUS ONCE
Status: DISCONTINUED | OUTPATIENT
Start: 2020-01-01 | End: 2020-01-01 | Stop reason: HOSPADM

## 2020-01-01 RX ORDER — INSULIN GLARGINE 100 [IU]/ML
INJECTION, SOLUTION SUBCUTANEOUS
Qty: 15 ML | Refills: 6 | Status: ON HOLD
Start: 2020-01-01 | End: 2020-01-01 | Stop reason: SDUPTHER

## 2020-01-01 RX ORDER — INSULIN GLARGINE 100 [IU]/ML
INJECTION, SOLUTION SUBCUTANEOUS
Qty: 15 ML | Refills: 6
Start: 2020-01-01 | End: 2020-01-01

## 2020-01-01 RX ORDER — ROCURONIUM BROMIDE 10 MG/ML
INJECTION, SOLUTION INTRAVENOUS
Status: DISCONTINUED | OUTPATIENT
Start: 2020-01-01 | End: 2020-01-01

## 2020-01-01 RX ORDER — DEXTROSE 50 % IN WATER (D50W) INTRAVENOUS SYRINGE
25
Status: DISCONTINUED | OUTPATIENT
Start: 2020-01-01 | End: 2020-01-01 | Stop reason: HOSPADM

## 2020-01-01 RX ORDER — ONDANSETRON 2 MG/ML
4 INJECTION INTRAMUSCULAR; INTRAVENOUS ONCE
Status: CANCELLED | OUTPATIENT
Start: 2020-01-01 | End: 2020-01-01

## 2020-01-01 RX ORDER — FLUOXETINE 10 MG/1
20 CAPSULE ORAL DAILY
Status: DISCONTINUED | OUTPATIENT
Start: 2020-01-01 | End: 2020-01-01 | Stop reason: HOSPADM

## 2020-01-01 RX ORDER — IBUPROFEN 200 MG
16 TABLET ORAL
Status: CANCELLED | OUTPATIENT
Start: 2020-01-01

## 2020-01-01 RX ADMIN — FUROSEMIDE 120 MG: 10 INJECTION, SOLUTION INTRAVENOUS at 12:03

## 2020-01-01 RX ADMIN — AMLODIPINE BESYLATE 10 MG: 5 TABLET ORAL at 10:04

## 2020-01-01 RX ADMIN — SODIUM BICARBONATE 650 MG TABLET 650 MG: at 09:04

## 2020-01-01 RX ADMIN — FLUTICASONE PROPIONATE 50 MCG: 50 SPRAY, METERED NASAL at 09:03

## 2020-01-01 RX ADMIN — ENOXAPARIN SODIUM 90 MG: 150 INJECTION SUBCUTANEOUS at 05:03

## 2020-01-01 RX ADMIN — ENOXAPARIN SODIUM 30 MG: 30 INJECTION SUBCUTANEOUS at 09:04

## 2020-01-01 RX ADMIN — PHENYLEPHRINE HYDROCHLORIDE 200 MCG: 10 INJECTION INTRAVENOUS at 11:04

## 2020-01-01 RX ADMIN — INSULIN ASPART 2 UNITS: 100 INJECTION, SOLUTION INTRAVENOUS; SUBCUTANEOUS at 06:04

## 2020-01-01 RX ADMIN — AMLODIPINE BESYLATE 10 MG: 10 TABLET ORAL at 08:04

## 2020-01-01 RX ADMIN — THIAMINE HYDROCHLORIDE 500 MG: 100 INJECTION, SOLUTION INTRAMUSCULAR; INTRAVENOUS at 05:04

## 2020-01-01 RX ADMIN — CEFEPIME HYDROCHLORIDE 2 G: 2 INJECTION, SOLUTION INTRAVENOUS at 10:03

## 2020-01-01 RX ADMIN — INSULIN ASPART 2 UNITS: 100 INJECTION, SOLUTION INTRAVENOUS; SUBCUTANEOUS at 09:04

## 2020-01-01 RX ADMIN — ATORVASTATIN CALCIUM 40 MG: 20 TABLET, FILM COATED ORAL at 09:04

## 2020-01-01 RX ADMIN — HEPARIN SODIUM 5000 UNITS: 5000 INJECTION INTRAVENOUS; SUBCUTANEOUS at 09:04

## 2020-01-01 RX ADMIN — POTASSIUM CHLORIDE 40 MEQ: 20 SOLUTION ORAL at 07:04

## 2020-01-01 RX ADMIN — MONTELUKAST 10 MG: 10 TABLET, FILM COATED ORAL at 09:03

## 2020-01-01 RX ADMIN — VITAMIN D 2000 UNITS: 25 TAB ORAL at 08:04

## 2020-01-01 RX ADMIN — CARVEDILOL 25 MG: 25 TABLET, FILM COATED ORAL at 09:03

## 2020-01-01 RX ADMIN — MUPIROCIN: 20 OINTMENT TOPICAL at 02:04

## 2020-01-01 RX ADMIN — INSULIN DETEMIR 6 UNITS: 100 INJECTION, SOLUTION SUBCUTANEOUS at 08:04

## 2020-01-01 RX ADMIN — MONTELUKAST 10 MG: 10 TABLET, FILM COATED ORAL at 10:03

## 2020-01-01 RX ADMIN — POTASSIUM CHLORIDE 40 MEQ: 20 SOLUTION ORAL at 03:04

## 2020-01-01 RX ADMIN — FUROSEMIDE 120 MG: 10 INJECTION, SOLUTION INTRAVENOUS at 09:03

## 2020-01-01 RX ADMIN — CARVEDILOL 12.5 MG: 12.5 TABLET, FILM COATED ORAL at 05:03

## 2020-01-01 RX ADMIN — INSULIN DETEMIR 20 UNITS: 100 INJECTION, SOLUTION SUBCUTANEOUS at 09:02

## 2020-01-01 RX ADMIN — HEPARIN SODIUM 5000 UNITS: 5000 INJECTION, SOLUTION INTRAVENOUS; SUBCUTANEOUS at 10:04

## 2020-01-01 RX ADMIN — OXYCODONE HYDROCHLORIDE AND ACETAMINOPHEN 500 MG: 500 TABLET ORAL at 09:04

## 2020-01-01 RX ADMIN — INSULIN DETEMIR 10 UNITS: 100 INJECTION, SOLUTION SUBCUTANEOUS at 08:03

## 2020-01-01 RX ADMIN — POTASSIUM CHLORIDE 20 MEQ: 1500 TABLET, EXTENDED RELEASE ORAL at 02:03

## 2020-01-01 RX ADMIN — BORTEZOMIB 3.2 MG: 3.5 INJECTION, POWDER, LYOPHILIZED, FOR SOLUTION INTRAVENOUS; SUBCUTANEOUS at 09:01

## 2020-01-01 RX ADMIN — THERA TABS 1 TABLET: TAB at 09:04

## 2020-01-01 RX ADMIN — ACETAMINOPHEN 650 MG: 325 TABLET ORAL at 01:03

## 2020-01-01 RX ADMIN — ASPIRIN 81 MG CHEWABLE TABLET 81 MG: 81 TABLET CHEWABLE at 10:04

## 2020-01-01 RX ADMIN — SODIUM BICARBONATE 650 MG TABLET 650 MG: at 08:04

## 2020-01-01 RX ADMIN — VITAMIN D, TAB 1000IU (100/BT) 1000 UNITS: 25 TAB at 10:04

## 2020-01-01 RX ADMIN — INSULIN ASPART 2 UNITS: 100 INJECTION, SOLUTION INTRAVENOUS; SUBCUTANEOUS at 12:04

## 2020-01-01 RX ADMIN — CLOPIDOGREL BISULFATE 75 MG: 75 TABLET ORAL at 10:03

## 2020-01-01 RX ADMIN — CARVEDILOL 25 MG: 25 TABLET, FILM COATED ORAL at 09:04

## 2020-01-01 RX ADMIN — INSULIN DETEMIR 10 UNITS: 100 INJECTION, SOLUTION SUBCUTANEOUS at 09:03

## 2020-01-01 RX ADMIN — HEPARIN SODIUM 5000 UNITS: 5000 INJECTION INTRAVENOUS; SUBCUTANEOUS at 08:04

## 2020-01-01 RX ADMIN — CARVEDILOL 25 MG: 25 TABLET, FILM COATED ORAL at 05:04

## 2020-01-01 RX ADMIN — ENOXAPARIN SODIUM 90 MG: 150 INJECTION SUBCUTANEOUS at 04:03

## 2020-01-01 RX ADMIN — VITAMIN D, TAB 1000IU (100/BT) 1000 UNITS: 25 TAB at 09:04

## 2020-01-01 RX ADMIN — ACETAMINOPHEN 650 MG: 650 SUPPOSITORY RECTAL at 08:03

## 2020-01-01 RX ADMIN — PROPOFOL 80 MG: 10 INJECTION, EMULSION INTRAVENOUS at 11:01

## 2020-01-01 RX ADMIN — HEPARIN SODIUM 12 UNITS/KG/HR: 10000 INJECTION, SOLUTION INTRAVENOUS at 12:03

## 2020-01-01 RX ADMIN — MONTELUKAST 10 MG: 10 TABLET, FILM COATED ORAL at 08:03

## 2020-01-01 RX ADMIN — INSULIN DETEMIR 10 UNITS: 100 INJECTION, SOLUTION SUBCUTANEOUS at 09:04

## 2020-01-01 RX ADMIN — SODIUM CHLORIDE, SODIUM LACTATE, POTASSIUM CHLORIDE, AND CALCIUM CHLORIDE 500 ML: .6; .31; .03; .02 INJECTION, SOLUTION INTRAVENOUS at 11:04

## 2020-01-01 RX ADMIN — ENOXAPARIN SODIUM 30 MG: 30 INJECTION SUBCUTANEOUS at 11:04

## 2020-01-01 RX ADMIN — ASPIRIN 81 MG CHEWABLE TABLET 81 MG: 81 TABLET CHEWABLE at 09:04

## 2020-01-01 RX ADMIN — CLOPIDOGREL BISULFATE 75 MG: 75 TABLET ORAL at 10:04

## 2020-01-01 RX ADMIN — LACTULOSE 200 G: 10 SOLUTION ORAL at 05:04

## 2020-01-01 RX ADMIN — CLOPIDOGREL BISULFATE 75 MG: 75 TABLET ORAL at 09:04

## 2020-01-01 RX ADMIN — MONTELUKAST 10 MG: 10 TABLET, FILM COATED ORAL at 12:03

## 2020-01-01 RX ADMIN — Medication 16 G: at 10:03

## 2020-01-01 RX ADMIN — INSULIN ASPART 1 UNITS: 100 INJECTION, SOLUTION INTRAVENOUS; SUBCUTANEOUS at 08:04

## 2020-01-01 RX ADMIN — Medication 6 MG: at 08:04

## 2020-01-01 RX ADMIN — SODIUM CHLORIDE, SODIUM LACTATE, POTASSIUM CHLORIDE, AND CALCIUM CHLORIDE 500 ML: .6; .31; .03; .02 INJECTION, SOLUTION INTRAVENOUS at 12:03

## 2020-01-01 RX ADMIN — PANTOPRAZOLE SODIUM 40 MG: 40 TABLET, DELAYED RELEASE ORAL at 10:04

## 2020-01-01 RX ADMIN — AMLODIPINE BESYLATE 10 MG: 10 TABLET ORAL at 09:04

## 2020-01-01 RX ADMIN — INSULIN ASPART 1 UNITS: 100 INJECTION, SOLUTION INTRAVENOUS; SUBCUTANEOUS at 09:04

## 2020-01-01 RX ADMIN — INSULIN ASPART 3 UNITS: 100 INJECTION, SOLUTION INTRAVENOUS; SUBCUTANEOUS at 12:04

## 2020-01-01 RX ADMIN — Medication 100 MG: at 09:04

## 2020-01-01 RX ADMIN — INSULIN ASPART 6 UNITS: 100 INJECTION, SOLUTION INTRAVENOUS; SUBCUTANEOUS at 06:02

## 2020-01-01 RX ADMIN — PANTOPRAZOLE SODIUM 40 MG: 40 TABLET, DELAYED RELEASE ORAL at 08:04

## 2020-01-01 RX ADMIN — Medication 100 MG: at 09:05

## 2020-01-01 RX ADMIN — STANDARDIZED SENNA CONCENTRATE AND DOCUSATE SODIUM 1 TABLET: 8.6; 5 TABLET ORAL at 09:04

## 2020-01-01 RX ADMIN — CARVEDILOL 12.5 MG: 12.5 TABLET, FILM COATED ORAL at 09:03

## 2020-01-01 RX ADMIN — FUROSEMIDE 120 MG: 10 INJECTION, SOLUTION INTRAVENOUS at 04:03

## 2020-01-01 RX ADMIN — PANTOPRAZOLE SODIUM 40 MG: 40 TABLET, DELAYED RELEASE ORAL at 09:04

## 2020-01-01 RX ADMIN — ACETAMINOPHEN 650 MG: 325 TABLET ORAL at 04:03

## 2020-01-01 RX ADMIN — PROPOFOL 150 MG: 10 INJECTION, EMULSION INTRAVENOUS at 11:04

## 2020-01-01 RX ADMIN — AZITHROMYCIN MONOHYDRATE 500 MG: 500 INJECTION, POWDER, LYOPHILIZED, FOR SOLUTION INTRAVENOUS at 08:03

## 2020-01-01 RX ADMIN — ATORVASTATIN CALCIUM 40 MG: 20 TABLET, FILM COATED ORAL at 08:04

## 2020-01-01 RX ADMIN — INSULIN DETEMIR 6 UNITS: 100 INJECTION, SOLUTION SUBCUTANEOUS at 09:04

## 2020-01-01 RX ADMIN — INSULIN ASPART 2 UNITS: 100 INJECTION, SOLUTION INTRAVENOUS; SUBCUTANEOUS at 05:04

## 2020-01-01 RX ADMIN — CALCIUM CARBONATE (ANTACID) CHEW TAB 500 MG 500 MG: 500 CHEW TAB at 10:04

## 2020-01-01 RX ADMIN — PANTOPRAZOLE SODIUM 40 MG: 40 TABLET, DELAYED RELEASE ORAL at 09:05

## 2020-01-01 RX ADMIN — INSULIN ASPART 2 UNITS: 100 INJECTION, SOLUTION INTRAVENOUS; SUBCUTANEOUS at 01:05

## 2020-01-01 RX ADMIN — CARVEDILOL 25 MG: 25 TABLET, FILM COATED ORAL at 07:04

## 2020-01-01 RX ADMIN — Medication 100 MG: at 10:04

## 2020-01-01 RX ADMIN — CALCIUM CARBONATE (ANTACID) CHEW TAB 500 MG 500 MG: 500 CHEW TAB at 01:04

## 2020-01-01 RX ADMIN — FUROSEMIDE 80 MG: 40 TABLET ORAL at 05:03

## 2020-01-01 RX ADMIN — SODIUM CHLORIDE, SODIUM GLUCONATE, SODIUM ACETATE, POTASSIUM CHLORIDE, MAGNESIUM CHLORIDE, SODIUM PHOSPHATE, DIBASIC, AND POTASSIUM PHOSPHATE: .53; .5; .37; .037; .03; .012; .00082 INJECTION, SOLUTION INTRAVENOUS at 10:04

## 2020-01-01 RX ADMIN — CARVEDILOL 25 MG: 25 TABLET, FILM COATED ORAL at 06:04

## 2020-01-01 RX ADMIN — ACETAMINOPHEN 650 MG: 325 TABLET ORAL at 03:02

## 2020-01-01 RX ADMIN — FENTANYL CITRATE 50 MCG: 50 INJECTION, SOLUTION INTRAMUSCULAR; INTRAVENOUS at 11:04

## 2020-01-01 RX ADMIN — CARVEDILOL 25 MG: 25 TABLET, FILM COATED ORAL at 09:05

## 2020-01-01 RX ADMIN — CEFAZOLIN 1 G: 1 INJECTION, POWDER, FOR SOLUTION INTRAVENOUS at 03:04

## 2020-01-01 RX ADMIN — OXYCODONE HYDROCHLORIDE AND ACETAMINOPHEN 500 MG: 500 TABLET ORAL at 08:04

## 2020-01-01 RX ADMIN — GABAPENTIN 300 MG: 300 CAPSULE ORAL at 08:03

## 2020-01-01 RX ADMIN — CARVEDILOL 25 MG: 25 TABLET, FILM COATED ORAL at 06:03

## 2020-01-01 RX ADMIN — Medication 6 MG: at 09:04

## 2020-01-01 RX ADMIN — ASPIRIN 81 MG: 81 TABLET, COATED ORAL at 08:03

## 2020-01-01 RX ADMIN — AZITHROMYCIN 500 MG: 250 TABLET, FILM COATED ORAL at 11:03

## 2020-01-01 RX ADMIN — ENOXAPARIN SODIUM 30 MG: 30 INJECTION SUBCUTANEOUS at 08:04

## 2020-01-01 RX ADMIN — VITAMIN D, TAB 1000IU (100/BT) 1000 UNITS: 25 TAB at 08:04

## 2020-01-01 RX ADMIN — FLUOXETINE 20 MG: 10 CAPSULE ORAL at 08:04

## 2020-01-01 RX ADMIN — POTASSIUM & SODIUM PHOSPHATES POWDER PACK 280-160-250 MG 1 PACKET: 280-160-250 PACK at 12:04

## 2020-01-01 RX ADMIN — MEGESTROL ACETATE 40 MG: 40 TABLET ORAL at 11:04

## 2020-01-01 RX ADMIN — Medication 100 MG: at 08:04

## 2020-01-01 RX ADMIN — TUBERCULIN PURIFIED PROTEIN DERIVATIVE 5 UNITS: 5 INJECTION, SOLUTION INTRADERMAL at 02:04

## 2020-01-01 RX ADMIN — ATORVASTATIN CALCIUM 40 MG: 40 TABLET, FILM COATED ORAL at 10:03

## 2020-01-01 RX ADMIN — FLUTICASONE PROPIONATE 50 MCG: 50 SPRAY, METERED NASAL at 08:03

## 2020-01-01 RX ADMIN — CLOPIDOGREL BISULFATE 75 MG: 75 TABLET ORAL at 08:04

## 2020-01-01 RX ADMIN — MENTHOL, METHYL SALICYLATE: 10; 15 CREAM TOPICAL at 03:04

## 2020-01-01 RX ADMIN — THIAMINE HYDROCHLORIDE 500 MG: 100 INJECTION, SOLUTION INTRAMUSCULAR; INTRAVENOUS at 12:04

## 2020-01-01 RX ADMIN — CLOPIDOGREL BISULFATE 75 MG: 75 TABLET ORAL at 09:03

## 2020-01-01 RX ADMIN — AZITHROMYCIN 500 MG: 250 TABLET, FILM COATED ORAL at 08:03

## 2020-01-01 RX ADMIN — HEPARIN SODIUM 5000 UNITS: 5000 INJECTION, SOLUTION INTRAVENOUS; SUBCUTANEOUS at 09:03

## 2020-01-01 RX ADMIN — MUPIROCIN: 20 OINTMENT TOPICAL at 09:04

## 2020-01-01 RX ADMIN — ACETAMINOPHEN 650 MG: 325 TABLET ORAL at 02:03

## 2020-01-01 RX ADMIN — FENTANYL CITRATE 50 MCG: 50 INJECTION, SOLUTION INTRAMUSCULAR; INTRAVENOUS at 03:04

## 2020-01-01 RX ADMIN — CARVEDILOL 25 MG: 25 TABLET, FILM COATED ORAL at 08:04

## 2020-01-01 RX ADMIN — FUROSEMIDE 80 MG: 40 TABLET ORAL at 10:03

## 2020-01-01 RX ADMIN — CARVEDILOL 25 MG: 25 TABLET, FILM COATED ORAL at 10:04

## 2020-01-01 RX ADMIN — SENNOSIDES AND DOCUSATE SODIUM 1 TABLET: 8.6; 5 TABLET ORAL at 08:04

## 2020-01-01 RX ADMIN — HYDROXYCHLOROQUINE SULFATE 400 MG: 200 TABLET, FILM COATED ORAL at 01:03

## 2020-01-01 RX ADMIN — OXYCODONE HYDROCHLORIDE AND ACETAMINOPHEN 500 MG: 500 TABLET ORAL at 10:04

## 2020-01-01 RX ADMIN — SODIUM CHLORIDE 250 ML: 0.9 INJECTION, SOLUTION INTRAVENOUS at 04:04

## 2020-01-01 RX ADMIN — CLOPIDOGREL BISULFATE 75 MG: 75 TABLET ORAL at 08:03

## 2020-01-01 RX ADMIN — FLUOXETINE 20 MG: 10 CAPSULE ORAL at 09:04

## 2020-01-01 RX ADMIN — FENTANYL CITRATE 50 MCG: 50 INJECTION, SOLUTION INTRAMUSCULAR; INTRAVENOUS at 04:04

## 2020-01-01 RX ADMIN — INSULIN ASPART 5 UNITS: 100 INJECTION, SOLUTION INTRAVENOUS; SUBCUTANEOUS at 11:02

## 2020-01-01 RX ADMIN — ATORVASTATIN CALCIUM 40 MG: 20 TABLET, FILM COATED ORAL at 10:04

## 2020-01-01 RX ADMIN — CALCITRIOL 0.5 MCG: 0.25 CAPSULE ORAL at 09:02

## 2020-01-01 RX ADMIN — BORTEZOMIB 3.2 MG: 3.5 INJECTION, POWDER, LYOPHILIZED, FOR SOLUTION INTRAVENOUS; SUBCUTANEOUS at 10:02

## 2020-01-01 RX ADMIN — INSULIN ASPART 3 UNITS: 100 INJECTION, SOLUTION INTRAVENOUS; SUBCUTANEOUS at 06:04

## 2020-01-01 RX ADMIN — OSELTAMIVIR PHOSPHATE 30 MG: 30 CAPSULE ORAL at 08:03

## 2020-01-01 RX ADMIN — POLYETHYLENE GLYCOL 3350 17 G: 17 POWDER, FOR SOLUTION ORAL at 09:05

## 2020-01-01 RX ADMIN — POTASSIUM CHLORIDE 40 MEQ: 1500 TABLET, EXTENDED RELEASE ORAL at 09:03

## 2020-01-01 RX ADMIN — FUROSEMIDE 120 MG: 10 INJECTION, SOLUTION INTRAVENOUS at 05:03

## 2020-01-01 RX ADMIN — PANTOPRAZOLE SODIUM 40 MG: 40 TABLET, DELAYED RELEASE ORAL at 08:03

## 2020-01-01 RX ADMIN — MONTELUKAST 10 MG: 10 TABLET, FILM COATED ORAL at 01:03

## 2020-01-01 RX ADMIN — MEGESTROL ACETATE 40 MG: 40 TABLET ORAL at 05:04

## 2020-01-01 RX ADMIN — SODIUM CHLORIDE, SODIUM LACTATE, POTASSIUM CHLORIDE, AND CALCIUM CHLORIDE: .6; .31; .03; .02 INJECTION, SOLUTION INTRAVENOUS at 09:04

## 2020-01-01 RX ADMIN — CARVEDILOL 25 MG: 25 TABLET, FILM COATED ORAL at 05:03

## 2020-01-01 RX ADMIN — THERA TABS 1 TABLET: TAB at 09:05

## 2020-01-01 RX ADMIN — ATORVASTATIN CALCIUM 40 MG: 40 TABLET, FILM COATED ORAL at 09:03

## 2020-01-01 RX ADMIN — CARVEDILOL 12.5 MG: 12.5 TABLET, FILM COATED ORAL at 10:03

## 2020-01-01 RX ADMIN — MONTELUKAST 10 MG: 10 TABLET, FILM COATED ORAL at 08:02

## 2020-01-01 RX ADMIN — POTASSIUM CHLORIDE 10 MEQ: 7.46 INJECTION, SOLUTION INTRAVENOUS at 11:03

## 2020-01-01 RX ADMIN — CARVEDILOL 25 MG: 25 TABLET, FILM COATED ORAL at 09:02

## 2020-01-01 RX ADMIN — AMLODIPINE BESYLATE 10 MG: 5 TABLET ORAL at 10:03

## 2020-01-01 RX ADMIN — VITAMIN D 2000 UNITS: 25 TAB ORAL at 11:04

## 2020-01-01 RX ADMIN — PROPOFOL 40 MG: 10 INJECTION, EMULSION INTRAVENOUS at 03:04

## 2020-01-01 RX ADMIN — POLYETHYLENE GLYCOL 3350 17 G: 17 POWDER, FOR SOLUTION ORAL at 08:04

## 2020-01-01 RX ADMIN — HEPARIN SODIUM 5000 UNITS: 5000 INJECTION, SOLUTION INTRAVENOUS; SUBCUTANEOUS at 06:04

## 2020-01-01 RX ADMIN — FLUTICASONE PROPIONATE 50 MCG: 50 SPRAY, METERED NASAL at 10:03

## 2020-01-01 RX ADMIN — ASPIRIN 81 MG CHEWABLE TABLET 81 MG: 81 TABLET CHEWABLE at 09:05

## 2020-01-01 RX ADMIN — ENOXAPARIN SODIUM 30 MG: 30 INJECTION SUBCUTANEOUS at 10:04

## 2020-01-01 RX ADMIN — PROPOFOL 150 MCG/KG/MIN: 10 INJECTION, EMULSION INTRAVENOUS at 11:01

## 2020-01-01 RX ADMIN — SODIUM CHLORIDE, SODIUM LACTATE, POTASSIUM CHLORIDE, AND CALCIUM CHLORIDE 500 ML: .6; .31; .03; .02 INJECTION, SOLUTION INTRAVENOUS at 09:04

## 2020-01-01 RX ADMIN — PANTOPRAZOLE SODIUM 40 MG: 40 TABLET, DELAYED RELEASE ORAL at 10:03

## 2020-01-01 RX ADMIN — INSULIN DETEMIR 20 UNITS: 100 INJECTION, SOLUTION SUBCUTANEOUS at 11:02

## 2020-01-01 RX ADMIN — ATORVASTATIN CALCIUM 40 MG: 40 TABLET, FILM COATED ORAL at 12:03

## 2020-01-01 RX ADMIN — ATORVASTATIN CALCIUM 40 MG: 40 TABLET, FILM COATED ORAL at 08:03

## 2020-01-01 RX ADMIN — FUROSEMIDE 40 MG: 40 TABLET ORAL at 09:03

## 2020-01-01 RX ADMIN — LIDOCAINE HYDROCHLORIDE 100 MG: 20 INJECTION, SOLUTION INTRAVENOUS at 03:04

## 2020-01-01 RX ADMIN — INSULIN ASPART 2 UNITS: 100 INJECTION, SOLUTION INTRAVENOUS; SUBCUTANEOUS at 02:04

## 2020-01-01 RX ADMIN — POTASSIUM CHLORIDE 20 MEQ: 1500 TABLET, EXTENDED RELEASE ORAL at 05:03

## 2020-01-01 RX ADMIN — GABAPENTIN 300 MG: 300 CAPSULE ORAL at 10:03

## 2020-01-01 RX ADMIN — POTASSIUM CHLORIDE 20 MEQ: 1500 TABLET, EXTENDED RELEASE ORAL at 04:03

## 2020-01-01 RX ADMIN — ASPIRIN 81 MG: 81 TABLET, CHEWABLE ORAL at 11:04

## 2020-01-01 RX ADMIN — HEPARIN SODIUM 5000 UNITS: 5000 INJECTION, SOLUTION INTRAVENOUS; SUBCUTANEOUS at 03:04

## 2020-01-01 RX ADMIN — PHENYLEPHRINE HYDROCHLORIDE 100 MCG: 10 INJECTION INTRAVENOUS at 11:04

## 2020-01-01 RX ADMIN — FLUOXETINE 10 MG: 10 CAPSULE ORAL at 09:04

## 2020-01-01 RX ADMIN — SODIUM CHLORIDE: 0.9 INJECTION, SOLUTION INTRAVENOUS at 06:04

## 2020-01-01 RX ADMIN — BORTEZOMIB 3.2 MG: 3.5 INJECTION, POWDER, LYOPHILIZED, FOR SOLUTION INTRAVENOUS; SUBCUTANEOUS at 11:01

## 2020-01-01 RX ADMIN — ASPIRIN 81 MG CHEWABLE TABLET 81 MG: 81 TABLET CHEWABLE at 08:04

## 2020-01-01 RX ADMIN — THIAMINE HYDROCHLORIDE 500 MG: 100 INJECTION, SOLUTION INTRAMUSCULAR; INTRAVENOUS at 10:04

## 2020-01-01 RX ADMIN — SENNOSIDES AND DOCUSATE SODIUM 1 TABLET: 8.6; 5 TABLET ORAL at 09:05

## 2020-01-01 RX ADMIN — HEPARIN SODIUM 7 UNITS/KG/HR: 10000 INJECTION, SOLUTION INTRAVENOUS at 08:03

## 2020-01-01 RX ADMIN — HEPARIN SODIUM 5000 UNITS: 5000 INJECTION, SOLUTION INTRAVENOUS; SUBCUTANEOUS at 02:03

## 2020-01-01 RX ADMIN — ASPIRIN 325 MG ORAL TABLET 325 MG: 325 PILL ORAL at 12:03

## 2020-01-01 RX ADMIN — THERA TABS 1 TABLET: TAB at 08:04

## 2020-01-01 RX ADMIN — HYDROXYCHLOROQUINE SULFATE 400 MG: 200 TABLET, FILM COATED ORAL at 08:03

## 2020-01-01 RX ADMIN — INSULIN ASPART 2 UNITS: 100 INJECTION, SOLUTION INTRAVENOUS; SUBCUTANEOUS at 10:04

## 2020-01-01 RX ADMIN — FUROSEMIDE 120 MG: 10 INJECTION, SOLUTION INTRAVENOUS at 10:03

## 2020-01-01 RX ADMIN — CLOPIDOGREL BISULFATE 75 MG: 75 TABLET ORAL at 11:04

## 2020-01-01 RX ADMIN — SODIUM CHLORIDE: 0.9 INJECTION, SOLUTION INTRAVENOUS at 07:02

## 2020-01-01 RX ADMIN — CEFTRIAXONE 1 G: 1 INJECTION, SOLUTION INTRAVENOUS at 09:03

## 2020-01-01 RX ADMIN — SUCCINYLCHOLINE CHLORIDE 80 MG: 20 INJECTION, SOLUTION INTRAMUSCULAR; INTRAVENOUS at 03:04

## 2020-01-01 RX ADMIN — MAGNESIUM SULFATE IN WATER 2 G: 40 INJECTION, SOLUTION INTRAVENOUS at 09:03

## 2020-01-01 RX ADMIN — INSULIN ASPART 4 UNITS: 100 INJECTION, SOLUTION INTRAVENOUS; SUBCUTANEOUS at 11:04

## 2020-01-01 RX ADMIN — MONTELUKAST 10 MG: 10 TABLET, FILM COATED ORAL at 08:04

## 2020-01-01 RX ADMIN — INSULIN DETEMIR 20 UNITS: 100 INJECTION, SOLUTION SUBCUTANEOUS at 09:03

## 2020-01-01 RX ADMIN — FLUOXETINE 10 MG: 10 CAPSULE ORAL at 07:04

## 2020-01-01 RX ADMIN — ONDANSETRON 4 MG: 2 INJECTION INTRAMUSCULAR; INTRAVENOUS at 02:02

## 2020-01-01 RX ADMIN — CARVEDILOL 25 MG: 25 TABLET, FILM COATED ORAL at 08:03

## 2020-01-01 RX ADMIN — POTASSIUM & SODIUM PHOSPHATES POWDER PACK 280-160-250 MG 1 PACKET: 280-160-250 PACK at 09:04

## 2020-01-01 RX ADMIN — LACTULOSE 200 G: 10 SOLUTION ORAL at 10:04

## 2020-01-01 RX ADMIN — CARVEDILOL 25 MG: 25 TABLET, FILM COATED ORAL at 04:04

## 2020-01-01 RX ADMIN — INSULIN DETEMIR 10 UNITS: 100 INJECTION, SOLUTION SUBCUTANEOUS at 08:04

## 2020-01-01 RX ADMIN — INSULIN DETEMIR 8 UNITS: 100 INJECTION, SOLUTION SUBCUTANEOUS at 10:03

## 2020-01-01 RX ADMIN — MENTHOL, METHYL SALICYLATE: 10; 15 CREAM TOPICAL at 09:04

## 2020-01-01 RX ADMIN — ROCURONIUM BROMIDE 5 MG: 10 INJECTION, SOLUTION INTRAVENOUS at 11:04

## 2020-01-01 RX ADMIN — THIAMINE HYDROCHLORIDE 500 MG: 100 INJECTION, SOLUTION INTRAMUSCULAR; INTRAVENOUS at 03:04

## 2020-01-01 RX ADMIN — INSULIN ASPART 2 UNITS: 100 INJECTION, SOLUTION INTRAVENOUS; SUBCUTANEOUS at 09:05

## 2020-01-01 RX ADMIN — INSULIN ASPART 3 UNITS: 100 INJECTION, SOLUTION INTRAVENOUS; SUBCUTANEOUS at 11:04

## 2020-01-01 RX ADMIN — FUROSEMIDE 120 MG: 10 INJECTION, SOLUTION INTRAVENOUS at 02:03

## 2020-01-01 RX ADMIN — MODAFINIL 100 MG: 100 TABLET ORAL at 06:04

## 2020-01-01 RX ADMIN — ASPIRIN 81 MG: 81 TABLET, COATED ORAL at 09:03

## 2020-01-01 RX ADMIN — CARVEDILOL 12.5 MG: 12.5 TABLET, FILM COATED ORAL at 06:03

## 2020-01-01 RX ADMIN — AMLODIPINE BESYLATE 10 MG: 10 TABLET ORAL at 10:04

## 2020-01-01 RX ADMIN — ACETAMINOPHEN 325 MG: 650 SUPPOSITORY RECTAL at 08:03

## 2020-01-01 RX ADMIN — PANTOPRAZOLE SODIUM 40 MG: 40 TABLET, DELAYED RELEASE ORAL at 09:02

## 2020-01-01 RX ADMIN — STANDARDIZED SENNA CONCENTRATE AND DOCUSATE SODIUM 1 TABLET: 8.6; 5 TABLET ORAL at 08:04

## 2020-01-01 RX ADMIN — AMLODIPINE BESYLATE 10 MG: 10 TABLET ORAL at 09:05

## 2020-01-01 RX ADMIN — BORTEZOMIB 3.2 MG: 3.5 INJECTION, POWDER, LYOPHILIZED, FOR SOLUTION INTRAVENOUS; SUBCUTANEOUS at 10:01

## 2020-01-01 RX ADMIN — SODIUM CHLORIDE 500 ML: 0.9 INJECTION, SOLUTION INTRAVENOUS at 03:03

## 2020-01-01 RX ADMIN — SODIUM BICARBONATE 650 MG TABLET 650 MG: at 09:05

## 2020-01-01 RX ADMIN — AMLODIPINE BESYLATE 10 MG: 5 TABLET ORAL at 08:03

## 2020-01-01 RX ADMIN — HEPARIN SODIUM 5000 UNITS: 5000 INJECTION, SOLUTION INTRAVENOUS; SUBCUTANEOUS at 02:04

## 2020-01-01 RX ADMIN — PHENYLEPHRINE HYDROCHLORIDE 50 MCG: 10 INJECTION INTRAVENOUS at 12:01

## 2020-01-01 RX ADMIN — POTASSIUM CHLORIDE 10 MEQ: 7.46 INJECTION, SOLUTION INTRAVENOUS at 12:03

## 2020-01-01 RX ADMIN — INSULIN ASPART 5 UNITS: 100 INJECTION, SOLUTION INTRAVENOUS; SUBCUTANEOUS at 12:02

## 2020-01-01 RX ADMIN — POTASSIUM & SODIUM PHOSPHATES POWDER PACK 280-160-250 MG 1 PACKET: 280-160-250 PACK at 08:04

## 2020-01-01 RX ADMIN — CARVEDILOL 12.5 MG: 12.5 TABLET, FILM COATED ORAL at 08:03

## 2020-01-01 RX ADMIN — CEFTRIAXONE 2 G: 2 INJECTION, SOLUTION INTRAVENOUS at 12:04

## 2020-01-01 RX ADMIN — AMLODIPINE BESYLATE 10 MG: 5 TABLET ORAL at 09:02

## 2020-01-01 RX ADMIN — CARVEDILOL 25 MG: 25 TABLET, FILM COATED ORAL at 06:02

## 2020-01-01 RX ADMIN — CLOPIDOGREL BISULFATE 75 MG: 75 TABLET ORAL at 09:05

## 2020-01-01 RX ADMIN — INSULIN ASPART 1 UNITS: 100 INJECTION, SOLUTION INTRAVENOUS; SUBCUTANEOUS at 02:04

## 2020-01-01 RX ADMIN — GABAPENTIN 300 MG: 300 CAPSULE ORAL at 12:03

## 2020-01-01 RX ADMIN — GABAPENTIN 300 MG: 300 CAPSULE ORAL at 02:03

## 2020-01-01 RX ADMIN — INSULIN ASPART 2 UNITS: 100 INJECTION, SOLUTION INTRAVENOUS; SUBCUTANEOUS at 06:02

## 2020-01-01 RX ADMIN — CLOPIDOGREL BISULFATE 75 MG: 75 TABLET ORAL at 09:02

## 2020-01-01 RX ADMIN — HEPARIN SODIUM 5000 UNITS: 5000 INJECTION INTRAVENOUS; SUBCUTANEOUS at 10:04

## 2020-01-01 RX ADMIN — AMLODIPINE BESYLATE 10 MG: 5 TABLET ORAL at 10:01

## 2020-01-01 RX ADMIN — CEFTRIAXONE 1 G: 1 INJECTION, SOLUTION INTRAVENOUS at 11:03

## 2020-01-01 RX ADMIN — INSULIN ASPART 4 UNITS: 100 INJECTION, SOLUTION INTRAVENOUS; SUBCUTANEOUS at 05:04

## 2020-01-01 RX ADMIN — ONDANSETRON 4 MG: 2 INJECTION INTRAMUSCULAR; INTRAVENOUS at 09:02

## 2020-01-01 RX ADMIN — POTASSIUM CHLORIDE 20 MEQ: 1500 TABLET, EXTENDED RELEASE ORAL at 12:03

## 2020-01-01 RX ADMIN — FUROSEMIDE 40 MG: 40 TABLET ORAL at 08:03

## 2020-01-01 RX ADMIN — ASPIRIN 81 MG: 81 TABLET, COATED ORAL at 10:03

## 2020-01-01 RX ADMIN — CARVEDILOL 12.5 MG: 12.5 TABLET, FILM COATED ORAL at 04:03

## 2020-01-01 RX ADMIN — LIDOCAINE HYDROCHLORIDE 100 MG: 20 INJECTION, SOLUTION INTRAVENOUS at 11:04

## 2020-01-01 RX ADMIN — VITAMIN D, TAB 1000IU (100/BT) 1000 UNITS: 25 TAB at 09:05

## 2020-01-01 RX ADMIN — AMLODIPINE BESYLATE 10 MG: 5 TABLET ORAL at 09:03

## 2020-01-01 RX ADMIN — INSULIN DETEMIR 10 UNITS: 100 INJECTION, SOLUTION SUBCUTANEOUS at 10:03

## 2020-01-01 RX ADMIN — THERA TABS 1 TABLET: TAB at 10:04

## 2020-01-01 RX ADMIN — SODIUM CHLORIDE, SODIUM GLUCONATE, SODIUM ACETATE, POTASSIUM CHLORIDE, MAGNESIUM CHLORIDE, SODIUM PHOSPHATE, DIBASIC, AND POTASSIUM PHOSPHATE: .53; .5; .37; .037; .03; .012; .00082 INJECTION, SOLUTION INTRAVENOUS at 03:04

## 2020-01-01 RX ADMIN — INSULIN DETEMIR 6 UNITS: 100 INJECTION, SOLUTION SUBCUTANEOUS at 12:04

## 2020-01-01 RX ADMIN — CARVEDILOL 25 MG: 25 TABLET, FILM COATED ORAL at 08:02

## 2020-01-01 RX ADMIN — MIRTAZAPINE 30 MG: 15 TABLET, FILM COATED ORAL at 08:02

## 2020-01-01 RX ADMIN — HEPARIN SODIUM 5000 UNITS: 5000 INJECTION, SOLUTION INTRAVENOUS; SUBCUTANEOUS at 12:03

## 2020-01-01 RX ADMIN — MONTELUKAST 10 MG: 10 TABLET, FILM COATED ORAL at 10:04

## 2020-01-01 RX ADMIN — OXYCODONE HYDROCHLORIDE AND ACETAMINOPHEN 500 MG: 500 TABLET ORAL at 09:05

## 2020-01-01 RX ADMIN — HEPARIN SODIUM 5000 UNITS: 5000 INJECTION, SOLUTION INTRAVENOUS; SUBCUTANEOUS at 05:03

## 2020-01-01 RX ADMIN — Medication 20 MG: at 11:04

## 2020-01-01 RX ADMIN — PANTOPRAZOLE SODIUM 40 MG: 40 TABLET, DELAYED RELEASE ORAL at 09:03

## 2020-01-01 RX ADMIN — OSELTAMIVIR PHOSPHATE 30 MG: 30 CAPSULE ORAL at 09:03

## 2020-01-01 RX ADMIN — HYDROXYCHLOROQUINE SULFATE 400 MG: 200 TABLET, FILM COATED ORAL at 10:03

## 2020-01-01 RX ADMIN — CLONIDINE HYDROCHLORIDE 0.1 MG: 0.1 TABLET ORAL at 10:01

## 2020-01-01 RX ADMIN — SODIUM CHLORIDE, SODIUM LACTATE, POTASSIUM CHLORIDE, AND CALCIUM CHLORIDE: .6; .31; .03; .02 INJECTION, SOLUTION INTRAVENOUS at 06:04

## 2020-01-01 RX ADMIN — ASPIRIN 81 MG: 81 TABLET, CHEWABLE ORAL at 08:04

## 2020-01-01 RX ADMIN — MONTELUKAST 10 MG: 10 TABLET, FILM COATED ORAL at 09:02

## 2020-01-01 RX ADMIN — FUROSEMIDE 40 MG: 10 INJECTION, SOLUTION INTRAVENOUS at 02:02

## 2020-01-01 RX ADMIN — MEGESTROL ACETATE 40 MG: 40 TABLET ORAL at 03:04

## 2020-01-01 RX ADMIN — HEPARIN SODIUM 5000 UNITS: 5000 INJECTION INTRAVENOUS; SUBCUTANEOUS at 09:05

## 2020-01-01 RX ADMIN — SUCCINYLCHOLINE CHLORIDE 120 MG: 20 INJECTION, SOLUTION INTRAMUSCULAR; INTRAVENOUS at 11:04

## 2020-01-01 RX ADMIN — ETOMIDATE 12 MG: 2 INJECTION, SOLUTION INTRAVENOUS at 03:04

## 2020-01-01 RX ADMIN — HEPARIN SODIUM 5000 UNITS: 5000 INJECTION, SOLUTION INTRAVENOUS; SUBCUTANEOUS at 10:03

## 2020-01-01 RX ADMIN — ONDANSETRON 8 MG: 8 TABLET, ORALLY DISINTEGRATING ORAL at 09:04

## 2020-01-01 RX ADMIN — INSULIN ASPART 2 UNITS: 100 INJECTION, SOLUTION INTRAVENOUS; SUBCUTANEOUS at 06:03

## 2020-01-01 RX ADMIN — HYDROXYCHLOROQUINE SULFATE 400 MG: 200 TABLET, FILM COATED ORAL at 11:03

## 2020-01-01 RX ADMIN — ACETAMINOPHEN 650 MG: 325 TABLET ORAL at 06:04

## 2020-01-01 RX ADMIN — INSULIN ASPART 1 UNITS: 100 INJECTION, SOLUTION INTRAVENOUS; SUBCUTANEOUS at 09:02

## 2020-01-01 RX ADMIN — ONDANSETRON 4 MG: 2 INJECTION INTRAMUSCULAR; INTRAVENOUS at 11:04

## 2020-01-01 RX ADMIN — INSULIN DETEMIR 10 UNITS: 100 INJECTION, SOLUTION SUBCUTANEOUS at 01:03

## 2020-01-01 RX ADMIN — HEPARIN SODIUM 9 UNITS/KG/HR: 10000 INJECTION, SOLUTION INTRAVENOUS at 09:03

## 2020-01-01 RX ADMIN — SODIUM BICARBONATE 650 MG TABLET 650 MG: at 10:04

## 2020-01-01 RX ADMIN — INSULIN ASPART 2 UNITS: 100 INJECTION, SOLUTION INTRAVENOUS; SUBCUTANEOUS at 08:04

## 2020-01-01 RX ADMIN — SODIUM CHLORIDE: 0.9 INJECTION, SOLUTION INTRAVENOUS at 09:01

## 2020-01-01 RX ADMIN — MAGNESIUM SULFATE IN WATER 2 G: 40 INJECTION, SOLUTION INTRAVENOUS at 12:03

## 2020-01-01 RX ADMIN — HEPARIN SODIUM 5000 UNITS: 5000 INJECTION, SOLUTION INTRAVENOUS; SUBCUTANEOUS at 06:03

## 2020-01-01 RX ADMIN — DEXTROSE 250 ML: 10 SOLUTION INTRAVENOUS at 06:03

## 2020-01-01 RX ADMIN — INSULIN ASPART 4 UNITS: 100 INJECTION, SOLUTION INTRAVENOUS; SUBCUTANEOUS at 09:04

## 2020-01-01 RX ADMIN — KETOROLAC TROMETHAMINE 15 MG: 30 INJECTION, SOLUTION INTRAMUSCULAR at 08:03

## 2020-01-01 RX ADMIN — MEGESTROL ACETATE 40 MG: 40 TABLET ORAL at 09:04

## 2020-01-01 RX ADMIN — HYDROXYCHLOROQUINE SULFATE 400 MG: 200 TABLET, FILM COATED ORAL at 09:03

## 2020-01-01 RX ADMIN — ATORVASTATIN CALCIUM 40 MG: 40 TABLET, FILM COATED ORAL at 01:03

## 2020-01-01 RX ADMIN — POTASSIUM CHLORIDE 40 MEQ: 20 SOLUTION ORAL at 10:04

## 2020-01-01 RX ADMIN — FLUTICASONE PROPIONATE 50 MCG: 50 SPRAY, METERED NASAL at 11:03

## 2020-01-01 RX ADMIN — LIDOCAINE HYDROCHLORIDE 50 MG: 20 INJECTION, SOLUTION INTRAVENOUS at 11:01

## 2020-01-01 RX ADMIN — HEPARIN SODIUM 5000 UNITS: 5000 INJECTION, SOLUTION INTRAVENOUS; SUBCUTANEOUS at 05:04

## 2020-01-01 RX ADMIN — HEPARIN SODIUM 7 UNITS/KG/HR: 10000 INJECTION, SOLUTION INTRAVENOUS at 01:03

## 2020-01-01 RX ADMIN — BORTEZOMIB 3.2 MG: 3.5 INJECTION, POWDER, LYOPHILIZED, FOR SOLUTION INTRAVENOUS; SUBCUTANEOUS at 12:02

## 2020-01-01 RX ADMIN — SODIUM CHLORIDE 1000 ML: 0.9 INJECTION, SOLUTION INTRAVENOUS at 02:02

## 2020-01-01 RX ADMIN — AZITHROMYCIN 500 MG: 250 TABLET, FILM COATED ORAL at 10:03

## 2020-01-01 RX ADMIN — MUPIROCIN: 20 OINTMENT TOPICAL at 08:04

## 2020-01-01 RX ADMIN — SODIUM CHLORIDE, SODIUM LACTATE, POTASSIUM CHLORIDE, AND CALCIUM CHLORIDE: .6; .31; .03; .02 INJECTION, SOLUTION INTRAVENOUS at 12:04

## 2020-01-01 RX ADMIN — ASPIRIN 81 MG: 81 TABLET, COATED ORAL at 10:04

## 2020-01-08 NOTE — Clinical Note
--velcade today, and once weekly-cbc, cmp weekly--cbc, cmp, spep, igg, igm, iga, serum rosmery elight chains in 4 weeks--ct abdomen w/o contasrt5. Ct thoracic spine without contrast, ct lumbar spine withiut contrast in the next few days--f/u in 4 weeks

## 2020-01-08 NOTE — PROGRESS NOTES
CC: Multiple myeloma, on treatment, follow up visit.      HPI: Mr. Reis is a 64-year-old man with multiple myeloma. He was a patient of Dr.Archie Guaman.  In May 2016, his serum protein electrophoresis revealed a paraprotein measuring 0.86 g/dL; this was an IgG kappa.  A 24-hour urine contained 947 mg of protein, 3% of which was kappa light chains.  His bone marrow had 15% plasma cells and a FISH panel disclosed trisomy 1, 7, 9 and 15.  A PET scan in January 2017 revealed no bone lesions.     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 He was followed with periodic examinations and laboratory studies until early 2018 when his anemia worsened.  There had been a gradual increase in the   protein level and there was a substantial increase in the 24-hour urine protein to 4500 mg.  However, only 4.6% of this was kappa light chain and 2.8% was IgG kappa.  Most of the proteinuria was believed to have been secondary to diabetes   mellitus.  The IgG kappa paraprotein as measured in electrophoresis was 1.23 g/dL.     Mainly because of the worsening anemia and concern that it might have been least partially due to  Myeloma,he was recommended a trial of therapy with Revlimid  10 mg daily for 21 days, every 28 days and dexamethasone 12 mg once a week.   There was a long delay in obtaining Revlimid and after he started that he had worsening renal function and was hospitalized for that problem and for hyperglycemia.    He had another hospitalization in March 2018 for encephalopathy, congestive heart failure, hypoglycemia and worsening anemia which required red cell transfusions.       In May  2018,  Revlimid and dexamethasone were stopped due to the above mentioned problems.  There had been substantial improvement in the protein levels with the paraprotein falling to 0.56 g/dL and   the quantitative IgG declining from 1747 to 991.  The kappa/lambda ratio fell from 44 to 6.5.     He was transfused in early July 2018 and his  hemoglobin was 7.7. Since mid September 2018, hemoglobin values were between 9.6 to 11.3.     His other medical problems include a stroke many years ago that left him with weakness of his left arm and leg and he has severe diabetes mellitus with renal, retinal and peripheral nerve complications.  He has cardiac valvular disease, pulmonary hypertension and diastolic dysfunction.  He also has a poor understanding of his medical status, probably related to complications from the stroke.     Interval history: He started Bortezomib and Decadron from 10/16/19. He has finished 3 cycles. He had hiccups after each velcade treatment.       Review of Systems   Constitutional: Positive for malaise/fatigue. Negative for chills, fever and weight loss.   HENT: Negative for ear discharge, ear pain, nosebleeds, sinus pain and tinnitus.    Eyes: Negative for blurred vision, double vision, photophobia, pain and discharge.   Respiratory: Negative for cough, hemoptysis and sputum production.    Cardiovascular: Negative for chest pain, palpitations, orthopnea and claudication.   Gastrointestinal: Negative for abdominal pain, constipation, diarrhea, heartburn and vomiting.   Genitourinary: Negative for dysuria, frequency and urgency.   Musculoskeletal: Negative for back pain, myalgias and neck pain.        He has left flank pain   Skin: Negative for rash.   Neurological: Positive for dizziness, focal weakness and weakness. Negative for tingling, tremors, sensory change, speech change and headaches.   Endo/Heme/Allergies: Negative for environmental allergies. Does not bruise/bleed easily.   Psychiatric/Behavioral: Negative for depression, hallucinations, substance abuse and suicidal ideas. The patient is not nervous/anxious and does not have insomnia.        Current Outpatient Medications   Medication Sig    acyclovir (ZOVIRAX) 400 MG tablet Take 0.5 tablets (200 mg total) by mouth 2 (two) times daily.    amLODIPine (NORVASC) 10 MG  "tablet Take 1 tablet (10 mg total) by mouth once daily.    atorvastatin (LIPITOR) 80 MG tablet Take 1 tablet (80 mg total) by mouth once daily.    blood sugar diagnostic Strp 1 strip by Misc.(Non-Drug; Combo Route) route after meals as needed.    calcitRIOL (ROCALTROL) 0.25 MCG Cap Take 2 capsules (0.5 mcg total) by mouth once daily.    carvedilol (COREG) 25 MG tablet Take 1 tablet (25 mg total) by mouth 2 (two) times daily with meals.    cloNIDine (CATAPRES) 0.1 MG tablet Take 1 tablet (0.1 mg total) by mouth 2 (two) times daily.    clopidogrel (PLAVIX) 75 mg tablet TAKE 1 TABLET BY MOUTH EVERY DAY    dexAMETHasone (DECADRON) 4 MG Tab Take 5 tablets (20 mg total) by mouth As instructed. Take once weekly with Velcade injection    diclofenac sodium 1.5 % Drop APPLY 40 DROPS TO AFFECTED AREA(S) FOUR TIMES DAILY    flash glucose scanning reader (ioGeneticsSTYLE CLAUDIO 14 DAY READER) [facility] as directed.    flash glucose sensor (FREESTYLE CLAUDIO 14 DAY SENSOR) Kit Change every 14 days.    fluticasone (FLONASE) 50 mcg/actuation nasal spray 1 spray by Each Nare route 2 (two) times daily as needed for Rhinitis. (Patient taking differently: 1 spray by Each Nare route as needed for Rhinitis. )    furosemide (LASIX) 40 MG tablet Pt to take 1 1/2 tablet twice a day per Dr. Salinas on 3/19/18.    gabapentin (NEURONTIN) 300 MG capsule Take 1 capsule (300 mg total) by mouth daily as needed.    insulin (LANTUS SOLOSTAR U-100 INSULIN) glargine 100 units/mL (3mL) SubQ pen Inject 24 units at night.    insulin aspart U-100 (NOVOLOG) 100 unit/mL (3 mL) InPn pen Inject 18 units w/ breakfast, 16 units w/ lunch and dinner plus scale 150-200+2, 201-250+4, 251-300+6, 301-350+8, >350+10. Snack dose 6 units. Max daily 88 units    insulin needles, disposable, 31 X 5/16 " Ndle Pt to use pen needle with Lantus daily    LIDOTRAL 3.88 % Crea APPLY TO THE AFFECTED AREA 2 TIMES TO 3 TIMES DAILY    linagliptin (TRADJENTA) 5 mg Tab tablet " Take 1 tablet (5 mg total) by mouth once daily.    meclizine (ANTIVERT) 25 mg tablet Take 1 tablet (25 mg total) by mouth 3 (three) times daily as needed for Dizziness.    metOLazone (ZAROXOLYN) 2.5 MG tablet Take 1 tablet (2.5 mg total) by mouth once daily.    mirtazapine (REMERON) 30 MG tablet Take 30 mg by mouth every evening.     montelukast (SINGULAIR) 10 mg tablet Take 1 tablet (10 mg total) by mouth every evening.    nitroGLYCERIN (NITROSTAT) 0.4 MG SL tablet Place 1 tablet (0.4 mg total) under the tongue every 5 (five) minutes as needed for Chest pain.    omeprazole (PRILOSEC OTC) 20 MG tablet Take 1 tablet (20 mg total) by mouth once daily.    ondansetron (ZOFRAN-ODT) 8 MG TbDL Take 1 tablet (8 mg total) by mouth every 6 (six) hours as needed.    telmisartan (MICARDIS) 80 MG Tab Take 1 tablet (80 mg total) by mouth once daily.     No current facility-administered medications for this visit.      Vitals:    01/08/20 0915   BP: (!) 105/58   Pulse: 76   Resp: 16   Temp: 98.6 °F (37 °C)       Physical Exam   Constitutional: He appears well-developed.   HENT:   Head: Normocephalic and atraumatic.   Mouth/Throat: No oropharyngeal exudate.   Eyes: No scleral icterus.   Cardiovascular: Normal rate.   No murmur heard.  Pulmonary/Chest: Effort normal. No respiratory distress. He has no wheezes. He has no rales.   Abdominal: He exhibits no distension. There is no tenderness. There is no rebound.   Musculoskeletal: He exhibits no edema.   Lymphadenopathy:     He has no cervical adenopathy.   Neurological: He is alert. No cranial nerve deficit.   Skin: Skin is warm.   Psychiatric: He has a normal mood and affect.     Component      Latest Ref Rng & Units 12/10/2019   IgG - Serum      650 - 1600 mg/dL 1030   IgA      40 - 350 mg/dL 104   IgM      50 - 300 mg/dL 83   Kappa Free Light Chains      0.33 - 1.94 mg/dL 37.35 (H)   Lambda Free Light Chains      0.57 - 2.63 mg/dL 1.49   Kappa/Lambda FLC Ratio      0.26 -  1.65 25.07 (H)     12/10/19 SPEP: Normal total protein. Normal gamma globulins are decreased. There is a paraprotein band in gamma measuring 0.68 g/dL, previously 1.24 g/dL.       Assessment:     1.IgG kappa multiple myeloma not in remission  2. Anemia secondary to CKD   3. Anemia secondary to multiple myeloma   4.  Diabetes mellitus with neuropathy, nephropathy, retinal disease.  5.  Cardiomyopathy with chronic diastolic heart failure.  6.  h/o CVA  7. Essential hypertension  8. Hyperglycemia  9. Chronic fatigue  10. Thrombocytopenia  11. Supportive care  12. Back pain  13. Abdominal pain  14. Flank pain     PLAN:     1. He did not tolerate treatment with lenalidomide/Decadron well. It is unclear if he has renal dysfunction from multiple myeloma as well, in addition to diabetic nephropathy and hypertensive nephropathy.    SPEP on 5/21/19 showed slight increase in M protein from Dec 2018 (1.13g/dL versus 0.88g/dL). It was 1.24g/dL on 9/11/19.   He is not a transplant candidate ( poor insight into his health condition, renal dysfunction from non myeloma conditions, poorly controled hypertension). Serum free light chains have increased, with the ratio in May at 28.15 (24.09 in December 2018) and to 38.75 in Sept 2019. Creatinine had increased from 3.1 in Dec 2018 to 3.6 in May 2019.Calcium is normal. No bone pain / back pain.  I discussed resuming chemotherapy with Velcade and low dose Decadron ( 12 mg weekly due to hyperglycemia and type 2 DM) as well as Zolendronic acid. Risks and benefits discussed.He was evaluated by a dentist in nov 2019. It is not clear if he has more dental procedures that are planned. He started weekly velcade and weekly Decadron 12mg ( due to co-morbidities) from 10/15/19.     M spike on SPEP has decreased from 1.24g/dL in Sept 2019 to 0.68 g/dL in Dec 2019 (after 2 cycles). sFLC ratio has decreased to 25.07 from 38.75.          2,3.  Continue weekly  CBC.     4. On long term insulin. Follows  with his primary care physician     5.  Follows with cardiology. On Coreg, Lipitor, lasix     7. BP today 105/78 mm Hg, on repeat testing . He takes Amlodipine, Carvedilol, Clonidine. He is orthostatic today.       8. secondary to chemotherapy     11. On Acyclovir prophylaxis. Zometa still on hold as he is awaiting dental clearance. He is on calcium-vit D     12,13,14: He has new onset abdominal and left flank pain. He denies trauma. He will have CT abdomen w/o contrast, CT thoracic and lumabr spines.

## 2020-01-15 NOTE — NURSING
Patient here for velcade injection-complains of back pain-B/P elevated-states did not take one of his pills-states will connect his B/P cuff to [facility] system who monitor his B/P-tolerated injection well.

## 2020-01-16 NOTE — TELEPHONE ENCOUNTER
Spoke with patient about arrival time @ 0900.     Prep instructions reviewed: the day before the procedure, follow a clear liquid diet all day, then start the first 1/2 of prep at 5pm and take 2nd 1/2 of prep @ 0400.  Pt must be completely NPO when prep completed @ 0600.              Medications: Do not take Insulin or oral diabetic medications the day of the procedure.  Take as prescribed: heart, seizure and blood pressure medication in the morning with a sip of water (less than an ounce).  Take any breathing medications and bring inhalers to hospital with you Leave all valuables and jewelry at home.     Wear comfortable clothes to procedure to change into hospital gown You cannot drive for 24 hours after your procedure because you will receive sedation for your procedure to make you comfortable.  A ride must be provided at discharge.

## 2020-01-16 NOTE — TELEPHONE ENCOUNTER
Left message instructing patient to call dept @ [phone] between 8am-4pm.    Arrival time to be given @ 9am*  (Message sent via [facility] portal)

## 2020-01-20 PROBLEM — R19.5 POSITIVE FIT (FECAL IMMUNOCHEMICAL TEST): Status: ACTIVE | Noted: 2020-01-01

## 2020-01-20 NOTE — TRANSFER OF CARE
Anesthesia Transfer of Care Note    Patient: Marie Reis Jr.    Procedure(s) Performed: Procedure(s) (LRB):  COLONOSCOPY (N/A)    Patient location: GI    Anesthesia Type: MAC    Transport from OR: Transported from OR on room air with adequate spontaneous ventilation    Post pain: adequate analgesia    Post assessment: no apparent anesthetic complications and tolerated procedure well    Post vital signs: stable    Level of consciousness: awake, alert and oriented    Nausea/Vomiting: no nausea/vomiting    Complications: none    Transfer of care protocol was followed      Last vitals:   Visit Vitals  BP (!) 174/83   Pulse 84   Temp 36.2 °C (97.2 °F) (Skin)   Resp 16   Ht 6' (1.829 m)   Wt 86.2 kg (190 lb)   SpO2 99%   BMI 25.77 kg/m²

## 2020-01-20 NOTE — PLAN OF CARE
Patient BP check. 221/99. New BP given to Dr Esteves. No new orders given. Will continue to monitor.

## 2020-01-20 NOTE — ANESTHESIA PREPROCEDURE EVALUATION
01/20/2020  Marie Reis Jr. is a 64 y.o., male for colonoscopy under MAC. Noncompliant with BP meds, HTN, ordered home BP meds, allergy to hydralazine    Past Medical History:   Diagnosis Date    Binocular vision disorder with diplopia 9/22/2016    Bradycardia     Cataract     Cervical spondylosis 10/1/2015    Chronic diastolic heart failure 1/11/2018    Coronary artery disease due to calcified coronary lesion 3/19/2018    Dyslipidemia 6/26/2015    Hearing impairment 10/25/2013    History of stroke 3/23/2015    Hypertension associated with diabetes 1/11/2018    Hypertensive retinopathy of both eyes 9/26/2017    Lumbar spondylosis 10/1/2015    Multiple myeloma not having achieved remission 1/16/2018    Persistent proteinuria 1/11/2018    Spondylolisthesis of lumbar region 10/1/2015    Strabismus     Stroke 2014    Thoracic spondylosis 10/1/2015    Type 2 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, with long-term current use of insulin 9/26/2017    Type 2 diabetes mellitus with diabetic polyneuropathy, with long-term current use of insulin 9/28/2015    Type 2 diabetes mellitus with stage 4 chronic kidney disease, with long-term current use of insulin 1/11/2018     Past Surgical History:   Procedure Laterality Date    CATARACT EXTRACTION W/  INTRAOCULAR LENS IMPLANT Left 4/30/15    Diane    EYE SURGERY      cataract removal left eye    HAND SURGERY  2/2012    DR. BAKER U    left hand fracture sx      LT EYE   5/2/15    DR KULLMAN [facility]         Anesthesia Evaluation    I have reviewed the Patient Summary Reports.     I have reviewed the Medications.     Review of Systems  Social:  Non-Smoker        Physical Exam  General:  Well nourished    Airway/Jaw/Neck:  Airway Findings: Mallampati: II      Chest/Lungs:  Chest/Lungs Clear     Heart/Vascular:  Heart Findings: Normal        3/201CONCLUSIONS     1 - Low normal to mildly depressed left ventricular systolic function (EF 50-55%).     2 - Impaired LV relaxation, elevated LAP (grade 2 diastolic dysfunction).     3 - No wall motion abnormalities.     4 - Eccentric hypertrophy.     5 - Upper limit of normal ascending aorta.     6 - Normal right ventricular systolic function .     7 - Pulmonary hypertension. The estimated PA systolic pressure is 42 mmHg.     8 - Mild aortic stenosis, SHERIN = 1.96 cm2, AVAi = 0.95 cm2/m2, peak velocity = 2.12 m/s, mean gradient = 12 mmHg.     9 - Mild aortic regurgitation.     10 - Mild mitral regurgitation.     11 - Mild tricuspid regurgitation.             This document has been electronically    SIGNED BY: Debbie Salinas MD On: 03/26/2018 16:468      Anesthesia Plan  Type of Anesthesia, risks & benefits discussed:  Anesthesia Type:  MAC  Patient's Preference:   Intra-op Monitoring Plan:   Intra-op Monitoring Plan Comments:   Post Op Pain Control Plan:   Post Op Pain Control Plan Comments:   Induction:    Beta Blocker:  Patient is on a Beta-Blocker and has received one dose within the past 24 hours (No further documentation required).       Informed Consent: Patient understands risks and agrees with Anesthesia plan.  Questions answered. Anesthesia consent signed with patient.  ASA Score: 3     Day of Surgery Review of History & Physical:            Ready For Surgery From Anesthesia Perspective.

## 2020-01-20 NOTE — PLAN OF CARE
Patient recheck BP. 174/83, NSR 84. Spoke with Dr Esteves. No new for IV Labetalol. Ok to proceed to procedure per Anes.

## 2020-01-20 NOTE — PLAN OF CARE
Patient  BP elevated. Recheck  Per Dr Esteves request. 199/91/ Spoke with Dr Esteves about repeat BP. New orders given. Patient update on status. PO meds given. Will continue to monitor.

## 2020-01-20 NOTE — [employer] PATIENT INSTRUCTIONS
Discharge Summary/Instructions after an Endoscopic Procedure  Patient Name: Marie Reis  Patient MRN: [id number]  Patient YOB: 1955  Monday, January 20, 2020  Salvador Espinosa MD  RESTRICTIONS:  During your procedure today, you received medications for sedation.  These   medications may affect your judgment, balance and coordination.  Therefore,   for 24 hours, you have the following restrictions:   - DO NOT drive a car, operate machinery, make legal/financial decisions,   sign important papers or drink alcohol.    ACTIVITY:  Today: no heavy lifting, straining or running due to procedural   sedation/anesthesia.  The following day: return to full activity including work.  DIET:  Eat and drink normally unless instructed otherwise.     TREATMENT FOR COMMON SIDE EFFECTS:  - Mild abdominal pain, nausea, belching, bloating or excessive gas:  rest,   eat lightly and use a heating pad.  - Sore Throat: treat with throat lozenges and/or gargle with warm salt   water.  - Because air was used during the procedure, expelling large amounts of air   from your rectum or belching is normal.  - If a bowel prep was taken, you may not have a bowel movement for 1-3 days.    This is normal.  SYMPTOMS TO WATCH FOR AND REPORT TO YOUR PHYSICIAN:  1. Abdominal pain or bloating, other than gas cramps.  2. Chest pain.  3. Back pain.  4. Signs of infection such as: chills or fever occurring within 24 hours   after the procedure.  5. Rectal bleeding, which would show as bright red, maroon, or black stools.   (A tablespoon of blood from the rectum is not serious, especially if   hemorrhoids are present.)  6. Vomiting.  7. Weakness or dizziness.  GO DIRECTLY TO THE NEAREST EMERGENCY ROOM IF YOU HAVE ANY OF THE FOLLOWING:      Difficulty breathing              Chills and/or fever over 101 F   Persistent vomiting and/or vomiting blood   Severe abdominal pain   Severe chest pain   Black, tarry stools   Bleeding- more than one  tablespoon   Any other symptom or condition that you feel may need urgent attention  Your doctor recommends these additional instructions:  If any biopsies were taken, your doctors clinic will contact you in 1 to 2   weeks with any results.  - Discharge patient to home.   - Resume previous diet.   - Continue present medications.   - Resume Plavix (clopidogrel) at prior dose tomorrow.   - Await pathology results.   - Repeat colonoscopy in 3 years for surveillance.   - Patient has a contact number available for emergencies.  The signs and   symptoms of potential delayed complications were discussed with the   patient.  Return to normal activities tomorrow.  Written discharge   instructions were provided to the patient.  For questions, problems or results please call your physician - Salvador Espinosa MD at Work:  [phone].  EMERGENCY PHONE NUMBER: [phone],  LAB RESULTS: [phone]  IF A COMPLICATION OR EMERGENCY SITUATION ARISES AND YOU ARE UNABLE TO REACH   YOUR PHYSICIAN - GO DIRECTLY TO THE EMERGENCY ROOM.  Salvador Espinosa MD  1/20/2020 12:10:13 PM  This report has been verified and signed electronically.  [employer]

## 2020-01-20 NOTE — ANESTHESIA POSTPROCEDURE EVALUATION
Anesthesia Post Evaluation    Patient: Marie Reis Jr.    Procedure(s) Performed: Procedure(s) (LRB):  COLONOSCOPY (N/A)    Final Anesthesia Type: MAC    Patient location during evaluation: GI PACU  Patient participation: Yes- Able to Participate  Level of consciousness: awake and alert and oriented  Post-procedure vital signs: reviewed and stable  Pain management: adequate  Airway patency: patent    PONV status at discharge: No PONV  Anesthetic complications: no      Cardiovascular status: blood pressure returned to baseline, hemodynamically stable and stable  Respiratory status: unassisted, spontaneous ventilation and room air  Hydration status: euvolemic  Follow-up not needed.          Vitals Value Taken Time   /83 1/20/2020 11:10 AM   Temp 36.2 °C (97.2 °F) 1/20/2020  9:38 AM   Pulse 84 1/20/2020 11:10 AM   Resp 16 1/20/2020  9:38 AM   SpO2 99 % 1/20/2020  9:38 AM         No case tracking events are documented in the log.      Pain/Gregory Score: No data recorded

## 2020-01-20 NOTE — H&P
Short Stay Endoscopy History and Physical    PCP - Nancy Colón MD    Procedure - Colonoscopy  ASA - II  Mallampati - per anesthesia  History of Anesthesia problems - no  Family history Anesthesia problems - no     HPI:  This is a 64 y.o. male here for evaluation of : FIT positive    ROS:  Constitutional: No fevers, chills, No weight loss  ENT: No allergies  CV: No chest pain  Pulm: No shortness of breath  GI: see HPI  Derm: No rash    Medical History:  has a past medical history of Binocular vision disorder with diplopia (9/22/2016), Bradycardia, Cataract, Cervical spondylosis (10/1/2015), Chronic diastolic heart failure (1/11/2018), Coronary artery disease due to calcified coronary lesion (3/19/2018), Dyslipidemia (6/26/2015), Hearing impairment (10/25/2013), History of stroke (3/23/2015), Hypertension associated with diabetes (1/11/2018), Hypertensive retinopathy of both eyes (9/26/2017), Lumbar spondylosis (10/1/2015), Multiple myeloma not having achieved remission (1/16/2018), Persistent proteinuria (1/11/2018), Spondylolisthesis of lumbar region (10/1/2015), Strabismus, Stroke (2014), Thoracic spondylosis (10/1/2015), Type 2 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, with long-term current use of insulin (9/26/2017), Type 2 diabetes mellitus with diabetic polyneuropathy, with long-term current use of insulin (9/28/2015), and Type 2 diabetes mellitus with stage 4 chronic kidney disease, with long-term current use of insulin (1/11/2018).    Surgical History:  has a past surgical history that includes left hand fracture sx; Cataract extraction w/  intraocular lens implant (Left, 4/30/15); Hand surgery (2/2012); LT EYE  (5/2/15); and Eye surgery.    Family History: family history includes Cancer in his mother; Diabetes in his brother, father, mother, and sister; Heart attack in his father; Kidney disease in his mother; No Known Problems in his maternal aunt,  maternal grandfather, maternal grandmother, maternal uncle, paternal aunt, paternal grandfather, paternal grandmother, and paternal uncle.. Otherwise no colon cancer, inflammatory bowel disease, or GI malignancies.    Social History:  reports that he has never smoked. He has never used smokeless tobacco. He reports that he does not drink alcohol or use drugs.    Review of patient's allergies indicates:   Allergen Reactions    Hydralazine analogues      Increase swelling and throat itching and tightness with use.  Symptoms correlate only with this medication onset in hospital.       Medications:   Medications Prior to Admission   Medication Sig Dispense Refill Last Dose    acyclovir (ZOVIRAX) 400 MG tablet Take 0.5 tablets (200 mg total) by mouth 2 (two) times daily. 60 tablet 5 Taking    amLODIPine (NORVASC) 10 MG tablet Take 1 tablet (10 mg total) by mouth once daily. 90 tablet 3 Taking    atorvastatin (LIPITOR) 80 MG tablet Take 1 tablet (80 mg total) by mouth once daily. 90 tablet 1 Taking    blood sugar diagnostic Strp 1 strip by Misc.(Non-Drug; Combo Route) route after meals as needed.   Taking    calcitRIOL (ROCALTROL) 0.25 MCG Cap Take 2 capsules (0.5 mcg total) by mouth once daily. 60 capsule 6     carvedilol (COREG) 25 MG tablet Take 1 tablet (25 mg total) by mouth 2 (two) times daily with meals. 180 tablet 1 Taking    cloNIDine (CATAPRES) 0.1 MG tablet Take 1 tablet (0.1 mg total) by mouth 2 (two) times daily. 60 tablet 5 Taking    clopidogrel (PLAVIX) 75 mg tablet TAKE 1 TABLET BY MOUTH EVERY DAY 90 tablet 0 Taking    dexAMETHasone (DECADRON) 4 MG Tab Take 5 tablets (20 mg total) by mouth As instructed. Take once weekly with Velcade injection 120 tablet 0 Taking    diclofenac sodium 1.5 % Drop APPLY 40 DROPS TO AFFECTED AREA(S) FOUR TIMES DAILY  3 Taking    flash glucose scanning reader ([employer]ok[employer] CLAUDIO 14 DAY READER) Misc Use as directed. 1 each 0 Taking    flash glucose sensor (FREESTYLE  "CLAUDIO 14 DAY SENSOR) Kit Change every 14 days. 7 kit 3 Taking    fluticasone (FLONASE) 50 mcg/actuation nasal spray 1 spray by Each Nare route 2 (two) times daily as needed for Rhinitis. (Patient taking differently: 1 spray by Each Nare route as needed for Rhinitis. ) 16 g 5 Taking    furosemide (LASIX) 40 MG tablet Pt to take 1 1/2 tablet twice a day per Dr. Salinas on 3/19/18. 270 tablet 3 Taking    gabapentin (NEURONTIN) 300 MG capsule Take 1 capsule (300 mg total) by mouth daily as needed. 90 capsule 1 Taking    insulin (LANTUS SOLOSTAR U-100 INSULIN) glargine 100 units/mL (3mL) SubQ pen Inject 24 units at night. 15 mL 6 Taking    insulin aspart U-100 (NOVOLOG) 100 unit/mL (3 mL) InPn pen Inject 18 units w/ breakfast, 16 units w/ lunch and dinner plus scale 150-200+2, 201-250+4, 251-300+6, 301-350+8, >350+10. Snack dose 6 units. Max daily 88 units 1 Box 6 Taking    insulin needles, disposable, 31 X 5/16 " Ndle Pt to use pen needle with Lantus daily 100 each 3 Taking    LIDOTRAL 3.88 % Crea APPLY TO THE AFFECTED AREA 2 TIMES TO 3 TIMES DAILY  3 Taking    linagliptin (TRADJENTA) 5 mg Tab tablet Take 1 tablet (5 mg total) by mouth once daily. 90 tablet 3 Taking    meclizine (ANTIVERT) 25 mg tablet Take 1 tablet (25 mg total) by mouth 3 (three) times daily as needed for Dizziness. 270 tablet 0 Taking    metOLazone (ZAROXOLYN) 2.5 MG tablet Take 1 tablet (2.5 mg total) by mouth once daily. 90 tablet 1 Taking    mirtazapine (REMERON) 30 MG tablet Take 30 mg by mouth every evening.    Taking    montelukast (SINGULAIR) 10 mg tablet Take 1 tablet (10 mg total) by mouth every evening. 90 tablet 1 Taking    nitroGLYCERIN (NITROSTAT) 0.4 MG SL tablet Place 1 tablet (0.4 mg total) under the tongue every 5 (five) minutes as needed for Chest pain. 30 tablet 1 Taking    omeprazole (PRILOSEC OTC) 20 MG tablet Take 1 tablet (20 mg total) by mouth once daily. 30 tablet 11 Taking    ondansetron (ZOFRAN-ODT) 8 MG " TbDL Take 1 tablet (8 mg total) by mouth every 6 (six) hours as needed. 30 tablet 2 Taking    telmisartan (MICARDIS) 80 MG Tab Take 1 tablet (80 mg total) by mouth once daily. 90 tablet 1 Taking         Objective Findings:    Vital Signs: see nursing notes  Physical Exam:  General Appearance: Well appearing in no acute distress  Eyes:    No scleral icterus  ENT: Neck supple  Lungs: CTA anteriorly  Heart:  S1, S2 normal, no murmurs heard  Abdomen: Soft, non tender, non distended with positive bowel sounds. No hepatosplenomegaly, ascites, or mass  Extremities: no edema  Skin: No rash      Labs:  Lab Results   Component Value Date    WBC 9.16 01/15/2020    HGB 11.6 (L) 01/15/2020    HCT 36.5 (L) 01/15/2020     (L) 01/15/2020    CHOL 141 11/12/2019    TRIG 121 11/12/2019    HDL 29 (L) 11/12/2019    ALT 12 01/15/2020    AST 13 01/15/2020     01/15/2020    K 4.0 01/15/2020     01/15/2020    CREATININE 2.9 (H) 01/15/2020    BUN 32 (H) 01/15/2020    CO2 25 01/15/2020    TSH 3.277 11/12/2019    PSA 2.5 11/12/2019    INR 1.1 02/21/2018    HGBA1C 9.2 (H) 12/10/2019       I have explained the risks and benefits of endoscopy procedures to the patient including but not limited to bleeding, perforation, infection, and death.    Salvador Espinosa MD

## 2020-02-05 NOTE — NURSING
Pt tolerated  Velcade injection to the abdomen today. NAD.  declined AVS. Uses my Ochnser. Discharged home. Ambulated independently with cane with family.

## 2020-02-05 NOTE — PROGRESS NOTES
CC: Multiple myeloma, on treatment, follow up visit.      HPI: Mr. Reis is a 64-year-old man with multiple myeloma. He was a patient of Dr.Archie Guaman.  In May 2016, his serum protein electrophoresis revealed a paraprotein measuring 0.86 g/dL; this was an IgG kappa.  A 24-hour urine contained 947 mg of protein, 3% of which was kappa light chains.  His bone marrow had 15% plasma cells and a FISH panel disclosed trisomy 1, 7, 9 and 15.  A PET scan in January 2017 revealed no bone lesions.     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 He was followed with periodic examinations and laboratory studies until early 2018 when his anemia worsened.  There had been a gradual increase in the   protein level and there was a substantial increase in the 24-hour urine protein to 4500 mg.  However, only 4.6% of this was kappa light chain and 2.8% was IgG kappa.  Most of the proteinuria was believed to have been secondary to diabetes   mellitus.  The IgG kappa paraprotein as measured in electrophoresis was 1.23 g/dL.     Mainly because of the worsening anemia and concern that it might have been least partially due to  Myeloma,he was recommended a trial of therapy with Revlimid  10 mg daily for 21 days, every 28 days and dexamethasone 12 mg once a week.   There was a long delay in obtaining Revlimid and after he started that he had worsening renal function and was hospitalized for that problem and for hyperglycemia.    He had another hospitalization in March 2018 for encephalopathy, congestive heart failure, hypoglycemia and worsening anemia which required red cell transfusions.       In May  2018,  Revlimid and dexamethasone were stopped due to the above mentioned problems.  There had been substantial improvement in the protein levels with the paraprotein falling to 0.56 g/dL and   the quantitative IgG declining from 1747 to 991.  The kappa/lambda ratio fell from 44 to 6.5.     He was transfused in early July 2018 and his  hemoglobin was 7.7. Since mid September 2018, hemoglobin values were between 9.6 to 11.3.     His other medical problems include a stroke many years ago that left him with weakness of his left arm and leg and he has severe diabetes mellitus with renal, retinal and peripheral nerve complications.  He has cardiac valvular disease, pulmonary hypertension and diastolic dysfunction.  He also has a poor understanding of his medical status, probably related to complications from the stroke.     Interval history: He started Bortezomib and Decadron from 10/16/19. He has finished 4 cycles. He had hiccups after each velcade treatment.       Review of Systems   Constitutional: Positive for malaise/fatigue. Negative for chills, fever and weight loss.   HENT: Negative for ear discharge, ear pain, hearing loss and sinus pain.    Eyes: Negative for blurred vision, double vision and pain.   Respiratory: Negative for cough, hemoptysis, sputum production, shortness of breath and stridor.    Cardiovascular: Negative for chest pain, palpitations, orthopnea and claudication.   Gastrointestinal: Negative for abdominal pain, blood in stool, diarrhea, heartburn, melena, nausea and vomiting.   Genitourinary: Negative for dysuria and frequency.   Musculoskeletal: Positive for back pain and myalgias.   Neurological: Positive for tingling. Negative for dizziness, tremors, sensory change, seizures, weakness and headaches.   Endo/Heme/Allergies: Does not bruise/bleed easily.   Psychiatric/Behavioral: Negative for depression, hallucinations, substance abuse and suicidal ideas. The patient is not nervous/anxious.        Current Outpatient Medications   Medication Sig    acyclovir (ZOVIRAX) 400 MG tablet Take 0.5 tablets (200 mg total) by mouth 2 (two) times daily.    amLODIPine (NORVASC) 10 MG tablet Take 1 tablet (10 mg total) by mouth once daily.    atorvastatin (LIPITOR) 80 MG tablet Take 1 tablet (80 mg total) by mouth once daily.    blood  "sugar diagnostic Strp 1 strip by Misc.(Non-Drug; Combo Route) route after meals as needed.    calcitRIOL (ROCALTROL) 0.25 MCG Cap Take 2 capsules (0.5 mcg total) by mouth once daily.    carvedilol (COREG) 25 MG tablet Take 1 tablet (25 mg total) by mouth 2 (two) times daily with meals.    cloNIDine (CATAPRES) 0.1 MG tablet Take 1 tablet (0.1 mg total) by mouth 2 (two) times daily.    clopidogrel (PLAVIX) 75 mg tablet TAKE 1 TABLET BY MOUTH EVERY DAY    dexAMETHasone (DECADRON) 4 MG Tab Take 5 tablets (20 mg total) by mouth As instructed. Take once weekly with Velcade injection    diclofenac sodium 1.5 % Drop APPLY 40 DROPS TO AFFECTED AREA(S) FOUR TIMES DAILY    flash glucose scanning reader (FREESTYLE CLAUDIO 14 DAY READER) [facility] Use as directed.    flash glucose sensor (FREESTYLE CLAUDIO 14 DAY SENSOR) Kit Change every 14 days.    fluticasone (FLONASE) 50 mcg/actuation nasal spray 1 spray by Each Nare route 2 (two) times daily as needed for Rhinitis. (Patient taking differently: 1 spray by Each Nare route as needed for Rhinitis. )    furosemide (LASIX) 40 MG tablet Pt to take 1 1/2 tablet twice a day per Dr. Salinas on 3/19/18.    gabapentin (NEURONTIN) 300 MG capsule Take 1 capsule (300 mg total) by mouth daily as needed.    insulin (LANTUS SOLOSTAR U-100 INSULIN) glargine 100 units/mL (3mL) SubQ pen Inject 24 units at night.    insulin aspart U-100 (NOVOLOG) 100 unit/mL (3 mL) InPn pen Inject 18 units w/ breakfast, 16 units w/ lunch and dinner plus scale 150-200+2, 201-250+4, 251-300+6, 301-350+8, >350+10. Snack dose 6 units. Max daily 88 units    insulin needles, disposable, 31 X 5/16 " Ndle Pt to use pen needle with Lantus daily    LIDOTRAL 3.88 % Crea APPLY TO THE AFFECTED AREA 2 TIMES TO 3 TIMES DAILY    linagliptin (TRADJENTA) 5 mg Tab tablet Take 1 tablet (5 mg total) by mouth once daily.    meclizine (ANTIVERT) 25 mg tablet Take 1 tablet (25 mg total) by mouth 3 (three) times daily as needed " for Dizziness.    metOLazone (ZAROXOLYN) 2.5 MG tablet Take 1 tablet (2.5 mg total) by mouth once daily.    mirtazapine (REMERON) 30 MG tablet Take 30 mg by mouth every evening.     montelukast (SINGULAIR) 10 mg tablet Take 1 tablet (10 mg total) by mouth every evening.    nitroGLYCERIN (NITROSTAT) 0.4 MG SL tablet Place 1 tablet (0.4 mg total) under the tongue every 5 (five) minutes as needed for Chest pain.    omeprazole (PRILOSEC OTC) 20 MG tablet Take 1 tablet (20 mg total) by mouth once daily.    ondansetron (ZOFRAN-ODT) 8 MG TbDL Take 1 tablet (8 mg total) by mouth every 6 (six) hours as needed.    telmisartan (MICARDIS) 80 MG Tab Take 1 tablet (80 mg total) by mouth once daily.     No current facility-administered medications for this visit.        Vitals:    02/05/20 1049   BP: (!) 188/86   Pulse: 78   Resp: 17   Temp: 98.1 °F (36.7 °C)         Physical Exam   Constitutional: He is oriented to person, place, and time. He appears well-developed. No distress.   HENT:   Head: Normocephalic and atraumatic.   Eyes: No scleral icterus.   Cardiovascular: Normal rate.   No murmur heard.  Pulmonary/Chest: Effort normal. No respiratory distress. He has no wheezes.   Abdominal: Soft. He exhibits no distension. There is no tenderness.   Musculoskeletal: He exhibits no edema.   Lymphadenopathy:     He has no cervical adenopathy.   Neurological: He is alert and oriented to person, place, and time. No cranial nerve deficit.   Skin: Skin is warm.   Psychiatric: He has a normal mood and affect.        12/10/19 SPEP: Normal total protein. Normal gamma globulins are decreased. There is a paraprotein band in gamma measuring 0.68 g/dL, previously 1.24 g/dL.       1/13/20 CT thoracic and lumbar spines    FINDINGS:  Thoracic spine:    Thoracic vertebral body heights are maintained.  Mild disc space loss at multiple levels.  No acute fractures or traumatic subluxations.  No osseous lytic or destructive process.    Mild  degenerative changes noted within the thoracic spine.  No significant spinal canal stenosis or neural foraminal narrowing evident at any level.    Limited visualization of the soft tissue structures at the base of the neck show no significant abnormalities.  Coronary artery atherosclerosis and mild cardiomegaly noted.  Mild ground-glass attenuation within the lung parenchyma presumably related to respiratory motion.  Probable small hiatal hernia.  Hypodense lesions within the right renal parenchyma, too small to characterize and likely represent renal cysts.  Normal course of the thoracic aorta with mild atherosclerotic calcifications.    Lumbar spine:    Lumbar vertebral body heights and disc spaces are maintained.  No acute fractures or traumatic subluxations.  No osseous lytic or destructive process.    Limited intra-abdominal evaluation shows no bowel obstruction.  Paraspinal muscle bulk is maintained.  Nonspecific perinephric stranding.  Accessory splenule.  Probable right renal cysts.    T12-L1: No spinal canal stenosis or neural foraminal narrowing.    L1-L2: No spinal canal stenosis or neural foraminal narrowing.    L2-L3: No spinal canal stenosis or neural foraminal narrowing.    L3-L4: Mild diffuse disc bulge, ligamentum flavum hypertrophy, and mild bilateral facet arthropathy resulting in mild right neural foraminal narrowing and no greater than mild spinal canal stenosis.    L4-L5: Mild diffuse disc bulge, ligamentum flavum hypertrophy, and mild bilateral facet arthropathy resulting in mild bilateral neural foraminal narrowing and no greater than mild spinal canal stenosis.    L5-S1: Mild diffuse disc bulge, ligamentum flavum hypertrophy, and mild bilateral facet arthropathy resulting in mild to moderate right neural foraminal narrowing and no spinal canal stenosis.    Limited evaluation of the SI joints show no significant abnormalities.      Impression       No acute fractures or traumatic  subluxations.    Mild degenerative changes of the thoracic and lumbar spine, worst at L3-L4 and L4-L5 as discussed above.    Multiple right renal hypodensities, likely renal cyst.    Coronary artery atherosclerosis and mild cardiomegaly.       1/13/20 CT abdomen/pelvis w/o cont    COMPARISON:  PET-CT 01/06/2017, CT 08/12/2014    FINDINGS:  Images of the lower thorax are remarkable for bilateral dependent atelectasis.  There is calcification in the distribution of the coronary arteries.  The heart is prominent.  There is a small pericardial effusion.    The liver, spleen, pancreas, gallbladder and adrenal glands have a grossly unremarkable noncontrast appearance.  There is no biliary dilation.  The pancreatic duct is not dilated.  No significant abdominal lymphadenopathy.    There is prominent bilateral perinephric fat stranding.  There is a 1.5 cm low attenuating lesion arising from the interpolar region of the right kidney, attenuation of which suggests cyst.  An additional cyst is noted arising from the interpolar region measuring 1.3 cm.  There is no hydronephrosis or nephrolithiasis.  The bilateral ureters are unremarkable without calculi seen.  The urinary bladder is decompressed noting there is wall thickening.  The prostate is not enlarged.    The distal large bowel is decompressed.  There are a few scattered colonic diverticula without surrounding inflammation.  The terminal ileum and appendix are unremarkable.  The small bowel is unremarkable.  There is minimal strand-like fluid in the mesentery.  There is atherosclerotic calcification of the aorta and its branches.    Degenerative changes are noted of the pubic symphysis, noting possible remote fracture involving the anterior aspect of the left superior pubic ramus.  Degenerative changes are noted of the spine.  Remote anterior left rib injury noted.  There are bilateral fat containing inguinal hernias.      Impression       1. Prominent bilateral  perinephric fat stranding, similar to the previous examination, correlation with urinalysis recommended as there is urinary bladder wall thickening.  2. Please see separately dictated report for details of the cervical spine and lumbar spine.  3. Cardiomegaly with mild pericardial effusion.  4. Additional findings above.         Component      Latest Ref Rng & Units 2/5/2020 1/29/2020   WBC      3.90 - 12.70 K/uL 12.21 9.12   RBC      4.60 - 6.20 M/uL 3.62 (L) 3.80 (L)   Hemoglobin      14.0 - 18.0 g/dL 10.2 (L) 10.8 (L)   Hematocrit      40.0 - 54.0 % 32.4 (L) 33.9 (L)   MCV      82 - 98 fL 90 89   MCH      27.0 - 31.0 pg 28.2 28.4   MCHC      32.0 - 36.0 g/dL 31.5 (L) 31.9 (L)   RDW      11.5 - 14.5 % 12.7 12.7   Platelets      150 - 350 K/uL 177 173   MPV      9.2 - 12.9 fL 11.4 11.8   Immature Granulocytes      0.0 - 0.5 % 1.3 (H) 1.8 (H)   Gran # (ANC)      1.8 - 7.7 K/uL 10.1 (H) 7.1   Immature Grans (Abs)      0.00 - 0.04 K/uL 0.16 (H) 0.16 (H)   Lymph #      1.0 - 4.8 K/uL 1.1 1.1   Mono #      0.3 - 1.0 K/uL 0.7 0.6   Eos #      0.0 - 0.5 K/uL 0.2 0.2   Baso #      0.00 - 0.20 K/uL 0.01 0.02   nRBC      0 /100 WBC 0 0   Gran%      38.0 - 73.0 % 82.4 (H) 77.8 (H)   Lymph%      18.0 - 48.0 % 9.3 (L) 12.0 (L)   Mono%      4.0 - 15.0 % 5.7 6.6   Eosinophil%      0.0 - 8.0 % 1.2 1.6   Basophil%      0.0 - 1.9 % 0.1 0.2   Differential Method       Automated Automated   Sodium      136 - 145 mmol/L  136   Potassium      3.5 - 5.1 mmol/L  4.1   Chloride      95 - 110 mmol/L  104   CO2      23 - 29 mmol/L  23   Glucose      70 - 110 mg/dL  359 (H)   BUN, Bld      8 - 23 mg/dL  29 (H)   Creatinine      0.5 - 1.4 mg/dL  2.9 (H)   Calcium      8.7 - 10.5 mg/dL  8.2 (L)   PROTEIN TOTAL      6.0 - 8.4 g/dL  5.9 (L)   Albumin      3.5 - 5.2 g/dL  3.0 (L)   BILIRUBIN TOTAL      0.1 - 1.0 mg/dL  0.5   Alkaline Phosphatase      55 - 135 U/L  100   AST      10 - 40 U/L  13   ALT      10 - 44 U/L  12   Anion Gap      8 - 16  mmol/L  9   eGFR if African American      >60 mL/min/1.73 m:2  25.4 (A)   eGFR if non African American      >60 mL/min/1.73 m:2  22.0 (A)     Component      Latest Ref Rng & Units 2/5/2020   Sodium      136 - 145 mmol/L 139   Potassium      3.5 - 5.1 mmol/L 4.2   Chloride      95 - 110 mmol/L 108   CO2      23 - 29 mmol/L 26   Glucose      70 - 110 mg/dL 310 (H)   BUN, Bld      8 - 23 mg/dL 21   Creatinine      0.5 - 1.4 mg/dL 2.6 (H)   Calcium      8.7 - 10.5 mg/dL 8.4 (L)   PROTEIN TOTAL      6.0 - 8.4 g/dL 5.6 (L)   Albumin      3.5 - 5.2 g/dL 2.8 (L)   BILIRUBIN TOTAL      0.1 - 1.0 mg/dL 0.7   Alkaline Phosphatase      55 - 135 U/L 97   AST      10 - 40 U/L 13   ALT      10 - 44 U/L 14   Anion Gap      8 - 16 mmol/L 5 (L)   eGFR if African American      >60 mL/min/1.73 m:2 29.0 (A)   eGFR if non African American      >60 mL/min/1.73 m:2 25.1 (A)   IgG - Serum      650 - 1600 mg/dL 636 (L)   IgA      40 - 350 mg/dL 98   IgM      50 - 300 mg/dL 91     Assessment:     1.IgG kappa multiple myeloma not in remission  2. Anemia secondary to CKD   3. Anemia secondary to multiple myeloma   4.  Diabetes mellitus with neuropathy, nephropathy, retinal disease.  5.  Cardiomyopathy with chronic diastolic heart failure.  6.  h/o CVA  7. Essential hypertension  8. Hyperglycemia  9. Chronic fatigue  10. Thrombocytopenia  11. Supportive care  12. Back pain  13. Abdominal pain  14. Flank pain     PLAN:     1. He did not tolerate treatment with lenalidomide/Decadron well. It is unclear if he has renal dysfunction from multiple myeloma as well, in addition to diabetic nephropathy and hypertensive nephropathy.    SPEP on 5/21/19 showed slight increase in M protein from Dec 2018 (1.13g/dL versus 0.88g/dL). It was 1.24g/dL on 9/11/19.   He is not a transplant candidate ( poor insight into his health condition, renal dysfunction from non myeloma conditions, poorly controled hypertension). Serum free light chains have increased, with  the ratio in May at 28.15 (24.09 in December 2018) and to 38.75 in Sept 2019. Creatinine had increased from 3.1 in Dec 2018 to 3.6 in May 2019.Calcium is normal. No bone pain / back pain.  I discussed resuming chemotherapy with Velcade and low dose Decadron ( 12 mg weekly due to hyperglycemia and type 2 DM) as well as Zolendronic acid. Risks and benefits discussed.He was evaluated by a dentist in nov 2019. It is not clear if he has more dental procedures that are planned. He started weekly velcade and weekly Decadron 12mg ( due to co-morbidities) from 10/15/19.     M spike on SPEP decreased from 1.24g/dL in Sept 2019 to 0.68 g/dL in Dec 2019 (after 2 cycles). sFLC ratio has decreased to 25.07 from 38.75.          2,3.  Continue weekly  CBC.     4. On long term insulin. Follows with his primary care physician      5.  Follows with cardiology. On Coreg, Lipitor, lasix     7. BP today 188/86 mm Hg . He takes Amlodipine, Carvedilol, Clonidine.      8. He states it remains high due to Decadron. He will decrease Decadron to 12mg weekly ( from the current 20mg weekly)     11. On Acyclovir prophylaxis. Zometa still on hold as he is awaiting dental clearance. He is on calcium-vit D      12,13,14: He has new onset abdominal and left flank pain. He denies trauma. He had CT abdomen w/o contrast, CT thoracic and lumabr spines on 1/13/20. No lytic lesions or masses were identified. He had  prominent bilateral perinephric fat stranding. Urinary bladder wall was thickened.

## 2020-02-12 NOTE — NURSING
Patient here for velcade injection-patient very unsteady on feet-states took 2 B/P medications and has made him dizzy-patient on B/P study-advised to call M.D. When he gets home and connect to B/P monitor=-patient and wife express understanding-patient given W/C to go to auto.
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
## 2020-02-13 NOTE — TELEPHONE ENCOUNTER
----- Message from Debbie Salinas MD sent at 2/13/2020 11:31 AM CST -----  Except for uncontrolled Diabetes the rest of the blood work is stable.

## 2020-02-17 PROBLEM — N18.30 BENIGN HYPERTENSION WITH CHRONIC KIDNEY DISEASE, STAGE III: Status: ACTIVE | Noted: 2018-01-11

## 2020-02-17 PROBLEM — Z86.73 HISTORY OF CVA (CEREBROVASCULAR ACCIDENT): Status: ACTIVE | Noted: 2020-01-01

## 2020-02-17 PROBLEM — I12.9 BENIGN HYPERTENSION WITH CHRONIC KIDNEY DISEASE, STAGE III: Status: ACTIVE | Noted: 2018-01-11

## 2020-02-17 NOTE — PROGRESS NOTES
"Subjective:   Patient ID:  Marie Reis Jr. is a 64 y.o. male who presents for follow-up of Hypertension      HPI: Continues to "treat himself" dosing medications as he feels like it.  Presently taking Amlodipine, Only half Carvedilol sometimes once, sometimes twice a day. 1 Clonidine a day, prn Lasix, no Zaroxylyn and no Micarditis.   Patient c/o constipation and bloating.  His wife states he has balance problems and neuropathy. Patient is being treated For MM.     CKD is stable. Diabetes is not controlled.  BP is controlled.    [facility] is being followed [facility].    Lab Results   Component Value Date     02/12/2020    K 3.6 02/12/2020     02/12/2020    CO2 26 02/12/2020    BUN 20 02/12/2020    CREATININE 2.6 (H) 02/12/2020    GLU 94 02/12/2020    HGBA1C 10.5 (H) 02/12/2020    MG 1.6 04/12/2018    AST 14 02/12/2020    ALT 12 02/12/2020    ALBUMIN 3.1 (L) 02/12/2020    PROT 5.8 (L) 02/12/2020    BILITOT 1.0 02/12/2020    WBC 11.70 02/12/2020    HGB 10.2 (L) 02/12/2020    HCT 30.9 (L) 02/12/2020    HCT 27 (L) 02/21/2018    MCV 88 02/12/2020     02/12/2020    INR 1.1 02/21/2018    PSA 2.5 11/12/2019    TSH 1.509 02/12/2020    CHOL 174 02/12/2020    HDL 37 (L) 02/12/2020    LDLCALC 107.4 02/12/2020    TRIG 148 02/12/2020       Review of Systems   Constitution: Positive for malaise/fatigue and weight gain.   HENT: Positive for hearing loss.    Eyes: Negative.    Cardiovascular: Positive for dyspnea on exertion, irregular heartbeat and leg swelling. Negative for chest pain, claudication, near-syncope, palpitations and syncope.   Respiratory: Positive for cough and wheezing. Negative for shortness of breath and snoring.    Endocrine: Positive for polydipsia and polyuria. Negative for cold intolerance, heat intolerance and polyphagia.   Skin: Negative.    Musculoskeletal: Positive for arthritis, back pain, joint pain, muscle cramps, muscle weakness and neck pain.   Gastrointestinal: Positive for " "abdominal pain, constipation, diarrhea, heartburn and hematochezia.   Genitourinary: Negative.    Neurological: Positive for excessive daytime sleepiness, dizziness, focal weakness, light-headedness, loss of balance, numbness, paresthesias and weakness.   Psychiatric/Behavioral: Positive for depression. The patient is nervous/anxious.        Objective:   Physical Exam   Constitutional: He appears well-developed and well-nourished.   /65   Pulse 83   Ht 6' 1" (1.854 m)   Wt 84.6 kg (186 lb 9.9 oz)   BMI 24.62 kg/m²      HENT:   Head: Normocephalic.   Eyes: Pupils are equal, round, and reactive to light.   Neck: Normal range of motion. Neck supple. Carotid bruit is not present. No thyromegaly present.   Cardiovascular: Normal rate, regular rhythm, normal heart sounds and intact distal pulses. Exam reveals no gallop and no friction rub.   No murmur heard.  Pulses:       Carotid pulses are 2+ on the right side, and 2+ on the left side.       Radial pulses are 2+ on the right side, and 2+ on the left side.        Femoral pulses are 2+ on the right side, and 2+ on the left side.       Popliteal pulses are 2+ on the right side, and 2+ on the left side.        Dorsalis pedis pulses are 2+ on the right side, and 2+ on the left side.        Posterior tibial pulses are 2+ on the right side, and 2+ on the left side.   Pulmonary/Chest: Effort normal and breath sounds normal. No respiratory distress. He has no wheezes. He has no rales. He exhibits no tenderness.   Abdominal: Soft. Bowel sounds are normal.   Musculoskeletal: Normal range of motion. He exhibits edema.   1+ bilaterally.   Skin: Skin is warm and dry.   Psychiatric: He has a normal mood and affect.   Nursing note and vitals reviewed.        Assessment and Plan:     Problem List Items Addressed This Visit        Cardiology Problems    Essential hypertension    Relevant Medications    nitroGLYCERIN (NITROSTAT) 0.4 MG SL tablet    telmisartan (MICARDIS) 80 MG " Tab    Benign hypertension with chronic kidney disease, stage III    Relevant Medications    amLODIPine (NORVASC) 10 MG tablet    Chronic diastolic congestive heart failure    Relevant Medications    telmisartan (MICARDIS) 80 MG Tab    Coronary artery disease due to calcified coronary lesion    Relevant Medications    nitroGLYCERIN (NITROSTAT) 0.4 MG SL tablet    Bilateral carotid artery stenosis    Relevant Medications    nitroGLYCERIN (NITROSTAT) 0.4 MG SL tablet       Other    Dyslipidemia    Relevant Medications    nitroGLYCERIN (NITROSTAT) 0.4 MG SL tablet    History of CVA (cerebrovascular accident)    Relevant Medications    atorvastatin (LIPITOR) 80 MG tablet    clopidogreL (PLAVIX) 75 mg tablet      Other Visit Diagnoses     Hypertension, essential        Relevant Medications    carvediloL (COREG) 25 MG tablet    furosemide (LASIX) 40 MG tablet          Patient's Medications   New Prescriptions    No medications on file   Previous Medications    ACYCLOVIR (ZOVIRAX) 400 MG TABLET    Take 0.5 tablets (200 mg total) by mouth 2 (two) times daily.    BLOOD SUGAR DIAGNOSTIC STRP    1 strip by Misc.(Non-Drug; Combo Route) route after meals as needed.    CALCITRIOL (ROCALTROL) 0.25 MCG CAP    Take 2 capsules (0.5 mcg total) by mouth once daily.    CLONIDINE (CATAPRES) 0.1 MG TABLET    Take 1 tablet (0.1 mg total) by mouth 2 (two) times daily.    DEXAMETHASONE (DECADRON) 4 MG TAB    Take 5 tablets (20 mg total) by mouth As instructed. Take once weekly with Velcade injection    DICLOFENAC SODIUM 1.5 % DROP    APPLY 40 DROPS TO AFFECTED AREA(S) FOUR TIMES DAILY    FERROUS SULFATE ORAL    Take by mouth once daily. Pt unsure of dosage.    FLASH GLUCOSE SCANNING READER (FREESTYLE CLAUDIO 14 DAY READER) [facility]    Use as directed.    FLASH GLUCOSE SENSOR (FREESTYLE CLAUDIO 14 DAY SENSOR) KIT    Change every 14 days.    FLUTICASONE (FLONASE) 50 MCG/ACTUATION NASAL SPRAY    1 spray by Each Nare route 2 (two) times daily as needed  "for Rhinitis.    GABAPENTIN (NEURONTIN) 300 MG CAPSULE    Take 1 capsule (300 mg total) by mouth daily as needed.    INSULIN (LANTUS SOLOSTAR U-100 INSULIN) GLARGINE 100 UNITS/ML (3ML) SUBQ PEN    Inject 24 units at night.    INSULIN ASPART U-100 (NOVOLOG) 100 UNIT/ML (3 ML) INPN PEN    Inject 18 units w/ breakfast, 16 units w/ lunch and dinner plus scale 150-200+2, 201-250+4, 251-300+6, 301-350+8, >350+10. Snack dose 6 units. Max daily 88 units    INSULIN NEEDLES, DISPOSABLE, 31 X 5/16 " NDLE    Pt to use pen needle with Lantus daily    LIDOCAINE (LIDODERM) 5 %    APPLY UP TO 3 PATCHES TO THE AFFECTED AREA(S) FOR 12 HOURS DAILY AND REMOVE FOR 12 HOURS    LIDOCAINE (XYLOCAINE) 5 % OINT OINTMENT    APPLY TO THE AFFECTED AREA(S) THREE TO FOUR TIMES DAILY    LIDOTRAL 3.88 % CREA    APPLY TO THE AFFECTED AREA 2 TIMES TO 3 TIMES DAILY    LINAGLIPTIN (TRADJENTA) 5 MG TAB TABLET    Take 1 tablet (5 mg total) by mouth once daily.    MECLIZINE (ANTIVERT) 25 MG TABLET    Take 1 tablet (25 mg total) by mouth 3 (three) times daily as needed for Dizziness.    METHYL SALICYLATE 25 % CREA    APPLY TO THE AFFECTED AREA(S) THREE TO FOUR TIMES DAILY    MIRTAZAPINE (REMERON) 30 MG TABLET    Take 30 mg by mouth every evening.     MONTELUKAST (SINGULAIR) 10 MG TABLET    Take 1 tablet (10 mg total) by mouth every evening.    OMEPRAZOLE (PRILOSEC OTC) 20 MG TABLET    Take 1 tablet (20 mg total) by mouth once daily.    ONDANSETRON (ZOFRAN-ODT) 8 MG TBDL    Take 1 tablet (8 mg total) by mouth every 6 (six) hours as needed.    VITAMIN D (VITAMIN D3) 1000 UNITS TAB    Take 1,000 Units by mouth once daily.   Modified Medications    Modified Medication Previous Medication    AMLODIPINE (NORVASC) 10 MG TABLET amLODIPine (NORVASC) 10 MG tablet       Take 1 tablet (10 mg total) by mouth once daily.    Take 1 tablet (10 mg total) by mouth once daily.    ATORVASTATIN (LIPITOR) 80 MG TABLET atorvastatin (LIPITOR) 80 MG tablet       Take 1 tablet (80 " mg total) by mouth once daily.    Take 1 tablet (80 mg total) by mouth once daily.    CARVEDILOL (COREG) 25 MG TABLET carvedilol (COREG) 25 MG tablet       Take 1 tablet (25 mg total) by mouth 2 (two) times daily with meals.    Take 1 tablet (25 mg total) by mouth 2 (two) times daily with meals.    CLOPIDOGREL (PLAVIX) 75 MG TABLET clopidogrel (PLAVIX) 75 mg tablet       Take 1 tablet (75 mg total) by mouth once daily.    TAKE 1 TABLET BY MOUTH EVERY DAY    FUROSEMIDE (LASIX) 40 MG TABLET furosemide (LASIX) 40 MG tablet       Pt to take 1 1/2 tablet twice a day per Dr. Salinas on 3/19/18.    Pt to take 1 1/2 tablet twice a day per Dr. Salinas on 3/19/18.    NITROGLYCERIN (NITROSTAT) 0.4 MG SL TABLET nitroGLYCERIN (NITROSTAT) 0.4 MG SL tablet       Place 1 tablet (0.4 mg total) under the tongue every 5 (five) minutes as needed for Chest pain.    Place 1 tablet (0.4 mg total) under the tongue every 5 (five) minutes as needed for Chest pain.    TELMISARTAN (MICARDIS) 80 MG TAB telmisartan (MICARDIS) 80 MG Tab       Take 1 tablet (80 mg total) by mouth once daily.    Take 1 tablet (80 mg total) by mouth once daily.   Discontinued Medications    METOLAZONE (ZAROXOLYN) 2.5 MG TABLET    Take 1 tablet (2.5 mg total) by mouth once daily.   Long discussion with patient and his wife regarding compliance and close follow up.   Since BP is controlled on the present regimen I would recommend Amlodipine, Carvedilol 12.5 mg BID and Clonidine 0.1mg QD.  Eventually may want to restart Micardis since it may be beneficial to his CKD, but under control either nephrology or PCP.  For now no Zaroxylyn and Furosemide twice a week.  Diabetes management as per PCP.    Follow up in about 6 months (around 8/17/2020).

## 2020-02-19 NOTE — PROGRESS NOTES
Patient no showed for diabetic education appointment with this RN. Called patient, no answer- left message asking patient to call [phone] to reschedule Diabetes Education appointment.      Suma Garrett, RN, CCM

## 2020-02-21 PROBLEM — F33.1 MODERATE EPISODE OF RECURRENT MAJOR DEPRESSIVE DISORDER: Status: ACTIVE | Noted: 2020-01-01

## 2020-02-21 PROBLEM — I69.354 HEMIPLEGIA AND HEMIPARESIS FOLLOWING CEREBRAL INFARCTION AFFECTING LEFT NON-DOMINANT SIDE: Status: ACTIVE | Noted: 2020-01-01

## 2020-02-21 NOTE — PROGRESS NOTES
Subjective:       Patient ID: Marie Reis Jr. is a 64 y.o. male.    Chief Complaint: Follow-up and Diabetes    HPI 64-year-old male presents to clinic today for followup patient has long-time history of uncontrolled diabetes with multiple complications including retinopathy nephropathy neuropathy.  Additionally patient has multiple myeloma currently receives weekly Decadron injection this is contributing to hyperglycemia.  Baseline he typically does not have good control of his blood sugars and has not for decades.  He has not tolerated previous multiple myeloma treatment.  Patient had pain in his back they brought this to the attention of that his oncologist imaging including CT of thoracic and lumbar and abdomen was obtained last week did not determine any etiology for symptoms.  Later he did tell his wife is constipated she gave him something for relief last night did which was effective.  He is now having less pain is able to walk.  He also reports a previous injury and pain in his left leg he wants to make sure he does not have a fracture.  Injury with his remote.  Table hit his lower leg.  Abdominal can scan did show some thickening of his bladder he denies any urologic symptoms.  Review of Systems  otherwise negative  Objective:      Physical Exam  General: Well-appearing, well-nourished.  No distress  HEENT: conjunctivae are normal.  Pupils are equal and reative to light.  TM's are clear and intact bilaterally.  Hearing is grossly normal.  Nasopharynx is clear.  Oropharynx is clear.  Neck: Supple.  No thyroid megaly.  No bruits.  Lymph: No cervical or supraclavicular adenopathy.  Heart: Regular rate and rhythm, without murmur, rub or gallop.  Lungs: Clear to auscultation; respiratory effort normal.  Abdomen: Soft, nontender, nondistended.  Normoactive bowel sounds.  No hepatomegaly.  No masses.  Extremities: Good distal pulses.  No edema.  Psych: Oriented to time person place.  Judgment and insight seem  unimpaired.  Mood and affect are appropriate.  Assessment:       1. Hemiplegia and hemiparesis following cerebral infarction affecting left non-dominant side    2. Secondary hyperparathyroidism    3. Moderate episode of recurrent major depressive disorder    4. PAD (peripheral artery disease)    5. 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, with long-term current use of insulin    6. Multiple myeloma not having achieved remission    7. Type 2 diabetes mellitus with stage 4 chronic kidney disease, with long-term current use of insulin    8. History of stroke    9. Diabetic polyneuropathy associated with type 2 diabetes mellitus    10. Abnormal CT scan, bladder    11. Proteinuria, unspecified type     12. Pain of left lower extremity    13. Constipation, unspecified constipation type    14. Pain in rib    15. Type 2 diabetes mellitus with diabetic polyneuropathy, with long-term current use of insulin    16. Hypertensive retinopathy of both eyes        Plan:       Marie was seen today for follow-up and diabetes.    Diagnoses and all orders for this visit:    Hemiplegia and hemiparesis following cerebral infarction affecting left non-dominant side    Secondary hyperparathyroidism  Followed by Nephrology  Moderate episode of recurrent major depressive disorder  Controlled.  Continue current medical regimen.  Prescription refills addressed.  Followup advised. See after visit summary.  PAD (peripheral artery disease)  Continue risk factor management statin  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, with long-term current use of insulin  Patient is non adherence to diet or medications.  Patient came into the office today drinking a regular Coca-Cola.  He is often skipping meals and skipping medication.  Counseling has been reiterated.  Certainly Decadron contributes to his hyperglycemia but  this is multifactorial.  Very difficult to optimize diabetes control  Multiple myeloma not having achieved remission  -     X-Ray Femur 2 View Left; Future  Followed by Oncology  Type 2 diabetes mellitus with stage 4 chronic kidney disease, with long-term current use of insulin  Followed by Nephrology  History of stroke    Diabetic polyneuropathy associated with type 2 diabetes mellitus  Controlled.  Continue current medical regimen.  Prescription refills addressed.  Followup advised. See after visit summary.  Abnormal CT scan, bladder  -     URINALYSIS  -     Urine culture    Proteinuria, unspecified type   -     Urine culture    Pain of left lower extremity  -     X-Ray Femur 2 View Left; Future    Constipation, unspecified constipation type  Continue fiber water and MiraLax  Pain in rib  -     X-Ray Ribs 3 Views Bilateral; Future    Type 2 diabetes mellitus with diabetic polyneuropathy, with long-term current use of insulin  -     MICROALBUMIN / CREATININE RATIO URINE    Hypertensive retinopathy of both eyes  -     Comprehensive metabolic panel; Future  -     Lipid panel; Future    Other orders  -     Urinalysis Microscopic

## 2020-02-24 PROBLEM — R53.1 GENERALIZED WEAKNESS: Status: ACTIVE | Noted: 2020-01-01

## 2020-02-24 PROBLEM — Z79.4 TYPE 2 DIABETES MELLITUS WITH HYPERGLYCEMIA, WITH LONG-TERM CURRENT USE OF INSULIN: Status: ACTIVE | Noted: 2018-04-17

## 2020-02-24 PROBLEM — N18.9 ACUTE KIDNEY INJURY SUPERIMPOSED ON CKD: Status: ACTIVE | Noted: 2018-04-11

## 2020-02-24 PROBLEM — D64.9 FATIGUE ASSOCIATED WITH ANEMIA: Status: ACTIVE | Noted: 2020-01-01

## 2020-02-24 PROBLEM — R07.9 CHEST PAIN: Status: ACTIVE | Noted: 2020-01-01

## 2020-02-24 PROBLEM — E11.65 TYPE 2 DIABETES MELLITUS WITH HYPERGLYCEMIA, WITH LONG-TERM CURRENT USE OF INSULIN: Status: ACTIVE | Noted: 2018-04-17

## 2020-02-24 PROBLEM — R74.01 TRANSAMINITIS: Status: ACTIVE | Noted: 2020-01-01

## 2020-02-24 PROBLEM — E80.6 BILIRUBINEMIA: Status: ACTIVE | Noted: 2020-01-01

## 2020-02-24 NOTE — TELEPHONE ENCOUNTER
I reviewed all the labs from 2/18/20 with focus on kidney related labs. Kidney function is actually closer to baseline and there is no major change.     Unfortunately cannot explain why he has changes in behavior based on these labs. But it could become concerning if he continues to refuse to take medicines and eat.     I wonder if she should contact PCP to discuss this further.

## 2020-02-24 NOTE — ED TRIAGE NOTES
Pt has hx of multiple myeloma, wife reports pt did not go to past wed apt for steroid shot due to generalized weakness with started on tues evening. Pt is a poor historian, wife reports decreased appetite with nausea, and generalized weakness

## 2020-02-24 NOTE — ED PROVIDER NOTES
Encounter Date: 2/24/2020    SCRIBE #1 NOTE: I, Justin Moise, am scribing for, and in the presence of, Sebas Sherwood MD.       History     Chief Complaint   Patient presents with    Fatigue     Reports generalized weakness and poor appetite x 5-6 days with c/o intermittent abdominal pain. States no meds, including insulin x 5 days. Presents awake, alert, oriented. Skin w/d.      Marie Reis Jr. is a 64 y.o. male who  has a past medical history of Binocular vision disorder with diplopia (9/22/2016), Bradycardia, Cataract, Cervical spondylosis (10/1/2015), Chronic diastolic congestive heart failure, Chronic diastolic heart failure (1/11/2018), Coronary artery disease due to calcified coronary lesion (3/19/2018), Dyslipidemia (6/26/2015), Hearing impairment (10/25/2013), History of stroke (3/23/2015), Hypertension associated with diabetes (1/11/2018), Hypertensive retinopathy of both eyes (9/26/2017), Lumbar spondylosis (10/1/2015), Multiple myeloma not having achieved remission (1/16/2018), Persistent proteinuria (1/11/2018), Spondylolisthesis of lumbar region (10/1/2015), Strabismus, Stroke (2014), Thoracic spondylosis (10/1/2015), Type 2 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, with long-term current use of insulin (9/26/2017), Type 2 diabetes mellitus with diabetic polyneuropathy, with long-term current use of insulin (9/28/2015), and Type 2 diabetes mellitus with stage 4 chronic kidney disease, with long-term current use of insulin (1/11/2018).    The patient presents to the ED due to fatigue for the past 5-6 days. Patient reports generalized weakness with intermittent generalized abdominal pain predominantly after eating. States he has not had anything to eat in 3-4 days and has only been drinking water which he has been vomiting. He has also not taken any of his medications including insulin the last 5 days.     The history is provided by the patient.     Review  of patient's allergies indicates:   Allergen Reactions    Hydralazine analogues      Increase swelling and throat itching and tightness with use.  Symptoms correlate only with this medication onset in hospital.     Past Medical History:   Diagnosis Date    Binocular vision disorder with diplopia 9/22/2016    Bradycardia     Cataract     Cervical spondylosis 10/1/2015    Chronic diastolic congestive heart failure     Chronic diastolic heart failure 1/11/2018    Coronary artery disease due to calcified coronary lesion 3/19/2018    Dyslipidemia 6/26/2015    Hearing impairment 10/25/2013    History of stroke 3/23/2015    Hypertension associated with diabetes 1/11/2018    Hypertensive retinopathy of both eyes 9/26/2017    Lumbar spondylosis 10/1/2015    Multiple myeloma not having achieved remission 1/16/2018    Persistent proteinuria 1/11/2018    Spondylolisthesis of lumbar region 10/1/2015    Strabismus     Stroke 2014    Thoracic spondylosis 10/1/2015    Type 2 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, with long-term current use of insulin 9/26/2017    Type 2 diabetes mellitus with diabetic polyneuropathy, with long-term current use of insulin 9/28/2015    Type 2 diabetes mellitus with stage 4 chronic kidney disease, with long-term current use of insulin 1/11/2018     Past Surgical History:   Procedure Laterality Date    CATARACT EXTRACTION W/  INTRAOCULAR LENS IMPLANT Left 4/30/15    Diane    COLONOSCOPY N/A 1/20/2020    Procedure: COLONOSCOPY;  Surgeon: Salvador Espinosa MD;  Location: [facility];  Service: Endoscopy;  Laterality: N/A;    EYE SURGERY      cataract removal left eye    HAND SURGERY  2/2012    DR. BAKER [facility]    left hand fracture sx      LT EYE   5/2/15    DR KULLMAN [facility]     Family History   Problem Relation Age of Onset    Diabetes Mother     Cancer Mother     Kidney disease Mother     Diabetes Father      Heart attack Father     Diabetes Sister     Diabetes Brother     No Known Problems Maternal Aunt     No Known Problems Maternal Uncle     No Known Problems Paternal Aunt     No Known Problems Paternal Uncle     No Known Problems Maternal Grandmother     No Known Problems Maternal Grandfather     No Known Problems Paternal Grandmother     No Known Problems Paternal Grandfather     Blindness Neg Hx     Anesthesia problems Neg Hx     Amblyopia Neg Hx     Cataracts Neg Hx     Glaucoma Neg Hx     Hypertension Neg Hx     Macular degeneration Neg Hx     Retinal detachment Neg Hx     Strabismus Neg Hx     Stroke Neg Hx     Thyroid disease Neg Hx     Heart disease Neg Hx     Heart failure Neg Hx     Hyperlipidemia Neg Hx      Social History     Tobacco Use    Smoking status: Never Smoker    Smokeless tobacco: Never Used   Substance Use Topics    Alcohol use: No    Drug use: No     Review of Systems   Constitutional: Positive for appetite change and fatigue. Negative for fever.   HENT: Negative for sore throat.    Respiratory: Negative for shortness of breath.    Cardiovascular: Negative for chest pain.   Gastrointestinal: Positive for abdominal pain, nausea and vomiting.   Genitourinary: Negative for dysuria.   Musculoskeletal: Negative for back pain.   Skin: Negative for rash.   Neurological: Negative for weakness.   Hematological: Does not bruise/bleed easily.   All other systems reviewed and are negative.      Physical Exam     Initial Vitals   BP Pulse Resp Temp SpO2   -- -- -- -- --      MAP       --         Physical Exam    Nursing note and vitals reviewed.  Constitutional: He appears well-developed and well-nourished.   HENT:   Head: Normocephalic and atraumatic.   Dry mucous membranes   Eyes: EOM are normal. Pupils are equal, round, and reactive to light.   Conjunctivae slightly pale   Neck: Normal range of motion. Neck supple.   Cardiovascular: Normal rate, regular rhythm, normal heart  sounds and intact distal pulses.   Pulmonary/Chest: Breath sounds normal. No respiratory distress. He has no wheezes.   Abdominal: Soft. He exhibits no distension. There is no tenderness.   Musculoskeletal: Normal range of motion. He exhibits edema.   Bilateral LE edema   Neurological: He is alert and oriented to person, place, and time.   Skin: Skin is warm and dry.         ED Course   Critical Care  Date/Time: 2/24/2020 2:00 PM  Performed by: Sebas Carrillo MD  Authorized by: Laurence Zuniga MD   Direct patient critical care time: 30 minutes  Additional history critical care time: 10 minutes  Ordering / reviewing critical care time: 15 minutes  Documentation critical care time: 15 minutes  Consulting other physicians critical care time: 5 minutes  Total critical care time (exclusive of procedural time) : 75 minutes  Critical care was time spent personally by me on the following activities: examination of patient, ordering and performing treatments and interventions, ordering and review of radiographic studies, re-evaluation of patient's condition, review of old charts, pulse oximetry, ordering and review of laboratory studies, obtaining history from patient or surrogate, discussions with primary provider and evaluation of patient's response to treatment.        Labs Reviewed   CBC W/ AUTO DIFFERENTIAL - Abnormal; Notable for the following components:       Result Value    RBC 2.91 (*)     Hemoglobin 8.1 (*)     Hematocrit 24.9 (*)     Platelets 438 (*)     Immature Granulocytes 2.0 (*)     Gran # (ANC) 10.3 (*)     Immature Grans (Abs) 0.24 (*)     Lymph # 0.7 (*)     Gran% 86.0 (*)     Lymph% 5.9 (*)     All other components within normal limits   CBC W/ AUTO DIFFERENTIAL - Abnormal; Notable for the following components:    RBC 2.91 (*)     Hemoglobin 8.1 (*)     Hematocrit 24.9 (*)     Platelets 438 (*)     Immature Granulocytes 2.0 (*)     Gran # (ANC) 10.3 (*)     Immature Grans (Abs) 0.24 (*)      Lymph # 0.7 (*)     Gran% 86.0 (*)     Lymph% 5.9 (*)     All other components within normal limits   TROPONIN I - Abnormal; Notable for the following components:    Troponin I 0.374 (*)     All other components within normal limits   BETA - HYDROXYBUTYRATE, SERUM - Abnormal; Notable for the following components:    Beta-Hydroxybutyrate 1.1 (*)     All other components within normal limits   POCT GLUCOSE - Abnormal; Notable for the following components:    POCT Glucose 367 (*)     All other components within normal limits   COMPREHENSIVE METABOLIC PANEL   CK     EKG Readings: (Independently Interpreted)   Rate of 94 bpm, normal sinus rhythm, no ST elevations, ST depressions in leads I, II, v4, v5, and v6.       Imaging Results          X-Ray Abdomen AP 1 View (KUB) (Final result)  Result time 02/24/20 15:26:29    Final result by Abhijit Clement MD (02/24/20 15:26:29)                 Impression:      No acute abnormality.      Electronically signed by: Abhijit Clement MD  Date:    02/24/2020  Time:    15:26             Narrative:    EXAMINATION:  XR ABDOMEN AP 1 VIEW    CLINICAL HISTORY:  Constipation, unspecified    TECHNIQUE:  AP View(s) of the abdomen was performed.    COMPARISON:  CT January 13, 2020    FINDINGS:  Bowel gas pattern is normal.  No pneumatosis.  No prominent fecal burden in the colon.    Unremarkable soft tissues.    No acute bone abnormality.                                 Medical Decision Making:   Clinical Tests:   Lab Tests: Reviewed and Ordered  Radiological Study: Reviewed and Ordered  Medical Tests: Reviewed and Ordered  ED Management:  16:40   64-year-old male with insulin-dependent diabetes who has not been eating in the past 4 days.  He is also not taking his insulin. Beta-hydrozybutyrate 1.1.  Troponin also elevated. He will be admitted by [facility].                                 Clinical Impression:       ICD-10-CM ICD-9-CM   1. Generalized weakness R53.1 780.79   2. Chest pain R07.9  786.50   3. Constipation K59.00 564.00   4. Type 2 diabetes mellitus with diabetic polyneuropathy, with long-term current use of insulin E11.42 250.60    Z79.4 357.2     V58.67   5. Fatigue associated with anemia D64.9 285.9   6. Anemia due to multiple mechanisms D64.9 285.9   7. Type 2 diabetes mellitus with hyperglycemia, with long-term current use of insulin E11.65 250.00    Z79.4 790.29     V58.67   8. JAZYLN (acute kidney injury) N17.9 584.9   9. Transaminitis R74.0 790.4   10. Bilirubinemia E80.6 782.4   11. Troponin I above reference range R79.89 790.6   12. Liver function test abnormality R94.5 790.6   13. Acute kidney injury superimposed on CKD N17.9 866.00    N18.9 585.9   14. Constipation, unspecified constipation type K59.00 564.00   15. Dyslipidemia E78.5 272.4   16. Multiple myeloma not having achieved remission C90.00 203.00   17. Acute encephalopathy G93.40 348.30   18. Hypertension, essential I10 401.9   19. Type 2 diabetes mellitus with stage 4 chronic kidney disease, with long-term current use of insulin E11.22 250.40    N18.4 585.4    Z79.4 V58.67   20. Chronic diastolic congestive heart failure I50.32 428.32     428.0   21. Abnormal coagulation profile  R79.1 790.92   22. Symptomatic anemia D64.9 285.9                I, Dr. Sebas Carrillo, personally performed the services described in this documentation. All medical record entries made by the scribe were at my direction and in my presence. I have reviewed the chart and agree that the record reflects my personal performance and is accurate and complete. Sebas Carrillo MD.  3:14 PM 02/26/2020                 Sebas Carrillo MD  02/26/20 [id number]

## 2020-02-24 NOTE — TELEPHONE ENCOUNTER
Called patient's wife, who reports that patient has not been eating and has been unable to eat since about Thursday. Patient states that he ate an animal cracker this morning but vomited after. Patient unable to give definitive reason for not wanting to eat. He states that he does not have abdominal pain, but does feel nauseous. Wife states that he has been drinking water and expresses concern. Informed wife that to increase appetite, patient can take zofran 30 minutes before eating (medication routed to pharmacy), eat bland, room temperature foods, and to eat small, frequent amounts when possible.     Informed wife that if patient continues to be unable to eat despite interventions, he may need to be evaluated, as this is an acute change. Wife verbalized understanding. Nurse to inform Dr. Cantrell of patient condition and return wife's call with any other recommendations.

## 2020-02-25 NOTE — PROGRESS NOTES
"[facility] Resident HO-I  Progress Note    Subjective:      Patient sleeping comfortably in bed. Rather somnolent and nods his head yes and no to questions. Patient says he feels better and does not have any pain however left lower quadrant pain is elicited on palpation.  Patient denies rectal bleed.   Objective:   Last 24 Hour Vital Signs:  BP  Min: 116/70  Max: 171/85  Temp  Av.9 °F (36.6 °C)  Min: 97.5 °F (36.4 °C)  Max: 98.5 °F (36.9 °C)  Pulse  Av.3  Min: 72  Max: 99  Resp  Av.3  Min: 17  Max: 29  SpO2  Av.1 %  Min: 88 %  Max: 100 %  Height  Av' 1" (185.4 cm)  Min: 6' 1" (185.4 cm)  Max: 6' 1" (185.4 cm)  Weight  Av kg (200 lb 9.9 oz)  Min: 91 kg (200 lb 9.9 oz)  Max: 91 kg (200 lb 9.9 oz)  I/O last 3 completed shifts:  In: 2440 [P.O.:490; I.V.:950; IV Piggyback:1000]  Out: 200 [Urine:200]    Physical Examination:  General:          Alert and awake in NAD  Head:               Normocephalic and atraumatic  Eyes:               PERRL; EOMi with anicteric sclera and clear conjunctivae  Mouth:             Oropharynx clear and without exudate; moist mucous membranes  Cardio:             Regular rate and rhythm with normal S1 and S2; no murmurs or rubs  Resp:               CTAB and unlabored; no wheezes, crackles or rhonchi  Abdom:            Distended. TTP LLQ. Soft, with normoactive bowel sounds  Extrem:            2+BLLE WWP with no clubbing, cyanosis or   Skin:                No rashes, lesions, or color changes  Pulses:            2+ and symmetric distally  Neuro:             AAOx3; cooperative and pleasant with no focal deficits       Laboratory:  Laboratory Data Reviewed: yes  Pertinent Findings:  CBC pending  CMP pending  Trop pending  POCT glucose pending     Microbiology Data Reviewed: yes  Pertinent Findings:  Urine culture with no growth at 4 days     Other Results:  EKG (my interpretation):NSR. LVH. .    Radiology Data Reviewed: yes  Pertinent Findings:  MRI MRCP pending "     Current Medications:     Infusions:       Scheduled:   amLODIPine  10 mg Oral Daily    calcitRIOL  0.5 mcg Oral Daily    carvediloL  25 mg Oral BID WM    cloNIDine  0.1 mg Oral Daily    clopidogreL  75 mg Oral Daily    insulin aspart U-100  5 Units Subcutaneous TIDWM    insulin detemir U-100  20 Units Subcutaneous QHS    mirtazapine  30 mg Oral QHS    montelukast  10 mg Oral QHS    pantoprazole  40 mg Oral Daily        PRN:  dextrose 50 % in water (D50W), dextrose 50 % in water (D50W), glucose, glucose, insulin aspart U-100, nitroGLYCERIN, ondansetron, sodium chloride 0.9%    Antibiotics and Day Number of Therapy:  n/a  Lines and Day Number of Therapy:  n/a    Assessment:   Marie Reis Jr. is a 64 y.o. male with generalized malaise and reduced PO intake.      Plan:     Symptomatic Anemia   -6 days of generalized weakness  -H/H 8.1/24.9 on presentation   -H/H in 2018 10.3/30.1  -previous transfusion requirements  -FOBT negative  -consented for transfusion PRN      JAZLYN on CKDIV  -Cr 4.1. Cr noted 3.1 1/2020  -likely in setting of hypovolemia in setting of reduced PO intake   -fluid resuscitated   -urine studies suggest pre-renal etiology   -will trend renal function labs      Transaminitis w/ hyperbilirubinemia   -AST//296  -tbili 1.2   -likely in setting of hypovolemia in setting of decreased PO intake   -MRI MRCP pending         Insulin dependent type 2 diabetes mellitus with complications   -POCT glucose 367  -beta hydroxy 1.1  -insulin detemir 20 subcutaneous qPM  -insulin aspart prandial, PRN  -diabetes educator evaluation pending       Chronic Diastolic Heart Failure   -BNP 3,893  -echo flow with doppler pending         HTN  -Norvasc 10,      Hx of CVA  -continue ASA, statin      HLD  -continue ASA, statin      Multiple Myeloma   -follows with DR. Cantrell  -last seen in clinic 1/8/2020  -weekly velcade and decadron      PAD  -continue asa, statin            DISPO: pending symptomatic  improvement: pending renal recovery: pending workup        Ayo You  \[facility]\"" Internal Medicine HO-I   \[facility]\"" IM Service Team A    \[facility]\"" Medicine Hospitalist Pager numbers:   \[facility]\"" Hospitalist Medicine Team A (Anson/Nadia): [phone]  \[facility]\"" Hospitalist Medicine Team B (Shashank/Aniket):  [phone]

## 2020-02-25 NOTE — NURSING
Dr Lamas notified of pateints CBG for tonight 461. MD ok with just giving patient scheduled levemir 20 units and 5 units of regular insulin sliding scale. Will recheck CBG

## 2020-02-25 NOTE — NURSING
Dr. Griggs and Dr. Horowitz notified of pt being very sleepy, difficult to arouse. Charge RN at bedside. Opens eyes to repeat stimulation, follows simple commands. MDs at bedside and orders received.

## 2020-02-25 NOTE — NURSING
[facility] Team A notified of pt continuing to be somnolent, 02 sats dropping into 70's while sleeping, during apneic episodes. Pt has known history of sleep apnea per wife. Pt difficult to arouse, follows simple commands and answers simple questions and falls immediately back to sleep. Pt currently receiving unit 2/2 of PRBCs. Team will assess pt at bedside.

## 2020-02-25 NOTE — PLAN OF CARE
Patient AAox4. Quiet and withdrawn overnight. Slept for most of night , easily aroused. Bm with no noted blood upon arrival to floor. Denied pain. Ekg and troponin levels drawn every 6 hours as ordered. Telemetry monitor on patient. Accuchecks performed. Safety ensured. Call light within reach. Bed alarm on. 2d echo pending this AM   Problem: Fall Injury Risk  Goal: Absence of Fall and Fall-Related Injury  Outcome: Ongoing, Progressing     Problem: Adult Inpatient Plan of Care  Goal: Plan of Care Review  Outcome: Ongoing, Progressing

## 2020-02-25 NOTE — H&P
[facility] Internal Medicine History and Physical - Resident Note    Admitting Team: Medicine Team A   Attending Physician: Anson   Resident: Carlos Manuel   Interns: Avelino     Date of Admit: 2/24/2020    Chief Complaint     Generalized malaise x 6 days     Subjective:      History of Present Illness:  Marie Reis Jr. is a 64 y.o. male who  has a past medical history of Binocular vision disorder with diplopia (9/22/2016), Bradycardia, Cataract, Cervical spondylosis (10/1/2015), Chronic diastolic congestive heart failure, Chronic diastolic heart failure (1/11/2018), Coronary artery disease due to calcified coronary lesion (3/19/2018), Dyslipidemia (6/26/2015), Hearing impairment (10/25/2013), History of stroke (3/23/2015), Hypertension associated with diabetes (1/11/2018), Hypertensive retinopathy of both eyes (9/26/2017), Lumbar spondylosis (10/1/2015), Multiple myeloma not having achieved remission (1/16/2018), Persistent proteinuria (1/11/2018), Spondylolisthesis of lumbar region (10/1/2015), Strabismus, Stroke (2014), Thoracic spondylosis (10/1/2015), Type 2 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     The patient was in his usual state of health until about six days ago when he developed acute onset fatigue which caused the patient to not receive treatment for Multiple Myeloma. The patient says the fatigue was persistent and developed left lower quadrant abdominal pain a few days later resulting in decreased PO intake due to a lack of appetite. The patient says he has been unable to keep anything down and has been having persistent non-bilious, non-bloody vomiting. The patient has not taken his insulin in  five days. The patient;s wife says that the patient has been ambulating with an unsteady gait but says the patient has not fallen. In the ED the patient says he had a bowel movement and notes maroon colored streaking outlining his stool. The patient denies any previous episodes of bloody bowel movements. The patient denies chest pain, lightheadedness, and dizziness.     Past Medical History:  Past Medical History:   Diagnosis Date    Binocular vision disorder with diplopia 9/22/2016    Bradycardia     Cataract     Cervical spondylosis 10/1/2015    Chronic diastolic congestive heart failure     Chronic diastolic heart failure 1/11/2018    Coronary artery disease due to calcified coronary lesion 3/19/2018    Dyslipidemia 6/26/2015    Hearing impairment 10/25/2013    History of stroke 3/23/2015    Hypertension associated with diabetes 1/11/2018    Hypertensive retinopathy of both eyes 9/26/2017    Lumbar spondylosis 10/1/2015    Multiple myeloma not having achieved remission 1/16/2018    Persistent proteinuria 1/11/2018    Spondylolisthesis of lumbar region 10/1/2015    Strabismus     Stroke 2014    Thoracic spondylosis 10/1/2015    Type 2 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, with long-term current use of insulin 9/26/2017    Type 2 diabetes mellitus with diabetic polyneuropathy, with long-term current use of insulin 9/28/2015    Type 2 diabetes mellitus with stage 4 chronic kidney disease, with long-term current use of insulin 1/11/2018       Past Surgical History:  Past Surgical History:   Procedure Laterality Date    CATARACT EXTRACTION W/  INTRAOCULAR LENS IMPLANT Left 4/30/15    Doris    COLONOSCOPY N/A 1/20/2020    Procedure: COLONOSCOPY;  Surgeon: Salvador Espinosa MD;  Location: [facility];  Service: Endoscopy;  Laterality: N/A;    EYE SURGERY      cataract removal left eye    HAND SURGERY  2/2012    DR. BAKER U    left  hand fracture sx      LT EYE   5/2/15    DR KULLMAN [facility]       Allergies:  Review of patient's allergies indicates:   Allergen Reactions    Hydralazine analogues      Increase swelling and throat itching and tightness with use.  Symptoms correlate only with this medication onset in hospital.       Home Medications:  Prior to Admission medications    Medication Sig Start Date End Date Taking? Authorizing Provider   amLODIPine (NORVASC) 10 MG tablet Take 1 tablet (10 mg total) by mouth once daily. 2/17/20  Yes Debbie Salinas MD   atorvastatin (LIPITOR) 80 MG tablet Take 1 tablet (80 mg total) by mouth once daily. 2/17/20  Yes Debbie Salinas MD   calcitRIOL (ROCALTROL) 0.25 MCG Cap Take 2 capsules (0.5 mcg total) by mouth once daily. 1/13/20  Yes Alissa Dotson MD   carvediloL (COREG) 25 MG tablet Take 1 tablet (25 mg total) by mouth 2 (two) times daily with meals.  Patient taking differently: Take 25 mg by mouth 2 (two) times daily with meals. Pt taking 1/2 pill bid. 2/17/20  Yes Debbie Salinas MD   cloNIDine (CATAPRES) 0.1 MG tablet Take 1 tablet (0.1 mg total) by mouth 2 (two) times daily.  Patient taking differently: Take 0.1 mg by mouth 2 (two) times daily. Pt taking one pill once a day. 11/15/19 11/14/20 Yes Nancy Colón MD   clopidogreL (PLAVIX) 75 mg tablet Take 1 tablet (75 mg total) by mouth once daily. 2/17/20  Yes Debbie Salinas MD   diclofenac sodium 1.5 % Drop APPLY 40 DROPS TO AFFECTED AREA(S) FOUR TIMES DAILY 8/13/18  Yes Historical Provider, MD   insulin (LANTUS SOLOSTAR U-100 INSULIN) glargine 100 units/mL (3mL) SubQ pen Inject 24 units at night. 12/19/19  Yes Gifty Massey APRN, FNP   insulin aspart U-100 (NOVOLOG) 100 unit/mL (3 mL) InPn pen Inject 18 units w/ breakfast, 16 units w/ lunch and dinner plus scale 150-200+2, 201-250+4, 251-300+6, 301-350+8, >350+10. Snack dose 6 units. Max daily 88 units 12/19/19  Yes BELEM Gardiner, FNP   lidocaine  (XYLOCAINE) 5 % Oint ointment APPLY TO THE AFFECTED AREA(S) THREE TO FOUR TIMES DAILY 2/3/20  Yes Historical Provider, MD   LIDOTRAL 3.88 % Crea APPLY TO THE AFFECTED AREA 2 TIMES TO 3 TIMES DAILY 8/13/18  Yes Historical Provider, MD   methyl salicylate 25 % Crea APPLY TO THE AFFECTED AREA(S) THREE TO FOUR TIMES DAILY 1/28/20  Yes Historical Provider, MD   mirtazapine (REMERON) 30 MG tablet Take 30 mg by mouth every evening.  5/11/15  Yes Historical Provider, MD   montelukast (SINGULAIR) 10 mg tablet Take 1 tablet (10 mg total) by mouth every evening. 9/9/16  Yes Nancy Colón MD   omeprazole (PRILOSEC OTC) 20 MG tablet Take 1 tablet (20 mg total) by mouth once daily. 10/9/19 10/8/20 Yes Carol Cantrell MD   vitamin D (VITAMIN D3) 1000 units Tab Take 1,000 Units by mouth once daily.   Yes Historical Provider, MD   acyclovir (ZOVIRAX) 400 MG tablet Take 0.5 tablets (200 mg total) by mouth 2 (two) times daily.  Patient not taking: Reported on 2/21/2020 10/9/19 2/17/20  Carol Cantrell MD   blood sugar diagnostic Strp 1 strip by Misc.(Non-Drug; Combo Route) route after meals as needed. 1/14/14   Vince Kelly MD   dexAMETHasone (DECADRON) 4 MG Tab Take 5 tablets (20 mg total) by mouth As instructed. Take once weekly with Velcade injection 10/9/19 10/8/20  Carol Cantrell MD   flash glucose scanning reader (FREESTYLE LCAUDIO 14 DAY READER) [facility] Use as directed. 7/24/19   BELEM Gardiner, EMILIEP   flash glucose sensor (FREESTYLE CLAUDIO 14 DAY SENSOR) Kit Change every 14 days. 8/29/19   BELEM Gardiner, NAVI   fluticasone (FLONASE) 50 mcg/actuation nasal spray 1 spray by Each Nare route 2 (two) times daily as needed for Rhinitis.  Patient taking differently: 1 spray by Each Nare route as needed for Rhinitis.  9/9/16   Nancy Colón MD   furosemide (LASIX) 40 MG tablet Pt to take 1 1/2 tablet twice a day per Dr. Salinas on 3/19/18.  Patient taking differently: Pt taking prn. 2/17/20   Debbie Salinas,  "MD   gabapentin (NEURONTIN) 300 MG capsule Take 1 capsule (300 mg total) by mouth daily as needed. 9/11/19   Nancy Colón MD   insulin needles, disposable, 31 X 5/16 " Ndle Pt to use pen needle with Lantus daily 2/9/15   Nancy Colón MD   lidocaine (LIDODERM) 5 % APPLY UP TO 3 PATCHES TO THE AFFECTED AREA(S) FOR 12 HOURS DAILY AND REMOVE FOR 12 HOURS 12/20/19   Historical Provider, MD   linagliptin (TRADJENTA) 5 mg Tab tablet Take 1 tablet (5 mg total) by mouth once daily. 1/8/19 2/17/20  Gifty Massey, APRN, FNP   meclizine (ANTIVERT) 25 mg tablet Take 1 tablet (25 mg total) by mouth 3 (three) times daily as needed for Dizziness. 3/10/18   Nancy Colón MD   nitroGLYCERIN (NITROSTAT) 0.4 MG SL tablet Place 1 tablet (0.4 mg total) under the tongue every 5 (five) minutes as needed for Chest pain. 2/17/20   Debbie Salinas MD   ondansetron (ZOFRAN) 4 MG tablet Take 1 tablet (4 mg total) by mouth every 6 (six) hours as needed for Nausea. 2/24/20   Carol Cantrell MD   ondansetron (ZOFRAN-ODT) 8 MG TbDL Take 1 tablet (8 mg total) by mouth every 6 (six) hours as needed. 9/19/16   Gumaro Weinstein MD   telmisartan (MICARDIS) 80 MG Tab Take 1 tablet (80 mg total) by mouth once daily.  Patient taking differently: Take 80 mg by mouth once daily. Pt taking prn. 2/17/20   Debbie Salinas MD   FERROUS SULFATE ORAL Take by mouth once daily. Pt unsure of dosage.  2/24/20  Historical Provider, MD       Family History:  Family History   Problem Relation Age of Onset    Diabetes Mother     Cancer Mother     Kidney disease Mother     Diabetes Father     Heart attack Father     Diabetes Sister     Diabetes Brother     No Known Problems Maternal Aunt     No Known Problems Maternal Uncle     No Known Problems Paternal Aunt     No Known Problems Paternal Uncle     No Known Problems Maternal Grandmother     No Known Problems Maternal Grandfather     No Known Problems Paternal Grandmother     No Known " Problems Paternal Grandfather     Blindness Neg Hx     Anesthesia problems Neg Hx     Amblyopia Neg Hx     Cataracts Neg Hx     Glaucoma Neg Hx     Hypertension Neg Hx     Macular degeneration Neg Hx     Retinal detachment Neg Hx     Strabismus Neg Hx     Stroke Neg Hx     Thyroid disease Neg Hx     Heart disease Neg Hx     Heart failure Neg Hx     Hyperlipidemia Neg Hx        Social History:  Social History     Tobacco Use    Smoking status: Never Smoker    Smokeless tobacco: Never Used   Substance Use Topics    Alcohol use: No    Drug use: No       Review of Systems:  Pertinent positives and negatives are listed in HPI.  All other systems are reviewed and are negative.    Health Maintaince :   Primary Care Physician: Eldon   Immunizations:   TDap is up to date,   Influenza is up to date,   Pneumovax is up to date, .  Cancer Screening:  Colonoscopy: is up to date.      Objective:   Last 24 Hour Vital Signs:  BP  Min: 152/76  Max: 171/85  Temp  Av.5 °F (36.9 °C)  Min: 98.5 °F (36.9 °C)  Max: 98.5 °F (36.9 °C)  Pulse  Av.7  Min: 92  Max: 99  Resp  Av.4  Min: 18  Max: 29  SpO2  Av.1 %  Min: 88 %  Max: 100 %  There is no height or weight on file to calculate BMI.  I/O last 3 completed shifts:  In: 1000 [IV Piggyback:1000]  Out: 200 [Urine:200]    Physical Examination:  General: Alert and awake in NAD  Head:  Normocephalic and atraumatic  Eyes:  PERRL; EOMi with anicteric sclera and clear conjunctivae  Mouth:  Oropharynx clear and without exudate; moist mucous membranes  Cardio:  Regular rate and rhythm with normal S1 and S2; no murmurs or rubs  Resp:  CTAB and unlabored; no wheezes, crackles or rhonchi  Abdom: Distended. TTP LLQ. Soft, with normoactive bowel sounds  Extrem: 2+BLLE WWP with no clubbing, cyanosis or   Skin:  No rashes, lesions, or color changes  Pulses: 2+ and symmetric distally  Neuro:  AAOx3; cooperative and pleasant with no focal deficits    Laboratory:  Most  Recent Data:  CBC:   Lab Results   Component Value Date    WBC 11.99 02/24/2020    WBC 11.99 02/24/2020    HGB 8.1 (L) 02/24/2020    HGB 8.1 (L) 02/24/2020    HCT 24.9 (L) 02/24/2020    HCT 24.9 (L) 02/24/2020     (H) 02/24/2020     (H) 02/24/2020    MCV 86 02/24/2020    MCV 86 02/24/2020    RDW 12.9 02/24/2020    RDW 12.9 02/24/2020     BMP:   Lab Results   Component Value Date     02/24/2020    K 4.1 02/24/2020     02/24/2020    CO2 18 (L) 02/24/2020    BUN 66 (H) 02/24/2020    CREATININE 4.1 (H) 02/24/2020     (H) 02/24/2020    CALCIUM 9.2 02/24/2020    MG 1.6 04/12/2018    PHOS 3.3 12/03/2019     LFTs:   Lab Results   Component Value Date    PROT 6.3 02/24/2020    ALBUMIN 3.0 (L) 02/24/2020    BILITOT 1.2 (H) 02/24/2020     (H) 02/24/2020    ALKPHOS 91 02/24/2020     (H) 02/24/2020     Coags:   Lab Results   Component Value Date    INR 1.3 (H) 02/24/2020     Urinalysis:   Lab Results   Component Value Date    LABURIN No significant growth 02/21/2020    COLORU Yellow 02/24/2020    SPECGRAV >=1.030 (A) 02/24/2020    NITRITE Negative 02/24/2020    KETONESU Negative 02/24/2020    UROBILINOGEN Negative 02/24/2020    WBCUA 1 02/24/2020       Trended Lab Data:  Recent Labs   Lab 02/18/20  0853 02/24/20  1405   WBC  --  11.99  11.99   HGB  --  8.1*  8.1*   HCT  --  24.9*  24.9*   PLT  --  438*  438*   MCV  --  86  86   RDW  --  12.9  12.9    139   K 3.5 4.1    106   CO2 23 18*   BUN 33* 66*   CREATININE 2.9* 4.1*   * 400*   PROT 6.0 6.3   ALBUMIN 2.9* 3.0*   BILITOT 1.0 1.2*   AST 12 208*   ALKPHOS 73 91   ALT 10 296*       Trended Cardiac Data:  Recent Labs   Lab 02/24/20  1405 02/24/20  1826   TROPONINI 0.374* 0.312*   BNP 3,893*  --        Microbiology Data:  n/a    Other Laboratory Data:  n/a    Other Results:  EKG (my interpretation):   NSR    Radiology:  Imaging Results           US Abdomen Limited (Final result)  Result time 02/24/20 19:38:38     Final result by Elias Mcghee MD (02/24/20 19:38:38)                 Impression:      This report was flagged in Epic as abnormal.    There is mild distention of the gallbladder noting the common duct is enlarged measuring up to 1 cm.  No definite intraluminal filling defect is seen although the duct can not be followed in its entirety.  Distal choledocholithiasis is a consideration versus other stricturing at the level of the ampulla.  Correlation is advised.  ERCP as warranted.    Mild hepatomegaly, correlation with LFTs advised.      Electronically signed by: Elias Mcghee MD  Date:    02/24/2020  Time:    19:38             Narrative:    EXAMINATION:  US ABDOMEN LIMITED    CLINICAL HISTORY:  transaminitis and elevated t-bili with abdominal distention; Nonspecific elevation of levels of transaminase and lactic acid dehydrogenase (ldh)    TECHNIQUE:  Limited ultrasound of the right upper quadrant of the abdomen (including pancreas, liver, gallbladder, common bile duct, and spleen) was performed.    COMPARISON:  CT 01/13/2020    FINDINGS:  The pancreas is obscured by overlying bowel gas.  The visualized aorta and IVC are grossly unremarkable.  The gallbladder is mildly distended without wall thickening or pericholecystic fluid.  Sonographic De Oliveira sign is negative.  The common duct measures up to 1 cm near the pancreatic head.  No visualized intraluminal filling defect.  Sonographic De Oliveira sign is negative.  The hepatic parenchyma is grossly homogeneous noting the liver is mildly prominent.  The visualized right kidney is unremarkable.  The spleen is unremarkable.  No ascites.  The visualized left kidney is unremarkable.                               X-Ray Chest 1 View (Final result)  Result time 02/24/20 15:58:57    Final result by Belem Melchor MD (02/24/20 15:58:57)                 Impression:      Abnormal chest radiograph concerning for interstitial lung disease in this 64-year-old man with a  history of fatigue.      Electronically signed by: Belem Melchor MD  Date:    02/24/2020  Time:    15:58             Narrative:    EXAMINATION:  XR CHEST 1 VIEW    CLINICAL HISTORY:  fatigue;    TECHNIQUE:  Single frontal view of the chest was performed.    COMPARISON:  Bilateral ribs: 02/21/2020.  Chest radiograph: 02/04/2019.    FINDINGS:  Allowing for slight right posterior oblique rotation, mediastinal structures are midline.  Cardiac silhouette is stable compared with February 2019.    There is a reticulonodular pattern present throughout both lungs with loss of pulmonary vascular distribution concerning for interstitial pulmonary edema or other forms of interstitial lung disease.  Noncontrast chest CT with high-resolution imaging might provide further information if warranted.    I detect no convincing evidence of pneumonia or other focal pulmonary disease and no pleural fluid, pneumothorax, pneumomediastinum, pneumoperitoneum or significant osseous abnormality.                               X-Ray Abdomen AP 1 View (KUB) (Final result)  Result time 02/24/20 15:26:29    Final result by Abhijit Clement MD (02/24/20 15:26:29)                 Impression:      No acute abnormality.      Electronically signed by: Abhijit Clement MD  Date:    02/24/2020  Time:    15:26             Narrative:    EXAMINATION:  XR ABDOMEN AP 1 VIEW    CLINICAL HISTORY:  Constipation, unspecified    TECHNIQUE:  AP View(s) of the abdomen was performed.    COMPARISON:  CT January 13, 2020    FINDINGS:  Bowel gas pattern is normal.  No pneumatosis.  No prominent fecal burden in the colon.    Unremarkable soft tissues.    No acute bone abnormality.                                   Assessment:     Marie Reis Jr. is a 64 y.o. male with generalized malaise and reduced PO intake.      Plan:     Symptomatic Anemia   -6 days of generalized weakness  -H/H 8.1/24.9  -H/H in 2018 10.3/30.1  -previous transfusion requirements  -FOBT  negative  -consented for transfusion PRN     JAZLYN on CKDIV  -Cr 4.1  -likely in setting of hypovolemia in setting of reduced PO intake   -urine studies   -will trend renal function labs     Transaminitis w/ hyperbilirubinemia   -AST//296  -tbili 1.2   -likely in setting of hypovolemia in setting of decreased PO intake       Insulin dependent type 2 diabetes mellitus with complications   -POCT glucose 367  -beta hydroxy 1.1  -insulin detemir 20 subcutaneous qPM  -insulin aspart prandial, PRN  -diabetes educator evaluation pending      Chronic Diastolic Heart Failure   -BNP 3,893  -echo flow with doppler pending       HTN  -Norvasc 10,     Hx of CVA  -continue ASA, statin     HLD  -continue ASA, statin     Multiple Myeloma   -follows with DR. Cantrell  -last seen in clinic 1/8/2020  -weekly velcade and decadron     PAD  -continue asa, statin         DISPO: pending symptomatic improvement: pending renal recovery: pending workup         Code Status:     FULL    Ayo You  [facility] Internal Medicine HO-I  [facility] Medicine Service    [facility] Medicine Hospitalist Pager numbers:   [facility] Hospitalist Medicine Team A (Anson/Nadia): [phone]  [facility] Hospitalist Medicine Team B (Shashank/Aniket):  [phone]

## 2020-02-25 NOTE — NURSING
Patient has not voided since last night. Attempted to get patient up but pateint to weak to stand on side of bed. Urinal provided and encouragement given without success. Bladders scan showed 468 ml. Dr Adams maldonado. Waiting on call back

## 2020-02-25 NOTE — NURSING
Dr keys made aware that pateint has not voided at night and has 468 ml in bladder scan.  Per MD , rescan at 8am.

## 2020-02-25 NOTE — ED NOTES
"Pt states he had a " bloody bowel movement when he went to restroom" admit team notifed, blood consent signed, occult blood sample obtained  "

## 2020-02-25 NOTE — NURSING
Notified by  nurse, Annmarie, that she did admission questions and requested VN document admission questions. VN documented admisssion questions relayed to OC from pt and family.

## 2020-02-25 NOTE — CONSULTS
[facility]    CC: abdominal pain    HPI 64 y.o. male with MM with anemia and chronic renal failure, G2DD, pHTN, pericardial effusion, CVA on Plavix, HTN, IDDM2, bleeding internal hemorrhoids, medical noncompliance who presents with acute, intermittent, post-prandial, diffuse abdominal pain with abnormal LFTs. Endorses weakness, fatigue, poor PO intake, and persistent nausea with nonbloody vomiting for the last 5-6 days so presented to ED. He has also been without insulin for 5 days. No NSAID, tylenol, supplement, EtOH, or drug use. No recent travel or sick contacts. He has a separate complaint of intermittent BRBPR from known internal hemorrhoids seen on colonoscopy in Jan. He doesn't weigh himself or follow a low sodium diet. Unsure if he takes lasix regularly. Family member at bedside reports he has been dyspneic in the last few days.    On admission, he was found to have acute on chronic renal failure, worsening anemia, BG 400s with BHB 1.1, BNP 3000s, elevated troponin and new uptrending abnormal LFTs (AST, ALT, tbili but normal alk phos) with slightly elevated INR. CPK wnl. RUQ ultrasound showed distended galbladder with enlarged CBD at 1cm, duct could not be seen entirely so choledocholithiasis not entirely ruled out.    Chart reviewed and summarized here.    Past Medical History MM with anemia and chronic renal failure, G2DD, pHTN, pericardial effusion, HTN, IDDM2, CVA on Plavix, bleeding internal hemorrhoids, medical noncompliance    Past Surgical History eye surgery    Social History no tobacco, etoh or drugs    Family History no colon cancer    Review of Systems  General ROS: +fatigue, no fevers, chills or weight loss   Psychological ROS: negative for hallucination, depression or suicidal ideation  Ophthalmic ROS: +blurry vision, negative for photophobia or eye pain  ENT ROS: negative for epistaxis, sore throat or rhinorrhea  Respiratory ROS: +shortness of breath,  no cough or  "wheezing  Cardiovascular ROS: +dyspnea on exertion, no chest pain  Gastrointestinal ROS: +abdominal pain +nausea +vomiting  Genito-Urinary ROS: no dysuria, trouble voiding, or hematuria  Musculoskeletal ROS: +weakness, negative for gait disturbance   Neurological ROS: no syncope or seizures; no ataxia  Dermatological ROS: negative for pruritis, rash and jaundice    Physical Examination  /75   Pulse 77   Temp 98.6 °F (37 °C) (Axillary)   Resp 16   Ht 6' 1" (1.854 m)   Wt 91 kg (200 lb 9.9 oz)   SpO2 100%   BMI 26.47 kg/m²   General appearance: mildly somnolent, cooperative, no distress, having 20-30 second apneic episodes  HENT: +JVD, normocephalic, atraumatic, neck symmetrical, no nasal discharge   Eyes: conjunctivae/corneas clear, PERRL, EOM's intact  Lungs: +faint bilateral lower lobe crackles, no dullness to percussion bilaterally  Heart: regular rate and rhythm without rub; no displacement of the PMI   Abdomen: soft, non-tender; bowel sounds normoactive; no organomegaly  Extremities: 1+ pitting edema to ankles, extremities symmetric; no clubbing, cyanosis  Integument: Skin color, texture, turgor normal; no rashes; hair distrubution normal  Neurologic: Alert and oriented X 3, normal strength, no facial droop, no slurred speech  Psychiatric: no pressured speech; normal affect; no evidence of impaired cognition     Labs: personally reviewed  Hgb 10.2 > 8.1 > 6.9, plt 381   > 588 /  > 579  tbili 1.2 > 1.1  Alk phos wnl  Albumin 2.5  Ferritin 5977  INR 1.3  UDS negative, tylenol negative  BHB 1.1, no anion gap, VBG pH 7.4  BNP 3893, troponin 0.342    Imaging: personally reviewed  - RUQ ultrasound with distended galbladder with enlarged CBD at 1cm, duct could not be seen entirely so choledocholithiasis not entirely ruled out, no obvious gallstones, hepatomegaly  - CXR my read pulmonary edema  - CT A/P 1/8/20 no biliary dilation or gallstones  - 2DE 2018 G2DD, EF 50-55%, pHTN    Assessment: "   65yo M with MM with anemia and chronic renal failure, G2DD, pericardial effusion, pHTN, CVA on Plavix, HTN, IDDM2, bleeding internal hemorrhoids, medical noncompliance who presents with acute, intermittent, post-prandial, diffuse abdominal pain with abnormal LFTs, nausea, vomiting, and PO intolerance. RUQ ultrasound with enlarged CBD 1cm, CT A/P last month with normal liver and biliary tree. Also has mild coagulopathy. DDx acute on chronic R sided heart failure with congestive hepatopathy and bowel edema vs worsening pericardial effusion vs viral hepatitis vs choledocholithiasis vs cholecystitis vs acute gastroparesis from hyperglycemia.     Plan:   - agree with 2DE  - recommend aggressive IV diuresis  - agree with MRCP  - added on HSV/CMV PCR  - f/u viral hepatitis panel  - CMP & INR daily  - low sodium diet, daily weights  - CHF education    Louisa Ashley MD, KRYSTLE  [facility], PGY-4  Cell: [phone]  Pager: [phone]

## 2020-02-25 NOTE — PLAN OF CARE
Progress notes reviewed. Rounds completed. Introduced self as VN for this shift to patient's wife. Educated on VN's role in patient's care.  Plan of care reviewed. Opportunity given for  questions. Family hopes patient voids soon.  Safety precautions explained, instructed to call for assistance. Patient verbalizes understanding.  VN to continue to monitor.

## 2020-02-25 NOTE — ED NOTES
Pt aaox3, talking with family at bedside   Admit MD at bedside speaking to pt and family, blood consent sigeneed

## 2020-02-26 PROBLEM — I50.43 ACUTE ON CHRONIC COMBINED SYSTOLIC AND DIASTOLIC CONGESTIVE HEART FAILURE: Status: ACTIVE | Noted: 2018-01-11

## 2020-02-26 PROBLEM — D64.9 SYMPTOMATIC ANEMIA: Status: ACTIVE | Noted: 2019-01-01

## 2020-02-26 PROBLEM — I50.32 CHRONIC DIASTOLIC CONGESTIVE HEART FAILURE: Status: ACTIVE | Noted: 2017-11-13

## 2020-02-26 PROBLEM — R53.1 GENERALIZED WEAKNESS: Status: RESOLVED | Noted: 2020-01-01 | Resolved: 2020-01-01

## 2020-02-26 PROBLEM — D64.9 FATIGUE ASSOCIATED WITH ANEMIA: Status: RESOLVED | Noted: 2020-01-01 | Resolved: 2020-01-01

## 2020-02-26 NOTE — PLAN OF CARE
Afebrile during the night. VSS. Remained on the CPAP all night. Sats 100%. Awake, alert, oriented. More awake and alert than earlier in the shift. Complaints of nausea - PRN given. Urine output once 500cc. NPO after midnight. Safety precautions in place.

## 2020-02-26 NOTE — PROGRESS NOTES
[facility]    CC: abdominal pain     HPI 64 y.o. male with MM with anemia and chronic renal failure, G2DD, pHTN, pericardial effusion, CVA on Plavix, HTN, IDDM2, bleeding internal hemorrhoids, medical noncompliance who presents with acute, intermittent, post-prandial, diffuse abdominal pain with abnormal LFTs. Endorses weakness, fatigue, poor PO intake, and persistent nausea with nonbloody vomiting for the last 5-6 days so presented to ED. He has also been without insulin for 5 days. No NSAID, tylenol, supplement, EtOH, or drug use. No recent travel or sick contacts. He has a separate complaint of intermittent BRBPR from known internal hemorrhoids seen on colonoscopy in Jan. He doesn't weigh himself or follow a low sodium diet. Unsure if he takes lasix regularly. Family member at bedside reports he has been dyspneic in the last few days.     On admission, he was found to have acute on chronic renal failure, worsening anemia, BG 400s with BHB 1.1, BNP 3000s, elevated troponin and new uptrending abnormal LFTs (AST, ALT, tbili but normal alk phos) with slightly elevated INR. CPK wnl. RUQ ultrasound showed distended galbladder with enlarged CBD at 1cm, duct could not be seen entirely so choledocholithiasis not entirely ruled out.    Interval History:  He and his wife note that he is doing better. He denies abdominal pain, nausea, or vomiting.     Past Medical History MM with anemia and chronic renal failure, G2DD, pHTN, pericardial effusion, HTN, IDDM2, CVA on Plavix, bleeding internal hemorrhoids, medical noncompliance     Review of Systems  General ROS: negative for - chills, fever or weight loss Positive for fatigue  Cardiovascular ROS: no chest pain or dyspnea on exertion  Gastrointestinal ROS: Positive for abdominal pain, nausea, no vomiting, change in bowel habits, or black/ bloody stools  Respiratory ROS: Positive for shortness of breath, no wheezing    Physical Examination  /73 (BP Location:  "Right arm, Patient Position: Lying)   Pulse 79   Temp 97.5 °F (36.4 °C) (Tympanic)   Resp 19   Ht 6' 1" (1.854 m)   Wt 89.2 kg (196 lb 10.4 oz)   SpO2 100%   BMI 25.94 kg/m²   General appearance: alert, cooperative, no distress  HENT: Normocephalic, atraumatic, neck symmetrical, no nasal discharge   Lungs: clear to auscultation in all fields, symmetric chest wall expansion bilaterally, no wheeze, rale, or rhonchi  Heart: normal rate, regular rhythm without rub; palpable peripheral pulses  Abdomen: soft, non-tender; bowel sounds normoactive; no organomegaly  Extremities: extremities symmetric; pitting edema noted bilaterally  Neurologic: Alert and oriented X 3, normal strength, normal coordination    Labs:  BNP 3893    TB 1.5  AP normal        Imaging:  CXR with pulm edema    Assessment:   63yo M with MM with anemia and chronic renal failure, G2DD, pericardial effusion, pHTN, CVA on Plavix, HTN, IDDM2, bleeding internal hemorrhoids, medical noncompliance who presents with acute, intermittent, post-prandial, diffuse abdominal pain with abnormal LFTs, nausea, vomiting, and PO intolerance. RUQ ultrasound with enlarged CBD 1cm, previously normal.    MRCP without evidence of biliary obstruction. Abnormal LFTs likely due to congestive hepatopathy. INR elevated but stable.    Plan:  - viral hepatitis labs  - CMP and INR daily if he stays    Monserrat Cox MD MPH  [facility] PGY 5  [phone] cell  [phone] pager      "

## 2020-02-26 NOTE — PLAN OF CARE
[place] met with pt and his wife Naheed Hernandez    25 yo son lives with pt as well   pt has a st. cane --- orders sent to N - awaiting HH assignment    pt already has f/u apts scheduled --- pcp office to call pt with apt    5340 -- wife/pt informed - pt set up with [facility] per N     Future Appointments   Date Time Provider Department Center   3/4/2020  8:30 AM LAB, HEMONC CANCER BLDG [facility] LAB HO [place]   3/4/2020  9:30 AM Carol Cantrell MD [facility] HC BMT [place]   3/4/2020 10:30 AM INJECTION, [facility] INFUSION [facility]MO [place]   3/9/2020  9:15 AM LAB, [facility] LAB [place]   3/9/2020  9:30 AM LAB, [facility] LAB [place]   3/12/2020  8:40 AM SILVIA Al MD [facility] OPHTHAL [place]   3/16/2020 11:00 AM Vance Heart RN [facility] BOAZ MAN [place]   3/19/2020 11:00 AM Gifty Massey APRN, FNP [facility] IM [place] [facility]   5/19/2020  8:00 AM LABKVNG [facility] LAB [place]   5/22/2020  9:40 AM Nancy Colón MD [facility] IM [place]     pt has transportation to home.         02/26/20 1507   Discharge Assessment   Assessment Type Discharge Planning Assessment   Confirmed/corrected address and phone number on facesheet? Yes   Expected Length of Stay (days) 2   Communicated expected length of stay with patient/caregiver yes   Prior to hospitilization cognitive status: Alert/Oriented   Prior to hospitalization functional status: Independent   Current cognitive status: Alert/Oriented   Current Functional Status: Independent   Lives With spouse;child(zuhair), adult   Able to Return to Prior Arrangements yes   Is patient able to care for self after discharge? Yes   Who are your caregiver(s) and their phone number(s)?   (wife Naheed  )   Patient's perception of discharge disposition home or selfcare   Readmission Within the Last 30 Days no previous admission in last 30 days   Patient currently being followed by outpatient case management? No   Patient currently receives any other outside agency  services? No   Equipment Currently Used at Home cane, straight   Do you have any problems affording any of your prescribed medications? No   Is the patient taking medications as prescribed? yes  ([facility] )   Does the patient have transportation home? Yes   Transportation Anticipated family or friend will provide   Does the patient receive services at the [facility]? No   Discharge Plan A Home;Home with family;Home Health   DME Needed Upon Discharge  none   Patient/Family in Agreement with Plan yes

## 2020-02-26 NOTE — ASSESSMENT & PLAN NOTE
-getting 2 units packed red blood cells today for symptomatic anemia  -anemia is not secondary to myeloma  -it is likely a side effect of Velcade and renal dysfunction

## 2020-02-26 NOTE — CONSULTS
[facility]  Hematology/Oncology  Consult Note    Patient Name: Marie Reis Jr.  MRN: [id number]  Admission Date: 2/24/2020  Hospital Length of Stay: 0 days  Code Status: Prior   Attending Provider: Laurence Zuniga MD  Consulting Provider: Miguel Angel Chiang MD  Primary Care Physician: Nancy Colón MD  Principal Problem:Generalized weakness    Consults  Subjective:     HPI:  64-year-old male admitted with severe fatigue, decreased p.o. intake and abdominal discomfort - found to have hemoglobin 6.9, blood sugar 400, creatinine 4.1.    Undergoing blood transfusion, 2 units, for symptomatic anemia.    He has multiple myeloma, sees Dr. Cantrell ([facility]).  He has been on treatment with Velcade/dexamethasone weekly since October 2019.      2/5/2020 - [place] free light chain 16.81. Serum IgG level 636.  SPEP showed paraprotein level of 0.4 gram/deciliter.      Oncology Treatment Plan:   OP BORTEZOMIB BACKBONE DAYS 1, 8, 15, 22 (28-DAY CYCLE)    Medications:  Continuous Infusions:  Scheduled Meds:   amLODIPine  10 mg Oral Daily    calcitRIOL  0.5 mcg Oral Daily    carvediloL  25 mg Oral BID WM    clopidogreL  75 mg Oral Daily    insulin aspart U-100  5 Units Subcutaneous TIDWM    insulin detemir U-100  20 Units Subcutaneous QHS    montelukast  10 mg Oral QHS    pantoprazole  40 mg Oral Daily     PRN Meds:sodium chloride, dextrose 50 % in water (D50W), dextrose 50 % in water (D50W), glucose, glucose, insulin aspart U-100, nitroGLYCERIN, ondansetron, sodium chloride 0.9%     Review of patient's allergies indicates:   Allergen Reactions    Hydralazine analogues      Increase swelling and throat itching and tightness with use.  Symptoms correlate only with this medication onset in hospital.        Past Medical History:   Diagnosis Date    Binocular vision disorder with diplopia 9/22/2016    Bradycardia     Cataract     Cervical spondylosis 10/1/2015    Chronic diastolic congestive heart failure      Chronic diastolic heart failure 1/11/2018    Coronary artery disease due to calcified coronary lesion 3/19/2018    Dyslipidemia 6/26/2015    Encounter for blood transfusion     Hearing impairment 10/25/2013    History of stroke 3/23/2015    Hypertension associated with diabetes 1/11/2018    Hypertensive retinopathy of both eyes 9/26/2017    Lumbar spondylosis 10/1/2015    Multiple myeloma not having achieved remission 1/16/2018    Persistent proteinuria 1/11/2018    Spondylolisthesis of lumbar region 10/1/2015    Strabismus     Stroke 2014    Thoracic spondylosis 10/1/2015    Type 2 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, with long-term current use of insulin 9/26/2017    Type 2 diabetes mellitus with diabetic polyneuropathy, with long-term current use of insulin 9/28/2015    Type 2 diabetes mellitus with stage 4 chronic kidney disease, with long-term current use of insulin 1/11/2018     Past Surgical History:   Procedure Laterality Date    CATARACT EXTRACTION W/  INTRAOCULAR LENS IMPLANT Left 4/30/15    [facility]    COLONOSCOPY N/A 1/20/2020    Procedure: COLONOSCOPY;  Surgeon: Salvador Espinosa MD;  [facility];  Service: Endoscopy;  Laterality: N/A;    EYE SURGERY      cataract removal left eye    HAND SURGERY  2/2012    DR. OLIVIA MOLINA    left hand fracture sx      LT EYE   5/2/15    DR KULLMAN [facility]     Family History     Problem Relation (Age of Onset)    Cancer Mother    Diabetes Mother, Father, Sister, Brother    Heart attack Father    Kidney disease Mother    No Known Problems Maternal Aunt, Maternal Uncle, Paternal Aunt, Paternal Uncle, Maternal Grandmother, Maternal Grandfather, Paternal Grandmother, Paternal Grandfather        Tobacco Use    Smoking status: Never Smoker    Smokeless tobacco: Never Used   Substance and Sexual Activity    Alcohol use: No    Drug use: No    Sexual activity: Never       Review of  Systems   Constitutional: Positive for activity change, appetite change and fatigue. Negative for fever and unexpected weight change.   HENT: Negative for mouth sores and nosebleeds.    Eyes: Negative for photophobia and pain.   Respiratory: Negative for shortness of breath and wheezing.    Cardiovascular: Negative for chest pain and palpitations.   Gastrointestinal: Negative for abdominal pain and vomiting.   Genitourinary: Negative for flank pain and hematuria.   Musculoskeletal: Positive for arthralgias. Negative for back pain, neck pain and neck stiffness.   Skin: Negative for rash and wound.   Neurological: Positive for weakness. Negative for seizures and syncope.   Hematological: Negative for adenopathy. Does not bruise/bleed easily.   Psychiatric/Behavioral: Negative for behavioral problems and confusion.   All other systems reviewed and are negative.    Objective:     Vital Signs (Most Recent):  Temp: 97.8 °F (36.6 °C) (02/25/20 1714)  Pulse: 80 (02/25/20 1714)  Resp: 18 (02/25/20 1714)  BP: (!) 152/84 (02/25/20 1714)  SpO2: (!) 94 % (02/25/20 1536) Vital Signs (24h Range):  Temp:  [97.5 °F (36.4 °C)-98.6 °F (37 °C)] 97.8 °F (36.6 °C)  Pulse:  [72-95] 80  Resp:  [16-78] 18  SpO2:  [94 %-100 %] 94 %  BP: (116-152)/(63-84) 152/84     Weight: 91 kg (200 lb 9.9 oz)  Body mass index is 26.47 kg/m².  Body surface area is 2.16 meters squared.      Intake/Output Summary (Last 24 hours) at 2/25/2020 1944  Last data filed at 2/25/2020 1345  Gross per 24 hour   Intake 1730.42 ml   Output 0 ml   Net 1730.42 ml       Physical Exam   Constitutional: He is oriented to person, place, and time.  Non-toxic appearance. He does not have a sickly appearance. No distress.   HENT:   Nose: No epistaxis.   Mouth/Throat: Oropharynx is clear and moist. Mucous membranes are not pale, not dry and not cyanotic. No lacerations. No oropharyngeal exudate.   Eyes: Conjunctivae are normal. No scleral icterus.   Neck: Neck supple. No thyroid  mass and no thyromegaly present.   Cardiovascular: Normal rate, regular rhythm, S1 normal and normal heart sounds.   Pulmonary/Chest: Effort normal and breath sounds normal. No accessory muscle usage or stridor. No respiratory distress. He has no wheezes. He has no rales.   Abdominal: Soft. He exhibits no ascites and no mass. There is no hepatosplenomegaly. There is no tenderness.   Musculoskeletal: He exhibits no edema.   Lymphadenopathy:        Head (right side): No submental and no submandibular adenopathy present.        Head (left side): No submental and no submandibular adenopathy present.     He has no cervical adenopathy.     He has no axillary adenopathy.   Neurological: He is alert and oriented to person, place, and time. He has normal strength. No cranial nerve deficit or sensory deficit. Gait normal.   Skin: No bruising, no petechiae and no rash noted. He is not diaphoretic. No cyanosis.   Psychiatric: He has a normal mood and affect. Judgment and thought content normal. Cognition and memory are normal.   Vitals reviewed.      Significant Labs:   All pertinent labs from the last 24 hours have been reviewed.    Diagnostic Results:  I have reviewed all pertinent imaging results/findings within the past 24 hours.    Assessment/Plan:     Transaminitis  -noted  -workup in progress    Symptomatic Anemia  -getting 2 units packed red blood cells today for symptomatic anemia  -anemia is not secondary to myeloma  -it is likely a side effect of Velcade and renal dysfunction    Multiple myeloma not having achieved remission  -on treatment with Velcade/dexamethasone since October 2019  -myeloma parameters have improved since initiation of treatment  -he is doing well from a myeloma standpoint, however given symptomatic anemia, will hold myeloma therapy for now    Miguel Angel Chiang MD  Hematology/Oncology  [facility]

## 2020-02-26 NOTE — ASSESSMENT & PLAN NOTE
-on treatment with Velcade/dexamethasone since October 2019  -myeloma parameters are improving since initiation of treatment  -he is doing well from a myeloma standpoint  -given symptomatic anemia, hold myeloma therapy for now  -follow-up with Dr. Cantrell after hospital discharge

## 2020-02-26 NOTE — NURSING
IV and heart monitor DCd prior to discharge.  Intermittent complaints of pain throughout shift to the RLE.  MD called by nurse x2.  Tylenol ordered and administered prior to DC.

## 2020-02-26 NOTE — NURSING
"GI MD making rounds. Wife at bedside. POC discussed. Pt complaining of abdominal pain diffused around abdomen. Mental status varies, withdrawn. Wife states "hasn't been acting like this". CPAP mask ordered and RT notified. NPO. PRN Zofran given for nausea.   "

## 2020-02-26 NOTE — SUBJECTIVE & OBJECTIVE
Oncology Treatment Plan:   OP BORTEZOMIB BACKBONE DAYS 1, 8, 15, 22 (28-DAY CYCLE)    Medications:  Continuous Infusions:  Scheduled Meds:   amLODIPine  10 mg Oral Daily    calcitRIOL  0.5 mcg Oral Daily    carvediloL  25 mg Oral BID WM    clopidogreL  75 mg Oral Daily    insulin aspart U-100  5 Units Subcutaneous TIDWM    insulin detemir U-100  20 Units Subcutaneous QHS    montelukast  10 mg Oral QHS    pantoprazole  40 mg Oral Daily     PRN Meds:sodium chloride, dextrose 50 % in water (D50W), dextrose 50 % in water (D50W), glucose, glucose, insulin aspart U-100, nitroGLYCERIN, ondansetron, sodium chloride 0.9%     Review of patient's allergies indicates:   Allergen Reactions    Hydralazine analogues      Increase swelling and throat itching and tightness with use.  Symptoms correlate only with this medication onset in hospital.        Past Medical History:   Diagnosis Date    Binocular vision disorder with diplopia 9/22/2016    Bradycardia     Cataract     Cervical spondylosis 10/1/2015    Chronic diastolic congestive heart failure     Chronic diastolic heart failure 1/11/2018    Coronary artery disease due to calcified coronary lesion 3/19/2018    Dyslipidemia 6/26/2015    Encounter for blood transfusion     Hearing impairment 10/25/2013    History of stroke 3/23/2015    Hypertension associated with diabetes 1/11/2018    Hypertensive retinopathy of both eyes 9/26/2017    Lumbar spondylosis 10/1/2015    Multiple myeloma not having achieved remission 1/16/2018    Persistent proteinuria 1/11/2018    Spondylolisthesis of lumbar region 10/1/2015    Strabismus     Stroke 2014    Thoracic spondylosis 10/1/2015    Type 2 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, with long-term current use of insulin 9/26/2017    Type 2 diabetes mellitus with diabetic polyneuropathy, with long-term current use of insulin 9/28/2015    Type 2 diabetes mellitus  with stage 4 chronic kidney disease, with long-term current use of insulin 1/11/2018     Past Surgical History:   Procedure Laterality Date    CATARACT EXTRACTION W/  INTRAOCULAR LENS IMPLANT Left 4/30/15    Diane    COLONOSCOPY N/A 1/20/2020    Procedure: COLONOSCOPY;  Surgeon: Salvador Espinosa MD;  Location[facility];  Service: Endoscopy;  Laterality: N/A;    EYE SURGERY      cataract removal left eye    HAND SURGERY  2/2012    DR. BAKER [facility]    left hand fracture sx      LT EYE   5/2/15    DR KULLMAN [facility]     Family History     Problem Relation (Age of Onset)    Cancer Mother    Diabetes Mother, Father, Sister, Brother    Heart attack Father    Kidney disease Mother    No Known Problems Maternal Aunt, Maternal Uncle, Paternal Aunt, Paternal Uncle, Maternal Grandmother, Maternal Grandfather, Paternal Grandmother, Paternal Grandfather        Tobacco Use    Smoking status: Never Smoker    Smokeless tobacco: Never Used   Substance and Sexual Activity    Alcohol use: No    Drug use: No    Sexual activity: Never       Review of Systems   Constitutional: Positive for activity change, appetite change and fatigue. Negative for fever and unexpected weight change.   HENT: Negative for mouth sores and nosebleeds.    Eyes: Negative for photophobia and pain.   Respiratory: Negative for shortness of breath and wheezing.    Cardiovascular: Negative for chest pain and palpitations.   Gastrointestinal: Negative for abdominal pain and vomiting.   Genitourinary: Negative for flank pain and hematuria.   Musculoskeletal: Positive for arthralgias. Negative for back pain, neck pain and neck stiffness.   Skin: Negative for rash and wound.   Neurological: Positive for weakness. Negative for seizures and syncope.   Hematological: Negative for adenopathy. Does not bruise/bleed easily.   Psychiatric/Behavioral: Negative for behavioral problems and confusion.   All other systems reviewed and are negative.    Objective:      Vital Signs (Most Recent):  Temp: 97.8 °F (36.6 °C) (02/25/20 1714)  Pulse: 80 (02/25/20 1714)  Resp: 18 (02/25/20 1714)  BP: (!) 152/84 (02/25/20 1714)  SpO2: (!) 94 % (02/25/20 1536) Vital Signs (24h Range):  Temp:  [97.5 °F (36.4 °C)-98.6 °F (37 °C)] 97.8 °F (36.6 °C)  Pulse:  [72-95] 80  Resp:  [16-78] 18  SpO2:  [94 %-100 %] 94 %  BP: (116-152)/(63-84) 152/84     Weight: 91 kg (200 lb 9.9 oz)  Body mass index is 26.47 kg/m².  Body surface area is 2.16 meters squared.      Intake/Output Summary (Last 24 hours) at 2/25/2020 1944  Last data filed at 2/25/2020 1345  Gross per 24 hour   Intake 1730.42 ml   Output 0 ml   Net 1730.42 ml       Physical Exam   Constitutional: He is oriented to person, place, and time.  Non-toxic appearance. He does not have a sickly appearance. No distress.   HENT:   Nose: No epistaxis.   Mouth/Throat: Oropharynx is clear and moist. Mucous membranes are not pale, not dry and not cyanotic. No lacerations. No oropharyngeal exudate.   Eyes: Conjunctivae are normal. No scleral icterus.   Neck: Neck supple. No thyroid mass and no thyromegaly present.   Cardiovascular: Normal rate, regular rhythm, S1 normal and normal heart sounds.   Pulmonary/Chest: Effort normal and breath sounds normal. No accessory muscle usage or stridor. No respiratory distress. He has no wheezes. He has no rales.   Abdominal: Soft. He exhibits no ascites and no mass. There is no hepatosplenomegaly. There is no tenderness.   Musculoskeletal: He exhibits no edema.   Lymphadenopathy:        Head (right side): No submental and no submandibular adenopathy present.        Head (left side): No submental and no submandibular adenopathy present.     He has no cervical adenopathy.     He has no axillary adenopathy.   Neurological: He is alert and oriented to person, place, and time. He has normal strength. No cranial nerve deficit or sensory deficit. Gait normal.   Skin: No bruising, no petechiae and no rash noted. He is not  diaphoretic. No cyanosis.   Psychiatric: He has a normal mood and affect. Judgment and thought content normal. Cognition and memory are normal.   Vitals reviewed.      Significant Labs:   All pertinent labs from the last 24 hours have been reviewed.    Diagnostic Results:  I have reviewed all pertinent imaging results/findings within the past 24 hours.

## 2020-02-26 NOTE — PROGRESS NOTES
[facility] IM Resident MIGUELI  Progress Note    Subjective:      Patient alert and oriented x 3 on interview. S/p 2 unit RBC transfusion yesterday w/o complication yesterday.  Patient conversational and answers questions appropriately. On 2L NC w/o subjective dyspnea. Endorses left lower quadrant abdominal pain. Wife is at bedside. Nods his head no to chest pain and  Lightheadedness.     Objective:   Last 24 Hour Vital Signs:  BP  Min: 132/72  Max: 152/84  Temp  Av °F (36.7 °C)  Min: 97.5 °F (36.4 °C)  Max: 98.6 °F (37 °C)  Pulse  Av.9  Min: 73  Max: 81  Resp  Av.4  Min: 16  Max: 25  SpO2  Av.6 %  Min: 94 %  Max: 100 %  Weight  Av.2 kg (196 lb 10.4 oz)  Min: 89.2 kg (196 lb 10.4 oz)  Max: 89.2 kg (196 lb 10.4 oz)  I/O last 3 completed shifts:  In: 1730.4 [P.O.:490; I.V.:950; Blood:290.4]  Out: 1100 [Urine:1100]    Physical Examination:  General:          Alert and awake in NAD  Head:               Normocephalic and atraumatic  Eyes:               PERRL; EOMi with anicteric sclera and clear conjunctivae  Mouth:             Oropharynx clear and without exudate; moist mucous membranes  Cardio:             Regular rate and rhythm with normal S1 and S2; no murmurs or rubs  Resp:               CTAB and unlabored; no wheezes, crackles or rhonchi  Abdom:            Distended. TTP LLQ. Soft, with normoactive bowel sounds  Extrem:            1+BLLE WWP with no clubbing, cyanosis or   Skin:                No rashes, lesions, or color changes  Pulses:            2+ and symmetric distally  Neuro:             AAOx3; cooperative and pleasant with no focal deficits    Laboratory:  Laboratory Data Reviewed: yes  Pertinent Findings:  Most Recent Data:  CBC:   Recent Labs   Lab 20  0706 20  1911 20  0943   WBC 11.62 19.64* 15.39*   HGB 6.9* 9.2* 9.5*   HCT 21.4* 28.9* 29.4*   * 387* 362*   MCV 87 88 88   RDW 12.9 13.7 14.0     BMP:   Recent Labs   Lab 20  1405 20  0706  02/26/20  0943    140 143   K 4.1 4.1 3.9    112* 114*   CO2 18* 18* 19*   BUN 66* 64* 63*   CREATININE 4.1* 4.0* 4.2*   * 279* 123*   CALCIUM 9.2 8.3* 8.7     LFTs:   Recent Labs   Lab 02/24/20  1405 02/25/20  0706 02/26/20  0943   PROT 6.3 5.3* 5.5*   ALBUMIN 3.0* 2.5* 2.6*   BILITOT 1.2* 1.1* 1.5*   * 588* 273*   ALKPHOS 91 108 114   * 579* 496*     Coags:   Lab Results   Component Value Date    INR 1.3 (H) 02/26/2020     FLP:   Lab Results   Component Value Date    CHOL 157 02/18/2020    HDL 30 (L) 02/18/2020    LDLCALC 87.2 02/18/2020    TRIG 199 (H) 02/18/2020    CHOLHDL 19.1 (L) 02/18/2020     DM:   Lab Results   Component Value Date    HGBA1C 10.5 (H) 02/12/2020    HGBA1C 9.2 (H) 12/10/2019    HGBA1C 8.3 (H) 11/12/2019    LDLCALC 87.2 02/18/2020    CREATININE 4.2 (H) 02/26/2020     Thyroid:   Lab Results   Component Value Date    TSH 1.509 02/12/2020     Anemia:   Lab Results   Component Value Date    IRON 52 02/24/2020    TIBC 185 (L) 02/24/2020    FERRITIN 5,977 (H) 02/24/2020    QMTDMQMT73 639 02/24/2020    FOLATE 12.8 02/24/2020     Cardiac:   Recent Labs   Lab 02/24/20  1405  02/25/20  0011 02/25/20  0706 02/26/20  0943   TROPONINI 0.374*   < > 0.295* 0.342* 0.277*   BNP 3,893*  --   --   --   --     < > = values in this interval not displayed.     Urinalysis:   Lab Results   Component Value Date    LABURIN No significant growth 02/21/2020    COLORU Yellow 02/24/2020    SPECGRAV >=1.030 (A) 02/24/2020    NITRITE Negative 02/24/2020    KETONESU Negative 02/24/2020    UROBILINOGEN Negative 02/24/2020    WBCUA 1 02/24/2020       Microbiology Data Reviewed: yes  Pertinent Findings:  Urine culture with no growth x 5 days     Other Results:  EKG (my interpretation): NSR. LVH     Radiology Data Reviewed: yes  Pertinent Findings:  MRCP in process     Current Medications:     Infusions:       Scheduled:   amLODIPine  10 mg Oral Daily    calcitRIOL  0.5 mcg Oral Daily     carvediloL  25 mg Oral BID WM    clopidogreL  75 mg Oral Daily    insulin aspart U-100  5 Units Subcutaneous TIDWM    insulin detemir U-100  20 Units Subcutaneous QHS    montelukast  10 mg Oral QHS    pantoprazole  40 mg Oral Daily        PRN:  sodium chloride, dextrose 50 % in water (D50W), dextrose 50 % in water (D50W), glucose, glucose, insulin aspart U-100, nitroGLYCERIN, ondansetron, sodium chloride 0.9%, sodium chloride 0.9%    Antibiotics and Day Number of Therapy:  n/a    Lines and Day Number of Therapy:  n/a    Assessment:     Marie Reis Jr. is a 64 y.o.male with  Patient Active Problem List    Diagnosis Date Noted    Constipation     Liver function test abnormality     Troponin I above reference range     Generalized weakness 02/24/2020    Fatigue associated with anemia 02/24/2020    Transaminitis 02/24/2020    Bilirubinemia 02/24/2020    Moderate episode of recurrent major depressive disorder 02/21/2020    Hemiplegia and hemiparesis following cerebral infarction affecting left non-dominant side 02/21/2020    History of CVA (cerebrovascular accident) 02/17/2020    Positive FIT (fecal immunochemical test) 01/20/2020    Thrombocytopenia 12/11/2019    Dental caries 10/16/2019    Anemia due to multiple mechanisms 09/09/2019    PAD (peripheral artery disease) 08/28/2019    Diabetic polyneuropathy associated with type 2 diabetes mellitus 01/15/2019    Stage 4 chronic kidney disease 01/15/2019    Aortic atherosclerosis 01/15/2019    Chronic pulmonary heart disease 01/15/2019    Vitreous hemorrhage, right 10/05/2018    Diabetes mellitus with proteinuria 08/21/2018    Vitamin D deficiency 05/16/2018    Type 2 diabetes mellitus with hyperglycemia, with long-term current use of insulin 04/17/2018    Secondary hyperparathyroidism 04/14/2018    Hypertensive CKD (chronic kidney disease) 04/14/2018    Acute kidney injury superimposed on CKD 04/11/2018    Bradycardia     Coronary artery  disease due to calcified coronary lesion 03/19/2018    Bilateral carotid artery stenosis 03/19/2018    Multiple myeloma not having achieved remission 01/16/2018    Type 2 diabetes mellitus with stage 4 chronic kidney disease, with long-term current use of insulin 01/11/2018    Benign hypertension with chronic kidney disease, stage III 01/11/2018    Persistent proteinuria 01/11/2018    Chronic diastolic congestive heart failure 01/11/2018    Hypertensive retinopathy of both eyes 09/26/2017    Hypertropia of left eye 09/22/2016    Binocular vision disorder with diplopia 09/22/2016    Cervical spondylosis 10/01/2015    Thoracic spondylosis 10/01/2015    Lumbar spondylosis 10/01/2015    Spondylolisthesis of lumbar region 10/01/2015    Type 2 diabetes mellitus with diabetic polyneuropathy, with long-term current use of insulin 09/28/2015    Dyslipidemia 06/26/2015    History of stroke 03/23/2015    Essential hypertension 01/12/2014    Hearing impairment 10/25/2013        Plan:     Symptomatic Anemia requiring blood transfusions   -6 days of generalized weakness  -H/H 8.1/24.9 on presentation   -H/H in 2018 10.3/30.1  -H dropped to 6.9 2/25- s/p 2 unit RBC transfusion with post H/H at goal of >7  -previous transfusion requirements on previous admissions  -FOBT negative  -f/u CBC this AM     JAZLYN on CKDIV  -Cr 4.1 on presentation. Cr noted 3.1 1/2020  -likely in setting of hypovolemia in setting of reduced PO intake   -fluid resuscitated   -urine studies suggest pre-renal etiology   -will trend renal function labs  -Cr 4.2 today      Transaminitis and hyperbilirubinemia with CBD dilation   -AST//296  -abnormal ultrasound with CBD dilation   -tbili 1.2   -likely in setting of hypovolemia in setting of decreased PO intake   -GI consulted: appreciate recs and will follow up on HSv/CMV labs  -acetaminophen/saclicylate not elevated   -hepatitis panel in process   -MRI MRCP no evidence of biliary  obstruction or of gallstones.           Insulin dependent type 2 diabetes mellitus with complications   -POCT glucose 367  -beta hydroxy 1.1  -insulin detemir 20 subcutaneous qPM  -insulin aspart prandial, PRN  -diabetes educator evaluation pending       Chronic Diastolic Heart Failure   -BNP 3,893  -echo flow with doppler pending         HTN  -Norvasc 10,      Hx of CVA  -continue ASA, statin      HLD  -continue ASA, statin      Multiple Myeloma   -follows with DR. Cantrell  -last seen in clinic 1/8/2020  -weekly velcade and decadron  Since 2019  -[facility] onc consulted: plans to hold treatment in setting of symptomatic anemia      PAD  -continue asa, statin            DISPO: pending symptomatic improvement: pending renal recovery: pending Gi workup with MRCP         Ayo You  [facility] Internal Medicine HO-I   U IM Service Team A       [facility] Medicine Hospitalist Pager numbers:   [facility] Hospitalist Medicine Team A (Anson/Nadia): [phone]  [facility] Hospitalist Medicine Team B (Shashank/Aniket):  [phone]

## 2020-02-26 NOTE — PLAN OF CARE
Problem: Adult Inpatient Plan of Care  Goal: Plan of Care Review  Outcome: Ongoing, Progressing   Pt AAOX4. Safety precautions maintained. Bed low and locked, call light within reach. Medications administered per MAR. Pt tolerating diet well. DVT prophylaxis continued. Hourly rounding performed. Encouraged to call for OOB assistance. Pt received 2 units PRBC, tolerated well. Somnolent throughout day, but improved following transfusion. Awaiting pt disposition. Pt updated on plan of care and verbalizes understanding.

## 2020-02-26 NOTE — PLAN OF CARE
VN reviewed discharge instructions with pt. and wife.   AVS printed and handed to pt by bedside nurse.  Reviewed follow-up appointments, medications, diet, and importance of medication compliance.  Reviewed home care instructions, treatment plan, self-management, and when to seek medical attention.  Allowed time for questions.  All questions answered.  Patient and wife verbalized complete understanding of discharge instructions and voices no concerns.     Discharge instructions complete.  Transport/wheelchair requested.  Bedside nurse notified.

## 2020-02-26 NOTE — HPI
64-year-old male admitted with severe fatigue, decreased p.o. intake and abdominal discomfort - found to have hemoglobin 6.9, blood sugar 400, creatinine 4.1.    Undergoing blood transfusion, 2 units, for symptomatic anemia.    He has multiple myeloma, sees Dr. Cantrell ([facility]).  He has been on treatment with Velcade/dexamethasone weekly since October 2019.      2/5/2020 - [place] free light chain 16.81. Serum IgG level 636.  SPEP showed paraprotein level of 0.4 gram/deciliter.
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
## 2020-02-27 NOTE — TELEPHONE ENCOUNTER
----- Message from BELEM Gardiner, EMILIEP sent at 2/27/2020  9:55 AM CST -----  Call and check on pt  See if he is taking his insulin  Need 3 days worth of readings  Thanks  IB

## 2020-02-28 NOTE — DISCHARGE SUMMARY
[facility] Discharge Summary    Primary Team: [facility] Medicine Team A  Attending Physician: Nadia  Resident: Carlos Manuel   Intern: Avelino    Date of Admit: 2/24/2020  Date of Discharge: 2/26/2020  Discharge to: home  Condition: stable    Discharge Diagnoses     Symptomatic anemia requiring blood transfusion     Consultants and Procedures     Consultants:  GI  Hematology and Oncology     Procedures:   n/a    Brief History of Present Illness       The patient was in his usual state of health until about six days prior to presentation when he developed acute onset fatigue which caused the patient to not receive treatment for Multiple Myeloma. The patient says the fatigue was persistent and developed left lower quadrant abdominal pain a few days later resulting in decreased PO intake due to a lack of appetite. The patient says he has been unable to keep anything down and has been having persistent non-bilious, non-bloody vomiting. The patient has not taken his insulin in five days prior to presentation. The patient;s wife says that the patient has been ambulating with an unsteady gait but says the patient has not fallen. In the ED the patient says he had a bowel movement and notes maroon colored streaking outlining his stool. The patient denies any previous episodes of bloody bowel movements. The patient denies chest pain, lightheadedness, and dizziness.        Hospital Course By Problem with Pertinent Findings     Symptomatic Anemia requiring blood transfusions   -6 days of generalized weakness  -H/H 8.1/24.9 on presentation   -H/H in 2018 10.3/30.1  -H dropped to 6.9 2/25- s/p 2 unit RBC transfusion with post H/H at goal of >7  -previous transfusion requirements on previous admissions  -FOBT negative  -no active bleeding while in house  -H/H stable at discharge       JAZLYN on CKDIV  -Cr 4.1 on presentation. Cr noted 3.1 1/2020  -likely in setting of hypovolemia in setting of reduced PO intake   -fluid resuscitated   -urine  studies suggest pre-renal etiology   -trended renal labs while in house   -avoided nephrotoxic medications in house and to avoid on discharge  -continue follow up with outpatient nephrology      Transaminitis and hyperbilirubinemia with CBD dilation   -AST//[id number]  -abnormal ultrasound with CBD dilation   -tbili 1.2   -likely in setting of hypovolemia in setting of decreased PO intake   -GI consulted while in house  -acetaminophen/saclicylate not elevated   -hepatitis panel negative  -ammonia WNL  -MRI MRCP no evidence of biliary obstruction or of gallstones.   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 Cantrell  -last seen in clinic 1/8/2020  -weekly velcade and decadron  Since 2019  -heme onc consulted: plans to hold treatment in setting of symptomatic anemia   -continue follow-up with Heme-Onc outpatient      PAD  -continue asa, statin          Discharge Medications        Medication List      CHANGE how you take these medications    carvediloL 25 MG tablet  Commonly known as:  COREG  Take 1 tablet (25 mg total) by mouth 2 (two) times daily with meals.  What changed:  additional instructions     fluticasone  propionate 50 mcg/actuation nasal spray  Commonly known as:  FLONASE  1 spray by Each Nare route 2 (two) times daily as needed for Rhinitis.  What changed:  when to take this     furosemide 40 MG tablet  Commonly known as:  LASIX  Take 1 tablet (40 mg total) by mouth 2 (two) times daily. Take twice a day now  What changed:    · how much to take  · how to take this  · when to take this  · additional instructions        CONTINUE taking these medications    amLODIPine 10 MG tablet  Commonly known as:  NORVASC  Take 1 tablet (10 mg total) by mouth once daily.     blood sugar diagnostic Strp     calcitRIOL 0.25 MCG Cap  Commonly known as:  ROCALTROL  Take 2 capsules (0.5 mcg total) by mouth once daily.     clopidogreL 75 mg tablet  Commonly known as:  PLAVIX  Take 1 tablet (75 mg total) by mouth once daily.     dexAMETHasone 4 MG Tab  Commonly known as:  DECADRON  Take 5 tablets (20 mg total) by mouth As instructed. Take once weekly with Velcade injection     diclofenac sodium 1.5 % Drop     flash glucose scanning reader Misc  Commonly known as:  FreeStyle Alessandra 14 Day Pearson  Use as directed.     gabapentin 300 MG capsule  Commonly known as:  NEURONTIN  Take 1 capsule (300 mg total) by mouth daily as needed.     * lidocaine 5 %  Commonly known as:  LIDODERM     * lidocaine 5 % Oint ointment  Commonly known as:  XYLOCAINE     Lidotral 3.88 % Crea  Generic drug:  lidocaine HCl     meclizine 25 mg tablet  Commonly known as:  ANTIVERT  Take 1 tablet (25 mg total) by mouth 3 (three) times daily as needed for Dizziness.     methyl salicylate 25 % Crea     montelukast 10 mg tablet  Commonly known as:  SINGULAIR  Take 1 tablet (10 mg total) by mouth every evening.     nitroGLYCERIN 0.4 MG SL tablet  Commonly known as:  NITROSTAT  Place 1 tablet (0.4 mg total) under the tongue every 5 (five) minutes as needed for Chest pain.     omeprazole 20 MG tablet  Commonly known as:  PriLOSEC OTC  Take 1 tablet (20 mg total) by mouth once  "daily.     ondansetron 8 MG Tbdl  Commonly known as:  ZOFRAN-ODT  Take 1 tablet (8 mg total) by mouth every 6 (six) hours as needed.     pen needle, diabetic 31 gauge x 5/16" Ndle  Pt to use pen needle with Lantus daily     vitamin D 1000 units Tab  Commonly known as:  VITAMIN D3         * This list has 2 medication(s) that are the same as other medications prescribed for you. Read the directions carefully, and ask your doctor or other care provider to review them with you.            STOP taking these medications    acyclovir 400 MG tablet  Commonly known as:  Zovirax     atorvastatin 80 MG tablet  Commonly known as:  LIPITOR     cloNIDine 0.1 MG tablet  Commonly known as:  CATAPRES     insulin aspart U-100 100 unit/mL (3 mL) Inpn pen  Commonly known as:  NovoLOG     insulin glargine 100 units/mL (3mL) SubQ pen  Commonly known as:  Lantus Solostar U-100 Insulin     mirtazapine 30 MG tablet  Commonly known as:  REMERON     ondansetron 4 MG tablet  Commonly known as:  ZOFRAN     telmisartan 80 MG Tab  Commonly known as:  MICARDIS           Where to Get Your Medications      Information about where to get these medications is not yet available    Ask your nurse or doctor about these medications  · furosemide 40 MG tablet         Discharge Information:   Diet:  Cardiac     Physical Activity:  As tolerated     Instructions:  1. Take all medications as prescribed  2. Keep all follow-up appointments  3. Return to the hospital or call your primary care physicians if any worsening symptoms such as rectal bleed, lightheadedness, dizziness, and chest pain occurs.    Follow-Up Appointments:  PCP    Ayo You  [facility]      "

## 2020-02-28 NOTE — TELEPHONE ENCOUNTER
Spoke with wife about Mr. Reis---- hypoglycemia, failure to thrive-over the past 3 days, felt better with blood transfusion.  Pcp spoke with wife. If giving up-hospice discussed.  MDI insulin---cut back doses of insulin-sent via [facility].  [place] (freestyle obed) -can be picked up on Monday.

## 2020-03-02 NOTE — TELEPHONE ENCOUNTER
----- Message from Sadaf Romo sent at 3/2/2020  4:27 PM CST -----  Contact: [phone] ext Dale/ Pema with [facility]   Pema with [facility] would like to speak with you in regards to getting orders for the patient to have a rollator. Please send to their insurance company which is [employer]. Please advise.

## 2020-03-04 NOTE — PROGRESS NOTES
CC: Multiple myeloma, on treatment, follow up visit.      HPI: Mr. Reis is a 64-year-old man with multiple myeloma. He was a patient of Dr.Archie Guaman.  In May 2016, his serum protein electrophoresis revealed a paraprotein measuring 0.86 g/dL; this was an IgG kappa.  A 24-hour urine contained 947 mg of protein, 3% of which was kappa light chains.  His bone marrow had 15% plasma cells and a FISH panel disclosed trisomy 1, 7, 9 and 15.  A PET scan in January 2017 revealed no bone lesions.     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 He was followed with periodic examinations and laboratory studies until early 2018 when his anemia worsened.  There had been a gradual increase in the   protein level and there was a substantial increase in the 24-hour urine protein to 4500 mg.  However, only 4.6% of this was kappa light chain and 2.8% was IgG kappa.  Most of the proteinuria was believed to have been secondary to diabetes   mellitus.  The IgG kappa paraprotein as measured in electrophoresis was 1.23 g/dL.     Mainly because of the worsening anemia and concern that it might have been least partially due to  Myeloma,he was recommended a trial of therapy with Revlimid  10 mg daily for 21 days, every 28 days and dexamethasone 12 mg once a week.   There was a long delay in obtaining Revlimid and after he started that he had worsening renal function and was hospitalized for that problem and for hyperglycemia.    He had another hospitalization in March 2018 for encephalopathy, congestive heart failure, hypoglycemia and worsening anemia which required red cell transfusions.       In May  2018,  Revlimid and dexamethasone were stopped due to the above mentioned problems.  There had been substantial improvement in the protein levels with the paraprotein falling to 0.56 g/dL and   the quantitative IgG declining from 1747 to 991.  The kappa/lambda ratio fell from 44 to 6.5.     He was transfused in early July 2018 and his  hemoglobin was 7.7. Since mid September 2018, hemoglobin values were between 9.6 to 11.3.     His other medical problems include a stroke many years ago that left him with weakness of his left arm and leg and he has severe diabetes mellitus with renal, retinal and peripheral nerve complications.  He has cardiac valvular disease, pulmonary hypertension and diastolic dysfunction.  He also has a poor understanding of his medical status, probably related to complications from the stroke.     Interval history: He started Bortezomib and Decadron from 10/16/19. He has finished days 1 and 8 of cycle 5. He was hospitalized for fatigue and anemia in Feb 2020. He missed days 15 and 22 of his chemotherapy.  He had hiccups after each velcade treatment.       Review of Systems   Constitutional: Positive for malaise/fatigue. Negative for chills and fever.   HENT: Negative for ear discharge, nosebleeds and tinnitus.    Eyes: Negative for blurred vision, double vision and photophobia.   Respiratory: Negative for cough, hemoptysis and sputum production.    Cardiovascular: Negative for palpitations, orthopnea, claudication, leg swelling and PND.   Gastrointestinal: Negative for abdominal pain and heartburn.   Genitourinary: Negative for dysuria and frequency.   Musculoskeletal: Negative for back pain, joint pain, myalgias and neck pain.   Neurological: Negative for dizziness, speech change, focal weakness, seizures and loss of consciousness.   Endo/Heme/Allergies: Negative for environmental allergies. Does not bruise/bleed easily.   Psychiatric/Behavioral: Negative for depression, hallucinations, substance abuse and suicidal ideas.       Current Outpatient Medications   Medication Sig    amLODIPine (NORVASC) 10 MG tablet Take 1 tablet (10 mg total) by mouth once daily.    blood sugar diagnostic Strp 1 strip by Misc.(Non-Drug; Combo Route) route after meals as needed.    calcitRIOL (ROCALTROL) 0.25 MCG Cap Take 2 capsules (0.5 mcg  "total) by mouth once daily.    carvediloL (COREG) 25 MG tablet Take 1 tablet (25 mg total) by mouth 2 (two) times daily with meals. (Patient taking differently: Take 25 mg by mouth 2 (two) times daily with meals. Pt taking 1/2 pill bid.)    clopidogreL (PLAVIX) 75 mg tablet Take 1 tablet (75 mg total) by mouth once daily.    dexAMETHasone (DECADRON) 4 MG Tab Take 5 tablets (20 mg total) by mouth As instructed. Take once weekly with Velcade injection    diclofenac sodium 1.5 % Drop APPLY 40 DROPS TO AFFECTED AREA(S) FOUR TIMES DAILY    flash glucose scanning reader (FREESTYLE CLAUDIO 14 DAY READER) [facility] [place], [place] Use as directed.    flash glucose sensor (FREESTYLE CLAUDIO 14 DAY SENSOR) Kit Change every 14 days.    fluticasone (FLONASE) 50 mcg/actuation nasal spray 1 spray by Each Nare route 2 (two) times daily as needed for Rhinitis. (Patient taking differently: 1 spray by Each Nare route as needed for Rhinitis. )    furosemide (LASIX) 40 MG tablet Take 1 tablet (40 mg total) by mouth 2 (two) times daily. Take twice a day now    gabapentin (NEURONTIN) 300 MG capsule Take 1 capsule (300 mg total) by mouth daily as needed.    insulin (LANTUS SOLOSTAR U-100 INSULIN) glargine 100 units/mL (3mL) SubQ pen Inject 18 units at night.    insulin aspart U-100 (NOVOLOG) 100 unit/mL (3 mL) InPn pen Inject 4 units (light meal), 10 units (larger meal), scale 150-200+2, 201-250+4, 251-300+6, 301-350+8. Snack dose 2 units. Max daiy 60 units.    insulin needles, disposable, 31 X 5/16 " Ndle Pt to use pen needle with Lantus daily    lidocaine (LIDODERM) 5 % APPLY UP TO 3 PATCHES TO THE AFFECTED AREA(S) FOR 12 HOURS DAILY AND REMOVE FOR 12 HOURS    lidocaine (XYLOCAINE) 5 % Oint ointment APPLY TO THE AFFECTED AREA(S) THREE TO FOUR TIMES DAILY    LIDOTRAL 3.88 % Crea APPLY TO THE AFFECTED AREA 2 TIMES TO 3 TIMES DAILY    linaGLIPtin (TRADJENTA) 5 mg Tab tablet Take 1 tablet (5 mg total) by mouth once daily.    meclizine (ANTIVERT) 25 " mg tablet Take 1 tablet (25 mg total) by mouth 3 (three) times daily as needed for Dizziness.    methyl salicylate 25 % Crea APPLY TO THE AFFECTED AREA(S) THREE TO FOUR TIMES DAILY    montelukast (SINGULAIR) 10 mg tablet Take 1 tablet (10 mg total) by mouth every evening.    nitroGLYCERIN (NITROSTAT) 0.4 MG SL tablet Place 1 tablet (0.4 mg total) under the tongue every 5 (five) minutes as needed for Chest pain.    omeprazole (PRILOSEC OTC) 20 MG tablet Take 1 tablet (20 mg total) by mouth once daily.    ondansetron (ZOFRAN-ODT) 8 MG TbDL Take 1 tablet (8 mg total) by mouth every 6 (six) hours as needed.    vitamin D (VITAMIN D3) 1000 units Tab Take 1,000 Units by mouth once daily.     No current facility-administered medications for this visit.          Vitals:    03/04/20 1004   BP: 128/60   Pulse: 75   Resp: 17   Temp: 98.1 °F (36.7 °C)       Physical Exam   Constitutional: He is oriented to person, place, and time. He appears well-developed.   HENT:   Head: Normocephalic.   Mouth/Throat: No oropharyngeal exudate.   Eyes: No scleral icterus.   Cardiovascular: Normal rate.   No murmur heard.  Pulmonary/Chest: Effort normal. No respiratory distress. He has no rales.   Abdominal: Soft. He exhibits no distension. There is no rebound.   Musculoskeletal: He exhibits edema.   Lymphadenopathy:     He has no cervical adenopathy.   Neurological: He is alert and oriented to person, place, and time. No cranial nerve deficit.   Skin: Skin is warm.   Psychiatric: He has a normal mood and affect.        12/10/19 SPEP: Normal total protein. Normal gamma globulins are decreased. There is a paraprotein band in gamma measuring 0.68 g/dL, previously 1.24 g/dL.       1/13/20 CT thoracic and lumbar spines          FINDINGS:  Thoracic spine:    Thoracic vertebral body heights are maintained.  Mild disc space loss at multiple levels.  No acute fractures or traumatic subluxations.  No osseous lytic or destructive process.    Mild  degenerative changes noted within the thoracic spine.  No significant spinal canal stenosis or neural foraminal narrowing evident at any level.    Limited visualization of the soft tissue structures at the base of the neck show no significant abnormalities.  Coronary artery atherosclerosis and mild cardiomegaly noted.  Mild ground-glass attenuation within the lung parenchyma presumably related to respiratory motion.  Probable small hiatal hernia.  Hypodense lesions within the right renal parenchyma, too small to characterize and likely represent renal cysts.  Normal course of the thoracic aorta with mild atherosclerotic calcifications.    Lumbar spine:    Lumbar vertebral body heights and disc spaces are maintained.  No acute fractures or traumatic subluxations.  No osseous lytic or destructive process.    Limited intra-abdominal evaluation shows no bowel obstruction.  Paraspinal muscle bulk is maintained.  Nonspecific perinephric stranding.  Accessory splenule.  Probable right renal cysts.    T12-L1: No spinal canal stenosis or neural foraminal narrowing.    L1-L2: No spinal canal stenosis or neural foraminal narrowing.    L2-L3: No spinal canal stenosis or neural foraminal narrowing.    L3-L4: Mild diffuse disc bulge, ligamentum flavum hypertrophy, and mild bilateral facet arthropathy resulting in mild right neural foraminal narrowing and no greater than mild spinal canal stenosis.    L4-L5: Mild diffuse disc bulge, ligamentum flavum hypertrophy, and mild bilateral facet arthropathy resulting in mild bilateral neural foraminal narrowing and no greater than mild spinal canal stenosis.    L5-S1: Mild diffuse disc bulge, ligamentum flavum hypertrophy, and mild bilateral facet arthropathy resulting in mild to moderate right neural foraminal narrowing and no spinal canal stenosis.    Limited evaluation of the SI joints show no significant abnormalities.       Impression         No acute fractures or traumatic  subluxations.    Mild degenerative changes of the thoracic and lumbar spine, worst at L3-L4 and L4-L5 as discussed above.    Multiple right renal hypodensities, likely renal cyst.    Coronary artery atherosclerosis and mild cardiomegaly.         1/13/20 CT abdomen/pelvis w/o cont          COMPARISON:  PET-CT 01/06/2017, CT 08/12/2014    FINDINGS:  Images of the lower thorax are remarkable for bilateral dependent atelectasis.  There is calcification in the distribution of the coronary arteries.  The heart is prominent.  There is a small pericardial effusion.    The liver, spleen, pancreas, gallbladder and adrenal glands have a grossly unremarkable noncontrast appearance.  There is no biliary dilation.  The pancreatic duct is not dilated.  No significant abdominal lymphadenopathy.    There is prominent bilateral perinephric fat stranding.  There is a 1.5 cm low attenuating lesion arising from the interpolar region of the right kidney, attenuation of which suggests cyst.  An additional cyst is noted arising from the interpolar region measuring 1.3 cm.  There is no hydronephrosis or nephrolithiasis.  The bilateral ureters are unremarkable without calculi seen.  The urinary bladder is decompressed noting there is wall thickening.  The prostate is not enlarged.    The distal large bowel is decompressed.  There are a few scattered colonic diverticula without surrounding inflammation.  The terminal ileum and appendix are unremarkable.  The small bowel is unremarkable.  There is minimal strand-like fluid in the mesentery.  There is atherosclerotic calcification of the aorta and its branches.    Degenerative changes are noted of the pubic symphysis, noting possible remote fracture involving the anterior aspect of the left superior pubic ramus.  Degenerative changes are noted of the spine.  Remote anterior left rib injury noted.  There are bilateral fat containing inguinal hernias.       Impression         1. Prominent  bilateral perinephric fat stranding, similar to the previous examination, correlation with urinalysis recommended as there is urinary bladder wall thickening.  2. Please see separately dictated report for details of the cervical spine and lumbar spine.  3. Cardiomegaly with mild pericardial effusion.  4. Additional findings above.         Component      Latest Ref Rng & Units 2/5/2020   IgG - Serum      650 - 1600 mg/dL 636 (L)   IgA      40 - 350 mg/dL 98   IgM      50 - 300 mg/dL 91   Kappa Free Light Chains      0.33 - 1.94 mg/dL 16.81 (H)   Lambda Free Light Chains      0.57 - 2.63 mg/dL 2.02   Kappa/Lambda FLC Ratio      0.26 - 1.65 8.32 (H)     2/5/20 SPEP: Decreased total protein. Normal gamma globulins are decreased. There is a paraprotein band in gamma measuring 0.40 g/dL, previously 0.68 g/dL.       Component      Latest Ref Rng & Units 3/4/2020   WBC      3.90 - 12.70 K/uL 9.23   RBC      4.60 - 6.20 M/uL 3.79 (L)   Hemoglobin      14.0 - 18.0 g/dL 10.7 (L)   Hematocrit      40.0 - 54.0 % 34.1 (L)   MCV      82 - 98 fL 90   MCH      27.0 - 31.0 pg 28.2   MCHC      32.0 - 36.0 g/dL 31.4 (L)   RDW      11.5 - 14.5 % 13.8   Platelets      150 - 350 K/uL 313   MPV      9.2 - 12.9 fL 10.1   Immature Granulocytes      0.0 - 0.5 % 0.5   Gran # (ANC)      1.8 - 7.7 K/uL 7.3   Immature Grans (Abs)      0.00 - 0.04 K/uL 0.05 (H)   Lymph #      1.0 - 4.8 K/uL 0.7 (L)   Mono #      0.3 - 1.0 K/uL 0.9   Eos #      0.0 - 0.5 K/uL 0.3   Baso #      0.00 - 0.20 K/uL 0.03   nRBC      0 /100 WBC 0   Gran%      38.0 - 73.0 % 78.7 (H)   Lymph%      18.0 - 48.0 % 7.0 (L)   Mono%      4.0 - 15.0 % 10.1   Eosinophil%      0.0 - 8.0 % 3.4   Basophil%      0.0 - 1.9 % 0.3   Differential Method       Automated     Component      Latest Ref Rng & Units 3/4/2020   Sodium      136 - 145 mmol/L 144   Potassium      3.5 - 5.1 mmol/L 4.0   Chloride      95 - 110 mmol/L 108   CO2      23 - 29 mmol/L 25   Glucose      70 - 110 mg/dL 288  (H)   BUN, Bld      8 - 23 mg/dL 37 (H)   Creatinine      0.5 - 1.4 mg/dL 3.4 (H)   Calcium      8.7 - 10.5 mg/dL 8.9   PROTEIN TOTAL      6.0 - 8.4 g/dL 6.2   Albumin      3.5 - 5.2 g/dL 2.7 (L)   BILIRUBIN TOTAL      0.1 - 1.0 mg/dL 1.0   Alkaline Phosphatase      55 - 135 U/L 85   AST      10 - 40 U/L 11   ALT      10 - 44 U/L 62 (H)   Anion Gap      8 - 16 mmol/L 11   eGFR if African American      >60 mL/min/1.73 m:2 21.0 (A)   eGFR if non African American      >60 mL/min/1.73 m:2 18.2 (A)     Assessment:     1.IgG kappa multiple myeloma not in remission  2. Anemia secondary to CKD   3. Anemia secondary to multiple myeloma   4.  Diabetes mellitus with neuropathy, nephropathy, retinal disease.  5.  Cardiomyopathy with chronic diastolic heart failure.  6.  h/o CVA  7. Essential hypertension  8. Hyperglycemia  9. Chronic fatigue  10. Supportive care  11. Back pain  12. Abdominal pain  13. Flank pain  14. CKD     PLAN:     1. He did not tolerate treatment with lenalidomide/Decadron well. It is unclear if he has renal dysfunction from multiple myeloma as well, in addition to diabetic nephropathy and hypertensive nephropathy.    SPEP on 5/21/19 showed slight increase in M protein from Dec 2018 (1.13g/dL versus 0.88g/dL). It was 1.24g/dL on 9/11/19.   He is not a transplant candidate ( poor insight into his health condition, renal dysfunction from non myeloma conditions, poorly controled hypertension). Serum free light chains have increased, with the ratio in May at 28.15 (24.09 in December 2018) and to 38.75 in Sept 2019. Creatinine had increased from 3.1 in Dec 2018 to 3.6 in May 2019.Calcium is normal. No bone pain / back pain.  I discussed resuming chemotherapy with Velcade and low dose Decadron ( 12 mg weekly due to hyperglycemia and type 2 DM) as well as Zolendronic acid. Risks and benefits discussed.He was evaluated by a dentist in nov 2019. It is not clear if he has more dental procedures that are planned. He  started weekly velcade and weekly Decadron 12mg ( due to co-morbidities) from 10/15/19.     M spike on SPEP decreased from 1.24g/dL in Sept 2019 to 0.68 g/dL in Dec 2019 (after 2 cycles) and to 0.4g/dL on  . sFLC ratio has decreased to 25.07 from 38.75 and to 8.32 on 2/5/20.    He is very weak and fatigued. His wife attributes his current state to chemotherapy. We will hold chemotherapy for 2 more weeks.           2,3.  Continue weekly  CBC.      4. On long term insulin. Follows with his primary care physician      5.  Follows with cardiology. On Coreg, Lipitor, lasix     7. BP today 128/60 mm Hg . He takes Amlodipine, Carvedilol, Clonidine.      8. He states it remains high due to Decadron. He will decrease Decadron to 12mg weekly ( from the current 20mg weekly)     10. On Acyclovir prophylaxis. Zometa still on hold as he is awaiting dental clearance. He is on calcium-vit D      11,12,13: He has abdominal and left flank pain. He denies trauma. He had CT abdomen w/o contrast, CT thoracic and lumbar spines on 1/13/20. No lytic lesions or masses were identified. He had  prominent bilateral perinephric fat stranding. Urinary bladder wall was thickened.     14. He follows with nephrology. Cr 3.4 today.

## 2020-03-09 NOTE — PROGRESS NOTES
Subjective:       Patient ID: Marie Reis Jr. is a 64 y.o. male.    Chief Complaint: Hospital Follow Up    HPI 64-year-old male presents to clinic today for follow-up after recent hospitalization patient was experiencing significant lethargy and wife is reporting some mental status change.  She actually states that he went to take a bath and stated the time for 5 hr.  He was not acting himself.  He was admitted and found to be more anemic hemoglobin 10 at baseline went to 8.  His consider that during his hospitalization the anemia may be related to his progression of multiple myeloma versus blood loss.  He had no evidence of blood loss during his hospitalization.  Hem oncology and Nephrology were both consulted.  He has seen his hematology oncologist since this hospitalization they have suspended chemotherapy for 2 weeks given his debility.  Creatinine typically baseline around 2-1/2 peaked at 4 at onset to presentation to hospital discharged around 3-1/2.  Certainly dialysis may be up for discussion at upcoming visit.  Patient's disease is appear to be getting ahead of him he has extensive complications from uncontrolled diabetes for many years and nonadherence to medical therapy.  He still is not taking his medications on a regular basis his wife states and he is not eating or drinking regularly.  I question patient today rather he is depressed or given up and he states he is not.  He denies depression.  Wife is overwhelmed with the entire situation.  Patient has appropriate follow-ups with all the specialist.  He has more edema since his hospitalization but he is not taking his Lasix.  Review of Systems  otherwise negative  Objective:      Physical Exam  General: Well-appearing, well-nourished.  No distress  HEENT: conjunctivae are normal.  Pupils are equal and reative to light.  TM's are clear and intact bilaterally.  Hearing is grossly normal.  Nasopharynx is clear.  Oropharynx is clear.  Neck: Supple.  No  thyroid megaly.  No bruits.  Lymph: No cervical or supraclavicular adenopathy.  Heart: Regular rate and rhythm, without murmur, rub or gallop.  Lungs: Clear to auscultation; respiratory effort normal.  Abdomen: Soft, nontender, nondistended.  Normoactive bowel sounds.  No hepatomegaly.  No masses.  Extremities: Good distal pulses.  2+ edema.  Psych: Oriented to time person place.    Assessment:       1. Hypertensive kidney disease with stage 4 chronic kidney disease    2. Multiple myeloma not having achieved remission    3. History of stroke    4. Symptomatic anemia    5. Internal hemorrhoids    6. Uncontrolled type 2 diabetes mellitus with peripheral neuropathy    7. 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    Diagnoses and all orders for this visit:    Hypertensive kidney disease with stage 4 chronic kidney disease  Hypertension control with progression of kidney disease this is also contributed due to volume contraction both prerenal and renal component.  Upcoming appointment with Nephrology where they will likely discuss his risk for needing dialysis and discussed this as a treatment option in the near future.  Multiple myeloma not having achieved remission  Oncology appointment current and has 1 scheduled will resume chemotherapy in 2 weeks  History of stroke  .  Continue symptomatic support  Symptomatic anemia  Multifactorial likely contributed by multiple myeloma and likely some mild bleeding of hemorrhoids.  Internal hemorrhoids  Continue good bowel maintenance to avoid constipation  Uncontrolled type 2 diabetes mellitus with peripheral neuropathy  Recommended errands to medication regimen patient has noncompliance.  Encouraged him to eat regularly so we can be able to take his medications as prescribed.  Uncontrolled type 2 diabetes  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, with long-term current use of insulin      Patient denies depression and states that he has not given up.  Discussed with him and his wife at some point we will need to discuss the possibility of hospice and end of life care.  Patient's diseases are progressing and likely a getting ahead of him. this is also contributed by his nonadherence to medications and prescribed treatment.

## 2020-03-12 NOTE — PROGRESS NOTES
HPI     Diabetic Eye Exam      Additional comments: 3 mon iop chk              Comments     Patient states he has been seeing a floater in OS for several days now.   No flashes.    Thera Tears PRN OU         Prior FA -  Late macular leakage OU  NP OU with NV OU    Prior OCT - VMT released OD , DME improved   Increased temporal DME OS vision, stable    A/P    1. Recurrent iritis  stable    2. PDR OU  - new small preretinal heme nasal OD  S/p PRP OD 03/27/2017  S/p PRP OS 05/09/2017 8/18 - increased VH - Avastin with Dr. CRUZ  2/12/19 - PRP fill OS  3/19 PRP fill OD.   4/19 - small boat heme - ASx - observed  11/19 - increased VH OD    Stable today, monitor  If recurrent heme, then will resume Avastin maintenance during MM tx    3. DME OU  - stable OS, temporal edema increase OD  S/p Avastin OD x 5 OS x 2  S/p Focal OS (12/16/14)  - mild worsening extrafoveal edema OD -  S/p Focal OD (5/24/16, 6/17)      4. PCIOL OU  S/p phaco w/IOL OD 8/13/15  S/p phaco w/IOL OS 4/30/15    5. Multiple myeloma        6 months OCT

## 2020-03-19 PROBLEM — Z86.73 HISTORY OF CVA (CEREBROVASCULAR ACCIDENT): Status: RESOLVED | Noted: 2020-01-01 | Resolved: 2020-01-01

## 2020-03-19 NOTE — PROGRESS NOTES
CC: Type 2 DM, hypoglycemia, variable BG readings      HPI: Mr. Marie Reis Jr. is a 64 y.o. Black or  male who was diagnosed with Type 2 DM in 2006.   Pt is accompanied by wife.  Has freestyle obed.  Recently more hypoglycemia, not eating as much, withdrawing at times w/ eating, taking medications, confusion.     The patient location is: home  The chief complaint leading to consultation is: type 2 dm, recent cva dx via MRI brain   Visit type: Virtual visit with synchronous audio and video  Total time spent with patient: 30 mins  Each patient to whom he or she provides medical services by telemedicine is:  (1) informed of the relationship between the physician and patient and the respective role of any other health care provider with respect to management of the patient; and (2) notified that he or she may decline to receive medical services by telemedicine and may withdraw from such care at any time.  [employer]: labs done today 3/19/20  ~bp 127/71 this am     297 mg/dl last night  246 mg/dl this am   291 mg/dl - pm   184 mg/dl- 1139 am     [facility]: sensors -duramed    MRI done as well 3/17/20 CVA  2/21/20-hemiplegia/hemiparesis of (L) non dominant side  Off balance at times, has cane   CT scan of head done recently-3/4/20  2/24/20 generalized weakness, hypoglycemia-irritable/confused    Had appt w/ hem/oncology (multiple myeloma), nephrology (ckd 4), ophthalmology (Dr. Al-3/12/20), neurology-at 420p today 3/19/20    Currently has pending treatment plan for multiple myeloma  ---bortezomib (velcade) or zoledronic acid (reclast).  Recently had dentist appt, has dental caries, decay.    Not able to do teeth cleaning or removal teeth----may need implants-jan 2020=[facility]   Pt and wife have concerns about SEs-worsening of teeth decay, kidney functions.  Currently doing chemo treatment via infusion: 12/4, 12/11, 12/18, next 12/26    Admission: 4/11/18 [facility]  2/2018 sepsis    Severe  hypoglycemia -EMS in the past 3 years    a1c elevated.   Last bg on yesterday   Lab Results   Component Value Date    HGBA1C 9.1 (H) 03/09/2020     BG readings are checked 5-6x/day (max) and readings=  Freestyle obed-awaiting more sensors-duramed  Injection 4 times a day.    Skipped/missed glycemic medications: No    Prandial injections taken with meals: Yes    Physical Activity: No    Skipping meals and/or snacking: The patient eats a regular, healthy diet.    CURRENT DIABETIC MEDS: tradjenta 5 mg daily   basal (lantus) 18 -24 units at night -change on 2/27/20-holding at times a day  Meal insulin (novolog)  4 units w/ light meal   10 units with larger meals   2 units w/ snack   Scale 150-200+2, 201-250+4, 251-300+6, etc     Uses Vials or Pens: pens  Type of Glucose Meter: up to date    Diabetes Management Status    Statin: Taking  ACE/ARB: Taking    Screening or Prevention Patient's value Goal Complete/Controlled?   HgA1C Testing and Control   Lab Results   Component Value Date    HGBA1C 9.1 (H) 03/09/2020      Annually/Less than 8% No   Lipid profile : 02/18/2020 Annually Yes   LDL control Lab Results   Component Value Date    LDLCALC 87.2 02/18/2020    Annually/Less than 100 mg/dl  Yes   Nephropathy screening Lab Results   Component Value Date    LABMICR 719.0 03/09/2020     Lab Results   Component Value Date    PROTEINUA 2+ (A) 03/09/2020    Annually Yes   Blood pressure BP Readings from Last 1 Encounters:   03/09/20 134/68    Less than 140/90 No   Dilated retinal exam : 03/12/2020 Annually Yes   Foot exam   : 08/28/2019 Annually Yes   [facility]  Last DM Education Attended:  2019 q 3 mos    REVIEW OF SYSTEMS  General: no weakness  + fatigue.   Eyes: no visual disturbances.   Cardiac: no  intermittent chest pain-has nitro prn. + mild edema   Respiratory: no cough or dyspnea.   GI: no abdominal pain or nausea.   Skin: no rashes or itching.   Neuro: + numbness or tingling. +back pain, +knee  pain  Musc: gait issues-cva 2/2020  Endocrine: no polyuria, polydipsia, polyphagia. +sweats w/ eating foods, +heat/cold intolerance    Vital Signs  There were no vitals taken for this visit.    Hemoglobin A1C   Date Value Ref Range Status   03/09/2020 9.1 (H) 4.0 - 5.6 % Final     Comment:     ADA Screening Guidelines:  5.7-6.4%  Consistent with prediabetes  >or=6.5%  Consistent with diabetes  High levels of fetal hemoglobin interfere with the HbA1C  assay. Heterozygous hemoglobin variants (HbS, HgC, etc)do  not significantly interfere with this assay.   However, presence of multiple variants may affect accuracy.     02/12/2020 10.5 (H) 4.0 - 5.6 % Final     Comment:     ADA Screening Guidelines:  5.7-6.4%  Consistent with prediabetes  >or=6.5%  Consistent with diabetes  High levels of fetal hemoglobin interfere with the HbA1C  assay. Heterozygous hemoglobin variants (HbS, HgC, etc)do  not significantly interfere with this assay.   However, presence of multiple variants may affect accuracy.     12/10/2019 9.2 (H) 4.0 - 5.6 % Final     Comment:     ADA Screening Guidelines:  5.7-6.4%  Consistent with prediabetes  >or=6.5%  Consistent with diabetes  High levels of fetal hemoglobin interfere with the HbA1C  assay. Heterozygous hemoglobin variants (HbS, HgC, etc)do  not significantly interfere with this assay.   However, presence of multiple variants may affect accuracy.         Chemistry        Component Value Date/Time     03/04/2020 0953     03/04/2020 0953    K 4.0 03/04/2020 0953    K 4.0 03/04/2020 0953     03/04/2020 0953     03/04/2020 0953    CO2 25 03/04/2020 0953    CO2 25 03/04/2020 0953    BUN 37 (H) 03/04/2020 0953    BUN 37 (H) 03/04/2020 0953    CREATININE 3.4 (H) 03/04/2020 0953    CREATININE 3.4 (H) 03/04/2020 0953     (H) 03/04/2020 0953     (H) 03/04/2020 0953        Component Value Date/Time    CALCIUM 8.9 03/04/2020 0953    CALCIUM 8.9 03/04/2020 0953     ALKPHOS 85 03/04/2020 0953    AST 11 03/04/2020 0953    ALT 62 (H) 03/04/2020 0953    BILITOT 1.0 03/04/2020 0953          Lab Results   Component Value Date    CHOL 157 02/18/2020    CHOL 174 02/12/2020    CHOL 141 11/12/2019     Lab Results   Component Value Date    HDL 30 (L) 02/18/2020    HDL 37 (L) 02/12/2020    HDL 29 (L) 11/12/2019     Lab Results   Component Value Date    LDLCALC 87.2 02/18/2020    LDLCALC 107.4 02/12/2020    LDLCALC 87.8 11/12/2019     Lab Results   Component Value Date    TRIG 199 (H) 02/18/2020    TRIG 148 02/12/2020    TRIG 121 11/12/2019     Lab Results   Component Value Date    CHOLHDL 19.1 (L) 02/18/2020    CHOLHDL 21.3 02/12/2020    CHOLHDL 20.6 11/12/2019       Lab Results   Component Value Date    TSH 1.509 02/12/2020       Lab Results   Component Value Date    MICALBCREAT 463.9 (H) 03/09/2020       Vit D, 25-Hydroxy   Date Value Ref Range Status   03/04/2020 29 (L) 30 - 96 ng/mL Final     Comment:     Vitamin D deficiency.........<10 ng/mL                              Vitamin D insufficiency......10-29 ng/mL       Vitamin D sufficiency........> or equal to 30 ng/mL  Vitamin D toxicity............>100 ng/mL         PHYSICAL EXAMINATION  Constitutional: Appears fairly well, no distress  Neck: Supple, trachea midline.   Respiratory: no wheezes, even and unlabored.  Cardiovascular: RRR  Lymph: +trace BLE  Skin: warm and dry; no injection site reactions, no acanthosis nigracans observed.  Neuro:patient alert and cooperative.   Footwear appropriate. Gait-assistance w/ cane     Assessment/Plan    1. Diabetic polyneuropathy associated with type 2 diabetes mellitus  Hemoglobin A1c   2. 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 Hemiplegia and hemiparesis following cerebral infarction affecting left non-dominant side     4.  History of CVA (cerebrovascular accident)     5. Dyslipidemia     6. Essential hypertension     7. PAD (peripheral artery disease)     8. Moderate episode of recurrent major depressive disorder     9. Uncontrolled type 2 diabetes mellitus with peripheral neuropathy  insulin aspart U-100 (NOVOLOG) 100 unit/mL (3 mL) InPn pen   10. Uncontrolled diabetes mellitus with polyneuropathy  insulin (LANTUS SOLOSTAR U-100 INSULIN) glargine 100 units/mL (3mL) SubQ pen       1.-2. F/u in 3 mos w/ me  Discussion w/ wife as well  lantus 20 units at [facility] -increase by 10 %  Change novolog to 6-6-6 units w/ meals  3 units w/ snack   Correction scale 180-230+2, etc  tradjenta resent to pharm-PA will be done per MA  a1c goal 7.5 to 8%  Continue use of freestyle oebd  covid19 info given  3. F/u with neuro today  4. See above  5.   Lab Results   Component Value Date    LDLCALC 87.2 02/18/2020     At goal   6. Controlled, continue med(s)  7. F/u with vascular prn, podiatry  8. F/u with pcp, psych prn  9. See above  10. See above   4.

## 2020-03-19 NOTE — PATIENT INSTRUCTIONS
Snacks can be an important part of a balanced, healthy meal plan. They allow you to eat more frequently, feeling full and satisfied throughout the day. Also, they allow you to spread carbohydrates evenly, which may stabilize blood sugars.  Plus, snacks are enjoyable!     The amount of carbohydrate needed at snacks varies. Generally, about 15-30 grams of carbohydrate per snack is recommended.  Below you will find some tasty treats.       0-5 gm carb   Crystal Light   Vitamin Water Zero   Herbal tea, unsweetened   2 tsp peanut butter on celery   1./2 cup sugar-free jell-o   1 sugar-free popsicle   ¼ cup blueberries   8oz Blue Amy unsweetened almond milk   5 baby carrots & celery sticks, cucumbers, bell peppers dipped in ¼ cup salsa, 2Tbsp light ranch dressing or 2Tbsp plain Greek yogurt   10 Goldfish crackers   ½ oz low-fat cheese or string cheese   1 closed handful of nuts, unsalted   1 Tbsp of sunflower seeds, unsalted   1 cup Smart Pop popcorn   1 whole grain brown rice cake        15 gm carb   1 small piece of fruit or ½ banana or 1/2 cup lite canned fruit   3 donna cracker squares   3 cups Smart Pop popcorn, top spray butter, Mijares lite salt or cinnamon and Truvia   5 Vanilla Wafers   ½ cup low fat, no added sugar ice cream or frozen yogurt (Blue bell, Blue Bunny, Weight Watchers, Skinny Cow)   ½ turkey, ham, or chicken sandwich   ½ c fruit with ½ c Cottage cheese   4-6 unsalted wheat crackers with 1 oz low fat cheese or 1 tbsp peanut butter    30-45 goldfish crackers (depending on flavor)    7-8 [employer] mini brown rice cakes (caramel, apple cinnamon, chocolate)    12 [employer] mini brown rice cakes (cheddar, bbq, ranch)    1/3 cup hummus dip with raw veg   1/2 whole wheat sourav, 1Tbsp hummus   Mini Pizza (1/2 whole wheat English muffin, low-fat  cheese, tomato sauce)   100 calorie snack pack (Oreo, Chips Ahoy, Ritz Mix, Baked Cheetos)   4-6 oz. light or Greek Style yogurt  (Mata, Carley, [facility], [facility])   ½ cup sugar-free pudding     6 in. wheat tortilla or sourav oven toasted chips (topped with spray butter flavoring, cinnamon, Truvia OR spray butter, garlic powder, chili powder)    18 BBQ Popchips (available at [employer], [employer], Fresh Market)

## 2020-03-19 NOTE — PROGRESS NOTES
Outpatient Neurology Consultation (Video Visit)    Requesting physician: Dr. Colón    Impression:  1. R PCA ischemic stroke Feb '20, suspect CE due to CHF although no vascular imaging  2. History of remote ischemic stroke 2014 (R insular and L thalamic), CE vs ESUS    Stroke risk factors: prior strokes, HTN, DM, dyslipidemia, CHF, SUZANNA (on CPAP)    Plan:  1. Continue clopidogrel  2. 30 day event monitor for e/o PAF  3. Restart atorvastatin 80mg/d and f/u with Dr. Colón; may need f/u LFTs  4. Defer vascular imaging as unlikely to   5. I will provide results of monitor when available  6. F/U with Dr. Colón as planned and return to see me prn      Problem List Items Addressed This Visit        1 - High    History of right PCA stroke - Primary       2     History of stroke       3     Hemiplegia and hemiparesis following cerebral infarction affecting left non-dominant side          CC:  stroke    HPI:  65 y/o AAM referred for evaluation of stroke.  He was seen with his wife via video due to COVID-19. He was hospitalized 2/24/20 with fatigue, abdominal pain and visual disturbance.  No brain imaging performed.  CT on 3/4/20 showed R PCA infarct which was confirmed on MRI 3/17/20.  Just completed PT.  He feels vision is fine when he wears glasses.  He takes clopidogrel but for some reason stopped atorvastatin; possibly recent transaminitis.  No palipitations    MRI 3/17/20 shows R PCA infarct    He had a remote stroke in 2014 with LLE weakness.      Echo 2/26/20    · Concentric left ventricular hypertrophy.  · Severe left atrial enlargement.  · Moderate aortic regurgitation.  · Mild-to-moderate mitral regurgitation.  · Moderate to severe tricuspid regurgitation.  · Moderate to severe pulmonary hypertension present.  · Left ventricular systolic function. The estimated ejection fraction is 25%.  · Grade II (moderate) left ventricular diastolic dysfunction consistent with pseudonormalization.  · Normal right  ventricular systolic function.  · Moderate-to-severe aortic valve stenosis.  · Aortic valve area is 1.02 cm2; peak velocity is 2.58 m/s; mean gradient is 16 mmHg.  ·  Elevated central venous pressure (15 mmHg).  · The estimated PA systolic pressure is 58 mmHg.         Past Medical History:   Diagnosis Date    Binocular vision disorder with diplopia 9/22/2016    Bradycardia     Cataract     Cervical spondylosis 10/1/2015    Chronic diastolic congestive heart failure     Chronic diastolic heart failure 1/11/2018    Coronary artery disease due to calcified coronary lesion 3/19/2018    Dyslipidemia 6/26/2015    Encounter for blood transfusion     Hearing impairment 10/25/2013    History of stroke 3/23/2015    Hypertension associated with diabetes 1/11/2018    Hypertensive retinopathy of both eyes 9/26/2017    Lumbar spondylosis 10/1/2015    Multiple myeloma not having achieved remission 1/16/2018    SUZANNA on CPAP     Persistent proteinuria 1/11/2018    Spondylolisthesis of lumbar region 10/1/2015    Strabismus     Stroke 2014    Thoracic spondylosis 10/1/2015    Type 2 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, with long-term current use of insulin 9/26/2017    Type 2 diabetes mellitus with diabetic polyneuropathy, with long-term current use of insulin 9/28/2015    Type 2 diabetes mellitus with stage 4 chronic kidney disease, with long-term current use of insulin 1/11/2018      Past Surgical History:   Procedure Laterality Date    CATARACT EXTRACTION W/  INTRAOCULAR LENS IMPLANT Left 4/30/15    Diane    COLONOSCOPY N/A 1/20/2020    Procedure: COLONOSCOPY;  Surgeon: Salvador Espinosa MD;  [facility];  Service: Endoscopy;  Laterality: N/A;    EYE SURGERY      cataract removal left eye    HAND SURGERY  2/2012    DR. BAKER [facility]    left hand fracture sx      LT EYE   5/2/15    DR KULLMAN [facility]      No outpatient medications have  been marked as taking for the 3/19/20 encounter (Office Visit) with Robb Freed MD.      Review of patient's allergies indicates:   Allergen Reactions    Hydralazine analogues      Increase swelling and throat itching and tightness with use.  Symptoms correlate only with this medication onset in hospital.      Family History   Problem Relation Age of Onset    Diabetes Mother     Cancer Mother     Kidney disease Mother     Diabetes Father     Heart attack Father     Diabetes Sister     Diabetes Brother     No Known Problems Maternal Aunt     No Known Problems Maternal Uncle     No Known Problems Paternal Aunt     No Known Problems Paternal Uncle     No Known Problems Maternal Grandmother     No Known Problems Maternal Grandfather     No Known Problems Paternal Grandmother     No Known Problems Paternal Grandfather     Blindness Neg Hx     Anesthesia problems Neg Hx     Amblyopia Neg Hx     Cataracts Neg Hx     Glaucoma Neg Hx     Hypertension Neg Hx     Macular degeneration Neg Hx     Retinal detachment Neg Hx     Strabismus Neg Hx     Stroke Neg Hx     Thyroid disease Neg Hx     Heart disease Neg Hx     Heart failure Neg Hx     Hyperlipidemia Neg Hx       Social History     Socioeconomic History    Marital status:      Spouse name: Not on file    Number of children: Not on file    Years of education: Not on file    Highest education level: Not on file   Occupational History    Not on file   Social Needs    Financial resource strain: Somewhat hard    Food insecurity:     Worry: Never true     Inability: Never true    Transportation needs:     Medical: No     Non-medical: No   Tobacco Use    Smoking status: Never Smoker    Smokeless tobacco: Never Used   Substance and Sexual Activity    Alcohol use: No     Frequency: Never     Drinks per session: Patient refused     Binge frequency: Never    Drug use: No    Sexual activity: Never   Lifestyle    Physical  activity:     Days per week: 3 days     Minutes per session: Not on file    Stress: Only a little   Relationships    Social connections:     Talks on phone: More than three times a week     Gets together: Once a week     Attends [employer] service: Not on file     Active member of club or organization: Yes     Attends meetings of clubs or organizations: Never     Relationship status:    Other Topics Concern    Not on file   Social History Narrative    Not on file     Review Of Systems:  General: Negative for fever   HENT: Negative for tinnitus, nose bleeds, neck stiffness   Cardiac Negative for palpitations   Vascular: Negative for easy bruising   Pulmonary: Negative for cough   Gastrointestinal: Negative for constipation   Urinary: Negative for incomplete bladder emptying   Musculoskeletal: Negative for muscle aches   Neurological: As above. Otherwise negative for abnormal movements   Psychiatric:  Negative for depression       There were no vitals taken for this visit.   Well developed, well nourished male  Extremities: no edema    Mental status:   Awake, alert and appropriately oriented   Normal recent and remote memory   Normal attention and concentration   Normal speech and language   Normal fund of knowledge   No extinction  Cranial nerves:   EOMF without nystagmus   VFF   Normal facial sensation   Mild R facial droop  Motor:   No pronator drift or LE drift   No abnormal movements  Sensory   Intact to LT  DTRs   nt  Coordination   Intact to FNF and HTS  Gait   NT    NIHSS = 1 (R face)      Data Reviewed:  MRI brain - see above  Lab Results   Component Value Date    LDLCALC 87.2 02/18/2020       CMP  Sodium   Date Value Ref Range Status   03/19/2020 138 136 - 145 mmol/L Final     Potassium   Date Value Ref Range Status   03/19/2020 3.4 (L) 3.5 - 5.1 mmol/L Final     Chloride   Date Value Ref Range Status   03/19/2020 104 95 - 110 mmol/L Final     CO2   Date Value Ref Range Status   03/19/2020 25 23 -  29 mmol/L Final     Glucose   Date Value Ref Range Status   03/19/2020 204 (H) 70 - 110 mg/dL Final     BUN, Bld   Date Value Ref Range Status   03/19/2020 26 (H) 8 - 23 mg/dL Final     Creatinine   Date Value Ref Range Status   03/19/2020 3.2 (H) 0.5 - 1.4 mg/dL Final     Calcium   Date Value Ref Range Status   03/19/2020 8.7 8.7 - 10.5 mg/dL Final     Total Protein   Date Value Ref Range Status   03/19/2020 6.4 6.0 - 8.4 g/dL Final     Albumin   Date Value Ref Range Status   03/19/2020 2.7 (L) 3.5 - 5.2 g/dL Final     Total Bilirubin   Date Value Ref Range Status   03/19/2020 0.6 0.1 - 1.0 mg/dL Final     Comment:     For infants and newborns, interpretation of results should be based  on gestational age, weight and in agreement with clinical  observations.  Premature Infant recommended reference ranges:  Up to 24 hours.............<8.0 mg/dL  Up to 48 hours............<12.0 mg/dL  3-5 days..................<15.0 mg/dL  6-29 days.................<15.0 mg/dL       Alkaline Phosphatase   Date Value Ref Range Status   03/19/2020 85 55 - 135 U/L Final     AST   Date Value Ref Range Status   03/19/2020 13 10 - 40 U/L Final     ALT   Date Value Ref Range Status   03/19/2020 8 (L) 10 - 44 U/L Final     Anion Gap   Date Value Ref Range Status   03/19/2020 9 8 - 16 mmol/L Final     eGFR if    Date Value Ref Range Status   03/19/2020 22.6 (A) >60 mL/min/1.73 m^2 Final     eGFR if non    Date Value Ref Range Status   03/19/2020 19.5 (A) >60 mL/min/1.73 m^2 Final     Comment:     Calculation used to obtain the estimated glomerular filtration  rate (eGFR) is the CKD-EPI equation.                The patient location is: home  The chief complaint leading to consultation is: stroke  Visit type: Virtual visit with synchronous audio and video  Total time spent with patient: 45 min + 15 min chart review  Each patient to whom he or she provides medical services by telemedicine is:  (1) informed of the  relationship between the physician and patient and the respective role of any other health care provider with respect to management of the patient; and (2) notified that he or she may decline to receive medical services by telemedicine and may withdraw from such care at any time.        Robb Freed MD

## 2020-03-19 NOTE — Clinical Note
Please schedule Velcade cycle 6 day 1 on 3/25, D8 on 4/1, D 15 on 4/8 and D22 on 4/15. Scheduled labs (CBC, CMP, SPEP, FLC, immunoglobulins) on 4/22 with appt with Dr. Cantrell and cycle 7 day 1 of Velcade

## 2020-03-19 NOTE — PROGRESS NOTES
The patient location is: home  The chief complaint leading to consultation is: Multiple myeloma, on treatment, follow up visit  Visit type: Virtual visit with synchronous audio and video  Total time spent with patient: 25 minutes  Each patient to whom he or she provides medical services by telemedicine is:  (1) informed of the relationship between the physician and patient and the respective role of any other health care provider with respect to management of the patient; and (2) notified that he or she may decline to receive medical services by telemedicine and may withdraw from such care at any time.    CC: Multiple myeloma, on treatment, follow up visit.      HPI: Mr. Reis is a 64-year-old man with multiple myeloma. He was a patient of Dr.Archie Guaman. In May 2016, his serum protein electrophoresis revealed a paraprotein measuring 0.86 g/dL; this was an IgG kappa.  A 24-hour urine contained 947 mg of protein, 3% of which was kappa light chains.  His bone marrow had 15% plasma cells and a FISH panel disclosed trisomy 1, 7, 9 and 15.  A PET scan in January 2017 revealed no bone lesions.     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 He was followed with periodic examinations and laboratory studies until early 2018 when his anemia worsened. There had been a gradual increase in the protein level and there was a substantial increase in the 24-hour urine protein to 4500 mg.  However, only 4.6% of this was kappa light chain and 2.8% was IgG kappa.  Most of the proteinuria was believed to have been secondary to diabetes mellitus.  The IgG kappa paraprotein as measured in electrophoresis was 1.23 g/dL.     Mainly because of the worsening anemia and concern that it might have been least partially due to  Myeloma,he was recommended a trial of therapy with Revlimid  10 mg daily for 21 days, every 28 days and dexamethasone 12 mg once a week.  There was a long delay in obtaining Revlimid and after he started that he had  "worsening renal function and was hospitalized for that problem and for hyperglycemia.  He had another hospitalization in March 2018 for encephalopathy, congestive heart failure, hypoglycemia and worsening anemia which required red cell transfusions.      In May  2018,  Revlimid and dexamethasone were stopped due to the above mentioned problems.  There had been substantial improvement in the protein levels with the paraprotein falling to 0.56 g/dL and the quantitative IgG declining from 1747 to 991.  The kappa/lambda ratio fell from 44 to 6.5.    Patient resumed weekly velcade and weekly Decadron 12mg (due to co-morbidities) 10/15/19 with good response noted.         His other medical problems include a stroke many years ago that left him with weakness of his left arm and leg and he has severe diabetes mellitus with renal, retinal and peripheral nerve complications.  He has cardiac valvular disease, pulmonary hypertension and diastolic dysfunction.  He also has a poor understanding of his medical status, probably related to complications from the stroke.     Interval history:     Since last visit patient had a CT head for evaluation of headache/"abnormal sensation" to right temporal area revealing new subtle region of hypoattenuation involving the medial right occipital lobe, of uncertain etiology.  Differential to include not limited to subacute infarct versus underlying lesion.  Recommend further evaluation brain MRI with and without contrast. Patient underwent MRI of the brain on 3/17 revealing 3.5 cm acute cortical infarct in the medial right occipital lobe. the remainder of the brain parenchyma and CSF spaces are unremarkable.  There are chronic lacunar infarcts in the basal ganglia bilaterally. No mass lesion or definite acute hemorrhage is seen. Felt abnormal sensation. Patient was instructed to go to ER for possible stroke by Dr. Cantrell however patient's wife states she didn't want to expose him to possible " "corona virus and his symptoms were stable and had started several weeks prior. The patient has a virtual appointment today with Neurology.    He started Bortezomib and Decadron from 10/16/19. He has finished days 1 and 8 of cycle 5. He was hospitalized for fatigue and anemia in Feb 2020. He missed days 15 and 22 of his chemotherapy (2/18 and 2/25/2020). Cycle 6 was due 3/4 but delayed due to fatigue and weakness.     Today:  Patient's wife reports vital signs checked at home, BP today, 127/71, pulse 72. Patient states he is feeling "a little better" today. Reports some weakness with ambulating, no focal neuro deficits and no upper extremity weakness. He denies headaches, mild stable blurry vision. He dizziness or syncope.  Denies n/v/d. States she is eating well. Denies fevers, sore throat or cough. Reports occasional neck pain    Review of Systems   Constitutional: Positive for malaise/fatigue. Negative for chills and fever.        Improved   HENT: Negative for ear discharge, nosebleeds and tinnitus.    Eyes: Positive for blurred vision. Negative for double vision and photophobia.   Respiratory: Negative for cough, hemoptysis and sputum production.    Cardiovascular: Negative for palpitations, orthopnea, claudication, leg swelling and PND.   Gastrointestinal: Negative for abdominal pain and heartburn.   Genitourinary: Negative for dysuria and frequency.   Musculoskeletal: Negative for back pain, joint pain, myalgias and neck pain.   Neurological: Negative for dizziness, speech change, focal weakness, seizures and loss of consciousness.   Endo/Heme/Allergies: Negative for environmental allergies. Does not bruise/bleed easily.   Psychiatric/Behavioral: Negative for depression, hallucinations, substance abuse and suicidal ideas.       Current Outpatient Medications   Medication Sig    amLODIPine (NORVASC) 10 MG tablet Take 1 tablet (10 mg total) by mouth once daily.    blood sugar diagnostic Strp 1 strip by " "Misc.(Non-Drug; Combo Route) route after meals as needed.    calcitRIOL (ROCALTROL) 0.25 MCG Cap Take 2 capsules (0.5 mcg total) by mouth once daily.    carvediloL (COREG) 25 MG tablet Take 1 tablet (25 mg total) by mouth 2 (two) times daily with meals. (Patient taking differently: Take 25 mg by mouth 2 (two) times daily with meals. Pt taking 1/2 pill bid.)    clopidogreL (PLAVIX) 75 mg tablet Take 1 tablet (75 mg total) by mouth once daily.    dexAMETHasone (DECADRON) 4 MG Tab Take 5 tablets (20 mg total) by mouth As instructed. Take once weekly with Velcade injection    diclofenac sodium 1.5 % Drop APPLY 40 DROPS TO AFFECTED AREA(S) FOUR TIMES DAILY    flash glucose scanning reader (FREESTYLE CLAUDIO 14 DAY READER) [facility] Use as directed.    flash glucose sensor (FREESTYLE CLAUDIO 14 DAY SENSOR) Kit Change every 14 days.    fluticasone (FLONASE) 50 mcg/actuation nasal spray 1 spray by Each Nare route 2 (two) times daily as needed for Rhinitis. (Patient taking differently: 1 spray by Each Nare route as needed for Rhinitis. )    furosemide (LASIX) 40 MG tablet Take 1 tablet (40 mg total) by mouth 2 (two) times daily. Take twice a day now    gabapentin (NEURONTIN) 300 MG capsule Take 1 capsule (300 mg total) by mouth daily as needed.    insulin (LANTUS SOLOSTAR U-100 INSULIN) glargine 100 units/mL (3mL) SubQ pen Inject 20 units at night.    insulin aspart U-100 (NOVOLOG) 100 unit/mL (3 mL) InPn pen Inject  6 units (light meal), 10 units (larger meal), scale 150-200+2, 201-250+4, 251-300+6, 301-350+8. Snack dose 3 units. Max daily 60 units.    insulin needles, disposable, 31 X 5/16 " Ndle Pt to use pen needle with Lantus daily    lidocaine (LIDODERM) 5 % APPLY UP TO 3 PATCHES TO THE AFFECTED AREA(S) FOR 12 HOURS DAILY AND REMOVE FOR 12 HOURS    lidocaine (XYLOCAINE) 5 % Oint ointment APPLY TO THE AFFECTED AREA(S) THREE TO FOUR TIMES DAILY    LIDOTRAL 3.88 % Crea APPLY TO THE AFFECTED AREA 2 TIMES TO 3 TIMES " DAILY    linaGLIPtin (TRADJENTA) 5 mg Tab tablet Take 1 tablet (5 mg total) by mouth once daily.    meclizine (ANTIVERT) 25 mg tablet Take 1 tablet (25 mg total) by mouth 3 (three) times daily as needed for Dizziness.    methyl salicylate 25 % Crea APPLY TO THE AFFECTED AREA(S) THREE TO FOUR TIMES DAILY    montelukast (SINGULAIR) 10 mg tablet Take 1 tablet (10 mg total) by mouth every evening.    nitroGLYCERIN (NITROSTAT) 0.4 MG SL tablet Place 1 tablet (0.4 mg total) under the tongue every 5 (five) minutes as needed for Chest pain.    omeprazole (PRILOSEC OTC) 20 MG tablet Take 1 tablet (20 mg total) by mouth once daily.    ondansetron (ZOFRAN-ODT) 8 MG TbDL Take 1 tablet (8 mg total) by mouth every 6 (six) hours as needed.    vitamin D (VITAMIN D3) 1000 units Tab Take 1,000 Units by mouth once daily.     No current facility-administered medications for this visit.           1/13/20 CT thoracic and lumbar spines          FINDINGS:  Thoracic spine:    Thoracic vertebral body heights are maintained.  Mild disc space loss at multiple levels.  No acute fractures or traumatic subluxations.  No osseous lytic or destructive process.    Mild degenerative changes noted within the thoracic spine.  No significant spinal canal stenosis or neural foraminal narrowing evident at any level.    Limited visualization of the soft tissue structures at the base of the neck show no significant abnormalities.  Coronary artery atherosclerosis and mild cardiomegaly noted.  Mild ground-glass attenuation within the lung parenchyma presumably related to respiratory motion.  Probable small hiatal hernia.  Hypodense lesions within the right renal parenchyma, too small to characterize and likely represent renal cysts.  Normal course of the thoracic aorta with mild atherosclerotic calcifications.    Lumbar spine:    Lumbar vertebral body heights and disc spaces are maintained.  No acute fractures or traumatic subluxations.  No osseous  lytic or destructive process.    Limited intra-abdominal evaluation shows no bowel obstruction.  Paraspinal muscle bulk is maintained.  Nonspecific perinephric stranding.  Accessory splenule.  Probable right renal cysts.    T12-L1: No spinal canal stenosis or neural foraminal narrowing.    L1-L2: No spinal canal stenosis or neural foraminal narrowing.    L2-L3: No spinal canal stenosis or neural foraminal narrowing.    L3-L4: Mild diffuse disc bulge, ligamentum flavum hypertrophy, and mild bilateral facet arthropathy resulting in mild right neural foraminal narrowing and no greater than mild spinal canal stenosis.    L4-L5: Mild diffuse disc bulge, ligamentum flavum hypertrophy, and mild bilateral facet arthropathy resulting in mild bilateral neural foraminal narrowing and no greater than mild spinal canal stenosis.    L5-S1: Mild diffuse disc bulge, ligamentum flavum hypertrophy, and mild bilateral facet arthropathy resulting in mild to moderate right neural foraminal narrowing and no spinal canal stenosis.    Limited evaluation of the SI joints show no significant abnormalities.       Impression         No acute fractures or traumatic subluxations.    Mild degenerative changes of the thoracic and lumbar spine, worst at L3-L4 and L4-L5 as discussed above.    Multiple right renal hypodensities, likely renal cyst.    Coronary artery atherosclerosis and mild cardiomegaly.         1/13/20 CT abdomen/pelvis w/o cont          COMPARISON:  PET-CT 01/06/2017, CT 08/12/2014    FINDINGS:  Images of the lower thorax are remarkable for bilateral dependent atelectasis.  There is calcification in the distribution of the coronary arteries.  The heart is prominent.  There is a small pericardial effusion.    The liver, spleen, pancreas, gallbladder and adrenal glands have a grossly unremarkable noncontrast appearance.  There is no biliary dilation.  The pancreatic duct is not dilated.  No significant abdominal  lymphadenopathy.    There is prominent bilateral perinephric fat stranding.  There is a 1.5 cm low attenuating lesion arising from the interpolar region of the right kidney, attenuation of which suggests cyst.  An additional cyst is noted arising from the interpolar region measuring 1.3 cm.  There is no hydronephrosis or nephrolithiasis.  The bilateral ureters are unremarkable without calculi seen.  The urinary bladder is decompressed noting there is wall thickening.  The prostate is not enlarged.    The distal large bowel is decompressed.  There are a few scattered colonic diverticula without surrounding inflammation.  The terminal ileum and appendix are unremarkable.  The small bowel is unremarkable.  There is minimal strand-like fluid in the mesentery.  There is atherosclerotic calcification of the aorta and its branches.    Degenerative changes are noted of the pubic symphysis, noting possible remote fracture involving the anterior aspect of the left superior pubic ramus.  Degenerative changes are noted of the spine.  Remote anterior left rib injury noted.  There are bilateral fat containing inguinal hernias.       Impression         1. Prominent bilateral perinephric fat stranding, similar to the previous examination, correlation with urinalysis recommended as there is urinary bladder wall thickening.  2. Please see separately dictated report for details of the cervical spine and lumbar spine.  3. Cardiomegaly with mild pericardial effusion.  4. Additional findings above.         Component      Latest Ref Rng & Units 2/5/2020   IgG - Serum      650 - 1600 mg/dL 636 (L)   IgA      40 - 350 mg/dL 98   IgM      50 - 300 mg/dL 91   Kappa Free Light Chains      0.33 - 1.94 mg/dL 16.81 (H)   Lambda Free Light Chains      0.57 - 2.63 mg/dL 2.02   Kappa/Lambda FLC Ratio      0.26 - 1.65 8.32 (H)     2/5/20 SPEP: Decreased total protein. Normal gamma globulins are decreased. There is a paraprotein band in gamma  measuring 0.40 g/dL, previously 0.68 g/dL.     Assessment:     1. IgG kappa multiple myeloma not in remission  2. Anemia secondary to CKD   3. Anemia secondary to multiple myeloma   4.  Diabetes mellitus with neuropathy, nephropathy, retinal disease.  5.  Cardiomyopathy with chronic diastolic heart failure.  6.  h/o CVA  7. Essential hypertension  8. CKD stage IV     PLAN:     1. He did not tolerate treatment with lenalidomide/Decadron well. It is unclear if he has renal dysfunction from multiple myeloma as well, in addition to diabetic nephropathy and hypertensive nephropathy.  SPEP on 5/21/19 showed slight increase in M protein from Dec 2018 (1.13g/dL versus 0.88g/dL). It was 1.24g/dL on 9/11/19.     He is not a transplant candidate (poor insight into his health condition, renal dysfunction from non myeloma conditions, poorly controled hypertension). Serum free light chains increased, with the ratio in May 2019 at 28.15 (24.09 in December 2018) and to 38.75 in Sept 2019. Creatinine had increased from 3.1 in Dec 2018 to 3.6 in May 2019.Calcium is normal. No bone pain / back pain. Chemotherapy resumed with Velcade and low dose Decadron (12 mg weekly due to hyperglycemia and type 2 DM) as well as plan for Zolendronic acid. He was evaluated by a dentist in Nov 2019. It is not clear if he has more dental procedures that are planned. He started weekly velcade and weekly Decadron 12mg (due to co-morbidities) from 10/15/19. Decadron refilled today.  M spike on SPEP decreased from 1.24g/dL in Sept 2019 to 0.68 g/dL in Dec 2019 (after 2 cycles). sFLC ratio has decreased to 25.07 from 38.75 and to 8.32 on 2/5/20.  Weakness and fatigue were attributed to chemotherapy per patient and family therefore cycle 6 that was due 3/4/2020 was held for an additional 2 weeks.   Will await results of today's labs and Neurology visit from later today to determine re-initiation of chemo. Plan to restart with cycle 6 of Lucas/Dex on 3/25/2020.  On Acyclovir prophylaxis. Zometa still on hold as he is awaiting dental clearance. He is on calcium-vit D        2,3.  Hgb stable today and transfusion independent     4. On long term insulin. Follows with his primary care physician. Dose reduced Decadron     5.  Follows with cardiology. On Coreg, Lipitor, lasix    6. History of CVA. Recent MRI shows new area of infarction. Patient refused to go to ED on 3/17. Has virtual visit with Neurology today     7. Took BP at home and wnl today. He takes Amlodipine, Carvedilol, Clonidine.      8. He follows with nephrology. Cr stable at 3.2 today    Follow-up:  Will message patient with lab results and recommendations. Plan to restart therapy with cycle 6 of Lucas/Dex on Wednesdays 3/25 with labs and clinic follow-up prior to cycle 7.    Nancy Paulino NP  Hematology/BMT

## 2020-03-21 PROBLEM — A41.9 SEPSIS: Status: ACTIVE | Noted: 2020-01-01

## 2020-03-22 PROBLEM — J96.01 ACUTE RESPIRATORY FAILURE WITH HYPOXIA: Status: ACTIVE | Noted: 2020-01-01

## 2020-03-22 PROBLEM — I21.4 NSTEMI (NON-ST ELEVATED MYOCARDIAL INFARCTION): Status: ACTIVE | Noted: 2020-01-01

## 2020-03-22 PROBLEM — I35.0 MODERATE TO SEVERE AORTIC STENOSIS: Status: ACTIVE | Noted: 2020-01-01

## 2020-03-22 PROBLEM — R65.20 SEVERE SEPSIS: Status: ACTIVE | Noted: 2020-01-01

## 2020-03-22 PROBLEM — J18.9 PNEUMONIA OF RIGHT LOWER LOBE DUE TO INFECTIOUS ORGANISM: Status: ACTIVE | Noted: 2020-01-01

## 2020-03-22 PROBLEM — D63.8 ANEMIA, CHRONIC DISEASE: Status: ACTIVE | Noted: 2020-01-01

## 2020-03-22 PROBLEM — I50.42 CHRONIC COMBINED SYSTOLIC AND DIASTOLIC HEART FAILURE: Status: ACTIVE | Noted: 2020-01-01

## 2020-03-22 PROBLEM — N18.4 ACUTE RENAL FAILURE SUPERIMPOSED ON STAGE 4 CHRONIC KIDNEY DISEASE: Status: ACTIVE | Noted: 2018-04-11

## 2020-03-22 NOTE — ED NOTES
Pt in room, sats 76% moved to rm 13, pt bagged,  placed on nonrebreather  In room 13 Dr lopez called to room.

## 2020-03-22 NOTE — PLAN OF CARE
VN cued into room for q2h rounding.  Pt resting comfortably in bed, face mask in place, pt talking on phone.  No needs or complaints at this time.  Call light within reach, fall precautions maintained.  VN will continue to follow and be available as needed.

## 2020-03-22 NOTE — PLAN OF CARE
VN cued into room for q2h rounding.  Pt resting comfortably in bed, face mask in place.  No needs or complaints at this time.  Call light within reach, fall precautions maintained.  VN will continue to follow and be available as needed.

## 2020-03-22 NOTE — PLAN OF CARE
VN cued into room for q2h rounding.  Pt resting comfortably in bed, pt currently sleeping, respirations even and unlabored.  VN will continue to follow and be available as needed.

## 2020-03-22 NOTE — ED PROVIDER NOTES
History       Chief Complaint   Patient presents with    Weakness     Pt presents to ED today c/o generalized weakness, cough, and glucose reading of 256 per EMS     Hyperglycemia    Cough     o2 sat 86 upon EMS arrival pt placed on 2 L NC             Marie Reis Jr. 64 y.o. presents to the emergency department today with a complaint of weakness, cough, shortness of breath.  Patient has been experiencing this for the last day to 2 days.  Patient denies having any sputum production.  Denies fever, chills or sweats.  Patient was picked up by EMS and upon arrival patient's sats were 86%.  He was placed on nasal cannula and had improvement.  Patient denies any nausea vomiting or diarrhea.  Denies chest pain, abdominal pain.  He does report cough and shortness of breath.    ROS    Constitutional:  See above  Eyes: No discharge. No pain.  HENT: No nasal drainage. No ear ache. No sore throat.  Cardiovascular: No chest pain, no palpitations.  Respiratory:  See above.  Gastrointestinal: No abdominal pain, no vomiting. No diarrhea.  Genitourinary: No hematuria, dysuria, urgency.  Musculoskeletal: No back pain.   Skin: No rashes, no lesions.  Neurological: No headache, no focal weakness.    Otherwise remaining ROS negative     The history is provided by the patient      ALLERGIES REVIEWED  MEDICATIONS REVIEWED  PMH/PSH/SOC/FH REVIEWED       Past Medical History:   Diagnosis Date    Binocular vision disorder with diplopia 9/22/2016    Bradycardia     Cataract     Cervical spondylosis 10/1/2015    Chronic diastolic congestive heart failure     Chronic diastolic heart failure 1/11/2018    Coronary artery disease due to calcified coronary lesion 3/19/2018    Dyslipidemia 6/26/2015    Encounter for blood transfusion     Hearing impairment 10/25/2013    History of stroke 3/23/2015    Hypertension associated with diabetes 1/11/2018    Hypertensive retinopathy of both eyes 9/26/2017    Lumbar spondylosis 10/1/2015     Multiple myeloma not having achieved remission 1/16/2018    SUZANNA on CPAP     Persistent proteinuria 1/11/2018    Spondylolisthesis of lumbar region 10/1/2015    Strabismus     Stroke 2014    Thoracic spondylosis 10/1/2015    Type 2 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, with long-term current use of insulin 9/26/2017    Type 2 diabetes mellitus with diabetic polyneuropathy, with long-term current use of insulin 9/28/2015    Type 2 diabetes mellitus with stage 4 chronic kidney disease, with long-term current use of insulin 1/11/2018         Past Surgical History:   Procedure Laterality Date    CATARACT EXTRACTION W/  INTRAOCULAR LENS IMPLANT Left 4/30/15    Diane    COLONOSCOPY N/A 1/20/2020    Procedure: COLONOSCOPY;  Surgeon: Salvador Espinosa MD;  Location[facility];  Service: Endoscopy;  Laterality: N/A;    EYE SURGERY      cataract removal left eye    HAND SURGERY  2/2012    DR. BAKER [facility]    left hand fracture sx      LT EYE   5/2/15    DR KULLMAN [facility]         Social History     Tobacco Use    Smoking status: Never Smoker    Smokeless tobacco: Never Used   Substance Use Topics    Alcohol use: No     Frequency: Never     Drinks per session: Patient refused     Binge frequency: Never    Drug use: No       Family History   Problem Relation Age of Onset    Diabetes Mother     Cancer Mother     Kidney disease Mother     Diabetes Father     Heart attack Father     Diabetes Sister     Diabetes Brother     No Known Problems Maternal Aunt     No Known Problems Maternal Uncle     No Known Problems Paternal Aunt     No Known Problems Paternal Uncle     No Known Problems Maternal Grandmother     No Known Problems Maternal Grandfather     No Known Problems Paternal Grandmother     No Known Problems Paternal Grandfather     Blindness Neg Hx     Anesthesia problems Neg Hx     Amblyopia Neg Hx     Cataracts Neg Hx     Glaucoma  Neg Hx     Hypertension Neg Hx     Macular degeneration Neg Hx     Retinal detachment Neg Hx     Strabismus Neg Hx     Stroke Neg Hx     Thyroid disease Neg Hx     Heart disease Neg Hx     Heart failure Neg Hx     Hyperlipidemia Neg Hx        Nursing/Ancillary staff note reviewed.  VS reviewed         Physical Exam     BP (!) 144/83   Pulse 105   Temp (!) 103.9 °F (39.9 °C)   Resp (!) 24   SpO2 96%     Physical Exam    General Appearance: The patient is alert, he is answering questions appropriately.  He is hot to the touch. He is requiring supplemental O2.   HEENT:  Normocephalic atraumatic.       Eyes: Pupils equal and round no pallor or injection. Extra ocular movements intact. No drainage.       Mouth: Mucous membranes are moist. Oropharynx clear.   Neck:Neck is supple non-tender. No lymphadenopathy. No stridor.   Respiratory: There are no retractions, lungs are coarse to auscultation No wheezing, no crackles. Chest wall nontender to palpation.   Cardiovascular: Regular rate and rhythm. No murmurs, rubs or gallops.  Gastrointestinal:  Abdomen is soft and non-tender, no masses, bowel sounds normal. No guarding, no rebound.  No pulsatile mass.   Neurological: Alert and oriented. CN II-XII grossly intact. No focal weakness. Strength intact symmetric in upper and lower extremities.   Skin: Warm and dry, no rashes.  Musculoskeletal:Musculoskeletal: Extremities are non-tender, non-swollen and have full range of motion. Back NTTP along the midline.     DIFFERENTIAL DIAGNOSIS: After history and physical exam a differential diagnosis was considered, but was not limited to, sepsis, pneumonia, UTI, intraabdominal infection, CNS infection, skin/soft tissue infection         Initial management:  Marie Reis Jr. presents to the ED today with cough, weakness, fever.  The pt has triggered sepsis activation due to their fever and likely source with hypoxia. Will obtain labs including basic labs, influenza, lactic  acid and blood cultures along with CXR. The pts BP is not  Hypotensive, will judiciously give fluids as pt has coarse BS on exam and is hypoxic. Broad spectrum ABX will be started to cover for infectious etiology.               Labs Reviewed   CBC W/ AUTO DIFFERENTIAL - Abnormal; Notable for the following components:       Result Value    RBC 3.31 (*)     Hemoglobin 8.9 (*)     Hematocrit 27.9 (*)     Mean Corpuscular Hemoglobin 26.9 (*)     Mean Corpuscular Hemoglobin Conc 31.9 (*)     Immature Granulocytes 1.3 (*)     Immature Grans (Abs) 0.12 (*)     Gran% 81.4 (*)     Lymph% 10.7 (*)     All other components within normal limits   POCT GLUCOSE - Abnormal; Notable for the following components:    POCT Glucose 240 (*)     All other components within normal limits   CULTURE, BLOOD   CULTURE, BLOOD   INFLUENZA A & B BY MOLECULAR   COMPREHENSIVE METABOLIC PANEL   LACTIC ACID, PLASMA   URINALYSIS, REFLEX TO URINE CULTURE   B-TYPE NATRIURETIC PEPTIDE   TROPONIN I   PROCALCITONIN   SARS-COV-2 (COVID-19) QUALITATIVE PCR         X-Ray Chest AP Portable    (Results Pending)       EK beats per minute, sinus tachycardia, LVH, no ST elevations, he has some T-wave inversions in lead V3 V4 V5 V6.  Similar to previous 20                ED Course        patient care turned over to Dr. Ivan at shift change awaiting results of workup.  I anticipate admission. Pt is currently on O2, responsive to questions. Critical but stable condition on turnover.       MDM         Procedures              Voice recognition software utilized in this note.              Impression        Sepsis.                              Shantel Godinez MD  [phone]

## 2020-03-22 NOTE — PLAN OF CARE
Patient aaox3, vss, face musk on , given all am med, POC explained patient verbalized understanding, will cont to monitor.bed low call light in reach

## 2020-03-22 NOTE — PLAN OF CARE
Attempted to ask the patient his admit questions.  He was too sleepy to stay awake and answer them.  Will attempt again in the AM.

## 2020-03-22 NOTE — ED NOTES
Reassessed pt at bedside for care handoff. Pt alert and oriented x4. Pt responds appropriately. Pt updated on results and is calm/ cooperative. Pt denies N/V/D, h/a, or dizziness. Pt reports SOB. No retractions or accessory muscles needed. Pt reports understanding of everything going on. Venturi mask 50%

## 2020-03-22 NOTE — PLAN OF CARE
VN note: Aptt 46.7 - therapeutic according to nomogram. Heparin continues at same rate; 5.6 ml/hr per MAR. Next aptt ordered for 2200. Will continue to monitor.

## 2020-03-22 NOTE — ED TRIAGE NOTES
Spoke with pt wife pt last seen well 9pm last night wife denied fever , sob or cough, c/o weakness, pt received with altered mental status, sats  In 70's, pt had stroke 2 weeks ago, ambulates at home, no home o2 , just finished pt.

## 2020-03-22 NOTE — PLAN OF CARE
Weakness (Pt presents to ED today c/o generalized weakness, cough, and glucose reading of 256 per EMS ); Hyperglycemia; and Cough (o2 sat 86 upon EMS arrival pt placed on 2 L NC )   for 2 days      The Sw spoke to to the pt's wife Naheed Reis [phone] via phone. The pt lives in [place] with his wife and son. The pt and his wife are retired. The pt's wife transports him and she will transport the pt home at d/c. The pt's independent with his adl's and he uses dme at home. The [facility] is a retired  and [facility]. The Sw left her name and contact info with the pt's wife and encouraged her to call should she have any questions or concerns. The Sw will continue to follow the pt throughout his transitions of care and will assist with any d/c needs.        03/22/20 1301   Discharge Assessment   Assessment Type Discharge Planning Assessment   Confirmed/corrected address and phone number on facesheet? Yes   Assessment information obtained from? Caregiver   Prior to hospitilization cognitive status: Alert/Oriented   Prior to hospitalization functional status: Independent;Assistive Equipment   Current cognitive status: Alert/Oriented   Current Functional Status: Assistive Equipment;Independent   Lives With child(zuhair), adult;spouse   Able to Return to Prior Arrangements yes   Is patient able to care for self after discharge? Unable to determine at this time (comments)   Who are your caregiver(s) and their phone number(s)? Naheed Reis(wife)[phone]   Patient's perception of discharge disposition home or selfcare   Readmission Within the Last 30 Days no previous admission in last 30 days   Patient currently being followed by outpatient case management? No   Patient currently receives any other outside agency services? No   Equipment Currently Used at Home cane, straight;rollator;CPAP   Do you have any problems affording any of your prescribed medications? No  ([facility] )   Is the  patient taking medications as prescribed? yes   Does the patient have transportation home? Yes   Transportation Anticipated family or friend will provide   Does the patient receive services at the [facility]? No   Discharge Plan A Home with family   Discharge Plan B Home Health   DME Needed Upon Discharge    (TBD)   Patient/Family in Agreement with Plan yes

## 2020-03-22 NOTE — H&P
[facility] Medicine H&P Note     Admitting Team: [facility] [place] [facility] A  Attending Physician: Laurence Zuniga MD  Resident: Arun  Intern: Dinora     Date of Admit: 3/21/2020    Chief Complaint     Weakness (Pt presents to ED today c/o generalized weakness, cough, and glucose reading of 256 per EMS ); Hyperglycemia; and Cough (o2 sat 86 upon EMS arrival pt placed on 2 L NC )   for 2 days     Subjective:      History of Present Illness:  Marie Reis Jr. is a 64 y.o.  male who  has a past medical history of Binocular vision disorder with diplopia (9/22/2016), Bradycardia, Cataract, Cervical spondylosis (10/1/2015), Chronic diastolic congestive heart failure, Chronic diastolic heart failure (1/11/2018), Coronary artery disease due to calcified coronary lesion (3/19/2018), Dyslipidemia (6/26/2015), Encounter for blood transfusion, Hearing impairment (10/25/2013), History of stroke (3/23/2015), Hypertension associated with diabetes (1/11/2018), Hypertensive retinopathy of both eyes (9/26/2017), Lumbar spondylosis (10/1/2015), Multiple myeloma not having achieved remission (1/16/2018), SUZANNA on CPAP, Persistent proteinuria (1/11/2018), Spondylolisthesis of lumbar region (10/1/2015), Strabismus, Stroke (2014), Thoracic spondylosis (10/1/2015), Type 2 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 The patient presented to [facility] on 3/21/2020 with a primary complaint of Weakness (Pt presents to ED today c/o generalized weakness, cough, and glucose reading of 256 per EMS ); Hyperglycemia; and Cough (o2 sat 86 upon EMS arrival pt placed on 2 L NC )    The patient was in their usual state  of health until 2 days ago when he began feeling weak. He states he wasn't feeling SOB or coughing until today. He denies fever, CP, n/v/d, abdominal pain. He was sating 86% when EMS arrived and is now requiring a ventimask.     Past Medical History:  Past Medical History:   Diagnosis Date    Binocular vision disorder with diplopia 9/22/2016    Bradycardia     Cataract     Cervical spondylosis 10/1/2015    Chronic diastolic congestive heart failure     Chronic diastolic heart failure 1/11/2018    Coronary artery disease due to calcified coronary lesion 3/19/2018    Dyslipidemia 6/26/2015    Encounter for blood transfusion     Hearing impairment 10/25/2013    History of stroke 3/23/2015    Hypertension associated with diabetes 1/11/2018    Hypertensive retinopathy of both eyes 9/26/2017    Lumbar spondylosis 10/1/2015    Multiple myeloma not having achieved remission 1/16/2018    SUZANNA on CPAP     Persistent proteinuria 1/11/2018    Spondylolisthesis of lumbar region 10/1/2015    Strabismus     Stroke 2014    Thoracic spondylosis 10/1/2015    Type 2 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, with long-term current use of insulin 9/26/2017    Type 2 diabetes mellitus with diabetic polyneuropathy, with long-term current use of insulin 9/28/2015    Type 2 diabetes mellitus with stage 4 chronic kidney disease, with long-term current use of insulin 1/11/2018     Past Surgical History:  Past Surgical History:   Procedure Laterality Date    CATARACT EXTRACTION W/  INTRAOCULAR LENS IMPLANT Left 4/30/15    Diane    COLONOSCOPY N/A 1/20/2020    Procedure: COLONOSCOPY;  Surgeon: Salvador Espinosa MD;  Location: [facility];  Service: Endoscopy;  Laterality: N/A;    EYE SURGERY      cataract removal left eye    HAND SURGERY  2/2012    DR. BAKER [facility]    left hand fracture sx      LT EYE   5/2/15    DR KULLMAN [facility]       Allergies:  Review of  patient's allergies indicates:   Allergen Reactions    Hydralazine analogues      Increase swelling and throat itching and tightness with use.  Symptoms correlate only with this medication onset in hospital.       Home Medications:  Prior to Admission medications    Medication Sig Start Date End Date Taking? Authorizing Provider   amLODIPine (NORVASC) 10 MG tablet Take 1 tablet (10 mg total) by mouth once daily. 2/17/20   Debbie Salinas MD   blood sugar diagnostic Strp 1 strip by Misc.(Non-Drug; Combo Route) route after meals as needed. 1/14/14   Vince Kelly MD   calcitRIOL (ROCALTROL) 0.25 MCG Cap Take 2 capsules (0.5 mcg total) by mouth once daily. 1/13/20   Alissa Dotson MD   carvediloL (COREG) 25 MG tablet Take 1 tablet (25 mg total) by mouth 2 (two) times daily with meals.  Patient taking differently: Take 25 mg by mouth 2 (two) times daily with meals. Pt taking 1/2 pill bid. 2/17/20   Debbie Salinas MD   clopidogreL (PLAVIX) 75 mg tablet Take 1 tablet (75 mg total) by mouth once daily. 2/17/20   Debbie Salinas MD   dexAMETHasone (DECADRON) 4 MG Tab Take 5 tablets (20 mg total) by mouth As instructed. Take once weekly with Velcade injection 3/19/20 3/19/21  Nancy Paulino NP   diclofenac sodium 1.5 % Drop APPLY 40 DROPS TO AFFECTED AREA(S) FOUR TIMES DAILY 8/13/18   Historical Provider, MD   flash glucose scanning reader (FREESTYLE CLAUDIO 14 DAY READER) [facility] Use as directed. 7/24/19   BELEM Gardiner FNP   flash glucose sensor (FREESTYLE CLAUDIO 14 DAY SENSOR) Kit Change every 14 days. 3/2/20   BELEM Gardiner, NAVI   fluticasone (FLONASE) 50 mcg/actuation nasal spray 1 spray by Each Nare route 2 (two) times daily as needed for Rhinitis.  Patient taking differently: 1 spray by Each Nare route as needed for Rhinitis.  9/9/16   Nancy Colón MD   furosemide (LASIX) 40 MG tablet Take 1 tablet (40 mg total) by mouth 2 (two) times daily. Take twice a day now 2/26/20    "Milla Horowitz MD   gabapentin (NEURONTIN) 300 MG capsule Take 1 capsule (300 mg total) by mouth daily as needed. 9/11/19   Nancy Colón MD   insulin (LANTUS SOLOSTAR U-100 INSULIN) glargine 100 units/mL (3mL) SubQ pen Inject 20 units at night. 3/19/20   BELEM Gardiner FNP   insulin aspart U-100 (NOVOLOG) 100 unit/mL (3 mL) InPn pen Inject  6 units (light meal), 10 units (larger meal), scale 150-200+2, 201-250+4, 251-300+6, 301-350+8. Snack dose 3 units. Max daily 60 units. 3/19/20   BELEM Gardiner FNP   insulin needles, disposable, 31 X 5/16 " Ndle Pt to use pen needle with Lantus daily 2/9/15   Nancy Colón MD   lidocaine (LIDODERM) 5 % APPLY UP TO 3 PATCHES TO THE AFFECTED AREA(S) FOR 12 HOURS DAILY AND REMOVE FOR 12 HOURS 12/20/19   Historical Provider, MD   lidocaine (XYLOCAINE) 5 % Oint ointment APPLY TO THE AFFECTED AREA(S) THREE TO FOUR TIMES DAILY 2/3/20   Historical Provider, MD   LIDOTRAL 3.88 % Crea APPLY TO THE AFFECTED AREA 2 TIMES TO 3 TIMES DAILY 8/13/18   Historical Provider, MD   linaGLIPtin (TRADJENTA) 5 mg Tab tablet Take 1 tablet (5 mg total) by mouth once daily. 3/19/20 3/19/21  BELME Gardiner, NAVI   meclizine (ANTIVERT) 25 mg tablet Take 1 tablet (25 mg total) by mouth 3 (three) times daily as needed for Dizziness. 3/10/18   Nancy Colón MD   methyl salicylate 25 % Crea APPLY TO THE AFFECTED AREA(S) THREE TO FOUR TIMES DAILY 1/28/20   Historical Provider, MD   montelukast (SINGULAIR) 10 mg tablet Take 1 tablet (10 mg total) by mouth every evening. 9/9/16   Nancy Colón MD   nitroGLYCERIN (NITROSTAT) 0.4 MG SL tablet Place 1 tablet (0.4 mg total) under the tongue every 5 (five) minutes as needed for Chest pain. 2/17/20   Debbie Salinas MD   omeprazole (PRILOSEC OTC) 20 MG tablet Take 1 tablet (20 mg total) by mouth once daily. 10/9/19 10/8/20  Carol Cantrell MD   ondansetron (ZOFRAN-ODT) 8 MG TbDL Take 1 tablet (8 mg total) by mouth every 6 " (six) hours as needed. 9/19/16   Gumaro Weinstein MD   vitamin D (VITAMIN D3) 1000 units Tab Take 1,000 Units by mouth once daily.    Historical Provider, MD       Family History:  Family History   Problem Relation Age of Onset    Diabetes Mother     Cancer Mother     Kidney disease Mother     Diabetes Father     Heart attack Father     Diabetes Sister     Diabetes Brother     No Known Problems Maternal Aunt     No Known Problems Maternal Uncle     No Known Problems Paternal Aunt     No Known Problems Paternal Uncle     No Known Problems Maternal Grandmother     No Known Problems Maternal Grandfather     No Known Problems Paternal Grandmother     No Known Problems Paternal Grandfather     Blindness Neg Hx     Anesthesia problems Neg Hx     Amblyopia Neg Hx     Cataracts Neg Hx     Glaucoma Neg Hx     Hypertension Neg Hx     Macular degeneration Neg Hx     Retinal detachment Neg Hx     Strabismus Neg Hx     Stroke Neg Hx     Thyroid disease Neg Hx     Heart disease Neg Hx     Heart failure Neg Hx     Hyperlipidemia Neg Hx        Social History:  Social History     Tobacco Use    Smoking status: Never Smoker    Smokeless tobacco: Never Used   Substance Use Topics    Alcohol use: No     Frequency: Never     Drinks per session: Patient refused     Binge frequency: Never    Drug use: No       Review of Systems:  Pertinent items are noted in HPI. All other systems are reviewed and are negative.    Health Maintaince :   Primary Care Physician: Nancy Colón MD    Immunizations:   Immunization History   Administered Date(s) Administered    Influenza 01/12/2011, 11/09/2011, 11/05/2019    Influenza - Quadrivalent 09/09/2016, 10/25/2017    Influenza Split 01/12/2011, 11/09/2011    Pneumococcal Conjugate - 13 Valent 06/26/2015    Pneumococcal Polysaccharide - 23 Valent 07/08/2016    Tdap 09/03/2013     Cancer Screening:  Colonoscopy: utd      Objective:   Last 24 Hour Vital Signs:  BP   "Min: 100/58  Max: 144/83  Temp  Av.9 °F (37.7 °C)  Min: 97.1 °F (36.2 °C)  Max: 103.9 °F (39.9 °C)  Pulse  Av.2  Min: 76  Max: 105  Resp  Av.8  Min: 15  Max: 36  SpO2  Av.6 %  Min: 90 %  Max: 99 %  Height  Av' 0.5" (184.2 cm)  Min: 6' (182.9 cm)  Max: 6' 1" (185.4 cm)  Weight  Av.6 kg (179 lb 13.5 oz)  Min: 80.6 kg (177 lb 11.1 oz)  Max: 82.6 kg (182 lb)  Body mass index is 23.44 kg/m².  No intake/output data recorded.    Physical Examination: patient seen on video chat   Physical Exam   Constitutional: He is oriented to person, place, and time. He appears well-developed and well-nourished. No distress.   Lying in bed, appears comfortable    HENT:   Head: Normocephalic and atraumatic.   Neck: Normal range of motion.   Pulmonary/Chest: No respiratory distress.   Neurological: He is alert and oriented to person, place, and time.   Psychiatric: He has a normal mood and affect. His behavior is normal.     Laboratory:  Most Recent Data:  CBC:   Lab Results   Component Value Date    WBC 9.37 2020    HGB 8.9 (L) 2020    HCT 27.9 (L) 2020     2020    MCV 84 2020    RDW 13.2 2020     BMP:   Lab Results   Component Value Date     2020    K 3.8 2020     2020    CO2 18 (L) 2020    BUN 34 (H) 2020    CREATININE 4.0 (H) 2020     (H) 2020    CALCIUM 8.6 (L) 2020    MG 1.6 2018    PHOS 3.2 2020     LFTs:   Lab Results   Component Value Date    PROT 6.2 2020    ALBUMIN 2.8 (L) 2020    BILITOT 0.7 2020    AST 33 2020    ALKPHOS 74 2020    ALT 12 2020     Coags:   Lab Results   Component Value Date    INR 1.2 2020   DM:   Lab Results   Component Value Date    HGBA1C 9.1 (H) 2020    HGBA1C 10.5 (H) 2020    HGBA1C 9.2 (H) 12/10/2019    LDLCALC 87.2 2020    CREATININE 4.0 (H) 2020     Thyroid:   Lab Results   Component Value " Date    TSH 1.509 02/12/2020     Anemia:   Lab Results   Component Value Date    IRON 52 02/24/2020    TIBC 185 (L) 02/24/2020    FERRITIN 5,977 (H) 02/24/2020    IOWNAXUK05 639 02/24/2020    FOLATE 12.8 02/24/2020     Cardiac:   Lab Results   Component Value Date    TROPONINI 5.610 (H) 03/21/2020    BNP 4,035 (H) 03/21/2020     Urinalysis:   Lab Results   Component Value Date    LABURIN No significant growth 02/21/2020    COLORU Brown (A) 03/21/2020    SPECGRAV >=1.030 (A) 03/21/2020    NITRITE Negative 03/21/2020    KETONESU Negative 03/21/2020    UROBILINOGEN Negative 03/21/2020    WBCUA 2 03/21/2020       Trended Lab Data:  Recent Labs   Lab 03/19/20  0819 03/21/20 1933   WBC 8.30 9.37   HGB 9.4* 8.9*   HCT 30.4* 27.9*    309   MCV 87 84   RDW 13.2 13.2    138   K 3.4* 3.8    105   CO2 25 18*   BUN 26* 34*   CREATININE 3.2* 4.0*   * 226*   PROT 6.4 6.2   ALBUMIN 2.7* 2.8*   BILITOT 0.6 0.7   AST 13 33   ALKPHOS 85 74   ALT 8* 12       Trended Cardiac Data:  Recent Labs   Lab 03/21/20 1933   TROPONINI 5.610*   BNP 4,035*       Microbiology Data:  Flu negative   covid pending   Blood cx pending     Other Results:  EKG (my interpretation): sinus, rate 101, normal axis, ST depressions and TWIs in lateral leads that are unchanged from previous EKGs    Radiology:  Imaging Results          X-Ray Chest AP Portable (Final result)  Result time 03/21/20 22:10:14    Final result by Saadia Walker MD (03/21/20 22:10:14)                 Impression:      Right lower lobe pneumonia.      Electronically signed by: Saadia Walker  Date:    03/21/2020  Time:    22:10             Narrative:    EXAMINATION:  AP PORTABLE CHEST    CLINICAL HISTORY:  Sepsis;    TECHNIQUE:  AP portable chest radiograph was submitted.    COMPARISON:  02/24/2020    FINDINGS:  AP portable chest radiograph demonstrates enlarged cardiac silhouette.  There is right lower lobe airspace opacity.  There is no pneumothorax or  significant pleural fluid.                               Assessment:     Marie Reis Jr. is a 64 y.o. male with:  Patient Active Problem List    Diagnosis Date Noted    Sepsis 03/21/2020    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, with long-term current use of insulin 03/12/2020    Constipation     Liver function test abnormality     Troponin I above reference range     Transaminitis 02/24/2020    Bilirubinemia 02/24/2020    Moderate episode of recurrent major depressive disorder 02/21/2020    Hemiplegia and hemiparesis following cerebral infarction affecting left non-dominant side 02/21/2020    Positive FIT (fecal immunochemical test) 01/20/2020    Thrombocytopenia 12/11/2019    Dental caries 10/16/2019    Symptomatic anemia 09/09/2019    PAD (peripheral artery disease) 08/28/2019    Diabetic polyneuropathy associated with type 2 diabetes mellitus 01/15/2019    Stage 4 chronic kidney disease 01/15/2019    Aortic atherosclerosis 01/15/2019    Chronic pulmonary heart disease 01/15/2019    Vitreous hemorrhage, right 10/05/2018    Diabetes mellitus with proteinuria 08/21/2018    Vitamin D deficiency 05/16/2018    Type 2 diabetes mellitus with hyperglycemia, with long-term current use of insulin 04/17/2018    Secondary hyperparathyroidism 04/14/2018    Hypertensive CKD (chronic kidney disease) 04/14/2018    Acute kidney injury superimposed on CKD 04/11/2018    Bradycardia     Coronary artery disease due to calcified coronary lesion 03/19/2018    Bilateral carotid artery stenosis 03/19/2018    Multiple myeloma not having achieved remission 01/16/2018    Type 2 diabetes mellitus with stage 4 chronic kidney disease, with long-term current use of insulin 01/11/2018    Benign hypertension with chronic kidney disease, stage III 01/11/2018    Persistent proteinuria 01/11/2018    Acute on chronic combined systolic and diastolic  congestive heart failure 01/11/2018    Chronic diastolic congestive heart failure 11/13/2017    Hypertensive retinopathy of both eyes 09/26/2017    Hypertropia of left eye 09/22/2016    Binocular vision disorder with diplopia 09/22/2016    History of right PCA stroke 09/17/2016    Cervical spondylosis 10/01/2015    Thoracic spondylosis 10/01/2015    Lumbar spondylosis 10/01/2015    Spondylolisthesis of lumbar region 10/01/2015    Type 2 diabetes mellitus with diabetic polyneuropathy, with long-term current use of insulin 09/28/2015    Dyslipidemia 06/26/2015    History of stroke 03/23/2015    Essential hypertension 01/12/2014    Hearing impairment 10/25/2013        Plan:     Acute hypoxic respiratory failure 2/2 PNA vs COVID  - on 2L nc-->escalated to ventimask   - CXR: RLL PNA   - given cefepime and azithro, will continue abx and add tamiflu   - consult ID in the AM for plaquenil recs     Severe Sepsis   - Tmax 103.9, RR 24,  with elevated troponin and creatinine   - given cefepime and azithro, will continue abx and add tamiflu   - lactate WNL  - procal elevated     NSTEMI   - troponin of 5 with no acute EKG changes  - patient not complaining of CP  - heparin gtt, full dose ASA, and plavix   - trend trops and EKGs  - consult cards in the AM     Hx of CVA  - R PCA infart on 3/17/2020   - CVA in 2014 with residual LLE weakness   - cont home Plavix and Lipitor     JAZLYN on CKD4  - Cr 4 on admit, baseline ~3.5  - will monitor     AOCD  - Hb 8.9 on admit   - denies active bleeding   - will monitor     Combined systolic and diastolic HF  - TTE in 2/26/2020 with EF 25% and grade 2 diastolic dysfunction with moderate to severe pulmonary HTN and moderate to severe aortic stenosis   - BNP 4,035, 3893 last month   - cont home lasix 40 bid     Multiple myeloma  - being treated with bortezomib   - due for cycle 6 on 3/25    CAD  - cont home ASA, plavix   - currently on heparin gtt     HTN  - cont home  amlodipine and coreg   - normotensive     DMT2 with complications with long term use of insulin   - A1c in 3/2020 9.1  - home regimen: levemir 20 qhs with SSI   - will give levemir 10 tonight and adjust as needed   - hold home linagliptin     Code Status:     Full     Cathy Farias MD  [facility] Medicine HO-I     [facility] Medicine Hospitalist Pager numbers:   [facility] Hospitalist Medicine Team A (Anson/Nadia): [phone]  [facility] Hospitalist Medicine Team B (Shashank/Aniket):  [phone]

## 2020-03-22 NOTE — PLAN OF CARE
Attempted to ask the patient his admit questions.  He was too sleepy to stay awake and answer them. Floor nurse notified..

## 2020-03-22 NOTE — CONSULTS
[facility] Consult     Primary Team: Laurence Zuniga MD  Consultant Attending: Dr. Ludy SANTAMARIA  Date of Admit: 3/21/2020    Reason for Consult     COVID 19 rule out & hydroxychlorquine recommendations     History of Present Illness     The patient was in their usual state of health until 2 days ago when he began feeling weak. He states he wasn't feeling SOB or coughing until today. He denies fever, CP, n/v/d, abdominal pain. He was sating 86% when EMS arrived and is now requiring a ventimask.     Review of Systems (positives in bold):  ROS  Defer to primary team ROS due to COVID19 testing    Allergies:  Review of patient's allergies indicates:   Allergen Reactions    Hydralazine analogues      Increase swelling and throat itching and tightness with use.  Symptoms correlate only with this medication onset in hospital.       Medications:   In-Hospital Scheduled Medications:   [START ON 3/23/2020] aspirin  81 mg Oral Daily    atorvastatin  40 mg Oral QHS    azithromycin  500 mg Intravenous Q24H    carvediloL  12.5 mg Oral BID WM    cefTRIAXone (ROCEPHIN) IVPB  1 g Intravenous Q24H    clopidogreL  75 mg Oral Daily    fluticasone propionate  1 spray Each Nostril BID    furosemide  40 mg Oral BID    insulin detemir U-100  10 Units Subcutaneous QHS    montelukast  10 mg Oral QHS    oseltamivir  30 mg Oral Daily    pantoprazole  40 mg Oral Daily      In-Hospital PRN Medications:  heparin (PORCINE), heparin (PORCINE)      Past Medical History:  Past Medical History:   Diagnosis Date    Binocular vision disorder with diplopia 9/22/2016    Bradycardia     Cataract     Cervical spondylosis 10/1/2015    Chronic diastolic congestive heart failure     Chronic diastolic heart failure 1/11/2018    Coronary artery disease due to calcified coronary lesion 3/19/2018    Dyslipidemia 6/26/2015    Encounter for blood transfusion     Hearing impairment 10/25/2013    History of stroke 3/23/2015     Hypertension associated with diabetes 1/11/2018    Hypertensive retinopathy of both eyes 9/26/2017    Lumbar spondylosis 10/1/2015    Multiple myeloma not having achieved remission 1/16/2018    NSTEMI (non-ST elevated myocardial infarction) 3/22/2020    SUZANNA on CPAP     Persistent proteinuria 1/11/2018    Pneumonia of right lower lobe due to infectious organism 3/22/2020    Spondylolisthesis of lumbar region 10/1/2015    Strabismus     Stroke 2014    Thoracic spondylosis 10/1/2015    Type 2 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, with long-term current use of insulin 9/26/2017    Type 2 diabetes mellitus with diabetic polyneuropathy, with long-term current use of insulin 9/28/2015    Type 2 diabetes mellitus with stage 4 chronic kidney disease, with long-term current use of insulin 1/11/2018     Past Surgical History/ObGyn Hx if gender appropriate:  Past Surgical History:   Procedure Laterality Date    CATARACT EXTRACTION W/  INTRAOCULAR LENS IMPLANT Left 4/30/15    Diane    COLONOSCOPY N/A 1/20/2020    Procedure: COLONOSCOPY;  Surgeon: Salvador Espinosa MD;  Location: [facility];  Service: Endoscopy;  Laterality: N/A;    EYE SURGERY      cataract removal left eye    HAND SURGERY  2/2012    DR. OLIVIA MOLINA    left hand fracture sx      LT EYE   5/2/15    DR KULLMAN [facility]     Family History:  Family History   Problem Relation Age of Onset    Diabetes Mother     Cancer Mother     Kidney disease Mother     Diabetes Father     Heart attack Father     Diabetes Sister     Diabetes Brother     No Known Problems Maternal Aunt     No Known Problems Maternal Uncle     No Known Problems Paternal Aunt     No Known Problems Paternal Uncle     No Known Problems Maternal Grandmother     No Known Problems Maternal Grandfather     No Known Problems Paternal Grandmother     No Known Problems Paternal Grandfather     Blindness Neg Hx      "Anesthesia problems Neg Hx     Amblyopia Neg Hx     Cataracts Neg Hx     Glaucoma Neg Hx     Hypertension Neg Hx     Macular degeneration Neg Hx     Retinal detachment Neg Hx     Strabismus Neg Hx     Stroke Neg Hx     Thyroid disease Neg Hx     Heart disease Neg Hx     Heart failure Neg Hx     Hyperlipidemia Neg Hx      Social History:  Social History     Tobacco Use    Smoking status: Never Smoker    Smokeless tobacco: Never Used   Substance Use Topics    Alcohol use: No     Frequency: Never     Drinks per session: Patient refused     Binge frequency: Never    Drug use: No         Objective   Last 24 Hour Vital Signs:  BP  Min: 99/58  Max: 144/83  Temp  Av.7 °F (37.1 °C)  Min: 96 °F (35.6 °C)  Max: 103.9 °F (39.9 °C)  Pulse  Av.8  Min: 66  Max: 105  Resp  Av.7  Min: 15  Max: 36  SpO2  Av %  Min: 90 %  Max: 99 %  Height  Av' 0.5" (184.2 cm)  Min: 6' (182.9 cm)  Max: 6' 1" (185.4 cm)  Weight  Av.6 kg (179 lb 13.5 oz)  Min: 80.6 kg (177 lb 11.1 oz)  Max: 82.6 kg (182 lb)        Physical Examination:  Physical Exam  Defer to primary team ROS due to COVID19 testing    Laboratory:  CBC:   Lab Results   Component Value Date    WBC 5.71 2020    HGB 8.5 (L) 2020     2020    MCV 83 2020    RDW 13.3 2020       Estimated Creatinine Clearance: 21.9 mL/min (A) (based on SCr of 3.9 mg/dL (H)).        Assessment     Marie Reis Jr. is a 64 y.o. male with a PMH of HFpEF, CAD, DM2, SUZANNA on CPAP, CVA two weeks ago & CKD4 who was in their usual state of health until 2 days ago when he began feeling weak. He states he wasn't feeling SOB or coughing until 3/21/20. He denies fever, CP, n/v/d, abdominal pain. He was sating 86% when EMS arrived and is now requiring a ventimask.      Recommendations   COVID 19 Rule Out  - Upon presentation, spo2 86% on RA -> 95 on 15L NC, 99/58mmhg, otherwise vital signs stable  - not on home O2  - Physical exam: normal  - " elevated: troponin 5.6 -> 11, BNP 4,035, procalcitonin 0.38  - without abnormality: flu, wbc (absolute lymphocyte count 1.4 ), lactic acid 1.2, UA  - following blood cultures from 3/21/20 -no growth to date  - follow up viral respiratory panel  - Chest x-ray on 3/21/20: right lower lobe airspace opacity.  There is no pneumothorax or significant pleural fluid.  - cardiology consulted, believed etiology might be secondary to perimyocarditis secondary to viral infection,unlikley candidate for percutaneous revascularization. Continue medical management   - TTE on 2/26/20 EF 25% & grade 2 diastolic dsyfunction      - do not recommended hydroxychlorquine due to potential cardiac toxcity  - continue azithromycin & ceftriaxone  - QTc 540 from ekg on 3/22/20  - follow up COVID 19 PCR from 3/21/20        Thank you for this consult. We will follow.      Antoine Swain, DO PGY-1  [facility]

## 2020-03-22 NOTE — PLAN OF CARE
Patient AAOx3 Patient denies any pain or SOB. Patient on Ventimask 50%. BBS clear diminished. Abd soft non tender. Heparin drip initiated at 12/units/kg. Patient tolerating well. Bed alarm in place. Call light within reach. Will continue to monitor.

## 2020-03-22 NOTE — ED NOTES
MD at bedside to evaluate patient. Taking patient off of nonrebreather and placing patient on venti mask. 40% and 15Liters.

## 2020-03-22 NOTE — PLAN OF CARE
VN cued into patients room for rounding and to complete admission assessment. Patient gave permission to VN to contact wife to review home medications as pt is unable to recall. Home med list updated via phone call with spouse, Naheed.

## 2020-03-22 NOTE — CONSULTS
[facility] Consult Note     Date of Admit: 3/21/2020    Consult Requested By: Cathy Farias MD  Reason For Consult:  NSTEMI, troponin increased from 5 to 11, COVID r/o     Chief Complaint      Chief Complaint   Patient presents with    Weakness     Pt presents to ED today c/o generalized weakness, cough, and glucose reading of 256 per EMS     Hyperglycemia    Cough     o2 sat 86 upon EMS arrival pt placed on 2 L NC         Subjective:      History of Present Illness:  Marie Reis Jr. is a 64 y.o.M past medical history of systolic and diastolic heart failure, Chronic diastolic heart failure, Coronary artery disease due to calcified coronary lesion (3/19/2018), Dyslipidemia,  Multiple myeloma not having achieved remission (1/16/2018), SUZANNA on CPAP, CVA (2014), Type 2 diabetes mellitus with retinopathy/polyneuropathy/CKD 4 and long history of noncompliance, and most recently symptomatic anemia requiring pRBC transfusion 2/2020.   The patient presented to the [facility] on 3/21/2020 with a prima/ry complaint of Weakness (Pt presents to ED today c/o generalized weakness, cough, and glucose reading of 256 per EMS ); Hyperglycemia; and Cough (o2 sat 86 upon EMS arrival pt placed on 2 L NC )    Interviewed by way of telemedicine    Denies chest discomfort/pain, orthopnea, pnd.  States generalized weakness with cough.  Denies history of coronary angiography or previous MI.    On chart review, outside cardiology clinics have noted significant noncompliance with home regimen of GDMT.        Past Medical History:  Past Medical History:   Diagnosis Date    Binocular vision disorder with diplopia 9/22/2016    Bradycardia     Cataract     Cervical spondylosis 10/1/2015    Chronic diastolic congestive heart failure     Chronic diastolic heart failure 1/11/2018    Coronary artery disease due to calcified coronary lesion 3/19/2018    Dyslipidemia 6/26/2015    Encounter for blood transfusion      Hearing impairment 10/25/2013    History of stroke 3/23/2015    Hypertension associated with diabetes 1/11/2018    Hypertensive retinopathy of both eyes 9/26/2017    Lumbar spondylosis 10/1/2015    Multiple myeloma not having achieved remission 1/16/2018    SUZANNA on CPAP     Persistent proteinuria 1/11/2018    Spondylolisthesis of lumbar region 10/1/2015    Strabismus     Stroke 2014    Thoracic spondylosis 10/1/2015    Type 2 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, with long-term current use of insulin 9/26/2017    Type 2 diabetes mellitus with diabetic polyneuropathy, with long-term current use of insulin 9/28/2015    Type 2 diabetes mellitus with stage 4 chronic kidney disease, with long-term current use of insulin 1/11/2018        Past Surgical History:  Past Surgical History:   Procedure Laterality Date    CATARACT EXTRACTION W/  INTRAOCULAR LENS IMPLANT Left 4/30/15    Diane    COLONOSCOPY N/A 1/20/2020    Procedure: COLONOSCOPY;  Surgeon: Salvador Espinosa MD;  Location: [facility];  Service: Endoscopy;  Laterality: N/A;    EYE SURGERY      cataract removal left eye    HAND SURGERY  2/2012    DR. BAKER [facility]    left hand fracture sx      LT EYE   5/2/15    DR KULLMAN [facility]        Allergies:  Review of patient's allergies indicates:   Allergen Reactions    Hydralazine analogues      Increase swelling and throat itching and tightness with use.  Symptoms correlate only with this medication onset in hospital.        Home Medications:  Prior to Admission medications    Medication Sig Start Date End Date Taking? Authorizing Provider   amLODIPine (NORVASC) 10 MG tablet Take 1 tablet (10 mg total) by mouth once daily. 2/17/20   Debbie Salinas MD   blood sugar diagnostic Strp 1 strip by Misc.(Non-Drug; Combo Route) route after meals as needed. 1/14/14   Vince Kelly MD   calcitRIOL (ROCALTROL) 0.25 MCG Cap Take 2 capsules (0.5 mcg  "total) by mouth once daily. 1/13/20   Alissa Dotson MD   carvediloL (COREG) 25 MG tablet Take 1 tablet (25 mg total) by mouth 2 (two) times daily with meals.  Patient taking differently: Take 25 mg by mouth 2 (two) times daily with meals. Pt taking 1/2 pill bid. 2/17/20   Debbie Salinas MD   clopidogreL (PLAVIX) 75 mg tablet Take 1 tablet (75 mg total) by mouth once daily. 2/17/20   Debbie Salinas MD   dexAMETHasone (DECADRON) 4 MG Tab Take 5 tablets (20 mg total) by mouth As instructed. Take once weekly with Velcade injection 3/19/20 3/19/21  Nancy Paulino NP   diclofenac sodium 1.5 % Drop APPLY 40 DROPS TO AFFECTED AREA(S) FOUR TIMES DAILY 8/13/18   Historical Provider, MD   flash glucose scanning reader (FREESTYLE CLAUDIO 14 DAY READER) [facility] Use as directed. 7/24/19   BELEM Gardiner FNP   flash glucose sensor (FREESTYLE CLAUDIO 14 DAY SENSOR) Kit Change every 14 days. 3/2/20   BELEM Gardiner FNP   fluticasone (FLONASE) 50 mcg/actuation nasal spray 1 spray by Each Nare route 2 (two) times daily as needed for Rhinitis.  Patient taking differently: 1 spray by Each Nare route as needed for Rhinitis.  9/9/16   Nancy Colón MD   furosemide (LASIX) 40 MG tablet Take 1 tablet (40 mg total) by mouth 2 (two) times daily. Take twice a day now 2/26/20   Milla Horowitz MD   gabapentin (NEURONTIN) 300 MG capsule Take 1 capsule (300 mg total) by mouth daily as needed. 9/11/19   Nancy Colón MD   insulin (LANTUS SOLOSTAR U-100 INSULIN) glargine 100 units/mL (3mL) SubQ pen Inject 20 units at night. 3/19/20   BELEM Gardiner FNP   insulin aspart U-100 (NOVOLOG) 100 unit/mL (3 mL) InPn pen Inject  6 units (light meal), 10 units (larger meal), scale 150-200+2, 201-250+4, 251-300+6, 301-350+8. Snack dose 3 units. Max daily 60 units. 3/19/20   BELEM Gardiner, EMILIEP   insulin needles, disposable, 31 X 5/16 " Ndle Pt to use pen needle with Lantus daily 2/9/15   Nancy REYES" MD [place]   lidocaine (LIDODERM) 5 % APPLY UP TO 3 PATCHES TO THE AFFECTED AREA(S) FOR 12 HOURS DAILY AND REMOVE FOR 12 HOURS 12/20/19   Historical Provider, MD   lidocaine (XYLOCAINE) 5 % Oint ointment APPLY TO THE AFFECTED AREA(S) THREE TO FOUR TIMES DAILY 2/3/20   Historical Provider, MD   LIDOTRAL 3.88 % Crea APPLY TO THE AFFECTED AREA 2 TIMES TO 3 TIMES DAILY 8/13/18   Historical Provider, MD   linaGLIPtin (TRADJENTA) 5 mg Tab tablet Take 1 tablet (5 mg total) by mouth once daily. 3/19/20 3/19/21  Gifty Massey APRN, FNP   meclizine (ANTIVERT) 25 mg tablet Take 1 tablet (25 mg total) by mouth 3 (three) times daily as needed for Dizziness. 3/10/18   Nancy Colón MD   methyl salicylate 25 % Crea APPLY TO THE AFFECTED AREA(S) THREE TO FOUR TIMES DAILY 1/28/20   Historical Provider, MD   montelukast (SINGULAIR) 10 mg tablet Take 1 tablet (10 mg total) by mouth every evening. 9/9/16   Nancy Colón MD   nitroGLYCERIN (NITROSTAT) 0.4 MG SL tablet Place 1 tablet (0.4 mg total) under the tongue every 5 (five) minutes as needed for Chest pain. 2/17/20   Debbie Salinas MD   omeprazole (PRILOSEC OTC) 20 MG tablet Take 1 tablet (20 mg total) by mouth once daily. 10/9/19 10/8/20  Carol Cantrell MD   ondansetron (ZOFRAN-ODT) 8 MG TbDL Take 1 tablet (8 mg total) by mouth every 6 (six) hours as needed. 9/19/16   Gumaro Weinstein MD   vitamin D (VITAMIN D3) 1000 units Tab Take 1,000 Units by mouth once daily.    Historical Provider, MD        Family History:  Family History   Problem Relation Age of Onset    Diabetes Mother     Cancer Mother     Kidney disease Mother     Diabetes Father     Heart attack Father     Diabetes Sister     Diabetes Brother     No Known Problems Maternal Aunt     No Known Problems Maternal Uncle     No Known Problems Paternal Aunt     No Known Problems Paternal Uncle     No Known Problems Maternal Grandmother     No Known Problems Maternal Grandfather     No Known  "Problems Paternal Grandmother     No Known Problems Paternal Grandfather     Blindness Neg Hx     Anesthesia problems Neg Hx     Amblyopia Neg Hx     Cataracts Neg Hx     Glaucoma Neg Hx     Hypertension Neg Hx     Macular degeneration Neg Hx     Retinal detachment Neg Hx     Strabismus Neg Hx     Stroke Neg Hx     Thyroid disease Neg Hx     Heart disease Neg Hx     Heart failure Neg Hx     Hyperlipidemia Neg Hx         Social History:  Social History     Tobacco Use    Smoking status: Never Smoker    Smokeless tobacco: Never Used   Substance Use Topics    Alcohol use: No     Frequency: Never     Drinks per session: Patient refused     Binge frequency: Never    Drug use: No        Review of Systems:  Constitutional: negative for anorexia and weight loss  Eyes: negative for color blindness and visual disturbance  Ears, nose, mouth, throat, and face: negative for epistaxis and voice change  Respiratory: negative for hemoptysis and stridor  Cardiovascular: negative for chest pain, chest pressure/discomfort, claudication, dyspnea and exertional chest pressure/discomfort  Gastrointestinal: marrooon colored stookl noted at previous admission  Genitourinary:negative for hematuria and nocturia  Integument/breast: negative for skin color change and skin lesion(s)  Hematologic/lymphatic: negative for bleeding and easy bruising  Musculoskeletal:negative for back pain and stiff joints  Neurological: negative for seizures and speech problems   All other systems are reviewed and are negative.       Objective:   Last 24 Hour Vital Signs:  BP  Min: 99/58  Max: 144/83  Temp  Av.1 °F (37.3 °C)  Min: 96.2 °F (35.7 °C)  Max: 103.9 °F (39.9 °C)  Pulse  Av.1  Min: 66  Max: 105  Resp  Av.3  Min: 15  Max: 36  SpO2  Av.8 %  Min: 90 %  Max: 99 %  Height  Av' 0.5" (184.2 cm)  Min: 6' (182.9 cm)  Max: 6' 1" (185.4 cm)  Weight  Av.6 kg (179 lb 13.5 oz)  Min: 80.6 kg (177 lb 11.1 oz)  Max: 82.6 kg " "(182 lb)  I/O last 3 completed shifts:  In: 50 [IV Piggyback:50]  Out: 200 [Urine:200]     Physical Examination:    /66   Pulse 68   Temp 98.2 °F (36.8 °C)   Resp 18   Ht 6' 1" (1.854 m)   Wt 80.6 kg (177 lb 11.1 oz)   SpO2 97%   BMI 23.44 kg/m²   Examined via telemedicine  No Acute distress, sitting up in bed eating and drinking, speaking in full sentences      Laboratory:  Component      Latest Ref Rng & Units 3/9/2020 2/12/2020 12/10/2019 11/12/2019   Hemoglobin A1C External      4.0 - 5.6 % 9.1 (H) 10.5 (H) 9.2 (H) 8.3 (H)     Component      Latest Ref Rng & Units 8/23/2019 8/5/2019 4/24/2019   Hemoglobin A1C External      4.0 - 5.6 % 8.3 (H) 8.9 (H) >14.0 (H)     Component      Latest Ref Rng & Units 3/21/2020 3/19/2020 3/4/2020 2/26/2020                WBC      3.90 - 12.70 K/uL 9.37 8.30 9.23 15.39 (H)   RBC      4.60 - 6.20 M/uL 3.31 (L) 3.49 (L) 3.79 (L) 3.35 (L)   Hemoglobin      14.0 - 18.0 g/dL 8.9 (L) 9.4 (L) 10.7 (L) 9.5 (L)   Hematocrit      40.0 - 54.0 % 27.9 (L) 30.4 (L) 34.1 (L) 29.4 (L)   MCV      82 - 98 fL 84 87 90 88   MCH      27.0 - 31.0 pg 26.9 (L) 26.9 (L) 28.2 28.4   MCHC      32.0 - 36.0 g/dL 31.9 (L) 30.9 (L) 31.4 (L) 32.3   RDW      11.5 - 14.5 % 13.2 13.2 13.8 14.0   Platelets      150 - 350 K/uL 309 327 313 362 (H)   MPV      9.2 - 12.9 fL 9.9 9.9 10.1 9.9   Immature Granulocytes      0.0 - 0.5 % 1.3 (H) 0.6 (H) 0.5 2.1 (H)   Gran # (ANC)      1.8 - 7.7 K/uL 7.6 5.7 7.3 12.9 (H)   Immature Grans (Abs)      0.00 - 0.04 K/uL 0.12 (H) 0.05 (H) 0.05 (H) 0.33 (H)   Lymph #      1.0 - 4.8 K/uL 1.0 1.4 0.7 (L) 1.0   Mono #      0.3 - 1.0 K/uL 0.6 0.8 0.9 0.9   Eos #      0.0 - 0.5 K/uL 0.0 0.4 0.3 0.3   Baso #      0.00 - 0.20 K/uL 0.02 0.05 0.03 0.03   nRBC      0 /100 WBC 0 0 0 1 (A)   Gran%      38.0 - 73.0 % 81.4 (H) 68.4 78.7 (H) 83.6 (H)   Lymph%      18.0 - 48.0 % 10.7 (L) 16.6 (L) 7.0 (L) 6.6 (L)   Mono%      4.0 - 15.0 % 6.0 9.2 10.1 5.5   Eosinophil%      0.0 - 8.0 % " 0.4 4.6 3.4 2.0   Basophil%      0.0 - 1.9 % 0.2 0.6 0.3 0.2     Component      Latest Ref Rng & Units 3/22/2020 3/21/2020 3/19/2020   Sodium      136 - 145 mmol/L 137 138 138   Potassium      3.5 - 5.1 mmol/L 3.5 3.8 3.4 (L)   Chloride      95 - 110 mmol/L 105 105 104   CO2      23 - 29 mmol/L 20 (L) 18 (L) 25   Glucose      70 - 110 mg/dL 235 (H) 226 (H) 204 (H)   BUN, Bld      8 - 23 mg/dL 41 (H) 34 (H) 26 (H)   Creatinine      0.5 - 1.4 mg/dL 3.9 (H) 4.0 (H) 3.2 (H)   Calcium      8.7 - 10.5 mg/dL 8.3 (L) 8.6 (L) 8.7     Component      Latest Ref Rng & Units 3/21/2020   BNP      0 - 99 pg/mL 4,035 (H)       Radiology:    RLL PNA with prominent interstitial markings and bilateral atrial enlargement   03/22/2020 02/25/2020       Assessment:      Marie Reis Jr. is a 64 y.o. male with:  Patient Active Problem List    Diagnosis Date Noted    Sepsis 03/21/2020    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, with long-term current use of insulin 03/12/2020    Constipation     Liver function test abnormality     Troponin I above reference range     Transaminitis 02/24/2020    Bilirubinemia 02/24/2020    Moderate episode of recurrent major depressive disorder 02/21/2020    Hemiplegia and hemiparesis following cerebral infarction affecting left non-dominant side 02/21/2020    Positive FIT (fecal immunochemical test) 01/20/2020    Thrombocytopenia 12/11/2019    Dental caries 10/16/2019    Symptomatic anemia 09/09/2019    PAD (peripheral artery disease) 08/28/2019    Diabetic polyneuropathy associated with type 2 diabetes mellitus 01/15/2019    Stage 4 chronic kidney disease 01/15/2019    Aortic atherosclerosis 01/15/2019    Chronic pulmonary heart disease 01/15/2019    Vitreous hemorrhage, right 10/05/2018    Diabetes mellitus with proteinuria 08/21/2018    Vitamin D deficiency 05/16/2018    Type 2 diabetes mellitus with  hyperglycemia, with long-term current use of insulin 04/17/2018    Secondary hyperparathyroidism 04/14/2018    Hypertensive CKD (chronic kidney disease) 04/14/2018    Acute kidney injury superimposed on CKD 04/11/2018    Bradycardia     Coronary artery disease due to calcified coronary lesion 03/19/2018    Bilateral carotid artery stenosis 03/19/2018    Multiple myeloma not having achieved remission 01/16/2018    Type 2 diabetes mellitus with stage 4 chronic kidney disease, with long-term current use of insulin 01/11/2018    Benign hypertension with chronic kidney disease, stage III 01/11/2018    Persistent proteinuria 01/11/2018    Acute on chronic combined systolic and diastolic congestive heart failure 01/11/2018    Chronic diastolic congestive heart failure 11/13/2017    Hypertensive retinopathy of both eyes 09/26/2017    Hypertropia of left eye 09/22/2016    Binocular vision disorder with diplopia 09/22/2016    History of right PCA stroke 09/17/2016    Cervical spondylosis 10/01/2015    Thoracic spondylosis 10/01/2015    Lumbar spondylosis 10/01/2015    Spondylolisthesis of lumbar region 10/01/2015    Type 2 diabetes mellitus with diabetic polyneuropathy, with long-term current use of insulin 09/28/2015    Dyslipidemia 06/26/2015    History of stroke 03/23/2015    Essential hypertension 01/12/2014    Hearing impairment 10/25/2013     Plan:      NSTEMI:  Possible due to perimyocarditis secondary to viral infection.  ECG with biphasic T waves anteriorly and TWI laterally, which is more pronounced then previous ecgs however consistent in pattern.  Troponin up to 11 without chest discomfort.  With poor compliance and CKD stage 4 he is an unlikely candidate for percutaneous revascularization.  Continue medical management with ASA, plavix, atorvastatin, coreg (initially hypertensive on admit now normotensive) and heparin.      HFrEF:  Continue coreg.  Unable to add ACEI/ARB or spironolactone  at this time.      Moderate AS and AR:  As above, supportive care.

## 2020-03-22 NOTE — PROGRESS NOTES
"[facility] IM Resident HO-I Progress Note    Subjective:      No complaints this AM. Denies CP.      Objective:   Last 24 Hour Vital Signs:  BP  Min: 99/58  Max: 144/83  Temp  Av.3 °F (37.4 °C)  Min: 96.2 °F (35.7 °C)  Max: 103.9 °F (39.9 °C)  Pulse  Av.4  Min: 68  Max: 105  Resp  Av.9  Min: 15  Max: 36  SpO2  Av.6 %  Min: 90 %  Max: 99 %  Height  Av' 0.5" (184.2 cm)  Min: 6' (182.9 cm)  Max: 6' 1" (185.4 cm)  Weight  Av.6 kg (179 lb 13.5 oz)  Min: 80.6 kg (177 lb 11.1 oz)  Max: 82.6 kg (182 lb)  No intake/output data recorded.    Physical Examination: patient seen on video chat   Physical Exam   Constitutional: He is oriented to person, place, and time. He appears well-developed and well-nourished. No distress.   Lying in bed, appears comfortable    HENT:   Head: Normocephalic and atraumatic.   Neck: Normal range of motion.   Pulmonary/Chest: No respiratory distress.   Neurological: He is alert and oriented to person, place, and time.   Psychiatric: He has a normal mood and affect. His behavior is normal.     Laboratory:  Laboratory Data Reviewed: yes  Pertinent Findings:  Cr 3.9  Trop 11     Microbiology Data Reviewed: yes  Pertinent Findings:  Flu negative  Blood cx pending     Other Results:  EKG (my interpretation): stat ekg ordered     Radiology Data Reviewed: yes  Pertinent Findings:  CXR: RLL PNA    Current Medications:     Infusions:   heparin (porcine) in D5W 12 Units/kg/hr (20 0050)        Scheduled:   [START ON 3/23/2020] aspirin  81 mg Oral Daily    atorvastatin  40 mg Oral QHS    carvediloL  12.5 mg Oral BID WM    clopidogreL  75 mg Oral Daily    fluticasone propionate  1 spray Each Nostril BID    furosemide  40 mg Oral BID    insulin detemir U-100  10 Units Subcutaneous QHS    montelukast  10 mg Oral QHS    oseltamivir  30 mg Oral Daily    pantoprazole  40 mg Oral Daily        PRN:  heparin (PORCINE), heparin (PORCINE)    Antibiotics and Day Number of " Therapy:  Tamiflu, rocephin, azithro    Assessment:     Marie Reis Jr. is a 64 y.o.male with  Patient Active Problem List    Diagnosis Date Noted    Sepsis 03/21/2020    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, with long-term current use of insulin 03/12/2020    Constipation     Liver function test abnormality     Troponin I above reference range     Transaminitis 02/24/2020    Bilirubinemia 02/24/2020    Moderate episode of recurrent major depressive disorder 02/21/2020    Hemiplegia and hemiparesis following cerebral infarction affecting left non-dominant side 02/21/2020    Positive FIT (fecal immunochemical test) 01/20/2020    Thrombocytopenia 12/11/2019    Dental caries 10/16/2019    Symptomatic anemia 09/09/2019    PAD (peripheral artery disease) 08/28/2019    Diabetic polyneuropathy associated with type 2 diabetes mellitus 01/15/2019    Stage 4 chronic kidney disease 01/15/2019    Aortic atherosclerosis 01/15/2019    Chronic pulmonary heart disease 01/15/2019    Vitreous hemorrhage, right 10/05/2018    Diabetes mellitus with proteinuria 08/21/2018    Vitamin D deficiency 05/16/2018    Type 2 diabetes mellitus with hyperglycemia, with long-term current use of insulin 04/17/2018    Secondary hyperparathyroidism 04/14/2018    Hypertensive CKD (chronic kidney disease) 04/14/2018    Acute kidney injury superimposed on CKD 04/11/2018    Bradycardia     Coronary artery disease due to calcified coronary lesion 03/19/2018    Bilateral carotid artery stenosis 03/19/2018    Multiple myeloma not having achieved remission 01/16/2018    Type 2 diabetes mellitus with stage 4 chronic kidney disease, with long-term current use of insulin 01/11/2018    Benign hypertension with chronic kidney disease, stage III 01/11/2018    Persistent proteinuria 01/11/2018    Acute on chronic combined systolic and diastolic congestive heart  failure 01/11/2018    Chronic diastolic congestive heart failure 11/13/2017    Hypertensive retinopathy of both eyes 09/26/2017    Hypertropia of left eye 09/22/2016    Binocular vision disorder with diplopia 09/22/2016    History of right PCA stroke 09/17/2016    Cervical spondylosis 10/01/2015    Thoracic spondylosis 10/01/2015    Lumbar spondylosis 10/01/2015    Spondylolisthesis of lumbar region 10/01/2015    Type 2 diabetes mellitus with diabetic polyneuropathy, with long-term current use of insulin 09/28/2015    Dyslipidemia 06/26/2015    History of stroke 03/23/2015    Essential hypertension 01/12/2014    Hearing impairment 10/25/2013        Plan:     Acute hypoxic respiratory failure 2/2 PNA vs COVID  - on 2L nc-->escalated to ventimask   - CXR: RLL PNA   - given cefepime and azithro, will switch to rocephin and continue azithro and add tamiflu   - consult ID in the AM for plaquenil recs      Severe Sepsis   - Tmax 103.9, RR 24,  with elevated troponin and creatinine   - given cefepime and azithro, will continue abx and add tamiflu   - lactate WNL  - procal elevated      NSTEMI   - troponin of 5 with no acute EKG changes, troponin increased to 11   - patient not complaining of CP  - heparin gtt, full dose ASA, and plavix   - trend trops and EKGs  - consult cards in the AM      Hx of CVA  - R PCA infart on 3/17/2020   - CVA in 2014 with residual LLE weakness   - cont home Plavix and Lipitor      JAZLYN on CKD4  - Cr 4 on admit, baseline ~3.5  - Cr 3.9 this AM   - will monitor      AOCD  - Hb 8.9 on admit   - denies active bleeding   - will monitor      Combined systolic and diastolic HF  - TTE in 2/26/2020 with EF 25% and grade 2 diastolic dysfunction with moderate to severe pulmonary HTN and moderate to severe aortic stenosis   - BNP 4,035, 3893 last month   - cont home lasix 40 bid      Multiple myeloma  - being treated with bortezomib   - due for cycle 6 on 3/25     CAD  - cont home ASA,  plavix   - currently on heparin gtt      HTN  - cont home amlodipine and coreg   - normotensive      DMT2 with complications with long term use of insulin   - A1c in 3/2020 9.1  - home regimen: levemir 20 qhs with SSI   - hold home linagliptin     Cathy Farias  [facility] Internal Medicine HO-I    [facility] Medicine Hospitalist Pager numbers:   [facility] Hospitalist Medicine Team A (Anson/Nadia): [phone]  [facility] Hospitalist Medicine Team B (Shashank/Aniket):  [phone]

## 2020-03-22 NOTE — PROVIDER PROGRESS NOTES - EMERGENCY DEPT.
Encounter Date: 3/21/2020    ED Physician Progress Notes       SCRIBE NOTE: I, Arely Ramirez, am scribing for, and in the presence of,  Dr. Ivan.  Physician Statement: I, Dr. Ivan, personally performed the services described in this documentation as scribed by Arely Ramirez in my presence, and it is both accurate and complete.        **This is an assumption of care note**    Case accepted from Dr. Godinez at 8PM, pending lab and imaging results  I agree with previous physician's history/physical and overall assessment.   *** reviewed which showed ***.       PHYSICAL EXAMINATION:  GENERAL:   Alert, no acute distress  VITAL SIGNS:  Per nurse's note, reviewed by me .  SKIN:  Warm, dry; no cyanosis; no rash, no pallor  HEAD:  Atraumatic, normocephalic  EYES:  no conjunctival pallor, no scleral icterus, EOMI.  ENMT:  Pharynx:  no erythema; airway patent: no stridor; mucous membranes moist  NECK:  no tenderness, normal ROM, no lymphadenopathy.  CHEST AND RESPIRATORY:  No distress, no rales, no rhonchi, no wheezes.  HEART AND CARDIOVASCULAR:  Normal rate, no irregularity; no murmur,   ABDOMEN AND GI:  Soft; no tenderness, no guarding  EXTREMITIES:  no deformity, no edema, no tenderness.  NEURO AND PSYCH:  Mental status as above.  Cranial nerves grossly intact; strength symmetric, sensation grossly intact bilaterally. Gait not assessed    LABS:  Labs Reviewed   CBC W/ AUTO DIFFERENTIAL - Abnormal; Notable for the following components:       Result Value    RBC 3.31 (*)     Hemoglobin 8.9 (*)     Hematocrit 27.9 (*)     Mean Corpuscular Hemoglobin 26.9 (*)     Mean Corpuscular Hemoglobin Conc 31.9 (*)     Immature Granulocytes 1.3 (*)     Immature Grans (Abs) 0.12 (*)     Gran% 81.4 (*)     Lymph% 10.7 (*)     All other components within normal limits   COMPREHENSIVE METABOLIC PANEL - Abnormal; Notable for the following components:    CO2 18 (*)     Glucose 226 (*)     BUN, Bld 34 (*)     Creatinine 4.0 (*)     Calcium  8.6 (*)     Albumin 2.8 (*)     eGFR if  17 (*)     eGFR if non  15 (*)     All other components within normal limits   LACTIC ACID, PLASMA - Abnormal; Notable for the following components:    Lactate (Lactic Acid) 3.5 (*)     All other components within normal limits   URINALYSIS, REFLEX TO URINE CULTURE - Abnormal; Notable for the following components:    Color, UA Brown (*)     Specific Gravity, UA >=1.030 (*)     Protein, UA 2+ (*)     Glucose, UA Trace (*)     Occult Blood UA 1+ (*)     All other components within normal limits    Narrative:     Preferred Collection Type->Urine, Clean Catch  Is the patient symptomatic?->Yes  What symptom criteria does the patient meet?->Fever >/= 100.4  What symptom criteria does the patient meet?->Cough   B-TYPE NATRIURETIC PEPTIDE - Abnormal; Notable for the following components:    BNP 4,035 (*)     All other components within normal limits   TROPONIN I - Abnormal; Notable for the following components:    Troponin I 5.610 (*)     All other components within normal limits   PROCALCITONIN - Abnormal; Notable for the following components:    Procalcitonin 0.38 (*)     All other components within normal limits   URINALYSIS MICROSCOPIC - Abnormal; Notable for the following components:    Bacteria Few (*)     All other components within normal limits    Narrative:     Preferred Collection Type->Urine, Clean Catch  Is the patient symptomatic?->Yes  What symptom criteria does the patient meet?->Fever >/= 100.4  What symptom criteria does the patient meet?->Cough   POCT GLUCOSE - Abnormal; Notable for the following components:    POCT Glucose 240 (*)     All other components within normal limits   INFLUENZA A & B BY MOLECULAR   CULTURE, BLOOD   CULTURE, BLOOD   SARS-COV-2 (COVID-19) QUALITATIVE PCR   LACTIC ACID, PLASMA         IMAGING:  Imaging Results          X-Ray Chest AP Portable (Final result)  Result time 03/21/20 22:10:14    Final result by  Saadia Walker MD (03/21/20 22:10:14)                 Impression:      Right lower lobe pneumonia.      Electronically signed by: Saadia Walker  Date:    03/21/2020  Time:    22:10             Narrative:    EXAMINATION:  AP PORTABLE CHEST    CLINICAL HISTORY:  Sepsis;    TECHNIQUE:  AP portable chest radiograph was submitted.    COMPARISON:  02/24/2020    FINDINGS:  AP portable chest radiograph demonstrates enlarged cardiac silhouette.  There is right lower lobe airspace opacity.  There is no pneumothorax or significant pleural fluid.                                  ED COURSE:  ED Course as of Mar 21 2228   Sat Mar 21, 2020   2056 Lactate, Cheng(!!): 3.5 [NP]   2056 BNP(!): 4,035 [NP]   2056 Sodium: 138 [NP]   2056 Potassium: 3.8 [NP]   2056 Chloride: 105 [NP]   2056 CO2(!): 18 [NP]   2056 Glucose(!): 226 [NP]   2056 BUN, Bld(!): 34 [NP]   2056 Creatinine(!): 4.0 [NP]   2056 AST: 33 [NP]   2056 ALT: 12 [NP]   2056 BILIRUBIN TOTAL: 0.7 [NP]   2056 WBC: 9.37 [NP]   2056 Hemoglobin(!): 8.9 [NP]   2056 Hematocrit(!): 27.9 [NP]   2056 Platelets: 309 [NP]   2056 Troponin I(!): 5.610 [NP]   2056 Procalcitonin(!): 0.38 [NP]   2056 Troponin I(!): 5.610 [NP]      ED Course User Index  [NP] Juan Ivan MD         DIAGNOSIS:  1. Sepsis           DISPOSITION:       10:25 PM Case discussed with [facility], who will accept the patient for admission.

## 2020-03-23 PROBLEM — R65.20 SEVERE SEPSIS: Status: ACTIVE | Noted: 2020-01-01

## 2020-03-23 PROBLEM — A41.9 SEVERE SEPSIS: Status: RESOLVED | Noted: 2020-01-01 | Resolved: 2020-01-01

## 2020-03-23 PROBLEM — R65.20 SEVERE SEPSIS: Status: RESOLVED | Noted: 2020-01-01 | Resolved: 2020-01-01

## 2020-03-23 PROBLEM — A41.9 SEVERE SEPSIS: Status: ACTIVE | Noted: 2020-01-01

## 2020-03-23 NOTE — HPI
64 year old male with numerus comorbidities including multiple myeloma, combined systolic and diastolic heart failure, uncontrolled DM2 with complication, hx CVA with residual weakness, CKD, and CAD presenting with 2 days of progressive weakness with associated shortness of breath and cough. He has no fevers, chills, or gastrointestinal complaints. On arrival, pt noted to be hypoxic to the 70's and was placed on nasal cannula. He was admitted to the unit for ongoing monitoring

## 2020-03-23 NOTE — NURSING TRANSFER
Nursing Transfer Note      3/23/2020     Transfer To: [id number]    Transfer via bed    Transfer with 4 L NC to O2, cardiac monitoring    Transported by [facility], transport    Medicines sent: respiratory treatment, insulin pen, flonase    Chart send with patient: Yes    Notified: spouse

## 2020-03-23 NOTE — PROGRESS NOTES
[facility]  Critical Care - Medicine  History & Physical    Patient Name: Marie Reis Jr.  MRN: [id number]  Admission Date: 3/21/2020  Hospital Length of Stay: 2 days  Code Status: Prior  Attending Physician:   Primary Care Provider: Nancy Colón MD   Principal Problem: Acute respiratory failure with hypoxia    Subjective:     HPI:    65 yo male c/o SOB., cough x 2 days. According to EMS sat was 86%.  He was placed on nasal cannula and had improvement.       Hospital/ICU Course:      Past Medical History:   Diagnosis Date    Binocular vision disorder with diplopia 9/22/2016    Bradycardia     Cataract     Cervical spondylosis 10/1/2015    Chronic diastolic congestive heart failure     Chronic diastolic heart failure 1/11/2018    Coronary artery disease due to calcified coronary lesion 3/19/2018    Dyslipidemia 6/26/2015    Encounter for blood transfusion     Hearing impairment 10/25/2013    History of stroke 3/23/2015    Hypertension associated with diabetes 1/11/2018    Hypertensive retinopathy of both eyes 9/26/2017    Lumbar spondylosis 10/1/2015    Multiple myeloma not having achieved remission 1/16/2018    NSTEMI (non-ST elevated myocardial infarction) 3/22/2020    SUZANNA on CPAP     Persistent proteinuria 1/11/2018    Pneumonia of right lower lobe due to infectious organism 3/22/2020    Spondylolisthesis of lumbar region 10/1/2015    Strabismus     Stroke 2014    Thoracic spondylosis 10/1/2015    Type 2 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, with long-term current use of insulin 9/26/2017    Type 2 diabetes mellitus with diabetic polyneuropathy, with long-term current use of insulin 9/28/2015    Type 2 diabetes mellitus with stage 4 chronic kidney disease, with long-term current use of insulin 1/11/2018       Past Surgical History:   Procedure Laterality Date    CATARACT EXTRACTION W/  INTRAOCULAR LENS IMPLANT Left  4/30/15    [facility]    COLONOSCOPY N/A 1/20/2020    Procedure: COLONOSCOPY;  Surgeon: Salvador Espinosa MD;  Location: [facility];  Service: Endoscopy;  Laterality: N/A;    EYE SURGERY      cataract removal left eye    HAND SURGERY  2/2012    DR. BAKER [facility]    left hand fracture sx      LT EYE   5/2/15    DR CONTRERAS [facility]       Home meds:    Amlodipine  Carvedilol 25 mg bid  Clopidogrel 75 mg qd  Lasix 40 mg bid  glargine 20 u q pm  Omeprazole 40 mg qd  Gabapentin 300mg tid  zofran 8 mg q 6 hrs      Review of patient's allergies indicates:   Allergen Reactions    Hydralazine analogues      Increase swelling and throat itching and tightness with use.  Symptoms correlate only with this medication onset in hospital.       Family History     Problem Relation (Age of Onset)    Cancer Mother    Diabetes Mother, Father, Sister, Brother    Heart attack Father    Kidney disease Mother    No Known Problems Maternal Aunt, Maternal Uncle, Paternal Aunt, Paternal Uncle, Maternal Grandmother, Maternal Grandfather, Paternal Grandmother, Paternal Grandfather        Tobacco Use    Smoking status: Never Smoker    Smokeless tobacco: Never Used   Substance and Sexual Activity    Alcohol use: No     Frequency: Never     Drinks per session: Patient refused     Binge frequency: Never    Drug use: No    Sexual activity: Never      Review of Systems  Objective:     Vital Signs (Most Recent):  Temp: (!) 101.6 °F (38.7 °C) (03/23/20 0245)  Pulse: 92 (03/23/20 0245)  Resp: (!) 41 (03/23/20 0236)  BP: (!) 115/57 (03/23/20 0245)  SpO2: 97 % (03/23/20 0240) Vital Signs (24h Range):  Temp:  [96 °F (35.6 °C)-101.6 °F (38.7 °C)] 101.6 °F (38.7 °C)  Pulse:  [] 92  Resp:  [16-41] 41  SpO2:  [77 %-99 %] 97 %  BP: ()/(55-84) 115/57     Weight: 80.6 kg (177 lb 11.1 oz)  Body mass index is 23.44 kg/m².      Intake/Output Summary (Last 24 hours) at 3/23/2020 0311  Last data filed at 3/22/2020 2300  Gross per 24 hour   Intake  960 ml   Output 600 ml   Net 360 ml       Physical Exam    Vents:  Oxygen Concentration (%): 50 (03/23/20 0215)    Lines/Drains/Airways     Peripheral Intravenous Line                 Peripheral IV - Single Lumen 03/21/20 1909 20 G Left Antecubital 1 day         Peripheral IV - Single Lumen 03/21/20 2151 20 G Right Antecubital 1 day                Significant Labs:    CBC/Anemia Profile:  Recent Labs   Lab 03/21/20 1933 03/22/20  0535   WBC 9.37 5.71   HGB 8.9* 8.5*   HCT 27.9* 26.4*    262   MCV 84 83   RDW 13.2 13.3        Chemistries:  Recent Labs   Lab 03/21/20 1933 03/22/20  0535    137   K 3.8 3.5    105   CO2 18* 20*   BUN 34* 41*   CREATININE 4.0* 3.9*   CALCIUM 8.6* 8.3*   ALBUMIN 2.8*  --    PROT 6.2  --    BILITOT 0.7  --    ALKPHOS 74  --    ALT 12  --    AST 33  --    MG  --  1.6   PHOS  --  3.8       All pertinent labs within the past 24 hours have been reviewed.    Significant Imaging: I have reviewed all pertinent imaging results/findings within the past 24 hours.       ECG: 3/21 6:50 pm:  ST depressions V4-6, I,L    ECG 3/22: 8 am:  Same findings      I viewed the pt at 3:21 am:    120/60, 84 sinus, 100%, RR 26    Pt on non rebreather mask    Heparin infusion 7 u/hr    Assessment/Plan:         Acute hypoxic respiratory failure  On FM  Low tolerance for intubation    Severe Sepsis  febrile  IV fluid  Follow lactate    Pneumonia  RLL  Empiric Azithro, Ceftriaxone  Influenza screen negative  COVID ordered    NSTEMI   trend troponin  No CP  IV herparin      CKD  At baseline creat    Combined systolic and diastolic HF   TTE in 2/26/2020 with EF 25% and grade 2 diastolic dysfunction with moderate to severe pulmonary HTN and moderate to severe aortic stenosis    BNP 4,035, 3893 last month    cont home lasix 40 bid        Multiple myeloma   being treated with bortezomib    due for cycle 6 on 3/25      CAD   cont home ASA, plavix    currently on heparin gtt     HTN   cont home amlodipine  and coreg    normotensive     DM  SSI      Active Diagnoses:    Diagnosis Date Noted POA    PRINCIPAL PROBLEM:  Acute respiratory failure with hypoxia [J96.01] 03/22/2020 Yes    Pneumonia of right lower lobe due to infectious organism [J18.1] 03/22/2020 Yes    NSTEMI (non-ST elevated myocardial infarction) [I21.4] 03/22/2020 Yes    Chronic combined systolic and diastolic heart failure [I50.42] 03/22/2020 Yes    Moderate to severe aortic stenosis [I35.0] 03/22/2020 Yes    Anemia, chronic disease [D63.8] 03/22/2020 Yes    Suspected Severe Acute Respiratory Syndrome Coronavirus 2 (SARS-Cov-2) Infection [R68.89]  Unknown    Severe sepsis [A41.9, R65.20] 03/21/2020 Yes    Acute renal failure superimposed on stage 4 chronic kidney disease [N17.9, N18.4] 04/11/2018 Yes    Coronary artery disease due to calcified coronary lesion [I25.10, I25.84] 03/19/2018 Yes    Multiple myeloma not having achieved remission [C90.00] 01/16/2018 Yes    Essential hypertension [I10] 01/12/2014 Yes      Problems Resolved During this Admission:            Critical Care Time: 10 minutes  Critical secondary to Patient has a condition that poses threat to life and bodily function: Acute Myocardial Infarction       Noé Willett MD  Critical Care - Medicine  [facility]

## 2020-03-23 NOTE — PLAN OF CARE
[facility] Plan of Care Note    MET was called as patient was noted to be tachypneic w/ decreased oxygen saturations while on ventimask. Patient w/ noted oxygen saturations in the 70's w/ a RR in the 40's along w/ accessory muscle use. Patient was transitioned from venti-mask to non-rebreather w/ minimal improvement in oxygen saturations and work of breathing. Upon arrival patient glucose was 141. Temperature was 101.6. ABG was 7.42/29/61/19 while on non-rebreather. Patient was given Tylenol and it was decided to step patient up to ICU for tachypnea w/ desaturations while on supplemental oxygen support w/out NIPPV, and possible need for intubation.    Ryley Adam McPeters, MD  [facility] Med-Peds resident, PGY-IV

## 2020-03-23 NOTE — SUBJECTIVE & OBJECTIVE
Past Medical History:   Diagnosis Date    Binocular vision disorder with diplopia 9/22/2016    Bradycardia     Cataract     Cervical spondylosis 10/1/2015    Chronic diastolic congestive heart failure     Chronic diastolic heart failure 1/11/2018    Coronary artery disease due to calcified coronary lesion 3/19/2018    Dyslipidemia 6/26/2015    Encounter for blood transfusion     Hearing impairment 10/25/2013    History of stroke 3/23/2015    Hypertension associated with diabetes 1/11/2018    Hypertensive retinopathy of both eyes 9/26/2017    Lumbar spondylosis 10/1/2015    Multiple myeloma not having achieved remission 1/16/2018    NSTEMI (non-ST elevated myocardial infarction) 3/22/2020    SUZANNA on CPAP     Persistent proteinuria 1/11/2018    Pneumonia of right lower lobe due to infectious organism 3/22/2020    Spondylolisthesis of lumbar region 10/1/2015    Strabismus     Stroke 2014    Thoracic spondylosis 10/1/2015    Type 2 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, with long-term current use of insulin 9/26/2017    Type 2 diabetes mellitus with diabetic polyneuropathy, with long-term current use of insulin 9/28/2015    Type 2 diabetes mellitus with stage 4 chronic kidney disease, with long-term current use of insulin 1/11/2018       Past Surgical History:   Procedure Laterality Date    CATARACT EXTRACTION W/  INTRAOCULAR LENS IMPLANT Left 4/30/15    Diane    COLONOSCOPY N/A 1/20/2020    Procedure: COLONOSCOPY;  Surgeon: Salvador Espinosa MD;  Location: [facility];  Service: Endoscopy;  Laterality: N/A;    EYE SURGERY      cataract removal left eye    HAND SURGERY  2/2012    DR. BAKER [facility]    left hand fracture sx      LT EYE   5/2/15    DR KULLMAN [facility]       Review of patient's allergies indicates:   Allergen Reactions    Hydralazine analogues      Increase swelling and throat itching and tightness with use.  Symptoms  correlate only with this medication onset in hospital.       Family History     Problem Relation (Age of Onset)    Cancer Mother    Diabetes Mother, Father, Sister, Brother    Heart attack Father    Kidney disease Mother    No Known Problems Maternal Aunt, Maternal Uncle, Paternal Aunt, Paternal Uncle, Maternal Grandmother, Maternal Grandfather, Paternal Grandmother, Paternal Grandfather        Tobacco Use    Smoking status: Never Smoker    Smokeless tobacco: Never Used   Substance and Sexual Activity    Alcohol use: No     Frequency: Never     Drinks per session: Patient refused     Binge frequency: Never    Drug use: No    Sexual activity: Never         Review of Systems   Constitutional: Positive for fatigue. Negative for chills, diaphoresis and fever.   HENT: Negative for congestion, postnasal drip, rhinorrhea and sore throat.    Respiratory: Positive for cough and shortness of breath. Negative for wheezing.    Cardiovascular: Positive for leg swelling. Negative for chest pain.   Gastrointestinal: Negative for abdominal distention, abdominal pain, constipation, diarrhea, nausea and vomiting.     Objective:     Vital Signs (Most Recent):  Temp: 97.8 °F (36.6 °C) (03/23/20 1115)  Pulse: 72 (03/23/20 1430)  Resp: 20 (03/23/20 1430)  BP: 137/72 (03/23/20 1430)  SpO2: 96 % (03/23/20 1430) Vital Signs (24h Range):  Temp:  [96.9 °F (36.1 °C)-101.6 °F (38.7 °C)] 97.8 °F (36.6 °C)  Pulse:  [] 72  Resp:  [13-41] 20  SpO2:  [77 %-100 %] 96 %  BP: (103-155)/(57-84) 137/72     Weight: 85.2 kg (187 lb 13.3 oz)  Body mass index is 24.78 kg/m².      Intake/Output Summary (Last 24 hours) at 3/23/2020 1457  Last data filed at 3/23/2020 1400  Gross per 24 hour   Intake 840 ml   Output 1925 ml   Net -1085 ml       Physical Exam   Constitutional: He is oriented to person, place, and time. He appears well-developed and well-nourished. No distress.   HENT:   Mouth/Throat: No oropharyngeal exudate.   Eyes: Conjunctivae are  normal. No scleral icterus.   Cardiovascular: Normal rate and regular rhythm.   Pulmonary/Chest: No accessory muscle usage. No tachypnea. No respiratory distress. He has no decreased breath sounds. He has no wheezes. He has no rhonchi. He has rales.   Abdominal: Soft. Bowel sounds are normal. He exhibits no distension. There is no tenderness.   Neurological: He is alert and oriented to person, place, and time. No cranial nerve deficit.   Skin: Skin is warm and dry. He is not diaphoretic.   Nursing note and vitals reviewed.      Vents:  Oxygen Concentration (%): 50 (03/23/20 0215)    Lines/Drains/Airways     Drain            Male External Urinary Catheter 03/23/20 0435 Small less than 1 day          Peripheral Intravenous Line                 Peripheral IV - Single Lumen 03/21/20 1909 20 G Left Antecubital 1 day         Peripheral IV - Single Lumen 03/21/20 2151 20 G Right Antecubital 1 day                Significant Labs:    CBC/Anemia Profile:  Recent Labs   Lab 03/21/20 1933 03/22/20  0535 03/23/20  0449 03/23/20  1153   WBC 9.37 5.71  --  8.20   HGB 8.9* 8.5*  --  8.2*   HCT 27.9* 26.4* 21* 25.4*    262  --  241   MCV 84 83  --  83   RDW 13.2 13.3  --  13.7        Chemistries:  Recent Labs   Lab 03/21/20 1933 03/22/20  0535 03/23/20  1153    137 141   K 3.8 3.5 3.6    105 109   CO2 18* 20* 21*   BUN 34* 41* 51*   CREATININE 4.0* 3.9* 3.8*   CALCIUM 8.6* 8.3* 8.6*   ALBUMIN 2.8*  --  2.5*   PROT 6.2  --  5.6*   BILITOT 0.7  --  0.8   ALKPHOS 74  --  66   ALT 12  --  79*   AST 33  --  124*   MG  --  1.6 2.1   PHOS  --  3.8 3.9       All pertinent labs within the past 24 hours have been reviewed.    Significant Imaging:   I have reviewed all pertinent imaging results/findings within the past 24 hours.

## 2020-03-23 NOTE — PLAN OF CARE
Problem: Adult Inpatient Plan of Care  Goal: Plan of Care Review    Patient is awake and orientedx2 to self and place. Care plan explained to patient; he verbalized understanding. On a venti mask, O2 saturation at 94%. Hooked to heart monitor running normal sinus rhythm at 86-101bpm. Urinal in reach. No pain/n/v/d during shift. Due medications given. Heparin drip infusing at 7units/kg/hr; 5.6mL/hr. Accuchecks completed. Encouraged to turn every 2 hours as tolerated. Maintained on fall risk precaution. Bed in lowest position, bed alarm on, call light/personal items within reach and instructed to call for help when needed. Will continue to monitor.    Outcome: Ongoing, Progressing

## 2020-03-23 NOTE — PLAN OF CARE
Notified by SHERIN that patient had taken of venti mask. 77% on room air, respirations 34.  Notified Dr Davis. Ordered to restrain patient to prevent him from pulling off venti mask.

## 2020-03-23 NOTE — PLAN OF CARE
VN cued into patients room for q2h rounding - Patient is in no acute distress at this time.  Call light within reach. Will continue to monitor closely.

## 2020-03-23 NOTE — PLAN OF CARE
VN cued into patients room for rounding. VN role explained and informed pt that VN will be working alongside the bedside care team throughout the day. Pt verbalized understanding that VN is available for any questions and education, and nurse and PCT will continue hourly rounding at bedside. Fall education provided. Heparin drip infusing. O2 NC in place. Denies any questions/concerns at this time.  Will continue to monitor and intervene PRN.

## 2020-03-23 NOTE — PROGRESS NOTES
Patient seen and examined by me. I agree with the trainee's separate note except as modified.     64 year old with h/o CAD, DM, HTN, HFrEF, multiple myeloma which is not in remission, CVA came in on 3/21 with 2 days of weakness, then cough/SOB. Hypoxic on admission. This worsened until he was transferred to the ICU this morning on a non-rebreather.     Febrile since admission. 100% on non rebreather.     Creatinine 3.2 to 4, now 3.8. CTNI peaked at 11.2. 7.45/30/49/21/87% on NRB.    BNP 4000 on admission. Flu negative. CoV2 pending (3/21)    CXR reviewed by me: enlarged cardiac shadow. Mild bilateral opacities. No clear pleural effusions.    On my exam: tachypneic but awake    Impression: suspected COVID-19, also with possible volume overload due to HFrEF, AoCKD    Recommendations:   -no indication for intubation at this point, will continue supplemental O2  -on azithromycin (day 2) and ceftriaxone (day 3)  -started on hydroxychloroquine for possible COVID-19  -trend creatinine, no HD needs for now  -would stop tamiflu as flu was negative by PCR  -on heparin gtt for NSTEMI    Critical care time (30min) spent personally by me on the following activities: development of treatment plan with patient or surrogate and bedside caregivers, discussions with consultants, evaluation of patient's response to treatment, examination of patient, ordering and performing treatments and interventions, ordering and review of laboratory studies, ordering and review of radiographic studies, pulse oximetry, re-evaluation of patient's condition. This critical care time did not overlap with that of any other provider or involve time for any procedures.

## 2020-03-23 NOTE — NURSING
Patient no longer confused. Removed restraints with agreement that patient will not pull lines. Patient agrees.

## 2020-03-23 NOTE — PROGRESS NOTES
"[facility] IM Resident LUCINDA Progress Note    Subjective:      Patient doing okay this am. He was stepped up to the ICU overnight for tachypnea and worsening resp status with saturations decreased to 80s on 14LHFNC. He was placed on nonrebreather in ICU and respiratory status improved. This am he was talking in full sentences and oriented x3. I took off the nonrebreather and placed him on 4LNC and he was sating 94-97%. He looked well and denied any CP, SOB, N/V, abdominal pain.      Objective:   Last 24 Hour Vital Signs:  BP  Min: 103/61  Max: 147/71  Temp  Av.7 °F (37.1 °C)  Min: 96 °F (35.6 °C)  Max: 101.6 °F (38.7 °C)  Pulse  Av.1  Min: 70  Max: 101  Resp  Av  Min: 13  Max: 41  SpO2  Av.2 %  Min: 77 %  Max: 100 %  Height  Av' 1" (185.4 cm)  Min: 6' 1" (185.4 cm)  Max: 6' 1" (185.4 cm)  Weight  Av.2 kg (187 lb 13.3 oz)  Min: 85.2 kg (187 lb 13.3 oz)  Max: 85.2 kg (187 lb 13.3 oz)  I/O last 3 completed shifts:  In: 1010 [P.O.:910; IV Piggyback:100]  Out: 800 [Urine:800]    Physical Examination: patient seen on video chat   Physical Exam   Constitutional: He is oriented to person, place, and time. He appears well-developed and well-nourished. No distress.   Lying in bed, appears comfortable    HENT:   Head: Normocephalic and atraumatic.   Neck: Normal range of motion.   Pulmonary/Chest: No respiratory distress.   Neurological: He is alert and oriented to person, place, and time.   Psychiatric: He has a normal mood and affect. His behavior is normal.     Laboratory:  Laboratory Data Reviewed: yes  Pertinent Findings:  Recent Labs   Lab 20  0819 20  1933 20  0535 20  0449   WBC 8.30 9.37 5.71  --    HGB 9.4* 8.9* 8.5*  --    HCT 30.4* 27.9* 26.4* 21*    309 262  --    MCV 87 84 83  --    RDW 13.2 13.2 13.3  --     138 137  --    K 3.4* 3.8 3.5  --     105 105  --    CO2 25 18* 20*  --    BUN 26* 34* 41*  --    CREATININE 3.2* 4.0* 3.9*  --    * " 226* 235*  --    PROT 6.4 6.2  --   --    ALBUMIN 2.7* 2.8*  --   --    BILITOT 0.6 0.7  --   --    AST 13 33  --   --    ALKPHOS 85 74  --   --    ALT 8* 12  --   --      Microbiology Data Reviewed: yes  Pertinent Findings:  Flu negative  Blood cx NGx2d  COVID pending     Other Results:  EKG (my interpretation): NSR, QTc 540     Radiology Data Reviewed: yes  Pertinent Findings:  CXR: RLL PNA    Current Medications:     Infusions:   heparin (porcine) in D5W 7 Units/kg/hr (03/23/20 0845)        Scheduled:   aspirin  81 mg Oral Daily    atorvastatin  40 mg Oral QHS    azithromycin  500 mg Intravenous Q24H    carvediloL  12.5 mg Oral BID WM    cefTRIAXone (ROCEPHIN) IVPB  1 g Intravenous Q24H    clopidogreL  75 mg Oral Daily    fluticasone propionate  1 spray Each Nostril BID    furosemide (LASIX) IV  120 mg Intravenous Q8H    insulin detemir U-100  10 Units Subcutaneous QHS    montelukast  10 mg Oral QHS    oseltamivir  30 mg Oral Daily    pantoprazole  40 mg Oral Daily        PRN:  acetaminophen, heparin (PORCINE), heparin (PORCINE)    Antibiotics and Day Number of Therapy:  Azithromycin day 3  Cefepime x1 3/21  Rocephin Day 2   tamiflu day 2     Assessment:     Marie Reis Jr. is a 64 y.o.male with  Patient Active Problem List    Diagnosis Date Noted    Acute respiratory failure with hypoxia 03/22/2020    Pneumonia of right lower lobe due to infectious organism 03/22/2020    NSTEMI (non-ST elevated myocardial infarction) 03/22/2020    Chronic combined systolic and diastolic heart failure 03/22/2020    Moderate to severe aortic stenosis 03/22/2020    Anemia, chronic disease 03/22/2020    Suspected Severe Acute Respiratory Syndrome Coronavirus 2 (SARS-Cov-2) Infection     Severe sepsis 03/21/2020    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, with long-term current use of insulin 03/12/2020    Constipation     Liver function test  abnormality     Troponin I above reference range     Transaminitis 02/24/2020    Bilirubinemia 02/24/2020    Moderate episode of recurrent major depressive disorder 02/21/2020    Hemiplegia and hemiparesis following cerebral infarction affecting left non-dominant side 02/21/2020    Positive FIT (fecal immunochemical test) 01/20/2020    Thrombocytopenia 12/11/2019    Dental caries 10/16/2019    Symptomatic anemia 09/09/2019    PAD (peripheral artery disease) 08/28/2019    Diabetic polyneuropathy associated with type 2 diabetes mellitus 01/15/2019    Stage 4 chronic kidney disease 01/15/2019    Aortic atherosclerosis 01/15/2019    Chronic pulmonary heart disease 01/15/2019    Vitreous hemorrhage, right 10/05/2018    Diabetes mellitus with proteinuria 08/21/2018    Vitamin D deficiency 05/16/2018    Type 2 diabetes mellitus with hyperglycemia, with long-term current use of insulin 04/17/2018    Secondary hyperparathyroidism 04/14/2018    Hypertensive CKD (chronic kidney disease) 04/14/2018    Acute renal failure superimposed on stage 4 chronic kidney disease 04/11/2018    Bradycardia     Coronary artery disease due to calcified coronary lesion 03/19/2018    Bilateral carotid artery stenosis 03/19/2018    Multiple myeloma not having achieved remission 01/16/2018    Type 2 diabetes mellitus with stage 4 chronic kidney disease, with long-term current use of insulin 01/11/2018    Benign hypertension with chronic kidney disease, stage III 01/11/2018    Persistent proteinuria 01/11/2018    Acute on chronic combined systolic and diastolic congestive heart failure 01/11/2018    Chronic diastolic congestive heart failure 11/13/2017    Hypertensive retinopathy of both eyes 09/26/2017    Hypertropia of left eye 09/22/2016    Binocular vision disorder with diplopia 09/22/2016    History of right PCA stroke 09/17/2016    Cervical spondylosis 10/01/2015    Thoracic spondylosis 10/01/2015     Lumbar spondylosis 10/01/2015    Spondylolisthesis of lumbar region 10/01/2015    Type 2 diabetes mellitus with diabetic polyneuropathy, with long-term current use of insulin 09/28/2015    Dyslipidemia 06/26/2015    History of stroke 03/23/2015    Essential hypertension 01/12/2014    Hearing impairment 10/25/2013        Plan:     Acute hypoxic respiratory failure 2/2 PNA vs COVID  - on 2L nc-->escalated to ventimask; stepped up to ICU 3/23 early am   - CXR: RLL PNA   - given cefepime and azithro in ED   - continue rocephin, azithro and tamiflu   - plaquenil not started, pt's QTc already 540  - pt currently on 4LNC in ICU, if continues to do well, may step down later today      Severe Sepsis   - Tmax 103.9, RR 24,  with elevated troponin and creatinine   - given cefepime and azithro, will continue abx and add tamiflu   - lactate WNL  - procal elevated      NSTEMI   - troponin of 5 with no acute EKG changes, troponin increased to 11   - patient not complaining of CP  - consulted cards, recommend continuing medical management of ASA, plavix, atorvastatin, coreg and heparin gtt      Hx of CVA  - R PCA infart on 3/17/2020   - CVA in 2014 with residual LLE weakness   - cont home Plavix and Lipitor      JAZLYN on CKD4  - Cr 4 on admit, baseline ~3.5  - Cr 3.9 this AM   - will monitor      AOCD  - Hb 8.9 on admit   - denies active bleeding   - will monitor      Combined systolic and diastolic HF  - TTE in 2/26/2020 with EF 25% and grade 2 diastolic dysfunction with moderate to severe pulmonary HTN and moderate to severe aortic stenosis   - BNP 4,035, 3893 last month   - lasix 120mg IV TID currently, monitor output   - continue coreg, unable to add ACEI/ARB or spironolactone at this time due to kidney function      Multiple myeloma  - being treated with bortezomib   - due for cycle 6 on 3/25     CAD  - cont home ASA, plavix   - currently on heparin gtt      HTN  - Pt on amlodipine and coreg at home, coreg was  continued  - normotensive at admit, now becoming hypertensive, will cont starting home amlodipine      DMT2 with complications with long term use of insulin   - A1c in 3/2020 9.1  - home regimen: levemir 20 qhs with SSI   - hold home linagliptin     Diet: Cardiac   DVT PPx: heparin gtt    Dispo: pending improvement in oxygen requirements/respiratory status, possible step down to floor today     Obed Dias MD  [facility] Internal Medicine/Pediatrics HO-II    [facility] Medicine Hospitalist Pager numbers:   [facility] Hospitalist Medicine Team A (Anson/Nadia): [phone]  [facility] Hospitalist Medicine Team B (Shashank/Aniket):  [phone]

## 2020-03-23 NOTE — CONSULTS
[facility]  Pulmonology  Consult Note    Patient Name: Marie Reis Jr.  MRN: [id number]  Admission Date: 3/21/2020  Hospital Length of Stay: 2 days  Code Status: Prior  Attending Physician: Laurence Zuniga MD  Primary Care Provider: Nancy Colón MD   Principal Problem: Acute respiratory failure with hypoxia    Inpatient consult to Pulmonology  Consult performed by: Gumaro Weinstein MD  Consult ordered by: Obed Dias MD        Subjective:     HPI:  64 year old male with numerus comorbidities including multiple myeloma, combined systolic and diastolic heart failure, uncontrolled DM2 with complication, hx CVA with residual weakness, CKD, and CAD presenting with 2 days of progressive weakness with associated shortness of breath and cough. He has no fevers, chills, or gastrointestinal complaints. On arrival, pt noted to be hypoxic to the 70's and was placed on nasal cannula. He was admitted to the unit for ongoing monitoring    Past Medical History:   Diagnosis Date    Binocular vision disorder with diplopia 9/22/2016    Bradycardia     Cataract     Cervical spondylosis 10/1/2015    Chronic diastolic congestive heart failure     Chronic diastolic heart failure 1/11/2018    Coronary artery disease due to calcified coronary lesion 3/19/2018    Dyslipidemia 6/26/2015    Encounter for blood transfusion     Hearing impairment 10/25/2013    History of stroke 3/23/2015    Hypertension associated with diabetes 1/11/2018    Hypertensive retinopathy of both eyes 9/26/2017    Lumbar spondylosis 10/1/2015    Multiple myeloma not having achieved remission 1/16/2018    NSTEMI (non-ST elevated myocardial infarction) 3/22/2020    SUZANNA on CPAP     Persistent proteinuria 1/11/2018    Pneumonia of right lower lobe due to infectious organism 3/22/2020    Spondylolisthesis of lumbar region 10/1/2015    Strabismus     Stroke 2014    Thoracic spondylosis 10/1/2015    Type 2 diabetes mellitus  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, with long-term current use of insulin 9/26/2017    Type 2 diabetes mellitus with diabetic polyneuropathy, with long-term current use of insulin 9/28/2015    Type 2 diabetes mellitus with stage 4 chronic kidney disease, with long-term current use of insulin 1/11/2018       Past Surgical History:   Procedure Laterality Date    CATARACT EXTRACTION W/  INTRAOCULAR LENS IMPLANT Left 4/30/15    Diane    COLONOSCOPY N/A 1/20/2020    Procedure: COLONOSCOPY;  Surgeon: Salvador Espinosa MD;  Location: [facility];  Service: Endoscopy;  Laterality: N/A;    EYE SURGERY      cataract removal left eye    HAND SURGERY  2/2012    DR. OLIVIA MOLINA    left hand fracture sx      LT EYE   5/2/15    DR KULLMAN [facility]       Review of patient's allergies indicates:   Allergen Reactions    Hydralazine analogues      Increase swelling and throat itching and tightness with use.  Symptoms correlate only with this medication onset in hospital.       Family History     Problem Relation (Age of Onset)    Cancer Mother    Diabetes Mother, Father, Sister, Brother    Heart attack Father    Kidney disease Mother    No Known Problems Maternal Aunt, Maternal Uncle, Paternal Aunt, Paternal Uncle, Maternal Grandmother, Maternal Grandfather, Paternal Grandmother, Paternal Grandfather        Tobacco Use    Smoking status: Never Smoker    Smokeless tobacco: Never Used   Substance and Sexual Activity    Alcohol use: No     Frequency: Never     Drinks per session: Patient refused     Binge frequency: Never    Drug use: No    Sexual activity: Never         Review of Systems   Constitutional: Positive for fatigue. Negative for chills, diaphoresis and fever.   HENT: Negative for congestion, postnasal drip, rhinorrhea and sore throat.    Respiratory: Positive for cough and shortness of breath. Negative for wheezing.    Cardiovascular: Positive for leg swelling. Negative  for chest pain.   Gastrointestinal: Negative for abdominal distention, abdominal pain, constipation, diarrhea, nausea and vomiting.     Objective:     Vital Signs (Most Recent):  Temp: 97.8 °F (36.6 °C) (03/23/20 1115)  Pulse: 72 (03/23/20 1430)  Resp: 20 (03/23/20 1430)  BP: 137/72 (03/23/20 1430)  SpO2: 96 % (03/23/20 1430) Vital Signs (24h Range):  Temp:  [96.9 °F (36.1 °C)-101.6 °F (38.7 °C)] 97.8 °F (36.6 °C)  Pulse:  [] 72  Resp:  [13-41] 20  SpO2:  [77 %-100 %] 96 %  BP: (103-155)/(57-84) 137/72     Weight: 85.2 kg (187 lb 13.3 oz)  Body mass index is 24.78 kg/m².      Intake/Output Summary (Last 24 hours) at 3/23/2020 1457  Last data filed at 3/23/2020 1400  Gross per 24 hour   Intake 840 ml   Output 1925 ml   Net -1085 ml       Physical Exam   Constitutional: He is oriented to person, place, and time. He appears well-developed and well-nourished. No distress.   HENT:   Mouth/Throat: No oropharyngeal exudate.   Eyes: Conjunctivae are normal. No scleral icterus.   Cardiovascular: Normal rate and regular rhythm.   Pulmonary/Chest: No accessory muscle usage. No tachypnea. No respiratory distress. He has no decreased breath sounds. He has no wheezes. He has no rhonchi. He has rales.   Abdominal: Soft. Bowel sounds are normal. He exhibits no distension. There is no tenderness.   Neurological: He is alert and oriented to person, place, and time. No cranial nerve deficit.   Skin: Skin is warm and dry. He is not diaphoretic.   Nursing note and vitals reviewed.      Vents:  Oxygen Concentration (%): 50 (03/23/20 0215)    Lines/Drains/Airways     Drain            Male External Urinary Catheter 03/23/20 0435 Small less than 1 day          Peripheral Intravenous Line                 Peripheral IV - Single Lumen 03/21/20 1909 20 G Left Antecubital 1 day         Peripheral IV - Single Lumen 03/21/20 2151 20 G Right Antecubital 1 day                Significant Labs:    CBC/Anemia Profile:  Recent Labs   Lab  03/21/20 1933 03/22/20  0535 03/23/20  0449 03/23/20  1153   WBC 9.37 5.71  --  8.20   HGB 8.9* 8.5*  --  8.2*   HCT 27.9* 26.4* 21* 25.4*    262  --  241   MCV 84 83  --  83   RDW 13.2 13.3  --  13.7        Chemistries:  Recent Labs   Lab 03/21/20 1933 03/22/20  0535 03/23/20  1153    137 141   K 3.8 3.5 3.6    105 109   CO2 18* 20* 21*   BUN 34* 41* 51*   CREATININE 4.0* 3.9* 3.8*   CALCIUM 8.6* 8.3* 8.6*   ALBUMIN 2.8*  --  2.5*   PROT 6.2  --  5.6*   BILITOT 0.7  --  0.8   ALKPHOS 74  --  66   ALT 12  --  79*   AST 33  --  124*   MG  --  1.6 2.1   PHOS  --  3.8 3.9       All pertinent labs within the past 24 hours have been reviewed.    Significant Imaging:   I have reviewed all pertinent imaging results/findings within the past 24 hours.    Assessment/Plan:     CNS:  · No acute issues. Alert and awake.     CARDS:  # Combined Systolic and Diastolic Heart Failure  · Continue ASA, statin, GDMT. BNP 4000. Aggressive diuresis with Lasix 120mg TID. Strict I/O, daily weights  # NSTEMI  · Cards consulted. On heparin gtt     PULM:  # Acute Hypoxemic Respiratory Failure 2/2 suspected COVID infection  · Supportive care and maintain airborne, contact, droplet. COVID pending  · Continue Hydroxychloroquine 200mg BID 5d. Continue Azithromycin and Rocephin     FEN/GI:  · Electrolyte repletion PRN     RENAL:  # Chronic Kidney Disease  · Renally dose all meds and avoid nephrotoxins     HEME:  # Normocytic Anemia  · Likely comorbid complication. No overt bleeding     ID:  # Suspected COVID-19 Infection  · Flu neg. Blood Cx negative. Recs as noted above     ENDO:  # Type 2 Diabetes  · Add and titrate insulin to -180     VTE PPx:  Heparin gtt     CODE: FULL     Disposition: stable for transfer to the floor      Thank you for your consult. I will sign off. Please contact us if you have any additional questions.     Gumaro Weinstein MD  Pulmonology  [facility]

## 2020-03-23 NOTE — NURSING
MET was called on patient due to patient being tachypneic despite being on the non-rebreather/Venti mask. Transferred to ICU for further care.

## 2020-03-23 NOTE — PROGRESS NOTES
Responded to MET, pt on NRB sats 94% ABG obtained, pt transported to ICU. Will continue to monitor.

## 2020-03-23 NOTE — PLAN OF CARE
ID Staff - [facility] [phone]    Rounded on patient with ICU team - seen yesterday and Hydroxychloroquine was not started due to concerns over NSTEMI and prolonged QTc - now with worsening respiratory failure    Resp:  FiO2 - 15L % sat    IMP:  Approve Hydroxychloroquine with positive test and changing status   Monitor QTc and/or cardiology consult as needed     aspirin  81 mg Oral Daily    atorvastatin  40 mg Oral QHS    azithromycin  500 mg Intravenous Q24H    carvediloL  12.5 mg Oral BID WM    cefTRIAXone (ROCEPHIN) IVPB  1 g Intravenous Q24H    clopidogreL  75 mg Oral Daily    fluticasone propionate  1 spray Each Nostril BID    furosemide (LASIX) IV  120 mg Intravenous Q8H    hydroxychloroquine  400 mg Oral BID    insulin detemir U-100  10 Units Subcutaneous QHS    montelukast  10 mg Oral QHS    oseltamivir  30 mg Oral Daily    pantoprazole  40 mg Oral Daily       Labs:   Noted   WBC normal  Hct 25  Creat 3.5  (Cr Cl 22)    lactate normal   procal slightly up 0.38    CXR today radiology read:  The heart size is mildly enlarged.  Mediastinal structures are midline.  Lungs are expanded with mildly increased pulmonary vascularity and interstitial lung markings consistent with mild edema.  Minimal ground-glass opacity is present in right lower lung zone, also possible edema; no large airspace consolidation is detected..  No significant pleural fluid is seen.  The skeletal structures show no acute fracture..

## 2020-03-24 PROBLEM — J12.82 PNEUMONIA DUE TO COVID-19 VIRUS: Status: ACTIVE | Noted: 2020-01-01

## 2020-03-24 PROBLEM — I50.43 ACUTE ON CHRONIC COMBINED SYSTOLIC AND DIASTOLIC HEART FAILURE: Status: ACTIVE | Noted: 2020-01-01

## 2020-03-24 PROBLEM — J06.9 ACUTE RESPIRATORY DISEASE DUE TO SEVERE ACUTE RESPIRATORY SYNDROME CORONAVIRUS 2 (SARS-COV-2): Status: ACTIVE | Noted: 2020-01-01

## 2020-03-24 PROBLEM — I40.0 MYOCARDITIS DUE TO COVID-19 VIRUS: Status: ACTIVE | Noted: 2020-01-01

## 2020-03-24 PROBLEM — I50.42 CHRONIC COMBINED SYSTOLIC AND DIASTOLIC HEART FAILURE: Status: ACTIVE | Noted: 2017-11-13

## 2020-03-24 PROBLEM — U07.1 PNEUMONIA DUE TO COVID-19 VIRUS: Status: ACTIVE | Noted: 2020-01-01

## 2020-03-24 PROBLEM — U07.1 ACUTE RESPIRATORY DISEASE DUE TO SEVERE ACUTE RESPIRATORY SYNDROME CORONAVIRUS 2 (SARS-COV-2): Status: ACTIVE | Noted: 2020-01-01

## 2020-03-24 NOTE — PLAN OF CARE
VN cued into patients room for q2h rounding - patient resting in bed in no acute distress at this time. O2 cont to be in use via nasal cannula. Call bell within reach. Will continue to monitor and be available to intervene PRN.

## 2020-03-24 NOTE — PROGRESS NOTES
[facility] Cardiology Progress Note    Admit Date: 3/21/2020        LOS: 3 days     Subjective     Pt doing well. No cp or dyspnea. Fever overnight at 102. No other is issues.     Objective     Vital Signs (Most Recent)  Temp: 97.2 °F (36.2 °C) (03/24/20 0740)  Pulse: 78 (03/24/20 0740)  Resp: 20 (03/24/20 0740)  BP: 134/63 (03/24/20 0740)  SpO2: 97 % (03/24/20 0740)    I & O (Last 24H):    Intake/Output Summary (Last 24 hours) at 3/24/2020 0935  Last data filed at 3/23/2020 2339  Gross per 24 hour   Intake 450 ml   Output 1325 ml   Net -875 ml       Physical Exam:  NAD, visually resting in bed comfortably. Did telemedicine  Visit.     Laboratory/Imaging:  Reviewed in chart    Scheduled Meds:   aspirin  81 mg Oral Daily    atorvastatin  40 mg Oral QHS    azithromycin  500 mg Intravenous Q24H    carvediloL  12.5 mg Oral BID WM    cefTRIAXone (ROCEPHIN) IVPB  1 g Intravenous Q24H    clopidogreL  75 mg Oral Daily    fluticasone propionate  1 spray Each Nostril BID    furosemide (LASIX) IV  120 mg Intravenous Q8H    hydroxychloroquine  400 mg Oral Daily    insulin detemir U-100  10 Units Subcutaneous QHS    montelukast  10 mg Oral QHS    pantoprazole  40 mg Oral Daily     Continuous Infusions:   heparin (porcine) in D5W 7 Units/kg/hr (03/23/20 0845)     PRN Meds:acetaminophen, benzonatate, heparin (PORCINE), heparin (PORCINE)    Assessment/Plan     # NSTEMI Trop peak @ 11  # Chronic systolic heart failure EF 25%  # Hypoxia 2/2 suspected COV19 virus  # ?JAZLYN on CKD 4-5  # Mod-Severe AS  # Pulm HTN  # Mod AI  # Hx of CVA      Cont ACS treatment. Discussed extensively with patient regarding cor angio prior to discharge or medical mgmt. Pt wants to think about it and discuss with wife; given patients renal dysfunction, ?new progressed CKD/basleine, and compliance. Pt denies any cardiac symptoms which is a good sign but does have multiple substrate for CAD and should have an ischemic evaluation at some point.     Edward  Segundo SANTAMARIA  [facility] Cardiology Fellow PGY6  Cell: [phone]

## 2020-03-24 NOTE — PROGRESS NOTES
[facility] Infectious Disease Consult     Primary Team: Laurence Zuniga MD  Consultant Attending: Dr. Ludy SANTAMARIA  Date of Admit: 3/21/2020    Reason for Consult     CoVid-19 POSITIVE    History of Present Illness     The patient was in their usual state of health until 2 days PTA when he began feeling weak. He states he wasn't feeling SOB or coughing until today. He denies fever, CP, n/v/d, abdominal pain. He was sating 86% when EMS arrived and is now requiring a ventimask.     Review of Systems (positives in bold):  ROS  Defer to primary team ROS due to COVID19 testing    Allergies:  Review of patient's allergies indicates:   Allergen Reactions    Hydralazine analogues      Increase swelling and throat itching and tightness with use.  Symptoms correlate only with this medication onset in hospital.       Medications:   In-Hospital Scheduled Medications:   aspirin  81 mg Oral Daily    atorvastatin  40 mg Oral QHS    azithromycin  500 mg Intravenous Q24H    carvediloL  12.5 mg Oral BID WM    cefTRIAXone (ROCEPHIN) IVPB  1 g Intravenous Q24H    clopidogreL  75 mg Oral Daily    enoxaparin  1 mg/kg (Dosing Weight) Subcutaneous Q24H    fluticasone propionate  1 spray Each Nostril BID    furosemide (LASIX) IV  120 mg Intravenous Q8H    hydroxychloroquine  400 mg Oral Daily    insulin detemir U-100  10 Units Subcutaneous QHS    montelukast  10 mg Oral QHS    pantoprazole  40 mg Oral Daily      In-Hospital PRN Medications:  acetaminophen, benzonatate      Past Medical History:  Past Medical History:   Diagnosis Date    Binocular vision disorder with diplopia 9/22/2016    Bradycardia     Cataract     Cervical spondylosis 10/1/2015    Chronic diastolic congestive heart failure     Chronic diastolic heart failure 1/11/2018    Coronary artery disease due to calcified coronary lesion 3/19/2018    Dyslipidemia 6/26/2015    Encounter for blood transfusion     Hearing impairment 10/25/2013    History of stroke  3/23/2015    Hypertension associated with diabetes 2018    Hypertensive retinopathy of both eyes 2017    Lumbar spondylosis 10/1/2015    Multiple myeloma not having achieved remission 2018    NSTEMI (non-ST elevated myocardial infarction) 3/22/2020    SUZANNA on CPAP     Persistent proteinuria 2018    Pneumonia of right lower lobe due to infectious organism 3/22/2020    Spondylolisthesis of lumbar region 10/1/2015    Strabismus     Stroke 2014    Thoracic spondylosis 10/1/2015    Type 2 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, with long-term current use of insulin 2017    Type 2 diabetes mellitus with diabetic polyneuropathy, with long-term current use of insulin 2015    Type 2 diabetes mellitus with stage 4 chronic kidney disease, with long-term current use of insulin 2018         Objective   Last 24 Hour Vital Signs:  BP  Min: 119/57  Max: 155/73  Temp  Av.2 °F (37.3 °C)  Min: 96.8 °F (36 °C)  Max: 102.1 °F (38.9 °C)  Pulse  Av.2  Min: 72  Max: 92  Resp  Av.2  Min: 16  Max: 20  SpO2  Av.3 %  Min: 93 %  Max: 97 %    Physical Examination:  Physical Exam  Defer to primary team ROS due to COVID19 testing    Laboratory:  CBC:   Lab Results   Component Value Date    WBC 8.20 2020    HGB 8.2 (L) 2020     2020    MCV 83 2020    RDW 13.7 2020     3/23  Creat 3.8    ALT 79 - both elevated (normal on admit)    Estimated Creatinine Clearance: 22.5 mL/min (A) (based on SCr of 3.8 mg/dL (H)).    3/21 BC x 2 NG  3/21 Flu neg    Assessment     Marie Reis Jr. is a 64 y.o. male with a PMH of HFpEF, CAD, DM2, SUZANNA on CPAP, CVA two weeks ago & CKD4 who was in their usual state of health until 2 days ago when he began feeling weak. He states he wasn't feeling SOB or coughing until 3/21/20. He denies fever, CP, n/v/d, abdominal pain. He was sating 86% when EMS arrived and is now  requiring a ventimask.     NSTEMI - jssut out of ICU and stable  CoVid-19 Positive     Recommendations   COVID 19 infection - positive CXR    - Upon presentation, spo2 86% on RA -> 95 on 15L NC, 99/58mmhg, otherwise vital signs stable  - not on home O2    - elevated: troponin 5.6 -> 11, BNP 4,035, procalcitonin 0.38  - without abnormality: flu, wbc (absolute lymphocyte count 1.4 ), lactic acid 1.2, UA  - following blood cultures from 3/21/20 -no growth to date    - Chest x-ray on 3/21/20: right lower lobe airspace opacity.  There is no pneumothorax or significant pleural fluid.  No change 3/23    - cardiology consulted, believed etiology might be secondary to perimyocarditis secondary to viral infection,unlikley candidate for percutaneous revascularization. Continue medical management   - TTE on 2/26/20 EF 25% & grade 2 diastolic dsyfunction    Now on Hydroxychloroquine with (+) Covid test - day 2 / 5, and Day 3 ceftriaxone      - QTc 540 from ekg on 3/22/20 - carefully monitor QTc - xardiology to help with this insterad of getting multiple EKG's      Thank you for this consult. We will follow.      JOANNE Boston  [facility] Infectious Disease

## 2020-03-24 NOTE — PLAN OF CARE
Plan of care reviewed patient verbalized understanding. Medications administered. Cardiac monitoring. Heparin infusing 7units/kg/min. Aptt therapeutic. safety maintained bed in the lowest position, call bell within reach, bed alarm on.

## 2020-03-24 NOTE — PLAN OF CARE
VN cued into patients room for q2h rounding - patient resting in bed with blanket over head; voices no needs at this time. Denies SOB. Call bell within reach. Will continue to monitor and be available to intervene PRN.

## 2020-03-24 NOTE — PLAN OF CARE
1900 reported no pain.   2100 accu ck 141, pt requested only 8 of 10 u of detemir.      Tele: SR,  HR 80,  No alarms.     Bed in lowest position, wheels locked, non skid socks, ID band worn, personal items and call bell with in reach, bed alarm set.

## 2020-03-24 NOTE — PROGRESS NOTES
[facility] IM Resident LUCINDA Progress Note    Subjective:      Patient doing well this morning; he is on 4LNC and doing well. He has no complaints at this time and is able to talk in complete sentences. He did run a fever overnight but denies any other issues at this time.       Objective:   Last 24 Hour Vital Signs:  BP  Min: 119/57  Max: 155/73  Temp  Av.6 °F (37.6 °C)  Min: 97.2 °F (36.2 °C)  Max: 102.1 °F (38.9 °C)  Pulse  Av.4  Min: 70  Max: 92  Resp  Av.1  Min: 13  Max: 20  SpO2  Av.2 %  Min: 93 %  Max: 99 %  I/O last 3 completed shifts:  In: 1110 [P.O.:760; IV Piggyback:350]  Out:  [Urine:]    Physical Examination: patient seen on video chat   Physical Exam   Constitutional: He is oriented to person, place, and time. He appears well-developed and well-nourished. No distress.   Lying in bed, appears comfortable    HENT:   Head: Normocephalic and atraumatic.   Neck: Normal range of motion.   Pulmonary/Chest: No respiratory distress.   Neurological: He is alert and oriented to person, place, and time.   Psychiatric: He has a normal mood and affect. His behavior is normal.   Rest of exam not evaluated via virtual rounding      Laboratory:  Laboratory Data Reviewed: yes  Pertinent Findings:  Recent Labs   Lab 20  0819 20  1933 20  0535 20  0449 20  1153   WBC 8.30 9.37 5.71  --  8.20   HGB 9.4* 8.9* 8.5*  --  8.2*   HCT 30.4* 27.9* 26.4* 21* 25.4*    309 262  --  241   MCV 87 84 83  --  83   RDW 13.2 13.2 13.3  --  13.7    138 137  --  141   K 3.4* 3.8 3.5  --  3.6    105 105  --  109   CO2 25 18* 20*  --  21*   BUN 26* 34* 41*  --  51*   CREATININE 3.2* 4.0* 3.9*  --  3.8*   * 226* 235*  --  143*   PROT 6.4 6.2  --   --  5.6*   ALBUMIN 2.7* 2.8*  --   --  2.5*   BILITOT 0.6 0.7  --   --  0.8   AST 13 33  --   --  124*   ALKPHOS 85 74  --   --  66   ALT 8* 12  --   --  79*     Microbiology Data Reviewed: yes  Pertinent Findings:  Flu  negative  Blood cx NGx3d  COVID positive      Other Results:  EKG (my interpretation): NSR, QTc 540     Radiology Data Reviewed: yes  Pertinent Findings:  CXR: RLL PNA    Current Medications:     Infusions:       Scheduled:   aspirin  81 mg Oral Daily    atorvastatin  40 mg Oral QHS    azithromycin  500 mg Intravenous Q24H    carvediloL  12.5 mg Oral BID WM    cefTRIAXone (ROCEPHIN) IVPB  1 g Intravenous Q24H    clopidogreL  75 mg Oral Daily    enoxaparin  1 mg/kg (Dosing Weight) Subcutaneous Q24H    fluticasone propionate  1 spray Each Nostril BID    furosemide (LASIX) IV  120 mg Intravenous Q8H    hydroxychloroquine  400 mg Oral Daily    insulin detemir U-100  10 Units Subcutaneous QHS    montelukast  10 mg Oral QHS    pantoprazole  40 mg Oral Daily        PRN:  acetaminophen, benzonatate    Antibiotics and Day Number of Therapy:  Azithromycin day 4  Cefepime x1 3/21  Rocephin Day 3   tamiflu x2 days     Assessment:     Marie Reis Jr. is a 64 y.o.male with  Patient Active Problem List    Diagnosis Date Noted    Severe sepsis 03/23/2020    Acute Respiratory Disease due to Severe Acute Respiratory Syndrome Coronavirus 2 (SARS-Cov-2)     Acute respiratory failure with hypoxia 03/22/2020    Pneumonia of right lower lobe due to infectious organism 03/22/2020    NSTEMI (non-ST elevated myocardial infarction) 03/22/2020    Chronic combined systolic and diastolic heart failure 03/22/2020    Moderate to severe aortic stenosis 03/22/2020    Anemia, chronic disease 03/22/2020    Suspected Severe Acute Respiratory Syndrome Coronavirus 2 (SARS-Cov-2) Infection     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, with long-term current use of insulin 03/12/2020    Constipation     Liver function test abnormality     Troponin I above reference range     Transaminitis 02/24/2020    Bilirubinemia 02/24/2020    Moderate episode of recurrent  major depressive disorder 02/21/2020    Hemiplegia and hemiparesis following cerebral infarction affecting left non-dominant side 02/21/2020    Positive FIT (fecal immunochemical test) 01/20/2020    Thrombocytopenia 12/11/2019    Dental caries 10/16/2019    Symptomatic anemia 09/09/2019    PAD (peripheral artery disease) 08/28/2019    Diabetic polyneuropathy associated with type 2 diabetes mellitus 01/15/2019    Stage 4 chronic kidney disease 01/15/2019    Aortic atherosclerosis 01/15/2019    Chronic pulmonary heart disease 01/15/2019    Vitreous hemorrhage, right 10/05/2018    Diabetes mellitus with proteinuria 08/21/2018    Vitamin D deficiency 05/16/2018    Type 2 diabetes mellitus with hyperglycemia, with long-term current use of insulin 04/17/2018    Secondary hyperparathyroidism 04/14/2018    Hypertensive CKD (chronic kidney disease) 04/14/2018    Acute renal failure superimposed on stage 4 chronic kidney disease 04/11/2018    Bradycardia     Coronary artery disease due to calcified coronary lesion 03/19/2018    Bilateral carotid artery stenosis 03/19/2018    Prolonged Q-T interval on ECG 02/22/2018    Multiple myeloma not having achieved remission 01/16/2018    Type 2 diabetes mellitus with stage 4 chronic kidney disease, with long-term current use of insulin 01/11/2018    Benign hypertension with chronic kidney disease, stage III 01/11/2018    Persistent proteinuria 01/11/2018    Acute on chronic combined systolic and diastolic congestive heart failure 01/11/2018    Chronic diastolic congestive heart failure 11/13/2017    Hypertensive retinopathy of both eyes 09/26/2017    Hypertropia of left eye 09/22/2016    Binocular vision disorder with diplopia 09/22/2016    History of right PCA stroke 09/17/2016    Cervical spondylosis 10/01/2015    Thoracic spondylosis 10/01/2015    Lumbar spondylosis 10/01/2015    Spondylolisthesis of lumbar region 10/01/2015    Type 2 diabetes  mellitus with diabetic polyneuropathy, with long-term current use of insulin 09/28/2015    Dyslipidemia 06/26/2015    History of stroke 03/23/2015    Essential hypertension 01/12/2014    Hearing impairment 10/25/2013        Plan:     Acute hypoxic respiratory failure and severe sepsis 2/2 COVID-19   - Tmax 103.9, RR 24,  with elevated troponin and creatinine   - on 2L nc-->escalated to ventimask; stepped up to ICU 3/23 early am now stepped back down to floor on 4LNC    - CXR: RLL PNA   - procal elevated   - given cefepime and azithro in ED   - continue rocephin and azithro, discontinue tamiflu   - plaquenil started day 2/ , pt's QTc 540, continue to monitor   - wean O2 as tolerated to keep sats>90%      NSTEMI   - troponin of 5 with no acute EKG changes, troponin increased to 11   - patient not complaining of CP  - consulted cards, recommend continuing medical management of ASA, plavix, atorvastatin, coreg  - discontinue hep gtt and start full dose lovenox      Hx of CVA  - R PCA infart on 3/17/2020   - CVA in 2014 with residual LLE weakness   - cont home Plavix and Lipitor      JAZLYN on CKD4  - Cr 4 on admit, baseline ~3.5  - Cr 3.9  - will monitor      AOCD  - Hb 8.9 on admit   - denies active bleeding   - will monitor      Acute on Chronic Combined systolic and diastolic HF  - TTE in 2/26/2020 with EF 25% and grade 2 diastolic dysfunction with moderate to severe pulmonary HTN and moderate to severe aortic stenosis   - BNP 4,035, 3893 last month   - lasix 120mg IV TID currently, monitor output   - continue coreg, unable to add ACEI/ARB or spironolactone at this time due to kidney function      Multiple myeloma  - being treated with bortezomib   - due for cycle 6 on 3/25     CAD  - cont home ASA, plavix   - full dose lovenox, heparin gtt discontinued      HTN  - Pt on amlodipine and coreg at home, coreg was continued  - normotensive at admit, cont to monitor, will add home amlodipine if needed    DMT2 with  complications with long term use of insulin   - A1c in 3/2020 9.1  - home regimen: levemir 20 qhs with SSI   - hold home linagliptin     Diet: Cardiac   DVT PPx: full dose lovenox     Dispo: pending improvement in oxygen requirements/respiratory status    Obed Dias MD  \[facility]\"" Internal Medicine/Pediatrics HO-II    \[facility]\"" Medicine Hospitalist Pager numbers:   \[facility]\"" Hospitalist Medicine Team A (Anson/Nadia): [phone]  \[facility]\"" Hospitalist Medicine Team B (Shashank/Aniket):  [phone]

## 2020-03-24 NOTE — PLAN OF CARE
VN cued into pt's room for introduction. VN informed pt that VN would be working along side bedside nurse and PCT throughout shift. Level of present pain assessed. At present no distress noted. O2 cont to be in use via nasal cannula. Discussed with patient High fall risk protocol and interventions that have been initiated and cont be in place for safety. Patient verbalized clear understanding and cooperation using teach back method. Bed alarm presently activated and in use. Will cont to be available to patient and intervene prn.

## 2020-03-24 NOTE — PLAN OF CARE
VN cued into patients room for q2h rounding - patient resting in bed in no acute distress at this time. Call bell within reach. Will continue to monitor and be available to intervene PRN.

## 2020-03-24 NOTE — PLAN OF CARE
Plan of care reviewed patient verbalized understanding. Medications administered. Cardiac monitoring. Blood glucose monitoring.safety maintained bed in the lowest position, call bell within reach, bed alarm on.

## 2020-03-24 NOTE — PLAN OF CARE
VN cued into patients room for q2h rounding - patient resting in bed in no acute distress at this time. O2 cont to be admin via nasal cannula. Patient states that he just wants to be left alone so that he can take a nap. Call bell within reach. Will continue to monitor and be available to intervene PRN.

## 2020-03-25 NOTE — PLAN OF CARE
VN q2h rounds: Patient resting in bed. Denies pain. No needs or complaints at this time. NAD noted. Call light within reach and safety measures are in place. Will continue to monitor and remain available as needed.

## 2020-03-25 NOTE — PLAN OF CARE
AAOx3, VSS, NADN, IV antibiotics infusing, 2L NC, tele monitor SR, urinal and bedside commode at bedside, no other complaints, will continue to monitor pt, fall and covid precautions in place, bed rails up x2,

## 2020-03-25 NOTE — PLAN OF CARE
VN q2h rounds: Patient sitting up in bed. Denies pain. No needs or complaints at this time. NAD noted. Call light within reach and safety measures are in place. Will continue to monitor and remain available as needed.

## 2020-03-25 NOTE — PROGRESS NOTES
[facility] Internal Medicine Resident HO-I Progress Note    Subjective:      Resting comfortably in bed.  On O2 via HFNC     Objective:   Last 24 Hour Vital Signs:  BP  Min: 123/58  Max: 142/61  Temp  Av.2 °F (36.8 °C)  Min: 96.8 °F (36 °C)  Max: 98.7 °F (37.1 °C)  Pulse  Av  Min: 77  Max: 96  Resp  Av.3  Min: 20  Max: 21  SpO2  Av.1 %  Min: 93 %  Max: 97 %    Fluid Balance:     Intake/Output Summary (Last 24 hours) at 3/25/2020 0744  Last data filed at 3/24/2020 2126  Gross per 24 hour   Intake 400 ml   Output 600 ml   Net -200 ml       Vitals:    20 0408 20 0416 20 0440 20 044   BP:  135/64     BP Location:  Right arm     Patient Position:  Lying     Pulse: 95 96     Resp:       Temp:  98.5 °F (36.9 °C)     TempSrc:       SpO2:  (!) 93% (!) 93% (!) 93%   Weight:       Height:           Physical Examination:  Gen: Lying comfortably in bed, in NAD  Resp: Speaking in full sentences, no coughing  MSK: Moving all extremities    Rest of exam deferred due to virtual visit    Laboratory:  Laboratory Data Reviewed: Yes  Pertinent Findings:    Hematology:  Recent Labs   Lab 209 20  1153   WBC 8.30 9.37 5.71  --  8.20   HGB 9.4* 8.9* 8.5*  --  8.2*   HCT 30.4* 27.9* 26.4* 21* 25.4*    309 262  --  241   MCV 87 84 83  --  83   RDW 13.2 13.2 13.3  --  13.7       Chemistry:  Recent Labs   Lab 20  0535 20  1153    138 137 141   K 3.4* 3.8 3.5 3.6    105 105 109   CO2 25 18* 20* 21*   BUN 26* 34* 41* 51*   CREATININE 3.2* 4.0* 3.9* 3.8*   * 226* 235* 143*   CALCIUM 8.7 8.6* 8.3* 8.6*   PROT 6.4 6.2  --  5.6*   ALBUMIN 2.7* 2.8*  --  2.5*   ALT 8* 12  --  79*   AST 13 33  --  124*   ALKPHOS 85 74  --  66   MG  --   --  1.6 2.1   PHOS  --   --  3.8 3.9   BILITOT 0.6 0.7  --  0.8       Cardiac:  Recent Labs   Lab 20  0535 20  03/23/20  0125   TROPONINI 5.610* 11.188* 8.784* 9.057*   BNP 4,035*  --   --   --        DM:  Recent Labs   Lab 03/21/20  1933  03/22/20  0535  03/23/20  1153 03/23/20  1220 03/23/20  2239 03/24/20  0535 03/24/20  1254 03/24/20  1620   *  --  235*  --  143*  --   --   --   --   --    POCTGLUCOSE  --    < >  --    < >  --  149* 141* 140* 132* 126*    < > = values in this interval not displayed.         Microbiology Data Reviewed: Yes  Pertinent Findings:  Microbiology Results (last 7 days)     Procedure Component Value Units Date/Time    Blood culture x two cultures. Draw prior to antibiotics. [[id number]] Collected:  03/21/20 1934    Order Status:  Completed Specimen:  Blood from Peripheral, Antecubital, Left Updated:  03/25/20 0612     Blood Culture, Routine No Growth to date      No Growth to date      No Growth to date      No Growth to date    Narrative:       Aerobic and anaerobic    Blood culture x two cultures. Draw prior to antibiotics. [[id number]] Collected:  03/21/20 2012    Order Status:  Completed Specimen:  Blood from Peripheral, Hand, Left Updated:  03/25/20 0612     Blood Culture, Routine No Growth to date      No Growth to date      No Growth to date      No Growth to date    Narrative:       Aerobic and anaerobic    Respiratory Infection Panel (PCR), Nasopharyngeal [[id number]] Collected:  03/22/20 1646    Order Status:  Canceled Specimen:  Nasopharyngeal Swab     Influenza A & B by Molecular [[id number]] Collected:  03/21/20 2020    Order Status:  Completed Specimen:  Nasopharyngeal Swab Updated:  03/21/20 2124     Influenza A, Molecular Negative     Influenza B, Molecular Negative     Flu A & B Source NP          Lab Results   Component Value Date    [id number] Detected (A) 03/21/2020           Other Results:    Radiology Data Reviewed: Yes  Pertinent Findings:  Imaging Results          X-Ray Chest AP Portable (Final result)  Result time 03/21/20 22:10:14    Final result by Saadia THOMPSON  MD Aaron (03/21/20 22:10:14)                 Impression:      Right lower lobe pneumonia.      Electronically signed by: Saadia Walker  Date:    03/21/2020  Time:    22:10             Narrative:    EXAMINATION:  AP PORTABLE CHEST    CLINICAL HISTORY:  Sepsis;    TECHNIQUE:  AP portable chest radiograph was submitted.    COMPARISON:  02/24/2020    FINDINGS:  AP portable chest radiograph demonstrates enlarged cardiac silhouette.  There is right lower lobe airspace opacity.  There is no pneumothorax or significant pleural fluid.                                Current Medications:     Infusions:       Scheduled:   aspirin  81 mg Oral Daily    atorvastatin  40 mg Oral QHS    azithromycin  500 mg Intravenous Q24H    carvediloL  12.5 mg Oral BID WM    cefTRIAXone (ROCEPHIN) IVPB  1 g Intravenous Q24H    clopidogreL  75 mg Oral Daily    enoxaparin  1 mg/kg (Dosing Weight) Subcutaneous Q24H    fluticasone propionate  1 spray Each Nostril BID    furosemide (LASIX) IV  120 mg Intravenous Q8H    hydroxychloroquine  400 mg Oral Daily    insulin detemir U-100  10 Units Subcutaneous QHS    montelukast  10 mg Oral QHS    pantoprazole  40 mg Oral Daily        PRN:  acetaminophen, benzonatate     Antibiotics:  Antimicrobials (From admission, onward)    Ordered     Dose Route Frequency Start Stop    03/22/20 0722  cefTRIAXone (ROCEPHIN) 1 g in dextrose 5 % 50 mL IVPB     INDICATION:  Lower Respiratory Infections        1 g IV Every 24 hours (non-standard times) 03/22/20 2130 --    03/22/20 0722  azithromycin 500 mg in dextrose 5 % 250 mL IVPB (ready to mix system)     INDICATION:  Lower Respiratory Infections        500 mg IV Every 24 hours (non-standard times) 03/22/20 1945 --             Assessment:     Marie Reis Jr. is a 64 y.o.male with    Plan:     Acute hypoxic respiratory failure and severe sepsis and pneumonia 2/2 COVID-19   - Tmax 103.9, RR 24,  with elevated troponin and creatinine   - on 2L  nc-->escalated to ventimask; stepped up to ICU 3/23 early am now stepped back down to floor on 4LNC    - CXR: RLL PNA   - procal elevated   - given cefepime and azithro in ED   - continue azithro, monitor QTc closely, last 540  - Continue plaquenil  - wean O2 as tolerated to keep sats>90%      NSTEMI   - troponin of 5 with no acute EKG changes, troponin increased to 11   - patient not complaining of CP  - consulted cards, recommend continuing medical management of ASA, plavix, atorvastatin, coreg  - discontinue hep gtt and start full dose lovenox      Hx of CVA  - R PCA infart on 3/17/2020   - CVA in 2014 with residual LLE weakness   - cont home Plavix and Lipitor      JAZLYN on CKD4  - Cr 4 on admit, baseline ~3.5  - Cr 3.9  - will monitor      AOCD  - Hb 8.9 on admit   - denies active bleeding   - will monitor      Acute on Chronic Combined systolic and diastolic HF  - TTE in 2/26/2020 with EF 25% and grade 2 diastolic dysfunction with moderate to severe pulmonary HTN and moderate to severe aortic stenosis   - BNP 4,035, 3893 last month   - lasix 120mg IV TID currently, monitor output   - continue coreg, unable to add ACEI/ARB or spironolactone at this time due to kidney function      Multiple myeloma  - being treated with bortezomib   - due for cycle 6 on 3/25     CAD  - cont home ASA, plavix   - full dose lovenox, heparin gtt discontinued      HTN  - Pt on amlodipine and coreg at home, coreg was continued  - normotensive at admit, cont to monitor, will add home amlodipine if needed     DMT2 with complications with long term use of insulin   - A1c in 3/2020 9.1  - home regimen: levemir 20 qhs with SSI   - hold home linagliptin      Diet: Cardiac   DVT PPx: full dose lovenox      Dispo: pending improvement in oxygen requirements/respiratory status    Alan Ybarra MD  [facility] Internal Medicine HO-I  [facility] Internal Medicine Service Team A    [facility] Medicine Hospitalist Pager numbers:   [facility] Hospitalist Medicine Team A  (Anson/Nadia): [phone]  [facility] Hospitalist Medicine Team B (Shashank/Aniket):  [phone]

## 2020-03-25 NOTE — PLAN OF CARE
VN q2h rounds: Patient resting in bed. Patient complains of not reaching his daily eye drops. Will notify the MD. NAD noted. Call light within reach and safety measures are in place. Will continue to monitor and remain available as needed.

## 2020-03-26 NOTE — PLAN OF CARE
VN cued into patients room for q2h rounding - patient resting in bed; denies any acute distress at this time; O2 HFNC in place. Call bell within reach. Will continue to monitor and be available to intervene PRN.

## 2020-03-26 NOTE — PLAN OF CARE
Procedures See viewpoint US report     VN cued into patients room for q2h rounding - patient resting in bed in no acute distress at this time. Call bell within reach. Will continue to monitor and be available to intervene PRN.

## 2020-03-26 NOTE — PROGRESS NOTES
U Internal Medicine Resident MIGUELI Progress Note    Subjective:      Patient resting comfortably in bed, reports breathing is doing well.  He denies cough, shortness of breath, chest pain.       Objective:   Last 24 Hour Vital Signs:  BP  Min: 97/52  Max: 139/70  Temp  Av.9 °F (36.6 °C)  Min: 97.3 °F (36.3 °C)  Max: 98.2 °F (36.8 °C)  Pulse  Av.6  Min: 62  Max: 81  Resp  Av.5  Min: 17  Max: 24  SpO2  Av.6 %  Min: 92 %  Max: 97 %    Fluid Balance:     Intake/Output Summary (Last 24 hours) at 3/26/2020 0752  Last data filed at 3/26/2020 0600  Gross per 24 hour   Intake 855 ml   Output 2250 ml   Net -1395 ml       Vitals:    20 2356 20 0033 20 0400 20 0450   BP: (!) 113/57   (!) 130/58   BP Location: Left arm   Right arm   Patient Position: Lying   Lying   Pulse: 69 66 62 67   Resp: 17   17   Temp: 98.2 °F (36.8 °C)   97.8 °F (36.6 °C)   TempSrc: Oral   Oral   SpO2:       Weight:       Height:           Physical Examination:  Gen: Lying comfortably in bed, in NAD  Resp: Speaking in full sentences, no coughing  MSK: Moving all extremities    Rest of exam deferred due to virtual visit    Laboratory:  Laboratory Data Reviewed: Yes  Pertinent Findings:    Hematology:  Recent Labs   Lab 20  0535 20  0449 20  1153   WBC 8.30 9.37 5.71  --  8.20   HGB 9.4* 8.9* 8.5*  --  8.2*   HCT 30.4* 27.9* 26.4* 21* 25.4*    309 262  --  241   MCV 87 84 83  --  83   RDW 13.2 13.2 13.3  --  13.7       Chemistry:  Recent Labs   Lab 20  0535 20  1153    138 137 141   K 3.4* 3.8 3.5 3.6    105 105 109   CO2 25 18* 20* 21*   BUN 26* 34* 41* 51*   CREATININE 3.2* 4.0* 3.9* 3.8*   * 226* 235* 143*   CALCIUM 8.7 8.6* 8.3* 8.6*   PROT 6.4 6.2  --  5.6*   ALBUMIN 2.7* 2.8*  --  2.5*   ALT 8* 12  --  79*   AST 13 33  --  124*   ALKPHOS 85 74  --  66   MG  --   --  1.6 2.1   PHOS  --   --   3.8 3.9   BILITOT 0.6 0.7  --  0.8       Cardiac:  Recent Labs   Lab 03/21/20 1933 03/22/20  0535 03/22/20 1936 03/23/20  0125   TROPONINI 5.610* 11.188* 8.784* 9.057*   BNP 4,035*  --   --   --        DM:  Recent Labs   Lab 03/21/20 1933 03/22/20  0535  03/23/20  1153  03/24/20  1254 03/24/20  1620 03/25/20  1150 03/25/20  1928 03/26/20  0447   *  --  235*  --  143*  --   --   --   --   --   --    POCTGLUCOSE  --    < >  --    < >  --    < > 132* 126* 131* 159* 102    < > = values in this interval not displayed.         Microbiology Data Reviewed: Yes  Pertinent Findings:  Microbiology Results (last 7 days)     Procedure Component Value Units Date/Time    Blood culture x two cultures. Draw prior to antibiotics. [[id number]] Collected:  03/21/20 1934    Order Status:  Completed Specimen:  Blood from Peripheral, Antecubital, Left Updated:  03/26/20 0612     Blood Culture, Routine No Growth to date      No Growth to date      No Growth to date      No Growth to date      No Growth to date    Narrative:       Aerobic and anaerobic    Blood culture x two cultures. Draw prior to antibiotics. [[id number]] Collected:  03/21/20 2012    Order Status:  Completed Specimen:  Blood from Peripheral, Hand, Left Updated:  03/26/20 0612     Blood Culture, Routine No Growth to date      No Growth to date      No Growth to date      No Growth to date      No Growth to date    Narrative:       Aerobic and anaerobic    Respiratory Infection Panel (PCR), Nasopharyngeal [[id number]] Collected:  03/22/20 1646    Order Status:  Canceled Specimen:  Nasopharyngeal Swab     Influenza A & B by Molecular [[id number]] Collected:  03/21/20 2020    Order Status:  Completed Specimen:  Nasopharyngeal Swab Updated:  03/21/20 2124     Influenza A, Molecular Negative     Influenza B, Molecular Negative     Flu A & B Source NP          Lab Results   Component Value Date    [id number] Detected (A) 03/21/2020           Other Results:    Radiology  Data Reviewed: Yes  Pertinent Findings:  Imaging Results          X-Ray Chest AP Portable (Final result)  Result time 03/21/20 22:10:14    Final result by Saadia Walker MD (03/21/20 22:10:14)                 Impression:      Right lower lobe pneumonia.      Electronically signed by: Saadia Walker  Date:    03/21/2020  Time:    22:10             Narrative:    EXAMINATION:  AP PORTABLE CHEST    CLINICAL HISTORY:  Sepsis;    TECHNIQUE:  AP portable chest radiograph was submitted.    COMPARISON:  02/24/2020    FINDINGS:  AP portable chest radiograph demonstrates enlarged cardiac silhouette.  There is right lower lobe airspace opacity.  There is no pneumothorax or significant pleural fluid.                                Current Medications:     Infusions:       Scheduled:   aspirin  81 mg Oral Daily    atorvastatin  40 mg Oral QHS    azithromycin  500 mg Oral Daily    carvediloL  12.5 mg Oral BID WM    clopidogreL  75 mg Oral Daily    enoxaparin  1 mg/kg (Dosing Weight) Subcutaneous Q24H    fluticasone propionate  1 spray Each Nostril BID    furosemide (LASIX) IV  120 mg Intravenous Q8H    gabapentin  300 mg Oral TID    hydroxychloroquine  400 mg Oral Daily    insulin detemir U-100  10 Units Subcutaneous QHS    montelukast  10 mg Oral QHS    pantoprazole  40 mg Oral Daily        PRN:  acetaminophen, artificial tears, benzonatate     Antibiotics:  Antimicrobials (From admission, onward)    Ordered     Dose Route Frequency Start Stop    03/22/20 0722  cefTRIAXone (ROCEPHIN) 1 g in dextrose 5 % 50 mL IVPB     INDICATION:  Lower Respiratory Infections        1 g IV Every 24 hours (non-standard times) 03/22/20 2130 --    03/22/20 0722  azithromycin 500 mg in dextrose 5 % 250 mL IVPB (ready to mix system)     INDICATION:  Lower Respiratory Infections        500 mg IV Every 24 hours (non-standard times) 03/22/20 1945 --             Assessment:     Marie Reis Jr. is a 64 y.o.male with    Plan:     Acute  hypoxic respiratory failure and severe sepsis and pneumonia 2/2 COVID-19   - Tmax 103.9, RR 24,  with elevated troponin and creatinine   - on 2L nc-->escalated to ventimask; stepped up to ICU 3/23 early am now stepped back down to floor on 4LNC    - CXR: RLL PNA   - procal elevated   - given cefepime and azithro in ED   - continue azithro, monitor QTc closely, last 540  - Continue plaquenil  - wean O2 as tolerated to keep sats>90%      NSTEMI   - troponin of 5 with no acute EKG changes, troponin increased to 11   - patient not complaining of CP  - consulted cards, recommend continuing medical management of ASA, plavix, atorvastatin, coreg  - discontinue hep gtt and start full dose lovenox      Hx of CVA  - R PCA infart on 3/17/2020   - CVA in 2014 with residual LLE weakness   - cont home Plavix and Lipitor      JAZLYN on CKD4  - Cr 4 on admit, baseline ~3.5  - Cr 3.9  - will monitor      AOCD  - Hb 8.9 on admit   - denies active bleeding   - will monitor      Acute on Chronic Combined systolic and diastolic HF  - TTE in 2/26/2020 with EF 25% and grade 2 diastolic dysfunction with moderate to severe pulmonary HTN and moderate to severe aortic stenosis   - BNP 4,035, 3893 last month   - lasix 120mg IV TID currently, monitor output   - continue coreg, unable to add ACEI/ARB or spironolactone at this time due to kidney function      Multiple myeloma  - being treated with bortezomib   - due for cycle 6 on 3/25     CAD  - cont home ASA, plavix   - full dose lovenox, heparin gtt discontinued      HTN  - Pt on amlodipine and coreg at home, coreg was continued  - normotensive at admit, cont to monitor, will add home amlodipine if needed     DMT2 with complications with long term use of insulin   - A1c in 3/2020 9.1  - home regimen: levemir 20 qhs with SSI   - hold home linagliptin      Diet: Cardiac   DVT PPx: full dose lovenox      Dispo: pending improvement in oxygen requirements/respiratory status    Alan PATE  MD Tate  [facility] Internal Medicine -I  [facility] Internal Medicine Service Team A    [facility] Medicine Hospitalist Pager numbers:   [facility] Hospitalist Medicine Team A (Anson/Nadia): [phone]  [facility] Hospitalist Medicine Team B (Shashank/Aniket):  [phone]

## 2020-03-26 NOTE — PLAN OF CARE
Pt requesting pasta for supper   Informed the pt he is on a cardiac diet and there may be limitations of what he may have.   Dietary dept was contacted    No pasta on current menu tonight    This nurse requested dietary dept to contact pt via phone regarding his menu   Pt does have the hospital  phone at his bedside

## 2020-03-26 NOTE — PLAN OF CARE
VN cued into patient's room. Permission received per patient to turn camera to view patient.  Introduced as one of their virtual nurses for the shift. Call light in reach, patient denies distress or any needs at this time. Informed patient that I will round on them every 2 hours but to use call light for any other needs they may have. Patient verbalized understanding.  Will continue to monitor.

## 2020-03-26 NOTE — PLAN OF CARE
VN cued into patients room for q2h rounding - patient resting in bed in no acute distress at this time. Eating lunch. O2 HFNC in place. Denies any concerns at this time. Call bell within reach. Will continue to monitor and be available to intervene PRN.

## 2020-03-26 NOTE — PLAN OF CARE
VN cued into patients room for q2h rounding - patient resting in bed; c/o of neck pain but denies wanting any pain medication at this time; warm packs offered. Patient states pain is tolerable at this time. Bedside nurse informed. Call bell within reach. Will continue to monitor and be available to intervene PRN.

## 2020-03-26 NOTE — PLAN OF CARE
Patient received on   4 Lpm NC with SpO2    95%. Pt with no apparent distress noted. Will continue to monitor.

## 2020-03-26 NOTE — PLAN OF CARE
VN cued into patients room for q2h rounding - patient resting in bed in no acute distress at this time. O2 HFNC in place. Call bell within reach. Will continue to monitor and be available to intervene PRN.

## 2020-03-26 NOTE — PLAN OF CARE
1900 reported no pain, 2100 accu ck 159.      Tele: SR,  HR 60,  No alarms.     Bed in lowest position, wheels locked, non skid socks, ID band worn, personal items and call bell with in reach, bed alarm set.

## 2020-03-27 NOTE — TELEPHONE ENCOUNTER
Please schedule a virtual visit for patient for follow-up.  This can be scheduled Monday.  Please make sure patient comes to the Virtual visit with a current blood pressure and heart rate and temperature.  His wife needs to be present for encounter.

## 2020-03-27 NOTE — PLAN OF CARE
VN cued into patients room for q2h rounding - patient resting in bed in no acute distress at this time. O2 in place. Denies any needs at this time. Call bell within reach. Will continue to monitor and be available to intervene PRN.

## 2020-03-27 NOTE — TELEPHONE ENCOUNTER
----- Message from Kristin Gonzalez RN sent at 3/27/2020 12:26 PM CDT -----  Contact: RADHA Foster case manager  Pt will need hospital f/u.  Can you arrange virtual visit please?    Thanks,   Kristin Gonzalez RN    [phone]

## 2020-03-27 NOTE — PLAN OF CARE
Call to pt in room, aware of d/c home, no need for home O2.  PCP office to contact pt with f/u virtual visit, pt is aware.  Wife will  at time of d/c.  Pt aware pharmacy can get information from [facility] for scripts to be covered at no cost.  Pt has no additional needs.       03/27/20 1223   Final Note   Assessment Type Final Discharge Note   Anticipated Discharge Disposition Home   Hospital Follow Up  Appt(s) scheduled? Yes   Discharge plans and expectations educations in teach back method with documentation complete? Yes   Right Care Referral Info   Post Acute Recommendation No Care       Kristin Gonzalez, RN    [phone]

## 2020-03-27 NOTE — PROGRESS NOTES
U Internal Medicine Resident MIGUELI Progress Note    Subjective:      Patient feeling well this morning.  Reports breathing is improving.      Objective:   Last 24 Hour Vital Signs:  BP  Min: 102/52  Max: 143/68  Temp  Av.3 °F (36.3 °C)  Min: 96.6 °F (35.9 °C)  Max: 98 °F (36.7 °C)  Pulse  Av.4  Min: 63  Max: 74  Resp  Av.4  Min: 16  Max: 20  SpO2  Av.3 %  Min: 96 %  Max: 100 %    Fluid Balance:     Intake/Output Summary (Last 24 hours) at 3/27/2020 0733  Last data filed at 3/27/2020 0430  Gross per 24 hour   Intake 900 ml   Output 2620 ml   Net -1720 ml       Vitals:    20 0000 20 0005 20 0422 20 0433   BP:  (!) 143/68 (!) 114/58    BP Location:  Right arm Left arm    Patient Position:  Lying Sitting    Pulse: 68 74 71 71   Resp:  16 16    Temp:  97.6 °F (36.4 °C) 97.7 °F (36.5 °C)    TempSrc:  Axillary Oral    SpO2:  100% 98%    Weight:       Height:           Physical Examination:  Gen: Lying comfortably in bed, in NAD  Resp: Speaking in full sentences, no coughing    Rest of exam deferred due to virtual visit    Laboratory:  Laboratory Data Reviewed: Yes  Pertinent Findings:    Hematology:  Recent Labs   Lab 20  0535 20  0449 20  1153   WBC 9.37 5.71  --  8.20   HGB 8.9* 8.5*  --  8.2*   HCT 27.9* 26.4* 21* 25.4*    262  --  241   MCV 84 83  --  83   RDW 13.2 13.3  --  13.7       Chemistry:  Recent Labs   Lab 20  0535 20  1153    137 141   K 3.8 3.5 3.6    105 109   CO2 18* 20* 21*   BUN 34* 41* 51*   CREATININE 4.0* 3.9* 3.8*   * 235* 143*   CALCIUM 8.6* 8.3* 8.6*   PROT 6.2  --  5.6*   ALBUMIN 2.8*  --  2.5*   ALT 12  --  79*   AST 33  --  124*   ALKPHOS 74  --  66   MG  --  1.6 2.1   PHOS  --  3.8 3.9   BILITOT 0.7  --  0.8       Cardiac:  Recent Labs   Lab 20  0535 20  0125   TROPONINI 5.610* 11.188* 8.784* 9.057*   BNP 4,035*  --   --    --        DM:  Recent Labs   Lab 03/21/20  1933  03/22/20  0535  03/23/20  1153  03/26/20  0447 03/26/20  1210 03/26/20  1542 03/26/20  2044 03/27/20  0556   *  --  235*  --  143*  --   --   --   --   --   --    POCTGLUCOSE  --    < >  --    < >  --    < > 102 117* 127* 139* 91    < > = values in this interval not displayed.         Microbiology Data Reviewed: Yes  Pertinent Findings:  Microbiology Results (last 7 days)     Procedure Component Value Units Date/Time    Blood culture x two cultures. Draw prior to antibiotics. [[id number]] Collected:  03/21/20 1934    Order Status:  Completed Specimen:  Blood from Peripheral, Antecubital, Left Updated:  03/26/20 1500     Blood Culture, Routine No growth after 4 days.    Narrative:       Aerobic and anaerobic    Blood culture x two cultures. Draw prior to antibiotics. [[id number]] Collected:  03/21/20 2012    Order Status:  Completed Specimen:  Blood from Peripheral, Hand, Left Updated:  03/26/20 1455     Blood Culture, Routine No growth after 4 days.    Narrative:       Aerobic and anaerobic    Respiratory Infection Panel (PCR), Nasopharyngeal [[id number]] Collected:  03/22/20 1646    Order Status:  Canceled Specimen:  Nasopharyngeal Swab     Influenza A & B by Molecular [[id number]] Collected:  03/21/20 2020    Order Status:  Completed Specimen:  Nasopharyngeal Swab Updated:  03/21/20 2124     Influenza A, Molecular Negative     Influenza B, Molecular Negative     Flu A & B Source NP          Lab Results   Component Value Date    [id number] Detected (A) 03/21/2020           Other Results:    Radiology Data Reviewed: Yes  Pertinent Findings:  Imaging Results          X-Ray Chest AP Portable (Final result)  Result time 03/21/20 22:10:14    Final result by Saadia Wakler MD (03/21/20 22:10:14)                 Impression:      Right lower lobe pneumonia.      Electronically signed by: Saadia Walker  Date:    03/21/2020  Time:    22:10             Narrative:     EXAMINATION:  AP PORTABLE CHEST    CLINICAL HISTORY:  Sepsis;    TECHNIQUE:  AP portable chest radiograph was submitted.    COMPARISON:  02/24/2020    FINDINGS:  AP portable chest radiograph demonstrates enlarged cardiac silhouette.  There is right lower lobe airspace opacity.  There is no pneumothorax or significant pleural fluid.                                Current Medications:     Infusions:       Scheduled:   aspirin  81 mg Oral Daily    atorvastatin  40 mg Oral QHS    azithromycin  500 mg Oral Daily    carvediloL  12.5 mg Oral BID WM    clopidogreL  75 mg Oral Daily    enoxaparin  1 mg/kg (Dosing Weight) Subcutaneous Q24H    fluticasone propionate  1 spray Each Nostril BID    furosemide  80 mg Oral BID    gabapentin  300 mg Oral TID    hydroxychloroquine  400 mg Oral Daily    insulin detemir U-100  10 Units Subcutaneous QHS    montelukast  10 mg Oral QHS    pantoprazole  40 mg Oral Daily        PRN:  acetaminophen, artificial tears, benzonatate     Antibiotics:  Antimicrobials (From admission, onward)    Ordered     Dose Route Frequency Start Stop    03/22/20 0722  cefTRIAXone (ROCEPHIN) 1 g in dextrose 5 % 50 mL IVPB     INDICATION:  Lower Respiratory Infections        1 g IV Every 24 hours (non-standard times) 03/22/20 2130 --    03/22/20 0722  azithromycin 500 mg in dextrose 5 % 250 mL IVPB (ready to mix system)     INDICATION:  Lower Respiratory Infections        500 mg IV Every 24 hours (non-standard times) 03/22/20 1945 --             Assessment:     Marie Reis Jr. is a 64 y.o.male with    Plan:     Acute hypoxic respiratory failure and severe sepsis and pneumonia 2/2 COVID-19   - Tmax 103.9, RR 24,  with elevated troponin and creatinine   - on 2L nc-->escalated to ventimask; stepped up to ICU 3/23 early am now stepped back down to floor on 4LNC    - CXR: RLL PNA   - procal elevated   - given cefepime and azithro in ED   - continue azithro, monitor QTc closely, last 540  - Continue  plaquenil  - wean O2 as tolerated to keep sats>90%      NSTEMI   - troponin of 5 with no acute EKG changes, troponin increased to 11   - patient not complaining of CP  - consulted cards, recommend continuing medical management of ASA, plavix, atorvastatin, coreg  - discontinue hep gtt and start full dose lovenox   - on day 5 of heparin/lovenox     Hx of CVA  - R PCA infart on 3/17/2020   - CVA in 2014 with residual LLE weakness   - cont home Plavix and Lipitor      JAZLYN on CKD4  - Cr 4 on admit, baseline ~3.5  - Cr 3.9  - will monitor      AOCD  - Hb 8.9 on admit   - denies active bleeding   - will monitor      Acute on Chronic Combined systolic and diastolic HF  - TTE in 2/26/2020 with EF 25% and grade 2 diastolic dysfunction with moderate to severe pulmonary HTN and moderate to severe aortic stenosis   - BNP 4,035, 3893 last month   - lasix 120mg IV TID currently, monitor output   - continue coreg, unable to add ACEI/ARB or spironolactone at this time due to kidney function      Multiple myeloma  - being treated with bortezomib   - due for cycle 6 on 3/25     CAD  - cont home ASA, plavix   - full dose lovenox, heparin gtt discontinued      HTN  - Pt on amlodipine and coreg at home, coreg was continued  - normotensive at admit, cont to monitor, will add home amlodipine if needed     DMT2 with complications with long term use of insulin   - A1c in 3/2020 9.1  - home regimen: levemir 20 qhs with SSI   - hold home linagliptin      Diet: Cardiac   DVT PPx: full dose lovenox      Dispo: pending improvement in oxygen requirements/respiratory status, possible discharge today with home O2 vs tomorrow    Alan Ybarra MD  [facility] Internal Medicine HO-I  [facility] Internal Medicine Service Team A    [facility] Medicine Hospitalist Pager numbers:   [facility] Hospitalist Medicine Team A (Anson/Nadia): [phone]  [facility] Hospitalist Medicine Team B (Shashank/Aniket):  [phone]

## 2020-03-27 NOTE — PLAN OF CARE
VN cued into patient's room. Permission received per patient to turn camera to view patient.  Introduced as one of their virtual nurses for the shift. Call light in reach, O2 in place, patient denies distress or any needs at this time. Informed patient that I will round on them every 2 hours but to use call light for any other needs they may have. Patient verbalized understanding.  Will continue to monitor.

## 2020-03-27 NOTE — PLAN OF CARE
Patient educated on the importance of calling for assistance before attempting to get out of bed, Call light with in reach, bed alarm activated. White board updated and patient educated on nursing rounds and clustered care.  Patient had a poor appetite through out the day. Education provided to patient on the importance of haveing consistency in meals and eating patterns.

## 2020-03-27 NOTE — PLAN OF CARE
ID Staff - Besch    Patient being followed at a distance -    Doing better by all reports    If QTc getting long - would stop Hydroxychloroquine as have no string evidence of benefit - also has received 4 days    Will sign off - please call ID for questions.  Thx

## 2020-03-27 NOTE — PLAN OF CARE
Discharge orders noted. Additional clinical references attached. Patient's discharge instructions given by bedside RN and reviewed via VN. Education provided on new and previous medications, diagnosis, and follow-up appointments. New medications delivered by pharmacy. Patient verbalized understanding. Patient's ride/transportation downstairs. All questions answered. Transport to [facility] requested. Floor nurse notified.      Mr. Reis gave permission to call his wife. Discharge instruction were also given to her including f/u appointments, medications, diagnosis, and education (COVID-19). Wife was instructed to call after picking up the patient's medications from pharmacy.

## 2020-03-27 NOTE — PLAN OF CARE
1935H Patient is awake and orientedx4. Maintained on airborne, contact and droplet precaution for COVID-19. Hard of hearing, no hearing aids. Care plan explained and verbalized understanding. VIP care model explained.  On oxygen 3lpm/nasal cannula, no complaints of shortness of breath. Coughing infrequently, nonproductive. On heart monitor running Sinus Rhythm, heart rate 70s. IV site to the right forearm 20G and ;eft antecubital 20G; flushed and saline locked. Maintained on low salt, low fat, diabetic diet. Maintained on fall precaution. Commode at the bedside. Bed in lowest position, bed alarms on, call light within reach and instructed to call for help when needed.    Due medications given. Blood sugar monitored and covered per insulin sliding scale.  Will continue  to monitor.

## 2020-03-27 NOTE — DISCHARGE SUMMARY
[facility] Hospital Medicine Discharge Summary    Primary Team: [facility] Hospitalist Team A  Attending Physician: Anson  Resident: Arun  Intern: Tate    Date of Admit: 3/21/2020  Date of Discharge: 3/27/2020    Discharge to: Home  Condition: Stable    Discharge Diagnoses     Patient Active Problem List   Diagnosis    Hearing impairment    Essential hypertension    History of stroke    Dyslipidemia    Type 2 diabetes mellitus with diabetic polyneuropathy, with long-term current use of insulin    Cervical spondylosis    Thoracic spondylosis    Lumbar spondylosis    Spondylolisthesis of lumbar region    History of right PCA stroke    Hypertropia of left eye    Binocular vision disorder with diplopia    Hypertensive retinopathy of both eyes    Chronic combined systolic and diastolic heart failure    Type 2 diabetes mellitus with stage 4 chronic kidney disease, with long-term current use of insulin    Benign hypertension with chronic kidney disease, stage III    Persistent proteinuria    Acute on chronic combined systolic and diastolic congestive heart failure    Multiple myeloma not having achieved remission    Prolonged Q-T interval on ECG    Coronary artery disease due to calcified coronary lesion    Bilateral carotid artery stenosis    Bradycardia    Acute renal failure superimposed on stage 4 chronic kidney disease    Secondary hyperparathyroidism    Hypertensive CKD (chronic kidney disease)    Type 2 diabetes mellitus with hyperglycemia, with long-term current use of insulin    Vitamin D deficiency    Diabetes mellitus with proteinuria    Vitreous hemorrhage, right    Diabetic polyneuropathy associated with type 2 diabetes mellitus    Stage 4 chronic kidney disease    Aortic atherosclerosis    Chronic pulmonary heart disease    PAD (peripheral artery disease)    Symptomatic anemia    Dental caries    Thrombocytopenia    Positive FIT (fecal immunochemical test)    Moderate episode  of recurrent major depressive disorder    Hemiplegia and hemiparesis following cerebral infarction affecting left non-dominant side    Transaminitis    Bilirubinemia    Constipation    Liver function test abnormality    Troponin I above reference range    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, with long-term current use of insulin    Acute respiratory failure with hypoxia    Pneumonia due to Covid-19 Virus    NSTEMI (non-ST elevated myocardial infarction)    Acute on chronic combined systolic and diastolic heart failure    Moderate to severe aortic stenosis    Anemia, chronic disease    Severe sepsis    Acute Respiratory Disease due to Severe Acute Respiratory Syndrome Coronavirus 2 (SARS-Cov-2)    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      Electronically signed by: Saadia Walker  Date:    03/21/2020  Time:    22:10             Narrative:    EXAMINATION:  AP PORTABLE CHEST    CLINICAL HISTORY:  Sepsis;    TECHNIQUE:  AP portable chest radiograph was submitted.    COMPARISON:  02/24/2020    FINDINGS:  AP portable chest radiograph demonstrates enlarged cardiac silhouette.  There is right lower lobe airspace opacity.  There is no pneumothorax or significant pleural fluid.                                Brief History of Present Illness      Marie Reis Jr. is a 64 y.o.  male who  has a past medical history of Binocular vision disorder with diplopia (9/22/2016), Bradycardia, Cataract, Cervical spondylosis (10/1/2015), Chronic diastolic congestive heart failure, Chronic diastolic heart  failure (1/11/2018), Coronary artery disease due to calcified coronary lesion (3/19/2018), Dyslipidemia (6/26/2015), Encounter for blood transfusion, Hearing impairment (10/25/2013), History of stroke (3/23/2015), Hypertension associated with diabetes (1/11/2018), Hypertensive retinopathy of both eyes (9/26/2017), Lumbar spondylosis (10/1/2015), Multiple myeloma not having achieved remission (1/16/2018), SUZANNA on CPAP, Persistent proteinuria (1/11/2018), Spondylolisthesis of lumbar region (10/1/2015), Strabismus, Stroke (2014), Thoracic spondylosis (10/1/2015), Type 2 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 The patient presented to [facility] on 3/21/2020 with a primary complaint of Weakness (Pt presents to ED today c/o generalized weakness, cough, and glucose reading of 256 per EMS ); Hyperglycemia; and Cough (o2 sat 86 upon EMS arrival pt placed on 2 L NC )     The patient was in their usual state of health until 2 days ago when he began feeling weak. He states he wasn't feeling SOB or coughing until today. He denies fever, CP, n/v/d, abdominal pain. He was sating 86% when EMS arrived and is now requiring a ventimask.     For the full HPI please refer to the History & Physical from this admission.    Hospital Course By Problem with Pertinent Findings     Acute hypoxic respiratory failure and severe sepsis and pneumonia 2/2 COVID-19   - Tmax 103.9, RR 24,  with elevated troponin and creatinine   - on 2L nc-->escalated to ventimask; stepped up to ICU 3/23 early am now stepped back down to floor on 4LNC    - CXR: RLL PNA   - procal elevated   - given cefepime and azithro in  ED   - continue azithro, monitor QTc closely, last 540  - Continue plaquenil  - wean O2 as tolerated to keep sats>90%   - Patient not requiring O2 on discharge      NSTEMI   - troponin of 5 with no acute EKG changes, troponin increased to 11   - patient not complaining of CP  - consulted cards, recommend continuing medical management of ASA, plavix, atorvastatin, coreg  - discontinue hep gtt and start full dose lovenox   - on day 5 of heparin/lovenox      Hx of CVA  - R PCA infart on 3/17/2020   - CVA in 2014 with residual LLE weakness   - cont home Plavix and Lipitor      JAZLYN on CKD4  - Cr 4 on admit, baseline ~3.5  - Cr 3.9  - will monitor      AOCD  - Hb 8.9 on admit   - denies active bleeding   - will monitor      Acute on Chronic Combined systolic and diastolic HF  - TTE in 2/26/2020 with EF 25% and grade 2 diastolic dysfunction with moderate to severe pulmonary HTN and moderate to severe aortic stenosis   - BNP 4,035, 3893 last month   - lasix 120mg IV TID currently, monitor output   - continue coreg, unable to add ACEI/ARB or spironolactone at this time due to kidney function      Multiple myeloma  - being treated with bortezomib   - due for cycle 6 on 3/25     CAD  - cont home ASA, plavix   - full dose lovenox, heparin gtt discontinued   - Continue asa, plavix on discharge     HTN  - Pt on amlodipine and coreg at home, coreg was continued  - normotensive at admit, cont to monitor, will add home amlodipine if needed  - Resume home meds on discharge     DMT2 with complications with long term use of insulin   - A1c in 3/2020 9.1  - home regimen: levemir 20 qhs with SSI   - Resume home linagliptin  on discharge    Discharge Medications      Marie Reis Jr.   Home Medication Instructions VASU:[id number]    Printed on:03/27/20 1036   Medication Information                      amLODIPine (NORVASC) 10 MG tablet  Take 1 tablet (10 mg total) by mouth once daily.             aspirin (ECOTRIN) 81 MG EC tablet  Take 1  tablet (81 mg total) by mouth once daily.             atorvastatin (LIPITOR) 40 MG tablet  Take 1 tablet (40 mg total) by mouth every evening.             azithromycin (ZITHROMAX) 500 MG tablet  Take 1 tablet (500 mg total) by mouth once daily.             benzonatate (TESSALON) 100 MG capsule  Take 1 capsule (100 mg total) by mouth 3 (three) times daily as needed for Cough.             blood sugar diagnostic Strp  1 strip by Misc.(Non-Drug; Combo Route) route after meals as needed.             calcitRIOL (ROCALTROL) 0.25 MCG Cap  Take 2 capsules (0.5 mcg total) by mouth once daily.             carvediloL (COREG) 25 MG tablet  Take 1 tablet (25 mg total) by mouth 2 (two) times daily with meals.             clopidogreL (PLAVIX) 75 mg tablet  Take 1 tablet (75 mg total) by mouth once daily.             dexAMETHasone (DECADRON) 4 MG Tab  Take 5 tablets (20 mg total) by mouth As instructed. Take once weekly with Velcade injection             diclofenac sodium 1.5 % Drop  APPLY 40 DROPS TO AFFECTED AREA(S) FOUR TIMES DAILY             flash glucose scanning reader (FREESTYLE CLAUDIO 14 DAY READER) [facility]  Use as directed.             flash glucose sensor (FREESTYLE CLAUDIO 14 DAY SENSOR) Kit  Change every 14 days.             fluticasone (FLONASE) 50 mcg/actuation nasal spray  1 spray by Each Nare route 2 (two) times daily as needed for Rhinitis.             furosemide (LASIX) 80 MG tablet  Take 1 tablet (80 mg total) by mouth 2 (two) times daily. Take twice a day now             gabapentin (NEURONTIN) 300 MG capsule  Take 1 capsule (300 mg total) by mouth daily as needed.             insulin (LANTUS SOLOSTAR U-100 INSULIN) glargine 100 units/mL (3mL) SubQ pen  Inject 15 units at night.             insulin aspart U-100 (NOVOLOG) 100 unit/mL (3 mL) InPn pen  Inject  6 units (light meal), 10 units (larger meal), scale 150-200+2, 201-250+4, 251-300+6, 301-350+8. Snack dose 3 units. Max daily 60 units.             insulin needles,  "disposable, 31 X 5/16 " Ndle  Pt to use pen needle with Lantus daily             lidocaine (LIDODERM) 5 %  APPLY UP TO 3 PATCHES TO THE AFFECTED AREA(S) FOR 12 HOURS DAILY AND REMOVE FOR 12 HOURS             lidocaine (XYLOCAINE) 5 % Oint ointment  APPLY TO THE AFFECTED AREA(S) THREE TO FOUR TIMES DAILY             LIDOTRAL 3.88 % Crea  APPLY TO THE AFFECTED AREA 2 TIMES TO 3 TIMES DAILY             linaGLIPtin (TRADJENTA) 5 mg Tab tablet  Take 1 tablet (5 mg total) by mouth once daily.             methyl salicylate 25 % Crea  APPLY TO THE AFFECTED AREA(S) THREE TO FOUR TIMES DAILY             montelukast (SINGULAIR) 10 mg tablet  Take 1 tablet (10 mg total) by mouth every evening.             nitroGLYCERIN (NITROSTAT) 0.4 MG SL tablet  Place 1 tablet (0.4 mg total) under the tongue every 5 (five) minutes as needed for Chest pain.             omeprazole (PRILOSEC OTC) 20 MG tablet  Take 1 tablet (20 mg total) by mouth once daily.             ondansetron (ZOFRAN-ODT) 8 MG TbDL  Take 1 tablet (8 mg total) by mouth every 6 (six) hours as needed.             vitamin D (VITAMIN D3) 1000 units Tab  Take 1,000 Units by mouth once daily.                 Discharge Information:   Diet:  Cardiac, diabetic    Physical Activity:  As tolerated             Instructions:  1. Take all medications as prescribed  2. Keep all follow-up appointments  3. Return to the hospital or call your primary care physicians if any worsening symptoms such as fever, chest pain, shortness of breath, return of symptoms, or any other concerns.    Follow-Up Appointments:        Alan Ybarra MD  [facility], [facility]               "

## 2020-03-28 PROBLEM — N18.9 ACUTE RENAL FAILURE SUPERIMPOSED ON CHRONIC KIDNEY DISEASE: Status: ACTIVE | Noted: 2020-01-01

## 2020-03-28 PROBLEM — U07.1 COVID-19 VIRUS DETECTED: Status: ACTIVE | Noted: 2020-01-01

## 2020-03-28 PROBLEM — N17.9 ACUTE RENAL FAILURE SUPERIMPOSED ON CHRONIC KIDNEY DISEASE: Status: ACTIVE | Noted: 2020-01-01

## 2020-03-28 NOTE — PLAN OF CARE
Permission to enter room through virtual sytem attained from patient.  The role of virtual nurse explained. Admission questions asked and replies entered into chart   Safety precautions reviewed with patient. Current orders and  plan of care reviewed.   Pt has poor memory of morning events at his home   Currently in no resp distress

## 2020-03-28 NOTE — ED NOTES
Patient arrived to ED via EMS for unwitnessed seziure like activity, and patient found lying on floor in bathroom unresponsive. Patient responds to voice and follows command. Patient is oriented x 4. Patient stated that he was using the bathroom and he fell in the bathroom and did not move. Patient stated that he felt weak. Patient was recently treated two days ago and tested positive for covid. Patient changed and cleaned from feces over his entire body. EKG performed. Patient denies SOB or chest pain at present time

## 2020-03-28 NOTE — NURSING
Patient admitted to telemetry unit from ED via stretcher without incident. No S/S of pain or discomfort noted at time of arrival. NADN. Patient seems to be disoriented- did not answer nurse's questions when prompted. Patient oriented to room and placed on cardiac monitoring. Patient educated on POC and VIP care model- needs reinforcement. Bed in lowest position, side rails up x 2, call light within reach. Will endorse patient to oncoming nurse.

## 2020-03-28 NOTE — ED NOTES
Spoke with family member, wife, Naheed and she stated to please have MD update her on patient status and plan of care. [phone]

## 2020-03-28 NOTE — ED PROVIDER NOTES
Encounter Date: 3/28/2020    SCRIBE #1 NOTE: I, Michelle Ahumada, am scribing for, and in the presence of,  Dr. Abdullahi. I have scribed the entire note.       History     Chief Complaint   Patient presents with    Fatigue     cc weakness. Patient was found lying in bathroom not moving by family members. patient stated that he didnt feel strong enough to move. Family was concerned of possible seziure like activity,     Fall     patient fell in bathroom this am, and was too weak to get up. patient denies loss of loc. patient was found with stool all over himself. Patient did have recent positive covid.      Time seen by provider: 1:04 PM on 03/28/2020    The patient is a 64 y.o. male with a history of stroke, CAD, DM, bradycardia, HTN, pneumonia, and NSTEMI who presents to the ED with complaint of increased generalized fatigue today. Patient reports he fell in the bathroom this morning with no head injury or LOC. He says he felt too weak to get up on his own. Patient was found by family on the bathroom floor and he had some bowel incontinence. Patient was positive for COVID. Denies shortness of breath, cough, congestion, or any other complaints at his time. He was admitted to the hospital on 03/22 to 03/27 due to weakness, cough, hyperglycemia, and a fever.    The history is provided by the patient and a relative.     Review of patient's allergies indicates:   Allergen Reactions    Hydralazine analogues      Increase swelling and throat itching and tightness with use.  Symptoms correlate only with this medication onset in hospital.     Past Medical History:   Diagnosis Date    Binocular vision disorder with diplopia 9/22/2016    Bradycardia     Cataract     Cervical spondylosis 10/1/2015    Chronic diastolic congestive heart failure     Chronic diastolic heart failure 1/11/2018    Coronary artery disease due to calcified coronary lesion 3/19/2018    Dyslipidemia 6/26/2015    Encounter for blood transfusion      Hearing impairment 10/25/2013    History of stroke 3/23/2015    Hypertension associated with diabetes 1/11/2018    Hypertensive retinopathy of both eyes 9/26/2017    Lumbar spondylosis 10/1/2015    Multiple myeloma not having achieved remission 1/16/2018    NSTEMI (non-ST elevated myocardial infarction) 3/22/2020    SUZANNA on CPAP     Persistent proteinuria 1/11/2018    Pneumonia of right lower lobe due to infectious organism 3/22/2020    Spondylolisthesis of lumbar region 10/1/2015    Strabismus     Stroke 2014    Thoracic spondylosis 10/1/2015    Type 2 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, with long-term current use of insulin 9/26/2017    Type 2 diabetes mellitus with diabetic polyneuropathy, with long-term current use of insulin 9/28/2015    Type 2 diabetes mellitus with stage 4 chronic kidney disease, with long-term current use of insulin 1/11/2018     Past Surgical History:   Procedure Laterality Date    CATARACT EXTRACTION W/  INTRAOCULAR LENS IMPLANT Left 4/30/15    Diane    COLONOSCOPY N/A 1/20/2020    Procedure: COLONOSCOPY;  Surgeon: Salvador Espinosa MD;  [facility];  Service: Endoscopy;  Laterality: N/A;    EYE SURGERY      cataract removal left eye    HAND SURGERY  2/2012    DR. BAKER [facility]    left hand fracture sx      LT EYE   5/2/15    DR KULLMAN [facility]     Family History   Problem Relation Age of Onset    Diabetes Mother     Cancer Mother     Kidney disease Mother     Diabetes Father     Heart attack Father     Diabetes Sister     Diabetes Brother     No Known Problems Maternal Aunt     No Known Problems Maternal Uncle     No Known Problems Paternal Aunt     No Known Problems Paternal Uncle     No Known Problems Maternal Grandmother     No Known Problems Maternal Grandfather     No Known Problems Paternal Grandmother     No Known Problems Paternal Grandfather     Blindness Neg Hx      Anesthesia problems Neg Hx     Amblyopia Neg Hx     Cataracts Neg Hx     Glaucoma Neg Hx     Hypertension Neg Hx     Macular degeneration Neg Hx     Retinal detachment Neg Hx     Strabismus Neg Hx     Stroke Neg Hx     Thyroid disease Neg Hx     Heart disease Neg Hx     Heart failure Neg Hx     Hyperlipidemia Neg Hx      Social History     Tobacco Use    Smoking status: Never Smoker    Smokeless tobacco: Never Used   Substance Use Topics    Alcohol use: No     Frequency: Never     Drinks per session: Patient refused     Binge frequency: Never    Drug use: No     Review of Systems   Constitutional: Positive for fatigue. Negative for chills and fever.   HENT: Negative for congestion, ear pain, rhinorrhea and sore throat.    Respiratory: Negative for cough, shortness of breath and wheezing.    Cardiovascular: Negative for chest pain and palpitations.   Gastrointestinal: Negative for abdominal pain, diarrhea, nausea and vomiting.   Genitourinary: Negative for dysuria and hematuria.   Musculoskeletal: Negative for back pain, myalgias and neck pain.   Skin: Negative for rash.   Neurological: Negative for dizziness, weakness, light-headedness and headaches.   Psychiatric/Behavioral: Negative for confusion.   All other systems reviewed and are negative.      Physical Exam     Initial Vitals [03/28/20 1211]   BP Pulse Resp Temp SpO2   (!) 122/58 71 20 97.8 °F (36.6 °C) 95 %      MAP       --         Physical Exam    Nursing note and vitals reviewed.  Constitutional: He appears well-developed and well-nourished. No distress.   HENT:   Head: Normocephalic and atraumatic.   Mouth/Throat: Oropharynx is clear and moist.   Eyes: Conjunctivae and EOM are normal. Pupils are equal, round, and reactive to light.   Neck: Normal range of motion. Neck supple. No stridor present. No tracheal deviation present.   Cardiovascular: Normal rate, regular rhythm and normal heart sounds.   No murmur heard.  Pulmonary/Chest: Breath  sounds normal. No respiratory distress.   Abdominal: Soft. Bowel sounds are normal. There is no tenderness. There is no rebound and no guarding.   Musculoskeletal: Normal range of motion. He exhibits no edema or tenderness.   Neurological: He is alert and oriented to person, place, and time. No sensory deficit.   Skin: Skin is warm and dry. Capillary refill takes less than 2 seconds.   Psychiatric: He has a normal mood and affect. His behavior is normal.         ED Course   Procedures  Labs Reviewed   CBC W/ AUTO DIFFERENTIAL - Abnormal; Notable for the following components:       Result Value    RBC 3.46 (*)     Hemoglobin 9.2 (*)     Hematocrit 29.0 (*)     Mean Corpuscular Hemoglobin 26.6 (*)     Mean Corpuscular Hemoglobin Conc 31.7 (*)     Immature Granulocytes 1.2 (*)     Immature Grans (Abs) 0.07 (*)     Lymph # 0.5 (*)     Gran% 84.9 (*)     Lymph% 9.0 (*)     All other components within normal limits   COMPREHENSIVE METABOLIC PANEL - Abnormal; Notable for the following components:    CO2 20 (*)     Glucose 261 (*)     BUN, Bld 66 (*)     Creatinine 5.6 (*)     Albumin 2.8 (*)     AST 42 (*)     Anion Gap 18 (*)     eGFR if  11 (*)     eGFR if non  10 (*)     All other components within normal limits   TROPONIN I - Abnormal; Notable for the following components:    Troponin I 1.895 (*)     All other components within normal limits   B-TYPE NATRIURETIC PEPTIDE - Abnormal; Notable for the following components:    BNP 1,850 (*)     All other components within normal limits   C-REACTIVE PROTEIN - Abnormal; Notable for the following components:    CRP 93.6 (*)     All other components within normal limits     EKG Readings: (Independently Interpreted)   Normal sinus rhythm, rate 71, positive artifact, LVH with repolarization abnormality, QTc prolongation, no STEMI. Similar to the EKG done on 03/22/20. This EKG was interpreted by myself.       X-Rays:   Independently Interpreted  Readings:   Other Readings:  Reviewed by myself, read by radiology.    Imaging Results          X-Ray Chest AP Portable (Final result)  Result time 03/28/20 13:49:12    Final result by Layo Olivera MD (03/28/20 13:49:12)                 Impression:      See above      Electronically signed by: Layo Olivera MD  Date:    03/28/2020  Time:    13:49             Narrative:    EXAMINATION:  XR CHEST AP PORTABLE    CLINICAL HISTORY:  Weakness    TECHNIQUE:  Single frontal view of the chest was performed.    COMPARISON:  Non 03/23/2020 e    FINDINGS:  Mild cardiomegaly.  No significant airspace consolidation or pleural effusion identified.  Small opacities noted at the right lung base. .  No pleural effusion.                              Medical Decision Making:   Initial Assessment:   Past medical records queried and reviewed, including a history of stroke, CAD, DM, bradycardia, HTN, pneumonia, and NSTEMI.     The patient is a 64 y.o. male who presents to the ED with complaint of increased generalized fatigue today.  The patient was seen and examined. The history and physical exam was obtained. The nursing notes and vital signs were reviewed.     Secondary to symptoms and exam findings we ordered:    Labs: CBC, CMP, BNP, CRP, troponin    Imaging: CXR    Patient will be monitored and reassessed.  Clinical Tests:   Lab Tests: Ordered and Reviewed  Radiological Study: Ordered and Reviewed  Medical Tests: Ordered and Reviewed  ED Management:  3:25 PM  Spoke with Dr. Marcial with [facility] who will admit the patient. Review of lab shows renal function deterioration from 5 days ago. Patient's BNP and troponin have significantly decreased. He was recently hospitalized for NSTEMI. Symptoms and presentation likely secondary to COVID infection. No new consolidation on CXR. Patient is in no apparent distress. Half a liter of IVF will be administered and patient will be admitted.                                 Clinical  Impression:     1. Acute renal failure superimposed on chronic kidney disease, unspecified CKD stage, unspecified acute renal failure type    2. Weakness    3. COVID-19 virus infection      Disposition:   Disposition: Admitted  Condition: Fair     ED Disposition Condition    Admit              I, Dr. Luiz Abdullahi, personally performed the services described in this documentation. All medical record entries made by the scribe were at my direction and in my presence.  I have reviewed the chart and agree that the record reflects my personal performance and is accurate and complete      I, Dr. Luiz Abdullahi, personally performed the services described in this documentation. All medical record entries made by the scribe were at my direction and in my presence.  I have reviewed the chart and agree that the record reflects my personal performance and is accurate and complete       Luiz Abdullahi MD  03/29/20 [id number]

## 2020-03-29 NOTE — PROGRESS NOTES
"[facility] Internal Medicine Resident HO-I Progress Note    Subjective:      Patient feeling well this morning.  No further syncopal events.  Breathing is doing well, not requiring supplemental O2.       Objective:   Last 24 Hour Vital Signs:  BP  Min: 116/58  Max: 167/76  Temp  Av.9 °F (36.1 °C)  Min: 96 °F (35.6 °C)  Max: 97.8 °F (36.6 °C)  Pulse  Av  Min: 64  Max: 71  Resp  Av.1  Min: 15  Max: 23  SpO2  Av.6 %  Min: 95 %  Max: 99 %  Height  Av' 1" (185.4 cm)  Min: 6' 1" (185.4 cm)  Max: 6' 1" (185.4 cm)  Weight  Av.4 kg (179 lb 8.3 oz)  Min: 72.9 kg (160 lb 11.5 oz)  Max: 86.2 kg (190 lb)    Fluid Balance:     Intake/Output Summary (Last 24 hours) at 3/29/2020 1001  Last data filed at 3/29/2020 0513  Gross per 24 hour   Intake 100 ml   Output 250 ml   Net -150 ml       Vitals:    20 0443 20 0757 20 0839 20 0855   BP: 127/60   (!) 116/58   BP Location:    Left arm   Patient Position: Lying   Sitting   Pulse: 68 67  65   Resp: (!) 21   16   Temp: 97.1 °F (36.2 °C)   96 °F (35.6 °C)   TempSrc: Oral   Oral   SpO2: 95%  95% 97%   Weight: 72.9 kg (160 lb 11.5 oz)      Height:           Physical Examination:  Gen: Lying comfortably in bed, in NAD  Resp: Speaking in full sentences, no coughing  Neuro: Alert & oriented.      Rest of exam deferred due to virtual visit    Laboratory:  Laboratory Data Reviewed: Yes  Pertinent Findings:    Hematology:  Recent Labs   Lab 20  0449 20  1153 20  1307 20  0700   WBC  --  8.20 5.89 6.00   HGB  --  8.2* 9.2* 8.4*   HCT 21* 25.4* 29.0* 25.8*   PLT  --  241 276 267   MCV  --  83 84 82   RDW  --  13.7 13.5 13.4       Chemistry:  Recent Labs   Lab 20  1153 20  1307 20  0700    137 142   K 3.6 3.6 2.9*    99 106   CO2 21* 20* 22*   BUN 51* 66* 68*   CREATININE 3.8* 5.6* 4.8*   * 261* 97   CALCIUM 8.6* 8.7 8.0*   PROT 5.6* 6.8 5.3*   ALBUMIN 2.5* 2.8* 2.5*   ALT 79* 30 26   * " 42* 40   ALKPHOS 66 77 63   MG 2.1  --   --    PHOS 3.9  --   --    BILITOT 0.8 0.8 0.5       Cardiac:  Recent Labs   Lab 03/23/20  0125 03/28/20  1307 03/29/20  0700   TROPONINI 9.057* 1.895* 1.220*   BNP  --  1,850*  --        DM:  Recent Labs   Lab 03/23/20  1153  03/26/20  2044 03/27/20  0556 03/28/20  1307 03/28/20  1813 03/28/20  2054 03/29/20  0700 03/29/20  0803   *  --   --   --  261*  --   --  97  --    POCTGLUCOSE  --    < > 139* 91  --  211* 165*  --  82    < > = values in this interval not displayed.         Microbiology Data Reviewed: Yes  Pertinent Findings:  Microbiology Results (last 7 days)     ** No results found for the last 168 hours. **          Lab Results   Component Value Date    KMW51EQWGLJW Detected (A) 03/21/2020           Other Results:     Radiology Data Reviewed: Yes  Pertinent Findings:  Imaging Results          X-Ray Chest AP Portable (Final result)  Result time 03/28/20 13:49:12    Final result by Layo Olivera MD (03/28/20 13:49:12)                 Impression:      See above      Electronically signed by: Layo Olivera MD  Date:    03/28/2020  Time:    13:49             Narrative:    EXAMINATION:  XR CHEST AP PORTABLE    CLINICAL HISTORY:  Weakness    TECHNIQUE:  Single frontal view of the chest was performed.    COMPARISON:  Non 03/23/2020 e    FINDINGS:  Mild cardiomegaly.  No significant airspace consolidation or pleural effusion identified.  Small opacities noted at the right lung base. .  No pleural effusion.                                Current Medications:     Infusions:       Scheduled:   amLODIPine  10 mg Oral Daily    aspirin  81 mg Oral Daily    atorvastatin  40 mg Oral QHS    carvediloL  25 mg Oral BID WM    clopidogreL  75 mg Oral Daily    heparin (porcine)  5,000 Units Subcutaneous Q8H    insulin detemir U-100  20 Units Subcutaneous QHS    montelukast  10 mg Oral QHS    pantoprazole  40 mg Oral Daily        PRN:  benzonatate, Dextrose 10% Bolus,  Dextrose 10% Bolus, fluticasone propionate, glucagon (human recombinant), glucose, glucose, insulin aspart U-100, sodium chloride 0.9%     Antibiotics:  Antimicrobials (From admission, onward)    None          Assessment:     Marie Reis Jr. is a 64 y.o.male with    Plan:     Syncopal Event  - Unwitnessed. Patient with poor recollection of events.  - Suspect likely 2/2 to hypovolemia given recent admission for COVID-19.   - No known seizure history. However, per chart, patient presenting with fecal incontinence  - consider Neuro consult   - EKG not concerning for arrhythmogenic causes  - Further imaging such as head CT, TTE to further evaluate for potential causes deferred thus far 2/2 to COVID status  - Will continue to monitor     JAZLYN on CKD4  - Cr 5.6 on admit, up from 3.8 on yesterday's discharge   - Received 500 ml bolus NS in ED  - Continue to monitor       Acute hypoxic respiratory failure and severe sepsis and pneumonia 2/2 COVID-19   - On previous admit 3/21: Tmax 103.9, RR 24,  with elevated troponin and creatinine   - on 2L nc-->escalated to ventimask; stepped up to ICU 3/23 early am now stepped back down to floor on 4LNC    - CXR: RLL PNA   - procal elevated   - continue azithro, monitor QTc closely, last 540  - Patient discharged 3/27 to complete course of Plaquenil, followup with PCP and cardiology  - Plaquenil stopped per ID as QTC getting longer, has already received 4 days  - Patient remains afebrile, VSS, 98% on RA     NSTEMI   - troponin of 5 with no acute EKG changes, troponin increased to 11   - patient not complaining of CP  - Rpt trop 1.22  - Continue medical management of ASA, plavix, atorvastatin, coreg     Acute on Chronic Combined systolic and diastolic HF  - TTE in 2/26/2020 with EF 25% and grade 2 diastolic dysfunction with moderate to severe pulmonary HTN and moderate to severe aortic stenosis   - BNP 4,035, 3893 last month, rpt BNP 1850  - lasix 120mg IV TID currently, monitor  output   - continue coreg, unable to add ACEI/ARB or spironolactone at this time due to kidney function      Multiple myeloma  - being treated with bortezomib   - due for cycle 6 on 3/25     CAD  - cont home ASA, plavix      HTN  - Continue home Coreg, amlodipine      DMT2 with complications with long term use of insulin   - A1c in 3/2020 9.1  - home regimen: levemir 20 qhs with SSI   - hold home linagliptin      Diet: Cardiac   DVT PPx: Heparin  Code: Full     Dispo: Pending improvement in JAZLYN    Alan Ybarra MD  [facility] Internal Medicine HO-I  [facility] Internal Medicine Service TeamA    [facility] Medicine Hospitalist Pager numbers:   [facility] Hospitalist Medicine Team A (Anson/Nadia): [phone]  [facility] Hospitalist Medicine Team B (Shashank/Aniket):  [phone]

## 2020-03-29 NOTE — PLAN OF CARE
1910H Patient is awake and oriented to self only. Maintained on airborne, contact and droplet precaution for COVID-19. Hard of hearing, no hearing aids. Care plan explained and verbalized understanding. VIP care model explained.  On room air, no complaints of shortness of breath. On heart monitor running Sinus Rhythm, heart rate 70s. IV site to the right antecubital 20G; flushed and saline locked. Maintained on low salt, low fat, diabetic diet. Maintained on fall precaution. urinal within reach. Bed in lowest position, bed alarms on, call light within reach and instructed to call for help when needed.     Due medications given. Blood sugar monitored and covered per insulin sliding scale.  Will continue  to monitor.

## 2020-03-29 NOTE — PLAN OF CARE
Permission attained to enter room via virtual system.  Virtual rounds completed as documented.  Today's lab values, notes, and vital signs up to now have been reviewed.  Reminded to not get out of bed without assistance  bed alarm is on   noted not to be in any resp distress   iv bolus in progress   reminded again of current isolation precautions for covid and to that he is to continue them on discharge

## 2020-03-29 NOTE — H&P
"[facility] History and Physical - Resident Note     Admitting Team: Medicine Team A  Attending Physician: Nadia   Resident: Arun  Interns: Keyon      Date of Admit: 3/28/2020     Chief Complaint      "seizure" x 1 episode, unwitnessed      Subjective:      History of Present Illness:  Marie Reis Jr. is a 64 y.o. male who has a past medical history of COVID-19, Binocular vision disorder with diplopia (9/22/2016), Bradycardia, Cataract, Cervical spondylosis (10/1/2015), Chronic diastolic heart failure (1/11/2018), Coronary artery disease due to calcified coronary lesion (3/19/2018), Dyslipidemia (6/26/2015), Encounter for blood transfusion, Hearing impairment (10/25/2013), History of stroke (3/23/2015), Hypertension associated with diabetes (1/11/2018), Hypertensive retinopathy of both eyes (9/26/2017), Lumbar spondylosis (10/1/2015), Multiple myeloma not having achieved remission (1/16/2018), NSTEMI (non-ST elevated myocardial infarction) (3/22/2020), SUZANNA on CPAP, Persistent proteinuria (1/11/2018), Pneumonia of right lower lobe due to infectious organism (3/22/2020), Spondylolisthesis of lumbar region (10/1/2015), Strabismus, Stroke (2014), Thoracic spondylosis (10/1/2015), 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     The patient presented to [facility] on 3/28/2020 with a primary complaint of fatigue and weakness following an unwitnessed syncopal vs seizure event. Per family, patient was found down in bathroom not moving and stating too weak to move. Family concerned for seizure-like activity. EMS was called. On arrival to ED, patient disoriented and found to have feces across his lower body. EKG with NSR, LVH, QTc 534. CMP w/ glucose 261, BUN/Cr 66/5.6, otherwise wnl. Trop 1.895. BNP 1850. CRP 93.6. CXR with no significant change. Patient with poor " recollection of events. Other than above, patient without further complaint. Denies any CP, worsening of SOB, N/V, F/C.     Of note, patient discharged on 3/27 after being treated for acute hypoxic respiratory failure and sepsis with pneumonia secondary to COVID-19, NSTEMI with acute on chronic combined HF and possible myocarditis due to COVID-19.     Given recent admission for COVID-19, worsening JAZLYN, and concern for syncope vs seizure, patient re-admitted to [facility] Team A.        Past Medical History:       Past Medical History:   Diagnosis Date    Binocular vision disorder with diplopia 9/22/2016    Bradycardia      Cataract      Cervical spondylosis 10/1/2015    Chronic diastolic congestive heart failure      Chronic diastolic heart failure 1/11/2018    Coronary artery disease due to calcified coronary lesion 3/19/2018    Dyslipidemia 6/26/2015    Encounter for blood transfusion      Hearing impairment 10/25/2013    History of stroke 3/23/2015    Hypertension associated with diabetes 1/11/2018    Hypertensive retinopathy of both eyes 9/26/2017    Lumbar spondylosis 10/1/2015    Multiple myeloma not having achieved remission 1/16/2018    NSTEMI (non-ST elevated myocardial infarction) 3/22/2020    SUZANNA on CPAP      Persistent proteinuria 1/11/2018    Pneumonia of right lower lobe due to infectious organism 3/22/2020    Spondylolisthesis of lumbar region 10/1/2015    Strabismus      Stroke 2014    Thoracic spondylosis 10/1/2015    Type 2 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, with long-term current use of insulin 9/26/2017    Type 2 diabetes mellitus with diabetic polyneuropathy, with long-term current use of insulin 9/28/2015    Type 2 diabetes mellitus with stage 4 chronic kidney disease, with long-term current use of insulin 1/11/2018         Past Surgical History:        Past Surgical History:   Procedure Laterality Date    CATARACT  EXTRACTION W/  INTRAOCULAR LENS IMPLANT Left 4/30/15     Diane    COLONOSCOPY N/A 1/20/2020     Procedure: COLONOSCOPY;  Surgeon: Salvador Espinosa MD;  Location: [facility];  Service: Endoscopy;  Laterality: N/A;    EYE SURGERY         cataract removal left eye    HAND SURGERY   2/2012     DR. BAKER [facility]    left hand fracture sx        LT EYE    5/2/15     DR KULLMAN [facility]         Allergies:        Review of patient's allergies indicates:   Allergen Reactions    Hydralazine analogues         Increase swelling and throat itching and tightness with use.  Symptoms correlate only with this medication onset in hospital.         Home Medications:          Prior to Admission medications    Medication Sig Start Date End Date Taking? Authorizing Provider   insulin (LANTUS SOLOSTAR U-100 INSULIN) glargine 100 units/mL (3mL) SubQ pen Inject 15 units at night. 3/27/20   Yes Alan Ybarra MD   insulin aspart U-100 (NOVOLOG) 100 unit/mL (3 mL) InPn pen Inject  6 units (light meal), 10 units (larger meal), scale 150-200+2, 201-250+4, 251-300+6, 301-350+8. Snack dose 3 units. Max daily 60 units. 3/19/20   Yes Gifty Massey, BELEM, FNP   amLODIPine (NORVASC) 10 MG tablet Take 1 tablet (10 mg total) by mouth once daily. 2/17/20     Debbie Salinas MD   aspirin (ECOTRIN) 81 MG EC tablet Take 1 tablet (81 mg total) by mouth once daily. 3/28/20 3/28/21   Alan Ybarra MD   atorvastatin (LIPITOR) 40 MG tablet Take 1 tablet (40 mg total) by mouth every evening. 3/27/20 3/27/21   Alan Ybarra MD   azithromycin (ZITHROMAX) 500 MG tablet Take 1 tablet (500 mg total) by mouth once daily. 3/28/20     Alan Ybarra MD   benzonatate (TESSALON) 100 MG capsule Take 1 capsule (100 mg total) by mouth 3 (three) times daily as needed for Cough. 3/27/20 4/6/20   Alan Ybarra MD   blood sugar diagnostic Strp 1 strip by Misc.(Non-Drug; Combo Route) route after meals as needed. 1/14/14      "Vince Kelly MD   calcitRIOL (ROCALTROL) 0.25 MCG Cap Take 2 capsules (0.5 mcg total) by mouth once daily. 1/13/20     Alissa Dotson MD   carvediloL (COREG) 25 MG tablet Take 1 tablet (25 mg total) by mouth 2 (two) times daily with meals. 2/17/20     Debbie Salinas MD   clopidogreL (PLAVIX) 75 mg tablet Take 1 tablet (75 mg total) by mouth once daily. 2/17/20     Debbie Salinas MD   dexAMETHasone (DECADRON) 4 MG Tab Take 5 tablets (20 mg total) by mouth As instructed. Take once weekly with Velcade injection 3/19/20 3/19/21   Nancy Paulino NP   diclofenac sodium 1.5 % Drop APPLY 40 DROPS TO AFFECTED AREA(S) FOUR TIMES DAILY 8/13/18     Historical Provider, MD   flash glucose scanning reader (FREESTYLE CLAUDIO 14 DAY READER) [facility] Use as directed. 7/24/19     BELEM Gardiner, NAVI   flash glucose sensor (FREESTYLE CLAUDIO 14 DAY SENSOR) Kit Change every 14 days. 3/2/20     BELEM Gardiner, NAVI   fluticasone (FLONASE) 50 mcg/actuation nasal spray 1 spray by Each Nare route 2 (two) times daily as needed for Rhinitis.  Patient taking differently: 1 spray by Each Nare route as needed for Rhinitis.  9/9/16     Nancy Colón MD   furosemide (LASIX) 80 MG tablet Take 1 tablet (80 mg total) by mouth 2 (two) times daily. Take twice a day now 3/27/20 3/27/21   Alan Ybarra MD   gabapentin (NEURONTIN) 300 MG capsule Take 1 capsule (300 mg total) by mouth daily as needed.  Patient taking differently: Take 300 mg by mouth 2 (two) times daily.  9/11/19     Nancy Colón MD   insulin needles, disposable, 31 X 5/16 " Ndle Pt to use pen needle with Lantus daily 2/9/15     Nancy Colón MD   lidocaine (LIDODERM) 5 % APPLY UP TO 3 PATCHES TO THE AFFECTED AREA(S) FOR 12 HOURS DAILY AND REMOVE FOR 12 HOURS 12/20/19     Historical Provider, MD   lidocaine (XYLOCAINE) 5 % Oint ointment APPLY TO THE AFFECTED AREA(S) THREE TO FOUR TIMES DAILY 2/3/20     Historical Provider, MD MCCRARY " "3.88 % Crea APPLY TO THE AFFECTED AREA 2 TIMES TO 3 TIMES DAILY 8/13/18     Historical Provider, MD   linaGLIPtin (TRADJENTA) 5 mg Tab tablet Take 1 tablet (5 mg total) by mouth once daily. 3/19/20 3/19/21   Gifty Massey APRN, FNP   methyl salicylate 25 % Crea APPLY TO THE AFFECTED AREA(S) THREE TO FOUR TIMES DAILY 1/28/20     Historical Provider, MD   montelukast (SINGULAIR) 10 mg tablet Take 1 tablet (10 mg total) by mouth every evening. 9/9/16     Nancy Colón MD   nitroGLYCERIN (NITROSTAT) 0.4 MG SL tablet Place 1 tablet (0.4 mg total) under the tongue every 5 (five) minutes as needed for Chest pain. 2/17/20     Debbie Salinas MD   omeprazole (PRILOSEC OTC) 20 MG tablet Take 1 tablet (20 mg total) by mouth once daily. 10/9/19 10/8/20   Carol Cantrell MD   ondansetron (ZOFRAN-ODT) 8 MG TbDL Take 1 tablet (8 mg total) by mouth every 6 (six) hours as needed. 9/19/16     Gumaro Weinstein MD   pen needle, diabetic 32 gauge x 1/4" Ndle use to inject insulin once daily 3/27/20     Alan Ybarra MD   vitamin D (VITAMIN D3) 1000 units Tab Take 1,000 Units by mouth once daily.       Historical Provider, MD         Family History:        Family History   Problem Relation Age of Onset    Diabetes Mother      Cancer Mother      Kidney disease Mother      Diabetes Father      Heart attack Father      Diabetes Sister      Diabetes Brother      No Known Problems Maternal Aunt      No Known Problems Maternal Uncle      No Known Problems Paternal Aunt      No Known Problems Paternal Uncle      No Known Problems Maternal Grandmother      No Known Problems Maternal Grandfather      No Known Problems Paternal Grandmother      No Known Problems Paternal Grandfather      Blindness Neg Hx      Anesthesia problems Neg Hx      Amblyopia Neg Hx      Cataracts Neg Hx      Glaucoma Neg Hx      Hypertension Neg Hx      Macular degeneration Neg Hx      Retinal detachment Neg Hx      Strabismus " "Neg Hx      Stroke Neg Hx      Thyroid disease Neg Hx      Heart disease Neg Hx      Heart failure Neg Hx      Hyperlipidemia Neg Hx           Social History:  Social History            Tobacco Use    Smoking status: Never Smoker    Smokeless tobacco: Never Used   Substance Use Topics    Alcohol use: No       Frequency: Never       Drinks per session: Patient refused       Binge frequency: Never    Drug use: No         Review of Systems:  Pertinent positives and negatives are listed in HPI.  All other systems are reviewed and are negative.        Objective:   Last 24 Hour Vital Signs:  BP  Min: 122/58  Max: 167/76  Temp  Av.8 °F (36.6 °C)  Min: 97.8 °F (36.6 °C)  Max: 97.8 °F (36.6 °C)  Pulse  Av.8  Min: 69  Max: 71  Resp  Av.8  Min: 15  Max: 23  SpO2  Av.8 %  Min: 95 %  Max: 99 %  Height  Av' 1" (185.4 cm)  Min: 6' 1" (185.4 cm)  Max: 6' 1" (185.4 cm)  Weight  Av.7 kg (188 lb 14.7 oz)  Min: 85.2 kg (187 lb 13.3 oz)  Max: 86.2 kg (190 lb)  Body mass index is 24.78 kg/m².  No intake/output data recorded.     Physical Examination:  Deferred due to COVID-19.     Laboratory:  Most Recent Data:  CBC:         Lab Results   Component Value Date     WBC 5.89 2020     HGB 9.2 (L) 2020     HCT 29.0 (L) 2020      2020     MCV 84 2020     RDW 13.5 2020      BMP:         Lab Results   Component Value Date      2020     K 3.6 2020     CL 99 2020     CO2 20 (L) 2020     BUN 66 (H) 2020     CREATININE 5.6 (H) 2020      (H) 2020     CALCIUM 8.7 2020     MG 2.1 2020     PHOS 3.9 2020      LFTs:         Lab Results   Component Value Date     PROT 6.8 2020     ALBUMIN 2.8 (L) 2020     BILITOT 0.8 2020     AST 42 (H) 2020     ALKPHOS 77 2020     ALT 30 2020      Coags:         Lab Results   Component Value Date     INR 1.2 2020      Urinalysis: "         Lab Results   Component Value Date     LABURIN No significant growth 02/21/2020     COLORU Brown (A) 03/21/2020     SPECGRAV >=1.030 (A) 03/21/2020     NITRITE Negative 03/21/2020     KETONESU Negative 03/21/2020     UROBILINOGEN Negative 03/21/2020     WBCUA 2 03/21/2020         Trended Lab Data:         Recent Labs   Lab 03/22/20  0535 03/23/20  0449 03/23/20  1153 03/28/20  1307   WBC 5.71  --  8.20 5.89   HGB 8.5*  --  8.2* 9.2*   HCT 26.4* 21* 25.4* 29.0*     --  241 276   MCV 83  --  83 84   RDW 13.3  --  13.7 13.5     --  141 137   K 3.5  --  3.6 3.6     --  109 99   CO2 20*  --  21* 20*   BUN 41*  --  51* 66*   CREATININE 3.9*  --  3.8* 5.6*   *  --  143* 261*   PROT  --   --  5.6* 6.8   ALBUMIN  --   --  2.5* 2.8*   BILITOT  --   --  0.8 0.8   AST  --   --  124* 42*   ALKPHOS  --   --  66 77   ALT  --   --  79* 30         Trended Cardiac Data:        Recent Labs   Lab 03/22/20  1936 03/23/20  0125 03/28/20  1307   TROPONINI 8.784* 9.057* 1.895*   BNP  --   --  1,850*         Microbiology Data:      Microbiology Results (last 7 days)      ** No results found for the last 168 hours. **          Other Results:  EKG (my interpretation):   NSR, LVH, QTc 534.     Radiology:      Imaging Results                   X-Ray Chest AP Portable (Final result)  Result time 03/28/20 13:49:12                Final result by Layo Olivera MD (03/28/20 13:49:12)                               Impression:        See above        Electronically signed by:     Layo Olivera MD  Date:                                            03/28/2020  Time:                                            13:49                         Narrative:     EXAMINATION:  XR CHEST AP PORTABLE     CLINICAL HISTORY:  Weakness     TECHNIQUE:  Single frontal view of the chest was performed.     COMPARISON:  Non 03/23/2020 e     FINDINGS:  Mild cardiomegaly.  No significant airspace consolidation or pleural effusion identified.   Small opacities noted at the right lung base. .  No pleural effusion.                                                Assessment:      Marie Reis Jr. is a 64 y.o. male with history as above who was recently admitted for acute hypoxic respiratory failure and sepsis with pneumonia secondary to COVID-19, NSTEMI with acute on chronic combined HF and possible myocarditis due to COVID-19. Discharged on 3/27, now re-presenting to ED after being found down in bathroom covered in feces.      Plan:      Syncopal Event  - Unwitnessed. Patient with poor recollection of events.  - Suspect likely 2/2 to hypovolemia given recent admission for COVID-19.   - No known seizure history. However, per chart, patient presenting with fecal incontinence  - consider Neuro consult in the morning  - EKG not concerning for arrhythmogenic causes  - Further imaging such as head CT, TTE to further evaluate for potential causes deferred thus far 2/2 to COVID status  - Will continue to monitor     JAZLYN on CKD4  - Cr 5.6 on admit, up from 3.8 on yesterday's discharge   - Received 500 ml bolus NS in ED  - Continue to monitor      Acute hypoxic respiratory failure and severe sepsis and pneumonia 2/2 COVID-19   - On previous admit 3/21: Tmax 103.9, RR 24,  with elevated troponin and creatinine   - on 2L nc-->escalated to ventimask; stepped up to ICU 3/23 early am now stepped back down to floor on 4LNC    - CXR: RLL PNA   - procal elevated   - continue azithro, monitor QTc closely, last 540  - Continue plaquenil  - Patient discharged 3/27 to complete course of Plaquenil, followup with PCP and cardiology  - Plaquenil stopped per ID as QTC getting longer, has already received 4 days  - Patient remains afebrile, VSS, 98% on RA     NSTEMI   - troponin of 5 with no acute EKG changes, troponin increased to 11   - patient not complaining of CP  - consulted cards, recommend continuing medical management of ASA, plavix, atorvastatin, coreg     Acute on  Chronic Combined systolic and diastolic HF  - TTE in 2/26/2020 with EF 25% and grade 2 diastolic dysfunction with moderate to severe pulmonary HTN and moderate to severe aortic stenosis   - BNP 4,035, 3893 last month   - lasix 120mg IV TID currently, monitor output   - continue coreg, unable to add ACEI/ARB or spironolactone at this time due to kidney function      Multiple myeloma  - being treated with bortezomib   - due for cycle 6 on 3/25     CAD  - cont home ASA, plavix      HTN  - Continue home Coreg, amlodipine      DMT2 with complications with long term use of insulin   - A1c in 3/2020 9.1  - home regimen: levemir 20 qhs with SSI   - hold home linagliptin      Diet: Cardiac   DVT PPx: Heparin  Code: Full     Dispo: Pending improvement in JAZLYN, potential syncope workup        Alessio Bustamante Jr, MD  [facility] Medicine [facility]  U Medicine Service     [facility] Medicine Hospitalist Pager numbers:   [facility] Hospitalist Medicine Team A (Anson/Nadia):          [phone]  [facility] Hospitalist Medicine Team B (Shashank/Aniket):        [phone]

## 2020-03-29 NOTE — PLAN OF CARE
Patient on room air. No apparent respiratory distress noted.  Will continue to monitor. Will continue to monitor.

## 2020-03-29 NOTE — PROGRESS NOTES
"[facility] Internal Medicine History and Physical - Resident Note    Admitting Team: Medicine Team A  Attending Physician: Nadia   Resident: Arun  Interns: Keyon     Date of Admit: 3/28/2020    Chief Complaint     "seizure" x 1 episode, unwitnessed     Subjective:      History of Present Illness:  Marie Reis Jr. is a 64 y.o. male who has a past medical history of COVID-19, Binocular vision disorder with diplopia (9/22/2016), Bradycardia, Cataract, Cervical spondylosis (10/1/2015), Chronic diastolic heart failure (1/11/2018), Coronary artery disease due to calcified coronary lesion (3/19/2018), Dyslipidemia (6/26/2015), Encounter for blood transfusion, Hearing impairment (10/25/2013), History of stroke (3/23/2015), Hypertension associated with diabetes (1/11/2018), Hypertensive retinopathy of both eyes (9/26/2017), Lumbar spondylosis (10/1/2015), Multiple myeloma not having achieved remission (1/16/2018), NSTEMI (non-ST elevated myocardial infarction) (3/22/2020), SUZANNA on CPAP, Persistent proteinuria (1/11/2018), Pneumonia of right lower lobe due to infectious organism (3/22/2020), Spondylolisthesis of lumbar region (10/1/2015), Strabismus, Stroke (2014), Thoracic spondylosis (10/1/2015), 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    The patient presented to [facility] on 3/28/2020 with a primary complaint of fatigue and weakness following an unwitnessed syncopal vs seizure event. Per family, patient was found down in bathroom not moving and stating too weak to move. Family concerned for seizure-like activity. EMS was called. On arrival to ED, patient disoriented and found to have feces across his lower body. EKG with NSR, LVH, QTc 534. CMP w/ glucose 261, BUN/Cr 66/5.6, otherwise wnl. Trop 1.895. BNP 1850. CRP 93.6. CXR with no significant change. Patient with poor recollection of " events. Other than above, patient without further complaint. Denies any CP, worsening of SOB, N/V, F/C.    Of note, patient discharged on 3/27 after being treated for acute hypoxic respiratory failure and sepsis with pneumonia secondary to COVID-19, NSTEMI with acute on chronic combined HF and possible myocarditis due to COVID-19.    Given recent admission for COVID-19, worsening JAZLYN, and concern for syncope vs seizure, patient re-admitted to [facility] Team A.      Past Medical History:  Past Medical History:   Diagnosis Date    Binocular vision disorder with diplopia 9/22/2016    Bradycardia     Cataract     Cervical spondylosis 10/1/2015    Chronic diastolic congestive heart failure     Chronic diastolic heart failure 1/11/2018    Coronary artery disease due to calcified coronary lesion 3/19/2018    Dyslipidemia 6/26/2015    Encounter for blood transfusion     Hearing impairment 10/25/2013    History of stroke 3/23/2015    Hypertension associated with diabetes 1/11/2018    Hypertensive retinopathy of both eyes 9/26/2017    Lumbar spondylosis 10/1/2015    Multiple myeloma not having achieved remission 1/16/2018    NSTEMI (non-ST elevated myocardial infarction) 3/22/2020    SUZANNA on CPAP     Persistent proteinuria 1/11/2018    Pneumonia of right lower lobe due to infectious organism 3/22/2020    Spondylolisthesis of lumbar region 10/1/2015    Strabismus     Stroke 2014    Thoracic spondylosis 10/1/2015    Type 2 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, with long-term current use of insulin 9/26/2017    Type 2 diabetes mellitus with diabetic polyneuropathy, with long-term current use of insulin 9/28/2015    Type 2 diabetes mellitus with stage 4 chronic kidney disease, with long-term current use of insulin 1/11/2018       Past Surgical History:  Past Surgical History:   Procedure Laterality Date    CATARACT EXTRACTION W/  INTRAOCULAR LENS IMPLANT Left  4/30/15    Diane    COLONOSCOPY N/A 1/20/2020    Procedure: COLONOSCOPY;  Surgeon: Salvador Espinosa MD;  [facility];  Service: Endoscopy;  Laterality: N/A;    EYE SURGERY      cataract removal left eye    HAND SURGERY  2/2012    DR. BAKER [facility]    left hand fracture sx      LT EYE   5/2/15    DR KULLMAN [facility]       Allergies:  Review of patient's allergies indicates:   Allergen Reactions    Hydralazine analogues      Increase swelling and throat itching and tightness with use.  Symptoms correlate only with this medication onset in hospital.       Home Medications:  Prior to Admission medications    Medication Sig Start Date End Date Taking? Authorizing Provider   insulin (LANTUS SOLOSTAR U-100 INSULIN) glargine 100 units/mL (3mL) SubQ pen Inject 15 units at night. 3/27/20  Yes Alan Ybarra MD   insulin aspart U-100 (NOVOLOG) 100 unit/mL (3 mL) InPn pen Inject  6 units (light meal), 10 units (larger meal), scale 150-200+2, 201-250+4, 251-300+6, 301-350+8. Snack dose 3 units. Max daily 60 units. 3/19/20  Yes Gifty Massey, BELEM, FNP   amLODIPine (NORVASC) 10 MG tablet Take 1 tablet (10 mg total) by mouth once daily. 2/17/20   Debbie Salinas MD   aspirin (ECOTRIN) 81 MG EC tablet Take 1 tablet (81 mg total) by mouth once daily. 3/28/20 3/28/21  Alan Ybarra MD   atorvastatin (LIPITOR) 40 MG tablet Take 1 tablet (40 mg total) by mouth every evening. 3/27/20 3/27/21  Alan Ybarra MD   azithromycin (ZITHROMAX) 500 MG tablet Take 1 tablet (500 mg total) by mouth once daily. 3/28/20   Alan Ybarra MD   benzonatate (TESSALON) 100 MG capsule Take 1 capsule (100 mg total) by mouth 3 (three) times daily as needed for Cough. 3/27/20 4/6/20  Alan Ybarra MD   blood sugar diagnostic Strp 1 strip by Misc.(Non-Drug; Combo Route) route after meals as needed. 1/14/14   Vince Kelly MD   calcitRIOL (ROCALTROL) 0.25 MCG Cap Take 2 capsules (0.5 mcg  "total) by mouth once daily. 1/13/20   Alissa Dotson MD   carvediloL (COREG) 25 MG tablet Take 1 tablet (25 mg total) by mouth 2 (two) times daily with meals. 2/17/20   Debbie Salinas MD   clopidogreL (PLAVIX) 75 mg tablet Take 1 tablet (75 mg total) by mouth once daily. 2/17/20   Debbie Salinas MD   dexAMETHasone (DECADRON) 4 MG Tab Take 5 tablets (20 mg total) by mouth As instructed. Take once weekly with Velcade injection 3/19/20 3/19/21  Nancy Paulino NP   diclofenac sodium 1.5 % Drop APPLY 40 DROPS TO AFFECTED AREA(S) FOUR TIMES DAILY 8/13/18   Historical Provider, MD   flash glucose scanning reader (FREESTYLE CLAUDIO 14 DAY READER) [facility] Use as directed. 7/24/19   BELEM Gardiner, NAVI   flash glucose sensor (FREESTYLE CLAUDIO 14 DAY SENSOR) Kit Change every 14 days. 3/2/20   BELEM Gardiner, NAVI   fluticasone (FLONASE) 50 mcg/actuation nasal spray 1 spray by Each Nare route 2 (two) times daily as needed for Rhinitis.  Patient taking differently: 1 spray by Each Nare route as needed for Rhinitis.  9/9/16   Nancy Colón MD   furosemide (LASIX) 80 MG tablet Take 1 tablet (80 mg total) by mouth 2 (two) times daily. Take twice a day now 3/27/20 3/27/21  Alan Ybarra MD   gabapentin (NEURONTIN) 300 MG capsule Take 1 capsule (300 mg total) by mouth daily as needed.  Patient taking differently: Take 300 mg by mouth 2 (two) times daily.  9/11/19   Nancy Colón MD   insulin needles, disposable, 31 X 5/16 " Ndle Pt to use pen needle with Lantus daily 2/9/15   Nancy Colón MD   lidocaine (LIDODERM) 5 % APPLY UP TO 3 PATCHES TO THE AFFECTED AREA(S) FOR 12 HOURS DAILY AND REMOVE FOR 12 HOURS 12/20/19   Historical Provider, MD   lidocaine (XYLOCAINE) 5 % Oint ointment APPLY TO THE AFFECTED AREA(S) THREE TO FOUR TIMES DAILY 2/3/20   Historical Provider, MD   LIDOTRAL 3.88 % Crea APPLY TO THE AFFECTED AREA 2 TIMES TO 3 TIMES DAILY 8/13/18   Historical Provider, MD   linaGLIPtin " "(TRADJENTA) 5 mg Tab tablet Take 1 tablet (5 mg total) by mouth once daily. 3/19/20 3/19/21  Iricosme Massey, APRN, FNP   methyl salicylate 25 % Crea APPLY TO THE AFFECTED AREA(S) THREE TO FOUR TIMES DAILY 1/28/20   Historical Provider, MD   montelukast (SINGULAIR) 10 mg tablet Take 1 tablet (10 mg total) by mouth every evening. 9/9/16   Nancy Colón MD   nitroGLYCERIN (NITROSTAT) 0.4 MG SL tablet Place 1 tablet (0.4 mg total) under the tongue every 5 (five) minutes as needed for Chest pain. 2/17/20   Debbie Salinas MD   omeprazole (PRILOSEC OTC) 20 MG tablet Take 1 tablet (20 mg total) by mouth once daily. 10/9/19 10/8/20  Carol Cantrell MD   ondansetron (ZOFRAN-ODT) 8 MG TbDL Take 1 tablet (8 mg total) by mouth every 6 (six) hours as needed. 9/19/16   Gumaro Weinstein MD   pen needle, diabetic 32 gauge x 1/4" Ndle use to inject insulin once daily 3/27/20   Alan Ybarra MD   vitamin D (VITAMIN D3) 1000 units Tab Take 1,000 Units by mouth once daily.    Historical Provider, MD       Family History:  Family History   Problem Relation Age of Onset    Diabetes Mother     Cancer Mother     Kidney disease Mother     Diabetes Father     Heart attack Father     Diabetes Sister     Diabetes Brother     No Known Problems Maternal Aunt     No Known Problems Maternal Uncle     No Known Problems Paternal Aunt     No Known Problems Paternal Uncle     No Known Problems Maternal Grandmother     No Known Problems Maternal Grandfather     No Known Problems Paternal Grandmother     No Known Problems Paternal Grandfather     Blindness Neg Hx     Anesthesia problems Neg Hx     Amblyopia Neg Hx     Cataracts Neg Hx     Glaucoma Neg Hx     Hypertension Neg Hx     Macular degeneration Neg Hx     Retinal detachment Neg Hx     Strabismus Neg Hx     Stroke Neg Hx     Thyroid disease Neg Hx     Heart disease Neg Hx     Heart failure Neg Hx     Hyperlipidemia Neg Hx        Social " "History:  Social History     Tobacco Use    Smoking status: Never Smoker    Smokeless tobacco: Never Used   Substance Use Topics    Alcohol use: No     Frequency: Never     Drinks per session: Patient refused     Binge frequency: Never    Drug use: No       Review of Systems:  Pertinent positives and negatives are listed in HPI.  All other systems are reviewed and are negative.       Objective:   Last 24 Hour Vital Signs:  BP  Min: 122/58  Max: 167/76  Temp  Av.8 °F (36.6 °C)  Min: 97.8 °F (36.6 °C)  Max: 97.8 °F (36.6 °C)  Pulse  Av.8  Min: 69  Max: 71  Resp  Av.8  Min: 15  Max: 23  SpO2  Av.8 %  Min: 95 %  Max: 99 %  Height  Av' 1" (185.4 cm)  Min: 6' 1" (185.4 cm)  Max: 6' 1" (185.4 cm)  Weight  Av.7 kg (188 lb 14.7 oz)  Min: 85.2 kg (187 lb 13.3 oz)  Max: 86.2 kg (190 lb)  Body mass index is 24.78 kg/m².  No intake/output data recorded.    Physical Examination:  Deferred due to COVID-19.    Laboratory:  Most Recent Data:  CBC:   Lab Results   Component Value Date    WBC 5.89 2020    HGB 9.2 (L) 2020    HCT 29.0 (L) 2020     2020    MCV 84 2020    RDW 13.5 2020     BMP:   Lab Results   Component Value Date     2020    K 3.6 2020    CL 99 2020    CO2 20 (L) 2020    BUN 66 (H) 2020    CREATININE 5.6 (H) 2020     (H) 2020    CALCIUM 8.7 2020    MG 2.1 2020    PHOS 3.9 2020     LFTs:   Lab Results   Component Value Date    PROT 6.8 2020    ALBUMIN 2.8 (L) 2020    BILITOT 0.8 2020    AST 42 (H) 2020    ALKPHOS 77 2020    ALT 30 2020     Coags:   Lab Results   Component Value Date    INR 1.2 2020     Urinalysis:   Lab Results   Component Value Date    LABURIN No significant growth 2020    COLORU Brown (A) 2020    SPECGRAV >=1.030 (A) 2020    NITRITE Negative 2020    KETONESU Negative 2020    " UROBILINOGEN Negative 03/21/2020    WBCUA 2 03/21/2020       Trended Lab Data:  Recent Labs   Lab 03/22/20  0535 03/23/20  0449 03/23/20  1153 03/28/20  1307   WBC 5.71  --  8.20 5.89   HGB 8.5*  --  8.2* 9.2*   HCT 26.4* 21* 25.4* 29.0*     --  241 276   MCV 83  --  83 84   RDW 13.3  --  13.7 13.5     --  141 137   K 3.5  --  3.6 3.6     --  109 99   CO2 20*  --  21* 20*   BUN 41*  --  51* 66*   CREATININE 3.9*  --  3.8* 5.6*   *  --  143* 261*   PROT  --   --  5.6* 6.8   ALBUMIN  --   --  2.5* 2.8*   BILITOT  --   --  0.8 0.8   AST  --   --  124* 42*   ALKPHOS  --   --  66 77   ALT  --   --  79* 30       Trended Cardiac Data:  Recent Labs   Lab 03/22/20  1936 03/23/20  0125 03/28/20  1307   TROPONINI 8.784* 9.057* 1.895*   BNP  --   --  1,850*       Microbiology Data:  Microbiology Results (last 7 days)     ** No results found for the last 168 hours. **        Other Results:  EKG (my interpretation):   NSR, LVH, QTc 534.    Radiology:  Imaging Results          X-Ray Chest AP Portable (Final result)  Result time 03/28/20 13:49:12    Final result by Layo Olivera MD (03/28/20 13:49:12)                 Impression:      See above      Electronically signed by: Layo Olivera MD  Date:    03/28/2020  Time:    13:49             Narrative:    EXAMINATION:  XR CHEST AP PORTABLE    CLINICAL HISTORY:  Weakness    TECHNIQUE:  Single frontal view of the chest was performed.    COMPARISON:  Non 03/23/2020 e    FINDINGS:  Mild cardiomegaly.  No significant airspace consolidation or pleural effusion identified.  Small opacities noted at the right lung base. .  No pleural effusion.                                   Assessment:     Marie Reis Jr. is a 64 y.o. male with history as above who was recently admitted for acute hypoxic respiratory failure and sepsis with pneumonia secondary to COVID-19, NSTEMI with acute on chronic combined HF and possible myocarditis due to COVID-19. Discharged on 3/27, now  re-presenting to ED after being found down in bathroom covered in feces.      Plan:     Syncopal Event  - Unwitnessed. Patient with poor recollection of events.  - Suspect likely 2/2 to hypovolemia given recent admission for COVID-19.   - No known seizure history. However, per chart, patient presenting with fecal incontinence  - consider Neuro consult in the morning  - EKG not concerning for arrhythmogenic causes  - Further imaging such as head CT, TTE to further evaluate for potential causes deferred thus far 2/2 to COVID status  - Will continue to monitor    JAZLYN on CKD4  - Cr 5.6 on admit, up from 3.8 on yesterday's discharge   - Received 500 ml bolus NS in ED  - Continue to monitor     Acute hypoxic respiratory failure and severe sepsis and pneumonia 2/2 COVID-19   - On previous admit 3/21: Tmax 103.9, RR 24,  with elevated troponin and creatinine   - on 2L nc-->escalated to ventimask; stepped up to ICU 3/23 early am now stepped back down to floor on 4LNC    - CXR: RLL PNA   - procal elevated   - continue azithro, monitor QTc closely, last 540  - Continue plaquenil  - Patient discharged 3/27 to complete course of Plaquenil, followup with PCP and cardiology  - Plaquenil stopped per ID as QTC getting longer, has already received 4 days  - Patient remains afebrile, VSS, 98% on RA    NSTEMI   - troponin of 5 with no acute EKG changes, troponin increased to 11   - patient not complaining of CP  - consulted cards, recommend continuing medical management of ASA, plavix, atorvastatin, coreg    Acute on Chronic Combined systolic and diastolic HF  - TTE in 2/26/2020 with EF 25% and grade 2 diastolic dysfunction with moderate to severe pulmonary HTN and moderate to severe aortic stenosis   - BNP 4,035, 3893 last month   - lasix 120mg IV TID currently, monitor output   - continue coreg, unable to add ACEI/ARB or spironolactone at this time due to kidney function      Multiple myeloma  - being treated with  bortezomib   - due for cycle 6 on 3/25     CAD  - cont home ASA, plavix      HTN  - Continue home Coreg, amlodipine      DMT2 with complications with long term use of insulin   - A1c in 3/2020 9.1  - home regimen: levemir 20 qhs with SSI   - hold home linagliptin      Diet: Cardiac   DVT PPx: Heparin  Code: Full    Dispo: Pending improvement in JAZLYN, potential syncope workup      Alessio Bustamante Jr, MD  [facility] Internal Medicine HO-I  [facility] Medicine Service    [facility] Medicine Hospitalist Pager numbers:   [facility] Hospitalist Medicine Team A (Anson/Nadia): [phone]  [facility] Hospitalist Medicine Team B (Shashank/Aniket):  [phone]

## 2020-03-29 NOTE — PLAN OF CARE
VIRTUAL NURSE 2 HOUR ROUNDS:  Cued into patient's room.  Patient awake at this time. Staff in room. respirations even and unlabored.  No distress noted.  Will cont to monitor.

## 2020-03-29 NOTE — PLAN OF CARE
VIRTUAL NURSE 2 HOUR ROUNDS:  Cued into patient's room.  Patient resting comfortably in bed with eyes closed; respirations even and unlabored.  No distress noted.  Will cont to monitor.]

## 2020-03-30 NOTE — PLAN OF CARE
Frequent check and two hour rounding completed.  Patient awake but relaxed and comfortable.  Corbin, PCT acquiring vital signs. Patient denies any needs or complaints at this time. All safety measures maintained.

## 2020-03-30 NOTE — PLAN OF CARE
Every 2 hour frequent check completed.  Lights out. Patient resting. No distress noted. Patient has no needs or complaints at this time.  All safety measures maintained.

## 2020-03-30 NOTE — PLAN OF CARE
Problem: Occupational Therapy Goal  Goal: Occupational Therapy Goal  Description  Goals to be met by: 04/30     Patient will increase functional independence with ADLs by performing:    UE Dressing with Supervision.  LE Dressing with Supervision.  Grooming while standing at sink with Supervision.  Toileting from bedside commode with Supervision for hygiene and clothing management.   Toilet transfer to bedside commode with Supervision.  Increased functional strength to WFL for ADLs.     Outcome: Ongoing, Progressing   Pt found in supine for tele-med interview & for co-eval/tx w/ PT. Pt lives w/ spouse in [place] w/ 0STE; questionable whether he has t/s or WIS. PLOF: MI via SPC w/ all fxnl tasks incl standing showers (per pt report). Pt AO4 & perf the following: sup-->EOB SBA; tolerated sitting EOB x ~30 min w/ SBA; standing via RW x 3 attempts w/ SB-CGA & tolerated x ~1-2 min per attempt. Pt returned to sitting w/ CGA 2/2 c/o mild dizziness in standing. Unable to obtain BP. Pt returned to supine w/ SBA. Edu/tx re: general safety techs & HEP. Pt verbalized understanding, but questionable retention/comprehension.    Pt presents w/ decreased overall endurance/conditioning, balance/mobility & coordination w/ subsequent decline in (I)/safety w/ BADLs, fxnl mobility & fxnl t/f's.  OT 5x/wk to increase phys/fxnl status & maximize potential to achieve established goals for d/c-->SNF w/ rec BSC, shower chair, RW & transport WC.    **Pt demo'ed delayed processing of verbal commands, inconsistent gross/fxnl use LUE, flat affect, delayed motor/verbal responsiveness, delayed/impaired motor planning. Unknown if this is pt's baseline, but could be the result of anoxic brain injury 2/2 respiratory distress, extension of previous CVA, depression or non-compliance, but MDs would have to dx or r/o any of these possibilities. Pt demo'ed no interest in OT/PT, going home or increasing phys/fxnl status.

## 2020-03-30 NOTE — PLAN OF CARE
VN entered the patient's room with permission via [employer]. The patient was awake and alert. Has no concerns or needs at this time. He denies any pain. Chart reviewed. Will continue to follow and remain available as needed.

## 2020-03-30 NOTE — DISCHARGE SUMMARY
[facility] Hospital Medicine Discharge Summary    Primary Team: [facility] Hospitalist Team A  Attending Physician: Nadia  Resident: Arun  Intern: Tate    Date of Admit: 3/28/2020  Date of Discharge: 3/30/2020    Discharge to: Home  Condition: Stable    Discharge Diagnoses     Patient Active Problem List   Diagnosis    Hearing impairment    Essential hypertension    History of stroke    Dyslipidemia    Type 2 diabetes mellitus with diabetic polyneuropathy, with long-term current use of insulin    Cervical spondylosis    Thoracic spondylosis    Lumbar spondylosis    Spondylolisthesis of lumbar region    History of right PCA stroke    Hypertropia of left eye    Binocular vision disorder with diplopia    Hypertensive retinopathy of both eyes    Chronic combined systolic and diastolic heart failure    Type 2 diabetes mellitus with stage 4 chronic kidney disease, with long-term current use of insulin    Benign hypertension with chronic kidney disease, stage III    Persistent proteinuria    Acute on chronic combined systolic and diastolic congestive heart failure    Multiple myeloma    Prolonged Q-T interval on ECG    Coronary artery disease due to calcified coronary lesion    Bilateral carotid artery stenosis    Bradycardia    Acute renal failure superimposed on stage 4 chronic kidney disease    Secondary hyperparathyroidism    Hypertensive CKD (chronic kidney disease)    Type 2 diabetes mellitus with hyperglycemia, with long-term current use of insulin    Vitamin D deficiency    Diabetes mellitus with proteinuria    Vitreous hemorrhage, right    Diabetic polyneuropathy associated with type 2 diabetes mellitus    Stage 4 chronic kidney disease    Aortic atherosclerosis    Chronic pulmonary heart disease    PAD (peripheral artery disease)    Symptomatic anemia    Dental caries    Thrombocytopenia    Positive FIT (fecal immunochemical test)    Moderate episode of recurrent major  depressive disorder    Hemiplegia and hemiparesis following cerebral infarction affecting left non-dominant side    Transaminitis    Bilirubinemia    Constipation    Liver function test abnormality    Troponin I above reference range    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, with long-term current use of insulin    Acute respiratory failure with hypoxia    Pneumonia due 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    NSTEMI (non-ST elevated myocardial infarction)    Acute on chronic combined systolic and diastolic heart failure    Moderate to severe aortic stenosis    Anemia, chronic disease    Severe sepsis    Acute respiratory disease due to severe acute respiratory syndrome coronavirus 2 (SARS-CoV-2)    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 is a 64 y.o. male who has a past medical history of COVID-19, Binocular vision disorder with diplopia (9/22/2016), Bradycardia, Cataract, Cervical spondylosis (10/1/2015), Chronic diastolic heart failure (1/11/2018), Coronary artery disease due to calcified coronary lesion (3/19/2018), Dyslipidemia (6/26/2015), Encounter for blood transfusion, Hearing impairment (10/25/2013), History of stroke (3/23/2015), Hypertension associated with diabetes (1/11/2018), Hypertensive retinopathy of both eyes (9/26/2017), Lumbar spondylosis (10/1/2015), Multiple myeloma not having achieved remission (1/16/2018), NSTEMI (non-ST elevated myocardial infarction) (3/22/2020), SUZANNA on CPAP, Persistent proteinuria (1/11/2018),  Pneumonia of right lower lobe due to infectious organism (3/22/2020), Spondylolisthesis of lumbar region (10/1/2015), Strabismus, Stroke (2014), Thoracic spondylosis (10/1/2015), 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     The patient presented to [facility] on 3/28/2020 with a primary complaint of fatigue and weakness following an unwitnessed syncopal vs seizure event. Per family, patient was found down in bathroom not moving and stating too weak to move. Family concerned for seizure-like activity. EMS was called. On arrival to ED, patient disoriented and found to have feces across his lower body. EKG with NSR, LVH, QTc 534. CMP w/ glucose 261, BUN/Cr 66/5.6, otherwise wnl. Trop 1.895. BNP 1850. CRP 93.6. CXR with no significant change. Patient with poor recollection of events. Other than above, patient without further complaint. Denies any CP, worsening of SOB, N/V, F/C.     Of note, patient discharged on 3/27 after being treated for acute hypoxic respiratory failure and sepsis with pneumonia secondary to COVID-19, NSTEMI with acute on chronic combined HF and possible myocarditis due to COVID-19.    For the full HPI please refer to the History & Physical from this admission.    Hospital Course By Problem with Pertinent Findings     Syncopal Event  - Unwitnessed. Patient with poor recollection of events.  - Suspect likely 2/2 to hypovolemia, generalized weakness from recent admission/COVID-19 infection  - No known seizure history. However, per chart, patient presenting with fecal incontinence  - EKG not concerning for arrhythmogenic causes  - Further imaging such as head CT, TTE to further evaluate for potential causes deferred thus far 2/2 to COVID status  - No further syncopal events.  - Will continue to monitor     JAZLYN on CKD4  - Cr 5.6 on admit, up from 3.8 on yesterday's discharge   -  Received 500 ml bolus NS in ED  - Cr this morning - 3.8; improving, was 3.8 at prior discharge  - Continue to monitor       Acute hypoxic respiratory failure and severe sepsis and pneumonia 2/2 COVID-19   - CXR: RLL PNA   - procal elevated   - Patient discharged 3/27 to complete course of Plaquenil, followup with PCP and cardiology  - Completed course of plaquenil  - Patient remains afebrile, VSS on room air  - Will enroll in COVID symptom monitoring program     NSTEMI   - troponin of 5 with no acute EKG changes, troponin increased to 11   - patient not complaining of CP  - Rpt trop 1.22  - Continue medical management of ASA, plavix, atorvastatin, coreg  - F/u with cardiology outpatient     Acute on Chronic Combined systolic and diastolic HF  - TTE in 2/26/2020 with EF 25% and grade 2 diastolic dysfunction with moderate to severe pulmonary HTN and moderate to severe aortic stenosis   - BNP 4,035, 3893 last month, rpt BNP 1850  - continue coreg, unable to add ACEI/ARB or spironolactone at this time due to kidney function   - Will decrease home lasix to 40mg BID on discharge  - F/u with cardiology outpatient     Multiple myeloma  - being treated with bortezomib   - due for cycle 6 on 3/25  - F/u with oncology     CAD  - cont home ASA, plavix      HTN  - Continue home Coreg, amlodipine      DMT2 with complications with long term use of insulin   - A1c in 3/2020 9.1  - home regimen: levemir 15 qhs with SSI  - Resume home levemir 15 qHS on discharge  - Resume home linagliptin    Deconditioning  - Patient with reported weakness following recent hospital stay  - Likely 2/2 prolonged hospitalization and acute illness  - Limited options with therapy d/t COVID status  - Positive test confirmed 3/21, patient on day 9 of 14 of isolation  - May be additional rehab options available after 14 days of isolation, discussed with case management.      Discharge Medications      Marie Reis Jr.   Home Medication Instructions  [facility]:[id number]    Printed on:03/30/20 1012   Medication Information                      amLODIPine (NORVASC) 10 MG tablet  Take 1 tablet (10 mg total) by mouth once daily.             aspirin (ECOTRIN) 81 MG EC tablet  Take 1 tablet (81 mg total) by mouth once daily.             atorvastatin (LIPITOR) 40 MG tablet  Take 1 tablet (40 mg total) by mouth every evening.             azithromycin (ZITHROMAX) 500 MG tablet  Take 1 tablet (500 mg total) by mouth once daily.             benzonatate (TESSALON) 100 MG capsule  Take 1 capsule (100 mg total) by mouth 3 (three) times daily as needed for Cough.             blood sugar diagnostic Strp  1 strip by Misc.(Non-Drug; Combo Route) route after meals as needed.             calcitRIOL (ROCALTROL) 0.25 MCG Cap  Take 2 capsules (0.5 mcg total) by mouth once daily.             carvediloL (COREG) 25 MG tablet  Take 1 tablet (25 mg total) by mouth 2 (two) times daily with meals.             clopidogreL (PLAVIX) 75 mg tablet  Take 1 tablet (75 mg total) by mouth once daily.             dexAMETHasone (DECADRON) 4 MG Tab  Take 5 tablets (20 mg total) by mouth As instructed. Take once weekly with Velcade injection             diclofenac sodium 1.5 % Drop  APPLY 40 DROPS TO AFFECTED AREA(S) FOUR TIMES DAILY             flash glucose scanning reader (FREESTYLE CLAUDIO 14 DAY READER) [facility]  Use as directed.             flash glucose sensor (FREESTYLE CLAUDIO 14 DAY SENSOR) Kit  Change every 14 days.             fluticasone (FLONASE) 50 mcg/actuation nasal spray  1 spray by Each Nare route 2 (two) times daily as needed for Rhinitis.             furosemide (LASIX) 40 MG tablet  Take 1 tablet (40 mg total) by mouth 2 (two) times daily. Take twice a day now             gabapentin (NEURONTIN) 300 MG capsule  Take 1 capsule (300 mg total) by mouth daily as needed.             insulin (LANTUS SOLOSTAR U-100 INSULIN) glargine 100 units/mL (3mL) SubQ pen  Inject 15 units at night.            "  insulin aspart U-100 (NOVOLOG) 100 unit/mL (3 mL) InPn pen  Inject  6 units (light meal), 10 units (larger meal), scale 150-200+2, 201-250+4, 251-300+6, 301-350+8. Snack dose 3 units. Max daily 60 units.             insulin needles, disposable, 31 X 5/16 " Ndle  Pt to use pen needle with Lantus daily             lidocaine (LIDODERM) 5 %  APPLY UP TO 3 PATCHES TO THE AFFECTED AREA(S) FOR 12 HOURS DAILY AND REMOVE FOR 12 HOURS             lidocaine (XYLOCAINE) 5 % Oint ointment  APPLY TO THE AFFECTED AREA(S) THREE TO FOUR TIMES DAILY             LIDOTRAL 3.88 % Crea  APPLY TO THE AFFECTED AREA 2 TIMES TO 3 TIMES DAILY             linaGLIPtin (TRADJENTA) 5 mg Tab tablet  Take 1 tablet (5 mg total) by mouth once daily.             methyl salicylate 25 % Crea  APPLY TO THE AFFECTED AREA(S) THREE TO FOUR TIMES DAILY             montelukast (SINGULAIR) 10 mg tablet  Take 1 tablet (10 mg total) by mouth every evening.             nitroGLYCERIN (NITROSTAT) 0.4 MG SL tablet  Place 1 tablet (0.4 mg total) under the tongue every 5 (five) minutes as needed for Chest pain.             omeprazole (PRILOSEC OTC) 20 MG tablet  Take 1 tablet (20 mg total) by mouth once daily.             ondansetron (ZOFRAN-ODT) 8 MG TbDL  Take 1 tablet (8 mg total) by mouth every 6 (six) hours as needed.             pen needle, diabetic 32 gauge x 1/4" Ndle  use to inject insulin once daily             vitamin D (VITAMIN D3) 1000 units Tab  Take 1,000 Units by mouth once daily.                 Discharge Information:   Diet:  Cardiac    Physical Activity:  As tolerated             Instructions:  1. Take all medications as prescribed  2. Keep all follow-up appointments  3. Return to the hospital or call your primary care physicians if any worsening symptoms such as fever, chest pain, shortness of breath, return of symptoms, or any other concerns.    Follow-Up Appointments:    Follow-up Information     Nancy Coóln MD In 1 week.    Specialty:  " Internal Medicine  Contact information:  [address]  [place] [address]  [phone]                   Alan Ybarra MD  hospitals Internal Medicine, [facility]

## 2020-03-30 NOTE — PT/OT/SLP EVAL
Occupational Therapy   Evaluation/tx    Name: Marie Reis Jr.  MRN: [id number]  Admitting Diagnosis:  Acute renal failure superimposed on chronic kidney disease      Recommendations:     Discharge Recommendations: nursing facility, skilled  Discharge Equipment Recommendations:  grab bar, bedside commode, shower chair, wheelchair, walker, rolling  Barriers to discharge:  None    Assessment:   Pt presents w/ decreased overall endurance/conditioning, balance/mobility & coordination w/ subsequent decline in (I)/safety w/ BADLs, fxnl mobility & fxnl t/f's.  OT 5x/wk to increase phys/fxnl status & maximize potential to achieve established goals for d/c-->SNF w/ rec BSC, shower chair, RW & transport WC.    **Pt demo'ed delayed processing of verbal commands, inconsistent gross/fxnl use LUE, flat affect, delayed motor/verbal responsiveness, delayed/impaired motor planning. Unknown if this is pt's baseline, but could be the result of anoxic brain injury 2/2 respiratory distress, extension of previous CVA, depression or non-compliance, but MDs would have to dx or r/o any of these possibilities. Pt demo'ed no interest in OT/PT, going home or increasing phys/fxnl status.    Marie Reis Jr. is a 64 y.o. male with a medical diagnosis of Acute renal failure superimposed on chronic kidney disease.  He presents with . Performance deficits affecting function: weakness, impaired endurance, impaired self care skills, impaired balance, gait instability, impaired functional mobilty, visual deficits, decreased coordination, decreased upper extremity function, impaired cognition, decreased safety awareness, pain.      Rehab Prognosis: Fair; patient would benefit from acute skilled OT services to address these deficits and reach maximum level of function.       Plan:     Patient to be seen 5 x/week to address the above listed problems via self-care/home management, therapeutic activities, therapeutic exercises  · Plan of Care Expires:  04/30/20  · Plan of Care Reviewed with: patient    Subjective     Chief Complaint: SOB  Patient/Family Comments/goals: return home    Occupational Profile:  Living Environment: w/ spouse in [facility] w/ 0STE; either t/s or WIS  Previous level of function: MI via SPC per pt report  Roles and Routines: standing showers  Equipment Used at Home:  cane, straight  Assistance upon Discharge: spouse    Pain/Comfort:  · Pain Rating 1: 0/10  · Location - Side 1: Right  · Location - Orientation 1: lower  · Location 1: back  · Pain Addressed 1: Reposition, Distraction, Cessation of Activity  · Pain Rating Post-Intervention 1: 4/10    Patients cultural, spiritual, [employer] conflicts given the current situation:      Objective:     Communicated with: nsg prior to session.  Patient found HOB elevated with bed alarm, telemetry upon OT entry to room.    General Precautions: Standard, airborne, droplet, fall, seizure, hearing impaired, vision impaired   Orthopedic Precautions:N/A   Braces: N/A     Occupational Performance:    Bed Mobility:    · Patient completed Supine to Sit with stand by assistance  · Patient completed Sit to Supine with stand by assistance    Functional Mobility/Transfers:  · Patient completed Sit <> Stand Transfer with stand by assistance and contact guard assistance  with  rolling walker   · Functional Mobility: pt took 2-3 steps backwards to EOB via RW    Activities of Daily Living:  ·     Cognitive/Visual Perceptual:  AO4  Flat affect  Delayed processing  Delayed verbal/motor responses  Decreased vision    Physical Exam:  B shldrs ~100* scaption; elb-->Ds WFL at 4+/5    **Decreased proprioception/kinesthesia LUE dist>prox    Sit balance: F to F+  Stand balance: F- to F    AMPAC 6 Click ADL:  AMPAC Total Score: 14    Treatment & Education:  Pt found in supine for tele-med interview & for co-eval/tx w/ PT. Pt lives w/ spouse in [facility] w/ 0STE; questionable whether he has t/s or WIS. PLOF: MI via SPC w/ all fxnl tasks  incl standing showers (per pt report). Pt AO4 & perf the following: sup-->EOB SBA; tolerated sitting EOB x ~30 min w/ SBA; standing via RW x 3 attempts w/ SB-CGA & tolerated x ~1-2 min per attempt. Pt returned to sitting w/ CGA 2/2 c/o mild dizziness in standing. Unable to obtain BP. Pt returned to supine w/ SBA. Edu/tx re: general safety techs & HEP. Pt verbalized understanding, but questionable retention/comprehension.    Patient left HOB elevated with all lines intact, call button in reach, bed alarm on and nsg notified    GOALS:   Multidisciplinary Problems     Occupational Therapy Goals        Problem: Occupational Therapy Goal    Goal Priority Disciplines Outcome Interventions   Occupational Therapy Goal     OT, PT/OT Ongoing, Progressing    Description:  Goals to be met by: 04/30     Patient will increase functional independence with ADLs by performing:    UE Dressing with Supervision.  LE Dressing with Supervision.  Grooming while standing at sink with Supervision.  Toileting from bedside commode with Supervision for hygiene and clothing management.   Toilet transfer to bedside commode with Supervision.  Increased functional strength to [facility] for ADLs.                      History:     Past Medical History:   Diagnosis Date    Binocular vision disorder with diplopia 9/22/2016    Bradycardia     Cataract     Cervical spondylosis 10/1/2015    Chronic diastolic congestive heart failure     Chronic diastolic heart failure 1/11/2018    Coronary artery disease due to calcified coronary lesion 3/19/2018    Dyslipidemia 6/26/2015    Encounter for blood transfusion     Hearing impairment 10/25/2013    History of stroke 3/23/2015    Hypertension associated with diabetes 1/11/2018    Hypertensive retinopathy of both eyes 9/26/2017    Lumbar spondylosis 10/1/2015    Multiple myeloma not having achieved remission 1/16/2018    NSTEMI (non-ST elevated myocardial infarction) 3/22/2020    SUZANNA on CPAP      Persistent proteinuria 1/11/2018    Pneumonia of right lower lobe due to infectious organism 3/22/2020    Spondylolisthesis of lumbar region 10/1/2015    Strabismus     Stroke 2014    Thoracic spondylosis 10/1/2015    Type 2 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, with long-term current use of insulin 9/26/2017    Type 2 diabetes mellitus with diabetic polyneuropathy, with long-term current use of insulin 9/28/2015    Type 2 diabetes mellitus with stage 4 chronic kidney disease, with long-term current use of insulin 1/11/2018       Past Surgical History:   Procedure Laterality Date    CATARACT EXTRACTION W/  INTRAOCULAR LENS IMPLANT Left 4/30/15    Diane    COLONOSCOPY N/A 1/20/2020    Procedure: COLONOSCOPY;  Surgeon: Salvador Espinosa MD;  [facility];  Service: Endoscopy;  Laterality: N/A;    EYE SURGERY      cataract removal left eye    HAND SURGERY  2/2012    DR. BAKER [facility]    left hand fracture sx      LT EYE   5/2/15    DR KULLMAN [facility]       Time Tracking:     OT Date of Treatment: 03/30/20  OT Start Time: 1420  OT Stop Time: 1545  OT Total Time (min): 85 min    Billable Minutes:Evaluation 15  Therapeutic Activity 30  Total Time 85--co-tx w/ PT    OBED Crain  3/30/2020

## 2020-03-30 NOTE — PLAN OF CARE
Frequent check and q2 hourly rounding completed. Patient noted to be resting but awakened for rounds and denied any needs at this time. Respirations even in unlabored.  No signs of distress noted at this time.

## 2020-03-30 NOTE — PLAN OF CARE
VN cued into room for q2h rounding.  Pt resting comfortably in bed, talking on phone.  No needs or complaints at this time.  Call light within reach, fall precautions maintained.  VN will continue to follow and be available as needed.

## 2020-03-30 NOTE — PROGRESS NOTES
[facility] Internal Medicine Resident [facility]-I Progress Note    Subjective:      Patient sleeping comfortably in bed this morning.  No new complaints.  Breathing doing well.       Objective:   Last 24 Hour Vital Signs:  BP  Min: 116/58  Max: 148/67  Temp  Av.8 °F (36.6 °C)  Min: 96 °F (35.6 °C)  Max: 99.2 °F (37.3 °C)  Pulse  Av.7  Min: 65  Max: 77  Resp  Av.8  Min: 14  Max: 20  SpO2  Av.3 %  Min: 94 %  Max: 97 %  Weight  Av.7 kg (162 lb 7.7 oz)  Min: 73.7 kg (162 lb 7.7 oz)  Max: 73.7 kg (162 lb 7.7 oz)    Fluid Balance:     Intake/Output Summary (Last 24 hours) at 3/30/2020 0658  Last data filed at 3/29/2020 2030  Gross per 24 hour   Intake 250 ml   Output 500 ml   Net -250 ml       Vitals:    20 0012 20 0018 20 0409 20 0639   BP: 134/61   (!) 148/67   BP Location:       Patient Position: Lying   Lying   Pulse: 76 74 77 73   Resp: 20   20   Temp: 97.1 °F (36.2 °C)   99.2 °F (37.3 °C)   TempSrc: Oral   Oral   SpO2: (!) 94%   96%   Weight:    73.7 kg (162 lb 7.7 oz)   Height:           Physical Examination:  Gen: Lying comfortably in bed, in NAD  Resp: Speaking in full sentences, no coughing, on room air.  Neuro: Alert & oriented.      Rest of exam deferred due to virtual visit    Laboratory:  Laboratory Data Reviewed: Yes  Pertinent Findings:    Hematology:  Recent Labs   Lab 20  1153 20  1307 20  0700   WBC 8.20 5.89 6.00   HGB 8.2* 9.2* 8.4*   HCT 25.4* 29.0* 25.8*    276 267   MCV 83 84 82   RDW 13.7 13.5 13.4       Chemistry:  Recent Labs   Lab 20  1153 20  1307 20  0700 03/29/20  1611    137 142 139   K 3.6 3.6 2.9* 3.3*    99 106 103   CO2 21* 20* 22* 24   BUN 51* 66* 68* 61*   CREATININE 3.8* 5.6* 4.8* 4.4*   * 261* 97 127*   CALCIUM 8.6* 8.7 8.0* 8.6*   PROT 5.6* 6.8 5.3*  --    ALBUMIN 2.5* 2.8* 2.5*  --    ALT 79* 30 26  --    * 42* 40  --    ALKPHOS 66 77 63  --    MG 2.1  --   --   --    PHOS 3.9   --   --   --    BILITOT 0.8 0.8 0.5  --        Cardiac:  Recent Labs   Lab 03/28/20  1307 03/29/20  0700   TROPONINI 1.895* 1.220*   BNP 1,850*  --        DM:  Recent Labs   Lab 03/28/20  1307  03/29/20  0700 03/29/20  0803 03/29/20  1214 03/29/20  1611 03/29/20  1613 03/29/20 2028 03/30/20  0633   *  --  97  --   --  127*  --   --   --    POCTGLUCOSE  --    < >  --  82 81  --  120* 82 113*    < > = values in this interval not displayed.         Microbiology Data Reviewed: Yes  Pertinent Findings:  Microbiology Results (last 7 days)     ** No results found for the last 168 hours. **          Lab Results   Component Value Date    [id number] Detected (A) 03/21/2020       Other Results:     Radiology Data Reviewed: Yes  Pertinent Findings:  Imaging Results          X-Ray Chest AP Portable (Final result)  Result time 03/28/20 13:49:12    Final result by Layo Olivera MD (03/28/20 13:49:12)                 Impression:      See above      Electronically signed by: Layo Olivera MD  Date:    03/28/2020  Time:    13:49             Narrative:    EXAMINATION:  XR CHEST AP PORTABLE    CLINICAL HISTORY:  Weakness    TECHNIQUE:  Single frontal view of the chest was performed.    COMPARISON:  Non 03/23/2020 e    FINDINGS:  Mild cardiomegaly.  No significant airspace consolidation or pleural effusion identified.  Small opacities noted at the right lung base. .  No pleural effusion.                                Current Medications:     Infusions:       Scheduled:   amLODIPine  10 mg Oral Daily    aspirin  81 mg Oral Daily    atorvastatin  40 mg Oral QHS    carvediloL  25 mg Oral BID WM    clopidogreL  75 mg Oral Daily    heparin (porcine)  5,000 Units Subcutaneous Q8H    insulin detemir U-100  20 Units Subcutaneous QHS    montelukast  10 mg Oral QHS    pantoprazole  40 mg Oral Daily        PRN:  benzonatate, Dextrose 10% Bolus, Dextrose 10% Bolus, fluticasone propionate, glucagon (human recombinant), glucose,  glucose, insulin aspart U-100, sodium chloride 0.9%     Antibiotics:  Antimicrobials (From admission, onward)    None          Assessment:     Marie Reis Jr. is a 64 y.o.male with    Plan:     Syncopal Event  - Unwitnessed. Patient with poor recollection of events.  - Suspect likely 2/2 to hypovolemia, generalized weakness from recent admission/COVID-19 infection  - No known seizure history. However, per chart, patient presenting with fecal incontinence  - EKG not concerning for arrhythmogenic causes  - Further imaging such as head CT, TTE to further evaluate for potential causes deferred thus far 2/2 to COVID status  - No further syncopal events.  - Will continue to monitor     JAZLYN on CKD4  - Cr 5.6 on admit, up from 3.8 on yesterday's discharge   - Received 500 ml bolus NS in ED  - Cr this morning - 3.8; improving, was 3.8 at prior discharge  - Continue to monitor       Acute hypoxic respiratory failure and severe sepsis and pneumonia 2/2 COVID-19   - CXR: RLL PNA   - procal elevated   - Patient discharged 3/27 to complete course of Plaquenil, followup with PCP and cardiology  - Completed course of plaquenil  - Patient remains afebrile, VSS on room air     NSTEMI   - troponin of 5 with no acute EKG changes, troponin increased to 11   - patient not complaining of CP  - Rpt trop 1.22  - Continue medical management of ASA, plavix, atorvastatin, coreg     Acute on Chronic Combined systolic and diastolic HF  - TTE in 2/26/2020 with EF 25% and grade 2 diastolic dysfunction with moderate to severe pulmonary HTN and moderate to severe aortic stenosis   - BNP 4,035, 3893 last month, rpt BNP 1850  - lasix 120mg IV TID currently, monitor output   - continue coreg, unable to add ACEI/ARB or spironolactone at this time due to kidney function      Multiple myeloma  - being treated with bortezomib   - due for cycle 6 on 3/25     CAD  - cont home ASA, plavix      HTN  - Continue home Coreg, amlodipine      DMT2 with complications  with long term use of insulin   - A1c in 3/2020 9.1  - home regimen: levemir 20 qhs with SSI  - hold home linagliptin     Diet: Cardiac   DVT PPx: Heparin  Code: Full     Dispo: possible discharge today    Alan Ybarra MD  [facility] Internal Medicine HO-I  [facility] Internal Medicine Service TeamA    [facility] Medicine Hospitalist Pager numbers:   [facility] Hospitalist Medicine Team A (Anson/Nadia): [phone]  [facility] Hospitalist Medicine Team B (Shashank/Aniket):  [phone]

## 2020-03-30 NOTE — PLAN OF CARE
Contacted wife with plan for d/c.  Wife concerned with pt's physical mobility status stating pt was d/c'd last week but too weak to manage ADL's.  States PLOF was independent with ADL's.  Explained CM has no PT/OT notes to compare/contrast as therapy unable to see Covid positive pt's.  Information gathered from nursing, unable to give true assessment of mobility status.  Also explained to wife pt has been ill, likely deconditioned but will take some time to improve.  CM requested primary team reassure wife as she is refusing to pick pt up with CM's assessment.  Primary team to reach out to pt's spouse.    CM did speak with Cathryn with [facility], pt would not be approved for inpatient rehab.  Skilled NH auth would require a therapy eval (unable to obtain on Covid + pt).  2nd barrier is obtaining a SNF NH is pt's + Covid status.        Kristin Gonzalez, RN    [phone]

## 2020-03-30 NOTE — PLAN OF CARE
Patient aaox3, vss, given all am med , do not eat well, POC explained patient verbalize understanding, will cont to reinforce, bed low , call light in reach, will discharge home today

## 2020-03-30 NOTE — PT/OT/SLP EVAL
Physical Therapy Evaluation    Patient Name:  Marie Reis Jr.   MRN:  [id number]    Recommendations:     Discharge Recommendations:  nursing facility, skilled   Discharge Equipment Recommendations: bedside commode, shower chair, walker, rolling(transport chair)   Barriers to discharge: None    Assessment:     Marie Reis Jr. is a 64 y.o. male admitted with a medical diagnosis of Acute renal failure superimposed on chronic kidney disease.  He presents with the following impairments/functional limitations:  impaired endurance, impaired self care skills, impaired functional mobilty, gait instability, impaired balance, decreased safety awareness, decreased lower extremity function, decreased upper extremity function, impaired cognition, impaired coordination, visual deficits   pt A&Ox4; pt very slow with verbal and motor responses to verbal commands, at times requiring repeated VCs; pt with delayed motor planning - unsure if this is pt's baseline- might be the result of anoxic brain injury 2/2 to respiratory distress, extension of previous CVA, depression or non-compliance but MDs would have to dx or r/o any of these possibilities; recommend SNF to maximize functional status.    Rehab Prognosis: Fair; patient would benefit from acute skilled PT services to address these deficits and reach maximum level of function.    Recent Surgery: * No surgery found *      Plan:     During this hospitalization, patient to be seen 5 x/week to address the identified rehab impairments via gait training, therapeutic activities, therapeutic exercises, neuromuscular re-education and progress toward the following goals:    · Plan of Care Expires:  04/30/20    Subjective     Chief Complaint: none upon arrival  Patient/Family Comments/goals: none stated  Pain/Comfort:  · Pain Rating 1: 0/10  · Location - Side 1: Right  · Location - Orientation 1: lower  · Location 1: back  · Pain Addressed 1: Reposition, Distraction, Cessation of Activity  · Pain  Rating Post-Intervention 1: 4/10    Patients cultural, spiritual, Congregation conflicts given the current situation: no    Living Environment:  Pt lives with wife and son in [facility] with no JANIYA; unsure if has WIS or tub/shower  Prior to admission, patients level of function was Willis with std cane for mobility per wife.  Equipment used at home: cane, straight. Upon discharge, patient will have assistance from wife.    Objective:     Communicated with nurse prior to session.  Patient found with peripheral IV, bed alarm  upon PT entry to room.    General Precautions: Standard, airborne, fall, droplet, hearing impaired, vision impaired   Orthopedic Precautions:N/A   Braces: N/A     Exams:  · Cognitive Exam:  Patient is oriented to Person, Place, Time, Situation and delayed responses to verbal commands, decreased motor planning with increased time to comply with command, flat affect  · Gross Motor Coordination: slow movement, delayed timing  · RLE ROM: WFL  · RLE Strength: 4 to 4+ grossly  · LLE ROM: WFL  · LLE Strength: 4 to 4+ grossly    Functional Mobility:  · Bed Mobility:     · Supine to Sit: stand by assistance  · Sit to Supine: stand by assistance  · Transfers:     · Sit to Stand:  stand by assistance and contact guard assistance with rolling walker  · Gait: unable to assess fully; pt unable to step in place or march as requested but stepped back a couple of steps to sit down with assist to manage RW; VCs to hold head erect and keep RW close   · Balance: sit~fair to fair+;stand ~fair- to fair      Therapeutic Activities and Exercises:  Pt educated on role of PT/POC; educated on transfers sit<>stand using RW and amb using RW, also BLE AROM ex; increased time for all transitional movements- sup to sit with SBA with sit tolerance~30 min; VCs for scooting to EOB and sit to stand with SBA/CGA x 3 trials using RW tolerating 1-2 min of standing at a time; pt took a couple of steps backward needing assist with RW management  but did not march in place or make forward steps as requested; pt c/o dizziness in standing; tried sitting exercises/breathing ex to assist; unable to obtain BP; pt returned to supine; educated on general safety and HEP-(placed handout in pt d/c folder)-pt verbalized understanding but unsure of pt's comprehension.    AM-PAC 6 CLICK MOBILITY  Total Score:13     Patient left HOB elevated with all lines intact, call button in reach, bed alarm on and nurse notified.    GOALS:   Multidisciplinary Problems     Physical Therapy Goals        Problem: Physical Therapy Goal    Goal Priority Disciplines Outcome Goal Variances Interventions   Physical Therapy Goal     PT, PT/OT Ongoing, Progressing     Description:  Goals to be met by: 2020     Patient will increase functional independence with mobility by performin. Supine to sit with Supervision  2. Sit to supine with Supervision  3. Sit to stand transfer with Supervision with appropriate AD  4. Bed to chair transfer with Modified [place] Supervision with appropriate AD  5. Gait  x 100 feet with Supervision using appropriate AD  6. Lower extremity exercise program x10 reps per handout, with supervision                      History:     Past Medical History:   Diagnosis Date    Binocular vision disorder with diplopia 2016    Bradycardia     Cataract     Cervical spondylosis 10/1/2015    Chronic diastolic congestive heart failure     Chronic diastolic heart failure 2018    Coronary artery disease due to calcified coronary lesion 3/19/2018    Dyslipidemia 2015    Encounter for blood transfusion     Hearing impairment 10/25/2013    History of stroke 3/23/2015    Hypertension associated with diabetes 2018    Hypertensive retinopathy of both eyes 2017    Lumbar spondylosis 10/1/2015    Multiple myeloma not having achieved remission 2018    NSTEMI (non-ST elevated myocardial infarction) 3/22/2020    SUZANNA on CPAP      Persistent proteinuria 1/11/2018    Pneumonia of right lower lobe due to infectious organism 3/22/2020    Spondylolisthesis of lumbar region 10/1/2015    Strabismus     Stroke 2014    Thoracic spondylosis 10/1/2015    Type 2 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, with long-term current use of insulin 9/26/2017    Type 2 diabetes mellitus with diabetic polyneuropathy, with long-term current use of insulin 9/28/2015    Type 2 diabetes mellitus with stage 4 chronic kidney disease, with long-term current use of insulin 1/11/2018       Past Surgical History:   Procedure Laterality Date    CATARACT EXTRACTION W/  INTRAOCULAR LENS IMPLANT Left 4/30/15    Diane    COLONOSCOPY N/A 1/20/2020    Procedure: COLONOSCOPY;  Surgeon: Salvador Espinosa MD;  [facility];  Service: Endoscopy;  Laterality: N/A;    EYE SURGERY      cataract removal left eye    HAND SURGERY  2/2012    DR. BAKER [facility]    left hand fracture sx      LT EYE   5/2/15    DR KULLMAN [facility]       Time Tracking:     PT Received On: 03/30/20  PT Start Time: 1420     PT Stop Time: 1545  PT Total Time (min): 85 min with OT    Billable Minutes: Evaluation 15 minutes, Therapeutic Activity 15 minutes and Therapeutic Exercise 15 minutes      Lexi Crandall, PT  03/30/2020

## 2020-03-30 NOTE — PLAN OF CARE
VN cued into patients room for q2h rounding - Patient is in no acute distress at this time.  Call light within reach. Patient mumbled and pointed to his mouth and pointed to the cup.  VN asked if he needed something to spit into, and patient nodded.  Floor notified that patient needed assistance. Corbin PCT came to bedside to assist patient. Will continue to monitor closely.

## 2020-03-30 NOTE — PLAN OF CARE
Patient aao, vss, given all med, POC explained, patient verbalize understanding, will cont to monitor, bed low, call light in reach

## 2020-03-30 NOTE — PLAN OF CARE
Q2 Hour rounding completed by VN.  Patient sleeping and relaxed.  Respirations even and unlabored. Will continue to monitor. No distress noted at this time.

## 2020-03-31 NOTE — PLAN OF CARE
CM noted OT recs for skilled nursing.  Only option at this time will be [facility] that is accepting Covid + pt beginning tomorrow.  Referral sent yesterday in anticipation of recs.  Will f/u once [facility] has reviewed and Cathryn with [facility] has reviewed.    Kristin Gonzalez, RN    [phone]

## 2020-03-31 NOTE — PLAN OF CARE
VN cued into patients room for q2h rounding - patient resting in bed; unable to visualize as pt is under his covers. Call bell within reach. Will continue to monitor and be available to intervene PRN.

## 2020-03-31 NOTE — PLAN OF CARE
Plan of care reviewed with patient. Normal sinus rhythm on continuous heart monitor, no true red alarms. Patient on room air. Spo2 96%.Droplet, airborne and contact precautions maintained. Blood glucose maintained per MD orders. Safety maintained at this time. Bed in lowest position, side rails up x 2. Call light in reach.  Encouraged patient to use call light for assistance .Verbalized understanding. Will continue to monitor patient.

## 2020-03-31 NOTE — PLAN OF CARE
VN cued into patients room for q2h rounding - Patiently sleeping. Patient is in no acute distress at this time.  Call light within reach. Will continue to monitor closely.

## 2020-03-31 NOTE — PLAN OF CARE
Problem: Physical Therapy Goal  Goal: Physical Therapy Goal  Description  Goals to be met by: 2020     Patient will increase functional independence with mobility by performin. Supine to sit with Supervision  2. Sit to supine with Supervision  3. Sit to stand transfer with Supervision with appropriate AD  4. Bed to chair transfer with Modified [place] Supervision with appropriate AD  5. Gait  x 100 feet with Supervision using appropriate AD  6. Lower extremity exercise program x10 reps per handout, with supervision     Outcome: Ongoing, Progressing  Patient still with delayed responses to verbal commands, increased assist to initiate sit to stand and amb using RW with step by step instructions; decreased higher level cognitive function; will cont with POC.

## 2020-03-31 NOTE — PLAN OF CARE
CM communicated with Isatu at [facility] with possible placement at facility tomorrow.  Will contact this CM once plans have been made.  Currently pt is unable to be placed in a skilled NH facility with Covid + status.      Plan A [facility] once medically accepted, insurance auth request once approved.    Plan B pt remains inpt, aggressively assist pt with returning to baseline status.    Kristin Gonzalez, RN    [phone]

## 2020-03-31 NOTE — PLAN OF CARE
Patient aaox3, vss, waiting for rehab placement, poor appetite  educating to eat well for stable BG, POC explained patient verbalize understanding, will cont to monitor.bed low call light in reach, urinal at bedside

## 2020-03-31 NOTE — PLAN OF CARE
VN cued into patients room for q2h rounding - patient resting in bed, covering his head with the blanket; denies any needs. Call bell within reach. Will continue to monitor and be available to intervene PRN.

## 2020-03-31 NOTE — PLAN OF CARE
VN Note: VN cued into patient's room for Q2 hr rounds. Patient resting in bed. He denies any needs at this time. VN will continue to follow.

## 2020-03-31 NOTE — PROGRESS NOTES
[facility] Resident HOStacieI Progress Note    Subjective:      Patient sleeping in bed this morning.  No complaints.    Objective:   Last 24 Hour Vital Signs:  BP  Min: 124/60  Max: 153/69  Temp  Av.8 °F (36.6 °C)  Min: 96.6 °F (35.9 °C)  Max: 98.7 °F (37.1 °C)  Pulse  Av.6  Min: 62  Max: 70  Resp  Av.6  Min: 19  Max: 20  SpO2  Av.1 %  Min: 96 %  Max: 98 %    Fluid Balance:     Intake/Output Summary (Last 24 hours) at 3/31/2020 0708  Last data filed at 3/31/2020 0615  Gross per 24 hour   Intake 450 ml   Output 2800 ml   Net -2350 ml       Vitals:    20 0021 20 0028 20 0434 20 0446   BP:  124/60  (!) 149/68   BP Location:  Left arm  Left arm   Patient Position:  Lying  Lying   Pulse: 67 66 62 63   Resp:  20  19   Temp:  97.9 °F (36.6 °C)  96.6 °F (35.9 °C)   TempSrc:  Oral  Oral   SpO2:  97%  96%   Weight:       Height:           Physical Examination:  Gen: Lying comfortably in bed, in NAD  Resp: Speaking in full sentences, no coughing, on room air.    Rest of exam deferred due to virtual visit    Laboratory:  Laboratory Data Reviewed: Yes  Pertinent Findings:    Hematology:  Recent Labs   Lab 20  1307 20  0700 20  0650   WBC 5.89 6.00 5.16   HGB 9.2* 8.4* 8.6*   HCT 29.0* 25.8* 26.7*    267 273   MCV 84 82 82   RDW 13.5 13.4 13.5       Chemistry:  Recent Labs   Lab 20  1307 20  0700 20  1611 20  0650    142 139 139   K 3.6 2.9* 3.3* 3.9   CL 99 106 103 108   CO2 20* 22* 24 22*   BUN 66* 68* 61* 51*   CREATININE 5.6* 4.8* 4.4* 3.8*   * 97 127* 114*   CALCIUM 8.7 8.0* 8.6* 8.4*   PROT 6.8 5.3*  --  5.9*   ALBUMIN 2.8* 2.5*  --  2.5*   ALT 30 26  --  26   AST 42* 40  --  41*   ALKPHOS 77 63  --  63   BILITOT 0.8 0.5  --  0.5       Cardiac:  Recent Labs   Lab 20  1307 20  0700   TROPONINI 1.895* 1.220*   BNP 1,850*  --        DM:  Recent Labs   Lab 20  0700  20  1611  20  0650  03/30/20  1123 03/30/20  1610 03/30/20  2129 03/31/20  0622 03/31/20  0703   GLU 97  --  127*  --  114*  --   --   --   --   --    POCTGLUCOSE  --    < >  --    < >  --  126* 116* 131* 43* 87    < > = values in this interval not displayed.         Microbiology Data Reviewed: Yes  Pertinent Findings:  Microbiology Results (last 7 days)     ** No results found for the last 168 hours. **          Lab Results   Component Value Date    [id number] Detected (A) 03/21/2020       Other Results:     Radiology Data Reviewed: Yes  Pertinent Findings:  Imaging Results          X-Ray Chest AP Portable (Final result)  Result time 03/28/20 13:49:12    Final result by Layo Olivera MD (03/28/20 13:49:12)                 Impression:      See above      Electronically signed by: Layo Olivera MD  Date:    03/28/2020  Time:    13:49             Narrative:    EXAMINATION:  XR CHEST AP PORTABLE    CLINICAL HISTORY:  Weakness    TECHNIQUE:  Single frontal view of the chest was performed.    COMPARISON:  Non 03/23/2020 e    FINDINGS:  Mild cardiomegaly.  No significant airspace consolidation or pleural effusion identified.  Small opacities noted at the right lung base. .  No pleural effusion.                                Current Medications:     Infusions:       Scheduled:   amLODIPine  10 mg Oral Daily    aspirin  81 mg Oral Daily    atorvastatin  40 mg Oral QHS    carvediloL  25 mg Oral BID WM    clopidogreL  75 mg Oral Daily    heparin (porcine)  5,000 Units Subcutaneous Q8H    insulin detemir U-100  20 Units Subcutaneous QHS    montelukast  10 mg Oral QHS    pantoprazole  40 mg Oral Daily        PRN:  benzonatate, Dextrose 10% Bolus, Dextrose 10% Bolus, fluticasone propionate, glucagon (human recombinant), glucose, glucose, insulin aspart U-100, sodium chloride 0.9%     Antibiotics:  Antimicrobials (From admission, onward)    None          Assessment:     Marie Reis Jr. is a 64 y.o.male with    Plan:     Syncopal Event  -  Unwitnessed. Patient with poor recollection of events.  - Suspect likely 2/2 to hypovolemia, generalized weakness from recent admission/COVID-19 infection  - No known seizure history. However, per chart, patient presenting with fecal incontinence  - EKG not concerning for arrhythmogenic causes  - Further imaging such as head CT, TTE to further evaluate for potential causes deferred thus far 2/2 to COVID status  - No further syncopal events.     JAZLYN on CKD4  - Cr 5.6 on admit, up from 3.8 on yesterday's discharge   - Received 500 ml bolus NS in ED  - Cr this morning - 3.8; improving, was 3.8 at prior discharge  - Continue to monitor       Acute hypoxic respiratory failure and severe sepsis and pneumonia 2/2 COVID-19   - CXR: RLL PNA   - procal elevated   - Patient discharged 3/27 to complete course of Plaquenil, followup with PCP and cardiology  - Completed course of plaquenil  - Patient remains afebrile, VSS on room air     NSTEMI   - troponin of 5 with no acute EKG changes, troponin increased to 11   - patient not complaining of CP  - Rpt trop 1.22  - Continue medical management of ASA, plavix, atorvastatin, coreg     Acute on Chronic Combined systolic and diastolic HF  - TTE in 2/26/2020 with EF 25% and grade 2 diastolic dysfunction with moderate to severe pulmonary HTN and moderate to severe aortic stenosis   - BNP 4,035, 3893 last month, rpt BNP 1850  - lasix 120mg IV TID currently, monitor output   - continue coreg, unable to add ACEI/ARB or spironolactone at this time due to kidney function      Multiple myeloma  - being treated with bortezomib   - due for cycle 6 on 3/25     CAD  - cont home ASA, plavix      HTN  - Continue home Coreg, amlodipine      DMT2 with complications with long term use of insulin   - A1c in 3/2020 9.1  - home regimen: levemir 10 qhs with SSI  - hold home linagliptin    Deconditioning  - Patient with reported weakness following recent hospital stay  - Likely 2/2 prolonged  hospitalization and acute illness  - Limited options with therapy d/t COVID status  - PT/OT evaluated patient, recommending SNF  - Positive test confirmed 3/21, patient on day 10 of 14 of isolation.  Has been afebrile and symptom free for over 72 hours, is likely not infectious at this point.      Diet: Cardiac   DVT PPx: Heparin  Code: Full     Dispo: possible discharge today    Alan Ybarra MD  [facility] Internal Medicine HO-I  [facility] Internal Medicine Service TeamA    [facility] Medicine Hospitalist Pager numbers:   [facility] Hospitalist Medicine Team A (Anson/Nadia): [phone]  [facility] Hospitalist Medicine Team B (Shashank/Aniket):  [phone]

## 2020-03-31 NOTE — PLAN OF CARE
Problem: Occupational Therapy Goal  Goal: Occupational Therapy Goal  Description  Goals to be met by: 04/30     Patient will increase functional independence with ADLs by performing:    UE Dressing with Supervision.  LE Dressing with Supervision.  Grooming while standing at sink with Supervision.  Toileting from bedside commode with Supervision for hygiene and clothing management.   Toilet transfer to bedside commode with Supervision.  Increased functional strength to WFL for ADLs.     Outcome: Ongoing, Progressing   Pt found in supine w/ blanket entirely covering head. Pt w/ c/o being cold & required max encouragement for participation. Pt perf the following: sup-->EOB w/ SBA & increased time; standing via RW w/ Mod A x 2 for prompting/initiation 2/2 extensive delay vs non-compliance w/ commands; in standing, pt dropped head in near full cervical flex & required mult verbal requests for pt to finally admit to having dizziness, then sitting EOB w/o warning & w/ poor safety (almost pulled RW down w/ him). Pt sat EOB in tripod w/ head fully flexed down & after numerous requests, finally demo'ed/stated that his dizziness was at the top of his head. Pt sat like this at EOB for an extended time until, OT/PT insisted he attempt standing via RW again w/ Min A x 2 for prompting/initiation. Pt provided verbal/phys assist edu re: RW use & amb sequencing w/ forward WS prompting before pt would attempt taking steps. Pt amb via RW for short dist-->toilet w/ Mod A x 2 for DME mgmt, WSing & prompting for continued stepping. Pt tolerated standing via RW for clothing mgmt w/ Mod-max A. Pt sat on toilet via RW w/ Mod A x 2 for controlled descent. Pt sat on toilet for extended time in tripod position w/ head/trunk flexed & cont to require max questioning to illicit infrequent, delayed & limited verbal responses from pt. Pt stood from toilet via RW w/ Mod A x 2 for alfredo hygiene/clothing mgmt w/ Max-total A. Amb via RW for short dist  back to EOB w/ Mod A x 2. Pt returned to supine w/ SBA for simple hand/face washing w/ MI after S/U. Edu/tx re: general safety techs, safe/proper RW use & HEP. Pt w/ questionable understanding/retention.    Pt cont w/ flat affect & demo'ed the following deficits/impairments:  -limited dorsiflex L foot  -signif delayed processing  -impaired abstraction/higher level cognitive functioning  -impaired motor planning/mvmt initiation  -impaired verbal & motor processing/planning  -aloofness/detachment from reality  -impaired insight into deficits/current condition  -inconsistent/contradictory responses   -impaired kinesthesia/proprioception L U/LE  -cervical/lumbar pain  -near-constant sleeping or sleepiness  -inability to maint eye opening  -impaired motivation to get OOB or move    Although pt did increase fxnl mobility from yesterday's eval/tx, pt demo's limited potential for phys/fxnl improvement at this time due to cognitive impairments & has questionable fxnl potential at this time. Cont w/ OT per POC.

## 2020-03-31 NOTE — PT/OT/SLP PROGRESS
Physical Therapy Treatment    Patient Name:  Marie Reis Jr.   MRN:  [id number]    Recommendations:     Discharge Recommendations:  nursing facility, skilled   Discharge Equipment Recommendations: grab bar, bedside commode, shower chair, wheelchair, walker, rolling   Barriers to discharge: None    Assessment:     Marie Ries Jr. is a 64 y.o. male admitted with a medical diagnosis of Acute renal failure superimposed on chronic kidney disease.  He presents with the following impairments/functional limitations:  weakness, impaired endurance, impaired self care skills, impaired functional mobilty, gait instability, impaired balance, visual deficits, impaired cognition, decreased coordination, decreased upper extremity function, decreased lower extremity function, decreased safety awareness, pain Patient still with delayed responses to verbal commands, increased assist to initiate sit to stand and amb using RW with step by step instructions; decreased higher level cognitive function; will cont with POC. .    Rehab Prognosis: Fair+; patient would benefit from acute skilled PT services to address these deficits and reach maximum level of function.    Recent Surgery: * No surgery found *      Plan:     During this hospitalization, patient to be seen 5 x/week to address the identified rehab impairments via gait training, therapeutic activities, therapeutic exercises, neuromuscular re-education and progress toward the following goals:    · Plan of Care Expires:  04/30/20    Subjective     Pain/Comfort:  · Pain Rating 1: 0/10  · Location - Orientation 1: posterior  · Location 1: neck  · Pain Addressed 1: Reposition, Cessation of Activity, Distraction(warm compress)  · Pain Rating Post-Intervention 1: (did not rate)      Objective:     Communicated with nurse prior to session.  Patient found with bed alarm, telemetry upon PT entry to room.     General Precautions: Standard, airborne, droplet, fall, seizure, hearing impaired, vision  impaired   Orthopedic Precautions:N/A   Braces: N/A     Functional Mobility:  · Bed Mobility:     · Supine to Sit: stand by assistance  · Sit to Supine: stand by assistance  · Transfers:     · Sit to Stand:  moderate assistance and of 2 persons with rolling walker  · Toilet Transfer: moderate assistance and of 2 persons with  rolling walker  using  Step Transfer  · Gait: Patient amb 8ft using the RW to the bathroom and 8ft from bathroom to bed; required modA x 2 for RW management in keeping within proximity and handling during turns/backing up, step by step VCs with sequencing and assist with initiation of movement; short step length, foot flat and decreased floor clearance with feet L>R; slow mariann, not fluid.      AM-PAC 6 CLICK MOBILITY  Turning over in bed (including adjusting bedclothes, sheets and blankets)?: 4  Sitting down on and standing up from a chair with arms (e.g., wheelchair, bedside commode, etc.): 2  Moving from lying on back to sitting on the side of the bed?: 3  Moving to and from a bed to a chair (including a wheelchair)?: 2  Need to walk in hospital room?: 2  Climbing 3-5 steps with a railing?: 1  Basic Mobility Total Score: 14       Therapeutic Activities and Exercises:   Pt found with blanket covering his head; c/o being cold, max encouragement to participate; pt sup to EOB with SBA, increased time; sit to stand using RW with VCs for hand placement, modA x 2 for initiation 2/2 increased delay vs non compliance with commands; in standing, pt holds head in max forward cx flexion with eyes closed and required mod VCs prompting pt to admit having dizziness, then sat down EOB without warning in unsafe manner tipping RW back toward him; after sitting, performing minimal APing and having head down/foward, reported dizziness was at the top of his head and sat in this manner for extended amount of time until PT/OT insisted pt attempt standing with RW; sit to stand with Marissa x 2 with counting to 3; pt  required VCs and physical assist to use RW and for sequencing/wt shift before taking steps; pt amb 8 ft to bathroom with modA x 2 for RW management, assist with sequencing and propulsion, keeping the RW close as described above; see OT notes for toilet transfer and toileting; modAx 2 for controlled descent; extended time on toielet in tripod position with head/trunk flexed; delayed responses to questions, limited verbal responses; stood from toilet with modA x 2; after cleansing with max/total A, amb 8ft back to bed in manner as above with modA x 2; pt returned to supine with SBA.    Patient left HOB elevated with all lines intact, call button in reach, bed alarm on and nurse notified..    GOALS:   Multidisciplinary Problems     Physical Therapy Goals        Problem: Physical Therapy Goal    Goal Priority Disciplines Outcome Goal Variances Interventions   Physical Therapy Goal     PT, PT/OT Ongoing, Progressing     Description:  Goals to be met by: 2020     Patient will increase functional independence with mobility by performin. Supine to sit with Supervision  2. Sit to supine with Supervision  3. Sit to stand transfer with Supervision with appropriate AD  4. Bed to chair transfer with Modified [place] Supervision with appropriate AD  5. Gait  x 100 feet with Supervision using appropriate AD  6. Lower extremity exercise program x10 reps per handout, with supervision                      Time Tracking:     PT Received On: 20  PT Start Time: 1430     PT Stop Time: 1530  PT Total Time (min): 60 min     Billable Minutes: Gait Training 15 minutes and Therapeutic Activity 15 minutes    Treatment Type: Treatment  PT/PTA: PT     PTA Visit Number: 0     Lexi Crandall, PT  2020

## 2020-03-31 NOTE — PLAN OF CARE
Pt has been accepted to [facility] for admission tomorrow once PHN auth obtained.  Cathryn with PHN has been notified, request faxed to N.  Will f/u with wife and primary team once auth has been obtained.      Once approved primary team can place D/C Readmit orders with SNF orders set.    Kristin Gonzalez RN    [phone]

## 2020-04-01 NOTE — PT/OT/SLP PROGRESS
Occupational Therapy   Treatment    Name: Marie Reis Jr.  MRN: [id number]  Admitting Diagnosis:  Acute renal failure superimposed on chronic kidney disease       Recommendations:     Discharge Recommendations: nursing facility, skilled  Discharge Equipment Recommendations:  grab bar, bedside commode, shower chair, walker, rolling, wheelchair  Barriers to discharge:  None    Assessment:   Pt cont w/ flat affect & demo'ed the following deficits/impairments:  -limited dorsiflex L foot  -signif delayed processing  -impaired abstraction/higher level cognitive functioning  -impaired motor planning/mvmt initiation  -impaired verbal & motor processing/planning  -aloofness/detachment from reality  -impaired insight into deficits/current condition  -inconsistent/contradictory responses   -impaired kinesthesia/proprioception L U/LE  -cervical/lumbar pain  -near-constant sleeping or sleepiness  -inability to maint eye opening  -impaired motivation to get OOB or move    Although pt did increase fxnl mobility from yesterday's eval/tx, pt demo's limited potential for phys/fxnl improvement at this time due to cognitive impairments & has questionable fxnl potential at this time. Cont w/ OT per POC.    Marie Reis Jr. is a 64 y.o. male with a medical diagnosis of Acute renal failure superimposed on chronic kidney disease.  He presents with . Performance deficits affecting function are weakness, impaired endurance, impaired sensation, impaired self care skills, visual deficits, impaired balance, gait instability, impaired functional mobilty, impaired cognition, decreased coordination, decreased upper extremity function, decreased lower extremity function, decreased ROM, pain, decreased safety awareness.     Rehab Prognosis:  Poor; patient would benefit from acute skilled OT services to address these deficits and reach maximum level of function.       Plan:     Patient to be seen 5 x/week to address the above listed problems via  self-care/home management, therapeutic activities, therapeutic exercises  · Plan of Care Expires: 04/30/20  · Plan of Care Reviewed with: patient    Subjective     Pain/Comfort:  ·      Objective:     Communicated with: nsg prior to session.  Patient found HOB elevated with bed alarm, telemetry upon OT entry to room.    General Precautions: Standard, airborne, droplet, fall, hearing impaired, vision impaired   Orthopedic Precautions:N/A   Braces:       Occupational Performance:     Bed Mobility:    · Patient completed Supine to Sit with stand by assistance  · Patient completed Sit to Supine with stand by assistance     Functional Mobility/Transfers:  · Patient completed Sit <> Stand Transfer with minimum assistance, moderate assistance and of 2 persons  with  rolling walker   · Patient completed Toilet Transfer Step Transfer technique with moderate assistance and of 2 persons with  rolling walker  · Functional Mobility: via RW for short dist w/in room w/ Mod A x 2    Activities of Daily Living:  · Toileting: maximal assistance of 2 persons      [facility] 6 Click ADL:      Treatment & Education:  Pt found in supine w/ blanket entirely covering head. Pt w/ c/o being cold & required max encouragement for participation. Pt perf the following: sup-->EOB w/ SBA & increased time; standing via RW w/ Mod A x 2 for prompting/initiation 2/2 extensive delay vs non-compliance w/ commands; in standing, pt dropped head in near full cervical flex & required mult verbal requests for pt to finally admit to having dizziness, then sitting EOB w/o warning & w/ poor safety (almost pulled RW down w/ him). Pt sat EOB in tripod w/ head fully flexed down & after numerous requests, finally demo'ed/stated that his dizziness was at the top of his head. Pt sat like this at EOB for an extended time until, OT/PT insisted he attempt standing via RW again w/ Min A x 2 for prompting/initiation. Pt provided verbal/phys assist edu re: RW use & amb  sequencing w/ forward WS prompting before pt would attempt taking steps. Pt amb via RW for short dist-->toilet w/ Mod A x 2 for DME mgmt, WSing & prompting for continued stepping. Pt tolerated standing via RW for clothing mgmt w/ Mod-max A. Pt sat on toilet via RW w/ Mod A x 2 for controlled descent. Pt sat on toilet for extended time in tripod position w/ head/trunk flexed & cont to require max questioning to illicit infrequent, delayed & limited verbal responses from pt. Pt stood from toilet via RW w/ Mod A x 2 for alfredo hygiene/clothing mgmt w/ Max-total A. Amb via RW for short dist back to EOB w/ Mod A x 2. Pt returned to supine w/ SBA for simple hand/face washing w/ MI after S/U. Edu/tx re: general safety techs, safe/proper RW use & HEP. Pt w/ questionable understanding/retention.      Patient left HOB elevated with all lines intact, call button in reach, bed alarm on and nsg notifiedEducation:      GOALS:   Multidisciplinary Problems     Occupational Therapy Goals        Problem: Occupational Therapy Goal    Goal Priority Disciplines Outcome Interventions   Occupational Therapy Goal     OT, PT/OT Ongoing, Progressing    Description:  Goals to be met by: 04/30     Patient will increase functional independence with ADLs by performing:    UE Dressing with Supervision.  LE Dressing with Supervision.  Grooming while standing at sink with Supervision.  Toileting from bedside commode with Supervision for hygiene and clothing management.   Toilet transfer to bedside commode with Supervision.  Increased functional strength to WFL for ADLs.                      Time Tracking:     OT Date of Treatment: 03/31/20  OT Start Time: 1430  OT Stop Time: 1530  OT Total Time (min): 60 min    Billable Minutes:Self Care/Home Management 15  Therapeutic Activity 15  Total Time 60--co-tx w/ PT    OBED Crain  03/31/2020

## 2020-04-01 NOTE — PLAN OF CARE
Explained plan of care to patient. Verbalized understanding. Patient on room air, respirations even and unlabored, no complaints of shortness of breath. Awake, alert, and oriented, hard of hearing, no hearing aids. Maintained on fall precautions, frequent checks q2 hours. Bed in lowest position, bed alarm on, call light and personal items within reach, instructed to call for help when needed. Blood glucose monitored. Patient have poor appetite. Tried to call report to [facility]. Waiting on call back. Patient will be transported to room 502. Will continue to monitor.

## 2020-04-01 NOTE — DISCHARGE SUMMARY
[facility] Hospital Medicine Discharge Summary    Primary Team: [facility] Hospitalist Team A  Attending Physician: Anson  Resident: Lenard  Intern: Collin    Date of Admit: 3/28/2020  Date of Discharge: 4/1/2020    Discharge to: [facility]  Condition: Stable    Discharge Diagnoses     Patient Active Problem List   Diagnosis    Hearing impairment    Essential hypertension    History of stroke    Dyslipidemia    Type 2 diabetes mellitus with diabetic polyneuropathy, with long-term current use of insulin    Cervical spondylosis    Thoracic spondylosis    Lumbar spondylosis    Spondylolisthesis of lumbar region    History of right PCA stroke    Hypertropia of left eye    Binocular vision disorder with diplopia    Hypertensive retinopathy of both eyes    Chronic combined systolic and diastolic heart failure    Type 2 diabetes mellitus with stage 4 chronic kidney disease, with long-term current use of insulin    Benign hypertension with chronic kidney disease, stage III    Persistent proteinuria    Acute on chronic combined systolic and diastolic congestive heart failure    Multiple myeloma    Prolonged Q-T interval on ECG    Coronary artery disease due to calcified coronary lesion    Bilateral carotid artery stenosis    Bradycardia    Acute renal failure superimposed on stage 4 chronic kidney disease    Secondary hyperparathyroidism    Hypertensive CKD (chronic kidney disease)    Type 2 diabetes mellitus with hyperglycemia, with long-term current use of insulin    Vitamin D deficiency    Diabetes mellitus with proteinuria    Vitreous hemorrhage, right    Diabetic polyneuropathy associated with type 2 diabetes mellitus    Stage 4 chronic kidney disease    Aortic atherosclerosis    Chronic pulmonary heart disease    PAD (peripheral artery disease)    Symptomatic anemia    Dental caries    Thrombocytopenia    Positive FIT (fecal immunochemical test)    Moderate episode of recurrent major  depressive disorder    Hemiplegia and hemiparesis following cerebral infarction affecting left non-dominant side    Transaminitis    Bilirubinemia    Constipation    Liver function test abnormality    Troponin I above reference range    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, with long-term current use of insulin    Acute respiratory failure with hypoxia    Pneumonia due 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    NSTEMI (non-ST elevated myocardial infarction)    Acute on chronic combined systolic and diastolic heart failure    Moderate to severe aortic stenosis    Anemia, chronic disease    Severe sepsis    Acute respiratory disease due to severe acute respiratory syndrome coronavirus 2 (SARS-CoV-2)    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 is a 64 y.o. male who has a past medical history of COVID-19, Binocular vision disorder with diplopia (9/22/2016), Bradycardia, Cataract, Cervical spondylosis (10/1/2015), Chronic diastolic heart failure (1/11/2018), Coronary artery disease due to calcified coronary lesion (3/19/2018), Dyslipidemia (6/26/2015), Encounter for blood transfusion, Hearing impairment (10/25/2013), History of stroke (3/23/2015), Hypertension associated with diabetes (1/11/2018), Hypertensive retinopathy of both eyes (9/26/2017), Lumbar spondylosis (10/1/2015), Multiple myeloma not having achieved remission (1/16/2018), NSTEMI (non-ST elevated myocardial infarction) (3/22/2020), SUZANNA on CPAP, Persistent proteinuria (1/11/2018),  Pneumonia of right lower lobe due to infectious organism (3/22/2020), Spondylolisthesis of lumbar region (10/1/2015), Strabismus, Stroke (2014), Thoracic spondylosis (10/1/2015), 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     The patient presented to [facility] on 3/28/2020 with a primary complaint of fatigue and weakness following an unwitnessed syncopal vs seizure event. Per family, patient was found down in bathroom not moving and stating too weak to move. Family concerned for seizure-like activity. EMS was called. On arrival to ED, patient disoriented and found to have feces across his lower body. EKG with NSR, LVH, QTc 534. CMP w/ glucose 261, BUN/Cr 66/5.6, otherwise wnl. Trop 1.895. BNP 1850. CRP 93.6. CXR with no significant change. Patient with poor recollection of events. Other than above, patient without further complaint. Denies any CP, worsening of SOB, N/V, F/C.     Of note, patient discharged on 3/27 after being treated for acute hypoxic respiratory failure and sepsis with pneumonia secondary to COVID-19, NSTEMI with acute on chronic combined HF and possible myocarditis due to COVID-19.    For the full HPI please refer to the History & Physical from this admission.    Hospital Course By Problem with Pertinent Findings     Syncopal Event  - Unwitnessed. Patient with poor recollection of events.  - Suspect likely 2/2 to hypovolemia, generalized weakness from recent admission/COVID-19 infection  - No known seizure history. However, per chart, patient presenting with fecal incontinence  - EKG not concerning for arrhythmogenic causes  - Further imaging such as head CT, TTE to further evaluate for potential causes deferred thus far 2/2 to COVID status  - No further syncopal events.     JAZLYN on CKD4  - Cr 5.6 on admit, up from 3.8 on yesterday's discharge   - Received 500 ml bolus NS in  ED  - Cr 3/31 - 3.8; improving, was 3.8 at prior discharge    Acute hypoxic respiratory failure and severe sepsis and pneumonia 2/2 COVID-19   - CXR: RLL PNA   - procal elevated   - Patient discharged 3/27 to complete course of Plaquenil, followup with PCP and cardiology  - Completed course of plaquenil  - Patient remains afebrile, VSS on room air  - Will enroll in COVID symptom monitoring program     NSTEMI   - troponin of 5 with no acute EKG changes, troponin increased to 11   - patient not complaining of CP  - Rpt trop 1.22  - Continue medical management of ASA, plavix, atorvastatin, coreg  - F/u with cardiology outpatient     Acute on Chronic Combined systolic and diastolic HF  - TTE in 2/26/2020 with EF 25% and grade 2 diastolic dysfunction with moderate to severe pulmonary HTN and moderate to severe aortic stenosis   - BNP 4,035, 3893 last month, rpt BNP 1850  - continue coreg, unable to add ACEI/ARB or spironolactone at this time due to kidney function   - Will decrease home lasix to 40mg BID on discharge  - F/u with cardiology outpatient     Multiple myeloma  - being treated with bortezomib   - due for cycle 6 on 3/25  - F/u with oncology     CAD  - cont home ASA, plavix      HTN  - Continue home Coreg, amlodipine      DMT2 with complications with long term use of insulin   - A1c in 3/2020 9.1  - home regimen: levemir 15 qhs with SSI  - Change home levemir 12 qHS on discharge  - Patient BGL 70s 3/31-4/1 requiring 1 glucose tablet. Decreasing lantus while at SNF. If continues to be hypoglycemic, then reduce or hold lantus.  - Resume home linagliptin    Deconditioning  - Patient with reported weakness following recent hospital stay  - Likely 2/2 prolonged hospitalization and acute illness  - Limited options with therapy d/t COVID status  - Positive test confirmed 3/21, patient on day 11 of 14 of isolation  - May be additional rehab options available after 14 days of isolation, discussed with case  management.    Discharge Medications      Chato, [place] .   Home Medication Instructions VASU:[id number]    Printed on:04/01/20 1120   Medication Information                      amLODIPine (NORVASC) 10 MG tablet  Take 1 tablet (10 mg total) by mouth once daily.             aspirin (ECOTRIN) 81 MG EC tablet  Take 1 tablet (81 mg total) by mouth once daily.             atorvastatin (LIPITOR) 40 MG tablet  Take 1 tablet (40 mg total) by mouth every evening.             azithromycin (ZITHROMAX) 500 MG tablet  Take 1 tablet (500 mg total) by mouth once daily.             benzonatate (TESSALON) 100 MG capsule  Take 1 capsule (100 mg total) by mouth 3 (three) times daily as needed for Cough.             blood sugar diagnostic Strp  1 strip by Misc.(Non-Drug; Combo Route) route after meals as needed.             calcitRIOL (ROCALTROL) 0.25 MCG Cap  Take 2 capsules (0.5 mcg total) by mouth once daily.             carvediloL (COREG) 25 MG tablet  Take 1 tablet (25 mg total) by mouth 2 (two) times daily with meals.             clopidogreL (PLAVIX) 75 mg tablet  Take 1 tablet (75 mg total) by mouth once daily.             dexAMETHasone (DECADRON) 4 MG Tab  Take 5 tablets (20 mg total) by mouth As instructed. Take once weekly with Velcade injection             diclofenac sodium 1.5 % Drop  APPLY 40 DROPS TO AFFECTED AREA(S) FOUR TIMES DAILY             flash glucose scanning reader (FREESTYLE CLAUDIO 14 DAY READER[place] [facility]  Use as directed.             flash glucose sensor (FREESTYLE CLAUDIO 14 DAY SENSOR) Kit  Change every 14 days.             fluticasone (FLONASE) 50 mcg/actuation nasal spray  1 spray by Each Nare route 2 (two) times daily as needed for Rhinitis.             furosemide (LASIX) 40 MG tablet  Take 1 tablet (40 mg total) by mouth 2 (two) times daily. Take twice a day now             gabapentin (NEURONTIN) 300 MG capsule  Take 1 capsule (300 mg total) by mouth daily as needed.             glucose 4 GM chewable  "tablet  Take 4 tablets (16 g total) by mouth as needed. For glucose less than 70.             insulin (LANTUS SOLOSTAR U-100 INSULIN) glargine 100 units/mL (3mL) SubQ pen  Inject 12 units at night.             insulin aspart U-100 (NOVOLOG) 100 unit/mL (3 mL) InPn pen  Inject  6 units (light meal), 10 units (larger meal), scale 150-200+2, 201-250+4, 251-300+6, 301-350+8. Snack dose 3 units. Max daily 60 units.             insulin needles, disposable, 31 X 5/16 " Ndle  Pt to use pen needle with Lantus daily             lidocaine (LIDODERM) 5 %  APPLY UP TO 3 PATCHES TO THE AFFECTED AREA(S) FOR 12 HOURS DAILY AND REMOVE FOR 12 HOURS             lidocaine (XYLOCAINE) 5 % Oint ointment  APPLY TO THE AFFECTED AREA(S) THREE TO FOUR TIMES DAILY             LIDOTRAL 3.88 % Crea  APPLY TO THE AFFECTED AREA 2 TIMES TO 3 TIMES DAILY             linaGLIPtin (TRADJENTA) 5 mg Tab tablet  Take 1 tablet (5 mg total) by mouth once daily.             methyl salicylate 25 % Crea  APPLY TO THE AFFECTED AREA(S) THREE TO FOUR TIMES DAILY             montelukast (SINGULAIR) 10 mg tablet  Take 1 tablet (10 mg total) by mouth every evening.             nitroGLYCERIN (NITROSTAT) 0.4 MG SL tablet  Place 1 tablet (0.4 mg total) under the tongue every 5 (five) minutes as needed for Chest pain.             omeprazole (PRILOSEC OTC) 20 MG tablet  Take 1 tablet (20 mg total) by mouth once daily.             ondansetron (ZOFRAN-ODT) 8 MG TbDL  Take 1 tablet (8 mg total) by mouth every 6 (six) hours as needed.             pen needle, diabetic 32 gauge x 1/4" Ndle  use to inject insulin once daily             vitamin D (VITAMIN D3) 1000 units Tab  Take 1,000 Units by mouth once daily.               Discharge Information:   Diet:  Cardiac    Physical Activity:  As tolerated             Instructions:  1. Take all medications as prescribed  2. Keep all follow-up appointments  3. Return to the hospital or call your primary care physicians if any " worsening symptoms such as fever, chest pain, shortness of breath, return of symptoms, or any other concerns.    Follow-Up Appointments:  Follow-up Information     Nancy Colón MD In 1 week.    Specialty:  Internal Medicine  Contact information:  [address] [facility] [facility]  [place] [address] [phone]        Adiel Polanco MD  [facility] Internal Medicine, [facility]

## 2020-04-01 NOTE — PLAN OF CARE
[facility]    FACILITY TRANSFER ORDERS      Patient Name: Marie Reis Jr.  YOB: 1955    PCP: Nancy Colón MD   PCP Address: [address] [place] [address] / [place] [address]  PCP Phone Number: [phone]  PCP Fax: [phone]    Encounter Date: 04/01/2020    Admit to: [facility]  Vital Signs:  Routine, oxygen sats on room air    Diagnoses:   Active Hospital Problems    Diagnosis  POA    *Acute renal failure superimposed on chronic kidney disease [N17.9, N18.9]  Yes    Physical deconditioning [R53.81]  Yes    Hypoglycemia [E16.2]  Yes    Syncope and collapse [R55]  Yes    COVID-19 virus infection [U07.1]  Yes    Coronary artery disease involving native coronary artery of native heart without angina pectoris [I25.10]  Yes    Pulmonary hypertension [I27.20]  Yes    COVID-19 virus detected [U07.1]  Yes    Multiple myeloma [C90.00]  Yes      Resolved Hospital Problems   No resolved problems to display.     Allergies:  Review of patient's allergies indicates:   Allergen Reactions    Hydralazine analogues      Increase swelling and throat itching and tightness with use.  Symptoms correlate only with this medication onset in hospital.     Diet: cardiac diet    Activities: Up with assistance, fall precautions    Nursing: regular nursing care, fall precautions    Labs: CBC and CMP weekly     CONSULTS:    Physical Therapy to evaluate and treat.  and Occupational Therapy to evaluate and treat.    MISCELLANEOUS CARE:  Routine Skin for Bedridden Patients: Apply moisture barrier cream to all skin folds and wet areas in perineal area daily and after baths and all bowel movements. and Diabetes Care:   Fingerstick blood sugar a.m. and p.m. and Report CBG < 60 or > 350 to physician.    WOUND CARE ORDERS  None    Medications: Review discharge medications with patient and family and provide education.      Current Discharge Medication List      START taking these medications    Details   glucose 4 GM  chewable tablet Take 4 tablets (16 g total) by mouth as needed. For glucose less than 70.  Refills: 12         CONTINUE these medications which have CHANGED    Details   furosemide (LASIX) 40 MG tablet Take 1 tablet (40 mg total) by mouth 2 (two) times daily. Take twice a day now  Qty: 60 tablet, Refills: 11    Associated Diagnoses: Hypertension, essential      insulin (LANTUS SOLOSTAR U-100 INSULIN) glargine 100 units/mL (3mL) SubQ pen Inject 12 units at night.  Qty: 15 mL, Refills: 6    Associated Diagnoses: Uncontrolled diabetes mellitus with polyneuropathy         CONTINUE these medications which have NOT CHANGED    Details   insulin aspart U-100 (NOVOLOG) 100 unit/mL (3 mL) InPn pen Inject  6 units (light meal), 10 units (larger meal), scale 150-200+2, 201-250+4, 251-300+6, 301-350+8. Snack dose 3 units. Max daily 60 units.  Qty: 18 mL, Refills: 6    Associated Diagnoses: Uncontrolled type 2 diabetes mellitus with peripheral neuropathy      amLODIPine (NORVASC) 10 MG tablet Take 1 tablet (10 mg total) by mouth once daily.  Qty: 90 tablet, Refills: 3    Associated Diagnoses: Benign hypertension with chronic kidney disease, stage III      aspirin (ECOTRIN) 81 MG EC tablet Take 1 tablet (81 mg total) by mouth once daily.  Qty: 30 tablet, Refills: 11      atorvastatin (LIPITOR) 40 MG tablet Take 1 tablet (40 mg total) by mouth every evening.  Qty: 90 tablet, Refills: 3      azithromycin (ZITHROMAX) 500 MG tablet Take 1 tablet (500 mg total) by mouth once daily.  Qty: 2 tablet, Refills: 0      benzonatate (TESSALON) 100 MG capsule Take 1 capsule (100 mg total) by mouth 3 (three) times daily as needed for Cough.  Qty: 30 capsule, Refills: 0      blood sugar diagnostic Strp 1 strip by Misc.(Non-Drug; Combo Route) route after meals as needed.      calcitRIOL (ROCALTROL) 0.25 MCG Cap Take 2 capsules (0.5 mcg total) by mouth once daily.  Qty: 60 capsule, Refills: 6    Associated Diagnoses: Secondary hyperparathyroidism  "     carvediloL (COREG) 25 MG tablet Take 1 tablet (25 mg total) by mouth 2 (two) times daily with meals.  Qty: 180 tablet, Refills: 3    Associated Diagnoses: Hypertension, essential      clopidogreL (PLAVIX) 75 mg tablet Take 1 tablet (75 mg total) by mouth once daily.  Qty: 90 tablet, Refills: 3    Associated Diagnoses: History of CVA (cerebrovascular accident)      dexAMETHasone (DECADRON) 4 MG Tab Take 5 tablets (20 mg total) by mouth As instructed. Take once weekly with Velcade injection  Qty: 120 tablet, Refills: 2    Associated Diagnoses: Multiple myeloma not having achieved remission      diclofenac sodium 1.5 % Drop APPLY 40 DROPS TO AFFECTED AREA(S) FOUR TIMES DAILY  Refills: 3      flash glucose scanning reader (FREESTYLE CLAUDIO 14 DAY READER) [facility] [place] Use as directed.  Qty: 1 each, Refills: 0      flash glucose sensor (FREESTYLE CLAUDIO 14 DAY SENSOR) Kit Change every 14 days.  Qty: 9 kit, Refills: 3      fluticasone (FLONASE) 50 mcg/actuation nasal spray 1 spray by Each Nare route 2 (two) times daily as needed for Rhinitis.  Qty: 16 g, Refills: 5    Associated Diagnoses: Allergic rhinitis, unspecified allergic rhinitis type      gabapentin (NEURONTIN) 300 MG capsule Take 1 capsule (300 mg total) by mouth daily as needed.  Qty: 90 capsule, Refills: 1    Associated Diagnoses: Neuropathy      insulin needles, disposable, 31 X 5/16 " Ndle Pt to use pen needle with Lantus daily  Qty: 100 each, Refills: 3      lidocaine (LIDODERM) 5 % APPLY UP TO 3 PATCHES TO THE AFFECTED AREA(S) FOR 12 HOURS DAILY AND REMOVE FOR 12 HOURS      lidocaine (XYLOCAINE) 5 % Oint ointment APPLY TO THE AFFECTED AREA(S) THREE TO FOUR TIMES DAILY      LIDOTRAL 3.88 % Crea APPLY TO THE AFFECTED AREA 2 TIMES TO 3 TIMES DAILY  Refills: 3      linaGLIPtin (TRADJENTA) 5 mg Tab tablet Take 1 tablet (5 mg total) by mouth once daily.  Qty: 90 tablet, Refills: 3    Associated Diagnoses: Uncontrolled type 2 diabetes mellitus with both eyes affected " "by proliferative retinopathy and macular edema, with long-term current use of insulin      methyl salicylate 25 % Crea APPLY TO THE AFFECTED AREA(S) THREE TO FOUR TIMES DAILY      montelukast (SINGULAIR) 10 mg tablet Take 1 tablet (10 mg total) by mouth every evening.  Qty: 90 tablet, Refills: 1    Associated Diagnoses: Chronic rhinitis      nitroGLYCERIN (NITROSTAT) 0.4 MG SL tablet Place 1 tablet (0.4 mg total) under the tongue every 5 (five) minutes as needed for Chest pain.  Qty: 30 tablet, Refills: 3    Associated Diagnoses: Coronary artery disease due to calcified coronary lesion; Essential hypertension; Dyslipidemia; Bilateral carotid artery stenosis      omeprazole (PRILOSEC OTC) 20 MG tablet Take 1 tablet (20 mg total) by mouth once daily.  Qty: 30 tablet, Refills: 11    Associated Diagnoses: Multiple myeloma not having achieved remission      ondansetron (ZOFRAN-ODT) 8 MG TbDL Take 1 tablet (8 mg total) by mouth every 6 (six) hours as needed.  Qty: 30 tablet, Refills: 2      pen needle, diabetic 32 gauge x 1/4" Ndle use to inject insulin once daily  Qty: 100 each, Refills: 6      vitamin D (VITAMIN D3) 1000 units Tab Take 1,000 Units by mouth once daily.                  _________________________________  Adiel Polanco MD  04/01/2020          "

## 2020-04-01 NOTE — PLAN OF CARE
Plan of care reviewed with patient. Normal sinus rhythm on continuous heart monitor, no true red alarms.Patient has a delay in response. Answer questions appropriately.  Patient on room air. Spo2 96%. Droplet, airborne and contact precautions maintained. Blood glucose maintained per MD orders. Safety maintained at this time. Bed in lowest position, side rails up x 2. Call light in reach.  Encouraged patient to use call light for assistance .Verbalized understanding. Will continue to monitor patient.

## 2020-04-01 NOTE — NURSING
Vanessa called back. Report complete. Patient discharged with transport on a stretcher to [facility] room 502. Patient in no acute distress.

## 2020-04-01 NOTE — PLAN OF CARE
D/c pending insurance auth from N.  N is aware pt has been accepted.  Once approved, Isatu with [facility] will contact  with time for transport and report information.      Primary team to place D/C Readmit Orders and SNF order set.    Kristin Gonzalez RN    [phone]

## 2020-04-01 NOTE — PHYSICIAN QUERY
PT Name: Marie Reis Jr.  MR #: [id number]     PHYSICIAN QUERY -  ELECTROLYTE CLARIFICATION      CDS/: Hernan GARCIA, RN-BC  Contact information: [email or web address]  This form is a permanent document in the medical record.     Query Date: April 1, 2020    By submitting this query, we are merely seeking further clarification of documentation to reflect the severity of illness of your patient. Please utilize your independent clinical judgment when addressing the question(s) below.    The Medical record reflects the following:     Indicators   Supporting Clinical Findings Location in Medical Record   X Lab Value(s) Potassium 3.6, 2.9, 3.3, 3.9 Labs 3/28-3/30   X Treatment                                 Medication potassium chloride 10 mEq IVPB  potassium chloride SA CR 20 mEq MAR 3/29    Other       Provider, please specify the diagnosis or diagnoses that correspond(s) to the above indicators.    [  X ] Hypokalemia   [   ] Other electrolyte disturbance (please specify): _______   [   ]  Clinically Undetermined       Please document in your progress notes daily for the duration of treatment until resolved, and include in your discharge summary.

## 2020-04-01 NOTE — PLAN OF CARE
Problem: Physical Therapy Goal  Goal: Physical Therapy Goal  Description  Goals to be met by: 2020     Patient will increase functional independence with mobility by performin. Supine to sit with Supervision  2. Sit to supine with Supervision  3. Sit to stand transfer with Supervision with appropriate AD  4. Bed to chair transfer with Modified Ryder Supervision with appropriate AD  5. Gait  x 100 feet with Supervision using appropriate AD  6. Lower extremity exercise program x10 reps per handout, with supervision     Outcome: Ongoing, Progressing   Patient continues to need VCs/physical guidance to initiate and complete tasks with poor motivation to get up and mobilize; continues with delayed processing and slow in his responses both verbal and motor; decreased in higher cognitive functioning; decreased fluidity of movement during amb holding trunk forward with RW ahead and BUEs extended and braced on the RW, poor LE placement with initial wide DIALLO and keeping RLE outside of boundaries of the RW- showing impaired proprioception/kinesthesia of L sided extremities; pt continues to need intermittent requests to open eyes throughout session; pt has made minimal functional improvement, but pt's functional potential limited 2/2 cognitive impairment; will cont with POC.

## 2020-04-01 NOTE — PLAN OF CARE
Patient to be discharge to [facility]. Patient and family aware. [place] spoke will patient's wife Naheed, [phone] to inform her of transportation  time. Ambulance transport has been set up for 6 pm through [facility] [phone]. Bedside nurse to call report to accepting nurse at [facility] [phone].      04/01/20 1633   Final Note   Assessment Type Final Discharge Note   Anticipated Discharge Disposition SNF   What phone number can be called within the next 1-3 days to see how you are doing after discharge? [phone]   Discharge plans and expectations educations in teach back method with documentation complete? Yes   Right Care Referral Info   Post Acute Recommendation IRF   Referral Type SNF   Facility Name [facility]

## 2020-04-01 NOTE — PT/OT/SLP PROGRESS
Physical Therapy Treatment    Patient Name:  Marie Reis Jr.   MRN:  [id number]    Recommendations:     Discharge Recommendations:  nursing facility, skilled   Discharge Equipment Recommendations: bedside commode, shower chair, wheelchair, walker, rolling, grab bar   Barriers to discharge: None    Assessment:     Marie Reis Jr. is a 64 y.o. male admitted with a medical diagnosis of Acute renal failure superimposed on chronic kidney disease.  He presents with the following impairments/functional limitations:  weakness, impaired endurance, gait instability, impaired functional mobilty, impaired self care skills, impaired balance, decreased upper extremity function, decreased lower extremity function, decreased safety awareness, pain, impaired cognition, visual deficits, decreased coordination . Patient continues to need VCs/physical guidance to initiate and complete tasks with poor motivation to get up and mobilize; continues with delayed processing and slow in his responses both verbal and motor; decreased in higher cognitive functioning; decreased fluidity of movement during amb holding trunk forward with RW ahead and BUEs extended and braced on the RW, poor LE placement with initial wide DIALLO and keeping RLE outside of boundaries of the RW- showing impaired proprioception/kinesthesia of L sided extremities; pt continues to need intermittent requests to open eyes throughout session; pt has made minimal functional improvement, but pt's functional potential limited 2/2 cognitive impairment  Rehab Prognosis: poor to fair-; patient would benefit from acute skilled PT services to address these deficits and reach maximum level of function.    Recent Surgery: * No surgery found *      Plan:     During this hospitalization, patient to be seen 5 x/week to address the identified rehab impairments via gait training, therapeutic activities, therapeutic exercises, neuromuscular re-education and progress toward the following  goals:    · Plan of Care Expires:  04/30/20    Subjective     Pain/Comfort:  · Pain Rating 1: (c/o chronic LBP, neck pain that improved with repositioning, propping, sitting in chair-pt did not rate)      Objective:     Communicated with nurse prior to session.  Patient found with bed alarm, telemetry upon PT entry to room.     General Precautions: Standard, airborne, droplet, fall, seizure, hearing impaired, vision impaired   Orthopedic Precautions:N/A   Braces: N/A     Functional Mobility:  · Bed Mobility:     · Supine to Sit: moderate assistance and for prompting/initiation  · Transfers:     · Sit to Stand:  minimum assistance, of 2 persons and for prompting/initiation with rolling walker  · Bed to Chair: minimum assistance and moderate assistance with  rolling walker  using  Step Transfer  · Gait: Patient amb using RW from bed to bedside chair with initial wide DIALLO starting with RLE outside of boundaries of RW, leaning trunk forward with BUEs extended while WB on RW and knees extended, min/modA with RW management, short steps, step by step instructions      AM-PAC 6 CLICK MOBILITY  Turning over in bed (including adjusting bedclothes, sheets and blankets)?: 4  Sitting down on and standing up from a chair with arms (e.g., wheelchair, bedside commode, etc.): 2  Moving from lying on back to sitting on the side of the bed?: 2  Moving to and from a bed to a chair (including a wheelchair)?: 2  Need to walk in hospital room?: 2  Climbing 3-5 steps with a railing?: 1  Basic Mobility Total Score: 13       Therapeutic Activities and Exercises:   Patient sup to EOB with modA for prompting/initiation; pt stood with Marissa x 2 for prompting/initiation; performed amb as described above; sit to stand from beside chair via RW with min/modA x 2 for placement of chair alarm; pt scooted back in chair with SBA; max VCs throughout for all mobility, to keep eyes open and head lifted.    Patient left up in chair with all lines intact,  call button in reach, chair alarm on and nurse notified..    GOALS:   Multidisciplinary Problems     Physical Therapy Goals        Problem: Physical Therapy Goal    Goal Priority Disciplines Outcome Goal Variances Interventions   Physical Therapy Goal     PT, PT/OT Ongoing, Progressing     Description:  Goals to be met by: 2020     Patient will increase functional independence with mobility by performin. Supine to sit with Supervision  2. Sit to supine with Supervision  3. Sit to stand transfer with Supervision with appropriate AD  4. Bed to chair transfer with Modified [place] Supervision with appropriate AD  5. Gait  x 100 feet with Supervision using appropriate AD  6. Lower extremity exercise program x10 reps per handout, with supervision                      Time Tracking:     PT Received On: 20  PT Start Time: 1520     PT Stop Time: 1613  PT Total Time (min): 53 min with OT    Billable Minutes: Therapeutic Activity 30 minutes    Treatment Type: Treatment  PT/PTA: PT     PTA Visit Number: 0     Lexi Crandall, PT  2020

## 2020-04-01 NOTE — PROGRESS NOTES
[facility] Internal Medicine Resident LUCINDA Progress Note    Subjective:      Patient awake and eating breakfast this morning. No complaints. Patient was accepted for OSNF yesterday pending N insurance approval today.     Objective:   Last 24 Hour Vital Signs:  BP  Min: 113/57  Max: 156/67  Temp  Av.5 °F (36.4 °C)  Min: 96.1 °F (35.6 °C)  Max: 98.8 °F (37.1 °C)  Pulse  Av.1  Min: 58  Max: 67  Resp  Av.4  Min: 17  Max: 20  SpO2  Av.8 %  Min: 94 %  Max: 99 %    Fluid Balance:     Intake/Output Summary (Last 24 hours) at 2020 0722  Last data filed at 3/31/2020 1400  Gross per 24 hour   Intake 575 ml   Output 1800 ml   Net -1225 ml     Vitals:    20 1613 20 2050 20 2103 20 0438   BP: (!) 113/57 131/64  134/63   BP Location: Left arm Left arm  Left arm   Patient Position: Lying Lying  Lying   Pulse: (!) 58 63  67   Resp: 18 17  17   Temp: 96.3 °F (35.7 °C) 98.3 °F (36.8 °C)  98.1 °F (36.7 °C)   TempSrc: Oral Oral  Axillary   SpO2:  99% 97% 98%   Weight:       Height:         Physical Examination:  Gen: Lying comfortably in bed, in NAD  Resp: Speaking in full sentences, no coughing, on room air.    Rest of exam deferred due to virtual visit    Laboratory:  Hematology:  Recent Labs   Lab 20  1307 20  0700 20  0650 20  0714   WBC 5.89 6.00 5.16 5.31   HGB 9.2* 8.4* 8.6* 8.9*   HCT 29.0* 25.8* 26.7* 28.4*    267 273 296   MCV 84 82 82 83   RDW 13.5 13.4 13.5 13.5     Chemistry:  Recent Labs   Lab 20  1307 20  0700 20  1611 20  0650 20  0714    142 139 139 141   K 3.6 2.9* 3.3* 3.9 3.6   CL 99 106 103 108 108   CO2 20* 22* 24 22* 21*   BUN 66* 68* 61* 51* 45*   CREATININE 5.6* 4.8* 4.4* 3.8* 3.2*   * 97 127* 114* 79   CALCIUM 8.7 8.0* 8.6* 8.4* 8.6*   PROT 6.8 5.3*  --  5.9* 6.2   ALBUMIN 2.8* 2.5*  --  2.5* 2.6*   ALT 30 26  --  26 22   AST 42* 40  --  41* 37   ALKPHOS 77 63  --  63 66   BILITOT 0.8 0.5  --   0.5 0.6     Cardiac:  Recent Labs   Lab 03/28/20  1307 03/29/20  0700   TROPONINI 1.895* 1.220*   BNP 1,850*  --      DM:  Recent Labs   Lab 03/29/20  1611  03/30/20  0650  03/31/20  0703 03/31/20  0714 03/31/20  1021 03/31/20  1612 03/31/20  2048 04/01/20  0607   *  --  114*  --   --  79  --   --   --   --    POCTGLUCOSE  --    < >  --    < > 87  --  57* 123* 82 73    < > = values in this interval not displayed.     Lab Results   Component Value Date    [id number] Detected (A) 03/21/2020     Current Medications:     Scheduled:   amLODIPine  10 mg Oral Daily    aspirin  81 mg Oral Daily    atorvastatin  40 mg Oral QHS    carvediloL  25 mg Oral BID WM    clopidogreL  75 mg Oral Daily    heparin (porcine)  5,000 Units Subcutaneous Q8H    insulin detemir U-100  10 Units Subcutaneous QHS    montelukast  10 mg Oral QHS    pantoprazole  40 mg Oral Daily        PRN:  benzonatate, Dextrose 10% Bolus, Dextrose 10% Bolus, fluticasone propionate, glucagon (human recombinant), glucose, glucose, insulin aspart U-100, sodium chloride 0.9%  Assessment:     Syncopal Event  - Unwitnessed. Patient with poor recollection of events.  - Suspect likely 2/2 to hypovolemia, generalized weakness from recent admission/COVID-19 infection  - No known seizure history. However, per chart, patient presenting with fecal incontinence  - EKG not concerning for arrhythmogenic causes  - Further imaging such as head CT, TTE to further evaluate for potential causes deferred thus far 2/2 to COVID status  - No further syncopal events.     JAZLYN on CKD4  - Cr 5.6 on admit, up from 3.8 on yesterday's discharge   - Received 500 ml bolus NS in ED  - Cr this morning - 3.8; improving, was 3.8 at prior discharge  - Continue to monitor       Acute hypoxic respiratory failure and severe sepsis and pneumonia 2/2 COVID-19   - CXR: RLL PNA   - procal elevated   - Patient discharged 3/27 to complete course of Plaquenil, followup with PCP and  cardiology  - Completed course of plaquenil  - Patient remains afebrile, VSS on room air     NSTEMI   - troponin of 5 with no acute EKG changes, troponin increased to 11   - patient not complaining of CP  - Rpt trop 1.22  - Continue medical management of ASA, plavix, atorvastatin, coreg     Acute on Chronic Combined systolic and diastolic HF  - TTE in 2/26/2020 with EF 25% and grade 2 diastolic dysfunction with moderate to severe pulmonary HTN and moderate to severe aortic stenosis   - BNP 4,035, 3893 last month, rpt BNP 1850  - lasix 120mg IV TID currently, monitor output   - continue coreg, unable to add ACEI/ARB or spironolactone at this time due to kidney function      Multiple myeloma  - being treated with bortezomib   - due for cycle 6 on 3/25     CAD  - cont home ASA, plavix      HTN  - Continue home Coreg, amlodipine      DMT2 with complications with long term use of insulin   - A1c in 3/2020 9.1  - home regimen: levemir 10 qhs with SSI  - hold home linagliptin  - decreased levemir nightly to 10U 3/31. Patient BGL in the 70s overnight, received 1 glucose tablet. Continue to monitor for hypoglycemia    Deconditioning  - Patient with reported weakness following recent hospital stay  - Likely 2/2 prolonged hospitalization and acute illness  - Limited options with therapy d/t COVID status  - PT/OT evaluated patient, recommending SNF  - Positive test confirmed 3/21, patient on day 11 of 14 of isolation.  Has been afebrile and symptom free for over 72 hours, is likely not infectious at this point.      Diet: Cardiac   DVT PPx: Heparin  Code: Full     Dispo: SNF discharge today. Patient was accepted to OSNF for admission today once PHN auth obtained. D/C Readmit orders with SNF orders set placed.    Adiel Polanco MD  [facility] Internal Medicine HO-I  [facility] Internal Medicine Service Team A    [facility] Medicine Hospitalist Pager numbers:   [facility] Hospitalist Medicine Team A (Anson/Nadia): [phone]  [facility] Hospitalist Medicine  Team B (Shashank/Aniket):  [phone]

## 2020-04-02 NOTE — PLAN OF CARE
Problem: Occupational Therapy Goal  Goal: Occupational Therapy Goal  Description  Goals to be met by: 04/30     Patient will increase functional independence with ADLs by performing:    UE Dressing with Supervision.  LE Dressing with Supervision.  Grooming while standing at sink with Supervision.  Toileting from bedside commode with Supervision for hygiene and clothing management.   Toilet transfer to bedside commode with Supervision.  Increased functional strength to WFL for ADLs.     Outcome: Ongoing, Progressing   Pt found in supine for OT/PT co-tx this date. Pt perf the following: sup-->EOB w/ Mod A for prompting/initiation; standing via RW from EOB w/ Min A x 2 for prompting/initiation; step t/f via RW w/ Min-Mod A-->b/s chair; standing from b/s chair via RW w/ Min-mod A x 2. Edu/tx re: general safety techs & HEP. Pt w/ limited understanding.    Pt w/o motivation vs impaired processing/planning. C/o post neck pain throughout & required max v/tc's to open eyes & raise head upright to midline. Pt demo'ed B U/LE stiffness w/ standing &: RW use. Pt w/ unknown potential for improvement. Cont w/ OT per POC.

## 2020-04-02 NOTE — PT/OT/SLP EVAL
PhysicalTherapy   Evaluation    Marie Reis Jr.   MRN: [id number]     PT Received On: 04/02/20  PT Start Time: 0906     PT Stop Time: 0948    PT Total Time (min): 42 min       Billable Minutes:  Evaluation 10, Gait Training 15 and Therapeutic Activity 17    Diagnosis: acute respiratory disease on CKD  Past Medical History:   Diagnosis Date    Binocular vision disorder with diplopia 9/22/2016    Bradycardia     Cataract     Cervical spondylosis 10/1/2015    Chronic diastolic congestive heart failure     Chronic diastolic heart failure 1/11/2018    Coronary artery disease due to calcified coronary lesion 3/19/2018    Dyslipidemia 6/26/2015    Encounter for blood transfusion     Hearing impairment 10/25/2013    History of stroke 3/23/2015    Hypertension associated with diabetes 1/11/2018    Hypertensive retinopathy of both eyes 9/26/2017    Lumbar spondylosis 10/1/2015    Multiple myeloma not having achieved remission 1/16/2018    NSTEMI (non-ST elevated myocardial infarction) 3/22/2020    SUZANNA on CPAP     Persistent proteinuria 1/11/2018    Pneumonia of right lower lobe due to infectious organism 3/22/2020    Spondylolisthesis of lumbar region 10/1/2015    Strabismus     Stroke 2014    Thoracic spondylosis 10/1/2015    Type 2 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, with long-term current use of insulin 9/26/2017    Type 2 diabetes mellitus with diabetic polyneuropathy, with long-term current use of insulin 9/28/2015    Type 2 diabetes mellitus with stage 4 chronic kidney disease, with long-term current use of insulin 1/11/2018      Past Surgical History:   Procedure Laterality Date    CATARACT EXTRACTION W/  INTRAOCULAR LENS IMPLANT Left 4/30/15    [facility]    COLONOSCOPY N/A 1/20/2020    Procedure: COLONOSCOPY;  Surgeon: Salvador Espinosa MD;  Location[facility];  Service: Endoscopy;  Laterality: N/A;    EYE SURGERY      cataract  removal left eye    HAND SURGERY  2/2012    DR. BAKER [facility]    left hand fracture sx      LT EYE   5/2/15    DR KULLMAN [facility]         General Precautions: Standard, droplet, airborne, fall, seizure, hearing impaired, vision impaired  Orthopedic Precautions:     Braces:      Spiritual, Cultural Beliefs, Taoism Practices, Values that Affect Care: no    Patient History:  Lives With: spouse  Living Arrangements: house  Home Accessibility: other (see comments)(no JANIYA)  Living Environment Comment: Pt lives with wife and son; was independent with gait with SC- has walk in shower and grab bar as per chart  Equipment Currently Used at Home: cane, straight, grab bar      Previous Level of Function:  Ambulation Skills: needs device and assist  Transfer Skills: needs device and assist  ADL Skills: needs device and assist    Subjective:  Communicated with nurse prior to session.  Pt with minimal verbalization during treatment, answered yes/no questions  Chief Complaint: back pain with bed mobility and needing to use the bathroom  Patient goals: pt did not state    Pain/Comfort  Pain Rating 1: other (see comments)(pt c/o back pain upon sitting but unable to grade pain)  Location - Orientation 1: lower  Location 1: back  Pain Addressed 1: Reposition, Cessation of Activity  Pain Rating Post-Intervention 1: 0/10(in sitting in bedside chair with LE elevated)    Objective:  Patient found supine with Patient found with: pulse ox (continuous), telemetry    Cognitive Exam:  Oriented to: Person  Follows Commands/attention: Follows one-step commands inconsistently but requires increased time and manual cueing at times  Communication: minimal verbalization  Safety awareness/insight to disability: impaired    Physical Exam:  Postural examination/scapula alignment:    -       Rounded shoulders  -       Forward head    Skin integrity: Dry    Sensation:      -       Intact  light/touch B LE, appears intact but testing unreliable due to pt  is inconsistent with responses     Lower Extremity strength:  Right Lower Extremity: Deficits: hip flex 3-/5; knee ext/flex 4+/5; ankle DF 4/5  Left Lower Extremity: Deficits: hip flex 2+/5; knee flex/ext 4/5; ankle DF 4-5/    Lower Extremity  ROM  Right Lower Extremity: WFL  Left Lower Extremity: WFL     AM-PAC 6 CLICK MOBILITY  Total Score:14    Bed Mobility:  Supine>Sit: moderate assist ; pt required manual and verbal cues to complete through R sidelying    Transfers:  Sit<>Stand: minimal assist with RW from bed, from commode with grab bar, and from bedside chair x 2 trials  Stand Pivot Transfer: bed to bedside chair with RW with minimal assist; pt required manual cues to push from the arm rest, pt would reach for the walker to stand    Gait:  Amb 10ft x 2 trials to/from bathroom then 30ft with Rw with minimal assist. Pt has visual deficits and required manual cues to direct the walker around obstacles. Pt performed gait with flexed trunk, decreased step length on L>R LE and at slow pace. Pt limited with gait distance due to SOB and c/o fatigue     Advanced Gait:  Stairs: pt does not have steps in home    Balance:  Static sitting balance fair +; static standing balance with RW fair-    Additional Treatment:  Pt stood at the sink to wash his hand ~ 3 min with RW support with minimal assist due to episode of posterior instability when he released the walker to wash dry his hands    Patient left up in chair with all lines intact, call button in reach and nurse notified.    Assessment:  Marie Reis Jr. is a 64 y.o. male with a medical diagnosis of respiratory diease on CKD. Pt is unsafe with functional mobility at this time due to pt requires moderate assist for bed mobility, minimal assist for transfers, and minimal assist for gait with RW due to posterior instability, SOB, and weakness. Pt is slow to respond to verbal commands and requires manual cues to complete tasks at times. PT has visual deficits and requires  verbal and manual cues for directions. Pt did not answer orientation questions.    Rehab identified problem list/impairments: weakness, impaired endurance, impaired sensation, impaired self care skills, impaired functional mobilty, gait instability, impaired balance, visual deficits, impaired cognition, decreased coordination, decreased upper extremity function, decreased lower extremity function, decreased safety awareness, pain, impaired cardiopulmonary response to activity(hearing impaired)    Rehab potential is good.    Activity tolerance: Fair due to SOB and fatigue    Discharge recommendations: home with home health     Barriers to discharge: Decreased caregiver support    Equipment recommendations: bedside commode, shower chair, walker, rolling     GOALS:   Multidisciplinary Problems     Physical Therapy Goals        Problem: Physical Therapy Goal    Goal Priority Disciplines Outcome Goal Variances Interventions   Physical Therapy Goal     PT, PT/OT Ongoing, Progressing     Description:  PT goals for 10 days    1. Pt supine to sit with supervision-not met  2. Pt sit to supine with mod independent-not met  3. Pt sit to stand with RW with supervision -not met  4. Pt to perform gait 100ft with RW with supervision.-not met  5. Pt to transfer bed to/from bedside chair with RW with supervision.-not met  6. Pt to perform B LE exs in sitting or supine x 10 reps to strengthen B LE to improve functional mobility.-not met                      PLAN:    Patient to be seen 4 x/week  to address the above listed problems via gait training, therapeutic activities, therapeutic exercises  Plan of Care Expires: 05/02/20    Cathy Guaman, PT 4/2/2020

## 2020-04-02 NOTE — PLAN OF CARE
Evaluation complete and goals set.  Cont with POC  Janine Ware, OT  4/2/2020    Problem: Occupational Therapy Goal  Goal: Occupational Therapy Goal  Description  Goals to be met by: 4/30     Patient will increase functional independence with ADLs by performing:    Feeding with Stand-by Assistance.  UE Dressing with Contact Guard Assistance.  LE Dressing with Minimal Assistance.  Grooming while seated with Contact Guard Assistance.  Toileting from toilet with Minimal Assistance for hygiene and clothing management.      Outcome: Ongoing, Progressing

## 2020-04-02 NOTE — NURSING
Pt. Refused AM medications and AM blood draw with lab staff x 3 attempts. POCT was 98. Pt. Armband not readable

## 2020-04-02 NOTE — PT/OT/SLP PROGRESS
Occupational Therapy   Treatment    Name: Marie Reis Jr.  MRN: [id number]  Admitting Diagnosis:  Acute renal failure superimposed on chronic kidney disease       Recommendations:     Discharge Recommendations: nursing facility, skilled  Discharge Equipment Recommendations:  bedside commode, shower chair, wheelchair, walker, rolling, grab bar  Barriers to discharge:  None    Assessment:   Pt w/o motivation vs impaired processing/planning. C/o post neck pain throughout & required max v/tc's to open eyes & raise head upright to midline. Pt demo'ed B U/LE stiffness w/ standing &: RW use. Pt w/ unknown potential for improvement. Cont w/ OT per POC.    Marie Reis Jr. is a 64 y.o. male with a medical diagnosis of Acute renal failure superimposed on chronic kidney disease.  He presents with . Performance deficits affecting function are weakness, impaired endurance, impaired self care skills, impaired sensation, visual deficits, impaired balance, gait instability, impaired functional mobilty, impaired cognition, decreased coordination, decreased upper extremity function, decreased lower extremity function, decreased ROM, pain, decreased safety awareness.     Rehab Prognosis:  Fair; patient would benefit from acute skilled OT services to address these deficits and reach maximum level of function.       Plan:     Patient to be seen 5 x/week to address the above listed problems via self-care/home management, therapeutic activities, therapeutic exercises  · Plan of Care Expires: 04/30/20  · Plan of Care Reviewed with: patient    Subjective     Pain/Comfort:  · Pain Rating 1: (unrated)  · Location - Orientation 1: posterior  · Location 1: neck  · Pain Addressed 1: Reposition, Distraction, Cessation of Activity  · Pain Rating Post-Intervention 1: (unrated)    Objective:     Communicated with: nsayden prior to session.  Patient found supine with bed alarm, telemetry upon OT entry to room.    General Precautions: Standard, airborne,  droplet, fall, seizure, hearing impaired, vision impaired   Orthopedic Precautions:N/A   Braces: N/A     Occupational Performance:     Bed Mobility:    · Patient completed Supine to Sit with moderate assistance     Functional Mobility/Transfers:  · Patient completed Sit <> Stand Transfer with minimum assistance, moderate assistance and of 2 persons  with  rolling walker   · Patient completed Bed <> Chair Transfer using Step Transfer technique with minimum assistance, moderate assistance and of 2 persons with rolling walker  · Functional Mobility:     Activities of Daily Living:  · Grooming: stand by assistance face washing      AMPAC 6 Click ADL: 13    Treatment & Education:  Pt found in supine for OT/PT co-tx this date. Pt perf the following: sup-->EOB w/ Mod A for prompting/initiation; standing via RW from EOB w/ Min A x 2 for prompting/initiation; step t/f via RW w/ Min-Mod A-->b/s chair; standing from b/s chair via RW w/ Min-mod A x 2. Edu/tx re: general safety techs & HEP. Pt w/ limited understanding. Pt left UIC w/ alarm & nsg notified.    Patient left up in chair with all lines intact, call button in reach, chair alarm on and nsg notifiedEducation:      GOALS:   Multidisciplinary Problems     Occupational Therapy Goals        Problem: Occupational Therapy Goal    Goal Priority Disciplines Outcome Interventions   Occupational Therapy Goal     OT, PT/OT Ongoing, Progressing    Description:  Goals to be met by: 04/30     Patient will increase functional independence with ADLs by performing:    UE Dressing with Supervision.  LE Dressing with Supervision.  Grooming while standing at sink with Supervision.  Toileting from bedside commode with Supervision for hygiene and clothing management.   Toilet transfer to bedside commode with Supervision.  Increased functional strength to WFL for ADLs.                      Time Tracking:     OT Date of Treatment: 04/01/20  OT Start Time: 1520  OT Stop Time: 1613  OT Total  Time (min): 53 min    Billable Minutes:Therapeutic Activity 23  Total Time 53--co-tx w/ PT    Viki Moreno, LOTR  04/01/20

## 2020-04-02 NOTE — PLAN OF CARE
Problem: Physical Therapy Goal  Goal: Physical Therapy Goal  Description  PT goals for 10 days    1. Pt supine to sit with supervision-not met  2. Pt sit to supine with mod independent-not met  3. Pt sit to stand with RW with supervision -not met  4. Pt to perform gait 100ft with RW with supervision.-not met  5. Pt to transfer bed to/from bedside chair with RW with supervision.-not met  6. Pt to perform B LE exs in sitting or supine x 10 reps to strengthen B LE to improve functional mobility.-not met     Outcome: Ongoing, Progressing   Pt's goals set and pt will continue to benefit from skilled PT services to work towards improved functional mobility including: bed mobility, transfers, and gait.   Cathy Guaman, PT  4/2/2020

## 2020-04-02 NOTE — PLAN OF CARE
SW completed discharge planning assessment via phone with patients spouse (Naheed Reis [phone]). Patients wife reports that she will provide transportation upon discharge. The pt.'s wife was not aware if the patient is currently taking any blood thinners. SW is following this Pt for DC planning needs. There are no identified needs at this time.    Nancy Colón MD    [facility] - [place], [place] - [address]  [address]  [address]  [facility] [address]  Phone: [phone] Fax: [phone]    [facility]  [address]  [place] [address]  Phone: [phone] Fax: [phone]    [facility] - [place] - [address]  [address]  [place] [address]  Phone: [phone] Fax: [phone]    [id number] - [place] - [address] [facility]  [address]  [place] [address]  Phone: [phone] Fax: [phone]    Payor: [employer] MEDICARE / Plan: [employer] 65 / Product Type: Medicare Advantage /      04/02/20 1016   Discharge Assessment   Assessment Type Discharge Planning Assessment   Confirmed/corrected address and phone number on facesheet? Yes   Assessment information obtained from? Other  (Naheed Reis ([phone]))   Expected Length of Stay (days) 14   Communicated expected length of stay with patient/caregiver yes   Prior to hospitilization cognitive status: Alert/Oriented   Prior to hospitalization functional status: Independent;Assistive Equipment   Current cognitive status: Alert/Oriented   Current Functional Status: Independent;Assistive Equipment   Lives With spouse   Able to Return to Prior Arrangements yes   Is patient able to care for self after discharge? Unable to determine at this time (comments)   Patient's perception of discharge disposition home or selfcare   Readmission Within the Last 30 Days no previous admission in last 30 days   Patient  currently being followed by outpatient case management? No   Patient currently receives any other outside agency services? No   Equipment Currently Used at Home none   Do you have any problems affording any of your prescribed medications? No   Is the patient taking medications as prescribed? yes   Does the patient have transportation home? Yes   Transportation Anticipated family or friend will provide  (Spouse)   Does the patient receive services at the [facility]? Yes   Discharge Plan A Home with family   Discharge Plan B Home   DME Needed Upon Discharge  none   Patient/Family in Agreement with Plan yes     Lexi Crockett LMSW  Case Management   [facility]

## 2020-04-02 NOTE — PT/OT/SLP EVAL
Occupational Therapy  Evaluation/Treatment    Marie Reis Jr.   MRN: [id number]   Admitting Diagnosis    OT Date of Treatment: 04/02/20   OT Start Time: 0915  OT Stop Time: 0950  OT Total Time (min): 35 min    Billable Minutes:  Evaluation 10  Self Care/Home Management 25    Diagnosis: acute renal failure    Past Medical History:   Diagnosis Date    Binocular vision disorder with diplopia 9/22/2016    Bradycardia     Cataract     Cervical spondylosis 10/1/2015    Chronic diastolic congestive heart failure     Chronic diastolic heart failure 1/11/2018    Coronary artery disease due to calcified coronary lesion 3/19/2018    Dyslipidemia 6/26/2015    Encounter for blood transfusion     Hearing impairment 10/25/2013    History of stroke 3/23/2015    Hypertension associated with diabetes 1/11/2018    Hypertensive retinopathy of both eyes 9/26/2017    Lumbar spondylosis 10/1/2015    Multiple myeloma not having achieved remission 1/16/2018    NSTEMI (non-ST elevated myocardial infarction) 3/22/2020    SUZANNA on CPAP     Persistent proteinuria 1/11/2018    Pneumonia of right lower lobe due to infectious organism 3/22/2020    Spondylolisthesis of lumbar region 10/1/2015    Strabismus     Stroke 2014    Thoracic spondylosis 10/1/2015    Type 2 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, with long-term current use of insulin 9/26/2017    Type 2 diabetes mellitus with diabetic polyneuropathy, with long-term current use of insulin 9/28/2015    Type 2 diabetes mellitus with stage 4 chronic kidney disease, with long-term current use of insulin 1/11/2018      Past Surgical History:   Procedure Laterality Date    CATARACT EXTRACTION W/  INTRAOCULAR LENS IMPLANT Left 4/30/15    [facility]    COLONOSCOPY N/A 1/20/2020    Procedure: COLONOSCOPY;  Surgeon: Salvador Espinosa MD;  Location: [facility];  Service: Endoscopy;  Laterality: N/A;    EYE SURGERY      cataract  removal left eye    HAND SURGERY  2/2012    DR. BAKER [facility]    left hand fracture sx      LT EYE   5/2/15    DR KULLMAN [facility]         General Precautions: Standard, airborne, contact, droplet  Orthopedic Precautions: N/A  Braces: N/A    Spiritual, Cultural Beliefs, Methodist Practices, Values that Affect Care: no     Patient History:  Lives With: spouse  Living Arrangements: house  Home Accessibility: other (see comments)(no steps to enter )  Home Layout: Able to live on 1st floor  Transportation Anticipated: family or friend will provide  Living Environment Comment: (Pt lives with wife in 1 story house )  Equipment Currently Used at Home: cane, straight    Prior level of function:    Bed Mobility/Transfers: needs assist  Grooming: needs assist  Bathing: unable to perform  Upper Body Dressing: needs assist  Lower Body Dressing: unable to perform  Toileting: needs assist  Homemaking Responsibilities: No  Driving License: No  Mode of Transportation: Car, Family  Occupation: Retired     Dominant hand: right    Subjective:  Communicated with RN prior to session.  Pt with limited engagement and refusing to eat.     Chief Complaint: decreased functional performance, decreased mobility   Patient/Family stated goals: return to home     Pain/Comfort  Pain Rating 1: (no complaints )    Objective:   Patient found with: telemetry    Cognitive Exam:  Oriented to: Person and Place  Follows Commands/attention: Inattentive  Communication: slow response and limited verbalization  Memory:  No Deficits noted  Safety awareness/insight to disability: impaired  Coping skills/emotional control: flat affect    Visual/perceptual:  Pt with binocular vision disorder which effects functional performance especially in new environment     Physical Exam:  Postural examination/scapula alignment:    -       Forward head  Skin integrity: Visible skin intact  Edema: none noted     Sensation:      -       Intact    Upper Extremity Range of  Motion:  Right Upper Extremity: WFL passive ROM   Left Upper Extremity: WFL passive ROM     Upper Extremity Strength:  Right Upper Extremity: 3/5  Left Upper Extremity: 3/5   Strength: WFL    Gross motor coordination: poor motor coordination     Occupational Performance:    Bed Mobility:    · Patient completed Rolling/Turning to Right with minimum assistance  · Patient completed Scooting/Bridging with minimum assistance  · Patient completed Supine to Sit with minimum assistance  · Patient completed Sit to Supine with moderate assistance     Functional Mobility/Transfers:  · Patient completed Sit <> Stand Transfer with minimum assistance  with  rolling walker   · Patient completed Toilet Transfer Step Transfer technique with minimum assistance with  rolling walker    Activities of Daily Living:  · Feeding:  pt refused to eat    · Lower Body Dressing: moderate assistance    · Toileting: minimum assistance      AMPA 6 Click:  AMPAC Total Score: 13    Additional Treatment:  Evaluation complete and goals set.  Cont with POC  - Pt educated on safety, role of OT, importance of increased participation in self care for gains , expectations for participation, expectations for gains, POC, energy conservation, caregiver strain. White board updated.   - ADL training for safe toileting     Patient left up in chair with all lines intact    Assessment:  Marie Reis JrRehana is a 64 y.o. male with a medical diagnosis of acute renal failure.  Pt presents supine and with noted slow processing of verbal commands.  Pt with fat affect and requires time and encouragement for participation.  Pt will benefit from skilled OT for max gains and return to [facility]     Rehab identified problem list/impairments: weakness, impaired endurance, impaired self care skills, visual deficits, decreased coordination    Rehab potential is fair    Activity tolerance: Fair    Discharge recommendations: home with home health     Barriers to discharge: Decreased  caregiver support(increased required level of care )     Equipment recommendations: bedside commode, shower chair, wheelchair     GOALS:   Multidisciplinary Problems     Occupational Therapy Goals        Problem: Occupational Therapy Goal    Goal Priority Disciplines Outcome Interventions   Occupational Therapy Goal     OT, PT/OT Ongoing, Progressing    Description:  Goals to be met by: 4/30     Patient will increase functional independence with ADLs by performing:    Feeding with Stand-by Assistance.  UE Dressing with Contact Guard Assistance.  LE Dressing with Minimal Assistance.  Grooming while seated with Contact Guard Assistance.  Toileting from toilet with Minimal Assistance for hygiene and clothing management.                       PLAN: Patient to be seen 4 x/week to address the above listed problems via self-care/home management, therapeutic activities, therapeutic exercises  Plan of Care expires: 04/30/20  Plan of Care reviewed with: patient    Janine Ware, OT  04/02/2020

## 2020-04-03 PROBLEM — E44.0 MODERATE MALNUTRITION: Status: ACTIVE | Noted: 2020-01-01

## 2020-04-03 NOTE — PT/OT/SLP PROGRESS
Occupational Therapy      Patient Name:  Marie Reis Jr.   MRN:  [id number]    Patient not seen today secondary to pt unwilling to participate despite extensive encouragement.  Pt presented with blanket over his head and seemingly depressed. Will continue to address and attempt improved participation   . Will follow-up 4/6/20.    Janine Ware, OT  4/3/2020

## 2020-04-03 NOTE — CLINICAL REVIEW
Clinical Pharmacy Chart Review Note      Admit Date: 4/1/2020   LOS: 2 days       Marie Reis Jr. is a 64 y.o. male admitted to SNF for PT/OT after hospitalization for COVID-19 virus infection.    Active Hospital Problems    Diagnosis  POA    Acute renal failure superimposed on chronic kidney disease [N17.9, N18.9]  Yes      Resolved Hospital Problems   No resolved problems to display.     Review of patient's allergies indicates:   Allergen Reactions    Hydralazine analogues      Increase swelling and throat itching and tightness with use.  Symptoms correlate only with this medication onset in hospital.     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     Acute respiratory disease due to severe acute respiratory syndrome coronavirus 2 (SARS-CoV-2) 03/24/2020    Severe sepsis 03/23/2020    Acute respiratory failure with hypoxia 03/22/2020    Pneumonia due 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 03/22/2020    NSTEMI (non-ST elevated myocardial infarction) 03/22/2020    Acute on chronic combined systolic and diastolic heart failure 03/22/2020    Moderate to severe aortic stenosis 03/22/2020    Anemia, chronic disease 03/22/2020    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, with long-term current use of insulin 03/12/2020    Constipation     Liver function test abnormality     Troponin I above reference range     Transaminitis 02/24/2020    Bilirubinemia 02/24/2020    Moderate episode of recurrent major depressive disorder 02/21/2020    Hemiplegia and hemiparesis following  cerebral infarction affecting left non-dominant side 02/21/2020    Positive FIT (fecal immunochemical test) 01/20/2020    Thrombocytopenia 12/11/2019    Dental caries 10/16/2019    Symptomatic anemia 09/09/2019    PAD (peripheral artery disease) 08/28/2019    Diabetic polyneuropathy associated with type 2 diabetes mellitus 01/15/2019    Stage 4 chronic kidney disease 01/15/2019    Aortic atherosclerosis 01/15/2019    Chronic pulmonary heart disease 01/15/2019    Vitreous hemorrhage, right 10/05/2018    Diabetes mellitus with proteinuria 08/21/2018    Vitamin D deficiency 05/16/2018    Type 2 diabetes mellitus with hyperglycemia, with long-term current use of insulin 04/17/2018    Secondary hyperparathyroidism 04/14/2018    Hypertensive CKD (chronic kidney disease) 04/14/2018    Acute renal failure superimposed on stage 4 chronic kidney disease 04/11/2018    Bradycardia     Coronary artery disease due to calcified coronary lesion 03/19/2018    Bilateral carotid artery stenosis 03/19/2018    Prolonged Q-T interval on ECG 02/22/2018    Multiple myeloma 01/16/2018    Type 2 diabetes mellitus with stage 4 chronic kidney disease, with long-term current use of insulin 01/11/2018    Benign hypertension with chronic kidney disease, stage III 01/11/2018    Persistent proteinuria 01/11/2018    Acute on chronic combined systolic and diastolic congestive heart failure 01/11/2018    Chronic combined systolic and diastolic heart failure 11/13/2017    Hypertensive retinopathy of both eyes 09/26/2017    Hypertropia of left eye 09/22/2016    Binocular vision disorder with diplopia 09/22/2016    History of right PCA stroke 09/17/2016    Cervical spondylosis 10/01/2015    Thoracic spondylosis 10/01/2015    Lumbar spondylosis 10/01/2015    Spondylolisthesis of lumbar region 10/01/2015    Type 2 diabetes mellitus with diabetic polyneuropathy, with long-term current use of insulin 09/28/2015     Dyslipidemia 06/26/2015    History of stroke 03/23/2015    Essential hypertension 01/12/2014    Hearing impairment 10/25/2013       Scheduled Meds:    amLODIPine  10 mg Oral Daily    aspirin  81 mg Oral Daily    atorvastatin  40 mg Oral QHS    carvediloL  25 mg Oral BID WM    clopidogreL  75 mg Oral Daily    heparin (porcine)  5,000 Units Subcutaneous Q8H    insulin detemir U-100  10 Units Subcutaneous QHS    montelukast  10 mg Oral QHS    pantoprazole  40 mg Oral Daily    senna-docusate 8.6-50 mg  1 tablet Oral BID     Continuous Infusions:   PRN Meds: acetaminophen, benzonatate, calcium carbonate, fluticasone propionate, glucagon (human recombinant), glucose, glucose, insulin aspart U-100, melatonin    OBJECTIVE:     Vital Signs (Last 24H)  Pulse:  [67]   Resp:  [17]   BP: (155)/(83)   SpO2:  [97 %]     Laboratory:  CBC:   Recent Labs   Lab 03/29/20  0700 03/30/20  0650 03/31/20  0714   WBC 6.00 5.16 5.31   RBC 3.15* 3.24* 3.41*   HGB 8.4* 8.6* 8.9*   HCT 25.8* 26.7* 28.4*    273 296   MCV 82 82 83   MCH 26.7* 26.5* 26.1*   MCHC 32.6 32.2 31.3*     BMP:   Recent Labs   Lab 03/29/20  1611 03/30/20  0650 03/31/20  0714   * 114* 79    139 141   K 3.3* 3.9 3.6    108 108   CO2 24 22* 21*   BUN 61* 51* 45*   CREATININE 4.4* 3.8* 3.2*   CALCIUM 8.6* 8.4* 8.6*     CMP:   Recent Labs   Lab 03/29/20  0700 03/29/20  1611 03/30/20  0650 03/31/20  0714   GLU 97 127* 114* 79   CALCIUM 8.0* 8.6* 8.4* 8.6*   ALBUMIN 2.5*  --  2.5* 2.6*   PROT 5.3*  --  5.9* 6.2    139 139 141   K 2.9* 3.3* 3.9 3.6   CO2 22* 24 22* 21*    103 108 108   BUN 68* 61* 51* 45*   CREATININE 4.8* 4.4* 3.8* 3.2*   ALKPHOS 63  --  63 66   ALT 26  --  26 22   AST 40  --  41* 37   BILITOT 0.5  --  0.5 0.6     LFTs:   Recent Labs   Lab 03/29/20  0700 03/30/20  0650 03/31/20  0714   ALT 26 26 22   AST 40 41* 37   ALKPHOS 63 63 66   BILITOT 0.5 0.5 0.6   PROT 5.3* 5.9* 6.2   ALBUMIN 2.5* 2.5* 2.6*     Lab  Results   Component Value Date    HGBA1C 9.1 (H) 03/09/2020         ASSESSMENT/PLAN:     I have reviewed the medications in compliance with CMS Regulation F756 of the [employer]. Based on information gathered, the following items may need to be addressed:    **According to PMH and home medication list, patient takes the following medications at home. These medications are not currently ordered at SNF:  · Calcitriol 0.5 mcg daily  · Tradjenta 5 mg daily (non-formulary)    Medications reviewed by PharmD, please re-consult if needed.      Cathy Kilpatrick, Pharm. D.  Clinical Pharmacist  [facility]

## 2020-04-03 NOTE — PT/OT/SLP PROGRESS
Physical Therapy  Pt Not Seen    Patient Name:  Marie Reis Jr.   MRN:  [id number]    9:33 am to 9:33 am    Patient not seen today secondary to Patient unwilling to participate.  Pt presents with covers over his head.  PTA educates pt on importance and benefits of performing exercises and functional mobility, however, he still refuses). Will follow-up on next scheduled visit.    Carla Farrell, PTA  4/3/2020

## 2020-04-04 NOTE — CONSULTS
"  [facility]  Adult Nutrition  Consult Note    SUMMARY     Recommendations    Recommendation:   1. Encourage PO and supplement intake.  2. Honor foor preferences as able.    Goals: PO intake > 50% EEN, EPN by next RD visit   Nutrition Goal Status: new  Communication of RD Recs: other (comment)(POC)    Reason for Assessment    Reason For Assessment: consult  Diagnosis: other (see comments)(Acute Renal Failure superimposed on CKD)  Relevant Medical History: CVA, CAD, DM2, Dyslipidemia, HTN, CHF, SUZANNA on CPAP, NSTEMI, CKDIV, Vit D def, PAD, Hemiplegia, Gemiparesis  Interdisciplinary Rounds: did not attend  General Information Comments:   4/3/20: RD reporting remotely; RD called pt x 2 without answer, obtained information from chart. Pt has mostly had a poor appetite since 3/30. PT has Boost Glucose Control ordered TID. No N/V/D noted in chart. Constipation x 3 days. Pt with 29# wt loss x 1 month. Pt meets criteria for moderate malnutrition due to poor PO intake x 1 week and weight loss. NFPE not performed, Patient is noted as being positive for COVID-19.  Nutrition Discharge Planning: Adequate PO intake on [id number] and 2gm Na diets     Nutrition/Diet History    Spiritual, Cultural Beliefs, Anabaptism Practices, Values that Affect Care: no  Food Allergies: NKFA  Factors Affecting Nutritional Intake: decreased appetite, constipation    Anthropometrics    Temp: 97.4 °F (36.3 °C)  Height: 6' 1" (185.4 cm)  Height (inches): 73 in  Weight: 73.7 kg (162 lb 7.7 oz)(From previous encounter on 3/28; NEW WT TO BE OBTAINED)  Weight (lb): 162.48 lb  Ideal Body Weight (IBW), Male: 184 lb  % Ideal Body Weight, Male (lb): 88.3 %  BMI (Calculated): 21.4  BMI Grade: 18.5-24.9 - normal  Weight Loss: unintentional  Usual Body Weight (UBW), k.82 kg  % Usual Body Weight: 85.07  % Weight Change From Usual Weight: -15.11 %     Lab/Procedures/Meds    Pertinent Labs Reviewed: reviewed  Pertinent Labs Comments: BUN 45, " Crt 3.2, eGFR 23, Ca 8.6, Alb 2.6, A1C 9.1% (3/19)  Pertinent Medications Reviewed: reviewed  Pertinent Medications Comments: amlodipine, aspirin, statin, carvediolol, clopidogrel, insulin, montelukcast, senna-docusate, pantoprazole    Physical Findings/Assessment    Connor Score 17     Estimated/Assessed Needs    Weight Used For Calorie Calculations: 73.7 kg (162 lb 7.7 oz)  Energy Calorie Requirements (kcal): 1843 - 2211 kcal/day  Energy Need Method: Kcal/kg(25 - 30 kcal/kg)  Protein Requirements: 89 - 111 g/day(1.2 - 1.5 g/kg)  Weight Used For Protein Calculations: 74 kg (163 lb 2.3 oz)  Fluid Requirements (mL): 1 mL/kcal or per MD   Estimated Fluid Requirement Method: RDA Method  RDA Method (mL): 1843  CHO Requirement: 216 g/day or 40% of daily EEN     Nutrition Prescription Ordered    Current Diet Order: DM 2000kcal; 2 gm Na  Oral Nutrition Supplement: Boost Glucose Control TID     Evaluation of Received Nutrient/Fluid Intake    Energy Calories Required: not meeting needs  Protein Required: not meeting needs  Fluid Required: meeting needs  Comments: LBM 3/31  Tolerance: not tolerating  % Intake of Estimated Energy Needs: 25 - 50 %   % Meal Intake: 0 - 25 %     Nutrition Risk    Level of Risk/Frequency of Follow-up: high(2x/week)     Assessment and Plan    Nutrition Problem:  Moderate Protein-Calorie Malnutrition  Malnutrition in the context of Acute Illness/Injury    Related to (etiology):  Decreased appetite     Signs and Symptoms (as evidenced by):  Energy Intake: <75% of estimated energy requirement for more than 5 days  Weight Loss: 15% x 1 month    Interventions(treatment strategy):  Collaboration with other providers  [employer] - Boost Glucose Control TID to provide calories and protein     Nutrition Diagnosis Status:  New    Monitor and Evaluation    Food and Nutrient Intake: energy intake, food and beverage intake  Food and Nutrient Adminstration: diet order  Knowledge/Beliefs/Attitudes: food  and nutrition knowledge/skill  Physical Activity and Function: nutrition-related ADLs and IADLs  Anthropometric Measurements: weight, body mass index, weight change  Biochemical Data, Medical Tests and Procedures: electrolyte and renal panel, inflammatory profile, lipid profile, glucose/endocrine profile     Malnutrition Assessment  Malnutrition Type: acute illness or injury  Energy Intake: moderate energy intake  Weight Loss (Malnutrition): greater than 5% in 1 month  Energy Intake (Malnutrition): less than or equal to 50% for greater than or equal to 5 days     Nutrition Follow-Up    RD Follow-up?: Yes

## 2020-04-04 NOTE — ASSESSMENT & PLAN NOTE
Nutrition Problem:  Moderate Protein-Calorie Malnutrition  Malnutrition in the context of Acute Illness/Injury     Related to (etiology):  Decreased appetite      Signs and Symptoms (as evidenced by):  Energy Intake: <75% of estimated energy requirement for more than 5 days  Weight Loss: 15% x 1 month     Interventions(treatment strategy):  Collaboration with other providers  [employer] - Boost Glucose Control TID to provide calories and protein      Nutrition Diagnosis Status:  Continues

## 2020-04-04 NOTE — PLAN OF CARE
Recommendation:   1. Encourage PO and supplement intake.  2. Honor foor preferences as able.    Goals: PO intake > 50% EEN, EPN by next RD visit   Nutrition Goal Status: new  Communication of RD Recs: other (comment)(POC)    Problem: Malnutrition  Goal: Improved Nutritional Intake  Outcome: Ongoing, Progressing

## 2020-04-05 NOTE — NURSING
Pt continues to refuse meds and meals, will drink water left for him. C/O Back pain refuses tylenol or other pain meds. Will continue to encourage meals, meds and fluids.

## 2020-04-05 NOTE — NURSING
Patient is non compliant to all meds, foods and drink. I was able to get him to sip on water. He refuses any care by any of the nurses and would not allow any of us to assess or care for him. When we tried to assess he became somewhat combative

## 2020-04-06 NOTE — PT/OT/SLP PROGRESS
Occupational Therapy      Patient Name:  Marie Reis Jr.   MRN:  [id number]    Patient not seen today secondary to pt. Refused therapy on this date and did not even uncover his head    OBED Rees  4/6/2020

## 2020-04-06 NOTE — PT/OT/SLP PROGRESS
Physical Therapy  Pt Not Seen    Patient Name:  Marie Reis Jr.   MRN:  [id number]     9:19 am    Patient not seen today secondary to  Patient unwilling to participate(pt with covers over his head and refusing to remove them.  PTA educated pt on importance of performing OOB mobility and ex's, however, no response from pt). Will follow-up on next scheduled visit.    Carla Farrell, PTA  4/6/2020

## 2020-04-06 NOTE — NURSING
"Attempted to do an assessment on the patient. He refused and was non compliant. Patient would not answer any of my questions, became combative when I attempted to listen to breath sounds. Pt pulled covers over his head and said to" leave him alone".   "

## 2020-04-06 NOTE — NURSING
Pt is refusing all medication attempted to give pt medication three times and pt refused, was able to get pt to drink 8 oz of water ,Pt seems depressed pt is ambulating to restroom by himself and back to bed staff continues to ask Pt to call when restroom is needed pt is non compliant .will continue to monitor pt will continue care Per MAR and Per orders.

## 2020-04-07 NOTE — PLAN OF CARE
Recommendation:   1. Encourage PO and supplement intake.   2. Honor foor preferences if able.    Goals: PO intake > 50% EEN, EPN by next RD visit   Nutrition Goal Status: goal not met  Communication of RD Recs: other (comment)(POC)    Problem: Malnutrition  Goal: Improved Nutritional Intake  Outcome: Ongoing, Not Progressing

## 2020-04-07 NOTE — PT/OT/SLP PROGRESS
Occupational Therapy      Patient Name:  Marie Reis Jr.   MRN:  [id number]    Patient not seen today secondary to pt. Declined OT services    OBED Rees  4/7/2020

## 2020-04-07 NOTE — PROGRESS NOTES
"[facility]  Adult Nutrition  Progress Note    SUMMARY       Recommendations    Recommendation:   1. Encourage PO and supplement intake.   2. Honor foor preferences if able.    Goals: PO intake > 50% EEN, EPN by next RD visit   Nutrition Goal Status: goal not met  Communication of RD Recs: other (comment)(POC)    Reason for Assessment    Reason For Assessment: RD follow-up  Diagnosis: other (see comments)(Acute Renal Failure superimposed on CKD)  Relevant Medical History: CVA, CAD, DM2, Dyslipidemia, HTN, CHF, SUZANNA on CPAP, NSTEMI, CKDIV, Vit D def, PAD, Hemiplegia, Gemiparesis  Interdisciplinary Rounds: did not attend  General Information Comments:   4/7/20: RD reporting remotely; RD called pt x 2 without answer. Pt was discussed in IDT; pt refusing meds, food and tx. Dr Almanzar will be notified. Pt has been refusing to eat since 4/4. Pt likely not drinking Boost Glucose Control at time time. No N/V/D/C noted in chart. New wt to be obtained.  4/3/20: RD reporting remotely; RD called pt x 2 without answer, obtained information from chart. Pt has mostly had a poor appetite since 3/30. PT has Boost Glucose Control ordered TID. No N/V/D noted in chart. Constipation x 3 days. Pt with 29# wt loss x 1 month. Pt meets criteria for moderate malnutrition due to poor PO intake x 1 week and weight loss. NFPE not performed, Patient is noted as being positive for COVID-19.   Nutrition Discharge Planning: Adequate PO intake on [id number] and 2gm Na diets     Nutrition Risk Screen    Nutrition Risk Screen: no indicators present    Nutrition/Diet History    Spiritual, Cultural Beliefs, Evangelical Practices, Values that Affect Care: no  Food Allergies: NKFA  Factors Affecting Nutritional Intake: decreased appetite    Anthropometrics    Temp: 97.2 °F (36.2 °C)  Height: 6' 1" (185.4 cm)  Height (inches): 73 in  Weight: 73.7 kg (162 lb 7.7 oz)(From previous encounter on 3/28; NEW WT TO BE OBTAINED)  Weight (lb): 162.48 " lb  Ideal Body Weight (IBW), Male: 184 lb  % Ideal Body Weight, Male (lb): 88.3 %  BMI (Calculated): 21.4  BMI Grade: 18.5-24.9 - normal  Weight Loss: unintentional  Usual Body Weight (UBW), k.82 kg  % Usual Body Weight: 85.07  % Weight Change From Usual Weight: -15.11 %    Lab/Procedures/Meds    Pertinent Labs Reviewed: reviewed  Pertinent Labs Comments: BUN 45, Crt 3.2, eGFR 23, Ca 8.6, Alb 2.6, A1C 9.1% (3/19)  Pertinent Medications Reviewed: reviewed  Pertinent Medications Comments: amlodipine, aspirin, statin, carvediolol, clopidogrel, insulin, montelukcast, senna-docusate, pantoprazole    Physical Findings/Assessment    Connor Score 17      Estimated/Assessed Needs    Weight Used For Calorie Calculations: 73.7 kg (162 lb 7.7 oz)  Energy Calorie Requirements (kcal): 1843 - 2211 kcal/day  Energy Need Method: Kcal/kg(25 - 30 kcal/kg)  Protein Requirements: 89 - 111 g/day(1.2 - 1.5 g/kg)  Weight Used For Protein Calculations: 74 kg (163 lb 2.3 oz)  Fluid Requirements (mL): 1 mL/kcal or per MD   Estimated Fluid Requirement Method: RDA Method  RDA Method (mL): 1843  CHO Requirement: 216 g/day or 40% of daily EEN     Nutrition Prescription Ordered    Current Diet Order: DM 2000kcal; 2 gm Na  Oral Nutrition Supplement: Boost Glucose Control TID     Evaluation of Received Nutrient/Fluid Intake    I/O: +480  Energy Calories Required: not meeting needs  Protein Required: not meeting needs  Fluid Required: meeting needs  Comments: LBM 4/5  Tolerance: not tolerating  % Intake of Estimated Energy Needs: 0 - 25 %   % Meal Intake: 0 - 25 %     Nutrition Risk    Level of Risk/Frequency of Follow-up: high(2x/week)     Assessment and Plan    Moderate malnutrition  Nutrition Problem:  Moderate Protein-Calorie Malnutrition  Malnutrition in the context of Acute Illness/Injury     Related to (etiology):  Decreased appetite      Signs and Symptoms (as evidenced by):  Energy Intake: <75% of estimated energy requirement for more  than 5 days  Weight Loss: 15% x 1 month     Interventions(treatment strategy):  Collaboration with other providers  [employer] - Boost Glucose Control TID to provide calories and protein      Nutrition Diagnosis Status:  Continues    Monitor and Evaluation    Food and Nutrient Intake: energy intake, food and beverage intake  Food and Nutrient Adminstration: diet order  Knowledge/Beliefs/Attitudes: food and nutrition knowledge/skill  Physical Activity and Function: nutrition-related ADLs and IADLs  Anthropometric Measurements: weight, body mass index, weight change  Biochemical Data, Medical Tests and Procedures: electrolyte and renal panel, inflammatory profile, lipid profile, glucose/endocrine profile     Malnutrition Assessment  Malnutrition Type: acute illness or injury  Energy Intake: moderate energy intake  Weight Loss (Malnutrition): greater than 5% in 1 month  Energy Intake (Malnutrition): less than or equal to 50% for greater than or equal to 5 days     Nutrition Follow-Up    RD Follow-up?: Yes

## 2020-04-07 NOTE — CARE UPDATE
POt continues to refuse meds and meals. Was found sitting on toilet with sheet over his head. Pt assisted back to [facility] bed. Nurse able to do POTC blood glucose  
Patient in bed with blanket over his head, still refusing all nursing care, including: meds, meals, and accu check. Becomes angry and aggressive when approached. Message sent to Dr. Almanzar.  
Patient is refusing all meds and meals. Message sent to Dr. Almanzar. Patient's wife called, he also refused to speak to her.    Currently sitting up chair.   
Pt denied bath and also denied food tray   
Detail Level: Detailed
Quality 226: Preventive Care And Screening: Tobacco Use: Screening And Cessation Intervention: Patient screened for tobacco use and is an ex/non-smoker
Quality 110: Preventive Care And Screening: Influenza Immunization: Influenza immunization was not ordered or administered, reason not given
Quality 431: Preventive Care And Screening: Unhealthy Alcohol Use - Screening: Patient screened for unhealthy alcohol use using a single question and scores less than 2 times per year

## 2020-04-07 NOTE — PT/OT/SLP PROGRESS
Physical Therapy  Pt Not Seen    Patient Name:  Marie Reis Jr.   MRN:  [id number]    Patient not seen today secondary to  Patient unwilling to participate(2 attempts for tx session: 1. Pt found in restroom at 9:09 am.  Pt requested that PTA not enter at the time; 2. pt found sup in bed with covers over his head and does not aknowledge PTA's presence.  PTA educates pt importance and benefits of performing exercises and functional mobility, still pt continues to ignore PTA). Will follow-up on next scheduled visit.    Carla Farrell, PTA  4/7/2020

## 2020-04-07 NOTE — PLAN OF CARE
This SW is in communication with  and medical team. Patient is currently refusing therapy at this time to address post-acute needs.SW will continue to follow for discharge needs and offer support as needed.     Lexi Crockett LMSW  Case Management   [facility]

## 2020-04-08 PROBLEM — E11.22 TYPE 2 DIABETES MELLITUS WITH STAGE 4 CHRONIC KIDNEY DISEASE AND HYPERTENSION: Status: ACTIVE | Noted: 2020-01-01

## 2020-04-08 PROBLEM — I12.9 TYPE 2 DIABETES MELLITUS WITH STAGE 4 CHRONIC KIDNEY DISEASE AND HYPERTENSION: Status: ACTIVE | Noted: 2020-01-01

## 2020-04-08 PROBLEM — N18.4 TYPE 2 DIABETES MELLITUS WITH STAGE 4 CHRONIC KIDNEY DISEASE AND HYPERTENSION: Status: ACTIVE | Noted: 2020-01-01

## 2020-04-08 PROBLEM — G93.41 ACUTE METABOLIC ENCEPHALOPATHY: Status: ACTIVE | Noted: 2020-01-01

## 2020-04-08 NOTE — PT/OT/SLP PROGRESS
Occupational Therapy  Treatment    Marie Reis Jr.   MRN: [id number]   Admitting Diagnosis: Acute metabolic encephalopathy    OT Date of Treatment: 04/08/20       Billable Minutes:  Self Care/Home Management 15    General Precautions: Standard, airborne, contact, droplet  Orthopedic Precautions: N/A  Braces: N/A    Spiritual, Cultural Beliefs, Rastafarian Practices, Values that Affect Care: no    Subjective:  Communicated with nurse prior to session.  Pt. Cleared for session    Pain/Comfort  Pain Rating 1: 0/10  Pain Rating Post-Intervention 1: 0/10    Objective:  Patient found with: (standing in dark bathroom alone and naked against grab bar; )    Occupational Performance:    Bed Mobility:    · Patient completed Sit to Supine with moderate assistance     Functional Mobility/Transfers:  · Patient completed Sit <> Stand Transfer with minimum assistance  with  grab bars(s)   · Patient completed Bed <> Chair Transfer using Stand Pivot technique with minimum assistance with grab bars(s)  · Functional Mobility: Pt. Ambulated from bathroom to bed with Min A x 2 to 3 people for safety 2/2 leaning forward and feeling weak.(nauseated)    Activities of Daily Living:  · Grooming Max A on this date to wipe face with cool towel when seated on bedside commode    AMPA 6 Click:  [facility] Total Score: 14    OT Exercises: not performed    Additional Treatment:  Pt. Educated on safety and need for assist to and from bathroom 2/2 weakness    Patient left right sidelying with call button in reach and nurse notified    ASSESSMENT:  Marie Reis Jr. is a 64 y.o. male with a medical diagnosis of Acute metabolic encephalopathy and presents with significant deficits in ADL skills as well as mobility. Pt. Noted to be nauseated on this date and required 2 person assist back to bed for safety. Pt. Very weak on this date.     Rehab identified problem list/impairments: weakness, impaired endurance, impaired self care skills, visual deficits, decreased  coordination    Rehab potential is fair    Activity tolerance: Poor    Discharge recommendations: home with home health will require light assist    Barriers to discharge: Decreased caregiver support(increased required level of care )     Equipment recommendations: bedside commode, shower chair, wheelchair     GOALS:   Multidisciplinary Problems     Occupational Therapy Goals        Problem: Occupational Therapy Goal    Goal Priority Disciplines Outcome Interventions   Occupational Therapy Goal     OT, PT/OT Ongoing, Progressing    Description:  Goals to be met by: 4/30     Patient will increase functional independence with ADLs by performing:    Feeding with Stand-by Assistance.  UE Dressing with Contact Guard Assistance.  LE Dressing with Minimal Assistance.  Grooming while seated with Contact Guard Assistance.  Toileting from toilet with Minimal Assistance for hygiene and clothing management.                       Plan:  Patient to be seen 4 x/week to address the above listed problems via self-care/home management, therapeutic activities, therapeutic exercises  Plan of Care expires: 04/30/20  Plan of Care reviewed with: patient    OBED Rees  04/08/2020

## 2020-04-08 NOTE — PLAN OF CARE
Problem: Adult Inpatient Plan of Care  Goal: Plan of Care Review  4/8/2020 0159 by Jaycee Perez RN  Outcome: Ongoing, Progressing  4/8/2020 0157 by Jaycee Perez, RN  Outcome: Ongoing, Progressing

## 2020-04-08 NOTE — NURSING
Pt in bed bed in lowest position with call light within reach , pt refuses medications when asked to take meds, pt is non compliant to care.

## 2020-04-08 NOTE — PROGRESS NOTES
[facility]   Skilled Nursing Facility   Progress Notes     Patient name: Marie Reis Jr.  MRN: [id number]  Patient Class: IP- SNF  PCP: Nancy Colón MD  Attending Provider: Iraida Almanzar MD  Nurse Practitioner: Alee Blanchard NP  Admission Date: 4/1/2020  LOS: 7 days  Code status: Full Code  Principal Problem:  Acute metabolic encephalopathy      TELEMEDICINE VISIT  Patient Location: [place] [id number]  Provider Location: Provider home  Start / End time:11:04-11:10  Evaluation by video and audio     HPI: Marie Reis Jr. is a 64 y.o. man with PMH Chronic systolic and diastolic HF, DMII CKD IV and HTN, Multiple myeloma (not in remission), SUZANNA (BiPAP), DJD LS spine, CVA 2/2018 admitted [facility] on 3/28 with a cc of fatigue and weakness following an unwitnessed syncopal vs seizure event. Patient was admitted 2/24 for blood transfusion. During virtual visit 3/19 he was determined to have right PCA ischemic stroke that was suspected due to cardioembolic from heart failure. Then returned 3/21 for SOB and coughing for one day. He was found to have COVID-19 pneumonia, NSTEMI and probable COVID myocarditis. Patient admitted again 3/28, day after discharge, for a syncopal episode. Patient was found to be dehydrated. Nursing reports patient minimally drinking, not eating and not taking medications since admission here. He also has not been participating with therapy. During televisit. Patient was not cooperative with exam. Patient laid underneath covers during entire visit. Nurse Layo Fuentes jimbo covers back and patient draw them back over. Upon review of previous admission, patient was not eating and only spoke with prompted. He had one previous PT/OT where he participated with physical prompting. After arrival here he has been actively refusing everything food, medications and interaction. He has kept on his visimonitor. Prior to onset of illness patient weighed 191 lb during office visit with  his PCP 2/21. Patient has 162lb listed as his current weight. He was independent of ADLs, A&O to person, place and time, but had little recall or insight to his medical conditions. Patient did not have any behavioral issues after his CVA in 2/2018.     Patient transferred to [facility] with PT and OT to improve functional status and ability to perform ADLs.      Interval History:  Pt seen and examined.  Continues to refuse all care including mediations, all meals, and PT/OT.  Pt remains disengaged and does not participate in anything with staff including assessment with myself.  Plan is to discharge patient home in near future, in coordination with .         PT/ OT--Declines services    RD--  Diet:Regular--refuses all meals     Past Medical History: Patient has a past medical history of Binocular vision disorder with diplopia (9/22/2016), Bradycardia, Cataract, Cervical spondylosis (10/1/2015), Chronic diastolic congestive heart failure, Chronic diastolic heart failure (1/11/2018), Coronary artery disease due to calcified coronary lesion (3/19/2018), Dyslipidemia (6/26/2015), Encounter for blood transfusion, Hearing impairment (10/25/2013), History of stroke (3/23/2015), Hypertension associated with diabetes (1/11/2018), Hypertensive retinopathy of both eyes (9/26/2017), Lumbar spondylosis (10/1/2015), Multiple myeloma not having achieved remission (1/16/2018), NSTEMI (non-ST elevated myocardial infarction) (3/22/2020), SUZANNA on CPAP, Persistent proteinuria (1/11/2018), Pneumonia of right lower lobe due to infectious organism (3/22/2020), Spondylolisthesis of lumbar region (10/1/2015), Strabismus, Stroke (2014), Thoracic spondylosis (10/1/2015), Type 2 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, with long-term current use of insulin (9/26/2017), Type 2 diabetes mellitus with diabetic polyneuropathy, with long-term current use of insulin (9/28/2015), and Type 2 diabetes  mellitus with stage 4 chronic kidney disease, with long-term current use of insulin (1/11/2018).     Past Surgical History: Patient has a past surgical history that includes left hand fracture sx; Cataract extraction w/  intraocular lens implant (Left, 4/30/15); Hand surgery (2/2012); LT EYE  (5/2/15); Eye surgery; and Colonoscopy (N/A, 1/20/2020).     Social History: Patient reports that he has never smoked. He has never used smokeless tobacco. He reports that he does not drink alcohol or use drugs.     Family History: family history includes Cancer in his mother; Diabetes in his brother, father, mother, and sister; Heart attack in his father; Kidney disease in his mother; No Known Problems in his maternal aunt, maternal grandfather, maternal grandmother, maternal uncle, paternal aunt, paternal grandfather, paternal grandmother, and paternal uncle.     Allergies: Patient is allergic to hydralazine analogues.     ROS:    Review of Systems   Unable to perform ROS: Other (pt uncooperative and disengaged)       PEx     Temp:  [98.6 °F (37 °C)]   Pulse:  [68]   Resp:  [18]   BP: (134)/(79)   SpO2:  [100 %]     Physical Exam:  Constitutional: Patient resting comfortably in no apparent distress; appears sickly and cachectic   Pulmonary:  Breathing comfortably at rest. Normal respiratory rate -per staff  Neurologic: Alert and oriented to person, place, and time. Speech clear and fluent  Psychiatric: Dysphoric mood and withdrawn affect. Uncooperative  Breath sounds (per nursing)--EDMAR--pt uncooperative         No results for input(s): WBC, HGB, HCT, PLT in the last 168 hours.  No results for input(s): GRAN, SEGS, BANDS, BAND, LYMPH, LYMPHS, MONO, MONOCYTES, EOS, EOSINOPHILS in the last 168 hours.  No results for input(s): NA, K, CL, CO2, BUN, CREATININE, GLU, CALCIUM, MG, PHOS in the last 168 hours.  No results for input(s): ALKPHOS, ALT, AST, ALBUMIN, PROT, BILITOT, INR in the last 168 hours.  No results for input(s): LDH,  CRP in the last 168 hours.  No results for input(s): CPK, TROPONINI, BNP in the last 168 hours.  Recent Labs   Lab 12/03/19  0835 02/24/20  1405 02/24/20  1826 03/21/20  1933 03/21/20  2342   PROCAL  --   --   --  0.38*  --    LACTATE  --   --  2.1 3.5* 1.2   FERRITIN 609* 5,977*  --   --   --      Recent Labs   Lab 02/12/20  0904   TSH 1.509     Recent Labs   Lab 04/04/20  1357 04/04/20  1737 04/05/20  1234 04/05/20  1733 04/07/20  1656 04/08/20  0733   POCTGLUCOSE 79 128* 120* 98 85 116*        Baselines:  Hemoglobin   Date Value Ref Range Status   03/31/2020 8.9 (L) 14.0 - 18.0 g/dL Final   03/30/2020 8.6 (L) 14.0 - 18.0 g/dL Final   03/29/2020 8.4 (L) 14.0 - 18.0 g/dL Final   03/28/2020 9.2 (L) 14.0 - 18.0 g/dL Final   03/23/2020 8.2 (L) 14.0 - 18.0 g/dL Final     Creatinine   Date Value Ref Range Status   03/31/2020 3.2 (H) 0.5 - 1.4 mg/dL Final   03/30/2020 3.8 (H) 0.5 - 1.4 mg/dL Final   03/29/2020 4.4 (H) 0.5 - 1.4 mg/dL Final   03/29/2020 4.8 (H) 0.5 - 1.4 mg/dL Final   03/28/2020 5.6 (H) 0.5 - 1.4 mg/dL Final        Scheduled Meds:    amLODIPine  10 mg Oral Daily    atorvastatin  40 mg Oral QHS    carvediloL  25 mg Oral BID WM    enoxaparin  30 mg Subcutaneous Q24H    FLUoxetine  20 mg Oral Daily    montelukast  10 mg Oral QHS    pantoprazole  40 mg Oral Daily    senna-docusate 8.6-50 mg  1 tablet Oral BID     Continuous Infusions:   As Needed: acetaminophen, benzonatate, calcium carbonate, fluticasone propionate, glucagon (human recombinant), glucose, glucose, insulin aspart U-100, melatonin, sodium chloride 0.9%     Active Hospital Problems    Diagnosis  POA    *Acute metabolic encephalopathy [G93.41]  Yes    Type 2 diabetes mellitus with stage 4 chronic kidney disease and hypertension [E11.22, I12.9, N18.4]  Yes    Moderate malnutrition [E44.0]  Yes    Physical deconditioning [R53.81]  Yes    COVID-19 virus infection [U07.1]  Yes    Pulmonary hypertension [I27.20]  Yes    Acute renal  failure superimposed on chronic kidney disease [N17.9, N18.9]  Yes    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 [U07.1, I40.0]  Yes    Acute respiratory disease due to severe acute respiratory syndrome coronavirus 2 (SARS-CoV-2) [U07.1, J06.9]  Yes    Acute on chronic combined systolic and diastolic heart failure [I50.43]  Yes    Pneumonia due 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 [U07.1, J12.89]  Yes    NSTEMI (non-ST elevated myocardial infarction) [I21.4]  Yes    Moderate to severe aortic stenosis [I35.0]  Yes    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, with long-term current use of insulin [E11.3513, E11.65, Z79.4]  Yes    Moderate episode of recurrent major depressive disorder [F33.1]  Yes    Acute renal failure superimposed on stage 4 chronic kidney disease [N17.9, N18.4]  Yes    Multiple myeloma [C90.00]  Yes    Acute on chronic combined systolic and diastolic congestive heart failure [I50.43]  Yes    Chronic combined systolic and diastolic heart failure [I50.42]  Yes      Resolved Hospital Problems   No resolved problems to display.      -------------------------------------------  Assessment and Plan:   ----------------------------------------------  *Impaired functional mobility and endurance  Participation in PT/ OT limited by lack of interest-pt is self-limiting  Overall stable    PT/ OT discontinued due to lack of participation     COVID 19 infection   COVID 19 pneumonia   Acute hypoxic respiratory failure and severe sepsis and pneumonia 2/2 COVID-19 , resolved  Onset of sx 3/21  COVID 19 positive on  3/21  CXR: 3/28/20--Mild cardiomegaly.  No significant airspace consolidation or pleural effusion identified.  Small opacities noted at the right lung base. . No pleural effusion.  Completed course of plaquenil  4/8/2020-  On room air and no longer febrile  Likely does not require isolation at this time  T max -EDMAR--pt uncooperative   SpO2 -EDMAR--pt  uncooperative   Labs - see above     Isolation   Airborne and Droplet Precaution   N95 masks must be fit tested, wear eye protection   Hand hygiene x 20 seconds   Limit visitors per hospital policy   Consolidating lab draws, nursing care, and interventions      Management   Bundle care as able to minimize in/out of room    Supplemental O2 to maintain SpO2 >92%, if requiring 6L NC or higher, place on nonrebreather and discuss case with MICU   Telemetry & continuous Pulse Ox   If wheezing  albuterol INHALER PRN 4puff Q6hr approximates a nebulizer (avoid nebulization of secretions) and ipratropium daily    Cautious use of NSAIDS for fever per WHO recommendations (3/16/2020)   No new ACEi/ARB start or discontinuation of chronic med unless hypotensive      Other Active Problems    Acute metabolic encephalopathy  Recent right PCA ischemic stroke  Probable vascular dementia  Probable depression  Discussed with neurology  Patient will need 6-12 months to determine recovery  Follow up with neurology  Consider PEG tube  PT/OT when able  prozac 20 mg daily when able  CONSIDER IN-PATIENT REHAB AFTER DISCHARGE IF PATIENT EVER STARTS TO COOPERATE WITH THERAPY  Need speech therapy  Patient does not show capacity make medical decision or perform discharge planning - his wife should be signing consents    Syncopal Event due to dehydration   JAZLYN on CKD4  Monitor closely     NSTEMI type II  Acute on Chronic Combined systolic and diastolic HF  Severe pulmonary HTN  Moderate to severe aortic stenosis  Coronary artery disease  - Continue medical management of ASA 81 mg daily, plavix 75 mg daily, atorvastatin 40 mg daily, coreg 25 mg BID  - F/u with cardiology outpatient  Furosemide held due to no oral intake and recent dehydration  plavix and clopidogrel held due possible PEG tube procedure     Multiple myeloma  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  - being treated with bortezomib   - due for cycle 6 on 3/25  - F/u with  oncology     HTN  Continue home Coreg, amlodipine 10 mg daily     DM II with HTN and CKD IV  DMII with diabetic proliferative retinopathy  - A1c in 3/2020 9.1  4/8/2020 -Patient not eating; Hold medications     Deconditioning  PT/OT     Moderate protein calorie malnutrition   anorexia  - boost   Recommend PEG  If family does not want PEG, then comfort care recommended     Degenerative joint disease - PT/OT         Diet:  Regular diet nutritional supplements boost  Lines/ Drains/ Airways - none      Goals of Care: Return to prior functional status     Discharge plan: TBD   Per PT evaluation       Alee Blanchard NP  [facility] 5th floor  TELEMEDICINE VISIT    DOS: 4/8/2020

## 2020-04-08 NOTE — PT/OT/SLP PROGRESS
Physical Therapy      Patient Name:  Marie Reis Jr.   MRN:  [id number]    Patient not seen today secondary to Patient unwilling to participate Will follow-up next scheduled day..    Sandi Joshua, PT

## 2020-04-08 NOTE — NURSING
" Naheed Orta, patient's wife, called to inquire about his status. Notified wife that patient has been declining food, water, medication, vital signs, assessments. Patient states she spoke with dr. vila today and dr vila mentioned having patient set up to  skype with his wife tomorrow 4/9/20. Patient's wife is very happy about this. Mrs. orta also states that she and dr vila discussed patient getting a "feeding tube" placed on Monday 4/13/20.   "

## 2020-04-08 NOTE — PLAN OF CARE
BRYAN spoke with Padmini Romo as it relates to patients insurance coverage for skilled treatment. Padmini reports that she is concerned about the patient consist refusal for PT/OT. BRYAN will follow - up with patients plan of care with his medical team.     Lexi Crockett LMSW  Case Management   [facility]

## 2020-04-09 NOTE — PROGRESS NOTES
[facility]   Skilled Nursing Facility   Progress Notes     Patient name: Marie Reis Jr.  MRN: [id number]  Patient Class: IP- [id number]  PCP: Nancy Colón MD  Attending Provider: Iraida Almanzar MD  Nurse Practitioner: Alee Blanchard NP  Admission Date: 4/1/2020  LOS: 8 days  Code status: Full Code  Principal Problem:  Acute metabolic encephalopathy      TELEMEDICINE VISIT  Patient Location: [place] [id number]  Provider Location: Provider home  Start / End time:15:06-15:11  Evaluation by video and audio     HPI: Marie Reis Jr. is a 64 y.o. man with PMH Chronic systolic and diastolic HF, DMII CKD IV and HTN, Multiple myeloma (not in remission), SUZANNA (BiPAP), DJD LS spine, CVA 2/2018 admitted [facility] on 3/28 with a cc of fatigue and weakness following an unwitnessed syncopal vs seizure event. Patient was admitted 2/24 for blood transfusion. During virtual visit 3/19 he was determined to have right PCA ischemic stroke that was suspected due to cardioembolic from heart failure. Then returned 3/21 for SOB and coughing for one day. He was found to have COVID-19 pneumonia, NSTEMI and probable COVID myocarditis. Patient admitted again 3/28, day after discharge, for a syncopal episode. Patient was found to be dehydrated. Nursing reports patient minimally drinking, not eating and not taking medications since admission here. He also has not been participating with therapy. During televisit. Patient was not cooperative with exam. Patient laid underneath covers during entire visit. Nurse Layo Fuentes jimbo covers back and patient draw them back over. Upon review of previous admission, patient was not eating and only spoke with prompted. He had one previous PT/OT where he participated with physical prompting. After arrival here he has been actively refusing everything food, medications and interaction. He has kept on his visimonitor. Prior to onset of illness patient weighed 191 lb during office visit with  his PCP 2/21. Patient has 162lb listed as his current weight. He was independent of ADLs, A&O to person, place and time, but had little recall or insight to his medical conditions. Patient did not have any behavioral issues after his CVA in 2/2018.     Patient transferred to [facility] with PT and OT to improve functional status and ability to perform ADLs.      Interval History:  Chart reviewed.  Pt refused AM lab draw.  Anticipate PEG placement on Monday to assist with medicinal and nutritional needs. Pt visualized with telemedicine cart and nurse at bedside.  Pt remained non-verbal and covered head with linens.  Remains disengaged and does not participate in anything with staff including assessment with myself.     PT/ OT--Declines services    RD--  Diet:Regular--refuses all meals; Anticipate PEG placement Monday     Past Medical History: Patient has a past medical history of Binocular vision disorder with diplopia (9/22/2016), Bradycardia, Cataract, Cervical spondylosis (10/1/2015), Chronic diastolic congestive heart failure, Chronic diastolic heart failure (1/11/2018), Coronary artery disease due to calcified coronary lesion (3/19/2018), Dyslipidemia (6/26/2015), Encounter for blood transfusion, Hearing impairment (10/25/2013), History of stroke (3/23/2015), Hypertension associated with diabetes (1/11/2018), Hypertensive retinopathy of both eyes (9/26/2017), Lumbar spondylosis (10/1/2015), Multiple myeloma not having achieved remission (1/16/2018), NSTEMI (non-ST elevated myocardial infarction) (3/22/2020), SUZANNA on CPAP, Persistent proteinuria (1/11/2018), Pneumonia of right lower lobe due to infectious organism (3/22/2020), Spondylolisthesis of lumbar region (10/1/2015), Strabismus, Stroke (2014), Thoracic spondylosis (10/1/2015), Type 2 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, with long-term current use of insulin (9/26/2017), Type 2 diabetes mellitus with  diabetic polyneuropathy, with long-term current use of insulin (9/28/2015), and Type 2 diabetes mellitus with stage 4 chronic kidney disease, with long-term current use of insulin (1/11/2018).     Past Surgical History: Patient has a past surgical history that includes left hand fracture sx; Cataract extraction w/  intraocular lens implant (Left, 4/30/15); Hand surgery (2/2012); LT EYE  (5/2/15); Eye surgery; and Colonoscopy (N/A, 1/20/2020).     Social History: Patient reports that he has never smoked. He has never used smokeless tobacco. He reports that he does not drink alcohol or use drugs.     Family History: family history includes Cancer in his mother; Diabetes in his brother, father, mother, and sister; Heart attack in his father; Kidney disease in his mother; No Known Problems in his maternal aunt, maternal grandfather, maternal grandmother, maternal uncle, paternal aunt, paternal grandfather, paternal grandmother, and paternal uncle.     Allergies: Patient is allergic to hydralazine analogues.     ROS:    Review of Systems   Unable to perform ROS: Other (pt uncooperative and disengaged)     PEx        Physical Exam:  Constitutional: Patient resting comfortably in no apparent distress; appears sickly and cachectic   Pulmonary:  Breathing comfortably at rest. Normal respiratory rate -per staff  Neurologic: EDMAR- uncooperative with assessment per staff  Psychiatric: Dysphoric mood and withdrawn affect. Uncooperative  Breath sounds (per nursing)--EDMAR--pt uncooperative       No results for input(s): WBC, HGB, HCT, PLT in the last 168 hours.  No results for input(s): GRAN, SEGS, BANDS, BAND, LYMPH, LYMPHS, MONO, MONOCYTES, EOS, EOSINOPHILS in the last 168 hours.  No results for input(s): NA, K, CL, CO2, BUN, CREATININE, GLU, CALCIUM, MG, PHOS in the last 168 hours.  No results for input(s): ALKPHOS, ALT, AST, ALBUMIN, PROT, BILITOT, INR in the last 168 hours.  No results for input(s): LDH, CRP in the last 168  hours.  No results for input(s): CPK, TROPONINI, BNP in the last 168 hours.  Recent Labs   Lab 12/03/19  0835 02/24/20  1405 02/24/20  1826 03/21/20  1933 03/21/20  2342   PROCAL  --   --   --  0.38*  --    LACTATE  --   --  2.1 3.5* 1.2   FERRITIN 609* 5,977*  --   --   --      Recent Labs   Lab 02/12/20  0904   TSH 1.509     Recent Labs   Lab 04/04/20  1357 04/04/20  1737 04/05/20  1234 04/05/20  1733 04/07/20  1656 04/08/20  0733   POCTGLUCOSE 79 128* 120* 98 85 116*        Baselines:  Hemoglobin   Date Value Ref Range Status   03/31/2020 8.9 (L) 14.0 - 18.0 g/dL Final   03/30/2020 8.6 (L) 14.0 - 18.0 g/dL Final   03/29/2020 8.4 (L) 14.0 - 18.0 g/dL Final   03/28/2020 9.2 (L) 14.0 - 18.0 g/dL Final   03/23/2020 8.2 (L) 14.0 - 18.0 g/dL Final     Creatinine   Date Value Ref Range Status   03/31/2020 3.2 (H) 0.5 - 1.4 mg/dL Final   03/30/2020 3.8 (H) 0.5 - 1.4 mg/dL Final   03/29/2020 4.4 (H) 0.5 - 1.4 mg/dL Final   03/29/2020 4.8 (H) 0.5 - 1.4 mg/dL Final   03/28/2020 5.6 (H) 0.5 - 1.4 mg/dL Final        Scheduled Meds:    amLODIPine  10 mg Oral Daily    atorvastatin  40 mg Oral QHS    carvediloL  25 mg Oral BID WM    enoxaparin  30 mg Subcutaneous Q24H    FLUoxetine  20 mg Oral Daily    montelukast  10 mg Oral QHS    pantoprazole  40 mg Oral Daily    senna-docusate 8.6-50 mg  1 tablet Oral BID     Continuous Infusions:   As Needed: acetaminophen, benzonatate, calcium carbonate, fluticasone propionate, glucagon (human recombinant), glucose, glucose, insulin aspart U-100, melatonin, sodium chloride 0.9%     Active Hospital Problems    Diagnosis  POA    *Acute metabolic encephalopathy [G93.41]  Yes    Type 2 diabetes mellitus with stage 4 chronic kidney disease and hypertension [E11.22, I12.9, N18.4]  Yes    Moderate malnutrition [E44.0]  Yes    Physical deconditioning [R53.81]  Yes    COVID-19 virus infection [U07.1]  Yes    Pulmonary hypertension [I27.20]  Yes    Acute renal failure superimposed on  chronic kidney disease [N17.9, N18.9]  Yes    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 [U07.1, I40.0]  Yes    Acute respiratory disease due to severe acute respiratory syndrome coronavirus 2 (SARS-CoV-2) [U07.1, J06.9]  Yes    Acute on chronic combined systolic and diastolic heart failure [I50.43]  Yes    Pneumonia due 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 [U07.1, J12.89]  Yes    NSTEMI (non-ST elevated myocardial infarction) [I21.4]  Yes    Moderate to severe aortic stenosis [I35.0]  Yes    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, with long-term current use of insulin [E11.3513, E11.65, Z79.4]  Yes    Moderate episode of recurrent major depressive disorder [F33.1]  Yes    Acute renal failure superimposed on stage 4 chronic kidney disease [N17.9, N18.4]  Yes    Multiple myeloma [C90.00]  Yes    Acute on chronic combined systolic and diastolic congestive heart failure [I50.43]  Yes    Chronic combined systolic and diastolic heart failure [I50.42]  Yes      Resolved Hospital Problems   No resolved problems to display.      -------------------------------------------  Assessment and Plan:   ----------------------------------------------  *Impaired functional mobility and endurance  Participation in PT/ OT limited by lack of interest-pt is self-limiting  Overall stable    PT/ OT discontinued due to lack of participation     COVID 19 infection   COVID 19 pneumonia   Acute hypoxic respiratory failure and severe sepsis and pneumonia 2/2 COVID-19 , resolved  Onset of sx 3/21  COVID 19 positive on  3/21  CXR: 3/28/20--Mild cardiomegaly.  No significant airspace consolidation or pleural effusion identified.  Small opacities noted at the right lung base. . No pleural effusion.  Completed course of plaquenil  4/9/2020-  On room air and no longer febrile  Likely does not require isolation at this time  T max -EDMAR--pt uncooperative   SpO2 -EDMAR--pt uncooperative   Labs - see  above     Isolation   Airborne and Droplet Precaution   N95 masks must be fit tested, wear eye protection   Hand hygiene x 20 seconds   Limit visitors per hospital policy   Consolidating lab draws, nursing care, and interventions      Management   Bundle care as able to minimize in/out of room    Supplemental O2 to maintain SpO2 >92%, if requiring 6L NC or higher, place on nonrebreather and discuss case with MICU   Telemetry & continuous Pulse Ox   If wheezing  albuterol INHALER PRN 4puff Q6hr approximates a nebulizer (avoid nebulization of secretions) and ipratropium daily    Cautious use of NSAIDS for fever per WHO recommendations (3/16/2020)   No new ACEi/ARB start or discontinuation of chronic med unless hypotensive      Other Active Problems    Acute metabolic encephalopathy  Recent right PCA ischemic stroke  Probable vascular dementia  Probable depression  Discussed with neurology  Patient will need 6-12 months to determine recovery  Follow up with neurology  Consider PEG tube  PT/OT when able  prozac 20 mg daily when able  CONSIDER IN-PATIENT REHAB AFTER DISCHARGE IF PATIENT EVER STARTS TO COOPERATE WITH THERAPY  Need speech therapy  Patient does not show capacity make medical decision or perform discharge planning - his wife should be signing consents    Syncopal Event due to dehydration   JAZLYN on CKD4  Monitor closely     NSTEMI type II  Acute on Chronic Combined systolic and diastolic HF  Severe pulmonary HTN  Moderate to severe aortic stenosis  Coronary artery disease  - Continue medical management of ASA 81 mg daily, plavix 75 mg daily, atorvastatin 40 mg daily, coreg 25 mg BID  - F/u with cardiology outpatient  Furosemide held due to no oral intake and recent dehydration  plavix and clopidogrel held due possible PEG tube procedure     Multiple myeloma  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  - being treated with bortezomib   - due for cycle 6 on 3/25  - F/u with oncology     HTN  Continue home Coreg,  amlodipine 10 mg daily     DM II with HTN and CKD IV  DMII with diabetic proliferative retinopathy  - A1c in 3/2020 9.1  4/9/2020 -Patient not eating; Hold medications     Deconditioning  PT/OT     Moderate protein calorie malnutrition   anorexia  - boost   Recommend PEG  If family does not want PEG, then comfort care recommended     Degenerative joint disease - PT/OT         Diet:  Regular diet nutritional supplements boost  Lines/ Drains/ Airways - none      Goals of Care: Return to prior functional status     Discharge plan: TBD   Per PT evaluation       Alee Blanchard NP  [facility] 5th floor  TELEMEDICINE VISIT    DOS: 4/9/2020

## 2020-04-09 NOTE — PT/OT/SLP PROGRESS
Speech Language Pathology      Marie Reis Jr.  MRN: [id number]    Patient not seen today secondary to Patient unwilling to participate on attempt. Patient pulled covers over his head and would not acknowledge SLP. SLP educated patient regarding role of ST and the benefits of therapy, but he did not participate.     Karson Randall CCC-SLP  Speech-Language Pathology  Pager: [phone]

## 2020-04-09 NOTE — PT/OT/SLP PROGRESS
Physical Therapy      Patient Name:  Marie Reis Jr.   MRN:  [id number]    Patient not seen today secondary to Patient unwilling to participate. Patient stated he did not feel well and would not pull covers from his face and did not want to sit up. Pt edu'd on importance of OOB activity and participation in therapy, pt still refused. Pt also stated he would not participate in a session tomorrow 4/10/20. Will still follow-up 4/10/20 as appropriate.    Charito Blair, PTA

## 2020-04-09 NOTE — NURSING
Patient refused to show ID wristband,refused medication and and vitals.  Left patient in bed with eyes open, no acute s/s of distress.

## 2020-04-09 NOTE — PLAN OF CARE
Problem: Adult Inpatient Plan of Care  Goal: Plan of Care Review  Outcome: Ongoing, Progressing     Problem: Adult Inpatient Plan of Care  Goal: Patient-Specific Goal (Individualization)  Outcome: Ongoing, Progressing     Bed locked and low, call bell within reach, safety monitored

## 2020-04-10 NOTE — NURSING
Patient took medications with encouragement this am, patient was calm and cooperative for bathing and assessment.

## 2020-04-10 NOTE — PROGRESS NOTES
[facility]   Skilled Nursing Facility   Progress Notes     Patient name: Marie Reis Jr.  MRN: [id number]  Patient Class: IP- SNF  PCP: Nancy Colón MD  Attending Provider: Iraida Almanzar MD  Nurse Practitioner: Alee Blanchard NP  Admission Date: 4/1/2020  LOS: 9 days  Code status: Full Code  Principal Problem:  Acute metabolic encephalopathy      TELEMEDICINE VISIT  Patient Location: [place] [id number]  Provider Location: Provider home  Start / End time:14:11-14:16  Evaluation by video and audio     HPI: Marie Reis Jr. is a 64 y.o. man with PMH Chronic systolic and diastolic HF, DMII CKD IV and HTN, Multiple myeloma (not in remission), SUZANNA (BiPAP), DJD LS spine, CVA 2/2018 admitted [facility] on 3/28 with a cc of fatigue and weakness following an unwitnessed syncopal vs seizure event. Patient was admitted 2/24 for blood transfusion. During virtual visit 3/19 he was determined to have right PCA ischemic stroke that was suspected due to cardioembolic from heart failure. Then returned 3/21 for SOB and coughing for one day. He was found to have COVID-19 pneumonia, NSTEMI and probable COVID myocarditis. Patient admitted again 3/28, day after discharge, for a syncopal episode. Patient was found to be dehydrated. Nursing reports patient minimally drinking, not eating and not taking medications since admission here. He also has not been participating with therapy. During televisit. Patient was not cooperative with exam. Patient laid underneath covers during entire visit. Nurse Layo Fuentes jimbo covers back and patient draw them back over. Upon review of previous admission, patient was not eating and only spoke with prompted. He had one previous PT/OT where he participated with physical prompting. After arrival here he has been actively refusing everything food, medications and interaction. He has kept on his visimonitor. Prior to onset of illness patient weighed 191 lb during office visit with  his PCP 2/21. Patient has 162lb listed as his current weight. He was independent of ADLs, A&O to person, place and time, but had little recall or insight to his medical conditions. Patient did not have any behavioral issues after his CVA in 2/2018.     Patient transferred to [facility] with PT and OT to improve functional status and ability to perform ADLs.      Interval History:  Nursing notes and vitals reviewed.  Pt seen and examined visually via telemedicine cart with nurse at bedside.  Pt remains non-verbal with interview, but staff reports cooperation with medication today, which is a great improvement given he has refused everything all week.  Anticipate PEG placement on Monday to assist with medicinal and nutritional needs.  Cont plan of care.      PT/ OT--Declines services    RD--  Diet: Regular--refuses all meals; Anticipate PEG placement Monday     Past Medical History: Patient has a past medical history of Binocular vision disorder with diplopia (9/22/2016), Bradycardia, Cataract, Cervical spondylosis (10/1/2015), Chronic diastolic congestive heart failure, Chronic diastolic heart failure (1/11/2018), Coronary artery disease due to calcified coronary lesion (3/19/2018), Dyslipidemia (6/26/2015), Encounter for blood transfusion, Hearing impairment (10/25/2013), History of stroke (3/23/2015), Hypertension associated with diabetes (1/11/2018), Hypertensive retinopathy of both eyes (9/26/2017), Lumbar spondylosis (10/1/2015), Multiple myeloma not having achieved remission (1/16/2018), NSTEMI (non-ST elevated myocardial infarction) (3/22/2020), SUZANNA on CPAP, Persistent proteinuria (1/11/2018), Pneumonia of right lower lobe due to infectious organism (3/22/2020), Spondylolisthesis of lumbar region (10/1/2015), Strabismus, Stroke (2014), Thoracic spondylosis (10/1/2015), Type 2 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, with long-term current use of insulin  (9/26/2017), Type 2 diabetes mellitus with diabetic polyneuropathy, with long-term current use of insulin (9/28/2015), and Type 2 diabetes mellitus with stage 4 chronic kidney disease, with long-term current use of insulin (1/11/2018).     Past Surgical History: Patient has a past surgical history that includes left hand fracture sx; Cataract extraction w/  intraocular lens implant (Left, 4/30/15); Hand surgery (2/2012); LT EYE  (5/2/15); Eye surgery; and Colonoscopy (N/A, 1/20/2020).     Social History: Patient reports that he has never smoked. He has never used smokeless tobacco. He reports that he does not drink alcohol or use drugs.     Family History: family history includes Cancer in his mother; Diabetes in his brother, father, mother, and sister; Heart attack in his father; Kidney disease in his mother; No Known Problems in his maternal aunt, maternal grandfather, maternal grandmother, maternal uncle, paternal aunt, paternal grandfather, paternal grandmother, and paternal uncle.     Allergies: Patient is allergic to hydralazine analogues.     ROS:    Review of Systems   Unable to perform ROS: Other (pt uncooperative and disengaged)     PEx     BP: (129)/(84)   Physical Exam:  Constitutional: Patient resting comfortably in no apparent distress; appears sickly and cachectic   Pulmonary:  Breathing comfortably at rest. Normal respiratory rate -per staff  Neurologic: EDMAR- uncooperative with assessment per staff  Psychiatric: Dysphoric mood and withdrawn affect. Uncooperative  Breath sounds (per nursing)--EDMAR--pt uncooperative       No results for input(s): WBC, HGB, HCT, PLT in the last 168 hours.  No results for input(s): GRAN, SEGS, BANDS, BAND, LYMPH, LYMPHS, MONO, MONOCYTES, EOS, EOSINOPHILS in the last 168 hours.  No results for input(s): NA, K, CL, CO2, BUN, CREATININE, GLU, CALCIUM, MG, PHOS in the last 168 hours.  No results for input(s): ALKPHOS, ALT, AST, ALBUMIN, PROT, BILITOT, INR in the last 168  hours.  No results for input(s): LDH, CRP in the last 168 hours.  No results for input(s): CPK, TROPONINI, BNP in the last 168 hours.  Recent Labs   Lab 12/03/19  0835 02/24/20  1405 02/24/20  1826 03/21/20  1933 03/21/20  2342   PROCAL  --   --   --  0.38*  --    LACTATE  --   --  2.1 3.5* 1.2   FERRITIN 609* 5,977*  --   --   --      Recent Labs   Lab 02/12/20  0904   TSH 1.509     Recent Labs   Lab 04/05/20  1234 04/05/20  1733 04/07/20  1656 04/08/20  0733 04/09/20  0836 04/10/20  0901   POCTGLUCOSE 120* 98 85 116* 112* 317*        Baselines:  Hemoglobin   Date Value Ref Range Status   03/31/2020 8.9 (L) 14.0 - 18.0 g/dL Final   03/30/2020 8.6 (L) 14.0 - 18.0 g/dL Final   03/29/2020 8.4 (L) 14.0 - 18.0 g/dL Final   03/28/2020 9.2 (L) 14.0 - 18.0 g/dL Final   03/23/2020 8.2 (L) 14.0 - 18.0 g/dL Final     Creatinine   Date Value Ref Range Status   03/31/2020 3.2 (H) 0.5 - 1.4 mg/dL Final   03/30/2020 3.8 (H) 0.5 - 1.4 mg/dL Final   03/29/2020 4.4 (H) 0.5 - 1.4 mg/dL Final   03/29/2020 4.8 (H) 0.5 - 1.4 mg/dL Final   03/28/2020 5.6 (H) 0.5 - 1.4 mg/dL Final        Scheduled Meds:    amLODIPine  10 mg Oral Daily    atorvastatin  40 mg Oral QHS    carvediloL  25 mg Oral BID WM    enoxaparin  30 mg Subcutaneous Q24H    FLUoxetine  20 mg Oral Daily    montelukast  10 mg Oral QHS    pantoprazole  40 mg Oral Daily    senna-docusate 8.6-50 mg  1 tablet Oral BID     Continuous Infusions:   As Needed: acetaminophen, benzonatate, calcium carbonate, fluticasone propionate, glucagon (human recombinant), glucose, glucose, insulin aspart U-100, melatonin, sodium chloride 0.9%     Active Hospital Problems    Diagnosis  POA    *Acute metabolic encephalopathy [G93.41]  Yes    Type 2 diabetes mellitus with stage 4 chronic kidney disease and hypertension [E11.22, I12.9, N18.4]  Yes    Moderate malnutrition [E44.0]  Yes    Physical deconditioning [R53.81]  Yes    COVID-19 virus infection [U07.1]  Yes    Pulmonary  hypertension [I27.20]  Yes    Acute renal failure superimposed on chronic kidney disease [N17.9, N18.9]  Yes    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 [U07.1, I40.0]  Yes    Acute respiratory disease due to severe acute respiratory syndrome coronavirus 2 (SARS-CoV-2) [U07.1, J06.9]  Yes    Acute on chronic combined systolic and diastolic heart failure [I50.43]  Yes    Pneumonia due 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 [U07.1, J12.89]  Yes    NSTEMI (non-ST elevated myocardial infarction) [I21.4]  Yes    Moderate to severe aortic stenosis [I35.0]  Yes    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, with long-term current use of insulin [E11.3513, E11.65, Z79.4]  Yes    Moderate episode of recurrent major depressive disorder [F33.1]  Yes    Acute renal failure superimposed on stage 4 chronic kidney disease [N17.9, N18.4]  Yes    Multiple myeloma [C90.00]  Yes    Acute on chronic combined systolic and diastolic congestive heart failure [I50.43]  Yes    Chronic combined systolic and diastolic heart failure [I50.42]  Yes      Resolved Hospital Problems   No resolved problems to display.      -------------------------------------------  Assessment and Plan:   ----------------------------------------------  *Impaired functional mobility and endurance  Participation in PT/ OT limited by lack of interest-pt is self-limiting  Overall stable    PT/ OT discontinued due to lack of participation     COVID 19 infection   COVID 19 pneumonia   Acute hypoxic respiratory failure and severe sepsis and pneumonia 2/2 COVID-19 , resolved  Onset of sx 3/21  COVID 19 positive on  3/21  CXR: 3/28/20--Mild cardiomegaly.  No significant airspace consolidation or pleural effusion identified.  Small opacities noted at the right lung base. . No pleural effusion.  Completed course of plaquenil  4/10/2020-  On room air and no longer febrile  Likely does not require isolation at this time  T max  -EDMAR--pt uncooperative   SpO2 -EDMAR--pt uncooperative   Labs - see above     Isolation   Airborne and Droplet Precaution   N95 masks must be fit tested, wear eye protection   Hand hygiene x 20 seconds   Limit visitors per hospital policy   Consolidating lab draws, nursing care, and interventions      Management   Bundle care as able to minimize in/out of room    Supplemental O2 to maintain SpO2 >92%, if requiring 6L NC or higher, place on nonrebreather and discuss case with MICU   Telemetry & continuous Pulse Ox   If wheezing  albuterol INHALER PRN 4puff Q6hr approximates a nebulizer (avoid nebulization of secretions) and ipratropium daily    Cautious use of NSAIDS for fever per WHO recommendations (3/16/2020)   No new ACEi/ARB start or discontinuation of chronic med unless hypotensive      Other Active Problems    Acute metabolic encephalopathy  Recent right PCA ischemic stroke  Probable vascular dementia  Probable depression  Discussed with neurology  Patient will need 6-12 months to determine recovery  Follow up with neurology  Consider PEG tube  PT/OT when able  prozac 20 mg daily when able  CONSIDER IN-PATIENT REHAB AFTER DISCHARGE IF PATIENT EVER STARTS TO COOPERATE WITH THERAPY  Need speech therapy  Patient does not show capacity make medical decision or perform discharge planning - his wife should be signing consents    Syncopal Event due to dehydration   JAZLYN on CKD4  Monitor closely     NSTEMI type II  Acute on Chronic Combined systolic and diastolic HF  Severe pulmonary HTN  Moderate to severe aortic stenosis  Coronary artery disease  - Continue medical management of ASA 81 mg daily, plavix 75 mg daily, atorvastatin 40 mg daily, coreg 25 mg BID  - F/u with cardiology outpatient  Furosemide held due to no oral intake and recent dehydration  plavix and clopidogrel held due possible PEG tube procedure     Multiple myeloma  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  - being treated with bortezomib   - due for cycle 6  on 3/25  - F/u with oncology     HTN  Continue home Coreg, amlodipine 10 mg daily     DM II with HTN and CKD IV  DMII with diabetic proliferative retinopathy  - A1c in 3/2020 9.1  4/10/2020 -Patient not eating; Hold medications     Deconditioning  PT/OT     Moderate protein calorie malnutrition   anorexia  - boost   Recommend PEG  If family does not want PEG, then comfort care recommended     Degenerative joint disease - PT/OT         Diet:  Regular diet nutritional supplements boost  Lines/ Drains/ Airways - none      Goals of Care: Return to prior functional status     Discharge plan: TBD   Per PT evaluation       Alee Blanchard NP  [facility] 5th floor  TELEMEDICINE VISIT    DOS: 4/10/2020

## 2020-04-10 NOTE — NURSING
Patient refused blood glucose test and medication , patient refusing to eat lunch provided. Wife had clothes sent to room, and patient refused to dress , patient only allowed me to apply knit hat and shirt. Encouragement provided

## 2020-04-10 NOTE — PROGRESS NOTES
[facility] - Skilled Nursing Care  Adult Nutrition  Progress Note    SUMMARY       Recommendations    Recommendation:   1. Continue to encourage PO and supplement intake.   2. If PEG placed rec'd TF of Diabetisource @ 10 mL/hr to increase by 5 mL q3hrs to a goal rate of 65mL/hr to provide 1872 kcal, 94g protein and 1276 mL free fluid. FWF of 150mL q6hrs to meet fluid needs or per MD.   - When medically able advance TF to bolus - 1 carton 6x/day to provide 1800kcal, 90g pro, and 1224mL free fluid.     Goals: PO intake > 50% EEN, EPN by next RD visit   Nutrition Goal Status: goal not met  Communication of RD Recs: other (comment)(POC)    Reason for Assessment    Reason For Assessment: RD follow-up  Diagnosis: other (see comments)(Acute Renal Failure superimposed on CKD)  Relevant Medical History: CVA, CAD, DM2, Dyslipidemia, HTN, CHF, SUZANNA on CPAP, NSTEMI, CKDIV, Vit D def, PAD, Hemiplegia, Gemiparesis  Interdisciplinary Rounds: did not attend  General Information Comments:   4/10/20: RD reporting remotely; pt disoriented, spoke with RN over the phone. Pt continues to refuse meals with poor PO intake. Pt drinking < 25% of Boost Glucose Control. Anticipated PEG placement on monday. RN continues to encourage PO and supplement intake. No N/V/D noted; constipation x 5 days. New wt to be obtained.  4/7/20: RD reporting remotely; RD called pt x 2 without answer. Pt was discussed in IDT; pt refusing meds, food and tx. Dr Almanzar will be notified. Pt has been refusing to eat since 4/4. Pt likely not drinking Boost Glucose Control at time time. No N/V/D/C noted in chart. New wt to be obtained.  4/3/20: RD reporting remotely; RD called pt x 2 without answer, obtained information from chart. Pt has mostly had a poor appetite since 3/30. PT has Boost Glucose Control ordered TID. No N/V/D noted in chart. Constipation x 3 days. Pt with 29# wt loss x 1 month. Pt meets criteria for moderate malnutrition due to poor PO intake x 1 week and  "weight loss. NFPE not performed, Patient is noted as being positive for COVID-19.   Nutrition Discharge Planning: Adequate PO intake on [id number] and 2gm Na diets     Nutrition Risk Screen    Nutrition Risk Screen: no indicators present    Nutrition/Diet History    Spiritual, Cultural Beliefs, Yazidi Practices, Values that Affect Care: no  Food Allergies: NKFA  Factors Affecting Nutritional Intake: decreased appetite    Anthropometrics    Temp: (refused)  Height: 6' 1" (185.4 cm)  Height (inches): 73 in  Weight: 73.7 kg (162 lb 7.7 oz)(From previous encounter on 3/28; NEW WT TO BE OBTAINED)  Weight (lb): 162.48 lb  Ideal Body Weight (IBW), Male: 184 lb  % Ideal Body Weight, Male (lb): 88.3 %  BMI (Calculated): 21.4  BMI Grade: 18.5-24.9 - normal  Weight Loss: unintentional  Usual Body Weight (UBW), k.82 kg  % Usual Body Weight: 85.07  % Weight Change From Usual Weight: -15.11 %     Lab/Procedures/Meds    Pertinent Labs Reviewed: reviewed  Pertinent Labs Comments: BUN 45, Crt 3.2, eGFR 23, Ca 8.6, Alb 2.6, A1C 9.1% (3/19)  Pertinent Medications Reviewed: reviewed  Pertinent Medications Comments: amlodipine, aspirin, statin, carvediolol, clopidogrel, insulin, montelukcast, senna-docusate, pantoprazole    Physical Findings/Assessment    Connor Score 15    Estimated/Assessed Needs    Weight Used For Calorie Calculations: 73.7 kg (162 lb 7.7 oz)  Energy Calorie Requirements (kcal): 1843 - 2211 kcal/day  Energy Need Method: Kcal/kg(25 - 30 kcal/kg)  Protein Requirements: 89 - 111 g/day(1.2 - 1.5 g/kg)  Weight Used For Protein Calculations: 74 kg (163 lb 2.3 oz)  Fluid Requirements (mL): 1 mL/kcal or per MD   Estimated Fluid Requirement Method: RDA Method  RDA Method (mL): 1843  CHO Requirement: 216 g/day or 40% of daily EEN     Nutrition Prescription Ordered    Current Diet Order: Regular   Oral Nutrition Supplement: Boost Glucose Control TID     Evaluation of Received Nutrient/Fluid Intake    I/O: +480  Energy " Calories Required: not meeting needs  Protein Required: not meeting needs  Fluid Required: meeting needs  Comments: LBM 4/5  Tolerance: not tolerating  % Intake of Estimated Energy Needs: 0 - 25 %   % Meal Intake: 0 - 25 %     Nutrition Risk    Level of Risk/Frequency of Follow-up: high(2x/week)     Assessment and Plan    Moderate malnutrition  Nutrition Problem:  Moderate Protein-Calorie Malnutrition  Malnutrition in the context of Acute Illness/Injury     Related to (etiology):  Decreased appetite      Signs and Symptoms (as evidenced by):  Energy Intake: <75% of estimated energy requirement for more than 5 days  Weight Loss: 15% x 1 month     Interventions(treatment strategy):  Collaboration with other providers  [employer] - Boost Glucose Control TID to provide calories and protein      Nutrition Diagnosis Status:  Continues    Monitor and Evaluation    Food and Nutrient Intake: energy intake, food and beverage intake  Food and Nutrient Adminstration: diet order  Knowledge/Beliefs/Attitudes: food and nutrition knowledge/skill  Physical Activity and Function: nutrition-related ADLs and IADLs  Anthropometric Measurements: weight, body mass index, weight change  Biochemical Data, Medical Tests and Procedures: electrolyte and renal panel, inflammatory profile, lipid profile, glucose/endocrine profile     Malnutrition Assessment  Malnutrition Type: acute illness or injury  Energy Intake: moderate energy intake  Weight Loss (Malnutrition): greater than 5% in 1 month  Energy Intake (Malnutrition): less than or equal to 50% for greater than or equal to 5 days     Nutrition Follow-Up    RD Follow-up?: Yes

## 2020-04-10 NOTE — NURSING
Patient remained under his blanket, Patient refused to show ID wristband,refused medication and  vitals.  Left patient in bed with eyes open, no acute s/s of distress

## 2020-04-10 NOTE — PLAN OF CARE
Recommendation:   1. Continue to encourage PO and supplement intake.   2. If PEG placed rec'd TF of Diabetisource @ 10 mL/hr to increase by 5 mL q3hrs to a goal rate of 65mL/hr to provide 1872 kcal, 94g protein and 1276 mL free fluid. FWF of 150mL q6hrs to meet fluid needs or per MD.   - When medically able advance TF to bolus - 1 carton 6x/day to provide 1800kcal, 90g pro, and 1224mL free fluid.     Goals: PO intake > 50% EEN, EPN by next RD visit   Nutrition Goal Status: goal not met  Communication of RD Recs: other (comment)(POC)    Problem: Malnutrition  Goal: Improved Nutritional Intake  Outcome: Ongoing, Progressing

## 2020-04-11 NOTE — DISCHARGE INSTRUCTIONS
Patient was evaluated for fall.    Please labs just showed a mild increase in creatinine to 4.3 (discharge with 3.8 at previous hospitalization).  He was given 250 cc of normal saline.  We recommend good p.o. hydration at nursing home and follow-up kidney function within 5 days.    His brain CT/C-spine as well as x-rays of his knee did not show any signs of dangerous traumatic pathology.    His other labs were at baseline.  His mental status was at baseline.    Patient will be discharged back to nursing home with continued care per nursing home and prompt follow-up for his kidney function.    He was hemodynamically stable during the entire emergency department visit.    ED return precautions for any new, worsening or worrisome symptoms.

## 2020-04-11 NOTE — PROGRESS NOTES
Spoke with Wife Naheed , awaiting transfer back from ER, will give an update when patient is transferred back to the unit

## 2020-04-11 NOTE — ED NOTES
Assisted to bathroom by tech. Resting in bed. Food provided. Call bell within reach. Will continue to monitor.

## 2020-04-11 NOTE — NURSING
Pt compliant with meds and VS. Consumed 50% of dinner, drank 1400 cc of water despite instructions to slow down on fluid intake pt had x1 episode of emesis 360 cc of clear liquid in emesis bag. Verbalized relief following emesis. Resting peacefully with call light in reach, will continue to monitor. Safety maintained.

## 2020-04-11 NOTE — ED TRIAGE NOTES
Presents via EMS from [facility] after a fall at 0915 this morning. Hit his head on the left side with a laceration to eyebrow. Hurt his left knee as well. Fall witnessed by nurse. No change in LOC reported.  per EMS. Covid positive.

## 2020-04-11 NOTE — ED NOTES
"Called the rehab facility to get information on patient. Nurse who has taken care of him for the past 2 days reports patient is at baseline, "withdrawn". States patient only responds to some questions and mostly just mumbles. Denied change of LOC. Reports patient normally is ambulatory with a walker, but he fell this morning trying to go to the bathroom. Physician informed of report.   "

## 2020-04-11 NOTE — PLAN OF CARE
Problem: Adult Inpatient Plan of Care  Goal: Plan of Care Review  Outcome: Ongoing, Progressing  Flowsheets (Taken 4/11/2020 0626)  Plan of Care Reviewed With: patient     Pt remains injury free, more alert. Encouraged frequent turning throughout shift  Respirations even and unlabored on room air. Pt was cooperative, took scheduled medications and allowed VS and CBG checks. Pt stated he did not like the dinner, therefore he did not want to eat it. Provided options of lean cuisines in the freezer on the unit, pt said he would eat the mashed potatoes and meatloaf, pt consumed 50% of meal and drank excessive fluids of 1400cc water. Pt quickly had an upset stomach from fluid intake and had x1 episode of emesis 360 cc clear liquid, quickly verbalized relief. Pt remained more alert and attentive to answering questions throughout shift. Denied pain throughout shift. Resting in bed peacefully, side rails up x2 with call light within reach.  No acute distress noted, safety maintained.

## 2020-04-11 NOTE — ED PROVIDER NOTES
Encounter Date: 4/11/2020       History     Chief Complaint   Patient presents with    Fall     Pt fell and hit his head (witnessed by nurse) fell out of bed at 915 am. Pt has 3 cm cut the left head and left knee. Denies LOC. AOx1 at baseline. COVID +     HPI  64-year-old male with past medical history of stroke, multiple myeloma, CAD, CHF, hypertension, hyperlipidemia, NSTEMI, diabetes, recent diagnosis COVID with associated COVID pneumonia discharged to [facility] skilled nursing facility coming in secondary to a fall around 915 this morning reportedly fell out of bed hitting his head and left knee.  With EMS was a&o x1, complaining of generalized pain, hemodynamically stable.    With this provider patient does not provide any further history.  Only says his name and follows basic commands.  Does not answer questions.     Review of patient's allergies indicates:   Allergen Reactions    Hydralazine analogues      Increase swelling and throat itching and tightness with use.  Symptoms correlate only with this medication onset in hospital.     Past Medical History:   Diagnosis Date    Binocular vision disorder with diplopia 9/22/2016    Bradycardia     Cataract     Cervical spondylosis 10/1/2015    Chronic diastolic congestive heart failure     Chronic diastolic heart failure 1/11/2018    Coronary artery disease due to calcified coronary lesion 3/19/2018    Dyslipidemia 6/26/2015    Encounter for blood transfusion     Hearing impairment 10/25/2013    History of stroke 3/23/2015    Hypertension associated with diabetes 1/11/2018    Hypertensive retinopathy of both eyes 9/26/2017    Lumbar spondylosis 10/1/2015    Multiple myeloma not having achieved remission 1/16/2018    NSTEMI (non-ST elevated myocardial infarction) 3/22/2020    SUZANNA on CPAP     Persistent proteinuria 1/11/2018    Pneumonia of right lower lobe due to infectious organism 3/22/2020    Spondylolisthesis of lumbar region 10/1/2015     Strabismus     Stroke 2014    Thoracic spondylosis 10/1/2015    Type 2 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, with long-term current use of insulin 9/26/2017    Type 2 diabetes mellitus with diabetic polyneuropathy, with long-term current use of insulin 9/28/2015    Type 2 diabetes mellitus with stage 4 chronic kidney disease, with long-term current use of insulin 1/11/2018     Past Surgical History:   Procedure Laterality Date    CATARACT EXTRACTION W/  INTRAOCULAR LENS IMPLANT Left 4/30/15    Diane    COLONOSCOPY N/A 1/20/2020    Procedure: COLONOSCOPY;  Surgeon: Salvador Espinosa MD;  [facility];  Service: Endoscopy;  Laterality: N/A;    EYE SURGERY      cataract removal left eye    HAND SURGERY  2/2012    DR. OLIVIA MOLINA    left hand fracture sx      LT EYE   5/2/15    DR KULLMAN [facility]     Family History   Problem Relation Age of Onset    Diabetes Mother     Cancer Mother     Kidney disease Mother     Diabetes Father     Heart attack Father     Diabetes Sister     Diabetes Brother     No Known Problems Maternal Aunt     No Known Problems Maternal Uncle     No Known Problems Paternal Aunt     No Known Problems Paternal Uncle     No Known Problems Maternal Grandmother     No Known Problems Maternal Grandfather     No Known Problems Paternal Grandmother     No Known Problems Paternal Grandfather     Blindness Neg Hx     Anesthesia problems Neg Hx     Amblyopia Neg Hx     Cataracts Neg Hx     Glaucoma Neg Hx     Hypertension Neg Hx     Macular degeneration Neg Hx     Retinal detachment Neg Hx     Strabismus Neg Hx     Stroke Neg Hx     Thyroid disease Neg Hx     Heart disease Neg Hx     Heart failure Neg Hx     Hyperlipidemia Neg Hx      Social History     Tobacco Use    Smoking status: Never Smoker    Smokeless tobacco: Never Used   Substance Use Topics    Alcohol use: No     Frequency: Never     Drinks  per session: Patient refused     Binge frequency: Never    Drug use: No     Review of Systems   Unable to obtain secondary to patient mental status / participation    Physical Exam     Initial Vitals [04/11/20 1119]   BP Pulse Resp Temp SpO2   (!) 140/83 79 18 98.6 °F (37 °C) 100 %      MAP       --         Physical Exam   Physical Exam:  CONSTITUTIONAL: Well developed, well nourished, in no acute distress.  HENT: Normocephalic, 2 cm well-approximated lack above the left eyebrow, hemostatic, there is no focal tenderness to palpation or bony deformity around the eyes.   EYES: Sclerae anicteric, fevers equal round reactive, extraocular movements are intact.  NECK: Supple, no thyroid enlargement  RESPIRATORY:  Breathing comfortably.   ABDOMEN: Soft and nontender, no masses, no rebound or guarding   NEUROLOGIC:  Sleeping eyes closed, awakens to verbal stimuli and follows basic commands such as squeezing my hand or move her toes. No facial droop.  Able to  both hands roughly equal strength, able to move both sets toes of  BACK:  There is no C, T or L-spine tenderness.  There is no signs of trauma on the back.  MSK:  Pelvis is stable, there is no deformity or tenderness to palpation to the bilateral upper extremity and bilateral lower extremity.  Abrasion of the left knee.  Moving all four extremities.  Skin: Warm and dry. No visible rash on exposed areas of skin.    Psych:  Calm, lethargic appearing.      ED Course   Procedures  Labs Reviewed - No data to display  EKG Readings: (Independently Interpreted)   Sinus rhythm at a rate of 63 with anterior T-wave inversions similar to prior morphology.       Imaging Results          X-Ray Chest AP Portable (In process)                  Medical Decision Making:   History:   Old Medical Records: I decided to obtain old medical records.    64-year-old male with past medical history as noted, known COVID positive, coming in with fall from bed with head laceration and abrasion  to left knee.  Nurse called nursing home, verifed that patient's mental status is at baseline. He normally ambulates with a walker.  Does not talk much, follows commands, usually in bed fatigued appearing.  Nursing home caretaker did note that he has been having diarrhea the last few days.  She thinks he fell while attempting to go to the bathroom.    Left eyelid laceration is hemostatic and well approximated no indication for suturing at this time.  Right knee wound is abrasion.      Given his fall in the context of COVID infection will repeat workup including full set of labs, chest x-ray, electrolytes.   CT brain/C-spine given head trauma.     ED workup is unremarkable anticipate discharge back to nursing home.    Update:  CT brain and CT C-spine are negative.  Chest x-ray and knee x-ray are not consistent with acutely dangerous medical conditions.  No signs of dangerous traumatic pathology.  EKG is as noted similar prior.  Troponin mildly bumped, well below previous numbers, consistent elevation 2/2 elevated creatinine, no chest pain or shortness of breath.  Not consistent with ACS, PE.     No other dangerous cause of fall identified.    Labs are baseline with the exception of mildly bumped creatinine to 4.3, discharge with 3.8 from hospital.  This could be secondary to dehydration in the context of diarrhea.  Encourage good oral fluids, oral fluids in the emergency department, in 250 cc of normal saline.  Recommended nursing home that creatinine to be follow-up within 5 days for repeat measurements.  He has been hemodynamically stable in the emergency department.    At this time there is no indication for admission or hospital stay.  Patient can be discharged back to nursing home with continued close monitoring, follow-up creatinine, good oral hydration, and return precautions.    MDM Complexity Points:   Problem Points:  1.New problem, with additional ED work-up planned - fall, JAZLYN    Data Points:  Review or  order clinical lab tests, Review or order radiology test, Review or order medicine test (PFTs, EKG, cardiac echo or catheterization), Independent review of image, tracing, or specimen, Decision to obtain old records (in the EHR), Review and summarization of old records and Obtaining history from Someone else other than the patient.                                        Clinical Impression:       ICD-10-CM ICD-9-CM   1. Fall, initial encounter W19.XXXA E888.9   2. JAZLYN (acute kidney injury) N17.9 584.9                                Alan Hurd MD  04/11/20 [id number]

## 2020-04-11 NOTE — NURSING
"Alerted by PCT [place] that patient had fallen in room trying to get to the bathroom. PCT [place] was sitting outside of patient room, and heard a loud thud, PCT immediatey went in room and found patient wrapped with blanket around him and feces on floor, patient was on the floor leaning up against the bed. Fall was unwitnessed by nurse or PCT, upon my entry into room patient was found conscious and sitting at edge of bed. Patient states" I need to use the bathroom now, get me there now!" Patient noted to have small laceration to left side of eye, and small laceration to left knee with blood present on knee. Patient was able to state where he was but was very agitated. Patient stable and escorted via 3 staff members to assist to bathroom commode. Patient able to walk with 3 person assistance to bathroom ,no LOC or dizziness noted. Vitals stable /102 , 72 p , 100% o2 , 96.3 temp. Patient complains of pain to left side of face and left knee. Patient put in bed via 4 person assist, patient unable to state time or day, patient states" Im in the hospital" . Patient unable to recall that he had fallen.  NP on call notified and MD on call notified. Orders given to contact ambulance for transport to ER for assessment and CT scan. Tele monitor ordered and set up in patient room, side rails up and bed alarm set. Remained at patient bedside.Family notified   "

## 2020-04-11 NOTE — NURSING
Called placed to Naheed ( wife) given update that patient is still in the ER awaiting CT scan , will call with further updates

## 2020-04-11 NOTE — ED NOTES
Patient cleansed and new brief applied. Resting in bed. Denies any requests. Call bell within reach. Will continue to monitor.

## 2020-04-11 NOTE — ED NOTES
Patient drank cup of water, but refused anymore since he does not like the taste. Swallowed without difficulty. Continuous pulse oximetry, BP, and cardiac monitoring in place. Call bell within reach. Will continue to monitor.

## 2020-04-12 NOTE — PROGRESS NOTES
Patient ambulating to restroom, paused IV line , upon trying to restart iv fluids , line occluded. Attempted to flush line and reposition iv dressing, line still not flushing, NP notified and order given to d/c fluids , 200ml remaining . Will continue to monitor iv site

## 2020-04-12 NOTE — PROGRESS NOTES
Patient VSS stable, no acute distress during shift. Patient safety maintained with Tele monitor in room, bed rails up x3 , bed alarm set , patient close to nursing station. Patient cooperative with eating and medication administration today. Wife updated via telephone throughout the day, will continue to monitor.

## 2020-04-12 NOTE — PROGRESS NOTES
[facility]   Skilled Nursing Facility   Progress Notes     Patient name: Marie Reis Jr.  MRN: [id number]  Patient Class: IP- SNF  PCP: Nancy Colón MD  Attending Provider: Iraida Almanzar MD  Nurse Practitioner: Vivienne Sanders NP  Admission Date: 4/1/2020  LOS: 11 days  Code status: Full Code  Principal Problem:  Acute metabolic encephalopathy      TELEMEDICINE VISIT  Patient Location: [place] [id number]  Provider Location: Provider home  Start / End time:14:18-14:25  Evaluation by video and audio     HPI: Marie Reis Jr. is a 64 y.o. man with PMH Chronic systolic and diastolic HF, DMII CKD IV and HTN, Multiple myeloma (not in remission), SUZANNA (BiPAP), DJD LS spine, CVA 2/2018 admitted [facility] on 3/28 with a cc of fatigue and weakness following an unwitnessed syncopal vs seizure event. Patient was admitted 2/24 for blood transfusion. During virtual visit 3/19 he was determined to have right PCA ischemic stroke that was suspected due to cardioembolic from heart failure. Then returned 3/21 for SOB and coughing for one day. He was found to have COVID-19 pneumonia, NSTEMI and probable COVID myocarditis. Patient admitted again 3/28, day after discharge, for a syncopal episode. Patient was found to be dehydrated. Nursing reports patient minimally drinking, not eating and not taking medications since admission here. He also has not been participating with therapy. During televisit. Patient was not cooperative with exam. Patient laid underneath covers during entire visit. Nurse Layo Fuentes jimbo covers back and patient draw them back over. Upon review of previous admission, patient was not eating and only spoke with prompted. He had one previous PT/OT where he participated with physical prompting. After arrival here he has been actively refusing everything food, medications and interaction. He has kept on his visimonitor. Prior to onset of illness patient weighed 191 lb during office visit with  his PCP 2/21. Patient has 162lb listed as his current weight. He was independent of ADLs, A&O to person, place and time, but had little recall or insight to his medical conditions. Patient did not have any behavioral issues after his CVA in 2/2018.     Patient transferred to [facility] with PT and OT to improve functional status and ability to perform ADLs.      Interval History:  Nursing notes and vitals reviewed. Pt seen and examined visually via telemedicine cart with nurse at bedside.  He is fell yesterday unwitnessed and sustained an injury to his left eye and left knee abrasion. He was sent to [facility] [place] [place] ED for evaluation. He is more cooperative today. Not eating. Slowed responses, follows commands slowly, tele sitter in place. Left eye brusing and mild swelling on upper lid, left knee abrasion visualized, minimal drainage, stable.  ED records reviewed, labs reviewed, stable, no acute findings on ct head, tsh wnl. Anticipate PEG placement on next week to assist with medicinal and nutritional needs. Continue close monitoring. Discussed with wife via phone plan of care and results from ed visit. Also discussed peg placement with her. Advised even though he is eating at times it is still inconsistent and he has lost >20pounds in the past few months. The peg is reversible and he can still eat foods by mouth.     PT/ OT--Declines services    RD--  Diet: Regular--refuses all meals; Anticipate PEG placement Monday     Past Medical History: Patient has a past medical history of Binocular vision disorder with diplopia (9/22/2016), Bradycardia, Cataract, Cervical spondylosis (10/1/2015), Chronic diastolic congestive heart failure, Chronic diastolic heart failure (1/11/2018), Coronary artery disease due to calcified coronary lesion (3/19/2018), Dyslipidemia (6/26/2015), Encounter for blood transfusion, Hearing impairment (10/25/2013), History of stroke (3/23/2015), Hypertension associated with diabetes (1/11/2018),  Hypertensive retinopathy of both eyes (9/26/2017), Lumbar spondylosis (10/1/2015), Multiple myeloma not having achieved remission (1/16/2018), NSTEMI (non-ST elevated myocardial infarction) (3/22/2020), SUZANNA on CPAP, Persistent proteinuria (1/11/2018), Pneumonia of right lower lobe due to infectious organism (3/22/2020), Spondylolisthesis of lumbar region (10/1/2015), Strabismus, Stroke (2014), Thoracic spondylosis (10/1/2015), Type 2 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, with long-term current use of insulin (9/26/2017), Type 2 diabetes mellitus with diabetic polyneuropathy, with long-term current use of insulin (9/28/2015), and Type 2 diabetes mellitus with stage 4 chronic kidney disease, with long-term current use of insulin (1/11/2018).     Past Surgical History: Patient has a past surgical history that includes left hand fracture sx; Cataract extraction w/  intraocular lens implant (Left, 4/30/15); Hand surgery (2/2012); LT EYE  (5/2/15); Eye surgery; and Colonoscopy (N/A, 1/20/2020).     Social History: Patient reports that he has never smoked. He has never used smokeless tobacco. He reports that he does not drink alcohol or use drugs.     Family History: family history includes Cancer in his mother; Diabetes in his brother, father, mother, and sister; Heart attack in his father; Kidney disease in his mother; No Known Problems in his maternal aunt, maternal grandfather, maternal grandmother, maternal uncle, paternal aunt, paternal grandfather, paternal grandmother, and paternal uncle.     Allergies: Patient is allergic to hydralazine analogues.     ROS:    Review of Systems   Unable to perform ROS: Other (pt uncooperative and disengaged)     PEx     Temp:  [96.1 °F (35.6 °C)-99.4 °F (37.4 °C)]   Pulse:  [62-68]   Resp:  [12-18]   BP: (118-136)/(60-80)   SpO2:  [100 %]   Physical Exam:  Constitutional: Patient resting comfortably in no apparent distress;  appears sickly and cachectic   Pulmonary:  Breathing comfortably at rest. Normal respiratory rate -per staff  Neurologic: oriented to self and place, more cooperative today  Psychiatric: Dysphoric mood and withdrawn affect. Uncooperative  Skin: Left eye bruising at brow, left knee abrasion  Breath sounds (per nursing)-CTA on room air      Recent Labs   Lab 04/11/20  1144   WBC 7.61   HGB 9.9*   HCT 32.2*   *     Recent Labs   Lab 04/11/20  1144   GRAN 78.9*  6.0   LYMPH 12.0*  0.9*   MONO 7.0  0.5   EOS 0.1     Recent Labs   Lab 04/11/20  1144      K 4.1      CO2 22*   BUN 65*   CREATININE 4.3*   *   CALCIUM 9.2   MG 2.2     Recent Labs   Lab 04/11/20  1144   ALKPHOS 73   ALT 13   AST 20   ALBUMIN 3.2*   PROT 6.8   BILITOT 0.9     Recent Labs   Lab 04/11/20  1144      CRP 7.9     Recent Labs   Lab 04/11/20  1144      TROPONINI 0.042*   *     Recent Labs   Lab 12/03/19  0835 02/24/20  1405  03/21/20  1933 03/21/20  2342 04/11/20  1144   PROCAL  --   --   --  0.38*  --   --    LACTATE  --   --    < > 3.5* 1.2 1.6   FERRITIN 609* 5,977*  --   --   --  1,795*    < > = values in this interval not displayed.     Recent Labs   Lab 04/11/20  1144   TSH 1.164     Recent Labs   Lab 04/08/20  0733 04/09/20  0836 04/10/20  0901 04/11/20  0726 04/11/20  2333 04/12/20  0939   POCTGLUCOSE 116* 112* 317* 96 116* 87        Baselines:  Hemoglobin   Date Value Ref Range Status   04/11/2020 9.9 (L) 14.0 - 18.0 g/dL Final   03/31/2020 8.9 (L) 14.0 - 18.0 g/dL Final   03/30/2020 8.6 (L) 14.0 - 18.0 g/dL Final   03/29/2020 8.4 (L) 14.0 - 18.0 g/dL Final   03/28/2020 9.2 (L) 14.0 - 18.0 g/dL Final     Creatinine   Date Value Ref Range Status   04/11/2020 4.3 (H) 0.5 - 1.4 mg/dL Final   03/31/2020 3.2 (H) 0.5 - 1.4 mg/dL Final   03/30/2020 3.8 (H) 0.5 - 1.4 mg/dL Final   03/29/2020 4.4 (H) 0.5 - 1.4 mg/dL Final   03/29/2020 4.8 (H) 0.5 - 1.4 mg/dL Final        Scheduled Meds:    amLODIPine   10 mg Oral Daily    atorvastatin  40 mg Oral QHS    carvediloL  25 mg Oral BID WM    enoxaparin  30 mg Subcutaneous Q24H    FLUoxetine  10 mg Oral Daily    megestroL  40 mg Oral TID AC    pantoprazole  40 mg Oral Daily    senna-docusate 8.6-50 mg  1 tablet Oral BID     Continuous Infusions:    sodium chloride 0.9% 75 mL/hr at 04/12/20 0633     As Needed: acetaminophen, benzonatate, calcium carbonate, fluticasone propionate, glucagon (human recombinant), glucose, glucose, insulin aspart U-100, melatonin, sodium chloride 0.9%     Active Hospital Problems    Diagnosis  POA    *Acute metabolic encephalopathy [G93.41]  Yes    Type 2 diabetes mellitus with stage 4 chronic kidney disease and hypertension [E11.22, I12.9, N18.4]  Yes    Moderate malnutrition [E44.0]  Yes    Physical deconditioning [R53.81]  Yes    COVID-19 virus infection [U07.1]  Yes    Pulmonary hypertension [I27.20]  Yes    Acute renal failure superimposed on chronic kidney disease [N17.9, N18.9]  Yes    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 [U07.1, I40.0]  Yes    Acute respiratory disease due to severe acute respiratory syndrome coronavirus 2 (SARS-CoV-2) [U07.1, J06.9]  Yes    Acute on chronic combined systolic and diastolic heart failure [I50.43]  Yes    Pneumonia due 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 [U07.1, J12.89]  Yes    NSTEMI (non-ST elevated myocardial infarction) [I21.4]  Yes    Moderate to severe aortic stenosis [I35.0]  Yes    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, with long-term current use of insulin [E11.3513, E11.65, Z79.4]  Yes    Moderate episode of recurrent major depressive disorder [F33.1]  Yes    Acute renal failure superimposed on stage 4 chronic kidney disease [N17.9, N18.4]  Yes    Multiple myeloma [C90.00]  Yes    Acute on chronic combined systolic and diastolic congestive heart failure [I50.43]  Yes    Chronic combined systolic and diastolic heart  failure [I50.42]  Yes      Resolved Hospital Problems   No resolved problems to display.      -------------------------------------------  Assessment and Plan:   ----------------------------------------------    Mechanical fall, NEW, unstable  4/12/20  He is fell yesterday unwitnessed and sustained an injury to his left eye and left knee abrasion. He was sent to [facility] [place] for [place]. He is more cooperative today. Not eating. Slowed responses, follows commands slowly, tele sitter in place. Left eye brusing and mild swelling on upper lid, left knee abrasion visualized, minimal drainage, stable.  ED records reviewed, labs reviewed, stable, no acute findings on ct head, tsh wnl. Anticipate PEG placement on next week to assist with medicinal and nutritional needs. Continue close monitoring. Discussed with wife via phone plan of care and results from ed visit. Also discussed peg placement with her. Advised even though he is eating at times it is still inconsistent and he has lost >20pounds in the past few months. The peg is reversible and he can still eat foods by mouth.       *Impaired functional mobility and endurance  Participation in PT/ OT limited by lack of interest-pt is self-limiting  Overall stable    PT/ OT discontinued due to lack of participation     COVID 19 infection   COVID 19 pneumonia   Acute hypoxic respiratory failure and severe sepsis and pneumonia 2/2 COVID-19 , resolved  Onset of sx 3/21  COVID 19 positive on  3/21  CXR: 3/28/20--Mild cardiomegaly.  No significant airspace consolidation or pleural effusion identified.  Small opacities noted at the right lung base. . No pleural effusion.  Completed course of plaquenil  4/12/2020-  On room air and no longer febrile  Likely does not require isolation at this time  4/12/20  Afebrile  Vital signs stable  Labs - see above     Isolation   Airborne and Droplet Precaution   N95 masks must be fit tested, wear eye protection   Hand hygiene x 20  seconds   Limit visitors per hospital policy   Consolidating lab draws, nursing care, and interventions      Management   Bundle care as able to minimize in/out of room    Supplemental O2 to maintain SpO2 >92%, if requiring 6L NC or higher, place on nonrebreather and discuss case with MICU   Telemetry & continuous Pulse Ox   If wheezing  albuterol INHALER PRN 4puff Q6hr approximates a nebulizer (avoid nebulization of secretions) and ipratropium daily    Cautious use of NSAIDS for fever per WHO recommendations (3/16/2020)   No new ACEi/ARB start or discontinuation of chronic med unless hypotensive      Other Active Problems    Acute metabolic encephalopathy  Recent right PCA ischemic stroke  Probable vascular dementia  Probable depression  Discussed with neurology  Patient will need 6-12 months to determine recovery  Follow up with neurology  Consider PEG tube  PT/OT when able  prozac 20 mg daily when able  CONSIDER IN-PATIENT REHAB AFTER DISCHARGE IF PATIENT EVER STARTS TO COOPERATE WITH THERAPY  Need speech therapy  Patient does not show capacity make medical decision or perform discharge planning - his wife should be signing consents    Syncopal Event due to dehydration   JAZLYN on CKD4  Monitor closely     NSTEMI type II  Acute on Chronic Combined systolic and diastolic HF  Severe pulmonary HTN  Moderate to severe aortic stenosis  Coronary artery disease  - Continue medical management of ASA 81 mg daily, plavix 75 mg daily, atorvastatin 40 mg daily, coreg 25 mg BID  - F/u with cardiology outpatient  Furosemide held due to no oral intake and recent dehydration  plavix and clopidogrel held due possible PEG tube procedure     Multiple myeloma  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  - being treated with bortezomib   - due for cycle 6 on 3/25  - F/u with oncology     HTN  Continue home Coreg, amlodipine 10 mg daily     DM II with HTN and CKD IV  DMII with diabetic proliferative retinopathy  - A1c in 3/2020 9.1  4/12/2020  -Patient not eating; Hold medications     Deconditioning  PT/OT     Moderate protein calorie malnutrition   anorexia  - boost   Recommend PEG  If family does not want PEG, then comfort care recommended     Degenerative joint disease - PT/OT         Diet:  Regular diet nutritional supplements boost  Lines/ Drains/ Airways - none      Goals of Care: Return to prior functional status     Discharge plan: TBD   Per PT evaluation       Vivienne Sanders NP  [facility] 5th floor  TELEMEDICINE VISIT    DOS: 4/12/2020

## 2020-04-13 NOTE — PLAN OF CARE
Recommendation:   1. Continue to encourage PO intake on regular diet.   2. Encourage Boost Glucose Control TID.   3. New wt to be obtained.    Goals: PO intake > 50% EEN, EPN by next RD visit   Nutrition Goal Status: progressing towards goal  Communication of RD Recs: reviewed with RN    Problem: Malnutrition  Goal: Improved Nutritional Intake  Outcome: Ongoing, Progressing

## 2020-04-13 NOTE — PT/OT/SLP PROGRESS
Physical Therapy  Treatment    Marie Reis Jr.   MRN: [id number]   Admitting Diagnosis: Acute metabolic encephalopathy    PT Received On: 04/13/20          Billable Minutes:  Therapeutic Activity 15 and Total Time 15    Treatment Type: Treatment  PT/PTA: PT     PTA Visit Number: 0       General Precautions: Standard, droplet, airborne, fall, seizure, hearing impaired, vision impaired  Orthopedic Precautions:     Braces:      Spiritual, Cultural Beliefs, [employer] Practices, Values that Affect Care: no    Subjective:  Communicated with patient and nursing prior to session.  Pt mostly nonverbal t/o session. Pt's nurse reported that pt often walks alone in his room to the bathroom using his walker. She reported that he is unsteady- caused a fall this past weekend.     Pain/Comfort  Pain Rating 1: 0/10  Pain Rating Post-Intervention 1: 0/10    Objective:  Patient found supine in bed. Patient found with: (Visi)     AM-PAC 6 CLICK MOBILITY  Total Score:15    Bed Mobility:  Sit>Supine:on bed w/ SBA  Supine>Sit: on bed w/ MaxA for trunk and BLE  inc'd time and encouragement required for pt to initiate bed mobility    Transfers:  Pt refused to attempt transfer t/o session despite PT encouragement and cues for safety/technique     Gait:  Pt refused    Therex:  Seated BLE therex x10 reps (HF, LAQ, AP) all w/ AAROM/PROM  Pt encouraging pt to actively participate    Balance:  Static sit at EOB ~10min w/ SBA/SPV for safety  Cues for upright posture      Patient left supine with all lines intact, call button in reach and nurse notified.    Assessment:  Marie Reis Jr. is a 64 y.o. male with a medical diagnosis of Acute metabolic encephalopathy.  Pt continues to have decreased participation- he refused to attempt transfers or gait w/ PT. He will continue PT POC.    Rehab identified problem list/impairments: weakness, impaired endurance, impaired sensation, impaired self care skills, impaired functional mobilty, gait instability,  impaired balance, visual deficits, impaired cognition, decreased coordination, decreased upper extremity function, decreased lower extremity function, decreased safety awareness, pain, impaired cardiopulmonary response to activity(hearing impaired)    Rehab potential is fair.    Activity tolerance: Good    Discharge recommendations: home with home health     Barriers to discharge: Decreased caregiver support    Equipment recommendations: bedside commode, shower chair, walker, rolling     GOALS:   Multidisciplinary Problems     Physical Therapy Goals        Problem: Physical Therapy Goal    Goal Priority Disciplines Outcome Goal Variances Interventions   Physical Therapy Goal     PT, PT/OT Ongoing, Progressing     Description:  PT goals for 10 days    1. Pt supine to sit with supervision-not met  2. Pt sit to supine with mod independent-not met  3. Pt sit to stand with RW with supervision -not met  4. Pt to perform gait 100ft with RW with supervision.-not met  5. Pt to transfer bed to/from bedside chair with RW with supervision.-not met  6. Pt to perform B LE exs in sitting or supine x 10 reps to strengthen B LE to improve functional mobility.-not met                      PLAN:    Patient to be seen 4 x/week  to address the above listed problems via gait training, therapeutic activities, therapeutic exercises  Plan of Care expires: 05/02/20  Plan of Care reviewed with: patient    Abby Owenmartin, PT  04/13/2020

## 2020-04-13 NOTE — PT/OT/SLP PROGRESS
Occupational Therapy      Patient Name:  Marie Reis Jr.   MRN:  [id number]    Patient not seen today secondary to pt. Did not participate on this date despite verbal/tactile  Stimulation.  Pt. Kept eyes closed with attempts to gain participation  OBED Rees  4/13/2020

## 2020-04-13 NOTE — PROGRESS NOTES
[facility] - Skilled Nursing Care  Adult Nutrition  Progress Note    SUMMARY       Recommendations    Recommendation:   1. Continue to encourage PO intake on regular diet.   2. Encourage Boost Glucose Control TID.   3. New wt to be obtained.    Goals: PO intake > 50% EEN, EPN by next RD visit   Nutrition Goal Status: progressing towards goal  Communication of RD Recs: reviewed with RN    Reason for Assessment    Reason For Assessment: RD follow-up  Diagnosis: other (see comments)(Acute Renal Failure superimposed on CKD)  Relevant Medical History: CVA, CAD, DM2, Dyslipidemia, HTN, CHF, SUZANNA on CPAP, NSTEMI, CKDIV, Vit D def, PAD, Hemiplegia, Gemiparesis  Interdisciplinary Rounds: did not attend  General Information Comments:  4/13/20: RD reporting remotely; spoke with RN over the phone. Pt began eating and drinking his supplements over the weekend. Pt with PO intake 25 - 50 % of meals but is drinking Boost Glucose TID. PEG placement differed since PO intake has improved. NO N/V/D/C noted. New wt to be obtained.  4/10/20: RD reporting remotely; pt disoriented, spoke with RN over the phone. Pt continues to refuse meals with poor PO intake. Pt drinking < 25% of Boost Glucose Control. Anticipated PEG placement on monday. RN continues to encourage PO and supplement intake. No N/V/D noted; constipation x 5 days. New wt to be obtained.  4/7/20: RD reporting remotely; RD called pt x 2 without answer. Pt was discussed in IDT; pt refusing meds, food and tx. Dr Almanzar will be notified. Pt has been refusing to eat since 4/4. Pt likely not drinking Boost Glucose Control at time time. No N/V/D/C noted in chart. New wt to be obtained.  4/3/20: RD reporting remotely; RD called pt x 2 without answer, obtained information from chart. Pt has mostly had a poor appetite since 3/30. PT has Boost Glucose Control ordered TID. No N/V/D noted in chart. Constipation x 3 days. Pt with 29# wt loss x 1 month. Pt meets criteria for moderate  "malnutrition due to poor PO intake x 1 week and weight loss. NFPE not performed, Patient is noted as being positive for COVID-19.   Nutrition Discharge Planning: Adequate PO intake on [id number] and 2gm Na diets     Nutrition Risk Screen    Nutrition Risk Screen: no indicators present    Nutrition/Diet History    Spiritual, Cultural Beliefs, Buddhist Practices, Values that Affect Care: no  Food Allergies: NKFA  Factors Affecting Nutritional Intake: decreased appetite    Anthropometrics    Temp: 97.6 °F (36.4 °C)  Height: 5' 10" (177.8 cm)  Height (inches): 70 in  Weight: 63.5 kg (140 lb)(From previous encounter on ; New wt to be obtained)  Weight (lb): 140 lb  Ideal Body Weight (IBW), Male: 166 lb  % Ideal Body Weight, Male (lb): 84.34 %  BMI (Calculated): 20.1  BMI Grade: 18.5-24.9 - normal  Weight Loss: unintentional  Usual Body Weight (UBW), k.82 kg(20)  % Usual Body Weight: 73.3  % Weight Change From Usual Weight: -26.86 %       Lab/Procedures/Meds    Pertinent Labs Reviewed: reviewed  Pertinent Labs Comments: BUN 56, Crt 3.6, eGFR 19.6, Glu 139, Alb 3.0, A1C 9.1% (3/9)  Pertinent Medications Reviewed: reviewed  Pertinent Medications Comments: amlodipine, aspirin, statin, carvediolol, clopidogrel, insulin, montelukcast, senna-docusate, pantoprazole    Physical Findings/Assessment    Connor Score 17    Estimated/Assessed Needs    Weight Used For Calorie Calculations: 73.7 kg (162 lb 7.7 oz)  Energy Calorie Requirements (kcal): 1843 - 2211 kcal/day  Energy Need Method: Kcal/kg(25 - 30 kcal/kg)  Protein Requirements: 89 - 111 g/day(1.2 - 1.5 g/kg)  Weight Used For Protein Calculations: 74 kg (163 lb 2.3 oz)  Fluid Requirements (mL): 1 mL/kcal or per MD   Estimated Fluid Requirement Method: RDA Method  RDA Method (mL): 1843  CHO Requirement: 216 g/day or 40% of daily EEN       Nutrition Prescription Ordered    Current Diet Order: Regular   Oral Nutrition Supplement: Boost Glucose Control TID "     Evaluation of Received Nutrient/Fluid Intake    I/O: -  Energy Calories Required: not meeting needs  Protein Required: not meeting needs  Fluid Required: meeting needs  Comments: LBM 4/12  Tolerance: tolerating  % Intake of Estimated Energy Needs: 25 - 50 %   % Meal Intake: 25 - 50 %     Nutrition Risk    Level of Risk/Frequency of Follow-up: high(2x/week)     Assessment and Plan    Moderate malnutrition  Nutrition Problem:  Moderate Protein-Calorie Malnutrition  Malnutrition in the context of Acute Illness/Injury     Related to (etiology):  Decreased appetite      Signs and Symptoms (as evidenced by):  Energy Intake: <75% of estimated energy requirement for more than 5 days  Weight Loss: 15% x 1 month     Interventions(treatment strategy):  Collaboration with other providers  [employer] - Boost Glucose Control TID to provide calories and protein      Nutrition Diagnosis Status:  Continues     Monitor and Evaluation    Food and Nutrient Intake: energy intake, food and beverage intake  Food and Nutrient Adminstration: diet order  Knowledge/Beliefs/Attitudes: food and nutrition knowledge/skill  Physical Activity and Function: nutrition-related ADLs and IADLs  Anthropometric Measurements: weight, body mass index, weight change  Biochemical Data, Medical Tests and Procedures: electrolyte and renal panel, inflammatory profile, lipid profile, glucose/endocrine profile     Malnutrition Assessment  Malnutrition Type: acute illness or injury  Energy Intake: moderate energy intake  Weight Loss (Malnutrition): greater than 5% in 1 month  Energy Intake (Malnutrition): less than or equal to 50% for greater than or equal to 5 days     Nutrition Follow-Up    RD Follow-up?: Yes

## 2020-04-13 NOTE — PT/OT/SLP EVAL
Speech Language Pathology  Evaluation    Marie Reis Jr.   MRN: [id number]   Admitting Diagnosis: Acute metabolic encephalopathy    Diet recommendations: Solid Diet Level: Regular  Liquid Diet Level: Thin Standard aspiration precautions    SLP Treatment Date: 04/13/20  Speech Start Time: 1243     Speech Stop Time: 1252     Speech Total (min): 9 min       TREATMENT BILLABLE MINUTES:  Eval 9     Diagnosis: Acute metabolic encephalopathy      Past Medical History:   Diagnosis Date    Binocular vision disorder with diplopia 9/22/2016    Bradycardia     Cataract     Cervical spondylosis 10/1/2015    Chronic diastolic congestive heart failure     Chronic diastolic heart failure 1/11/2018    Coronary artery disease due to calcified coronary lesion 3/19/2018    Dyslipidemia 6/26/2015    Encounter for blood transfusion     Hearing impairment 10/25/2013    History of stroke 3/23/2015    Hypertension associated with diabetes 1/11/2018    Hypertensive retinopathy of both eyes 9/26/2017    Lumbar spondylosis 10/1/2015    Multiple myeloma not having achieved remission 1/16/2018    NSTEMI (non-ST elevated myocardial infarction) 3/22/2020    SUZANNA on CPAP     Persistent proteinuria 1/11/2018    Pneumonia of right lower lobe due to infectious organism 3/22/2020    Spondylolisthesis of lumbar region 10/1/2015    Strabismus     Stroke 2014    Thoracic spondylosis 10/1/2015    Type 2 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, with long-term current use of insulin 9/26/2017    Type 2 diabetes mellitus with diabetic polyneuropathy, with long-term current use of insulin 9/28/2015    Type 2 diabetes mellitus with stage 4 chronic kidney disease, with long-term current use of insulin 1/11/2018     Past Surgical History:   Procedure Laterality Date    CATARACT EXTRACTION W/  INTRAOCULAR LENS IMPLANT Left 4/30/15    [facility]    COLONOSCOPY N/A 1/20/2020    Procedure:  "COLONOSCOPY;  Surgeon: Salvador Espinosa MD;  Location: [facility];  Service: Endoscopy;  Laterality: N/A;    EYE SURGERY      cataract removal left eye    HAND SURGERY  2/2012    DR. BAKER [facility]    left hand fracture sx      LT EYE   5/2/15    DR KULLMAN [facility]       Has the patient been evaluated by SLP for swallowing? : No    General Precautions: Standard, airborne, contact, droplet    Current Respiratory Status: room air     Social Hx: Patient lives with his spouse in [place].    Occupational/hobbies/homemaking: Patient did not answer. He is retired (found in previous notes).    Subjective:  Patient awake but overtly confused during session  "It all depends." Patient's repeated response to orientation questions  Communicated with nurse prior to session    Objective:   Patient found with: (avasys)    Cognitive Status:  Behavioral Observations: confused and distractible-  Memory and Orientation: Unable to appropriately assess 2' to limited participation   Attention: Patient required consistent redirection and repetition during session.  Problem Solving: Unable to assess  Pragmatics: topic maintenance problems  Executive Function: Poor topic maintenance     Language: Unable to appropriately assess given limited participation    Auditory Comprehension: Responds to yes/no questions, but responses were not accurate or reliable    Verbal Expression: Further assessment needed    Motor Speech: WFL, but he produced limited speech    Voice: WFL    Augmentative Alternative Communication: no    Reading: TBA    Writing: TBA    Visual-Spatial: TBA    Additional Treatment:  Patient seen for a speech/language/cognitive eval. He was sitting up in bed during session. He demonstrated improved participation from prior session, but continued to be very limited. Further assessment warranted. SLP educated patient regarding POC, but he did not acknowledge teaching. Patient left in bed with avasys in place and call light within " reach.    Assessment:  Marie Reis Jr. is a 64 y.o. male with a medical diagnosis of Acute metabolic encephalopathy and presents with a significant cognitive impairment.    Spiritual, Cultural Beliefs, Orthodox Practices, Values that Affect Care: no    Discharge recommendations: Discharge Facility/Level of Care Needs: home with home health     Goals:   Multidisciplinary Problems     SLP Goals        Problem: SLP Goal    Goal Priority Disciplines Outcome   SLP Goal     SLP    Description:  Speech-Language Pathology Goals   Goals to be met by 4/20/20  1. Patient will orient x4 with max assist.   2. Patient will answer simple yes/no questions with 75% accuracy and mod assist.   3. Patient will follow simple commands with 60% accuracy and mod assist.   4. Patient will recall personal information with 75% accuracy and max assist.   5. Patient will participate in ongoing assessment of cognition and language.                     Plan:   Patient to be seen Therapy Frequency: 3 x/week   Planned Interventions: Cognitive-Linguistic Therapy  Plan of Care expires: 05/13/20  Plan of Care reviewed with: patient  SLP Follow-up?: Yes  SLP - Next Visit Date: 04/15/20         Karson Randall CCC-SLP  Speech-Language Pathology  Pager: [phone]   04/13/2020

## 2020-04-14 NOTE — PT/OT/SLP PROGRESS
Occupational Therapy  Treatment    Marie Reis Jr.   MRN: [id number]   Admitting Diagnosis: Acute metabolic encephalopathy    OT Date of Treatment: 04/14/20       Billable Minutes:  Therapeutic Activity 40    General Precautions: Standard, airborne, contact, droplet  Orthopedic Precautions: N/A  Braces: N/A    Spiritual, Cultural Beliefs, Jain Practices, Values that Affect Care: no    Subjective:  Communicated with patient prior to session.  Pt agreeable to sitting up and skyping with wife    Pain/Comfort  Pain Rating 1: 0/10  Pain Rating Post-Intervention 1: 0/10    Objective:   Pt found in supine    Occupational Performance:    Bed Mobility:    · Patient completed Scooting/Bridging with stand by assistance  · Patient completed Supine to Sit with minimum assistance  · Patient completed Sit to Supine with stand by assistance         Activities of Daily Living:  · Grooming: stand by assistance seated EOB to wash face, vc/tc to initiate task  · Upper Body Dressing: setup to taqueria hat    [facility] 6 Click:  [facility] Total Score: 14        Additional Treatment:  Pt sat EOB ~35 min with S, cues to hold tablet at appropriate height to skype with wife to increase participation for therapy and activity tolerance for functional activities.    Patient left HOB elevated with call button in reach    ASSESSMENT:  Marie Reis Jr. is a 64 y.o. male with a medical diagnosis of Acute metabolic encephalopathy. Pt with cues to initiate tasks. Pt displays increased activity tolerance and participation today. Patient will benefit from continued OT services to progress toward goals and promote independence.      Rehab identified problem list/impairments: weakness, impaired endurance, impaired self care skills, visual deficits, decreased coordination    Rehab potential is good    Activity tolerance: Good    Discharge recommendations: home with home health     Barriers to discharge: Decreased caregiver support(increased required level of care )      Equipment recommendations: bedside commode, shower chair, wheelchair     GOALS:   Multidisciplinary Problems     Occupational Therapy Goals        Problem: Occupational Therapy Goal    Goal Priority Disciplines Outcome Interventions   Occupational Therapy Goal     OT, PT/OT Ongoing, Progressing    Description:  Goals to be met by: 4/30     Patient will increase functional independence with ADLs by performing:    Feeding with Stand-by Assistance.  UE Dressing with Contact Guard Assistance.  LE Dressing with Minimal Assistance.  Grooming while seated with Contact Guard Assistance.  Toileting from toilet with Minimal Assistance for hygiene and clothing management.                       Plan:  Patient to be seen 4 x/week to address the above listed problems via self-care/home management, therapeutic activities, therapeutic exercises  Plan of Care expires: 04/30/20  Plan of Care reviewed with: patient    RAOUL Gregory  04/14/2020           The OBED and PATRICIA have collaborated and discussed the patient's status, treatment plan and progress toward established goals.   RAOUL Gregory 4/14/2020

## 2020-04-14 NOTE — PROGRESS NOTES
Cancer Services COVID-19 Testing  [facility] [address], [place]  [address]    04/13/2020      In an effort to protect our immunocompromised patients from potential exposure to COVID-19, [facility] will now require all patients receiving an infusion, an injection, and/or radiation therapy to be tested for COVID-19 prior to their appointment.  All patients currently under treatment will be tested immediately, and patients initiating new treatment cycles or with one-time appointments (injections, transfusions, etc.) must be tested within 72 hours of their appointment.     Placed COVID-19 test order for patient.  A member of our team is to contact the patient in the near future to explain this process and the rationale behind it and to get the patient scheduled for their COVID-19 test.     The above was completed in accordance with instructions and guidelines set forth by [facility].     Armani Dietz, EUGENIA, APRN, FNP-BC, AOCNP  Department of Hematology and Oncology  [facility] [place], 3rd Floor  [facility] [address], [place]  [address]  Office phone   [phone]    Date:  04/13/2020

## 2020-04-15 NOTE — ANESTHESIA PREPROCEDURE EVALUATION
04/15/2020  Pre-operative evaluation for Procedure(s) (LRB):  INSERTION, PEG TUBE (N/A)    Marie Ries Jr. is a 64 y.o. male    Patient Active Problem List   Diagnosis    Hearing impairment    Essential hypertension    History of stroke    Dyslipidemia    Cervical spondylosis    Thoracic spondylosis    Lumbar spondylosis    Spondylolisthesis of lumbar region    History of right PCA stroke    Hypertropia of left eye    Binocular vision disorder with diplopia    Hypertensive retinopathy of both eyes    Chronic combined systolic and diastolic heart failure    Benign hypertension with chronic kidney disease, stage III    Persistent proteinuria    Acute on chronic combined systolic and diastolic congestive heart failure    Multiple myeloma    Prolonged Q-T interval on ECG    Coronary artery disease due to calcified coronary lesion    Bilateral carotid artery stenosis    Bradycardia    Acute renal failure superimposed on stage 4 chronic kidney disease    Secondary hyperparathyroidism    Hypertensive CKD (chronic kidney disease)    Vitamin D deficiency    Diabetes mellitus with proteinuria    Vitreous hemorrhage, right    Stage 4 chronic kidney disease    Aortic atherosclerosis    Chronic pulmonary heart disease    PAD (peripheral artery disease)    Symptomatic anemia    Dental caries    Thrombocytopenia    Positive FIT (fecal immunochemical test)    Moderate episode of recurrent major depressive disorder    Hemiplegia and hemiparesis following cerebral infarction affecting left non-dominant side    Transaminitis    Bilirubinemia    Constipation    Liver function test abnormality    Troponin I above reference range    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, with long-term current use of insulin    Acute  respiratory failure with hypoxia    Pneumonia due 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    NSTEMI (non-ST elevated myocardial infarction)    Acute on chronic combined systolic and diastolic heart failure    Moderate to severe aortic stenosis    Anemia, chronic disease    Severe sepsis    Acute respiratory disease due to severe acute respiratory syndrome coronavirus 2 (SARS-CoV-2)    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  Symptoms correlate only with this medication onset in hospital.       No current facility-administered medications on file prior to encounter.      Current Outpatient Medications on File Prior to Encounter   Medication Sig Dispense Refill    amLODIPine (NORVASC) 10 MG tablet Take 1 tablet (10 mg total) by mouth once daily. 90 tablet 3    aspirin (ECOTRIN) 81 MG EC tablet Take 1 tablet (81 mg total) by mouth once daily. 30 tablet 11    atorvastatin (LIPITOR) 40 MG tablet Take 1 tablet (40 mg total) by mouth every evening. 90 tablet 3    azithromycin (ZITHROMAX) 500 MG tablet Take 1 tablet (500 mg total) by mouth once daily. 2 tablet 0    blood sugar diagnostic Strp 1 strip by Misc.(Non-Drug; Combo Route) route after meals as needed.      calcitRIOL (ROCALTROL) 0.25 MCG Cap Take 2 capsules (0.5 mcg total) by mouth once daily. 60 capsule 6    carvediloL (COREG) 25 MG tablet Take 1 tablet (25 mg total) by mouth 2 (two) times daily  "with meals. 180 tablet 3    clopidogreL (PLAVIX) 75 mg tablet Take 1 tablet (75 mg total) by mouth once daily. 90 tablet 3    dexAMETHasone (DECADRON) 4 MG Tab Take 5 tablets (20 mg total) by mouth As instructed. Take once weekly with Velcade injection 120 tablet 2    diclofenac sodium 1.5 % Drop APPLY 40 DROPS TO AFFECTED AREA(S) FOUR TIMES DAILY  3    flash glucose scanning reader (FREESTYLE CLAUDIO 14 DAY READER) Misc Use as directed. 1 each 0    flash glucose sensor (FREESTYLE CLAUDIO 14 DAY SENSOR) Kit Change every 14 days. 9 kit 3    fluticasone (FLONASE) 50 mcg/actuation nasal spray 1 spray by Each Nare route 2 (two) times daily as needed for Rhinitis. (Patient taking differently: 1 spray by Each Nare route as needed for Rhinitis. ) 16 g 5    furosemide (LASIX) 40 MG tablet Take 1 tablet (40 mg total) by mouth 2 (two) times daily. Take twice a day now 60 tablet 11    gabapentin (NEURONTIN) 300 MG capsule Take 1 capsule (300 mg total) by mouth daily as needed. (Patient taking differently: Take 300 mg by mouth 2 (two) times daily. ) 90 capsule 1    glucose 4 GM chewable tablet Take 4 tablets (16 g total) by mouth as needed. For glucose less than 70.  12    insulin (LANTUS SOLOSTAR U-100 INSULIN) glargine 100 units/mL (3mL) SubQ pen Inject 12 units at night. 15 mL 6    insulin aspart U-100 (NOVOLOG) 100 unit/mL (3 mL) InPn pen Inject  6 units (light meal), 10 units (larger meal), scale 150-200+2, 201-250+4, 251-300+6, 301-350+8. Snack dose 3 units. Max daily 60 units. 18 mL 6    insulin needles, disposable, 31 X 5/16 " Ndle Pt to use pen needle with Lantus daily 100 each 3    lidocaine (LIDODERM) 5 % APPLY UP TO 3 PATCHES TO THE AFFECTED AREA(S) FOR 12 HOURS DAILY AND REMOVE FOR 12 HOURS      lidocaine (XYLOCAINE) 5 % Oint ointment APPLY TO THE AFFECTED AREA(S) THREE TO FOUR TIMES DAILY      LIDOTRAL 3.88 % Crea APPLY TO THE AFFECTED AREA 2 TIMES TO 3 TIMES DAILY  3    linaGLIPtin (TRADJENTA) 5 mg " "Tab tablet Take 1 tablet (5 mg total) by mouth once daily. 90 tablet 3    methyl salicylate 25 % Crea APPLY TO THE AFFECTED AREA(S) THREE TO FOUR TIMES DAILY      montelukast (SINGULAIR) 10 mg tablet Take 1 tablet (10 mg total) by mouth every evening. 90 tablet 1    nitroGLYCERIN (NITROSTAT) 0.4 MG SL tablet Place 1 tablet (0.4 mg total) under the tongue every 5 (five) minutes as needed for Chest pain. 30 tablet 3    omeprazole (PRILOSEC OTC) 20 MG tablet Take 1 tablet (20 mg total) by mouth once daily. 30 tablet 11    ondansetron (ZOFRAN-ODT) 8 MG TbDL Take 1 tablet (8 mg total) by mouth every 6 (six) hours as needed. 30 tablet 2    pen needle, diabetic 32 gauge x 1/4" Ndle use to inject insulin once daily 100 each 6    vitamin D (VITAMIN D3) 1000 units Tab Take 1,000 Units by mouth once daily.         Past Surgical History:   Procedure Laterality Date    CATARACT EXTRACTION W/  INTRAOCULAR LENS IMPLANT Left 4/30/15    Diane    COLONOSCOPY N/A 1/20/2020    Procedure: COLONOSCOPY;  Surgeon: Salvador Espinosa MD;  Location[facility];  Service: Endoscopy;  Laterality: N/A;    EYE SURGERY      cataract removal left eye    HAND SURGERY  2/2012    DR. OLIVIA MOLINA    left hand fracture sx      LT EYE   5/2/15    DR KULLMAN [facility]       Social History     Socioeconomic History    Marital status:      Spouse name: Not on file    Number of children: Not on file    Years of education: Not on file    Highest education level: Not on file   Occupational History    Not on file   Social Needs    Financial resource strain: Somewhat hard    Food insecurity:     Worry: Never true     Inability: Never true    Transportation needs:     Medical: No     Non-medical: No   Tobacco Use    Smoking status: Never Smoker    Smokeless tobacco: Never Used   Substance and Sexual Activity    Alcohol use: No     Frequency: Never     Drinks per session: Patient refused     Binge frequency: Never    Drug use: " No    Sexual activity: Never   Lifestyle    Physical activity:     Days per week: 3 days     Minutes per session: Not on file    Stress: Only a little   Relationships    Social connections:     Talks on phone: More than three times a week     Gets together: Once a week     Attends Anabaptism service: Not on file     Active member of club or organization: Yes     Attends meetings of clubs or organizations: Never     Relationship status:    Other Topics Concern    Not on file   Social History Narrative    Not on file         CBC: No results for input(s): WBC, RBC, HGB, HCT, PLT, MCV, MCH, MCHC in the last 72 hours.    CMP: No results for input(s): NA, K, CL, CO2, BUN, CREATININE, GLU, MG, PHOS, CALCIUM, ALBUMIN, PROT, ALKPHOS, ALT, AST, BILITOT in the last 72 hours.    INR  No results for input(s): PT, INR, PROTIME, APTT in the last 72 hours.        Diagnostic Studies:      EKD Echo:  Results for orders placed or performed in visit on 18   2D echo with color flow doppler   Result Value Ref Range    QEF 50 55 - 65    Mitral Valve Regurgitation MILD     Diastolic Dysfunction Yes (A)     Aortic Valve Regurgitation MILD     Aortic Valve Stenosis MILD (A)     Est. PA Systolic Pressure 42.19 (A)     Tricuspid Valve Regurgitation MILD              Anesthesia Evaluation    I have reviewed the Patient Summary Reports.    I have reviewed the Nursing Notes.   I have reviewed the Medications.     Review of Systems  Anesthesia Hx:  No problems with previous Anesthesia  History of prior surgery of interest to airway management or planning: Denies Family Hx of Anesthesia complications.   Denies Personal Hx of Anesthesia complications.   Hematology/Oncology:         -- Cancer in past history:   Cardiovascular:   Exercise tolerance: poor Hypertension Valvular problems/Murmurs Past MI CAD   CHF DAVIS ECG has been reviewed.    Pulmonary:   Pneumonia Shortness of breath Sleep Apnea    Renal/:   Chronic Renal  Disease, CRI    Hepatic/GI:   Denies GERD.    Musculoskeletal:   Arthritis     Neurological:   CVA Denies Seizures.    Endocrine:   Diabetes        Physical Exam  General:  Well nourished    Airway/Jaw/Neck:  Airway Findings: Mouth Opening: Normal Tongue: Normal  General Airway Assessment: Adult  Mallampati: II       Chest/Lungs:  Chest/Lungs Clear    Heart/Vascular:  Heart Findings: Normal       Mental Status:  Mental Status Findings:  Alert and Oriented         Anesthesia Plan  Type of Anesthesia, risks & benefits discussed:  Anesthesia Type:  general  Patient's Preference:   Intra-op Monitoring Plan: standard ASA monitors  Intra-op Monitoring Plan Comments:   Post Op Pain Control Plan: per primary service following discharge from PACU  Post Op Pain Control Plan Comments:   Induction:   IV  Beta Blocker:  Patient is on a Beta-Blocker and has received one dose within the past 24 hours (No further documentation required).       Informed Consent: Patient representative understands risks and agrees with Anesthesia plan.  Questions answered. Anesthesia consent signed with patient representative.  ASA Score: 4     Day of Surgery Review of History & Physical:    H&P update referred to the provider.         Ready For Surgery From Anesthesia Perspective.

## 2020-04-15 NOTE — TREATMENT PLAN
GI Treatment Plan    20 F PEG tube placed without any immediate complications.   External bumper at 2 cm salvatore.    Please follow the post-PEG recommendations including:   - Today: After 4 hours, may use PEG today for only medications and water  - Tomorrow: may use PEG tomorrow for feedings if abdominal examination remains benign and patient asymptomatic.  - Flushes: flush PEG daily with 60 ml water  - Please consult nutrition for tube feed goals and recommendations.    Care Instructions  - antibiotic ointment to site, clean site with soap and water daily and dry thoroughly and clean site daily with half-strength hydrogen peroxide for three days, then soap and water daily.  - Dressing: change dressing on top of bumper daily  - Please use abdominal binder due to risk of patient self-removing tube  - please ensure additional gauze applied to plastic components of the PEG tube if underneath the abdominal binder to help prevent pressure-related injury    Call if any questions or concerns    Negrito Mcarthur MD  Gastroenterology Fellow  Spectralink: [phone]

## 2020-04-15 NOTE — ANESTHESIA PROCEDURE NOTES
Intubation  Performed by: Brian Gaxiola Jr., MD  Authorized by: Brian Gaxiola Jr., MD     Intubation:     Induction:  Intravenous    Intubated:  Postinduction    Mask Ventilation:  N/a    Attempts:  1    Attempted By:  CRNA    Method of Intubation:  Video laryngoscopy (2/2 covid, would anticipate could DL easily)    Blade:  Keyes 4    Laryngeal View Grade: Grade I - full view of chords      Difficult Airway Encountered?: No      Complications:  None    Airway Device:  Oral endotracheal tube    Airway Device Size:  7.5    Style/Cuff Inflation:  Cuffed    Inflation Amount (mL):  5    Tube secured:  23    Secured at:  The lips    Placement Verified By:  Capnometry and Revisualization with laryngoscopy    Complicating Factors:  None    Findings Post-Intubation:  BS equal bilateral and atraumatic/condition of teeth unchanged

## 2020-04-15 NOTE — H&P
[facility] - Skilled Nursing Care  History & Physical    Subjective:      Chief Complaint/Reason for Admission:     EGD for Pegtube placement for poor po intake.    Marie Reis Jr. is a 64 y.o. male.    Past Medical History:   Diagnosis Date    Binocular vision disorder with diplopia 9/22/2016    Bradycardia     Cataract     Cervical spondylosis 10/1/2015    Chronic diastolic congestive heart failure     Chronic diastolic heart failure 1/11/2018    Coronary artery disease due to calcified coronary lesion 3/19/2018    Dyslipidemia 6/26/2015    Encounter for blood transfusion     Hearing impairment 10/25/2013    History of stroke 3/23/2015    Hypertension associated with diabetes 1/11/2018    Hypertensive retinopathy of both eyes 9/26/2017    Lumbar spondylosis 10/1/2015    Multiple myeloma not having achieved remission 1/16/2018    NSTEMI (non-ST elevated myocardial infarction) 3/22/2020    SUZANNA on CPAP     Persistent proteinuria 1/11/2018    Pneumonia of right lower lobe due to infectious organism 3/22/2020    Spondylolisthesis of lumbar region 10/1/2015    Strabismus     Stroke 2014    Thoracic spondylosis 10/1/2015    Type 2 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, with long-term current use of insulin 9/26/2017    Type 2 diabetes mellitus with diabetic polyneuropathy, with long-term current use of insulin 9/28/2015    Type 2 diabetes mellitus with stage 4 chronic kidney disease, with long-term current use of insulin 1/11/2018     Past Surgical History:   Procedure Laterality Date    CATARACT EXTRACTION W/  INTRAOCULAR LENS IMPLANT Left 4/30/15    [facility]    COLONOSCOPY N/A 1/20/2020    Procedure: COLONOSCOPY;  Surgeon: Salvador Espinosa MD;  Location: [facility];  Service: Endoscopy;  Laterality: N/A;    EYE SURGERY      cataract removal left eye    HAND SURGERY  2/2012    DR. BAK[facility]    left hand fracture sx      LT EYE   5/2/15     DR KULLMAN [facility]     Family History   Problem Relation Age of Onset    Diabetes Mother     Cancer Mother     Kidney disease Mother     Diabetes Father     Heart attack Father     Diabetes Sister     Diabetes Brother     No Known Problems Maternal Aunt     No Known Problems Maternal Uncle     No Known Problems Paternal Aunt     No Known Problems Paternal Uncle     No Known Problems Maternal Grandmother     No Known Problems Maternal Grandfather     No Known Problems Paternal Grandmother     No Known Problems Paternal Grandfather     Blindness Neg Hx     Anesthesia problems Neg Hx     Amblyopia Neg Hx     Cataracts Neg Hx     Glaucoma Neg Hx     Hypertension Neg Hx     Macular degeneration Neg Hx     Retinal detachment Neg Hx     Strabismus Neg Hx     Stroke Neg Hx     Thyroid disease Neg Hx     Heart disease Neg Hx     Heart failure Neg Hx     Hyperlipidemia Neg Hx      Social History     Tobacco Use    Smoking status: Never Smoker    Smokeless tobacco: Never Used   Substance Use Topics    Alcohol use: No     Frequency: Never     Drinks per session: Patient refused     Binge frequency: Never    Drug use: No       No medications prior to admission.     Review of patient's allergies indicates:   Allergen Reactions    Hydralazine analogues      Increase swelling and throat itching and tightness with use.  Symptoms correlate only with this medication onset in hospital.        Review of Systems   Constitutional: Negative for fever.       Objective:      Vital Signs (Most Recent)  Temp: 97.8 °F (36.6 °C) (04/15/20 0400)  Pulse: 65 (04/15/20 0400)  Resp: 16 (04/15/20 0400)  BP: (!) 158/73 (04/15/20 0400)  SpO2: 97 % (04/14/20 2015)    Vital Signs Range (Last 24H):  Temp:  [97.3 °F (36.3 °C)-97.8 °F (36.6 °C)]   Pulse:  [65-71]   Resp:  [16-18]   BP: (118-158)/(65-73)   SpO2:  [97 %]     Physical Exam   Constitutional: He appears well-developed and well-nourished.   Cardiovascular:  Normal rate.   Pulmonary/Chest: Effort normal.   Abdominal: He exhibits no distension.           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.0]  Yes    Acute respiratory disease due to severe acute respiratory syndrome coronavirus 2 (SARS-CoV-2) [U07.1, J06.9]  Yes    Acute on chronic combined systolic and diastolic heart failure [I50.43]  Yes    Pneumonia due 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 [U07.1, J12.89]  Yes    NSTEMI (non-ST elevated myocardial infarction) [I21.4]  Yes    Moderate to severe aortic stenosis [I35.0]  Yes    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, with long-term current use of insulin [E11.3513, E11.65, Z79.4]  Yes    Moderate episode of recurrent major depressive disorder [F33.1]  Yes    Acute renal failure superimposed on stage 4 chronic kidney disease [N17.9, N18.4]  Yes    Multiple myeloma [C90.00]  Yes    Acute on chronic combined systolic and diastolic congestive heart failure [I50.43]  Yes    Chronic combined systolic and diastolic heart failure [I50.42]  Yes      Resolved Hospital Problems   No resolved problems to display.       Plan:    Direct access EGD for pegtube placement for poor po intake patient with recent positive Covid-19 test results and poor po intake in LTAC.

## 2020-04-15 NOTE — PT/OT/SLP PROGRESS
Physical Therapy  Treatment    Marie Reis Jr.   MRN: [id number]   Admitting Diagnosis: Acute metabolic encephalopathy    PT Received On: 04/15/20        Billable Minutes:  Therapeutic Activity 12 and Total Time 12    Treatment Type: Treatment  PT/PTA: PT     PTA Visit Number: 0       General Precautions: Standard, droplet, airborne, fall, seizure, hearing impaired, vision impaired  Orthopedic Precautions:     Braces:      Spiritual, Cultural Beliefs, [employer] Practices, Values that Affect Care: no    Subjective:  Communicated with nursing prior to session.  Pt mostly nonverbal t/o session. A lot of encouragement was required for participation.    Pain/Comfort  Pain Rating 1: 0/10  Pain Rating Post-Intervention 1: 0/10    Objective:  Patient found supine in bed.       AM-PAC 6 CLICK MOBILITY  Total Score:14    Bed Mobility:  Supine>Sit: on bed w/ MaxA for trunk and BLE    Transfers:  Sit<>Stand: from EOB w/ RW and ModA to rise  Stand Pivot Transfer: to stretcher w/ RW and Chidi  Cues for hand/foot placement    Gait:  Amb ~6ft from EOB to stretcher w/ RW and Chidi for stability     Balance:  Static sit at EOB w/ SBA  Static stand w/ RW and CGA    Patient left supine on stretcher with all lines intact and PCT, nurse, and transportation present. Pt being sent for PEG tube placement.    Assessment:  Marie Reis Jr. is a 64 y.o. male with a medical diagnosis of Acute metabolic encephalopathy.  Pt continues to have decreased participation in therapy sessions. He requires inc'd time and encouragement for participation in OOB activity. Pt will continue PT POC.    Rehab identified problem list/impairments: weakness, impaired endurance, impaired sensation, impaired self care skills, impaired functional mobilty, gait instability, impaired balance, visual deficits, impaired cognition, decreased coordination, decreased upper extremity function, decreased lower extremity function, decreased safety awareness, pain, impaired  cardiopulmonary response to activity(hearing impaired)    Rehab potential is fair.    Activity tolerance: Good    Discharge recommendations: home with home health     Barriers to discharge: Decreased caregiver support    Equipment recommendations: bedside commode, shower chair, walker, rolling     GOALS:   Multidisciplinary Problems     Physical Therapy Goals        Problem: Physical Therapy Goal    Goal Priority Disciplines Outcome Goal Variances Interventions   Physical Therapy Goal     PT, PT/OT Ongoing, Progressing     Description:  PT goals for 10 days    1. Pt supine to sit with supervision-not met  2. Pt sit to supine with mod independent-not met  3. Pt sit to stand with RW with supervision -not met  4. Pt to perform gait 100ft with RW with supervision.-not met  5. Pt to transfer bed to/from bedside chair with RW with supervision.-not met  6. Pt to perform B LE exs in sitting or supine x 10 reps to strengthen B LE to improve functional mobility.-not met                      PLAN:    Patient to be seen 4 x/week  to address the above listed problems via gait training, therapeutic activities, therapeutic exercises  Plan of Care expires: 05/02/20  Plan of Care reviewed with: patient    Abby M Jay, PT  04/15/2020

## 2020-04-15 NOTE — TRANSFER OF CARE
"Anesthesia Transfer of Care Note    Patient: Marie Reis Jr.    Procedure(s) Performed: Procedure(s) (LRB):  INSERTION, PEG TUBE (N/A)    Patient location: Other: EMT foe transport to [facility]    Anesthesia Type: general    Transport from OR: Transported from OR on room air with adequate spontaneous ventilation    Post pain: adequate analgesia    Post assessment: no apparent anesthetic complications and tolerated procedure well    Post vital signs: stable    Level of consciousness: responds to stimulation    Nausea/Vomiting: no nausea/vomiting    Complications: none    Transfer of care protocol was followed      Last vitals:   Visit Vitals  BP (!) 181/86 (BP Location: Left arm, Patient Position: Lying)   Pulse 65   Temp 36.5 °C (97.7 °F) (Temporal)   Resp 18   Ht 5' 10" (1.778 m)   Wt 63.5 kg (140 lb)   SpO2 100%   BMI 20.09 kg/m²     "

## 2020-04-15 NOTE — PT/OT/SLP PROGRESS
Occupational Therapy      Patient Name:  Marie Reis Jr.   MRN:  [id number]    Patient not seen today secondary to   decreased response upon arrival and awaiting procedure. Will follow-up 4/16.    RAOUL Gregory  4/15/2020

## 2020-04-15 NOTE — PT/OT/SLP PROGRESS
Speech Language Pathology      Marie Reis Jr.  MRN: [id number]    Patient not seen today secondary to Out of hospital appointment. Will follow-up 4/16.    IVAN Hartman, CCC-SLP     IVAN Hartman, CCC-SLP  Speech Language Pathologist  [phone]  4/15/2020

## 2020-04-16 PROBLEM — R62.7 FAILURE TO THRIVE IN ADULT: Status: ACTIVE | Noted: 2020-01-01

## 2020-04-16 PROBLEM — R07.9 CHEST PAIN: Status: ACTIVE | Noted: 2020-01-01

## 2020-04-16 PROBLEM — E11.9 TYPE 2 DIABETES MELLITUS: Status: ACTIVE | Noted: 2020-01-01

## 2020-04-16 PROBLEM — N18.4 CHRONIC KIDNEY DISEASE, STAGE IV (SEVERE): Status: ACTIVE | Noted: 2020-01-01

## 2020-04-16 NOTE — NURSING
"During Med pass at 10pm patient complain of lower left rib pain but did not last more than a couple of seconds   Pt was repositioned in bed at the time and expressed that he was no longer in pain  Original pain was 6 out of 10 and only when he would take in a deep breathe   Pt then fell asleep and slept comfortably  At the time he also refused any pain medication  0130 pt got up in bed needing to use the restroom and was assisted   Returning to bed pt sat in bed and expressed that the pain had returned rating that the pain was now 8 out of 10  No sob  Vitals assessed 113/65 via dinamap  Shortly after pain was expressed 9 out of 10 and pain was towards the whole left side of chest  And stated that he felt like someone was "squeezing him"  bp taken at 1050p 152/67 73HR  20 RR 98%O2  9 out of 10 chest pain  2am vitals 161/76  73HR  99%O2 RR20  9 out of 10  Expresses pain when touched in area  Can not take full deep breath but does not complain of pain or expresses difficulty breathing    Pt was given a chewable calcium carb tablet  At 150  RN on unit with provider   Per provider we are sending pt to ED  Informing pt pt acknowledges  Last vitals 146/70 74 HR 99%O2  "

## 2020-04-16 NOTE — PT/OT/SLP PROGRESS
Physical Therapy      Patient Name:  Marie Reis Jr.   MRN:  [id number]    Patient not seen today secondary to patient still at [facility] in acute care. Will follow-up if pt returns to SNF COVID unit.    Abby Thompson, PT   4/16/2020

## 2020-04-16 NOTE — NURSING
Pt arrived to the unit on a stretcher via EMS. Patient is stable. V/s signs taken and charted. Camera set up. Will continue to monitor.

## 2020-04-16 NOTE — PT/OT/SLP PROGRESS
Speech Language Pathology      Marie Reis Jr.  MRN: [id number]    Patient not seen today secondary to (pt had not yet returned from acute floor). Will follow-up if pt returns prior to needing new orders.     IVAN Hartman, CCC-SLP     IVAN Hartman, CCC-SLP  Speech Language Pathologist  [phone]  4/16/2020

## 2020-04-16 NOTE — PROGRESS NOTES
[facility]   Skilled Nursing Facility   Progress Notes     Patient name: Marie Reis Jr.  MRN: [id number]  Patient Class: IP- SNF  PCP: Nancy Colón MD  Attending Provider: Iraida Almanzar MD  Nurse Practitioner: Vivienne Sanders NP  Admission Date: 4/1/2020  LOS: 14 days  Code status: Full Code  Principal Problem:  Acute metabolic encephalopathy      TELEMEDICINE VISIT  Patient Location: [place] [id number]  Provider Location: Provider home  Start / End time:14:09-14:14  Evaluation by video and audio     HPI: Marie Reis Jr. is a 64 y.o. man with PMH Chronic systolic and diastolic HF, DMII CKD IV and HTN, Multiple myeloma (not in remission), SUZANNA (BiPAP), DJD LS spine, CVA 2/2018 admitted [facility] on 3/28 with a cc of fatigue and weakness following an unwitnessed syncopal vs seizure event. Patient was admitted 2/24 for blood transfusion. During virtual visit 3/19 he was determined to have right PCA ischemic stroke that was suspected due to cardioembolic from heart failure. Then returned 3/21 for SOB and coughing for one day. He was found to have COVID-19 pneumonia, NSTEMI and probable COVID myocarditis. Patient admitted again 3/28, day after discharge, for a syncopal episode. Patient was found to be dehydrated. Nursing reports patient minimally drinking, not eating and not taking medications since admission here. He also has not been participating with therapy. During televisit. Patient was not cooperative with exam. Patient laid underneath covers during entire visit. Nurse Layo Fuentes jimbo covers back and patient draw them back over. Upon review of previous admission, patient was not eating and only spoke with prompted. He had one previous PT/OT where he participated with physical prompting. After arrival here he has been actively refusing everything food, medications and interaction. He has kept on his visimonitor. Prior to onset of illness patient weighed 191 lb during office visit with  his PCP 2/21. Patient has 162lb listed as his current weight. He was independent of ADLs, A&O to person, place and time, but had little recall or insight to his medical conditions. Patient did not have any behavioral issues after his CVA in 2/2018.     Patient transferred to [facility] with PT and OT to improve functional status and ability to perform ADLs.      Interval History:  Nursing notes and vitals reviewed. Pt seen and examined visually via telemedicine cart with nurse at bedside. Labs reviewed, stable, no acute findings. Anticipate PEG placement tomorrow at [facility]. Initiate npo at midnight. Continue close monitoring. He continues to refuse therapy but took his meds today.     PT/ OT--Declines services    RD--  Diet: Regular--refuses all meals; Anticipate PEG placement Monday     Past Medical History: Patient has a past medical history of Binocular vision disorder with diplopia (9/22/2016), Bradycardia, Cataract, Cervical spondylosis (10/1/2015), Chronic diastolic congestive heart failure, Chronic diastolic heart failure (1/11/2018), Coronary artery disease due to calcified coronary lesion (3/19/2018), Dyslipidemia (6/26/2015), Encounter for blood transfusion, Hearing impairment (10/25/2013), History of stroke (3/23/2015), Hypertension associated with diabetes (1/11/2018), Hypertensive retinopathy of both eyes (9/26/2017), Lumbar spondylosis (10/1/2015), Multiple myeloma not having achieved remission (1/16/2018), NSTEMI (non-ST elevated myocardial infarction) (3/22/2020), SUZANNA on CPAP, Persistent proteinuria (1/11/2018), Pneumonia of right lower lobe due to infectious organism (3/22/2020), Spondylolisthesis of lumbar region (10/1/2015), Strabismus, Stroke (2014), Thoracic spondylosis (10/1/2015), Type 2 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, with long-term current use of insulin (9/26/2017), Type 2 diabetes mellitus with diabetic polyneuropathy, with long-term  current use of insulin (9/28/2015), and Type 2 diabetes mellitus with stage 4 chronic kidney disease, with long-term current use of insulin (1/11/2018).     Past Surgical History: Patient has a past surgical history that includes left hand fracture sx; Cataract extraction w/  intraocular lens implant (Left, 4/30/15); Hand surgery (2/2012); LT EYE  (5/2/15); Eye surgery; and Colonoscopy (N/A, 1/20/2020).     Social History: Patient reports that he has never smoked. He has never used smokeless tobacco. He reports that he does not drink alcohol or use drugs.     Family History: family history includes Cancer in his mother; Diabetes in his brother, father, mother, and sister; Heart attack in his father; Kidney disease in his mother; No Known Problems in his maternal aunt, maternal grandfather, maternal grandmother, maternal uncle, paternal aunt, paternal grandfather, paternal grandmother, and paternal uncle.     Allergies: Patient is allergic to hydralazine analogues.     ROS:    Review of Systems   Unable to perform ROS: Other (pt uncooperative and disengaged)     PEx     Temp:  [88.9 °F (31.6 °C)-98.4 °F (36.9 °C)]   Pulse:  [65-77]   Resp:  [14-18]   BP: (147-181)/(72-88)   SpO2:  [96 %-100 %]   Physical Exam:  Constitutional: Patient resting comfortably in no apparent distress; appears sickly and cachectic   Pulmonary:  Breathing comfortably at rest. Normal respiratory rate -per staff  Neurologic: oriented to self and place, more cooperative today  Psychiatric: Dysphoric mood and withdrawn affect. Uncooperative  Skin: Left eye bruising at brow, left knee abrasion  Breath sounds (per nursing)-CTA on room air      Recent Labs   Lab 04/11/20  1144   WBC 7.61   HGB 9.9*   HCT 32.2*   *     Recent Labs   Lab 04/11/20  1144   GRAN 78.9*  6.0   LYMPH 12.0*  0.9*   MONO 7.0  0.5   EOS 0.1     Recent Labs   Lab 04/11/20  1144 04/12/20  1145    139   K 4.1 4.3    110   CO2 22* 21*   BUN 65* 56*    CREATININE 4.3* 3.6*   * 139*   CALCIUM 9.2 8.7   MG 2.2  --      Recent Labs   Lab 04/11/20  1144 04/12/20  1145   ALKPHOS 73 69   ALT 13 13   AST 20 19   ALBUMIN 3.2* 3.0*   PROT 6.8 6.3   BILITOT 0.9 0.8     Recent Labs   Lab 04/11/20  1144      CRP 7.9     Recent Labs   Lab 04/11/20  1144      TROPONINI 0.042*   *     Recent Labs   Lab 12/03/19  0835 02/24/20  1405  03/21/20  1933 03/21/20  2342 04/11/20  1144   PROCAL  --   --   --  0.38*  --   --    LACTATE  --   --    < > 3.5* 1.2 1.6   FERRITIN 609* 5,977*  --   --   --  1,795*    < > = values in this interval not displayed.     Recent Labs   Lab 04/11/20  1144   TSH 1.164     Recent Labs   Lab 04/14/20  0104 04/14/20  0813 04/14/20  1143 04/14/20  1742 04/14/20  2158 04/15/20  0550   POCTGLUCOSE 134* 99 101 131* 162* 100        Baselines:  Hemoglobin   Date Value Ref Range Status   04/11/2020 9.9 (L) 14.0 - 18.0 g/dL Final   03/31/2020 8.9 (L) 14.0 - 18.0 g/dL Final   03/30/2020 8.6 (L) 14.0 - 18.0 g/dL Final   03/29/2020 8.4 (L) 14.0 - 18.0 g/dL Final   03/28/2020 9.2 (L) 14.0 - 18.0 g/dL Final     Creatinine   Date Value Ref Range Status   04/12/2020 3.6 (H) 0.5 - 1.4 mg/dL Final   04/11/2020 4.3 (H) 0.5 - 1.4 mg/dL Final   03/31/2020 3.2 (H) 0.5 - 1.4 mg/dL Final   03/30/2020 3.8 (H) 0.5 - 1.4 mg/dL Final   03/29/2020 4.4 (H) 0.5 - 1.4 mg/dL Final        Scheduled Meds:    amLODIPine  10 mg Oral Daily    atorvastatin  40 mg Oral QHS    carvediloL  25 mg Oral BID WM    [START ON 4/16/2020] enoxaparin  30 mg Subcutaneous Daily    FLUoxetine  20 mg Oral Daily    [START ON 4/16/2020] modafiniL  100 mg Oral Before breakfast    mupirocin   Topical (Top) BID    pantoprazole  40 mg Oral Daily    senna-docusate 8.6-50 mg  1 tablet Oral BID     Continuous Infusions:     As Needed: acetaminophen, benzonatate, calcium carbonate, fluticasone propionate, glucagon (human recombinant), glucose, glucose, insulin aspart U-100,  melatonin, sodium chloride 0.9%     Active Hospital Problems    Diagnosis  POA    *Acute metabolic encephalopathy [G93.41]  Yes    Type 2 diabetes mellitus with stage 4 chronic kidney disease and hypertension [E11.22, I12.9, N18.4]  Yes    Moderate malnutrition [E44.0]  Yes    Physical deconditioning [R53.81]  Yes    COVID-19 virus infection [U07.1]  Yes    Pulmonary hypertension [I27.20]  Yes    Acute renal failure superimposed on chronic kidney disease [N17.9, N18.9]  Yes    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 [U07.1, I40.0]  Yes    Acute respiratory disease due to severe acute respiratory syndrome coronavirus 2 (SARS-CoV-2) [U07.1, J06.9]  Yes    Acute on chronic combined systolic and diastolic heart failure [I50.43]  Yes    Pneumonia due 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 [U07.1, J12.89]  Yes    NSTEMI (non-ST elevated myocardial infarction) [I21.4]  Yes    Moderate to severe aortic stenosis [I35.0]  Yes    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, with long-term current use of insulin [E11.3513, E11.65, Z79.4]  Yes    Moderate episode of recurrent major depressive disorder [F33.1]  Yes    Acute renal failure superimposed on stage 4 chronic kidney disease [N17.9, N18.4]  Yes    Multiple myeloma [C90.00]  Yes    Acute on chronic combined systolic and diastolic congestive heart failure [I50.43]  Yes    Chronic combined systolic and diastolic heart failure [I50.42]  Yes      Resolved Hospital Problems   No resolved problems to display.      -------------------------------------------  Assessment and Plan:   ----------------------------------------------  Protein calorie malnutrition, unstable  4/14/20  Body mass index is 20.09 kg/m².  Peg planned for tomorrow  -Initiate npo at midnight    Mechanical fall, unstable  4/12/20  He is fell yesterday unwitnessed and sustained an injury to his left eye and left knee abrasion. [facility]  evaluation. He is more cooperative today. Not eating. Slowed responses, follows commands slowly, tele sitter in place. Left eye brusing and mild swelling on upper lid, left knee abrasion visualized, minimal drainage, stable.  ED records reviewed, labs reviewed, stable, no acute findings on ct head, tsh wnl. Anticipate PEG placement on next week to assist with medicinal and nutritional needs. Continue close monitoring. Discussed with wife via phone plan of care and results from ed visit. Also discussed peg placement with her. Advised even though he is eating at times it is still inconsistent and he has lost >20pounds in the past few months. The peg is reversible and he can still eat foods by mouth.   4/14/20  Continue to monitor closely with tele sitter    *Impaired functional mobility and endurance  Participation in PT/ OT limited by lack of interest-pt is self-limiting  Overall stable    PT/ OT discontinued due to lack of participation     COVID 19 infection   COVID 19 pneumonia   Acute hypoxic respiratory failure and severe sepsis and pneumonia 2/2 COVID-19 , resolved  Onset of sx 3/21  COVID 19 positive on  3/21  CXR: 3/28/20--Mild cardiomegaly.  No significant airspace consolidation or pleural effusion identified.  Small opacities noted at the right lung base. . No pleural effusion.  Completed course of plaquenil  4/15/2020-  On room air and no longer febrile  Likely does not require isolation at this time  4/12/20  Afebrile  Vital signs stable  Labs - see above     Isolation   Airborne and Droplet Precaution   N95 masks must be fit tested, wear eye protection   Hand hygiene x 20 seconds   Limit visitors per hospital policy   Consolidating lab draws, nursing care, and interventions      Management   Bundle care as able to minimize in/out of room    Supplemental O2 to maintain SpO2 >92%, if requiring 6L NC or higher, place on nonrebreather and discuss case with MICU   Telemetry & continuous Pulse  Ox   If wheezing  albuterol INHALER PRN 4puff Q6hr approximates a nebulizer (avoid nebulization of secretions) and ipratropium daily    Cautious use of NSAIDS for fever per WHO recommendations (3/16/2020)   No new ACEi/ARB start or discontinuation of chronic med unless hypotensive      Other Active Problems    Acute metabolic encephalopathy  Recent right PCA ischemic stroke  Probable vascular dementia  Probable depression  Discussed with neurology  Patient will need 6-12 months to determine recovery  Follow up with neurology  Consider PEG tube  PT/OT when able  prozac 20 mg daily when able  CONSIDER IN-PATIENT REHAB AFTER DISCHARGE IF PATIENT EVER STARTS TO COOPERATE WITH THERAPY  Need speech therapy  Patient does not show capacity make medical decision or perform discharge planning - his wife should be signing consents    Syncopal Event due to dehydration   JAZLYN on CKD4  Monitor closely     NSTEMI type II  Acute on Chronic Combined systolic and diastolic HF  Severe pulmonary HTN  Moderate to severe aortic stenosis  Coronary artery disease  - Continue medical management of ASA 81 mg daily, plavix 75 mg daily, atorvastatin 40 mg daily, coreg 25 mg BID  - F/u with cardiology outpatient  Furosemide held due to no oral intake and recent dehydration  plavix and clopidogrel held due possible PEG tube procedure     Multiple myeloma  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  - being treated with bortezomib   - due for cycle 6 on 3/25  - F/u with oncology     HTN  Continue home Coreg, amlodipine 10 mg daily     DM II with HTN and CKD IV  DMII with diabetic proliferative retinopathy  - A1c in 3/2020 9.1  Patient not eating; Hold medications     Deconditioning  PT/OT     Moderate protein calorie malnutrition   anorexia  - boost   Recommend PEG  If family does not want PEG, then comfort care recommended     Degenerative joint disease - PT/OT         Diet:  Regular diet nutritional supplements boost  Lines/ Drains/ Airways - none      Goals of  Care: Return to prior functional status     Discharge plan: TBD   Per PT evaluation       Vivienne Sanders NP  [facility] 5th floor  TELEMEDICINE VISIT    DOS: 4/14/20

## 2020-04-16 NOTE — ANESTHESIA POSTPROCEDURE EVALUATION
Anesthesia Post Evaluation    Patient: Marie Reis Jr.    Procedure(s) Performed: Procedure(s) (LRB):  INSERTION, PEG TUBE (N/A)    Final Anesthesia Type: general    Patient location during evaluation: PACU  Patient participation: Yes- Able to Participate  Level of consciousness: awake and alert and oriented  Post-procedure vital signs: reviewed and stable  Pain management: adequate  Airway patency: patent    PONV status at discharge: No PONV  Anesthetic complications: no      Cardiovascular status: blood pressure returned to baseline, hemodynamically stable and stable  Respiratory status: unassisted, room air and spontaneous ventilation  Hydration status: euvolemic  Follow-up not needed.          Vitals Value Taken Time   /69 4/16/2020  5:33 AM   Temp 36.9 °C (98.4 °F) 4/16/2020  2:25 AM   Pulse 70 4/16/2020  5:33 AM   Resp 15 4/16/2020  5:33 AM   SpO2 100 % 4/16/2020  5:33 AM         No case tracking events are documented in the log.      Pain/Gregory Score: No data recorded

## 2020-04-17 NOTE — PT/OT/SLP PROGRESS
Speech Language Pathology  Treatment    Marie Reis Jr.   MRN: [id number]   Admitting Diagnosis: Acute metabolic encephalopathy    Diet recommendations: Solid Diet Level: Regular  Liquid Diet Level: Thin Standard aspiration precautions    SLP Treatment Date: 04/17/20  Speech Start Time: 1312     Speech Stop Time: 1321     Speech Total (min): 9 min       TREATMENT BILLABLE MINUTES:  Speech Therapy Individual 9    Has the patient been evaluated by SLP for swallowing? : No      General Precautions: Standard, airborne, contact, droplet  Current Respiratory Status: room air       Subjective:  Patient awake but nonverbal and with eyes closed during session  Communicated with nurse prior to session     Objective:   Patient seen for ongoing cognitive therapy. He was sitting up in bed with avasys in place during session. Patient awake, but kept eyes closed during session despite max stim. Patient shook his head repeatedly when SLP attempted to engage him. He answered 4/6 simple yes/no questions (by nodding) throughout session with max assist, but he never attempted to speak. SLP educated patient at length regarding purpose of therapy, but he did not indicate understanding.     Per OT, patient responded well to facetiming with his wife on the iPad. This should be used during next session if patient continues with limited interaction.     Assessment:  Marie Reis Jr. is a 64 y.o. male with a medical diagnosis of Acute metabolic encephalopathy and presents with cognitive-linguistic impairment.    Discharge recommendations: Discharge Facility/Level of Care Needs: home health speech therapy     Goals:   Multidisciplinary Problems     SLP Goals        Problem: SLP Goal    Goal Priority Disciplines Outcome   SLP Goal     SLP    Description:  Speech-Language Pathology Goals   Goals to be met by 4/20/20  1. Patient will orient x4 with max assist.   2. Patient will answer simple yes/no questions with 75% accuracy and mod assist.   3.  Patient will follow simple commands with 60% accuracy and mod assist.   4. Patient will recall personal information with 75% accuracy and max assist.   5. Patient will participate in ongoing assessment of cognition and language.                     Plan:   Patient to be seen Therapy Frequency: 3 x/week  Planned Interventions: Cognitive-Linguistic Therapy  Plan of Care expires: 05/13/20  Plan of Care reviewed with: patient  SLP Follow-up?: Yes  SLP - Next Visit Date: 04/20/20          Karson Randall CCC-SLP  Speech-Language Pathology  Pager: [phone]   4/17/20

## 2020-04-17 NOTE — PROGRESS NOTES
[facility]   Skilled Nursing Facility   Progress Notes     Patient name: Marie Reis Jr.  MRN: [id number]  Patient Class: IP- SNF  PCP: Nancy Colón MD  Attending Provider: Iraida Almanzar MD  Nurse Practitioner: Vivienne Sanders NP  Admission Date: 4/1/2020  LOS: 16 days  Code status: Full Code  Principal Problem:  Acute metabolic encephalopathy      TELEMEDICINE VISIT  Patient Location: [place] [id number]  Provider Location: Provider home  Start / End time:15:  Evaluation by video and audio     HPI: Marie Reis Jr. is a 64 y.o. man with PMH Chronic systolic and diastolic HF, DMII CKD IV and HTN, Multiple myeloma (not in remission), SUZANNA (BiPAP), DJD LS spine, CVA 2/2018 admitted [facility] on 3/28 with a cc of fatigue and weakness following an unwitnessed syncopal vs seizure event. Patient was admitted 2/24 for blood transfusion. During virtual visit 3/19 he was determined to have right PCA ischemic stroke that was suspected due to cardioembolic from heart failure. Then returned 3/21 for SOB and coughing for one day. He was found to have COVID-19 pneumonia, NSTEMI and probable COVID myocarditis. Patient admitted again 3/28, day after discharge, for a syncopal episode. Patient was found to be dehydrated. Nursing reports patient minimally drinking, not eating and not taking medications since admission here. He also has not been participating with therapy. During televisit. Patient was not cooperative with exam. Patient laid underneath covers during entire visit. Nurse Layo Fuentes jimbo covers back and patient draw them back over. Upon review of previous admission, patient was not eating and only spoke with prompted. He had one previous PT/OT where he participated with physical prompting. After arrival here he has been actively refusing everything food, medications and interaction. He has kept on his visimonitor. Prior to onset of illness patient weighed 191 lb during office visit with  his PCP 2/21. Patient has 162lb listed as his current weight. He was independent of ADLs, A&O to person, place and time, but had little recall or insight to his medical conditions. Patient did not have any behavioral issues after his CVA in 2/2018.     Patient transferred to [facility] with PT and OT to improve functional status and ability to perform ADLs.      Interval History:  Nursing notes and vitals reviewed. Pt seen and examined visually via telemedicine cart with nurse at bedside. No acute findings. PEG placed on 4/15 Strongly encourage abdominal binder at all times. Continue close monitoring.     PT/ OT--Declines services    RD--  Diet: Regular--refuses all meals; Anticipate PEG placement Monday     Past Medical History: Patient has a past medical history of Binocular vision disorder with diplopia (9/22/2016), Bradycardia, Cataract, Cervical spondylosis (10/1/2015), Chronic diastolic congestive heart failure, Chronic diastolic heart failure (1/11/2018), Coronary artery disease due to calcified coronary lesion (3/19/2018), Dyslipidemia (6/26/2015), Encounter for blood transfusion, Hearing impairment (10/25/2013), History of stroke (3/23/2015), Hypertension associated with diabetes (1/11/2018), Hypertensive retinopathy of both eyes (9/26/2017), Lumbar spondylosis (10/1/2015), Multiple myeloma not having achieved remission (1/16/2018), NSTEMI (non-ST elevated myocardial infarction) (3/22/2020), SUZANNA on CPAP, Persistent proteinuria (1/11/2018), Pneumonia of right lower lobe due to infectious organism (3/22/2020), Spondylolisthesis of lumbar region (10/1/2015), Strabismus, Stroke (2014), Thoracic spondylosis (10/1/2015), Type 2 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, with long-term current use of insulin (9/26/2017), Type 2 diabetes mellitus with diabetic polyneuropathy, with long-term current use of insulin (9/28/2015), and Type 2 diabetes mellitus with stage 4  chronic kidney disease, with long-term current use of insulin (1/11/2018).     Past Surgical History: Patient has a past surgical history that includes left hand fracture sx; Cataract extraction w/  intraocular lens implant (Left, 4/30/15); Hand surgery (2/2012); LT EYE  (5/2/15); Eye surgery; and Colonoscopy (N/A, 1/20/2020).     Social History: Patient reports that he has never smoked. He has never used smokeless tobacco. He reports that he does not drink alcohol or use drugs.     Family History: family history includes Cancer in his mother; Diabetes in his brother, father, mother, and sister; Heart attack in his father; Kidney disease in his mother; No Known Problems in his maternal aunt, maternal grandfather, maternal grandmother, maternal uncle, paternal aunt, paternal grandfather, paternal grandmother, and paternal uncle.     Allergies: Patient is allergic to hydralazine analogues.     ROS:    Review of Systems   Unable to perform ROS: Other (pt uncooperative and disengaged)     PEx     Temp:  [97.5 °F (36.4 °C)-98.3 °F (36.8 °C)]   Pulse:  [70-77]   Resp:  [16-22]   BP: (112-162)/(62-74)   SpO2:  [98 %-100 %]   Physical Exam:  Constitutional: Patient resting comfortably in no apparent distress; appears sickly and cachectic   Pulmonary:  Breathing comfortably at rest. Normal respiratory rate -per staff  Neurologic: oriented to self and place, more cooperative today  Psychiatric: Dysphoric mood and withdrawn affect. Uncooperative  Skin: Left eye bruising at brow, left knee abrasion  Breath sounds (per nursing)-CTA on room air      Recent Labs   Lab 04/11/20  1144 04/16/20  0443   WBC 7.61 8.06   HGB 9.9* 11.0*   HCT 32.2* 35.6*   * 244     Recent Labs   Lab 04/11/20  1144 04/16/20  0443   GRAN 78.9*  6.0 79.3*  6.4   LYMPH 12.0*  0.9* 9.6*  0.8*   MONO 7.0  0.5 8.1  0.7   EOS 0.1 0.2     Recent Labs   Lab 04/11/20  1144 04/12/20  1145 04/16/20  0443    139 140   K 4.1 4.3 4.1    110  111*   CO2 22* 21* 22*   BUN 65* 56* 28*   CREATININE 4.3* 3.6* 2.6*   * 139* 103   CALCIUM 9.2 8.7 9.4   MG 2.2  --   --      Recent Labs   Lab 04/11/20  1144 04/12/20  1145 04/16/20  0443   ALKPHOS 73 69 77   ALT 13 13 13   AST 20 19 18   ALBUMIN 3.2* 3.0* 3.1*   PROT 6.8 6.3 6.7   BILITOT 0.9 0.8 0.8     Recent Labs   Lab 04/11/20  1144 04/16/20  0443    272*   CRP 7.9 19.6*     Recent Labs   Lab 04/11/20  1144 04/16/20  0443 04/16/20  0739    72  --    TROPONINI 0.042* 0.040* 0.030*   * 540*  --      Recent Labs   Lab 02/24/20  1405  03/21/20  1933 03/21/20  2342 04/11/20  1144 04/16/20  0443 04/16/20  0740   PROCAL  --   --  0.38*  --   --  0.08  --    LACTATE  --    < > 3.5* 1.2 1.6  --   --    DDIMER  --   --   --   --   --  1.17*  --    FERRITIN 5,977*  --   --   --  1,795* 1,604*  --    SEDRATE  --   --   --   --   --   --  69*    < > = values in this interval not displayed.     Recent Labs   Lab 04/11/20  1144   TSH 1.164     Recent Labs   Lab 04/15/20  1537 04/15/20  2100 04/16/20  0726 04/16/20  1842 04/16/20  2311 04/17/20  0920   POCTGLUCOSE 98 88 88 108 205* 187*        Baselines:  Hemoglobin   Date Value Ref Range Status   04/16/2020 11.0 (L) 14.0 - 18.0 g/dL Final   04/11/2020 9.9 (L) 14.0 - 18.0 g/dL Final   03/31/2020 8.9 (L) 14.0 - 18.0 g/dL Final   03/30/2020 8.6 (L) 14.0 - 18.0 g/dL Final   03/29/2020 8.4 (L) 14.0 - 18.0 g/dL Final     Creatinine   Date Value Ref Range Status   04/16/2020 2.6 (H) 0.5 - 1.4 mg/dL Final   04/12/2020 3.6 (H) 0.5 - 1.4 mg/dL Final   04/11/2020 4.3 (H) 0.5 - 1.4 mg/dL Final   03/31/2020 3.2 (H) 0.5 - 1.4 mg/dL Final   03/30/2020 3.8 (H) 0.5 - 1.4 mg/dL Final        Scheduled Meds:    amLODIPine  10 mg Oral Daily    aspirin  81 mg Oral Daily    atorvastatin  40 mg Oral QHS    carvediloL  25 mg Oral BID WM    clopidogreL  75 mg Oral Daily    enoxaparin  30 mg Subcutaneous Daily    FLUoxetine  20 mg Oral Daily    insulin detemir  U-100  10 Units Subcutaneous QHS    methyl salicylate-menthol 15-10%   Topical (Top) TID    modafiniL  100 mg Oral Before breakfast    mupirocin   Topical (Top) BID    pantoprazole  40 mg Oral Daily    vitamin D  2,000 Units Oral Daily     Continuous Infusions:     As Needed: acetaminophen, calcium carbonate, Dextrose 10% Bolus, Dextrose 10% Bolus, fluticasone propionate, glucagon (human recombinant), glucose, glucose, insulin aspart U-100, ondansetron, sodium chloride 0.9%     Active Hospital Problems    Diagnosis  POA    *Acute metabolic encephalopathy [G93.41]  Yes    Type 2 diabetes mellitus [E11.9]  Yes    Moderate malnutrition [E44.0]  Yes    Physical deconditioning [R53.81]  Yes    COVID-19 virus infection [U07.1]  Yes    Pulmonary hypertension [I27.20]  Yes    Acute renal failure superimposed on chronic kidney disease [N17.9, N18.9]  Yes    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 [U07.1, I40.0]  Yes    Acute respiratory disease due to severe acute respiratory syndrome coronavirus 2 (SARS-CoV-2) [U07.1, J06.9]  Yes    Acute on chronic combined systolic and diastolic heart failure [I50.43]  Yes    Pneumonia due 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 [U07.1, J12.89]  Yes    NSTEMI (non-ST elevated myocardial infarction) [I21.4]  Yes    Moderate to severe aortic stenosis [I35.0]  Yes    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, with long-term current use of insulin [E11.3513, E11.65, Z79.4]  Yes    Moderate episode of recurrent major depressive disorder [F33.1]  Yes    Acute renal failure superimposed on stage 4 chronic kidney disease [N17.9, N18.4]  Yes    Multiple myeloma [C90.00]  Yes    Acute on chronic combined systolic and diastolic congestive heart failure [I50.43]  Yes    Chronic combined systolic and diastolic heart failure [I50.42]  Yes      Resolved Hospital Problems   No resolved problems to display.       -------------------------------------------  Assessment and Plan:   ----------------------------------------------  Protein calorie malnutrition, unstable  4/17/20  Body mass index is 20.09 kg/m².  Peg placed 4/15 start tube feeds     Mechanical fall, unstable  4/12/20  He is fell yesterday unwitnessed and sustained an injury to his left eye and left knee abrasion. He was sent to [facility] ED for evaluation. He is more cooperative today. Not eating. Slowed responses, follows commands slowly, tele sitter in place. Left eye brusing and mild swelling on upper lid, left knee abrasion visualized, minimal drainage, stable.  ED records reviewed, labs reviewed, stable, no acute findings on ct head, tsh wnl. Anticipate PEG placement on next week to assist with medicinal and nutritional needs. Continue close monitoring. Discussed with wife via phone plan of care and results from ed visit. Also discussed peg placement with her. Advised even though he is eating at times it is still inconsistent and he has lost >20pounds in the past few months. The peg is reversible and he can still eat foods by mouth.   4/17/20  Continue to monitor closely with tele sitter    *Impaired functional mobility and endurance  Participation in PT/ OT limited by lack of interest-pt is self-limiting  Overall stable    PT/ OT discontinued due to lack of participation     COVID 19 infection   COVID 19 pneumonia   Acute hypoxic respiratory failure and severe sepsis and pneumonia 2/2 COVID-19 , resolved  Onset of sx 3/21  COVID 19 positive on  3/21  CXR: 3/28/20--Mild cardiomegaly.  No significant airspace consolidation or pleural effusion identified.  Small opacities noted at the right lung base. . No pleural effusion.  Completed course of plaquenil  4/17/2020-  On room air and no longer febrile  Afebrile  Vital signs stable     Isolation   Airborne and Droplet Precaution   N95 masks must be fit tested, wear eye protection   Hand hygiene x 20  seconds   Limit visitors per hospital policy   Consolidating lab draws, nursing care, and interventions      Management   Bundle care as able to minimize in/out of room    Supplemental O2 to maintain SpO2 >92%, if requiring 6L NC or higher, place on nonrebreather and discuss case with MICU   Telemetry & continuous Pulse Ox   If wheezing  albuterol INHALER PRN 4puff Q6hr approximates a nebulizer (avoid nebulization of secretions) and ipratropium daily    Cautious use of NSAIDS for fever per WHO recommendations (3/16/2020)   No new ACEi/ARB start or discontinuation of chronic med unless hypotensive      Other Active Problems    Acute metabolic encephalopathy  Recent right PCA ischemic stroke  Probable vascular dementia  Probable depression  Discussed with neurology  Patient will need 6-12 months to determine recovery  Follow up with neurology  Consider PEG tube  PT/OT when able  prozac 20 mg daily when able  CONSIDER IN-PATIENT REHAB AFTER DISCHARGE IF PATIENT EVER STARTS TO COOPERATE WITH THERAPY  Need speech therapy  Patient does not show capacity make medical decision or perform discharge planning - his wife should be signing consents    Syncopal Event due to dehydration   JAZLYN on CKD4  Monitor closely     NSTEMI type II  Acute on Chronic Combined systolic and diastolic HF  Severe pulmonary HTN  Moderate to severe aortic stenosis  Coronary artery disease  - Continue medical management of ASA 81 mg daily, plavix 75 mg daily, atorvastatin 40 mg daily, coreg 25 mg BID  - F/u with cardiology outpatient  Furosemide held due to no oral intake and recent dehydration  plavix and clopidogrel held due possible PEG tube procedure     Multiple myeloma  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  - being treated with bortezomib   - due for cycle 6 on 3/25  - F/u with oncology     HTN  Continue home Coreg, amlodipine 10 mg daily     DM II with HTN and CKD IV  DMII with diabetic proliferative retinopathy  - A1c in 3/2020 9.1  Patient not  eating; Hold medications     Deconditioning  PT/OT     Moderate protein calorie malnutrition   anorexia  - boost   Recommend PEG  If family does not want PEG, then comfort care recommended     Degenerative joint disease - PT/OT         Diet:  Regular diet nutritional supplements boost  Lines/ Drains/ Airways - none      Goals of Care: Return to prior functional status     Discharge plan: TBD   Per PT evaluation       Vivienne Sanders NP  [facility] 5th floor  TELEMEDICINE VISIT    DOS: 4/17/20

## 2020-04-17 NOTE — NURSING
4 am vitals taken   Noted that pts gown was dirty   Upon observing noted that pt had made a bm on bed  Pt was cleaned up and bed sheets changed       Pt will need new abdominal wrap for PEG tube,he soiled his and was removed to avoid contamination  Peg area was covered with Medp[employer] bandage for the moment to avoid friction  Pt was also placed in adult diaper since pt I currently having loose bowels

## 2020-04-17 NOTE — PT/OT/SLP PROGRESS
Physical Therapy      Patient Name:  Marie Reis Jr.   MRN:  [id number]    Patient not seen today secondary to being unavailable x2 attempts. On first attempt w/ w/ SLP. On second attempt pt w/ nursing for PEG tub feeding. Pt also w/ chest pain. PT unable to return. Will follow-up per PT POC.    Abby Thompson, PT   4/17/2020

## 2020-04-17 NOTE — PLAN OF CARE
BRYAN spoke with pt's wife Naheed via phone (# [phone]) to discuss patients discharge plan. BRYAN communicated that the patient currently has a PEG tube to obtain feeding etc. BRYAN discussed with the patient's wife her ability to utilize the PEG tube. Pt.'s wife expressed that she is willing to be educated about the PEG tube. BYRAN communicated with patient's nurse Danielle ext #[phone] on the need for family education. SW will continue to follow up as needed.    3:14 PM    Danielle pt.'s nurse reported that she will be able to provide the education tomorrow 4/18 @10:00 am roughly. Patient's wife was informed, and agreed. Pt.s wife requested to speak to patient's MD discussing medical and discharge needs. BRYAN informed Medical team.       Lexi Crockett LMSW  Case Management   [facility]

## 2020-04-17 NOTE — PT/OT/SLP PROGRESS
Occupational Therapy  Treatment    Marie Reis Jr.   MRN: [id number]   Admitting Diagnosis: Acute metabolic encephalopathy    OT Date of Treatment: 04/17/20       Billable Minutes:  Therapeutic Activity 24    General Precautions: Standard, airborne, contact, droplet  Orthopedic Precautions: N/A  Braces: N/A    Spiritual, Cultural Beliefs, Baptist Practices, Values that Affect Care: no    Subjective:  Communicated with nurse prior to session.  Pt. Initially very sleepy during session    Pain/Comfort  Pain Rating 1: 5/10  Location - Side 1: Left  Location - Orientation 1: upper  Location 1: chest  Pain Addressed 1: Nurse notified, Reposition, Distraction  Pain Rating Post-Intervention 1: 5/10    Objective:  Patient found with: (supine in bed)    Occupational Performance:    Bed Mobility:    · Patient completed Scooting/Bridging with maximal assistance  To scoot to side of bed  With HOB elevated to foster upright positioning    Functional Mobility/Transfers:  · Not tested  · Functional Mobility: not tested    Activities of Daily Living:  · Not performed    AMPA 6 Click:  [facility] Total Score: 12    OT Exercises: Pt. Was observed to be able to move all extremities on this date actively but was declining repetative there ex 2/2 pain    Additional Treatment:  Pt. Spouse skyoed on this date:   Pt. Initially with eyes closed to therapist but encouraged to participate in session with spouse on skype.    Pt. Followed some simple commands but overall noted to be sleepy during session  Minimal verbalizations noted during session  Pt. Educated on what he needs to be able to do in order to return home with spouse and why therapy and OOB is important  Pt. Followed some 1 step commands but not consistently  Nursing notified of pt. With discomfort in chaest region and came to assess patient  Pt. Assisted back into midline positioning in bed    Patient left supine with call button in reach and nurses  present    ASSESSMENT:  Marie Reis  . is a 64 y.o. male with a medical diagnosis of Acute metabolic encephalopathy and presented with decreased level of arousal on this date as well as reported pain in upper left chest region.  Pt. Alert for portions of session and appeared to participate better when spouse speaking to him. .    Rehab identified problem list/impairments: weakness, impaired endurance, impaired self care skills, visual deficits, decreased coordination    Rehab potential is fair    Activity tolerance: Poor on this date    Discharge recommendations: home with home health     Barriers to discharge: Decreased caregiver support(increased required level of care )     Equipment recommendations: bedside commode, shower chair, wheelchair     GOALS:   Multidisciplinary Problems     Occupational Therapy Goals        Problem: Occupational Therapy Goal    Goal Priority Disciplines Outcome Interventions   Occupational Therapy Goal     OT, PT/OT Ongoing, Progressing    Description:  Goals to be met by: 4/30     Patient will increase functional independence with ADLs by performing:    Feeding with Stand-by Assistance.  UE Dressing with Contact Guard Assistance.  LE Dressing with Minimal Assistance.  Grooming while seated with Contact Guard Assistance.  Toileting from toilet with Minimal Assistance for hygiene and clothing management.                       Plan:  Patient to be seen 4 x/week to address the above listed problems via self-care/home management, therapeutic activities, therapeutic exercises  Plan of Care expires: 04/30/20  Plan of Care reviewed with: patient    OBED Rees  04/17/2020

## 2020-04-18 NOTE — PROGRESS NOTES
[facility]   Skilled Nursing Facility   Progress Notes     Patient name: Marie Reis Jr.  MRN: [id number]  Patient Class: IP- SNF  PCP: Nancy Colón MD  Attending Provider: Iraida Almanzar MD  Nurse Practitioner: Alee Blanchard NP  Admission Date: 4/1/2020  LOS: 17 days  Code status: Full Code  Principal Problem:  Acute metabolic encephalopathy      TELEMEDICINE VISIT  Patient Location: [place] [id number]  Provider Location: Provider home  Start / End time: 14:06-14:09  Evaluation by video and audio     HPI: Marie Reis Jr. is a 64 y.o. man with PMH Chronic systolic and diastolic HF, DMII CKD IV and HTN, Multiple myeloma (not in remission), SUZANNA (BiPAP), DJD LS spine, CVA 2/2018 admitted [facility] on 3/28 with a cc of fatigue and weakness following an unwitnessed syncopal vs seizure event. Patient was admitted 2/24 for blood transfusion. During virtual visit 3/19 he was determined to have right PCA ischemic stroke that was suspected due to cardioembolic from heart failure. Then returned 3/21 for SOB and coughing for one day. He was found to have COVID-19 pneumonia, NSTEMI and probable COVID myocarditis. Patient admitted again 3/28, day after discharge, for a syncopal episode. Patient was found to be dehydrated. Nursing reports patient minimally drinking, not eating and not taking medications since admission here. He also has not been participating with therapy. During televisit. Patient was not cooperative with exam. Patient laid underneath covers during entire visit. Nurse Layo Fuentes jimbo covers back and patient draw them back over. Upon review of previous admission, patient was not eating and only spoke with prompted. He had one previous PT/OT where he participated with physical prompting. After arrival here he has been actively refusing everything food, medications and interaction. He has kept on his visimonitor. Prior to onset of illness patient weighed 191 lb during office visit  with his PCP 2/21. Patient has 162lb listed as his current weight. He was independent of ADLs, A&O to person, place and time, but had little recall or insight to his medical conditions. Patient did not have any behavioral issues after his CVA in 2/2018.     Patient transferred to [facility] with PT and OT to improve functional status and ability to perform ADLs.      Interval History:  Chart reviewed.  Vitals reviewed.  Pt seen and examined visually via telemedicine cart with nurse at bedside.  Staff reports tolerance of bolus TF, medication administration has been via PEG. Pt remains disengaged with tele visit.  Answers all question with head nod no, however remains inconsistent.  Minimal participation with therapy today, continue efforts.  No other concerns expressed by staff.  Cont with current plan of care.  Initiate repeat COVID 19 on Monday.    ADDENDUM 4/18/2020 @ 7235  Notified by staff that pt removed PEG tube.  Initiate sims to be placed in PEG site to ensure patency of PEG tract.  Initiate GI consult for PEG replacement.  Will consider addition of IV fluids if pt continues to deny oral nutrition.    ADDENDUM 4/18/2020 @2007  Received a call from Dr. Mcarthur with concerns regarding PEG that was removed.  States that pt is at risk for perforation with emergent sims placed in lieu of removal.  Pt will be sent to [facility] ED for CT and evaluation for possible surgical intervention.  Report given to ED charge nurse.        PT/ OT--  4/18/2020  Bed Mobility:    · Patient completed Rolling/Turning to Left with  maximal assistance  · Patient completed Scooting/Bridging with maximal assistance      Functional Mobility/Transfers:  · N/A - pt declined getting OOB     Activities of Daily Living:  · N/A     [facility] 6 Click:  [facility] Total Score: 12     OT Exercises: AROM attempted to perform UE exercises with pt but he declined - attempted to perform passive stretches to arms at all joints, but pt pulled his arms away and  tucked them in his gown while threatening therapist to leave him alone     RD--  Diet: Dysphagia soft Regular;  Diabetisource TF: 2 cans after each meal TID if eats <50% of meal. 1 can after each meal if eats <75%.     Past Medical History: Patient has a past medical history of Binocular vision disorder with diplopia (9/22/2016), Bradycardia, Cataract, Cervical spondylosis (10/1/2015), Chronic diastolic congestive heart failure, Chronic diastolic heart failure (1/11/2018), Coronary artery disease due to calcified coronary lesion (3/19/2018), Dyslipidemia (6/26/2015), Encounter for blood transfusion, Hearing impairment (10/25/2013), History of stroke (3/23/2015), Hypertension associated with diabetes (1/11/2018), Hypertensive retinopathy of both eyes (9/26/2017), Lumbar spondylosis (10/1/2015), Multiple myeloma not having achieved remission (1/16/2018), NSTEMI (non-ST elevated myocardial infarction) (3/22/2020), SUZANNA on CPAP, Persistent proteinuria (1/11/2018), Pneumonia of right lower lobe due to infectious organism (3/22/2020), Spondylolisthesis of lumbar region (10/1/2015), Strabismus, Stroke (2014), Thoracic spondylosis (10/1/2015), Type 2 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, with long-term current use of insulin (9/26/2017), Type 2 diabetes mellitus with diabetic polyneuropathy, with long-term current use of insulin (9/28/2015), and Type 2 diabetes mellitus with stage 4 chronic kidney disease, with long-term current use of insulin (1/11/2018).     Past Surgical History: Patient has a past surgical history that includes left hand fracture sx; Cataract extraction w/  intraocular lens implant (Left, 4/30/15); Hand surgery (2/2012); LT EYE  (5/2/15); Eye surgery; and Colonoscopy (N/A, 1/20/2020).     Social History: Patient reports that he has never smoked. He has never used smokeless tobacco. He reports that he does not drink alcohol or use drugs.     Family  History: family history includes Cancer in his mother; Diabetes in his brother, father, mother, and sister; Heart attack in his father; Kidney disease in his mother; No Known Problems in his maternal aunt, maternal grandfather, maternal grandmother, maternal uncle, paternal aunt, paternal grandfather, paternal grandmother, and paternal uncle.     Allergies: Patient is allergic to hydralazine analogues.     ROS:    Review of Systems   Unable to perform ROS: Other (pt uncooperative and disengaged)     PEx     Pulse:  [72-76]   Resp:  [18]   BP: (129-134)/(70-75)   SpO2:  [95 %-99 %]      Physical Exam:  Constitutional: Patient resting comfortably in no apparent distress; appears sickly and cachectic   Pulmonary:  Breathing comfortably at rest. Normal respiratory rate -per staff  Neurologic: oriented to self and place, more cooperative today  Psychiatric: Dysphoric mood and withdrawn affect. Uncooperative  Skin: Left eye bruising at brow, left knee abrasion--improved  Breath sounds (per nursing)-CTA on room air      Recent Labs   Lab 04/11/20  1144 04/16/20  0443   WBC 7.61 8.06   HGB 9.9* 11.0*   HCT 32.2* 35.6*   * 244     Recent Labs   Lab 04/11/20  1144 04/16/20  0443   GRAN 78.9*  6.0 79.3*  6.4   LYMPH 12.0*  0.9* 9.6*  0.8*   MONO 7.0  0.5 8.1  0.7   EOS 0.1 0.2     Recent Labs   Lab 04/11/20  1144 04/12/20  1145 04/16/20  0443    139 140   K 4.1 4.3 4.1    110 111*   CO2 22* 21* 22*   BUN 65* 56* 28*   CREATININE 4.3* 3.6* 2.6*   * 139* 103   CALCIUM 9.2 8.7 9.4   MG 2.2  --   --      Recent Labs   Lab 04/11/20  1144 04/12/20  1145 04/16/20  0443   ALKPHOS 73 69 77   ALT 13 13 13   AST 20 19 18   ALBUMIN 3.2* 3.0* 3.1*   PROT 6.8 6.3 6.7   BILITOT 0.9 0.8 0.8     Recent Labs   Lab 04/11/20  1144 04/16/20  0443    272*   CRP 7.9 19.6*     Recent Labs   Lab 04/11/20  1144 04/16/20  0443 04/16/20  0739    72  --    TROPONINI 0.042* 0.040* 0.030*   * 540*  --       Recent Labs   Lab 02/24/20  1405  03/21/20  1933 03/21/20  2342 04/11/20  1144 04/16/20  0443 04/16/20  0740   PROCAL  --   --  0.38*  --   --  0.08  --    LACTATE  --    < > 3.5* 1.2 1.6  --   --    DDIMER  --   --   --   --   --  1.17*  --    FERRITIN 5,977*  --   --   --  1,795* 1,604*  --    SEDRATE  --   --   --   --   --   --  69*    < > = values in this interval not displayed.     Recent Labs   Lab 04/11/20  1144   TSH 1.164     Recent Labs   Lab 04/16/20  0726 04/16/20  1842 04/16/20  2311 04/17/20  0920 04/17/20  1134 04/17/20  1954   POCTGLUCOSE 88 108 205* 187* 179* 246*        Baselines:  Hemoglobin   Date Value Ref Range Status   04/16/2020 11.0 (L) 14.0 - 18.0 g/dL Final   04/11/2020 9.9 (L) 14.0 - 18.0 g/dL Final   03/31/2020 8.9 (L) 14.0 - 18.0 g/dL Final   03/30/2020 8.6 (L) 14.0 - 18.0 g/dL Final   03/29/2020 8.4 (L) 14.0 - 18.0 g/dL Final     Creatinine   Date Value Ref Range Status   04/16/2020 2.6 (H) 0.5 - 1.4 mg/dL Final   04/12/2020 3.6 (H) 0.5 - 1.4 mg/dL Final   04/11/2020 4.3 (H) 0.5 - 1.4 mg/dL Final   03/31/2020 3.2 (H) 0.5 - 1.4 mg/dL Final   03/30/2020 3.8 (H) 0.5 - 1.4 mg/dL Final        Scheduled Meds:    amLODIPine  10 mg Oral Daily    aspirin  81 mg Oral Daily    atorvastatin  40 mg Oral QHS    carvediloL  25 mg Oral BID WM    clopidogreL  75 mg Oral Daily    enoxaparin  30 mg Subcutaneous Daily    FLUoxetine  20 mg Oral Daily    insulin detemir U-100  10 Units Subcutaneous QHS    methyl salicylate-menthol 15-10%   Topical (Top) TID    modafiniL  100 mg Oral Before breakfast    mupirocin   Topical (Top) BID    pantoprazole  40 mg Oral Daily    vitamin D  2,000 Units Oral Daily     Continuous Infusions: N/A    As Needed: acetaminophen, calcium carbonate, Dextrose 10% Bolus, Dextrose 10% Bolus, fluticasone propionate, glucagon (human recombinant), glucose, glucose, insulin aspart U-100, ondansetron, sodium chloride 0.9%     Active Hospital Problems    Diagnosis  POA     *Acute metabolic encephalopathy [G93.41]  Yes    Type 2 diabetes mellitus [E11.9]  Yes    Moderate malnutrition [E44.0]  Yes    Physical deconditioning [R53.81]  Yes    COVID-19 virus infection [U07.1]  Yes    Pulmonary hypertension [I27.20]  Yes    Acute renal failure superimposed on chronic kidney disease [N17.9, N18.9]  Yes    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 [U07.1, I40.0]  Yes    Acute respiratory disease due to severe acute respiratory syndrome coronavirus 2 (SARS-CoV-2) [U07.1, J06.9]  Yes    Acute on chronic combined systolic and diastolic heart failure [I50.43]  Yes    Pneumonia due 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 [U07.1, J12.89]  Yes    NSTEMI (non-ST elevated myocardial infarction) [I21.4]  Yes    Moderate to severe aortic stenosis [I35.0]  Yes    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, with long-term current use of insulin [E11.3513, E11.65, Z79.4]  Yes    Moderate episode of recurrent major depressive disorder [F33.1]  Yes    Acute renal failure superimposed on stage 4 chronic kidney disease [N17.9, N18.4]  Yes    Multiple myeloma [C90.00]  Yes    Acute on chronic combined systolic and diastolic congestive heart failure [I50.43]  Yes    Chronic combined systolic and diastolic heart failure [I50.42]  Yes      Resolved Hospital Problems   No resolved problems to display.      -------------------------------------------  Assessment and Plan:   ----------------------------------------------  Protein calorie malnutrition, unstable  Moderate protein calorie malnutrition   anorexia  PEG placed 4/15/2020  4/18/2020  Body mass index is 20.09 kg/m².  -Tolerating diabetisource TF: 2 cans after each meal TID if eats <50% of meal. 1 can after each meal if eats <75%.    Mechanical fall, unstable  4/11/2020--Fell 4/11/2020 unwitnessed and sustained an injury to his left eye and left knee abrasion. He was sent to [facility] ED for evaluation. Left  eye brusing and mild swelling on upper lid, left knee abrasion visualized, minimal drainage, stable.  ED records reviewed, labs reviewed, stable, no acute findings on ct head, tsh wnl.   4/18/2020-Continue to monitor closely with tele sitter    *Impaired functional mobility and endurance  Participation in PT/ OT limited by lack of interest-pt is self-limiting  Overall stable    4/18/2020-Continue PT/ OT      COVID 19 infection   COVID 19 pneumonia   Acute hypoxic respiratory failure and severe sepsis and pneumonia 2/2 COVID-19 , resolved  Onset of sx 3/21  COVID 19 positive on 3/21; 4/13  CXR: 3/28/20--Mild cardiomegaly.  No significant airspace consolidation or pleural effusion identified.  Small opacities noted at the right lung base.  No pleural effusion.  Completed course of plaquenil  4/18/2020-  On room air  Likely does not require isolation at this time  Afebrile  Vital signs stable  Labs - see above  Initiate repeat COVID 19 on Monday     Isolation   Airborne and Droplet Precaution   N95 masks must be fit tested, wear eye protection   Hand hygiene x 20 seconds   Limit visitors per hospital policy   Consolidating lab draws, nursing care, and interventions    Management   Bundle care as able to minimize in/out of room    Supplemental O2 to maintain SpO2 >92%, if requiring 6L NC or higher, place on nonrebreather and discuss case with MICU   Telemetry & continuous Pulse Ox   If wheezing  albuterol INHALER PRN 4puff Q6hr approximates a nebulizer (avoid nebulization of secretions) and ipratropium daily    Cautious use of NSAIDS for fever per WHO recommendations (3/16/2020)   No new ACEi/ARB start or discontinuation of chronic med unless hypotensive      Other Active Problems    Acute metabolic encephalopathy  Recent right PCA ischemic stroke  Probable vascular dementia  Probable depression  Discussed with neurology  Patient will need 6-12 months to determine recovery  Follow up with neurology  PT/OT/ST    prozac 20 mg daily   CONSIDER IN-PATIENT REHAB AFTER DISCHARGE IF PATIENT EVER STARTS TO COOPERATE WITH THERAPY  Patient does not show capacity make medical decision or perform discharge planning - his wife should be signing consents    Syncopal Event due to dehydration   JAZLYN on CKD4  4/18/2020-Monitor I/Os closely; renally dose medications for GFR<30     NSTEMI type II  Acute on Chronic Combined systolic and diastolic HF  Severe pulmonary HTN  Moderate to severe aortic stenosis  Coronary artery disease  4/18/2020-Continue medical management of ASA 81 mg daily, plavix 75 mg daily, atorvastatin 40 mg daily, coreg 25 mg BID  - F/u with cardiology outpatient     Multiple myeloma  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 1 can after each meal if eats <75%.    Lines/ Drains/ Airways - none      Goals of Care: Return to prior functional status     Discharge plan: TBD   Per PT evaluation    Future Appointments   Date Time Provider Department Center   4/22/2020  7:30 AM LAB, HEMONC CANCER BLDG [facility] [place]   4/22/2020  9:00 AM Carol Cantrell MD [facility] BMT [place]   4/22/2020 10:15 AM INJECTION, [facility] IN[facility]ION [facility] [place]   4/29/2020 10:00 AM INJECTION, [facility] IN[facility] [place]   5/19/2020  8:00 AM LAB, [facility] [place]   5/22/2020  9:40 AM Nancy Colón MD [facility]   6/18/2020 10:30 AM Gifty Massey, APRN, FNP [facility]  [facility] PC[facility]    Return to GI clinic in 4 months for PEG tube check ~8/2020  - F/u with cardiology outpatient    Alee Blanchard NP  [facility] 5th floor  TELEMEDICINE VISIT    DOS: 4/18/2020

## 2020-04-18 NOTE — PT/OT/SLP PROGRESS
"Occupational Therapy  Treatment    Marie Reis Jr.   MRN: [id number]   Admitting Diagnosis: Acute metabolic encephalopathy    OT Date of Treatment: 04/18/20       Billable Minutes:  Therapeutic Activity 10    General Precautions: Standard, airborne, contact, droplet  Orthopedic Precautions: N/A  Braces: N/A    Spiritual, Cultural Beliefs, Jainism Practices, Values that Affect Care: no    Subjective:  Communicated with nurse prior to session.  "I don't want to do that.  If you don't leave, I'm going to punch you in the face"    Pain/Comfort  Pain Rating 1: 0/10  Pain Rating Post-Intervention 1: 0/10    Objective:  Patient found with: telemetry(sidelying in bed with [employer] camera on)    Occupational Performance:    Bed Mobility:    · Patient completed Rolling/Turning to Left with  maximal assistance  · Patient completed Scooting/Bridging with maximal assistance     Functional Mobility/Transfers:  · N/A - pt declined getting OOB    Activities of Daily Living:  · N/A    AMPA 6 Click:  [facility] Total Score: 12    OT Exercises: AROM attempted to perform UE exercises with pt but he declined - attempted to perform passive stretches to arms at all joints, but pt pulled his arms away and tucked them in his gown while threatening therapist to leave him alone    Additional Treatment:  · OT reviewed POC with pt and importance of getting OOB, but pt replied that he didn't care  · OT attempted UE active and passive exercises, but pt became aggressive towards therapist  · OT was able to assist pt with repositioning in bed due to poor posturing - bed left in chair position    Patient left with bed in chair position with all lines intact, call button in reach and nurse notified    ASSESSMENT:  Marie Reis Jr. is a 64 y.o. male with a medical diagnosis of Acute metabolic encephalopathy and deficits noted below.  Pt was initially agreeable to OT but had decreased participation due to deconditioning and refusing to continue session when " movement/exercise was introduced.  Pt's goals remain appropriate at this time.  he will continue to benefit from skilled OT services in order to assist him with increasing his safety and level of independence with self care and mobility tasks.    Rehab identified problem list/impairments: weakness, impaired endurance, impaired self care skills, visual deficits, decreased coordination    Rehab potential is fair    Activity tolerance: Poor    Discharge recommendations: home with home health     Barriers to discharge: Decreased caregiver support(increased required level of care )     Equipment recommendations: bedside commode, shower chair, wheelchair     GOALS:   Multidisciplinary Problems     Occupational Therapy Goals        Problem: Occupational Therapy Goal    Goal Priority Disciplines Outcome Interventions   Occupational Therapy Goal     OT, PT/OT Ongoing, Progressing    Description:  Goals to be met by: 4/30     Patient will increase functional independence with ADLs by performing:    Feeding with Stand-by Assistance.  UE Dressing with Contact Guard Assistance.  LE Dressing with Minimal Assistance.  Grooming while seated with Contact Guard Assistance.  Toileting from toilet with Minimal Assistance for hygiene and clothing management.                       Plan:  Patient to be seen 4 x/week to address the above listed problems via self-care/home management, therapeutic activities, therapeutic exercises  Plan of Care expires: 04/30/20  Plan of Care reviewed with: patient    Kindra NARANJO Gina, OT  04/18/2020

## 2020-04-18 NOTE — PLAN OF CARE
Problem: Occupational Therapy Goal  Goal: Occupational Therapy Goal  Description  Goals to be met by: 4/30     Patient will increase functional independence with ADLs by performing:    Feeding with Stand-by Assistance.  UE Dressing with Contact Guard Assistance.  LE Dressing with Minimal Assistance.  Grooming while seated with Contact Guard Assistance.  Toileting from toilet with Minimal Assistance for hygiene and clothing management.      Outcome: Ongoing, Progressing     Pt was agreeable to OT but had decreased participation due to deconditioning and refusing to continue session.  Pt's goals remain appropriate at this time.  he will continue to benefit from skilled OT services in order to assist him with increasing his safety and level of independence with self care and mobility tasks.    Kindra Fuentes, OT  4/18/2020

## 2020-04-19 PROBLEM — G93.40 ENCEPHALOPATHY: Status: ACTIVE | Noted: 2020-01-01

## 2020-04-19 PROBLEM — R41.82 ALTERED MENTAL STATUS: Status: ACTIVE | Noted: 2020-01-01

## 2020-04-19 PROBLEM — K94.23 PEG TUBE MALFUNCTION: Status: ACTIVE | Noted: 2020-01-01

## 2020-04-19 PROBLEM — E72.20 HYPERAMMONEMIA: Status: ACTIVE | Noted: 2020-01-01

## 2020-04-19 NOTE — ED PROVIDER NOTES
Encounter Date: 4/18/2020       History     Chief Complaint   Patient presents with    PEG Tube Problem     From [facility]. PEG tube dislodged tonight. COVID +     HPI   65yo M h/o CKD stage 4, DM, CHF, HLD, CVA, multiple myeloma, recent stemi (3/22/20), covid positive who presents after pulling out his new peg tube (placed 4/15/20). The pt was recently admitted for COVID and discharged after improvement on 4/16/20 At the SNF a nurse placed at 16F sims however, given the new peg which is likely not mature the pt was sent to the ED for further evaluation. The pt can eat but has poor intake due to situational depression and weakness per prior note.   PT is currently altered and not able to describe why he is here. He states he is at [facility] and its 2002.     Review of patient's allergies indicates:   Allergen Reactions    Hydralazine analogues      Increase swelling and throat itching and tightness with use.  Symptoms correlate only with this medication onset in hospital.     Past Medical History:   Diagnosis Date    Binocular vision disorder with diplopia 9/22/2016    Bradycardia     Cataract     Cervical spondylosis 10/1/2015    Chronic diastolic congestive heart failure     Chronic diastolic heart failure 1/11/2018    Coronary artery disease due to calcified coronary lesion 3/19/2018    Dyslipidemia 6/26/2015    Encounter for blood transfusion     Hearing impairment 10/25/2013    History of stroke 3/23/2015    Hypertension associated with diabetes 1/11/2018    Hypertensive retinopathy of both eyes 9/26/2017    Lumbar spondylosis 10/1/2015    Multiple myeloma not having achieved remission 1/16/2018    NSTEMI (non-ST elevated myocardial infarction) 3/22/2020    SUZANNA on CPAP     Persistent proteinuria 1/11/2018    Pneumonia of right lower lobe due to infectious organism 3/22/2020    Spondylolisthesis of lumbar region 10/1/2015    Strabismus     Stroke 2014    Thoracic spondylosis 10/1/2015     Type 2 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, with long-term current use of insulin 9/26/2017    Type 2 diabetes mellitus with diabetic polyneuropathy, with long-term current use of insulin 9/28/2015    Type 2 diabetes mellitus with stage 4 chronic kidney disease, with long-term current use of insulin 1/11/2018     Past Surgical History:   Procedure Laterality Date    CATARACT EXTRACTION W/  INTRAOCULAR LENS IMPLANT Left 4/30/15    Diane    COLONOSCOPY N/A 1/20/2020    Procedure: COLONOSCOPY;  Surgeon: Salvador Espinosa MD;  Location: [facility];  Service: Endoscopy;  Laterality: N/A;    EYE SURGERY      cataract removal left eye    HAND SURGERY  2/2012    DR. BAKER [facility]    left hand fracture sx      LT EYE   5/2/15    DR KULLMAN [facility]     Family History   Problem Relation Age of Onset    Diabetes Mother     Cancer Mother     Kidney disease Mother     Diabetes Father     Heart attack Father     Diabetes Sister     Diabetes Brother     No Known Problems Maternal Aunt     No Known Problems Maternal Uncle     No Known Problems Paternal Aunt     No Known Problems Paternal Uncle     No Known Problems Maternal Grandmother     No Known Problems Maternal Grandfather     No Known Problems Paternal Grandmother     No Known Problems Paternal Grandfather     Blindness Neg Hx     Anesthesia problems Neg Hx     Amblyopia Neg Hx     Cataracts Neg Hx     Glaucoma Neg Hx     Hypertension Neg Hx     Macular degeneration Neg Hx     Retinal detachment Neg Hx     Strabismus Neg Hx     Stroke Neg Hx     Thyroid disease Neg Hx     Heart disease Neg Hx     Heart failure Neg Hx     Hyperlipidemia Neg Hx      Social History     Tobacco Use    Smoking status: Never Smoker    Smokeless tobacco: Never Used   Substance Use Topics    Alcohol use: No     Frequency: Never     Drinks per session: Patient refused     Binge frequency: Never    Drug  use: No     Review of Systems   Unable to perform ROS: Mental status change       Physical Exam     Initial Vitals [04/18/20 2217]   BP Pulse Resp Temp SpO2   122/64 71 20 98.2 °F (36.8 °C) 100 %      MAP       --         Physical Exam    Nursing note and vitals reviewed.  Constitutional:   Alert, eyes open. Speaking normally.    HENT:   Head: Normocephalic.   Some bruising on the L eyebrow appears old.    Eyes: Pupils are equal, round, and reactive to light.   Neck: Normal range of motion.   Cardiovascular: Normal rate and regular rhythm.   No murmur heard.  Pulmonary/Chest:   RR 20, no respiratory distress. Appears comfortable. Did not auscultate due to COVID exposure.    Abdominal: Soft.   Pt has a sims catheter in place in G tube site. There is no active bleeding. No swelling or erythema around site. Abd is soft. No peritoneal signs. Non-tender   Musculoskeletal: Normal range of motion. He exhibits no edema or tenderness.   Neurological:   Pt is alert, eyes open. He is communicative however is he only oriented to person. He is not oriented to place[facility] He only answers some questions appropriately. He is able to move his arms and legs symmetrically. No facial droop.    Skin: Skin is warm. Capillary refill takes less than 2 seconds.         ED Course   Procedures  Labs Reviewed   CULTURE, BLOOD   CULTURE, BLOOD   AMMONIA   B-TYPE NATRIURETIC PEPTIDE   CBC W/ AUTO DIFFERENTIAL   COMPREHENSIVE METABOLIC PANEL   DRUG SCREEN PANEL, URINE EMERGENCY   LACTIC ACID, PLASMA   PROTIME-INR   TROPONIN I   TSH   URINALYSIS, REFLEX TO URINE CULTURE          Imaging Results    None        65yo M  CKD stage 4, DM, CHF, HLD, CVA, multiple myeloma, recent stemi (3/22/20), covid positive with sims in peg site presenting with AMS and peg tube displacement. The CT head shows chronic changes with multiple prior strokes. CT abd/pelvis shows a sims in place within the stomach. Discussed GI who  placed the tube and they will evaluate the patient in the am. IN the meantime pt is NPO.     Pt is HDS, afebrile. UTI, pna, bacteremia, electrolyte abnormalities, meningoencephalitis.     Gayle Castaneda   Emergency Medicine PGY3  12:46 AM    Update     Ammonia is 126. Likely contributing to AMS. The patients UA is pending.     Will admit to IM, Dr. Porter.     Gayle Castaneda   Emergency Medicine PGY3  1:01 AM                                Clinical Impression:       ICD-10-CM ICD-9-CM   1. Altered behavior R46.89 312.9   2. Altered mental status R41.82 780.97   3. PEG tube malfunction K94.23 536.42                                Gayle Castaneda MD  Resident  04/19/20 0104

## 2020-04-19 NOTE — PROGRESS NOTES
[place] [facility] [place] by Jose Carlos for evaluation of PEG tube. Patient was calm, cooperative, and alert. No distress noted at this time.Wife notified and verbalized understanding.

## 2020-04-19 NOTE — SUBJECTIVE & OBJECTIVE
Past Medical History:   Diagnosis Date    Binocular vision disorder with diplopia 9/22/2016    Bradycardia     Cataract     Cervical spondylosis 10/1/2015    Chronic diastolic congestive heart failure     Chronic diastolic heart failure 1/11/2018    Coronary artery disease due to calcified coronary lesion 3/19/2018    Dyslipidemia 6/26/2015    Encounter for blood transfusion     Hearing impairment 10/25/2013    History of stroke 3/23/2015    Hypertension associated with diabetes 1/11/2018    Hypertensive retinopathy of both eyes 9/26/2017    Lumbar spondylosis 10/1/2015    Multiple myeloma not having achieved remission 1/16/2018    NSTEMI (non-ST elevated myocardial infarction) 3/22/2020    SUZANNA on CPAP     Persistent proteinuria 1/11/2018    Pneumonia of right lower lobe due to infectious organism 3/22/2020    Spondylolisthesis of lumbar region 10/1/2015    Strabismus     Stroke 2014    Thoracic spondylosis 10/1/2015    Type 2 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, with long-term current use of insulin 9/26/2017    Type 2 diabetes mellitus with diabetic polyneuropathy, with long-term current use of insulin 9/28/2015    Type 2 diabetes mellitus with stage 4 chronic kidney disease, with long-term current use of insulin 1/11/2018       Past Surgical History:   Procedure Laterality Date    CATARACT EXTRACTION W/  INTRAOCULAR LENS IMPLANT Left 4/30/15    Diane    COLONOSCOPY N/A 1/20/2020    Procedure: COLONOSCOPY;  Surgeon: Salvador Espinosa MD;  Location: [facility];  Service: Endoscopy;  Laterality: N/A;    EYE SURGERY      cataract removal left eye    HAND SURGERY  2/2012    DR. BAK[facility]    left hand fracture sx      LT EYE   5/2/15    DR KULLMAN [facility]       Review of patient's allergies indicates:   Allergen Reactions    Hydralazine analogues      Increase swelling and throat itching and tightness with use.  Symptoms  correlate only with this medication onset in hospital.     Family History     Problem Relation (Age of Onset)    Cancer Mother    Diabetes Mother, Father, Sister, Brother    Heart attack Father    Kidney disease Mother    No Known Problems Maternal Aunt, Maternal Uncle, Paternal Aunt, Paternal Uncle, Maternal Grandmother, Maternal Grandfather, Paternal Grandmother, Paternal Grandfather        Tobacco Use    Smoking status: Never Smoker    Smokeless tobacco: Never Used   Substance and Sexual Activity    Alcohol use: No     Frequency: Never     Drinks per session: Patient refused     Binge frequency: Never    Drug use: No    Sexual activity: Never     Review of Systems   Unable to perform ROS: Mental status change     Objective:     Vital Signs (Most Recent):  Temp: 99 °F (37.2 °C) (04/19/20 0213)  Pulse: 67 (04/19/20 1100)  Resp: 16 (04/19/20 1100)  BP: 133/75 (04/19/20 1100)  SpO2: 99 % (04/19/20 1100) Vital Signs (24h Range):  Temp:  [97.1 °F (36.2 °C)-99 °F (37.2 °C)] 99 °F (37.2 °C)  Pulse:  [67-77] 67  Resp:  [16-22] 16  SpO2:  [98 %-100 %] 99 %  BP: ()/(62-75) 133/75     Weight: 62.2 kg (137 lb 2 oz) (04/18/20 2217)  Body mass index is 18.6 kg/m².    No intake or output data in the 24 hours ending 04/19/20 1247    Lines/Drains/Airways     Drain                 Gastrostomy/Enterostomy 04/15/20 1601 Percutaneous endoscopic gastrostomy (PEG) LUQ feeding 3 days          Peripheral Intravenous Line                 Peripheral IV - Single Lumen 04/18/20 2356 18 G Left Antecubital less than 1 day                Physical Exam   Constitutional: No distress.   Confused, appears comfortable. No distress.   HENT:   Head: Normocephalic and atraumatic.   Mouth/Throat: Oropharynx is clear and moist. No oropharyngeal exudate.   Eyes: Conjunctivae are normal. No scleral icterus.   Neck: No JVD present.   Cardiovascular: Normal rate, regular rhythm, normal heart sounds and intact distal pulses.   Pulmonary/Chest:  Effort normal and breath sounds normal. No respiratory distress.   Abdominal: Soft. Bowel sounds are normal. He exhibits no distension and no mass. There is no tenderness. There is no rebound and no guarding.   Soiled, pending changing by nurse (brown stool). Abdomen soft, non-distended. Parrish present to LUQ, site unremarkable, no discharge or bleeding. Parrish retracted and taped to abdomen   Musculoskeletal: He exhibits no edema or tenderness.   Lymphadenopathy:     He has no cervical adenopathy.   Neurological: He is alert.   Skin: Skin is warm. Capillary refill takes less than 2 seconds. He is not diaphoretic.   Nursing note and vitals reviewed.      Significant Labs:  All pertinent lab results from the last 24 hours have been reviewed.    Significant Imaging:  Imaging results within the past 24 hours have been reviewed.

## 2020-04-19 NOTE — H&P
Hospital Medicine  History and Physical  [facility]      Patient Name: Marie Reis Jr.  MRN:  [id number]  Hospital Medicine Team: Networked reference to record PCT  Des Williamson MD  Date of Admission:  4/18/2020     Length of Stay:  LOS: 0 days     Principal Problem: Suspected Covid-19 Virus Infection    Chief complaint    PEG tube removed    HPI    64 M with hx CVA, recent NSTEMI, Multiple Myeloma, recent COVID diagnosis, failure to thrive and decreased PO intake requiring PEG tube was transferred from SNF after pulling out his PEG tube. Per notes patient has been uncooperative and confused for a significant time and does not appear to have any acute changes. PEG tube was placed on 4/15 by GI. Today patient pulled out his PEG tube, was replaced prior to ED presentation with Parrish catheter. He was sent to the ED for CT scan and GI evaluation. Patient is unable to provide any history, believes he is on [employer] and it is 1989.     Per notes patient has not been participating in therapy and withdrawn. This was attributed to possibly old stroke symptoms vs depression for which he is on fluoxetine. In the ED he was found to have a markedly elevated ammonia level. No previous history of liver disease. Hepatitis serologies negative in February.     Review of Systems  Unable to obtain 2/2 mental status change    Past Medical History:   Diagnosis Date    Binocular vision disorder with diplopia 9/22/2016    Bradycardia     Cataract     Cervical spondylosis 10/1/2015    Chronic diastolic congestive heart failure     Chronic diastolic heart failure 1/11/2018    Coronary artery disease due to calcified coronary lesion 3/19/2018    Dyslipidemia 6/26/2015    Encounter for blood transfusion     Hearing impairment 10/25/2013    History of stroke 3/23/2015    Hypertension associated with diabetes 1/11/2018    Hypertensive retinopathy of both eyes 9/26/2017    Lumbar spondylosis 10/1/2015     Multiple myeloma not having achieved remission 1/16/2018    NSTEMI (non-ST elevated myocardial infarction) 3/22/2020    SUZANNA on CPAP     Persistent proteinuria 1/11/2018    Pneumonia of right lower lobe due to infectious organism 3/22/2020    Spondylolisthesis of lumbar region 10/1/2015    Strabismus     Stroke 2014    Thoracic spondylosis 10/1/2015    Type 2 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, with long-term current use of insulin 9/26/2017    Type 2 diabetes mellitus with diabetic polyneuropathy, with long-term current use of insulin 9/28/2015    Type 2 diabetes mellitus with stage 4 chronic kidney disease, with long-term current use of insulin 1/11/2018     Past Surgical History:   Procedure Laterality Date    CATARACT EXTRACTION W/  INTRAOCULAR LENS IMPLANT Left 4/30/15    Diane    COLONOSCOPY N/A 1/20/2020    Procedure: COLONOSCOPY;  Surgeon: Salvador Espinosa MD;  Location: [facility];  Service: Endoscopy;  Laterality: N/A;    EYE SURGERY      cataract removal left eye    HAND SURGERY  2/2012    DR. BAK[facility]    left hand fracture sx      LT EYE   5/2/15    DR KULLMAN [facility]     Family History   Problem Relation Age of Onset    Diabetes Mother     Cancer Mother     Kidney disease Mother     Diabetes Father     Heart attack Father     Diabetes Sister     Diabetes Brother     No Known Problems Maternal Aunt     No Known Problems Maternal Uncle     No Known Problems Paternal Aunt     No Known Problems Paternal Uncle     No Known Problems Maternal Grandmother     No Known Problems Maternal Grandfather     No Known Problems Paternal Grandmother     No Known Problems Paternal Grandfather     Blindness Neg Hx     Anesthesia problems Neg Hx     Amblyopia Neg Hx     Cataracts Neg Hx     Glaucoma Neg Hx     Hypertension Neg Hx     Macular degeneration Neg Hx     Retinal detachment Neg Hx     Strabismus Neg Hx      Stroke Neg Hx     Thyroid disease Neg Hx     Heart disease Neg Hx     Heart failure Neg Hx     Hyperlipidemia Neg Hx      Social History     Socioeconomic History    Marital status:      Spouse name: Not on file    Number of children: Not on file    Years of education: Not on file    Highest education level: Not on file   Occupational History    Not on file   Social Needs    Financial resource strain: Somewhat hard    Food insecurity:     Worry: Never true     Inability: Never true    Transportation needs:     Medical: No     Non-medical: No   Tobacco Use    Smoking status: Never Smoker    Smokeless tobacco: Never Used   Substance and Sexual Activity    Alcohol use: No     Frequency: Never     Drinks per session: Patient refused     Binge frequency: Never    Drug use: No    Sexual activity: Never   Lifestyle    Physical activity:     Days per week: 3 days     Minutes per session: Not on file    Stress: Only a little   Relationships    Social connections:     Talks on phone: More than three times a week     Gets together: Once a week     Attends [employer] service: Not on file     Active member of club or organization: Yes     Attends meetings of clubs or organizations: Never     Relationship status:    Other Topics Concern    Not on file   Social History Narrative    Not on file       Medications  Current Facility-Administered Medications on File Prior to Encounter   Medication Dose Route Frequency Provider Last Rate Last Dose    [MAR Hold - Suspended Admission] acetaminophen tablet 650 mg  650 mg Oral Q4H PRN Iraida Almanzar MD   650 mg at 04/18/20 1851    [MAR Hold - Suspended Admission] amLODIPine tablet 10 mg  10 mg Oral Daily Adiel Polanco MD   10 mg at 04/18/20 0834    [MAR Hold - Suspended Admission] aspirin chewable tablet 81 mg  81 mg Oral Daily Iraida Almanzar MD   81 mg at 04/18/20 0835    [MAR Hold - Suspended Admission] atorvastatin tablet 40 mg  40 mg Oral QHS  Adiel Polanco MD   Stopped at 04/18/20 2100    [MAR Hold - Suspended Admission] calcium carbonate 200 mg calcium (500 mg) chewable tablet 500 mg  500 mg Oral BID PRN Adiel Polanco MD   500 mg at 04/16/20 0150    [MAR Hold - Suspended Admission] carvediloL tablet 25 mg  25 mg Oral BID WM Alee Blanchard NP   25 mg at 04/18/20 0837    [MAR Hold - Suspended Admission] clopidogreL tablet 75 mg  75 mg Oral Daily Iraida Almanzar MD   75 mg at 04/18/20 0838    [MAR Hold - Suspended Admission] dextrose 10% (D10W) Bolus  12.5 g Intravenous PRN Iraida Almanzar MD        [MAR Hold - Suspended Admission] dextrose 10% (D10W) Bolus  25 g Intravenous PRN Iraida Almanzar MD        [MAR Hold - Suspended Admission] enoxaparin injection 30 mg  30 mg Subcutaneous Daily Iraida Almanzar MD   30 mg at 04/18/20 2103    [MAR Hold - Suspended Admission] FLUoxetine capsule 20 mg  20 mg Oral Daily Iraida Almanzar MD   20 mg at 04/17/20 0909    [MAR Hold - Suspended Admission] fluticasone propionate 50 mcg/actuation nasal spray 50 mcg  1 spray Each Nostril PRN Adiel Polanco MD        [MAR Hold - Suspended Admission] glucagon (human recombinant) injection 1 mg  1 mg Intramuscular PRN Iraida Almanzar MD        [MAR Hold - Suspended Admission] glucose chewable tablet 16 g  16 g Oral PRN Iraida Almanzar MD        [MAR Hold - Suspended Admission] glucose chewable tablet 24 g  24 g Oral PRN Iraida Almanzar MD        [MAR Hold - Suspended Admission] insulin aspart U-100 pen 0-5 Units  0-5 Units Subcutaneous QID (AC + HS) PRN Adiel Polanco MD   3 Units at 04/18/20 1859    [MAR Hold - Suspended Admission] insulin detemir U-100 pen 10 Units  10 Units Subcutaneous QHS Iraida Almanzar MD   10 Units at 04/18/20 2100    [MAR Hold - Suspended Admission] methyl salicylate-menthol 15-10% cream   Topical (Top) TID Iraida Almanzar MD        [MAR Hold - Suspended Admission] modafiniL tablet 100 mg  100 mg Oral Before breakfast Iraida SOTO  MD Jenelle   100 mg at 20 0652    [] mupirocin 2 % ointment   Topical (Top) BID Iraida Almanzar MD        [MAR Hold - Suspended Admission] ondansetron disintegrating tablet 8 mg  8 mg Oral Q8H PRN Nadege Vallejo MD   8 mg at 20 2137    [MAR Hold - Suspended Admission] pantoprazole EC tablet 40 mg  40 mg Oral Daily Adiel Polanco MD   40 mg at 20 0841    [MAR Hold - Suspended Admission] sodium chloride 0.9% flush 10 mL  10 mL Intravenous PRN Adiel Polanco MD        [MAR Hold - Suspended Admission] vitamin D 1000 units tablet 2,000 Units  2,000 Units Oral Daily Iraida Almanzar MD   2,000 Units at 20 0841     Current Outpatient Medications on File Prior to Encounter   Medication Sig Dispense Refill    amLODIPine (NORVASC) 10 MG tablet Take 1 tablet (10 mg total) by mouth once daily. 90 tablet 3    aspirin (ECOTRIN) 81 MG EC tablet Take 1 tablet (81 mg total) by mouth once daily. 30 tablet 11    atorvastatin (LIPITOR) 40 MG tablet Take 1 tablet (40 mg total) by mouth every evening. 90 tablet 3    azithromycin (ZITHROMAX) 500 MG tablet Take 1 tablet (500 mg total) by mouth once daily. 2 tablet 0    blood sugar diagnostic Strp 1 strip by Misc.(Non-Drug; Combo Route) route after meals as needed.      calcitRIOL (ROCALTROL) 0.25 MCG Cap Take 2 capsules (0.5 mcg total) by mouth once daily. 60 capsule 6    carvediloL (COREG) 25 MG tablet Take 1 tablet (25 mg total) by mouth 2 (two) times daily with meals. 180 tablet 3    clopidogreL (PLAVIX) 75 mg tablet Take 1 tablet (75 mg total) by mouth once daily. 90 tablet 3    dexAMETHasone (DECADRON) 4 MG Tab Take 5 tablets (20 mg total) by mouth As instructed. Take once weekly with Velcade injection 120 tablet 2    diclofenac sodium 1.5 % Drop APPLY 40 DROPS TO AFFECTED AREA(S) FOUR TIMES DAILY  3    flash glucose scanning reader ([employer] CLAUDIO 14 DAY READER) Misc Use as directed. 1 each 0    flash glucose sensor (FREESTYLE CLAUDIO  "14 DAY SENSOR) Kit Change every 14 days. 9 kit 3    fluticasone (FLONASE) 50 mcg/actuation nasal spray 1 spray by Each Nare route 2 (two) times daily as needed for Rhinitis. (Patient taking differently: 1 spray by Each Nare route as needed for Rhinitis. ) 16 g 5    furosemide (LASIX) 40 MG tablet Take 1 tablet (40 mg total) by mouth 2 (two) times daily. Take twice a day now 60 tablet 11    gabapentin (NEURONTIN) 300 MG capsule Take 1 capsule (300 mg total) by mouth daily as needed. (Patient taking differently: Take 300 mg by mouth 2 (two) times daily. ) 90 capsule 1    glucose 4 GM chewable tablet Take 4 tablets (16 g total) by mouth as needed. For glucose less than 70.  12    insulin (LANTUS SOLOSTAR U-100 INSULIN) glargine 100 units/mL (3mL) SubQ pen Inject 12 units at night. 15 mL 6    insulin aspart U-100 (NOVOLOG) 100 unit/mL (3 mL) InPn pen Inject  6 units (light meal), 10 units (larger meal), scale 150-200+2, 201-250+4, 251-300+6, 301-350+8. Snack dose 3 units. Max daily 60 units. 18 mL 6    insulin needles, disposable, 31 X 5/16 " Ndle Pt to use pen needle with Lantus daily 100 each 3    lidocaine (LIDODERM) 5 % APPLY UP TO 3 PATCHES TO THE AFFECTED AREA(S) FOR 12 HOURS DAILY AND REMOVE FOR 12 HOURS      lidocaine (XYLOCAINE) 5 % Oint ointment APPLY TO THE AFFECTED AREA(S) THREE TO FOUR TIMES DAILY      LIDOTRAL 3.88 % Crea APPLY TO THE AFFECTED AREA 2 TIMES TO 3 TIMES DAILY  3    linaGLIPtin (TRADJENTA) 5 mg Tab tablet Take 1 tablet (5 mg total) by mouth once daily. 90 tablet 3    methyl salicylate 25 % Crea APPLY TO THE AFFECTED AREA(S) THREE TO FOUR TIMES DAILY      montelukast (SINGULAIR) 10 mg tablet Take 1 tablet (10 mg total) by mouth every evening. 90 tablet 1    nitroGLYCERIN (NITROSTAT) 0.4 MG SL tablet Place 1 tablet (0.4 mg total) under the tongue every 5 (five) minutes as needed for Chest pain. 30 tablet 3    omeprazole (PRILOSEC OTC) 20 MG tablet Take 1 tablet (20 mg total) by " "mouth once daily. 30 tablet 11    ondansetron (ZOFRAN-ODT) 8 MG TbDL Take 1 tablet (8 mg total) by mouth every 6 (six) hours as needed. 30 tablet 2    pen needle, diabetic 32 gauge x 1/4" Ndle use to inject insulin once daily 100 each 6    vitamin D (VITAMIN D3) 1000 units Tab Take 1,000 Units by mouth once daily.         Allergies  Hydralazine analogues    Physical Examination  Temp:  [97.1 °F (36.2 °C)-99 °F (37.2 °C)]   Pulse:  [68-77]   Resp:  [18-20]   BP: ()/(62-85)   SpO2:  [95 %-100 %]     Gen: NAD, conversant  Head: Abrasion with small echymosis over left eye  Eyes: Pupils dilated but reactive. Does not follow commands to check EOM  Throat: Dry mucous membranes, OP clear  CV: RRR,  no peripheral edema, no JVD  Resp: no increased work of breathing on room air  GI: Soft, NT, ND. Catheter in place in PEG tube site with a small amount of bloody drainage surrounding. Nontender and not erythematous surrounding site.   Ext: No joint swelling or tenderness  Neuro: Coarse tremor. Increased tone throughout. Difficult to assess asterixis. No gross facial droop or obvious CN deficit. Oriented to person only. Speech intact to one word answers. Answers wrong but not inappropriate.   Psychiatry: Calm but uncooperative with exam.     Laboratory:  Recent Labs   Lab 04/16/20  0443 04/18/20  2349   WBC 8.06 8.38   LYMPH 9.6*  0.8* 9.1*  0.8*   HGB 11.0* 8.9*   HCT 35.6* 28.9*    210     Recent Labs   Lab 04/12/20  1145 04/16/20  0443 04/18/20  2349    140 136   K 4.3 4.1 4.0    111* 106   CO2 21* 22* 20*   BUN 56* 28* 52*   CREATININE 3.6* 2.6* 3.5*   * 103 244*   CALCIUM 8.7 9.4 8.7     Recent Labs   Lab 04/12/20  1145 04/16/20  0443 04/18/20  2349   ALKPHOS 69 77 116   ALT 13 13 14   AST 19 18 18   ALBUMIN 3.0* 3.1* 2.8*   PROT 6.3 6.7 6.5   BILITOT 0.8 0.8 0.4   INR  --   --  1.1      Recent Labs     04/16/20 0443  04/18/20  2349   DDIMER 1.17*  --   --    FERRITIN 1,604*  --   --  "   CRP 19.6*  --   --    *  --   --    *  --  294*   TROPONINI 0.040*   < > 0.040*   LACTATE  --   --  1.3    < > = values in this interval not displayed.       All labs within the last 24 hours were reviewed.     Microbiology:  Lab Results   Component Value Date    [id number] Detected (A) 04/13/2020       Microbiology Results (last 7 days)     Procedure Component Value Units Date/Time    Influenza A & B by Molecular [[id number]]     Order Status:  No result Specimen:  Nasopharyngeal Swab     Blood culture #2 **CANNOT BE ORDERED STAT** [[id number]] Collected:  04/18/20 2360    Order Status:  Sent Specimen:  Blood from Peripheral, Forearm, Left Updated:  04/19/20 0003    Blood culture #1 **CANNOT BE ORDERED STAT** [[id number]] Collected:  04/18/20 2349    Order Status:  Sent Specimen:  Blood from Peripheral, Antecubital, Left Updated:  04/19/20 0003            Imaging      Results for orders placed during the hospital encounter of 02/24/20   Echo Color Flow Doppler? Yes; Bubble Contrast? No    Narrative · Concentric left ventricular hypertrophy.  · Severe left atrial enlargement.  · Moderate aortic regurgitation.  · Mild-to-moderate mitral regurgitation.  · Moderate to severe tricuspid regurgitation.  · Moderate to severe pulmonary hypertension present.  · Left ventricular systolic function. The estimated ejection fraction is   25%.  · Grade II (moderate) left ventricular diastolic dysfunction consistent   with pseudonormalization.  · Normal right ventricular systolic function.  · Moderate-to-severe aortic valve stenosis.  · Aortic valve area is 1.02 cm2; peak velocity is 2.58 m/s; mean gradient   is 16 mmHg.  · Elevated central venous pressure (15 mmHg).  · The estimated PA systolic pressure is 58 mmHg.          X-Ray Chest AP Portable  Narrative: EXAMINATION:  XR CHEST AP PORTABLE    CLINICAL HISTORY:  Altered mental status, unspecified    TECHNIQUE:  Single frontal view of the chest was  performed.    COMPARISON:  04/16/2020    FINDINGS:  Cardiac monitoring leads overlie the chest.  Cardiomediastinal silhouette is mildly prominent.  There are low lung volumes bilaterally.  There is mildly accentuated interstitial attenuation which may relate to hypoventilatory status.  No new large confluent airspace consolidation or pleural effusion appreciated.  No evidence of pneumothorax.  Osseous structures appear grossly intact.  Impression: Mildly accentuated interstitial lung markings which may relate to hypoventilatory status.  Mild interstitial edema not excluded.  No new confluent airspace consolidation.    Electronically signed by: Dar Hussein MD  Date:    04/19/2020  Time:    00:13  CT Abdomen Pelvis  Without Contrast  Narrative: EXAMINATION:  CT ABDOMEN PELVIS WITHOUT CONTRAST    CLINICAL HISTORY:  PEG tube removal;    TECHNIQUE:  Low dose axial images, sagittal and coronal reformations were obtained from the lung bases to the pubic symphysis without IV contrast.  Oral contrast was not administered.    COMPARISON:  CT 01/13/2020    FINDINGS:  There is mild bibasilar atelectasis.  There is no significant pleural effusion.  There is calcification of the coronary arteries and aortic valve.  No significant pericardial effusion appreciated.    The liver is unremarkable in appearance on this limited non-contrast examination.  The gallbladder shows no evidence of stones or pericholecystic fluid.  There is no intra-or extrahepatic biliary ductal dilatation.    There is an apparent small bore catheter entering the the stomach and appears coiled within the gastric lumen.  This reportedly represents a Parrish catheter per the patient's medical record.  There is mild soft tissue induration within the subcutaneous soft tissues along the catheter entrance site.  The stomach otherwise appears grossly within normal limits allowing for lack of oral and IV contrast.    The spleen, pancreas, and adrenal glands appear  within normal limits for noncontrast technique.    The kidneys are normal in size and location. There is nonspecific moderate bilateral perinephric edema again demonstrated.  There are several well-circumscribed right renal hypodensities suggestive of cysts.  There is no hydronephrosis.  The ureters appear normal in course and caliber.  The urinary bladder appears within normal limits.  The prostate is mildly prominent.  There is a fat containing right inguinal hernia.    The abdominal aorta is normal in course and caliber with atherosclerotic calcification along its course.  There is no retroperitoneal hematoma.There is no evidence of small-bowel obstruction.  There is mild diffuse gaseous distention of the colon.  There is scattered liquid stool throughout the colon.  The appendix appears within normal limits.  There is no evidence of free intraperitoneal air, portal venous gas, or ascites.    The osseous structures demonstrate degenerative changes of the spine.  There are several remote left-sided rib fractures.  There are scattered foci of air within the left lateral abdominal wall which may relate to recent injection sites.  Impression: 1.  Small bore catheter entering the stomach and coiled within the gastric lumen, presumably corresponding to reported Parrish catheter per the patient's electronic medical record.  No evidence of free intraperitoneal air.  Mild soft tissue induration noted within the subcutaneous soft tissues along the catheter entrance site.  Clinical correlation is advised.    2.  Nonspecific gaseous distention of the colon with scattered liquid stool.    3.  Nonspecific bilateral perinephric edema.  Correlation with urinalysis advised.    4.  Additional findings as above.    Electronically signed by: Dar Hussein MD  Date:    04/19/2020  Time:    00:09      All imaging within the last 24 hours was reviewed.       Assessment and Plan:    Active Hospital Problems    Diagnosis  POA    *PEG  tube malfunction [K94.23]  Yes    Encephalopathy [G93.40]  Yes    Hyperammonemia [E72.20]  Yes    Chronic kidney disease, stage IV (severe) [N18.4]  Yes    Failure to thrive in adult [R62.7]  Yes    Type 2 diabetes mellitus [E11.9]  Yes    Coronary artery disease involving native coronary artery of native heart without angina pectoris [I25.10]  Yes    Acute respiratory disease due to severe acute respiratory syndrome coronavirus 2 (SARS-CoV-2) [U07.1, J06.9]  Yes    Acute on chronic combined systolic and diastolic heart failure [I50.43]  Yes    Anemia, chronic disease [D63.8]  Yes    Acute encephalopathy [G93.40]  Yes    Benign hypertension with chronic kidney disease, stage III [I12.9, N18.3]  Yes    History of right PCA stroke [Z86.73]  Not Applicable      Resolved Hospital Problems   No resolved problems to display.     #PEG tube malfunction  -S/p replacement prior to presentation with good positioning on CT  -Will not use until formal GI evaluation. Leave NPO in case requires procedure.     #Encephalopathy  -Subacute worsening per notes  -With rigidity on exam and hyperammonemia of unknown etiology on labs.   -Differential is broad and includes liver failure (as etiology of hyperammonemia), vitamin deficiency (severe weight loss), critical illness delirium, depression, serotonin syndrome (on fluoxetine and with some of Lamont criteria).   -Checking vitamin levels, empiric thiamine  -Rectal lactulose (not using NG or PO)  Liver US  -Neurology consult in AM.     #CKD IV  Stable, monitor renal function    #CAD  Recent NSTEMI, on DAPT, statin, B Blcoker. Continue  HF not decompensated.     #Anemia  -Lower today, recheck in AM    #HTN  Continue home meds    Suspected COVID-19 Virus Infection  Viral Pneumonia due to COVID-19  - COVID-19 testing: Collection Date: 4/13/2020 Collection Time:  11:30 PM positive  - Isolation: Airborne/Droplet. Surgical mask on patient. Notify Infection Control  - Diagnostics:  Trend Q48hrs if stable, more frequently if patient decompensating     Laboratory/Study Frequency Result   CBC Admit & Q48h Pending   CMP Admit & Q48h Pending   Magnesium level Admit Pending   D-dimer Admit & Q48h Pending   Ferritin Admit & Q48h 1604  On 4/16   CRP Admit & Q48h Pending   CPK Admit & Q24h if elevated 72 on 4/16   LDH Admit & Q48h 272 on 4/16   Vitamin D Admit Pending   BNP Admit 294 4/18   Troponin Admit & Q6h if elevated 0/04 4/18   Glucose-6-phos dehydrogenase Admit Pending   Procalcitonin Admit Pending   Lipid Panel If starting statin 2/18/20   Rapid influenza Admit Negative   Resp Infection Panel Admit if BMT/organ transplant N/A   Legionella Antigen Admit Pending   Sputum Culture Admit No cough   Blood Culture Admit Pending   Urinalysis & Culture Admit Pending   ECG Admit & Q48h if on HCQ Qt not prolonged, ST depressions   CXR Admit No infiltrate   Lymphopenia, hyponatremia, hyperferritinemia, elevated troponin, elevated d-dimer, age, and medical comorbidities are significant predictors of poor clinical outcome    - Management: Per [facility] COVID Treatment Protocol (4/15/20)    - Monitoring:   - Telemetry & Continuous Pulse Oximetry    - Nutrition:    - Multivitamin PO daily   - Add Boost supplement   - Vitamin D 1000IU daily if deficient   - Ascorbic acid 500mg PO bid    - Supportive Care:   - acetaminophen 650mg PO Q6hr PRN fever/headache   - loperamide PRN viral diarrhea   - IVF if indicated, restrictive strategy preferred, no maintenance IV if able   - VTE PPx: enoxaparin or heparin SQ unless contraindicated    - Antibiotics:  - if indications, CXR findings, elevated procal. See protocol for alternatives.   - Discontinue early if low concern for bacterial co-infection   - ceftriaxone 1g Q24h x 5 days  - azithromycin 500mg po x1, then 250mg po daily x 4 days    - Investigational Therapies:   - If patient meets criteria  - atorvastatin 40mg po daily  - Hydroxychloroquine 400mg PO BID x1 day,  "then 400mg PO x4 days   - Check glucose-6-phos dehydrogenase   - Check blood sugar Qmeal or Qshift        Acute Hypoxemic Respiratory Failure  - Order RT consult via Respiratory Communication for COVID Protocols  - Order Incentive Spirometer Q4h  - Order Flutter Valve Q4h  - Continuous Pulse Oximetry  - Goal SpO2 92-96%  - Supplemental O2 via LFNC, VentiMask, or HFNC (see Respiratory Support Oxygen Therapies)  - If wheezing, albuterol INH Q6h scheduled & PRN  - Proning Protocol if patient is a candidate (see [facility] Proning Protocol)   - GCS >13, able to self-prone  - If deterioration, may warrant trial of NIPPV and transfer to [facility] at Surprise pressure room or immediate ICU consult           Advance Care Planning  Goals of care, counseling/discussion  Patient unable to participate in GOC discussions due to mental status.   Family not contacted as it is 3 in the morning and patient is currently stable.   Has been full code during previous recent admissions, will continue now.   Will ask palliative care to assist with discussions, patient has had severe decline outside of acute COVID infection and prognosis is poor.     If patient transitions to Comfort-Focused Care, please place "Nurse Communication: End of Life Care, family members allowed to visit, including spouse/partner and adult children [please list names]. Please ask family to visit as a group and leave as a group.         VTE High Risk Prophylaxis:   VTE Risk Mitigation (From admission, onward)         Ordered     enoxaparin injection 30 mg  Daily      04/19/20 0218                  High Risk Conditions:  Patient has an abrupt change in neurologic status: Encephalopathy leading to bodily harm      Des Williamson MD   Internal Medicine PGY-3  [phone]        "

## 2020-04-19 NOTE — ED NOTES
Pt reports from [facility] with c/o PEG tube being dislodged. Pt is COVID+. PEG tube currently in place. Pt denies chest pain, SOB, abdominal pain, N/V, HA, fever, chills, malaise.

## 2020-04-19 NOTE — NURSING
"Patient pulled out peg tube. PTA witnessed and attempted to prevent without success. Covered with 4x4 gauze. No bleeding noted. Secure chat sent to Monik Blanchard NP. Recommendation to insert sims catheter into PEG site and secure. Site cleaned with betadine. Using sterile technique, RN inserted 16 fr sims without resistance. Balloon filled with 6ml sterile h20. Mupiricin ointment placed around site. Covered with ABD pad and secured with 3" paper tape. Patient tolerated well.   "

## 2020-04-19 NOTE — CONSULTS
Palliative Care Consult.    Consult received. Will review chart and discuss with team prior to seeing patient. Thank you for the consult..    Sunita PATRICIA, ACNS-BC, ACHPN

## 2020-04-19 NOTE — TREATMENT PLAN
GI Treatment Plan    Marie Reis Jr. is a 64 y.o. male admitted to hospital 4/1/2020 (Hospital Day: 18) due to Acute metabolic encephalopathy.     Interval History  - called by primary team due to concern that PEG tube dislocated  - PEG was placed endoscopically on Wednesday, binder was placed (not maintained as patient kept removing)  - patient self removed his PEG tube this evening around 630PM, sims placed (16F) per nurse with ease and no pain and inflated no gastric contents  -  PEG not used since replacement. Reportedly abdominal examination remains benign    Objective  Pulse:  [71-77] 77 (04/18 1815)  BP: ()/(62-85) 99/62 (04/18 1815)  Resp:  [18] 18 (04/18 1815)  SpO2:  [95 %-100 %] 100 % (04/18 1815)  .    Plan  - given short duration since PEG placement there may not have been a mature tract and concern that sims is not in the stomach  - strict NPO  - empiric broad spectrum antibiotics  - transfer to [facility] for tube study to evaluate   - surgical consult pending imaging  - Plan of care was discussed with primary team.  - We will continue to follow.    Thank you for involving us in the care of Marie Reis Jr.. Please call with any additional questions, concerns or changes in the patient's clinical status.    Negrito Mcarthur MD  Gastroenterology Fellow  Spectralink: [phone]

## 2020-04-19 NOTE — PLAN OF CARE
OT martha completed; rec SNF at this time. OBED Jones 4/19/2020   Problem: Occupational Therapy Goal  Goal: Occupational Therapy Goal  Description  Goals to be met by: 5/3     Patient will increase functional independence with ADLs by performing:    Bed mobility and supine to sit with minimal assistance.  UE Dressing while seated EOB with Minimal Assistance.  Grooming while standing with Minimal Assistance.  Toileting from bedside commode with Minimal Assistance for hygiene and clothing management.   Toilet transfer to bedside commode with Minimal Assistance.  Functional mobility at short household distance for ADL task with RW and mod(A).     Outcome: Ongoing, Progressing

## 2020-04-19 NOTE — HPI
Mr Reis is a 64 year old man with history of CVA (3/17/20), NSTEMI on DAPT, multiple myeloma, active COVID, T2DM, CHF, CKD who is presenting from SNF due to PEG removal.    He has had multiple recent hospitalizations, including 3/21 - 3/27 ([facility]) due to COVID with hospitalization notable for NSTEMI which was medically managed (ASA/plavix). He was admitted to [facility] 3/28 - 4/1 due to syncope and was discharged to [facility]. Due to failure to thrive at rehab, passed SLP but refusal of meds/PO, 30lb weight loss, family requested PEG tube. PEG tube was placed 4/15/20; EGD was unremarkable and PEG placed to LUQ with bumper at 2cm. He was discharged post-procedure back to rehab.    Per nursing staff at rehab on 4/18 in the evening the patient was noted to pull the PEG tube. It was replaced with a sims tube but due to short time since placement he was transferred back to [facility] for further evaluation. CTAP in ED with sims tube present in gastric lumen, mild induration along subcutaneou tissue, no pneumoperitoneum.

## 2020-04-19 NOTE — PLAN OF CARE
Plan of Care:  Discharge Recommendation: SNF.  Please continue Progressive Mobility Protocol as appropriate.  Appropriate transfer level with nursing staff: Bed <> Chair:  Stand Pivot with maximal assistance with No Assistive Device.    Vianey Mesa PT, DPT  2020  Pager: [phone]    Physical Therapy Treatment Goals:  Multidisciplinary Problems       Physical Therapy Goals          Problem: Physical Therapy Goal    Goal Priority Disciplines Outcome Goal Variances Interventions   Physical Therapy Goal     PT, PT/OT Ongoing, Progressing     Description:  Goals to be met by: 2020    Patient will increase functional independence with mobility by performin. Supine <> sit with Minimal Assistance.  2. Sit <> stand transfer with Minimal Assistance using Rolling Walker.  3. Bed <> chair transfer via Stand Pivot with Minimal Assistance using Rolling Walker.  4. Gait  x 50 feet with Minimal Assistance using Rolling Walker to prepare for community ambulation and endurance activities.  5. Able to tolerate exercise for 15-20 reps with supervision.

## 2020-04-19 NOTE — PLAN OF CARE
Pt alert. Combative with staff. Refuses interventions. md aware. Peg site dressing changed. incontinence care provided. No complaints of pain at this time. Will continue to monitor and interventions as appropriate.

## 2020-04-19 NOTE — ED NOTES
Pt wearing mask while being transported to room 621. Pt not soiled with urine or feces at time of transport.

## 2020-04-19 NOTE — CONSULTS
[facility]  Gastroenterology  Consult Note    Patient Name: Marie Reis Jr.  MRN: [id number]  Admission Date: 4/18/2020  Hospital Length of Stay: 0 days  Code Status: Full Code   Attending Provider: Charlene Porter MD   Consulting Provider: Negrito Mcarthur MD  Primary Care Physician: Nancy Colón MD  Principal Problem:PEG tube malfunction    Inpatient consult to Gastroenterology  Consult performed by: Negrito Mcarthur MD  Consult ordered by: Gayle Castaneda MD        Subjective:     HPI:  Mr Reis is a 64 year old man with history of CVA (3/17/20), NSTEMI on DAPT, multiple myeloma, active COVID, T2DM, CHF, CKD who is presenting from SNF due to PEG removal.    He has had multiple recent hospitalizations, including 3/21 - 3/27 ([facility]) due to COVID with hospitalization notable for NSTEMI which was medically managed (ASA/plavix). He was admitted to [facility] 3/28 - 4/1 due to syncope and was discharged to [facility]. Due to failure to thrive at rehab, passed SLP but refusal of meds/PO, 30lb weight loss, family requested PEG tube. PEG tube was placed 4/15/20; EGD was unremarkable and PEG placed to LUQ with bumper at 2cm. He was discharged post-procedure back to rehab.    Per nursing staff at rehab on 4/18 in the evening the patient was noted to pull the PEG tube. It was replaced with a sims tube but due to short time since placement he was transferred back to [facility] for further evaluation. CTAP in ED with sims tube present in gastric lumen, mild induration along subcutaneou tissue, no pneumoperitoneum.      Past Medical History:   Diagnosis Date    Binocular vision disorder with diplopia 9/22/2016    Bradycardia     Cataract     Cervical spondylosis 10/1/2015    Chronic diastolic congestive heart failure     Chronic diastolic heart failure 1/11/2018    Coronary artery disease due to calcified coronary lesion 3/19/2018    Dyslipidemia 6/26/2015    Encounter for blood transfusion     Hearing  impairment 10/25/2013    History of stroke 3/23/2015    Hypertension associated with diabetes 1/11/2018    Hypertensive retinopathy of both eyes 9/26/2017    Lumbar spondylosis 10/1/2015    Multiple myeloma not having achieved remission 1/16/2018    NSTEMI (non-ST elevated myocardial infarction) 3/22/2020    SUZANNA on CPAP     Persistent proteinuria 1/11/2018    Pneumonia of right lower lobe due to infectious organism 3/22/2020    Spondylolisthesis of lumbar region 10/1/2015    Strabismus     Stroke 2014    Thoracic spondylosis 10/1/2015    Type 2 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, with long-term current use of insulin 9/26/2017    Type 2 diabetes mellitus with diabetic polyneuropathy, with long-term current use of insulin 9/28/2015    Type 2 diabetes mellitus with stage 4 chronic kidney disease, with long-term current use of insulin 1/11/2018       Past Surgical History:   Procedure Laterality Date    CATARACT EXTRACTION W/  INTRAOCULAR LENS IMPLANT Left 4/30/15    Diane    COLONOSCOPY N/A 1/20/2020    Procedure: COLONOSCOPY;  Surgeon: Salvador Espinosa MD;  Location: [facility];  Service: Endoscopy;  Laterality: N/A;    EYE SURGERY      cataract removal left eye    HAND SURGERY  2/2012    DR. OLIVIA MOLINA    left hand fracture sx      LT EYE   5/2/15    DR KULLMAN [facility]       Review of patient's allergies indicates:   Allergen Reactions    Hydralazine analogues      Increase swelling and throat itching and tightness with use.  Symptoms correlate only with this medication onset in hospital.     Family History     Problem Relation (Age of Onset)    Cancer Mother    Diabetes Mother, Father, Sister, Brother    Heart attack Father    Kidney disease Mother    No Known Problems Maternal Aunt, Maternal Uncle, Paternal Aunt, Paternal Uncle, Maternal Grandmother, Maternal Grandfather, Paternal Grandmother, Paternal Grandfather        Tobacco  Use    Smoking status: Never Smoker    Smokeless tobacco: Never Used   Substance and Sexual Activity    Alcohol use: No     Frequency: Never     Drinks per session: Patient refused     Binge frequency: Never    Drug use: No    Sexual activity: Never     Review of Systems   Unable to perform ROS: Mental status change     Objective:     Vital Signs (Most Recent):  Temp: 99 °F (37.2 °C) (04/19/20 0213)  Pulse: 67 (04/19/20 1100)  Resp: 16 (04/19/20 1100)  BP: 133/75 (04/19/20 1100)  SpO2: 99 % (04/19/20 1100) Vital Signs (24h Range):  Temp:  [97.1 °F (36.2 °C)-99 °F (37.2 °C)] 99 °F (37.2 °C)  Pulse:  [67-77] 67  Resp:  [16-22] 16  SpO2:  [98 %-100 %] 99 %  BP: ()/(62-75) 133/75     Weight: 62.2 kg (137 lb 2 oz) (04/18/20 2217)  Body mass index is 18.6 kg/m².    No intake or output data in the 24 hours ending 04/19/20 1247    Lines/Drains/Airways     Drain                 Gastrostomy/Enterostomy 04/15/20 1601 Percutaneous endoscopic gastrostomy (PEG) LUQ feeding 3 days          Peripheral Intravenous Line                 Peripheral IV - Single Lumen 04/18/20 2356 18 G Left Antecubital less than 1 day                Physical Exam   Constitutional: No distress.   Confused, appears comfortable. No distress.   HENT:   Head: Normocephalic and atraumatic.   Mouth/Throat: Oropharynx is clear and moist. No oropharyngeal exudate.   Eyes: Conjunctivae are normal. No scleral icterus.   Neck: No JVD present.   Cardiovascular: Normal rate, regular rhythm, normal heart sounds and intact distal pulses.   Pulmonary/Chest: Effort normal and breath sounds normal. No respiratory distress.   Abdominal: Soft. Bowel sounds are normal. He exhibits no distension and no mass. There is no tenderness. There is no rebound and no guarding.   Soiled, pending changing by nurse (brown stool). Abdomen soft, non-distended. Parrish present to LUQ, site unremarkable, no discharge or bleeding. Parrish retracted and taped to abdomen    Musculoskeletal: He exhibits no edema or tenderness.   Lymphadenopathy:     He has no cervical adenopathy.   Neurological: He is alert.   Skin: Skin is warm. Capillary refill takes less than 2 seconds. He is not diaphoretic.   Nursing note and vitals reviewed.      Significant Labs:  All pertinent lab results from the last 24 hours have been reviewed.    Significant Imaging:  Imaging results within the past 24 hours have been reviewed.    Assessment/Plan:     * PEG tube malfunction  Mr Reis is a 64 year old man with history of CVA (3/17/20), NSTEMI on DAPT, multiple myeloma, active COVID, T2DM, CHF, CKD who is presenting from SNF due to PEG removal.    PEG placed 4/15, patient self-removed PEG on 4/18 and was successfully replaced with sims tube (location confirmed on CT). Recommend keeping sims tube in place and will require repeat endoscopic evaluation for exchange.    Plan  - difficult to maintain traction on sims due to lack of external bumper but recommend keeping sims so balloon opposing the gastric wall. Avoid excessive traction to prevent ulceration internally.  - ok to use PEG tube for feeding/meds  - given tube in proper location and no intra-abdominal process on imaging can d/c antibiotics  - will follow along, currently pending palliative consult. If patient continues with full care will discuss replacement of sims for PEG endoscopically        Thank you for your consult. I will follow-up with patient. Please contact us if you have any additional questions.    Negrito Mcarthur MD  Gastroenterology  [facility]

## 2020-04-19 NOTE — ASSESSMENT & PLAN NOTE
Mr Reis is a 64 year old man with history of CVA (3/17/20), NSTEMI on DAPT, multiple myeloma, active COVID, T2DM, CHF, CKD who is presenting from SNF due to PEG removal.    PEG placed 4/15, patient self-removed PEG on 4/18 and was successfully replaced with sims tube (location confirmed on CT). Recommend keeping sims tube in place and will require repeat endoscopic evaluation for exchange.    Plan  - difficult to maintain traction on sims due to lack of external bumper but recommend keeping sims so balloon opposing the gastric wall. Avoid excessive traction to prevent ulceration internally.  - ok to use PEG tube for feeding/meds  - given tube in proper location and no intra-abdominal process on imaging can d/c antibiotics  - will follow along, currently pending palliative consult. If patient continues with full care will discuss replacement of sims for PEG endoscopically

## 2020-04-19 NOTE — CONSULTS
Palliative Medicine Covid 19 Goals of Care Consult     Consult received by:   Sunita PATRICIA, ACNS-BC, ACHPN. Secure chat with Dr. Porter to discuss      Impression: 64 M with frequent admissions transferred form [facility] due to pulling out of PEG tube for further surgical evaluation.  Following a recent PMH of CVA, NSTEMI, Multiple Myeloma, and recent +COVID diagnosis (3/21/19). Palliative care consulted for goals of care and advance care planning. Pt's wife has adequate understanding of the patient's clincal status.  She has marginal prognosticawareness.  See advance care planning note below     HPI:  64 M with frequent admissions and a recent PMH of CVA, NSTEMI, Multiple Myeloma, stage 4 renal failure, and recent +COVID diagnosis (3/21/19). Pt continued to demonstrate failure to thrive and decreased PO intake requiring PEG tube 4/15/20 and was then transferred from SNF.  Per notes patient has been uncooperative and confused for a significant time.  At SNF the pt pulled out his PEG tube on 4/18/20.   It was replaced with Parrish catheter prior to ED presentation . Pt was transferred from SNF for further evaluation for possible surgical intervention.  CT scan and GI evaluation.     Per notes patient has not been participating in therapy and withdrawn. This was attributed to possibly old stroke symptoms vs depression for which he is on fluoxetine. In the ED he was found to have a markedly elevated ammonia level. No previous history of liver disease.     Advance Care Planning   Advance Care Planning /Goals of care   Advance Care Planning     [facility]  I engaged the patients wife in a conversation about advance care planning and we specifically addressed what the goals of care would be moving forward, in light of the patient's change in clinical status, specifically as it relates to his recent frequent admissions.  We explored the patient's values and preferences for future care.    The patient's wife stated that  the last day she was able to speak to her  was when he was admitted to [facility] and diagnosed with COVID 19. She reports he has a living will that she gave to [place] and I informed her it was not in the record.  We discussed her current goals of care and the patient's living will.  She states her goals are the same that they were when she sent him to SNF on 4/15/20.   The pt's wife endorses that what is most important right now is to focus on returning him to SNF and providing him with nutrition.   We discussed code status and she states she wants him to have CPR and to be intubated/placed on a breathing machine at this point.  She is attempting to get a copy of the pt's living will to the hospital.      Accordingly, she has decided that the best plan to meet the patient's goals includes:  ) Full code status  2) Assure new PEG tube is ok to meet pt's nutrition needs  3) Return to skilled unit at discharge.   The patient's wife states she is unable to care for her  in the physical state he is in and is aware he may need long term care following SNF if he does not improve.  [facility]  As he is a .   It is of note that the patient and his wife lost their son and young grandson 5yrs ago in a MVA.  She states that it has been difficult for both of them to get over this.  She also shares that her [facility] works for [employer] as an .    I spent a total of 70 minutes engaging the patient in this advance care planning discussion.     Living Will: Yes attempting to get copy on the chart from wife  [place]: No  Do Not Resuscitate Status: Full code status  Medical Power of : Yes.  Pt's wife Naheed  Next of kin for medical decision making Naheed Reis [phone]    Decision-Making Capacity: Patient unable to participate due to mental status change secondary to clinical status    Discussion with Naheed Reis (spouse) , telephone number  [phone]...    Following goals established:  1) Full code status  2) Assure new PEG tube is ok to meet pt's nutrition needs  3) Return to skilled unit at discharge.      Plan:  1)As per pt's wife's statement of goals of care.   2) Patient's wife will need ongoing conversations to improve her prognsotic awareness.  Discussed with Dr. Porter  Will follow.     Sunita PATRICIA, ACNS-BC, [facility]  Palliative Medicine ext [phone] 59 mins spent in advanced care planning/goals of care

## 2020-04-19 NOTE — PT/OT/SLP EVAL
Physical Therapy Evaluation    Patient Name:  Marie Reis Jr.   MRN:  [id number]  Admit Date: 4/18/2020  Admitting Diagnosis:  PEG tube malfunction  Length of Stay: 0 days  Recent Surgery: * No surgery found *      Recommendations:     Discharge Recommendations:  nursing facility, skilled   Discharge Equipment Recommendations: bedside commode, shower chair, wheelchair   Barriers to discharge: Decreased caregiver support    Assessment:     Marie Reis Jr. is a 64 y.o. male admitted with a medical diagnosis of PEG tube malfunction.  He presents with the following impairments/functional limitations: decreased functional mobility, balance and strength. Pt with reduced motivation to participate in session, requiring increased vc's to participate in session. Marie Reis Jr.'s deficits effect their ability to safely and independently participate in self-care tasks and functional mobility which increases their caregiver burden. Due to his physical therapy diagnosis of debility and deconditioning, they continue to benefit from acute PT services to address the following limitations: high fall risk, weakness, instability, and the need for supervised instruction of exercise. These deficits effect their roles and responsibilities in which they were able to complete prior to admit. Pt would benefit from an intensive and collaborative PT/OT/SLP therapy program to improve quality of life and focus on recovery of impairments.     Problem List: weakness, gait instability, impaired endurance, impaired balance, impaired self care skills, impaired functional mobilty, impaired cardiopulmonary response to activity, decreased safety awareness, decreased coordination  Rehab Prognosis: Fair; patient would benefit from acute skilled PT services to address these deficits and reach maximum level of function.      Plan:     During this hospitalization, patient to be seen 4 x/week to address the identified rehab impairments via gait training,  "therapeutic activities, therapeutic exercises, neuromuscular re-education and progress towards the established goals.    · Plan of Care Expires:  05/18/20    Subjective   Communicated with RN prior to session.  Patient found standing at EOB attempting to go to bathroom. upon PT entry to room, agreeable to evaluation. Marie Reis JrRehana's alone present during session.    Chief Complaint: PEG Tube Problem (From [facility]. PEG tube dislodged tonight. COVID +)    Patient/Family Comments/goals: "I just need to get to the bathroom." Pt actively having a BM.  Pain/Comfort:  · Pain Rating 1: 0/10  · Pain Rating Post-Intervention 1: 0/10    ~per chart review.  Living Environment:  Patient lives with wife in a single family home with no JANIYA.   Prior Level of Function:   Patient reports being modified independent with mobility with use of SPC & with ADLs. Patient uses DME as follows: straight cane. DME owned (not currently used): none.    Patient reports they will have assistance from wife upon discharge.    Objective:   Patient found with: telemetry, peripheral IV     General Precautions: Standard, Cardiac airborne, contact, fall, NPO   Orthopedic Precautions:N/A   Braces: N/A   Oxygen Device: Room Air  Vitals: /75   Pulse 67   Temp 99 °F (37.2 °C) (Oral)   Resp 16   Ht 6' (1.829 m)   Wt 62.2 kg (137 lb 2 oz)   SpO2 99%   BMI 18.60 kg/m²     Exams:  · Cognition:   · Alert, Uncooperative and Flat affect  · AxOx1 (pt refusing to answer orientation questions)  · Command following: Follows one-step commands  · Fluency: clear/fluent  · Hearing: Intact  · Vision:  Intact visual fields     Left UE Left LE Right UE Right LE   Edema absent absent absent absent   ROM AAROM WFL AAROM WFL AAROM WFL AAROM WFL   Modified Luke Scale 0: No increase in muscle tone 0: No increase in muscle tone 0: No increase in muscle tone 0: No increase in muscle tone   Strength 3/5 3/5 3/5 3/5   Sensation intact to light touch intact to " light touch intact to light touch intact to light touch   Coordination not tested not tested not tested not tested     Outcome Measures:  AM-PAC 6 CLICK MOBILITY  Turning over in bed (including adjusting bedclothes, sheets and blankets)?: 2  Sitting down on and standing up from a chair with arms (e.g., wheelchair, bedside commode, etc.): 2  Moving from lying on back to sitting on the side of the bed?: 2  Moving to and from a bed to a chair (including a wheelchair)?: 2  Need to walk in hospital room?: 2  Climbing 3-5 steps with a railing?: 1  Basic Mobility Total Score: 11     Functional Mobility:  Additional staff present: OT  Bed Mobility:  · Sit to Supine: moderate assistance; HOB flat and to left side of bed    Transfers:   · Sit <> Stand Transfer: maximal assistance with hand-held assist   · Stand <> Sit Transfer: maximal assistance with hand-held assist   · [id number] from EOB, via squat pivot to left of bed.  · Chair <> Bed Transfer: Squat Pivot technique with maximal assistance with hand-held assist  · Bed on patient's left      Gait:   NT due to poor participation in activity    Therapeutic Activities & Exercises:   *pt found standing at EOB, actively having a BM. Pt required frequent, constant and tactile cues to sit on bedside commode. PT and OT assisted with bed linen and gown change.    Patient left supine, with head in midline, neutral pelvis & heels floated for skin protection with all lines intact, call button in reach and RN notified.    GOALS:   Multidisciplinary Problems     Physical Therapy Goals        Problem: Physical Therapy Goal    Goal Priority Disciplines Outcome Goal Variances Interventions   Physical Therapy Goal     PT, PT/OT Ongoing, Progressing     Description:  Goals to be met by: 2020    Patient will increase functional independence with mobility by performin. Supine <> sit with Minimal Assistance.  2. Sit <> stand transfer with Minimal Assistance using Rolling Walker.  3.  Bed <> chair transfer via Stand Pivot with Minimal Assistance using Rolling Walker.  4. Gait  x 50 feet with Minimal Assistance using Rolling Walker to prepare for community ambulation and endurance activities.  5. Able to tolerate exercise for 15-20 reps with supervision.                        History:     Past Medical History:   Diagnosis Date    Binocular vision disorder with diplopia 9/22/2016    Bradycardia     Cataract     Cervical spondylosis 10/1/2015    Chronic diastolic congestive heart failure     Chronic diastolic heart failure 1/11/2018    Coronary artery disease due to calcified coronary lesion 3/19/2018    Dyslipidemia 6/26/2015    Encounter for blood transfusion     Hearing impairment 10/25/2013    History of stroke 3/23/2015    Hypertension associated with diabetes 1/11/2018    Hypertensive retinopathy of both eyes 9/26/2017    Lumbar spondylosis 10/1/2015    Multiple myeloma not having achieved remission 1/16/2018    NSTEMI (non-ST elevated myocardial infarction) 3/22/2020    SUZANNA on CPAP     Persistent proteinuria 1/11/2018    Pneumonia of right lower lobe due to infectious organism 3/22/2020    Spondylolisthesis of lumbar region 10/1/2015    Strabismus     Stroke 2014    Thoracic spondylosis 10/1/2015    Type 2 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, with long-term current use of insulin 9/26/2017    Type 2 diabetes mellitus with diabetic polyneuropathy, with long-term current use of insulin 9/28/2015    Type 2 diabetes mellitus with stage 4 chronic kidney disease, with long-term current use of insulin 1/11/2018       Past Surgical History:   Procedure Laterality Date    CATARACT EXTRACTION W/  INTRAOCULAR LENS IMPLANT Left 4/30/15    [facility]    COLONOSCOPY N/A 1/20/2020    Procedure: COLONOSCOPY;  Surgeon: Salvador Espinosa MD;  Location[facility];  Service: Endoscopy;  Laterality: N/A;    EYE SURGERY       cataract removal left eye    HAND SURGERY  2/2012    DR[facility] BAK[facility]    left hand fracture sx      LT EYE   5/2/15    DR KULLMAN [facility]       Time Tracking:     PT Received On: 04/19/20  PT Start Time: 1037     PT Stop Time: 1101  PT Total Time (min): 24 min   CO-eval with OT  Billable Minutes: Evaluation 10 and Therapeutic Activity 12    Vianey Mesa PT, DPT  4/19/2020  Pager: [phone]

## 2020-04-19 NOTE — PT/OT/SLP EVAL
Occupational Therapy   Evaluation    Name: Marie Reis Jr.  MRN: [id number]  Admitting Diagnosis:  PEG tube malfunction      Recommendations:     Discharge Recommendations: nursing facility, skilled  Discharge Equipment Recommendations:  bedside commode, wheelchair, shower chair  Barriers to discharge:  Decreased caregiver support(fall risk; level of skilled assistance required)    Assessment:     Marie Reis Jr. is a 64 y.o. male with a medical diagnosis of PEG tube malfunction.  He presents  presents from SNF after pulling out PEG tube. Pt COVID+ at this time. He is a high fall risk and demo cognitive deficits with overall insight into situation. He demo increased need for assistance with all functional tasks/activities. He would best benefit from return to SNF to max functional outcomes. Performance deficits affecting function: weakness, impaired endurance, impaired self care skills, impaired functional mobilty, gait instability, impaired balance, decreased upper extremity function, decreased lower extremity function, impaired cognition, decreased safety awareness, impaired cardiopulmonary response to activity, impaired skin.      Rehab Prognosis: Fair+; patient would benefit from acute skilled OT services to address these deficits and reach maximum level of function.       Plan:     Patient to be seen 3 x/week to address the above listed problems via self-care/home management, therapeutic activities, therapeutic exercises, neuromuscular re-education, cognitive retraining  · Plan of Care Expires: 05/19/20  · Plan of Care Reviewed with: patient    Subjective     Chief Complaint: did not report  Patient/Family Comments/goals: none reported     Occupational Profile: per chart review  Living Environment: Pt t/f from SNF. Prior, he was living at home with spouse in [facility] with 0 JANIYA.  Previous level of function: prior to 3/30- able to complete standing showers and using cane for mobility.   Roles and Routines: spouse, care  taker to self  Equipment Used at Home:  (unknown)  Assistance upon Discharge: spouse; unclear if 24 hour care    Pain/Comfort:  · Pain Rating 1: 0/10  · Pain Rating Post-Intervention 1: 0/10    Patients cultural, spiritual, Rastafarian conflicts given the current situation: no    Objective:     Communicated with: RN prior to session. Completed with PT.  Patient found EOB attempting to stand attempting to get to bedside commode (bed alarm going off) with telemetry, peripheral IV, pulse ox (continuous) upon OT entry to room.    General Precautions: Standard, diabetic, NPO, airborne, contact, droplet, fall (hearing impaired)  Orthopedic Precautions:N/A   Braces: N/A     Occupational Performance:  Bed Mobility:    · Patient completed Sit to Supine with total assistance    Functional Mobility/Transfers:  · Patient completed Sit <> Stand Transfer with moderate assistance  with  no assistive device   · Patient completed Toilet Transfer Stand Pivot technique with maximal assistance with  no AD   · Stand pivot to return to EOB following toileting with max(A)    Activities of Daily Living:  · Feeding:  NPO     · Grooming: unable to complete in standing; set up and min(A) seated - hand over hand for initiation    · Upper Body Dressing: maximal assistance  to doff soiled gown and don clean gown  · Lower Body Dressing: total assistance  to doff soiled socks and don clean socks while seated  · Toileting: maximal assistance perineal care    Cognitive/Visual Perceptual:  Cognitive/Psychosocial Skills:     -       Oriented to: Person   -       Follows Commands/attention:Inattentive and Easily distracted  -       Communication: clear/fluent  -       Memory: Impaired  -       Safety awareness/insight to disability: impaired   Visual/Perceptual:      -Impaired  poor functional visual attention       Physical Exam:  Postural examination/scapula alignment:    -       Rounded shoulders  -       Forward head  -       Posterior pelvic  tilt  Skin integrity: Thin and Dry  Edema:  None noted  Dominant hand:    -       R  Upper Extremity Range of Motion:     -       Right Upper Extremity: AAROM WFL  -       Left Upper Extremity: AAROM WFL  Upper Extremity Strength:    -       Right Upper Extremity: 4-/5  -       Left Upper Extremity: 4-/5   Strength:    -       Right Upper Extremity: WFL  -       Left Upper Extremity: WFL     AMPAC 6 Click ADL:  [facility] Total Score: 11   Body mass index is 18.6 kg/m².  Vitals:    04/19/20 1100   BP: 133/75   Pulse: 67   Resp: 16   Temp:        Treatment & Education:  -Pt alert with eyes open throughout; edu on therapy role in care  -edu on safety and need for staff assistance  -upon arrival in room; Pt attempting to stand with bedside commode; requiring max cues for sequencing stand pivot t/f to sit on bedside commode (unsure what item is)  -requiring multiple cues for redirection throughout   -Communication board updated; questions/concerns addressed within OT scope of practice    Education:    Patient left HOB elevated with all lines intact, call button in reach and bed alarm on    GOALS:   Multidisciplinary Problems     Occupational Therapy Goals        Problem: Occupational Therapy Goal    Goal Priority Disciplines Outcome Interventions   Occupational Therapy Goal     OT, PT/OT Ongoing, Progressing    Description:  Goals to be met by: 5/3     Patient will increase functional independence with ADLs by performing:    Bed mobility and supine to sit with minimal assistance.  UE Dressing while seated EOB with Minimal Assistance.  Grooming while standing with Minimal Assistance.  Toileting from bedside commode with Minimal Assistance for hygiene and clothing management.   Toilet transfer to bedside commode with Minimal Assistance.  Functional mobility at short household distance for ADL task with RW and mod(A).                      History:     Past Medical History:   Diagnosis Date    Binocular vision disorder with  diplopia 9/22/2016    Bradycardia     Cataract     Cervical spondylosis 10/1/2015    Chronic diastolic congestive heart failure     Chronic diastolic heart failure 1/11/2018    Coronary artery disease due to calcified coronary lesion 3/19/2018    Dyslipidemia 6/26/2015    Encounter for blood transfusion     Hearing impairment 10/25/2013    History of stroke 3/23/2015    Hypertension associated with diabetes 1/11/2018    Hypertensive retinopathy of both eyes 9/26/2017    Lumbar spondylosis 10/1/2015    Multiple myeloma not having achieved remission 1/16/2018    NSTEMI (non-ST elevated myocardial infarction) 3/22/2020    SUZANNA on CPAP     Persistent proteinuria 1/11/2018    Pneumonia of right lower lobe due to infectious organism 3/22/2020    Spondylolisthesis of lumbar region 10/1/2015    Strabismus     Stroke 2014    Thoracic spondylosis 10/1/2015    Type 2 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, with long-term current use of insulin 9/26/2017    Type 2 diabetes mellitus with diabetic polyneuropathy, with long-term current use of insulin 9/28/2015    Type 2 diabetes mellitus with stage 4 chronic kidney disease, with long-term current use of insulin 1/11/2018       Past Surgical History:   Procedure Laterality Date    CATARACT EXTRACTION W/  INTRAOCULAR LENS IMPLANT Left 4/30/15    [facility]    COLONOSCOPY N/A 1/20/2020    Procedure: COLONOSCOPY;  Surgeon: Salvador Espinosa MD;  [facility];  Service: Endoscopy;  Laterality: N/A;    EYE SURGERY      cataract removal left eye    HAND SURGERY  2/2012    DR. BAK[facility]    left hand fracture sx      LT EYE   5/2/15    DR KULLMAN [facility]       Time Tracking:     OT Date of Treatment: 04/19/20  OT Start Time: 1036  OT Stop Time: 1100  OT Total Time (min): 24 min    Billable Minutes:Evaluation 15  Self Care/Home Management 8    OBED Jones  4/19/2020

## 2020-04-20 PROBLEM — J06.9 ACUTE RESPIRATORY DISEASE DUE TO SEVERE ACUTE RESPIRATORY SYNDROME CORONAVIRUS 2 (SARS-COV-2): Status: RESOLVED | Noted: 2020-01-01 | Resolved: 2020-01-01

## 2020-04-20 PROBLEM — I50.43 ACUTE ON CHRONIC COMBINED SYSTOLIC AND DIASTOLIC CONGESTIVE HEART FAILURE: Status: RESOLVED | Noted: 2018-01-11 | Resolved: 2020-01-01

## 2020-04-20 PROBLEM — R41.0 DELIRIUM: Status: ACTIVE | Noted: 2020-01-01

## 2020-04-20 PROBLEM — U07.1 ACUTE RESPIRATORY DISEASE DUE TO SEVERE ACUTE RESPIRATORY SYNDROME CORONAVIRUS 2 (SARS-COV-2): Status: RESOLVED | Noted: 2020-01-01 | Resolved: 2020-01-01

## 2020-04-20 PROBLEM — I50.43 ACUTE ON CHRONIC COMBINED SYSTOLIC AND DIASTOLIC HEART FAILURE: Status: RESOLVED | Noted: 2020-01-01 | Resolved: 2020-01-01

## 2020-04-20 PROBLEM — E43 SEVERE PROTEIN-CALORIE MALNUTRITION: Status: ACTIVE | Noted: 2020-01-01

## 2020-04-20 NOTE — NURSING
Pt has been refusing to allow nurse to bladder scan him and stated that he doesn't make much urine.  He is not complaint with allowing us to check him even though I have explained to him that we don't want his bladder to burst if he were not to allow us to scan him.

## 2020-04-20 NOTE — PT/OT/SLP EVAL
Speech Language Pathology Evaluation  Cognitive Communication    Patient Name:  Marie Reis Jr.   MRN:  [id number]  Admitting Diagnosis: PEG tube malfunction    Recommendations:     Recommendations:                General Recommendations:  Cognitive-linguistic therapy and bedside swallow eval  Diet recommendations:  Regular, Thin   Aspiration Precautions: Standard aspiration precautions   General Precautions: Standard, airborne, contact, droplet, fall, diabetic  Communication strategies:  provide increased time to answer and go to room if call light pushed    History:     Past Medical History:   Diagnosis Date    Binocular vision disorder with diplopia 9/22/2016    Bradycardia     Cataract     Cervical spondylosis 10/1/2015    Chronic diastolic congestive heart failure     Chronic diastolic heart failure 1/11/2018    Coronary artery disease due to calcified coronary lesion 3/19/2018    Dyslipidemia 6/26/2015    Encounter for blood transfusion     Hearing impairment 10/25/2013    History of stroke 3/23/2015    Hypertension associated with diabetes 1/11/2018    Hypertensive retinopathy of both eyes 9/26/2017    Lumbar spondylosis 10/1/2015    Multiple myeloma not having achieved remission 1/16/2018    NSTEMI (non-ST elevated myocardial infarction) 3/22/2020    SUZANNA on CPAP     Persistent proteinuria 1/11/2018    Pneumonia of right lower lobe due to infectious organism 3/22/2020    Spondylolisthesis of lumbar region 10/1/2015    Strabismus     Stroke 2014    Thoracic spondylosis 10/1/2015    Type 2 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, with long-term current use of insulin 9/26/2017    Type 2 diabetes mellitus with diabetic polyneuropathy, with long-term current use of insulin 9/28/2015    Type 2 diabetes mellitus with stage 4 chronic kidney disease, with long-term current use of insulin 1/11/2018       Past Surgical History:   Procedure  "Laterality Date    CATARACT EXTRACTION W/  INTRAOCULAR LENS IMPLANT Left 4/30/15    [facility]    COLONOSCOPY N/A 1/20/2020    Procedure: COLONOSCOPY;  Surgeon: Salvador Espinosa MD;  [facility];  Service: Endoscopy;  Laterality: N/A;    EYE SURGERY      cataract removal left eye    HAND SURGERY  2/2012    DR. BAK[facility]    left hand fracture sx      LT EYE   5/2/15    DR KULL[facility]       Social History: Patient lives with his spouse in [place].    Occupation/hobbies/homemaking: [employer].      Subjective     Patient awake and alert during session  "Turn that back on, I'm watching that." Patient perseverated on TV during session  "No, I don't want any of that." Patient declining PO trials  Communicated with nurse prior to session     Pain/Comfort:  · Pain Rating 1: 0/10  · Pain Rating Post-Intervention 1: 0/10    Objective:   Cognitive Status:    *Cognitive assessment limited by poor participation  Arousal/Alertness Delayed response to stimuli throuhgout session  Attention Sustained attention deficit present during session, requiring frequent redirection  Perseveration Perseverating on watching TV during session  Orientation Person  Memory Immediate Recall max assist and recall general information max assist      Receptive Language:   Comprehension:    *Further assessment warranted  Questions Simple yes/no 3/5 with max assist  Commands  One step 3/4 with max assist    Pragmatics:    Poor topic maintenance    Expressive Language:  Verbal:    Sentence formulation WFL      Motor Speech:  WFL    Voice:   WFL    Visual-Spatial:  TBA    Reading:   TBA     Written Expression:   TBA    Treatment: Patient seen for a speech/language/cognitive eval. He was sitting up in bed during session. Patient exhibited mildly improved participation from prior sessions, but this was still significantly limited. Patient declined PO trials during this session. SLP educated him regarding role of ST, but he would benefit from " ongoing instruction. Patient left in bed with call light within reach.    Assessment:   Marie Reis Jr. is a 64 y.o. male with an SLP diagnosis of Cognitive-Linguistic Impairment.      Goals:   Multidisciplinary Problems     SLP Goals        Problem: SLP Goal    Goal Priority Disciplines Outcome   SLP Goal     SLP Ongoing, Progressing                   Plan:   · Patient to be seen:  3 x/week   · Plan of Care expires:  05/20/20  · Plan of Care reviewed with:  patient   · SLP Follow-Up:  Yes       Discharge recommendations:  Discharge Facility/Level of Care Needs: substance abuse facility   Barriers to Discharge:  Level of Skilled Assistance Needed     Time Tracking:   SLP Treatment Date:   04/20/20  Speech Start Time:  1012  Speech Stop Time:  1024     Speech Total Time (min):  12 min    Billable Minutes: Eval 12     Karson Randall CCC-SLP  Speech-Language Pathology  Pager: [phone]   04/20/2020

## 2020-04-20 NOTE — NURSING
bladder scanned pt and he allowed her to do so, we will leave in [facility] Catheter at this time b/c he is retaining urine.

## 2020-04-20 NOTE — SUBJECTIVE & OBJECTIVE
Patient History           Medical as of 4/20/2020     Past Medical History     Diagnosis Date Comments Source    Binocular vision disorder with diplopia 9/22/2016 -- Provider    Bradycardia -- -- Provider    Cataract -- -- Provider    Cervical spondylosis 10/1/2015 -- Provider    Chronic diastolic congestive heart failure -- -- Provider    Chronic diastolic heart failure 1/11/2018 -- Provider    Coronary artery disease due to calcified coronary lesion 3/19/2018 -- Provider    Dyslipidemia 6/26/2015 -- Provider    Encounter for blood transfusion -- -- Provider    Hearing impairment 10/25/2013 -- Provider    History of stroke 3/23/2015 -- Provider    Hypertension associated with diabetes 1/11/2018 -- Provider    Hypertensive retinopathy of both eyes 9/26/2017 -- Provider    Lumbar spondylosis 10/1/2015 -- Provider    Multiple myeloma not having achieved remission 1/16/2018 -- Provider    NSTEMI (non-ST elevated myocardial infarction) 3/22/2020 -- Provider    SUZANNA on CPAP -- -- Provider    Persistent proteinuria 1/11/2018 -- Provider    Pneumonia of right lower lobe due to infectious organism 3/22/2020 -- Provider    Spondylolisthesis of lumbar region 10/1/2015 -- Provider    Strabismus -- -- Provider    Stroke 2014 -- Provider    Thoracic spondylosis 10/1/2015 -- Provider    Type 2 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, with long-term current use of insulin 9/26/2017 -- Provider    Type 2 diabetes mellitus with diabetic polyneuropathy, with long-term current use of insulin 9/28/2015 -- Provider    Type 2 diabetes mellitus with stage 4 chronic kidney disease, with long-term current use of insulin 1/11/2018 -- Provider          Pertinent Negatives     Diagnosis Date Noted Comments Source    Amblyopia 09/26/2017 -- Provider    Anticoagulant long-term use 01/12/2014 -- Provider    Asthma 01/12/2014 -- Provider    COPD (chronic obstructive pulmonary disease) 01/12/2014  -- Provider    Glaucoma 09/26/2017 -- Provider    Macular degeneration 09/26/2017 -- Provider    Retinal detachment 09/26/2017 -- Provider    Seizures 01/12/2014 -- Provider    Thyroid disease 01/12/2014 -- Provider    Transfusion reaction 01/12/2014 -- Provider    Uveitis 09/26/2017 -- Provider                  Surgical as of 4/20/2020     Past Surgical History     Procedure Laterality Date Comments Source    left hand fracture sx [Other] -- -- -- Provider    CATARACT EXTRACTION W/  INTRAOCULAR LENS IMPLANT Left 4/30/15 Diane Provider    HAND SURGERY -- 2/2012 DR. BAKER [facility] Provider    LT EYE  [Other] -- 5/2/15 DR KULLMAN [facility] Provider    EYE SURGERY -- -- cataract removal left eye Provider    COLONOSCOPY N/A 1/20/2020 Procedure: COLONOSCOPY;  Surgeon: Salvador Espinosa MD;  Location: [facility];  Service: Endoscopy;  Laterality: N/A; Provider                  Family as of 4/20/2020     Problem Relation Name Age of Onset Comments Source    Diabetes Mother -- -- -- Provider    Cancer Mother -- -- -- Provider    Kidney disease Mother -- -- -- Provider    Diabetes Father -- -- -- Provider    Heart attack Father -- -- -- Provider    Diabetes Sister x6 -- -- Provider    Diabetes Brother x1 -- -- Provider    No Known Problems Maternal Aunt -- -- -- Provider    No Known Problems Maternal Uncle -- -- -- Provider    No Known Problems Paternal Aunt -- -- -- Provider    No Known Problems Paternal Uncle -- -- -- Provider    No Known Problems Maternal Grandmother -- -- -- Provider    No Known Problems Maternal Grandfather -- -- -- Provider    No Known Problems Paternal Grandmother -- -- -- Provider    No Known Problems Paternal Grandfather -- -- -- Provider    Blindness Neg Hx -- -- -- Provider    Anesthesia problems Neg Hx -- -- -- Provider    Amblyopia Neg Hx -- -- -- Provider    Cataracts Neg Hx -- -- -- Provider    Glaucoma Neg Hx -- -- -- Provider    Hypertension Neg Hx -- -- -- Provider    Macular degeneration  Neg Hx -- -- -- Provider    Retinal detachment Neg Hx -- -- -- Provider    Strabismus Neg Hx -- -- -- Provider    Stroke Neg Hx -- -- -- Provider    Thyroid disease Neg Hx -- -- -- Provider    Heart disease Neg Hx -- -- -- Provider    Heart failure Neg Hx -- -- -- Provider    Hyperlipidemia Neg Hx -- -- -- Provider            Tobacco Use as of 4/20/2020     Smoking Status Smoking Start Date Smoking Quit Date Packs/Day Years Used    Never Smoker -- -- -- --    Types Comments Smokeless Tobacco Status Smokeless Tobacco Quit Date Source    -- -- Never Used -- Provider            Alcohol Use as of 4/20/2020     Alcohol Use Drinks/Week Alcohol/Week Comments Source    No -- -- -- Provider, Patient    Frequency Standard Drinks Binge Drinking        Never  Patient refused  Never               Drug Use as of 4/20/2020     Drug Use Types Frequency Comments Source    No -- -- -- Provider            Sexual Activity as of 4/20/2020     Sexually Active Birth Control Partners Comments Source    Never -- -- -- Provider            Activities of Daily Living as of 4/20/2020    None           Social Documentation as of 4/20/2020    None           Occupational as of 4/20/2020    None           Socioeconomic as of 4/20/2020     Marital Status Spouse Name Number of Children Years Education Education Level Preferred Language Ethnicity Race Source     -- -- -- -- English /Black Black or  --    Financial Resource Strain Food Insecurity: Worry Food Insecurity: Inability Transportation Needs: Medical Transportation Needs: Non-medical    Somewhat hard  Never true  Never true  No  No             Pertinent History     Question Response Comments    Lives with -- --    Place in Birth Order -- --    Lives in -- --    Number of Siblings -- --    Raised by -- --    Legal Involvement -- --    Childhood Trauma -- --    Criminal History of -- --    Financial Status -- --    Highest Level of Education -- --    Does  patient have access to a firearm? -- --     Service -- --    Primary Leisure Activity -- --    Spirituality -- --        Past Medical History:   Diagnosis Date    Binocular vision disorder with diplopia 9/22/2016    Bradycardia     Cataract     Cervical spondylosis 10/1/2015    Chronic diastolic congestive heart failure     Chronic diastolic heart failure 1/11/2018    Coronary artery disease due to calcified coronary lesion 3/19/2018    Dyslipidemia 6/26/2015    Encounter for blood transfusion     Hearing impairment 10/25/2013    History of stroke 3/23/2015    Hypertension associated with diabetes 1/11/2018    Hypertensive retinopathy of both eyes 9/26/2017    Lumbar spondylosis 10/1/2015    Multiple myeloma not having achieved remission 1/16/2018    NSTEMI (non-ST elevated myocardial infarction) 3/22/2020    SUZANNA on CPAP     Persistent proteinuria 1/11/2018    Pneumonia of right lower lobe due to infectious organism 3/22/2020    Spondylolisthesis of lumbar region 10/1/2015    Strabismus     Stroke 2014    Thoracic spondylosis 10/1/2015    Type 2 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, with long-term current use of insulin 9/26/2017    Type 2 diabetes mellitus with diabetic polyneuropathy, with long-term current use of insulin 9/28/2015    Type 2 diabetes mellitus with stage 4 chronic kidney disease, with long-term current use of insulin 1/11/2018     Past Surgical History:   Procedure Laterality Date    CATARACT EXTRACTION W/  INTRAOCULAR LENS IMPLANT Left 4/30/15    Diane    COLONOSCOPY N/A 1/20/2020    Procedure: COLONOSCOPY;  Surgeon: Salvador Espinosa MD;  Location: [facility];  Service: Endoscopy;  Laterality: N/A;    EYE SURGERY      cataract removal left eye    HAND SURGERY  2/2012    DR. BAKER [facility]    left hand fracture sx      LT EYE   5/2/15    DR KULLMAN [facility]     Family History     Problem Relation (Age of  Onset)    Cancer Mother    Diabetes Mother, Father, Sister, Brother    Heart attack Father    Kidney disease Mother    No Known Problems Maternal Aunt, Maternal Uncle, Paternal Aunt, Paternal Uncle, Maternal Grandmother, Maternal Grandfather, Paternal Grandmother, Paternal Grandfather        Tobacco Use    Smoking status: Never Smoker    Smokeless tobacco: Never Used   Substance and Sexual Activity    Alcohol use: No     Frequency: Never     Drinks per session: Patient refused     Binge frequency: Never    Drug use: No    Sexual activity: Never     Review of patient's allergies indicates:   Allergen Reactions    Hydralazine analogues      Increase swelling and throat itching and tightness with use.  Symptoms correlate only with this medication onset in hospital.       No current facility-administered medications on file prior to encounter.      Current Outpatient Medications on File Prior to Encounter   Medication Sig    amLODIPine (NORVASC) 10 MG tablet Take 1 tablet (10 mg total) by mouth once daily.    aspirin (ECOTRIN) 81 MG EC tablet Take 1 tablet (81 mg total) by mouth once daily.    atorvastatin (LIPITOR) 40 MG tablet Take 1 tablet (40 mg total) by mouth every evening.    azithromycin (ZITHROMAX) 500 MG tablet Take 1 tablet (500 mg total) by mouth once daily.    blood sugar diagnostic Strp 1 strip by Misc.(Non-Drug; Combo Route) route after meals as needed.    calcitRIOL (ROCALTROL) 0.25 MCG Cap Take 2 capsules (0.5 mcg total) by mouth once daily.    carvediloL (COREG) 25 MG tablet Take 1 tablet (25 mg total) by mouth 2 (two) times daily with meals.    clopidogreL (PLAVIX) 75 mg tablet Take 1 tablet (75 mg total) by mouth once daily.    dexAMETHasone (DECADRON) 4 MG Tab Take 5 tablets (20 mg total) by mouth As instructed. Take once weekly with Velcade injection    diclofenac sodium 1.5 % Drop APPLY 40 DROPS TO AFFECTED AREA(S) FOUR TIMES DAILY    flash glucose scanning reader (Blueseed CLAUDIO  "14 DAY READER) Misc Use as directed.    flash glucose sensor (FREESTYLE CLAUDIO 14 DAY SENSOR) Kit Change every 14 days.    fluticasone (FLONASE) 50 mcg/actuation nasal spray 1 spray by Each Nare route 2 (two) times daily as needed for Rhinitis. (Patient taking differently: 1 spray by Each Nare route as needed for Rhinitis. )    furosemide (LASIX) 40 MG tablet Take 1 tablet (40 mg total) by mouth 2 (two) times daily. Take twice a day now    gabapentin (NEURONTIN) 300 MG capsule Take 1 capsule (300 mg total) by mouth daily as needed. (Patient taking differently: Take 300 mg by mouth 2 (two) times daily. )    glucose 4 GM chewable tablet Take 4 tablets (16 g total) by mouth as needed. For glucose less than 70.    insulin (LANTUS SOLOSTAR U-100 INSULIN) glargine 100 units/mL (3mL) SubQ pen Inject 12 units at night.    insulin aspart U-100 (NOVOLOG) 100 unit/mL (3 mL) InPn pen Inject  6 units (light meal), 10 units (larger meal), scale 150-200+2, 201-250+4, 251-300+6, 301-350+8. Snack dose 3 units. Max daily 60 units.    insulin needles, disposable, 31 X 5/16 " Ndle Pt to use pen needle with Lantus daily    lidocaine (LIDODERM) 5 % APPLY UP TO 3 PATCHES TO THE AFFECTED AREA(S) FOR 12 HOURS DAILY AND REMOVE FOR 12 HOURS    lidocaine (XYLOCAINE) 5 % Oint ointment APPLY TO THE AFFECTED AREA(S) THREE TO FOUR TIMES DAILY    LIDOTRAL 3.88 % Crea APPLY TO THE AFFECTED AREA 2 TIMES TO 3 TIMES DAILY    linaGLIPtin (TRADJENTA) 5 mg Tab tablet Take 1 tablet (5 mg total) by mouth once daily.    methyl salicylate 25 % Crea APPLY TO THE AFFECTED AREA(S) THREE TO FOUR TIMES DAILY    montelukast (SINGULAIR) 10 mg tablet Take 1 tablet (10 mg total) by mouth every evening.    nitroGLYCERIN (NITROSTAT) 0.4 MG SL tablet Place 1 tablet (0.4 mg total) under the tongue every 5 (five) minutes as needed for Chest pain.    omeprazole (PRILOSEC OTC) 20 MG tablet Take 1 tablet (20 mg total) by mouth once daily.    ondansetron " "(ZOFRAN-ODT) 8 MG TbDL Take 1 tablet (8 mg total) by mouth every 6 (six) hours as needed.    pen needle, diabetic 32 gauge x 1/4" Ndle use to inject insulin once daily    vitamin D (VITAMIN D3) 1000 units Tab Take 1,000 Units by mouth once daily.     Psychotherapeutics (From admission, onward)    Start     Stop Route Frequency Ordered    04/19/20 1313  OLANZapine injection 5 mg      -- IM 2 times daily PRN 04/19/20 1213        Objective:     Vital Signs (Most Recent):  Temp: 97.9 °F (36.6 °C) (04/20/20 1100)  Pulse: 74 (04/20/20 0900)  Resp: 14 (04/20/20 0900)  BP: 121/73 (04/20/20 0900)  SpO2: 98 % (04/20/20 0900) Vital Signs (24h Range):  Temp:  [97.1 °F (36.2 °C)-98.3 °F (36.8 °C)] 97.9 °F (36.6 °C)  Pulse:  [74-82] 74  Resp:  [14-17] 14  SpO2:  [98 %-99 %] 98 %  BP: (116-155)/(70-82) 121/73     Height: 6' (182.9 cm)  Weight: 62.2 kg (137 lb 2 oz)  Body mass index is 18.6 kg/m².    No intake or output data in the 24 hours ending 04/20/20 1524    Physical Exam   Psychiatric:   Mental Status Exam:  Unable to assess due to mental status/lack of cooperation        Significant Labs:   Last 24 Hours:   Recent Lab Results       04/20/20  1117   04/20/20  0450   04/20/20  0239   04/19/20  2355   04/19/20  2101        Influenza A, Molecular       Negative       Influenza B, Molecular       Negative       Flu A & B Source       Nasal swab       POCT Glucose 105 122 252   80                          Significant Imaging: I have reviewed all pertinent imaging results/findings within the past 24 hours.  "

## 2020-04-20 NOTE — CONSULTS
[facility]  Psychiatry  Consult Note    Patient Name: Marie Reis Jr.  MRN: [id number]   Code Status: Full Code  Admission Date: 4/18/2020  Hospital Length of Stay: 1 days  Attending Physician: Charlene Porter MD  Primary Care Provider: Nancy Colón MD    Current Legal Status: N/A    Patient information was obtained from nurse and the medical record.   Inpatient consult to Psychiatry  Consult performed by: Sangeetha Kaba MD  Consult ordered by: Charlene Porter MD        Subjective:     Principal Problem: PEG tube malfunction    Chief Complaint: AMS     HPI: Marie Reis Jr. is a 64-year-old male with possible psychiatric history of depression (was on fluoxetine per chart review, though unable to confirm) and a complicated PMHx significant for multiple myeloma, HFrEF, SUZANNA on CPAP, DMII, CKD IV, CVA (2/2018), NSTEMI (3/22), COVID-19 (4/13), and failure to thrive. Patient was brought to the hospital from his SNF after pulling out his PEG tube (which had been placed HERE by GI on 4/15).     On admission on 4/19, patient was confused, believed he was on [address] and that the year was 1990. Psychiatry was consulted to assist with agitation management.    Per Primary MD:  64 M with hx CVA, recent NSTEMI, Multiple Myeloma, recent COVID diagnosis, failure to thrive and decreased PO intake requiring PEG tube was transferred from SNF after pulling out his PEG tube. Per notes patient has been uncooperative and confused for a significant time and does not appear to have any acute changes. PEG tube was placed on 4/15 by GI. Today patient pulled out his PEG tube, was replaced prior to ED presentation with Parrish catheter. He was sent to the ED for CT scan and GI evaluation. Patient is unable to provide any history, believes he is on [address] and it is 1989.      Per notes patient has not been participating in therapy and withdrawn. This was attributed to possibly old stroke symptoms vs depression for  which he is on fluoxetine. In the ED he was found to have a markedly elevated ammonia level. No previous history of liver disease. Hepatitis serologies negative in February.    Per C-L MD:  Attempted to interview patient by phone this morning, however patient currently refusing to speak with us. Per nurse, he has also been refusing to talk to his wife.    Spoke with primary team, who has concerns for progressive encephalopathy perhaps superimposed on depression, patient has been refusing care. Per primary, he was able to move spontaneously on exam, though unable to fully participate in Neuro exam. Also unable to answer orientation questions.    Psychiatric Review of Systems:  Unable to assess 2/2 patient refusing to speak with us this morning.    Psychiatric History:  Diagnose(s): Yes - MDD (per chart review, had been on Remeron for several years, [id number]?) after death of son and grandson in an MVA 5 years ago  Previous Medication Trials: Yes - Remeron, Prozac (unclear whether he took Prozac, seems to have been started at SNF, where patient was refusing many of his medications)  Previous Psychiatric Hospitalizations: unable to assess  Previous Suicide Attempts: unable to assess  History of Violence: unable to assess  Outpatient Psychiatrist: unable to assess    Social History:  Marital Status:   Children: 2, son , daughter works for [employer] as an   Employment Status: unable to assess  Education: unable to assess  Special Ed: unable to assess   History: yes  Housing Status: currently in SNF  Developmental History: unable to assess  History of Abuse: unable to assess  Access to Gun: unable to assess    Substance Abuse History:  Unable to assess    Legal History:  Unable to assess    Family Psychiatric History:   Unable to assess    Medical Review Of Systems:  History unobtainable from patient due to mental status/lack of cooperation      Hospital Course: No notes on  file         Patient History           Medical as of 4/20/2020     Past Medical History     Diagnosis Date Comments Source    Binocular vision disorder with diplopia 9/22/2016 -- Provider    Bradycardia -- -- Provider    Cataract -- -- Provider    Cervical spondylosis 10/1/2015 -- Provider    Chronic diastolic congestive heart failure -- -- Provider    Chronic diastolic heart failure 1/11/2018 -- Provider    Coronary artery disease due to calcified coronary lesion 3/19/2018 -- Provider    Dyslipidemia 6/26/2015 -- Provider    Encounter for blood transfusion -- -- Provider    Hearing impairment 10/25/2013 -- Provider    History of stroke 3/23/2015 -- Provider    Hypertension associated with diabetes 1/11/2018 -- Provider    Hypertensive retinopathy of both eyes 9/26/2017 -- Provider    Lumbar spondylosis 10/1/2015 -- Provider    Multiple myeloma not having achieved remission 1/16/2018 -- Provider    NSTEMI (non-ST elevated myocardial infarction) 3/22/2020 -- Provider    SUZANNA on CPAP -- -- Provider    Persistent proteinuria 1/11/2018 -- Provider    Pneumonia of right lower lobe due to infectious organism 3/22/2020 -- Provider    Spondylolisthesis of lumbar region 10/1/2015 -- Provider    Strabismus -- -- Provider    Stroke 2014 -- Provider    Thoracic spondylosis 10/1/2015 -- Provider    Type 2 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, with long-term current use of insulin 9/26/2017 -- Provider    Type 2 diabetes mellitus with diabetic polyneuropathy, with long-term current use of insulin 9/28/2015 -- Provider    Type 2 diabetes mellitus with stage 4 chronic kidney disease, with long-term current use of insulin 1/11/2018 -- Provider          Pertinent Negatives     Diagnosis Date Noted Comments Source    Amblyopia 09/26/2017 -- Provider    Anticoagulant long-term use 01/12/2014 -- Provider    Asthma 01/12/2014 -- Provider    COPD (chronic obstructive pulmonary disease)  01/12/2014 -- Provider    Glaucoma 09/26/2017 -- Provider    Macular degeneration 09/26/2017 -- Provider    Retinal detachment 09/26/2017 -- Provider    Seizures 01/12/2014 -- Provider    Thyroid disease 01/12/2014 -- Provider    Transfusion reaction 01/12/2014 -- Provider    Uveitis 09/26/2017 -- Provider                  Surgical as of 4/20/2020     Past Surgical History     Procedure Laterality Date Comments Source    left hand fracture sx [Other] -- -- -- Provider    CATARACT EXTRACTION W/  INTRAOCULAR LENS IMPLANT Left 4/30/15 Diane Provider    HAND SURGERY -- 2/2012 DR. BAKER [facility]"" Provider    LT EYE  [Other] -- 5/2/15 DR KULLMAN [facility] Provider    EYE SURGERY -- -- cataract removal left eye Provider    COLONOSCOPY N/A 1/20/2020 Procedure: COLONOSCOPY;  Surgeon: Salvador Espinosa MD;  Location: [facility] END[facility];  Service: Endoscopy;  Laterality: N/A; Provider                  Family as of 4/20/2020     Problem Relation Name Age of Onset Comments Source    Diabetes Mother -- -- -- Provider    Cancer Mother -- -- -- Provider    Kidney disease Mother -- -- -- Provider    Diabetes Father -- -- -- Provider    Heart attack Father -- -- -- Provider    Diabetes Sister x6 -- -- Provider    Diabetes Brother x1 -- -- Provider    No Known Problems Maternal Aunt -- -- -- Provider    No Known Problems Maternal Uncle -- -- -- Provider    No Known Problems Paternal Aunt -- -- -- Provider    No Known Problems Paternal Uncle -- -- -- Provider    No Known Problems Maternal Grandmother -- -- -- Provider    No Known Problems Maternal Grandfather -- -- -- Provider    No Known Problems Paternal Grandmother -- -- -- Provider    No Known Problems Paternal Grandfather -- -- -- Provider    Blindness Neg Hx -- -- -- Provider    Anesthesia problems Neg Hx -- -- -- Provider    Amblyopia Neg Hx -- -- -- Provider    Cataracts Neg Hx -- -- -- Provider    Glaucoma Neg Hx -- -- -- Provider    Hypertension Neg Hx -- -- -- Provider    Macular  degeneration Neg Hx -- -- -- Provider    Retinal detachment Neg Hx -- -- -- Provider    Strabismus Neg Hx -- -- -- Provider    Stroke Neg Hx -- -- -- Provider    Thyroid disease Neg Hx -- -- -- Provider    Heart disease Neg Hx -- -- -- Provider    Heart failure Neg Hx -- -- -- Provider    Hyperlipidemia Neg Hx -- -- -- Provider            Tobacco Use as of 4/20/2020     Smoking Status Smoking Start Date Smoking Quit Date Packs/Day Years Used    Never Smoker -- -- -- --    Types Comments Smokeless Tobacco Status Smokeless Tobacco Quit Date Source    -- -- Never Used -- Provider            Alcohol Use as of 4/20/2020     Alcohol Use Drinks/Week Alcohol/Week Comments Source    No -- -- -- Provider, Patient    Frequency Standard Drinks Binge Drinking        Never  Patient refused  Never               Drug Use as of 4/20/2020     Drug Use Types Frequency Comments Source    No -- -- -- Provider            Sexual Activity as of 4/20/2020     Sexually Active Birth Control Partners Comments Source    Never -- -- -- Provider            Activities of Daily Living as of 4/20/2020    None           Social Documentation as of 4/20/2020    None           Occupational as of 4/20/2020    None           Socioeconomic as of 4/20/2020     Marital Status Spouse Name Number of Children Years Education Education Level Preferred Language Ethnicity Race Source     -- -- -- -- English /Black Black or  --    Financial Resource Strain Food Insecurity: Worry Food Insecurity: Inability Transportation Needs: Medical Transportation Needs: Non-medical    Somewhat hard  Never true  Never true  No  No             Pertinent History     Question Response Comments    Lives with -- --    Place in Birth Order -- --    Lives in -- --    Number of Siblings -- --    Raised by -- --    Legal Involvement -- --    Childhood Trauma -- --    Criminal History of -- --    Financial Status -- --    Highest Level of Education --  --    Does patient have access to a firearm? -- --     Service -- --    Primary Leisure Activity -- --    Spirituality -- --        Past Medical History:   Diagnosis Date    Binocular vision disorder with diplopia 9/22/2016    Bradycardia     Cataract     Cervical spondylosis 10/1/2015    Chronic diastolic congestive heart failure     Chronic diastolic heart failure 1/11/2018    Coronary artery disease due to calcified coronary lesion 3/19/2018    Dyslipidemia 6/26/2015    Encounter for blood transfusion     Hearing impairment 10/25/2013    History of stroke 3/23/2015    Hypertension associated with diabetes 1/11/2018    Hypertensive retinopathy of both eyes 9/26/2017    Lumbar spondylosis 10/1/2015    Multiple myeloma not having achieved remission 1/16/2018    NSTEMI (non-ST elevated myocardial infarction) 3/22/2020    SUZANNA on CPAP     Persistent proteinuria 1/11/2018    Pneumonia of right lower lobe due to infectious organism 3/22/2020    Spondylolisthesis of lumbar region 10/1/2015    Strabismus     Stroke 2014    Thoracic spondylosis 10/1/2015    Type 2 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, with long-term current use of insulin 9/26/2017    Type 2 diabetes mellitus with diabetic polyneuropathy, with long-term current use of insulin 9/28/2015    Type 2 diabetes mellitus with stage 4 chronic kidney disease, with long-term current use of insulin 1/11/2018     Past Surgical History:   Procedure Laterality Date    CATARACT EXTRACTION W/  INTRAOCULAR LENS IMPLANT Left 4/30/15    [facility]    COLONOSCOPY N/A 1/20/2020    Procedure: COLONOSCOPY;  Surgeon: Salvador Espinosa MD;  [facility];  Service: Endoscopy;  Laterality: N/A;    EYE SURGERY      cataract removal left eye    HAND SURGERY  2/2012    DR. BAKER [facility]    left hand fracture sx      LT EYE   5/2/15    DR KULLMAN [facility]     Family History     Problem  Relation (Age of Onset)    Cancer Mother    Diabetes Mother, Father, Sister, Brother    Heart attack Father    Kidney disease Mother    No Known Problems Maternal Aunt, Maternal Uncle, Paternal Aunt, Paternal Uncle, Maternal Grandmother, Maternal Grandfather, Paternal Grandmother, Paternal Grandfather        Tobacco Use    Smoking status: Never Smoker    Smokeless tobacco: Never Used   Substance and Sexual Activity    Alcohol use: No     Frequency: Never     Drinks per session: Patient refused     Binge frequency: Never    Drug use: No    Sexual activity: Never     Review of patient's allergies indicates:   Allergen Reactions    Hydralazine analogues      Increase swelling and throat itching and tightness with use.  Symptoms correlate only with this medication onset in hospital.       No current facility-administered medications on file prior to encounter.      Current Outpatient Medications on File Prior to Encounter   Medication Sig    amLODIPine (NORVASC) 10 MG tablet Take 1 tablet (10 mg total) by mouth once daily.    aspirin (ECOTRIN) 81 MG EC tablet Take 1 tablet (81 mg total) by mouth once daily.    atorvastatin (LIPITOR) 40 MG tablet Take 1 tablet (40 mg total) by mouth every evening.    azithromycin (ZITHROMAX) 500 MG tablet Take 1 tablet (500 mg total) by mouth once daily.    blood sugar diagnostic Strp 1 strip by Misc.(Non-Drug; Combo Route) route after meals as needed.    calcitRIOL (ROCALTROL) 0.25 MCG Cap Take 2 capsules (0.5 mcg total) by mouth once daily.    carvediloL (COREG) 25 MG tablet Take 1 tablet (25 mg total) by mouth 2 (two) times daily with meals.    clopidogreL (PLAVIX) 75 mg tablet Take 1 tablet (75 mg total) by mouth once daily.    dexAMETHasone (DECADRON) 4 MG Tab Take 5 tablets (20 mg total) by mouth As instructed. Take once weekly with Velcade injection    diclofenac sodium 1.5 % Drop APPLY 40 DROPS TO AFFECTED AREA(S) FOUR TIMES DAILY    flash glucose scanning reader  "(FREESTYLE CLAUDIO 14 DAY READER) [facility] Use as directed.    flash glucose sensor (FREESTYLE CLAUDIO 14 DAY SENSOR) Kit Change every 14 days.    fluticasone (FLONASE) 50 mcg/actuation nasal spray 1 spray by Each Nare route 2 (two) times daily as needed for Rhinitis. (Patient taking differently: 1 spray by Each Nare route as needed for Rhinitis. )    furosemide (LASIX) 40 MG tablet Take 1 tablet (40 mg total) by mouth 2 (two) times daily. Take twice a day now    gabapentin (NEURONTIN) 300 MG capsule Take 1 capsule (300 mg total) by mouth daily as needed. (Patient taking differently: Take 300 mg by mouth 2 (two) times daily. )    glucose 4 GM chewable tablet Take 4 tablets (16 g total) by mouth as needed. For glucose less than 70.    insulin (LANTUS SOLOSTAR U-100 INSULIN) glargine 100 units/mL (3mL) SubQ pen Inject 12 units at night.    insulin aspart U-100 (NOVOLOG) 100 unit/mL (3 mL) InPn pen Inject  6 units (light meal), 10 units (larger meal), scale 150-200+2, 201-250+4, 251-300+6, 301-350+8. Snack dose 3 units. Max daily 60 units.    insulin needles, disposable, 31 X 5/16 " Ndle Pt to use pen needle with Lantus daily    lidocaine (LIDODERM) 5 % APPLY UP TO 3 PATCHES TO THE AFFECTED AREA(S) FOR 12 HOURS DAILY AND REMOVE FOR 12 HOURS    lidocaine (XYLOCAINE) 5 % Oint ointment APPLY TO THE AFFECTED AREA(S) THREE TO FOUR TIMES DAILY    LIDOTRAL 3.88 % Crea APPLY TO THE AFFECTED AREA 2 TIMES TO 3 TIMES DAILY    linaGLIPtin (TRADJENTA) 5 mg Tab tablet Take 1 tablet (5 mg total) by mouth once daily.    methyl salicylate 25 % Crea APPLY TO THE AFFECTED AREA(S) THREE TO FOUR TIMES DAILY    montelukast (SINGULAIR) 10 mg tablet Take 1 tablet (10 mg total) by mouth every evening.    nitroGLYCERIN (NITROSTAT) 0.4 MG SL tablet Place 1 tablet (0.4 mg total) under the tongue every 5 (five) minutes as needed for Chest pain.    omeprazole (PRILOSEC OTC) 20 MG tablet Take 1 tablet (20 mg total) by mouth once daily.    " "ondansetron (ZOFRAN-ODT) 8 MG TbDL Take 1 tablet (8 mg total) by mouth every 6 (six) hours as needed.    pen needle, diabetic 32 gauge x 1/4" Ndle use to inject insulin once daily    vitamin D (VITAMIN D3) 1000 units Tab Take 1,000 Units by mouth once daily.     Psychotherapeutics (From admission, onward)    Start     Stop Route Frequency Ordered    04/19/20 1313  OLANZapine injection 5 mg      -- IM 2 times daily PRN 04/19/20 1213        Objective:     Vital Signs (Most Recent):  Temp: 97.9 °F (36.6 °C) (04/20/20 1100)  Pulse: 74 (04/20/20 0900)  Resp: 14 (04/20/20 0900)  BP: 121/73 (04/20/20 0900)  SpO2: 98 % (04/20/20 0900) Vital Signs (24h Range):  Temp:  [97.1 °F (36.2 °C)-98.3 °F (36.8 °C)] 97.9 °F (36.6 °C)  Pulse:  [74-82] 74  Resp:  [14-17] 14  SpO2:  [98 %-99 %] 98 %  BP: (116-155)/(70-82) 121/73     Height: 6' (182.9 cm)  Weight: 62.2 kg (137 lb 2 oz)  Body mass index is 18.6 kg/m².    No intake or output data in the 24 hours ending 04/20/20 1524    Physical Exam   Psychiatric:   Mental Status Exam:  Unable to assess due to mental status/lack of cooperation        Significant Labs:   Last 24 Hours:   Recent Lab Results       04/20/20  1117   04/20/20  0450   04/20/20  0239   04/19/20  2355   04/19/20  2101        Influenza A, Molecular       Negative       Influenza B, Molecular       Negative       Flu A & B Source       Nasal swab       POCT Glucose 105 122 252   80                          Significant Imaging: I have reviewed all pertinent imaging results/findings within the past 24 hours.    Assessment/Plan:     Delirium  Marie Reis Jr. is a 64-year-old male with possible psychiatric history of depression (was on fluoxetine per chart review, though unable to confirm) and a complicated PMHx significant for multiple myeloma, HFrEF, SUZANNA on CPAP, DMII, CKD IV, CVA (2/2018), NSTEMI (3/22), COVID-19 (4/13), and failure to thrive. Patient was brought to the hospital from his SNF after pulling out his PEG " tube (which had been placed HERE by GI on 4/15).     On admission on 4/19, patient was confused, believed he was on [address] and that the year was 1990. Psychiatry was consulted to assist with agitation management.    Patient with complex medical history, now with hyperammonemia, currently refusing to talk to Psychiatry. Will get in touch with wife for collateral.    IMPRESSION  Mixed episode of delirium  R/o pseudodementia    RECOMMENDATIONS  - Would not start an antidepressant at this time given unclear picture caused by AMS. Remeron can further exacerbate delirium. Would consider starting Lexapro as needed in the future.  - Can use Zyprexa 2.5mg PO/IM q6h PRN for non-redirectable agitation  - Consider EEG    - Legal:  Patient's wife, Naheed Reis ([phone]), is patient's medical POA    - Other:  DELIRIUM BEHAVIOR MANAGEMENT   PLEASE utilize CHEMICAL restraints with PRN meds first for agitation. Minimize use of PHYSICAL restraints   Keep window shades open and room lit during day and room dim at night in order to promote normal sleep-wake cycles   Encourage frequent contact with family. [place] patient often to situation, location, date.   Continue to limit or discontinue use of narcotics, benzodiazepines, and anti-cholinergic medications, as they may worsen or precipitate delirium.   Continue medical workup for underlying cause of delirium.       Psychiatry will continue to follow.  Patient discussed with staff, Dr. Madrigal.    Sangeetha Kaba MD, PhD  [facility] Psych[facility], PGY-2

## 2020-04-20 NOTE — ANESTHESIA PREPROCEDURE EVALUATION
[facility]  Anesthesia Pre-Operative Evaluation     Patient Name: Marie Reis Jr.  YOB: 1955  MRN: [id number]  [facility]: [id number]       Admit Date: 4/18/2020   Admit Team: [facility]  Hospital Day: 4  Procedure: Procedure(s) (LRB):  EGD (ESOPHAGOGASTRODUODENOSCOPY) (N/A)  INSERTION, PEG TUBE Date of Procedure: 4/21/2020  Anesthesia: General/MAC  Pre-Operative Diagnosis: Final diagnoses:  [R46.89] Altered behavior  [R41.82] Altered mental status  [K94.23] PEG tube malfunction  Proceduralist:Surgeon(s) and Role:     * Abdoulaye Delacruz MD - Primary  Code Status: Full Code   Advanced Directive: Not Received  Isolation Precautions: Airborne and Contact and Droplet  Capacity: Full capacity     SUBJECTIVE:   Marie Reis Jr. is a 64 y.o. male who  has a past medical history of HTN, IDDM2, CAD, HFrEF, NSTEMI  (3/22/2020), Cataract, Strabismus, Stroke (2014 and 2/2018), Cervical spondylosis (10/1/2015), Thoracic spondylosis (10/1/2015), Hearing impairment (10/25/2013), History of stroke (3/23/2015), Multiple myeloma not having achieved remission (1/16/2018), SUZANNA on CPAP, Pneumonia of right lower lobe due to infectious organism (3/22/2020), CKD4, FTT Patient was brought to the hospital from his SNF after pulling out his PEG tube (which had been placed HERE by GI on 4/15). Found to be COVID 19 positive 3/21 w/ repeat testing positive on 4/13.  Current admission c/b mixed episode of delirium, possibly 2/2 hyperammonemia. Pt intermittently confused.    Patient's wife, Naheed Reis ([phone]), is patient's medical POA    Prev airway: Placement Date: 04/15/20; Method of Intubation: Video Laryngoscopy (2/2 covid, would anticipate could DL easily); Mask Ventilation: Not Attempted; Intubated: Postinduction; Airway Device Size: 7.5; Capnometry; Complicating Factors: None; Intubation  ALLERGIES:     Review of patient's allergies indicates:   Allergen Reactions    Hydralazine analogues      Increase  swelling and throat itching and tightness with use.  Symptoms correlate only with this medication onset in hospital.     LDA:   AIRWAY:      Lines/Drains/Airways     Drain                 Gastrostomy/Enterostomy 04/15/20 1601 Percutaneous endoscopic gastrostomy (PEG) LUQ feeding 5 days         Urethral Catheter 04/20/20 1630 Double-lumen less than 1 day          Peripheral Intravenous Line                 Peripheral IV - Single Lumen 04/18/20 2356 18 G Left Antecubital 2 days               Anesthesia Evaluation      No history of anesthetic complications   Airway   Mallampati: II  Dental      Pulmonary    (+) sleep apnea,   Cardiovascular   Exercise tolerance: poor  (+) hypertension, past MI, CAD, CHF,     ECG reviewed    Neuro/Psych    (+) seizures, TIA, CVA,     GI/Hepatic/Renal    (+) chronic renal disease,   (-) GERD    Endo/Other    (+) diabetes mellitus,   Abdominal                    MEDICATIONS:     Current Outpatient Medications on File Prior to Encounter   Medication Sig Dispense Refill Last Dose    amLODIPine (NORVASC) 10 MG tablet  90 tablet 3 Unknown at Unknown time    aspirin (ECOTRIN) 81 MG EC tablet  30 tablet 11 Unknown at Unknown time    atorvastatin (LIPITOR) 40 MG tablet  90 tablet 3 Unknown at Unknown time    azithromycin (ZITHROMAX) 500 MG tablet  2 tablet 0     blood sugar diagnostic Strp    Taking    calcitRIOL (ROCALTROL) 0.25 MCG Cap  60 capsule 6 Past Week at Unknown time    carvediloL (COREG) 25 MG tablet  180 tablet 3 Past Week at Unknown time    clopidogreL (PLAVIX) 75 mg tablet  90 tablet 3 Unknown at Unknown time    dexAMETHasone (DECADRON) 4 MG Tab  120 tablet 2 Past Month at Unknown time    diclofenac sodium 1.5 % Drop   3 Unknown at Unknown time    flash glucose scanning reader (FREESTYLE CLAUDIO 14 DAY READER) Misc  1 each 0 Taking    flash glucose sensor (FREESTYLE CLAUDIO 14 DAY SENSOR) Kit  9 kit 3 Taking    fluticasone (FLONASE) 50 mcg/actuation nasal spray  16 g 5  "Unknown at Unknown time    furosemide (LASIX) 40 MG tablet  60 tablet 11     gabapentin (NEURONTIN) 300 MG capsule  90 capsule 1 Unknown at Unknown time    glucose 4 GM chewable tablet   12     insulin (LANTUS SOLOSTAR U-100 INSULIN) glargine 100 units/mL (3mL) SubQ pen  15 mL 6     insulin aspart U-100 (NOVOLOG) 100 unit/mL (3 mL) InPn pen  18 mL 6 3/27/2020 at Unknown time    insulin needles, disposable, 31 X 5/16 " Ndle  100 each 3 Taking    lidocaine (LIDODERM) 5 %    Taking    lidocaine (XYLOCAINE) 5 % Oint ointment    Taking    LIDOTRAL 3.88 % Crea   3 Taking    linaGLIPtin (TRADJENTA) 5 mg Tab tablet  90 tablet 3 Unknown at Unknown time    methyl salicylate 25 % Crea    Unknown at Unknown time    montelukast (SINGULAIR) 10 mg tablet  90 tablet 1 Unknown at Unknown time    nitroGLYCERIN (NITROSTAT) 0.4 MG SL tablet  30 tablet 3 More than a month at Unknown time    omeprazole (PRILOSEC OTC) 20 MG tablet  30 tablet 11 Unknown at Unknown time    ondansetron (ZOFRAN-ODT) 8 MG TbDL  30 tablet 2 Unknown at Unknown time    pen needle, diabetic 32 gauge x 1/4" Ndle  100 each 6     vitamin D (VITAMIN D3) 1000 units Tab    Unknown at Unknown time      Inpatient Medications:  Antibiotics (From admission, onward)    None        VTE Risk Mitigation (From admission, onward)         Ordered     enoxaparin injection 30 mg  Daily      04/19/20 0218              Anticoagulants     Ordered     Route Frequency Start Stop    04/19/20 0218  enoxaparin      SubQ Daily 04/19/20 2100 --        Scheduled Meds:   amLODIPine  10 mg Per G Tube Daily    ascorbic acid (vitamin C)  500 mg Oral BID    aspirin  81 mg Per G Tube Daily    atorvastatin  40 mg Per G Tube QHS    carvediloL  25 mg Oral BID WM    clopidogreL  75 mg Oral Daily    enoxaparin  30 mg Subcutaneous Daily    insulin detemir U-100  6 Units Subcutaneous QHS    lactated ringers  500 mL Intravenous Once    multivitamin  1 tablet Oral Daily    " pantoprazole  40 mg Oral Daily    thiamine  100 mg Oral Daily    vitamin D  1,000 Units Per G Tube Daily     Continuous Infusions:   lactated ringers       PRN Meds:.acetaminophen, bisacodyL, Dextrose 10% Bolus, Dextrose 10% Bolus, Dextrose 10% Bolus, glucagon (human recombinant), glucose, glucose, insulin aspart U-100, labetalol, OLANZapine, ondansetron, sodium chloride 0.9%    Current Facility-Administered Medications   Medication Dose Route Frequency Provider Last Rate Last Dose    acetaminophen suppository 650 mg  650 mg Rectal Q6H PRN Des Williamson MD        amLODIPine tablet 10 mg  10 mg Per G Tube Daily Des Williamson MD   10 mg at 04/20/20 1000    ascorbic acid (vitamin C) tablet 500 mg  500 mg Oral BID Des Williamson MD   500 mg at 04/20/20 2139    aspirin chewable tablet 81 mg  81 mg Per G Tube Daily Des Williamson MD   81 mg at 04/20/20 1000    atorvastatin tablet 40 mg  40 mg Per G Tube QHS Des Williamson MD   40 mg at 04/20/20 2139    bisacodyL suppository 10 mg  10 mg Rectal Daily PRN Des Williamson MD        carvediloL tablet 25 mg  25 mg Oral BID WM Des Williamson MD   25 mg at 04/20/20 1735    clopidogreL tablet 75 mg  75 mg Oral Daily Des Williamson MD   75 mg at 04/20/20 1000    dextrose 10% (D10W) Bolus  12.5 g Intravenous PRN Charlene Porter MD        dextrose 10% (D10W) Bolus  25 g Intravenous PRN Charlene Porter MD        dextrose 10% (D10W) Bolus  12.5 g Intravenous PRN Charlene Porter MD        enoxaparin injection 30 mg  30 mg Subcutaneous Daily Des Williamson MD   30 mg at 04/20/20 2139    glucagon (human recombinant) injection 1 mg  1 mg Intramuscular PRN Charlene Porter MD        glucose chewable tablet 16 g  16 g Oral PRN Des Williamson MD        glucose chewable tablet 24 g  24 g Oral PRN Des Williamson MD        insulin aspart U-100 pen 0-5 Units  0-5 Units Subcutaneous Q6H PRN Charlene Porter MD   1 Units at 04/20/20 2209     insulin detemir U-100 pen 6 Units  6 Units Subcutaneous QHS Charlene Porter MD        labetalol 20 mg/4 mL (5 mg/mL) IV syring  10 mg Intravenous TID PC PRN Charlene Porter MD        lactated ringers bolus 500 mL  500 mL Intravenous Once Charlene Porter MD        lactated ringers infusion   Intravenous Continuous Charlene Potrer MD        multivitamin tablet  1 tablet Oral Daily Des Williamson MD   1 tablet at 04/20/20 1000    OLANZapine injection 5 mg  5 mg Intramuscular BID PRN Charlene Porter MD        ondansetron injection 4 mg  4 mg Intravenous Q8H PRN Des Williamson MD        pantoprazole EC tablet 40 mg  40 mg Oral Daily Des Williamson MD   40 mg at 04/20/20 1000    sodium chloride 0.9% flush 10 mL  10 mL Intravenous PRN Des Williamson MD        thiamine tablet 100 mg  100 mg Oral Daily Charlene Porter MD        vitamin D 1000 units tablet 1,000 Units  1,000 Units Per G Tube Daily Des Williamson MD   1,000 Units at 04/20/20 1000         History:     Active Hospital Problems    Diagnosis  POA    *PEG tube malfunction [K94.23]  Yes    Delirium [R41.0]  Yes    Severe protein-calorie malnutrition [E43]  Yes    Encephalopathy [G93.40]  Yes    Hyperammonemia [E72.20]  Yes    Physical debility [R53.81]  Yes    Altered mental status [R41.82]  Yes    Advanced care planning/counseling discussion [Z71.89]  Not Applicable    Palliative care encounter [Z51.5]  Not Applicable    Counseling regarding advance care planning and goals of care [Z71.89]  Not Applicable    Chronic kidney disease, stage IV (severe) [N18.4]  Yes    Failure to thrive in adult [R62.7]  Yes    Type 2 diabetes mellitus [E11.9]  Yes    Coronary artery disease involving native coronary artery of native heart without angina pectoris [I25.10]  Yes    Anemia, chronic disease [D63.8]  Yes    Acute encephalopathy [G93.40]  Yes    Benign hypertension with chronic kidney disease, stage III [I12.9, N18.3]   Yes    Chronic combined systolic and diastolic heart failure [I50.42]  Yes    History of right PCA stroke [Z86.73]  Not Applicable    Acute CVA (cerebrovascular accident) [I63.9]  Yes     3.5 cm acute cortical infarct in the medial right occipital lobe - as per MRI on 3/17/20        Resolved Hospital Problems   No resolved problems to display.     Past Surgical History:   Procedure Laterality Date    CATARACT EXTRACTION W/  INTRAOCULAR LENS IMPLANT Left 4/30/15    Diane    COLONOSCOPY N/A 1/20/2020    Procedure: COLONOSCOPY;  Surgeon: Salvador Espinosa MD;  Location: [facility];  Service: Endoscopy;  Laterality: N/A;    EYE SURGERY      cataract removal left eye    HAND SURGERY  2/2012    DR. B[facility]    left hand fracture sx      LT EYE   5/2/15    DR KULLMAN [facility]     Social History     Substance and Sexual Activity   Alcohol Use No    Frequency: Never    Drinks per session: Patient refused    Binge frequency: Never          Vitals:    04/20/20 2212 04/21/20 0000 04/21/20 0200 04/21/20 0700   BP:  122/74 134/81 (!) 148/83   BP Location:       Patient Position:       Pulse:  75 76 72   Resp:  15 16 14   Temp:   36.7 °C (98 °F)    TempSrc:       SpO2: 98% 98% 99% 99%   Weight:       Height:         Vital Signs Range (Last 24H):  Temp:  [36.3 °C (97.3 °F)-36.7 °C (98 °F)]   Pulse:  [68-76]   Resp:  [14-16]   BP: (106-148)/(64-83)   SpO2:  [98 %-99 %]     Body mass index is 18.6 kg/m².  Wt Readings from Last 4 Encounters:   04/18/20 62.2 kg (137 lb 2 oz)   04/15/20 63.5 kg (140 lb)   04/16/20 62.2 kg (137 lb 2 oz)   04/11/20 63.5 kg (140 lb)        Intake/Output - Last 3 Shifts       04/19 0700 - 04/20 0659 04/20 0700 - 04/21 0659 04/21 0700 - 04/22 0659    P.O.  775     Total Intake(mL/kg)  775 (12.5)     Urine (mL/kg/hr)  1300 (0.9)     Total Output  1300     Net  -525            Urine Occurrence 2 x      Stool Occurrence 2 x 1 x         Lab Results   Component Value Date    WBC 8.58  04/21/2020    HGB 9.9 (L) 04/21/2020    HCT 32.2 (L) 04/21/2020     04/21/2020     04/21/2020    K 3.4 (L) 04/21/2020     04/21/2020    CREATININE 4.3 (H) 04/21/2020    BUN 58 (H) 04/21/2020    CO2 14 (L) 04/21/2020    GLU 97 04/21/2020    CALCIUM 9.3 04/21/2020    MG 2.4 04/21/2020    PHOS 4.8 (H) 04/21/2020    ALKPHOS 94 04/21/2020    ALT 15 04/21/2020    AST 22 04/21/2020    ALBUMIN 3.0 (L) 04/21/2020    INR 1.0 04/19/2020    APTT 31.2 04/19/2020    HGBA1C 9.1 (H) 03/09/2020     Recent Labs   Lab 04/18/20  2349 04/19/20  0241 04/19/20  0242 04/21/20  0306   WBC 8.38  --  7.96 8.58   HGB 8.9*  --  8.7* 9.9*   HCT 28.9*  --  29.8* 32.2*     --  204 266    137  --  140   K 4.0 3.8  --  3.4*   CREATININE 3.5* 3.4*  --  4.3*   * 180*  --  97   INR 1.1 1.0  --   --      EKG:   Results for orders placed or performed during the hospital encounter of 04/18/20   EKG 12-lead    Collection Time: 04/18/20 10:40 PM    Narrative    Test Reason : R46.89,    Vent. Rate : 070 BPM     Atrial Rate : 073 BPM     P-R Int : 150 ms          QRS Dur : 090 ms      QT Int : 404 ms       P-R-T Axes : 046 -06 148 degrees     QTc Int : 436 ms    Age and gender specific analysis  Normal sinus rhythm  LVH with repolarization abnormality  Abnormal ECG  When compared with ECG of 16-APR-2020 04:13,  T wave inversion less evident in Lateral leads  Confirmed by LUZMARIA BUTCHER MD ([id number]) on 4/20/2020 12:16:06 PM    Referred By: [email or web address]   SELF           Confirmed By:LUZMARIA BUTCHER MD     TTE:  Results for orders placed or performed during the hospital encounter of 02/24/20   Echo Color Flow Doppler? Yes; Bubble Contrast? No   Result Value Ref Range    AV mean gradient 16 mmHg    Ao peak tonja 2.58 m/s    Ao VTI 49.54 cm    IVS 1.89 (A) 0.6 - 1.1 cm    LA size 4.31 cm    Left Atrium Major Axis 6.82 cm    Left Atrium Minor Axis 6.12 cm    LVIDD 5.20 3.5 - 6.0 cm    LVIDS 4.10 (A) 2.1 - 4.0 cm    LVOT diameter 2.32  cm    LVOT peak VTI 11.91 cm    PW 1.42 (A) 0.6 - 1.1 cm    MV Peak A Lucas 0.60 m/s    E wave decelartion time 154.62 msec    MV Peak E Lucas 1.02 m/s    PV Peak D Lucas 0.23 m/s    PV Peak S Lucas 0.61 m/s    RA Major Axis 5.83 cm    RA Width 4.75 cm    TAPSE 1.67 cm    TR Max Lucas 3.28 m/s    LA WIDTH 4.93 cm    Ao root annulus 3.07 cm    AORTIC VALVE CUSP SEPERATION 0.64 cm    PV PEAK VELOCITY 2.07 cm/s    LV Diastolic Volume 129.22 mL    LV Systolic Volume 74.12 mL    LVOT peak lucas 0.61 m/s    FS 21 %    LA volume 116.51 cm3    LV mass 396.37 g    Left Ventricle Relative Wall Thickness 0.55 cm    AV valve area 1.02 cm2    AV Velocity Ratio 0.24     AV index (prosthetic) 0.24     E/A ratio 1.70     Pulm vein S/D ratio 2.65     LVOT area 4.2 cm2    LVOT stroke volume 50.32 cm3    AV peak gradient 27 mmHg    LV Systolic Volume Index 34.7 mL/m2    LV Diastolic Volume Index 60.48 mL/m2    LA Volume Index 54.5 mL/m2    LV Mass Index 186 g/m2    Triscuspid Valve Regurgitation Peak Gradient 43 mmHg    Right Atrial Pressure (from IVC) 15 mmHg    TV rest pulmonary artery pressure 58 mmHg    Narrative    · Concentric left ventricular hypertrophy.  · Severe left atrial enlargement.  · Moderate aortic regurgitation.  · Mild-to-moderate mitral regurgitation.  · Moderate to severe tricuspid regurgitation.  · Moderate to severe pulmonary hypertension present.  · Left ventricular systolic function. The estimated ejection fraction is   25%.  · Grade II (moderate) left ventricular diastolic dysfunction consistent   with pseudonormalization.  · Normal right ventricular systolic function.  · Moderate-to-severe aortic valve stenosis.  · Aortic valve area is 1.02 cm2; peak velocity is 2.58 m/s; mean gradient   is 16 mmHg.  · Elevated central venous pressure (15 mmHg).  · The estimated PA systolic pressure is 58 mmHg.        Results for orders placed or performed in visit on 03/26/18   2D echo with color flow doppler   Result Value Ref Range     QEF 50 55 - 65    Mitral Valve Regurgitation MILD     Diastolic Dysfunction Yes (A)     Aortic Valve Regurgitation MILD     Aortic Valve Stenosis MILD (A)     Est. PA Systolic Pressure 42.19 (A)     Tricuspid Valve Regurgitation MILD      PECRY:  No results found. However, due to the size of the patient record, not all encounters were searched. Please check Results Review for a complete set of results.  Stress Test:  No results found for this or any previous visit.   LHC:  No results found for this or any previous visit.   PFT:  No results found for: FEV1, FVC, [id number], TLC, DLCO       Anesthesia Evaluation    I have reviewed the Patient Summary Reports.    I have reviewed the Nursing Notes.   I have reviewed the Medications.     Review of Systems  Anesthesia Hx:  No problems with previous Anesthesia Denies Hx of Anesthetic complications  History of prior surgery of interest to airway management or planning: Previous anesthesia: General Denies Family Hx of Anesthesia complications.   Denies Personal Hx of Anesthesia complications.   Social:  Non-Smoker, No Alcohol Use    Hematology/Oncology:  Hematology Normal       -- Cancer in past history:    EENT/Dental:  EENT/Dental Normal  Denies Otitis Media Denies Chronic Tonsillitis   Cardiovascular:   Exercise tolerance: poor Hypertension Past MI CAD   CHF ECG has been reviewed.  Functional Capacity good / => 4 METS    Pulmonary:   Sleep Apnea    Renal/:   Chronic Renal Disease    Hepatic/GI:  Hepatic/GI Normal  Denies GERD.    Musculoskeletal:  Musculoskeletal Normal    Neurological:   TIA, CVA Seizures    Endocrine:   Diabetes    Dermatological:  Skin Normal    Psych:  Psychiatric Normal           Physical Exam  General:  Well nourished    Airway/Jaw/Neck:  Airway Findings: Mouth Opening: Normal Tongue: Normal  General Airway Assessment: Adult  Mallampati: II       Chest/Lungs:  Chest/Lungs Clear    Heart/Vascular:  Heart Findings: Normal       Mental Status:  Mental  Status Findings:  Alert and Oriented         Anesthesia Plan  Type of Anesthesia, risks & benefits discussed:  Anesthesia Type:  general  Patient's Preference:   Intra-op Monitoring Plan: standard ASA monitors  Intra-op Monitoring Plan Comments:   Post Op Pain Control Plan: per primary service following discharge from PACU and IV/PO Opioids PRN  Post Op Pain Control Plan Comments:   Induction:   IV  Beta Blocker:  Patient is on a Beta-Blocker and has received one dose within the past 24 hours (No further documentation required).       Informed Consent: Patient representative understands risks and agrees with Anesthesia plan.  Questions answered. Anesthesia consent signed with patient representative.  ASA Score: 3     Day of Surgery Review of History & Physical:    H&P update referred to the provider.         Ready For Surgery From Anesthesia Perspective.

## 2020-04-20 NOTE — PLAN OF CARE
"Pt is able to reposition self independently in bed. Pt appears agitated and angry, AAOX1. No urine output noted > 4 hours, pt refuses to have a condom catheter placed. Pt refused a bladder scan or straight cath. Pt stated, "I will use the urinal when I need to go, now move." Urinal placed at pt's bedside. SN notified the charge nurse of this will inform oncoming nurse, unable to collect urine specimen for U/A. Blood sugar levels monitored, coverage given as needed. Pt denied any c/o of pain or discomfort at time. 02 sat 99% on RA. Bed in low position, wheels locked, safety precautions maintained at all times. Personal belongings and call light within reach. Will cont. To monitor.  "

## 2020-04-20 NOTE — PLAN OF CARE
HOSPITAL MEDICINE PLAN OF CARE    Patient admitted over night. Chart reviewed. Patient seen and evaluated on rounds today. Doing well with no acute distress.   Awake and alert Nonsensical  speech. Unable to answer any orientation  questions     Results for EVA BOWLES JR. (MRN [id number]) as of 4/19/2020 19:39   Ref. Range 3/21/2020 20:19 4/13/2020 23:30   SARS-CoV2 (COVID-19) Qualitative PCR Latest Ref Range: Not Detected  Detected (A) Detected (A)       Plan :  - palliative care consulted - family would like full code and continuation of aggressive measures   - GI consulted - would require another PEG tube placement with EGD  - psych consulted       Signing Physician:     Charlene Porter MD  Department of Hospital Medicine   [facility]  Pager [phone]  4/19/2020

## 2020-04-20 NOTE — PLAN OF CARE
04/20/20 1014   Post-Acute Status   Post-Acute Authorization Placement   Post-Acute Placement Status Awaiting Internal Medical Clearance

## 2020-04-20 NOTE — HPI
Marie Reis Jr. is a 64-year-old male with possible psychiatric history of depression (was on fluoxetine per chart review, though unable to confirm) and a complicated PMHx significant for multiple myeloma, HFrEF, SUZANNA on CPAP, DMII, CKD IV, CVA (2/2018), NSTEMI (3/22), COVID-19 (4/13), and failure to thrive. Patient was brought to the hospital from his SNF after pulling out his PEG tube (which had been placed HERE by GI on 4/15).     On admission on 4/19, patient was confused, believed he was on [address] and that the year was 1990. Psychiatry was consulted to assist with agitation management.    Per Primary MD:  64 M with hx CVA, recent NSTEMI, Multiple Myeloma, recent COVID diagnosis, failure to thrive and decreased PO intake requiring PEG tube was transferred from SNF after pulling out his PEG tube. Per notes patient has been uncooperative and confused for a significant time and does not appear to have any acute changes. PEG tube was placed on 4/15 by GI. Today patient pulled out his PEG tube, was replaced prior to ED presentation with Parrish catheter. He was sent to the ED for CT scan and GI evaluation. Patient is unable to provide any history, believes he is on [address] and it is 1989.      Per notes patient has not been participating in therapy and withdrawn. This was attributed to possibly old stroke symptoms vs depression for which he is on fluoxetine. In the ED he was found to have a markedly elevated ammonia level. No previous history of liver disease. Hepatitis serologies negative in February.    Per C-L MD:  Attempted to interview patient by phone this morning, however patient currently refusing to speak with us. Per nurse, he has also been refusing to talk to his wife.    Spoke with primary team, who has concerns for progressive encephalopathy perhaps superimposed on depression, patient has been refusing care. Per primary, he was able to move spontaneously on exam, though unable to fully participate in  Neuro exam. Also unable to answer orientation questions.    Psychiatric Review of Systems:  Unable to assess 2/2 patient refusing to speak with us this morning.    Psychiatric History:  Diagnose(s): Yes - MDD (per chart review, had been on Remeron for several years, [id number]?) after death of son and grandson in an MVA 5 years ago  Previous Medication Trials: Yes - Remeron, Prozac (unclear whether he took Prozac, seems to have been started at SNF, where patient was refusing many of his medications)  Previous Psychiatric Hospitalizations: unable to assess  Previous Suicide Attempts: unable to assess  History of Violence: unable to assess  Outpatient Psychiatrist: unable to assess    Social History:  Marital Status:   Children: 2, son , daughter works for [employer] as an   Employment Status: unable to assess  Education: unable to assess  Special Ed: unable to assess   History: yes  Housing Status: currently in SNF  Developmental History: unable to assess  History of Abuse: unable to assess  Access to Gun: unable to assess    Substance Abuse History:  Unable to assess    Legal History:  Unable to assess    Family Psychiatric History:   Unable to assess    Medical Review Of Systems:  History unobtainable from patient due to mental status/lack of cooperation

## 2020-04-20 NOTE — ASSESSMENT & PLAN NOTE
Marie Reis Jr. is a 64-year-old male with possible psychiatric history of depression (was on fluoxetine per chart review, though unable to confirm) and a complicated PMHx significant for multiple myeloma, HFrEF, SUZANNA on CPAP, DMII, CKD IV, CVA (2/2018), NSTEMI (3/22), COVID-19 (4/13), and failure to thrive. Patient was brought to the hospital from his SNF after pulling out his PEG tube (which had been placed HERE by GI on 4/15).     On admission on 4/19, patient was confused, believed he was on [address] and that the year was 1990. Psychiatry was consulted to assist with agitation management.    Patient with complex medical history, now with hyperammonemia, currently refusing to talk to Psychiatry. Will get in touch with wife for collateral.    IMPRESSION  Mixed episode of delirium  R/o pseudodementia    RECOMMENDATIONS  - Would not start an antidepressant at this time given unclear picture caused by AMS. Remeron can further exacerbate delirium. Would consider starting Lexapro as needed in the future.  - Can use Zyprexa 2.5mg PO/IM q6h PRN for non-redirectable agitation  - Consider EEG    - Legal:  Patient's wife, Naheed Reis ([phone]), is patient's medical POA    - Other:  [facility]   PLEASE utilize CHEMICAL restraints with PRN meds first for agitation. Minimize use of PHYSICAL restraints   Keep window shades open and room lit during day and room dim at night in order to promote normal sleep-wake cycles   Encourage frequent contact with family. Jenison patient often to situation, location, date.   Continue to limit or discontinue use of narcotics, benzodiazepines, and anti-cholinergic medications, as they may worsen or precipitate delirium.   Continue medical workup for underlying cause of delirium.

## 2020-04-20 NOTE — PLAN OF CARE
04/20/20 1027   Post-Acute Status   Post-Acute Authorization Placement   Post-Acute Placement Status Awaiting Internal Medical Clearance

## 2020-04-20 NOTE — PLAN OF CARE
Patient seen for a speech/language/cognitive evaluation.     Karson Ranadll CCC-SLP  Speech-Language Pathology  Pager: [phone]

## 2020-04-20 NOTE — PLAN OF CARE
Pt is alert and oriented x 1-2.  He is non compliant with treatment and needs to be coaxed into complying and taking medications.  Pt wouldn't allow for nurse to do bladder scan on him and  was able to convince him to scan and he had about 700 cc/s.  After speaking with primary who was at bedside, she agreed that we could leave the sims catheter in place due to urinary retention.

## 2020-04-21 PROBLEM — R46.89 ALTERED BEHAVIOR: Status: ACTIVE | Noted: 2020-01-01

## 2020-04-21 PROBLEM — R33.9 URINARY RETENTION: Status: ACTIVE | Noted: 2020-01-01

## 2020-04-21 NOTE — PT/OT/SLP PROGRESS
Occupational Therapy   co-Treatment    Name: Marie Reis Jr.  MRN: [id number]  Admitting Diagnosis:  PEG tube malfunction  Day of Surgery    Recommendations:     Discharge Recommendations: nursing facility, skilled  Discharge Equipment Recommendations:  bedside commode, wheelchair, shower chair  Barriers to discharge:  Decreased caregiver support, Other (Comment)(Pt requires increased assistance at current functional level )    Assessment:     Marie Reis Jr. is a 64 y.o. male with a medical diagnosis of PEG tube malfunction.  He presents with performance deficits affecting function are weakness, impaired endurance, impaired self care skills, gait instability, impaired functional mobilty, impaired balance, decreased upper extremity function, decreased lower extremity function, decreased safety awareness, impaired cognition, impaired coordination, impaired cardiopulmonary response to activity. Pt tolerated session fair this date. Pt presents with self-limiting behaviors inhibiting pt from progressing with functional mobility and ADLs. Pt however is not safe to return to the home environment and would greatly benefit from SNF placement. Pt would benefit from continued skilled acute OT services in order to maximize independence and safety with ADLs and functional mobility to ensure safe return to PLOF in the least restrictive environment    Rehab Prognosis:  Good; patient would benefit from acute skilled OT services to address these deficits and reach maximum level of function.       Plan:     Patient to be seen 3 x/week to address the above listed problems via self-care/home management, therapeutic activities, therapeutic exercises, neuromuscular re-education, cognitive retraining  · Plan of Care Expires: 05/19/20  · Plan of Care Reviewed with: patient    Subjective     Pain/Comfort:  · Pain Rating 1: 0/10  · Pain Rating Post-Intervention 1: 0/10    Objective:     Communicated with: RN prior to session.  Patient found left  sidelying slouched down in bed with PEG Tube, telemetry, peripheral IV upon OT entry to room. Pt agreeable to therapy session with max encouragement provided.     General Precautions: Standard, airborne, contact, droplet, fall, diabetic   Orthopedic Precautions:N/A   Braces: N/A     Occupational Performance:     Bed Mobility:    · Patient completed Scooting/Bridging with total assistance, 2 persons and for supine scooting towards HOB and max A for anterior scooting towards EOB   · Patient completed Supine to Sit with total assistance and 2 persons  · Patient completed Sit to Supine with moderate assistance and with leg lift     Functional Mobility/Transfers:  · Patient completed x 2 reps Sit <> Stand Transfer from EOB with moderate assistance and of 2 persons  with  no assistive device   · Pt with forward flexed posturing   · Pt unable to achieve full upright posture despite facilitation provided and verbal/tactile cues.    · Standing balance: mod A x 2 persons with B HHA   · Functional Mobility: Pt unable to perform at this time.     Activities of Daily Living:  · Grooming: moderate assistance pt washed face while sitting EOB. Pt refused to perform oral care at this time   · Toileting: total assistance for hygiene to be performed in standing and blue pads changed       [facility] 6 Click ADL: 11    Treatment & Education:  - Pt educated on role of OT, POC, and goals for therapy.    - Pt disoriented to place, date, and situation. Daily orientation provided.   - Pt educated on importance of participating in therapy session and therapy expectations. Pt with eyes closed majority of therapy session and limited response back to therapist.   - EOB sitting balance: Pt tolerated sitting EOB for ~10 mins with CGA <> min A   - Time provided for therapeutic counseling and discussion of health disposition.   - Importance of OOB ax's with staff member assistance and sitting OOB majority of day.   - Co-tx with PT performed due to need  for education and assistance from two skilled therapy disciplines at pt's current functional level.   - Pt completed ADLs and functional mobility for treatment session as noted above   - Pt verbalized understanding. Pt expressed no further concerns/questions.  - whiteboard updated       Patient left with bed in chair position with all lines intact, call button in reach, bed alarm on and RN notifiedEducation:      GOALS:   Multidisciplinary Problems     Occupational Therapy Goals        Problem: Occupational Therapy Goal    Goal Priority Disciplines Outcome Interventions   Occupational Therapy Goal     OT, PT/OT Ongoing, Progressing    Description:  Goals to be met by: 5/3     Patient will increase functional independence with ADLs by performing:    Bed mobility and supine to sit with minimal assistance.  UE Dressing while seated EOB with Minimal Assistance.  Grooming while standing with Minimal Assistance.  Toileting from bedside commode with Minimal Assistance for hygiene and clothing management.   Toilet transfer to bedside commode with Minimal Assistance.  Functional mobility at short household distance for ADL task with RW and mod(A).                      Time Tracking:     OT Date of Treatment: 04/21/20  OT Start Time: 0816  OT Stop Time: 0839  OT Total Time (min): 23 min    Billable Minutes:Self Care/Home Management 23 (Co-treat with PT)    Cindy Botello OT  4/21/2020

## 2020-04-21 NOTE — PLAN OF CARE
04/21/20 0833   Post-Acute Status   Post-Acute Authorization Placement   Post-Acute Placement Status Referrals Sent     Pt may be ready to d/c back to [facility]NF, Sw will check with liaison to see if bed would be available. Sw to follow.

## 2020-04-21 NOTE — PLAN OF CARE
Problem: Physical Therapy Goal  Goal: Physical Therapy Goal  Description  Goals to be met by: 2020    Patient will increase functional independence with mobility by performin. Supine <> sit with Minimal Assistance.  2. Sit <> stand transfer with Minimal Assistance using Rolling Walker.  3. Bed <> chair transfer via Stand Pivot with Minimal Assistance using Rolling Walker.  4. Gait  x 50 feet with Minimal Assistance using Rolling Walker to prepare for community ambulation and endurance activities.  5. Able to tolerate exercise for 15-20 reps with supervision.       Outcome: Ongoing, Progressing   Patient tolerated treatment well. Established POC and goals reviewed and remain appropriate. Plan is to continue to improve patient's functional mobility capabilities.      Charito Regalado, PT, DPT  2020

## 2020-04-21 NOTE — ANESTHESIA POSTPROCEDURE EVALUATION
Anesthesia Post Evaluation    Patient: Marie Reis Jr.    Procedure(s) Performed: Procedure(s) (LRB):  EGD (ESOPHAGOGASTRODUODENOSCOPY) (N/A)  INSERTION, PEG TUBE    Final Anesthesia Type: general    Patient location during evaluation: PACU  Patient participation: Yes- Able to Participate  Level of consciousness: awake and alert  Post-procedure vital signs: reviewed and stable  Pain management: adequate  Airway patency: patent    PONV status at discharge: No PONV  Anesthetic complications: no      Cardiovascular status: blood pressure returned to baseline  Respiratory status: unassisted  Hydration status: euvolemic  Follow-up not needed.          Vitals Value Taken Time   /70 4/21/2020  9:00 AM   Temp 36.7 °C (98 °F) 4/21/2020  9:00 AM   Pulse 70 4/21/2020  9:00 AM   Resp 17 4/21/2020  9:00 AM   SpO2 99 % 4/21/2020 10:12 AM         No case tracking events are documented in the log.      Pain/Gregory Score: No data recorded

## 2020-04-21 NOTE — PLAN OF CARE
Problem: Occupational Therapy Goal  Goal: Occupational Therapy Goal  Description  Goals to be met by: 5/3     Patient will increase functional independence with ADLs by performing:    Bed mobility and supine to sit with minimal assistance.  UE Dressing while seated EOB with Minimal Assistance.  Grooming while standing with Minimal Assistance.  Toileting from bedside commode with Minimal Assistance for hygiene and clothing management.   Toilet transfer to bedside commode with Minimal Assistance.  Functional mobility at short household distance for ADL task with RW and mod(A).     Outcome: Ongoing, Progressing       Cindy Botello, OTR/L  Pager: [phone]  4/21/2020

## 2020-04-21 NOTE — MEDICAL/APP STUDENT
Consultation-Liaison Psychiatry - Progress Note    ENCOUNTER DATE:  2020 5:59 AM   SITE:  BMU unit, [facility]  ADMIT DATE:   Pt Name: Marie Reis Jr.   : 1955   MRN: [id number]      HISTORY OF PRESENTING ILLNESS:  Marie Reis Jr. is a 64 y.o. male with history of mutiple myeloma, chronic systolic & diastolic heart failure (reduced EF), hypertension, obstructive sleep apnea, diabetes mellitus II, chronic kidney disease (stage IV), cerebrovascular events (Mar 2015, 2018, R PCA infarct Mar 2020), non-ST elevated myocardial infarction (3/22/2020), pneumonia (COVID-19) who was consulted for agitation management.    Psychiatric History:  Diagnose(s): Yes - Depression (Moderate episode of recurrent MDD, following death of son);  Previous Medication Trials: Yes - Remeron; Prozac (though compliance is uncertain)  : [employer],  (per collateral)  Previous Psychiatric Hospitalizations: No  Previous Suicide Attempts: No  History of Violence: No  [facility]    Social History:  Marital Status:  (35 yrs)  Children: 3 - Two biological sons (1 ); 1 non-biological daughter  Employment Status: Unable to assess  Education: Unable to assess  Special Ed: unknown   History: Yes, [employer] -  (per collateral)  Housing Status: Prior to hospitalization and SNF, with wife in house  Developmental History: Unable to assess  History of Abuse: Unable to assess  Access to Gun: Unable to assess     Substance Abuse History: (per collateral)  Recreational Drugs: None  Use of Alcohol: None  Rehab History: No  Tobacco Use: No    Interim history  Per nurse  Yesterday during the day, pt remained verbally withdrawn, and either required significant convincing to perform procedures, or did not all the staff to perform procedures. O/N patient was NPO, with no significant events. Pt does not respond to  "questions. PEG placement planned for the afternoon    Per collateral: (Naheed Reis, spouse)  Mrs. Reis spoke with pt over the phone yesterday afternoon, and noted that the pt mistakenly believed his son had just visited him in the hospital. In general, Mrs Reis describes pt as "a talker" and noted that his decreased verbal communication as "unusual." Mrs Reis has not seen her  in person since being admitted in March for CVA/NSTEMI, and has described him as "all over the place" since that event, explaining "it's like he's confused." Pt recognized wife during videochat during his stay in SNF. Mrs Reis also disclosed an event in February, when pt took a bath for 6-7 hrs, as wife had fallen asleep waiting, and when asked to open the door, pt refused, at one point telling the son and herself that "if you didn't get away from the door, I'm gonna blow your head off". Mrs Reis does not know of access to gun in the household. When asked about hx of anger/violence, Mrs Reis says that he has calmed down over the past 5 yrs (since son and grandson ), but before he had anger issues: "anything would set him off," and in reference to playing games with children "he had to win, if he didn't win, he was angry."    Today, pt continues to be uncooperative with interviewing over phone.    Risk Parameters:  Unable to assess    Current psych meds  Zyprexa PRN    PAST PSYCHIATRIC, MEDICAL, AND SOCIAL HISTORY REVIEWED  The patient's past medical, family and social history have been reviewed and updated as appropriate within the electronic medical record.    MEDICAL REVIEW OF SYSTEMS  History unobtainable from patient due to mental status / lack of cooperation   General :   Respiratory:   Cardiovascular:    Gastrointestinal:    Neurological:    Psychiatric: please see HPI    ALL MEDICATIONS:    Current Facility-Administered Medications:     acetaminophen suppository 650 mg, 650 mg, Rectal, Q6H PRN, Des Williamson MD    " amLODIPine tablet 10 mg, 10 mg, Per G Tube, Daily, Des Williamson MD, 10 mg at 04/20/20 1000    ascorbic acid (vitamin C) tablet 500 mg, 500 mg, Oral, BID, Des Williamson MD, 500 mg at 04/20/20 2139    aspirin chewable tablet 81 mg, 81 mg, Per G Tube, Daily, Des Williamson MD, 81 mg at 04/20/20 1000    atorvastatin tablet 40 mg, 40 mg, Per G Tube, QHS, Des Williamson MD, 40 mg at 04/20/20 2139    bisacodyL suppository 10 mg, 10 mg, Rectal, Daily PRN, Des Williamson MD    carvediloL tablet 25 mg, 25 mg, Oral, BID WM, Des Williamson MD, 25 mg at 04/20/20 1735    clopidogreL tablet 75 mg, 75 mg, Oral, Daily, Des Williamson MD, 75 mg at 04/20/20 1000    dextrose 10% (D10W) Bolus, 12.5 g, Intravenous, PRN, Charlene Porter MD    dextrose 10% (D10W) Bolus, 25 g, Intravenous, PRN, Charlene Porter MD    dextrose 10% (D10W) Bolus, 12.5 g, Intravenous, PRN, Charlene Porter MD    enoxaparin injection 30 mg, 30 mg, Subcutaneous, Daily, Des Williamson MD, 30 mg at 04/20/20 2139    glucagon (human recombinant) injection 1 mg, 1 mg, Intramuscular, PRN, Charlene Porter MD    glucose chewable tablet 16 g, 16 g, Oral, PRN, Des Williamson MD    glucose chewable tablet 24 g, 24 g, Oral, PRN, Des Williamson MD    insulin aspart U-100 pen 0-5 Units, 0-5 Units, Subcutaneous, Q6H PRN, Charlene Porter MD, 1 Units at 04/20/20 0245    insulin detemir U-100 pen 10 Units, 10 Units, Subcutaneous, QHS, Des Williamson MD, 10 Units at 04/20/20 2139    labetalol 20 mg/4 mL (5 mg/mL) IV syring, 10 mg, Intravenous, TID PC PRN, Charlene Porter MD    multivitamin tablet, 1 tablet, Oral, Daily, Des Williamson MD, 1 tablet at 04/20/20 1000    OLANZapine injection 5 mg, 5 mg, Intramuscular, BID PRN, Charlene Porter MD    ondansetron injection 4 mg, 4 mg, Intravenous, Q8H PRN, Des Williamson MD    pantoprazole EC tablet 40 mg, 40 mg, Oral, Daily, Des Williamson MD, 40 mg at 04/20/20 1000     sodium chloride 0.9% flush 10 mL, 10 mL, Intravenous, PRN, Des Williamson MD    thiamine tablet 100 mg, 100 mg, Oral, Daily, Charlene Porter MD    vitamin D 1000 units tablet 1,000 Units, 1,000 Units, Per G Tube, Daily, Des Williamson MD, 1,000 Units at 04/20/20 1000    ALLERGIES:  Review of patient's allergies indicates:   Allergen Reactions    Hydralazine analogues      Increase swelling and throat itching and tightness with use.  Symptoms correlate only with this medication onset in hospital.       RELEVANT LABS/STUDIES:    Lab Results   Component Value Date    WBC 8.58 04/21/2020    HGB 9.9 (L) 04/21/2020    HCT 32.2 (L) 04/21/2020    MCV 87 04/21/2020     04/21/2020     BMP  Lab Results   Component Value Date     04/21/2020    K 3.4 (L) 04/21/2020     04/21/2020    CO2 14 (L) 04/21/2020    BUN 58 (H) 04/21/2020    CREATININE 4.3 (H) 04/21/2020    CALCIUM 9.3 04/21/2020    ANIONGAP 16 04/21/2020    ESTGFRAFRICA 15.8 (A) 04/21/2020    EGFRNONAA 13.7 (A) 04/21/2020     Lab Results   Component Value Date    ALT 15 04/21/2020    AST 22 04/21/2020    ALKPHOS 94 04/21/2020    BILITOT 0.6 04/21/2020     Lab Results   Component Value Date    TSH 1.867 04/18/2020     Lab Results   Component Value Date    HGBA1C 9.1 (H) 03/09/2020       VITALS  defer to nursing note    PHYSICAL EXAM  Unable to assess    PSYCHIATRIC EXAM:  Unable to assess     IMPRESSION:    Marie Reis Jr. is a 64 y.o. male with history of multiple co-morbitidies (please see HPI for details) including a recent CVA and NSTEMI 4 wks ago complicated w/ COVID-19 pneumonia, who was consulted for agitation management.    Given pt's recent MRI-proven R PCA infarct and reduced verbal communication (per spouse), delirium secondary to CVA neurocognitive sequelae needs to be ruled out. Comparative inpatient neuro consult may facilitate r/o.    The pt's episodes of marked confusion (as evidenced by pt believing son had visited) may be  presentations of psychosis (i.e.visual hallucinations). Considering the pt's h/o depression, major depressive episode with psychotic features should be ruled out w/ MSE over videochat tomorrow.    Though dementia as cause of uncooperative behavior should not be ruled out, pt's spouse reports more abrupt change in behavior, making dementia less likely.    DIAGNOSES:    ICD-10-CM ICD-9-CM   1. COVID-19 virus infection U07.1    2. Altered behavior R46.89 312.9   3. Altered mental status R41.82 780.97   4. PEG tube malfunction K94.23 536.42   5. Acute renal failure superimposed on chronic kidney disease, unspecified CKD stage, unspecified acute renal failure type N17.9 584.9    N18.9 585.9   6. Physical debility R53.81 799.3   7. Altered mental status, unspecified altered mental status type R41.82 780.97   8. Advanced care planning/counseling discussion Z71.89 V65.49   9. Palliative care encounter Z51.5 V66.7   10. Counseling regarding advance care planning and goals of care Z71.89 V65.49       PLAN:    1) Psych Med:   Continue PRM IM Zyprexa    2) Other: Labs/medical problems/collateral   -Sp[facility] provided [facility]   -[facility] request form was faxed this morning. Return fax will be to [phone].    3) Organize televisit with patient for 04/22/2020      Sudeep Ho M3    TASNEEM PEGUERO MD   [facility] Medical Director  [facility]  4/21/2020 5:59 AM

## 2020-04-21 NOTE — PROGRESS NOTES
[facility]  Psychiatry  Progress Note    Patient Name: Marie Reis Jr.  MRN: [id number]   Code Status: Full Code  Admission Date: 4/18/2020  Hospital Length of Stay: 2 days  Expected Discharge Date: 4/22/2020  Attending Physician: Charlene Porter MD  Primary Care Provider: Nancy Colón MD    Current Legal Status: N/A    Patient information was obtained from spouse/SO and RN, and the medical record.     Subjective:     Principal Problem:PEG tube malfunction    Chief Complaint: AMS    HPI: Marie Reis Jr. is a 64-year-old male with possible psychiatric history of depression (was on fluoxetine per chart review, though unable to confirm) and a complicated PMHx significant for multiple myeloma, HFrEF, SUZANNA on CPAP, DMII, CKD IV, CVA (2/2018), NSTEMI (3/22), COVID-19 (4/13), and failure to thrive. Patient was brought to the hospital from his SNF after pulling out his PEG tube (which had been placed HERE by GI on 4/15).     On admission on 4/19, patient was confused, believed he was on [address] and that the year was 1990. Psychiatry was consulted to assist with agitation management.    Per Primary MD:  64 M with hx CVA, recent NSTEMI, Multiple Myeloma, recent COVID diagnosis, failure to thrive and decreased PO intake requiring PEG tube was transferred from SNF after pulling out his PEG tube. Per notes patient has been uncooperative and confused for a significant time and does not appear to have any acute changes. PEG tube was placed on 4/15 by GI. Today patient pulled out his PEG tube, was replaced prior to ED presentation with Parrish catheter. He was sent to the ED for CT scan and GI evaluation. Patient is unable to provide any history, believes he is on [address] and it is 1990.      Per notes patient has not been participating in therapy and withdrawn. This was attributed to possibly old stroke symptoms vs depression for which he is on fluoxetine. In the ED he was found to have a markedly elevated  ammonia level. No previous history of liver disease. Hepatitis serologies negative in February.    Per C-L MD:  Attempted to interview patient by phone this morning, however patient currently refusing to speak with us. Per nurse, he has also been refusing to talk to his wife.    Spoke with primary team, who has concerns for progressive encephalopathy perhaps superimposed on depression, patient has been refusing care. Per primary, he was able to move spontaneously on exam, though unable to fully participate in Neuro exam. Also unable to answer orientation questions.    Psychiatric Review of Systems:  Unable to assess 2/2 patient refusing to speak with us this morning.    Psychiatric History:  Diagnose(s): Yes - MDD (per chart review, had been on Remeron for several years, [id number]?) after death of son and grandson in an MVA 5 years ago  Previous Medication Trials: Yes - Remeron, Prozac (unclear whether he took Prozac, seems to have been started at SNF, where patient was refusing many of his medications)  Previous Psychiatric Hospitalizations: unable to assess  Previous Suicide Attempts: unable to assess  History of Violence: unable to assess  Outpatient Psychiatrist: unable to assess    Social History:  Marital Status:   Children: 2, son , daughter works for [employer] as an   Employment Status: unable to assess  Education: unable to assess  Special Ed: unable to assess   History: yes  Housing Status: currently in SNF  Developmental History: unable to assess  History of Abuse: unable to assess  Access to Gun: unable to assess    Substance Abuse History:  Unable to assess    Legal History:  Unable to assess    Family Psychiatric History:   Unable to assess    Medical Review Of Systems:  History unobtainable from patient due to mental status/lack of cooperation      Hospital Course: 2020  Unable to speak with patient over the phone today, awaiting call back from RN. Will  "work on setting up telehealth visit tomorrow  in the morning.    MS3 Sudeep Ho was able to speak with patient's wife, Naheed Reis ([phone]):  Mrs. Reis spoke with pt over the phone yesterday afternoon, and noted that the pt mistakenly believed his son had just visited him in the hospital. In general, Mrs Reis describes pt as "a talker" and noted that his decreased verbal communication as "unusual." Mrs Reis has not seen her  in person since being admitted in March for CVA, and has described him as "all over the place" since that event, explaining "it's like he's confused." Pt recognized wife during videochat during his stay in SNF. Mrs Reis also disclosed an event in February, when pt took a bath for 6-7 hrs, as wife had fallen asleep waiting, and when asked to open the door, pt refused, at one point telling the son and herself that "if you didn't get away from the door, I'm gonna blow your head off". Mrs Reis does not know of access to gun in the household. When asked about hx of anger/violence, Mrs Reis says that he has calmed down over the past 5 yrs (since son and grandson ), but before he had anger issues: "anything would set him off," and in reference to playing games with children "he had to win, if he didn't win, he was angry."    Interval History: See Hospital Course    Family History     Problem Relation (Age of Onset)    Cancer Mother    Diabetes Mother, Father, Sister, Brother    Heart attack Father    Kidney disease Mother    No Known Problems Maternal Aunt, Maternal Uncle, Paternal Aunt, Paternal Uncle, Maternal Grandmother, Maternal Grandfather, Paternal Grandmother, Paternal Grandfather        Tobacco Use    Smoking status: Never Smoker    Smokeless tobacco: Never Used   Substance and Sexual Activity    Alcohol use: No     Frequency: Never     Drinks per session: Patient refused     Binge frequency: Never    Drug use: No    Sexual activity: Never     Psychotherapeutics (From " admission, onward)    Start     Stop Route Frequency Ordered    04/19/20 1313  OLANZapine injection 5 mg      -- IM 2 times daily PRN 04/19/20 1213          Review of Systems  Objective:     Vital Signs (Most Recent):  Temp: 98 °F (36.7 °C) (04/21/20 0900)  Pulse: 70 (04/21/20 0900)  Resp: 17 (04/21/20 0900)  BP: 126/70 (04/21/20 0900)  SpO2: 99 % (04/21/20 1012) Vital Signs (24h Range):  Temp:  [97.3 °F (36.3 °C)-98 °F (36.7 °C)] 98 °F (36.7 °C)  Pulse:  [68-76] 70  Resp:  [14-17] 17  SpO2:  [98 %-99 %] 99 %  BP: (106-148)/(64-83) 126/70     Height: 6' (182.9 cm)  Weight: 62.2 kg (137 lb 2 oz)  Body mass index is 18.6 kg/m².      Intake/Output Summary (Last 24 hours) at 4/21/2020 1313  Last data filed at 4/21/2020 1133  Gross per 24 hour   Intake 1125 ml   Output 1500 ml   Net -375 ml     Significant Labs:   Last 24 Hours:   Recent Lab Results       04/21/20  1226   04/21/20  0730   04/21/20  0306   04/20/20  2137   04/20/20  1701        Albumin     3.0         Alkaline Phosphatase     94         ALT     15         Anion Gap     16         AST     22         Baso #     0.04         Basophil%     0.5         BILIRUBIN TOTAL     0.6  Comment:  For infants and newborns, interpretation of results should be based  on gestational age, weight and in agreement with clinical  observations.  Premature Infant recommended reference ranges:  Up to 24 hours.............<8.0 mg/dL  Up to 48 hours............<12.0 mg/dL  3-5 days..................<15.0 mg/dL  6-29 days.................<15.0 mg/dL           BUN, Bld     58         Calcium     9.3         Chloride     110         CO2     14         Creatinine     4.3         CRP     70.1         Differential Method     Automated         eGFR if      15.8         eGFR if non      13.7  Comment:  Calculation used to obtain the estimated glomerular filtration  rate (eGFR) is the CKD-EPI equation.            Eos #     0.3         Eosinophil%     3.8          Glucose     97         Gran # (ANC)     6.1         Gran%     71.6         Hematocrit     32.2         Hemoglobin     9.9         Immature Grans (Abs)     0.05  Comment:  Mild elevation in immature granulocytes is non specific and   can be seen in a variety of conditions including stress response,   acute inflammation, trauma and pregnancy. Correlation with other   laboratory and clinical findings is essential.           Immature Granulocytes     0.6         Lymph #     1.3         Lymph%     15.6         Magnesium     2.4         MCH     26.7         MCHC     30.7         MCV     87         Mono #     0.7         Mono%     7.9         MPV     11.1         nRBC     0         Phosphorus     4.8         Platelets     266         POCT Glucose 91 114   113 124     Potassium     3.4         PROTEIN TOTAL     7.1         RBC     3.71         RDW     15.4         Sodium     140         WBC     8.58                              Significant Imaging: None    Assessment/Plan:     Brianda Reis Jr. is a 64-year-old male with possible psychiatric history of depression (was on fluoxetine per chart review, though unable to confirm) and a complicated PMHx significant for multiple myeloma, HFrEF, SUZANNA on CPAP, DMII, CKD IV, CVA (2/2018), NSTEMI (3/22), COVID-19 (4/13), and failure to thrive. Patient was brought to the hospital from his SNF after pulling out his PEG tube (which had been placed HERE by GI on 4/15).     On admission on 4/19, patient was confused, believed he was on [address] and that the year was 1990. Psychiatry was consulted to assist with agitation management.    Patient with complex medical history, now with hyperammonemia, currently refusing to talk to Psychiatry. Wife is involved and supportive. Will try to set up a telehealth visit with an iPad tomorrow.    IMPRESSION  Mixed episode of delirium  R/o pseudodementia    RECOMMENDATIONS  - Would not start an antidepressant at this time given unclear picture  caused by AMS. Remeron can further exacerbate delirium. Would consider starting Lexapro as needed in the future.  - Zyprexa 2.5mg PO/IM q6h PRN for non-redirectable agitation  - Consider melatonin 6mg PO ~3 hours before bedtime for sleep cycle stabilization  - Consider EEG    - Legal:  Patient's wife, Naheed Reis ([phone]), is patient's medical POA    - Other:  [facility]   PLEASE utilize CHEMICAL restraints with PRN meds first for agitation. Minimize use of PHYSICAL restraints   Keep window shades open and room lit during day and room dim at night in order to promote normal sleep-wake cycles   Encourage frequent contact with family. [place] patient often to situation, location, date.   Continue to limit or discontinue use of narcotics, benzodiazepines, and anti-cholinergic medications, as they may worsen or precipitate delirium.   Continue medical workup for underlying cause of delirium.    Psychiatry will continue to follow.  Patient discussed with staff, Dr. Madrigal.     Sangeetha Kaba MD, PhD  [facility], PGY-2

## 2020-04-21 NOTE — MEDICAL/APP STUDENT
"I spoke with patient's wife Ms Naheed Reis at [phone]. Informed her that Mr Reis has had his PEG tube placed and will be starting tube feeds and that he will have psych eval tomorrow with possible transfer to SNF tomorrow if he is stable. She was concerned about his mental status and him "not being himself." She also asked about whether he would be able to pull his tube out again and I reassured her that a binder had been placed to prevent this.  "

## 2020-04-21 NOTE — PLAN OF CARE
Sw informed no Covid beds at [facility], Sw sent out multiple SNF/[facility] facilities to consider Pt. [place] to follow.

## 2020-04-21 NOTE — ASSESSMENT & PLAN NOTE
Marie Reis Jr. is a 64-year-old male with possible psychiatric history of depression (was on fluoxetine per chart review, though unable to confirm) and a complicated PMHx significant for multiple myeloma, HFrEF, SUZANNA on CPAP, DMII, CKD IV, CVA (2/2018), NSTEMI (3/22), COVID-19 (4/13), and failure to thrive. Patient was brought to the hospital from his SNF after pulling out his PEG tube (which had been placed HERE by GI on 4/15).     On admission on 4/19, patient was confused, believed he was on [address] and that the year was 1990. Psychiatry was consulted to assist with agitation management.    Patient with complex medical history, now with hyperammonemia, currently refusing to talk to Psychiatry. Wife is involved and supportive. Will try to set up a telehealth visit with an iPad tomorrow.    IMPRESSION  Mixed episode of delirium  R/o pseudodementia    RECOMMENDATIONS  - Would not start an antidepressant at this time given unclear picture caused by AMS. Remeron can further exacerbate delirium. Would consider starting Lexapro as needed in the future.  - Zyprexa 2.5mg PO/IM q6h PRN for non-redirectable agitation  - Consider melatonin 6mg PO ~3 hours before bedtime for sleep cycle stabilization  - Consider EEG    - Legal:  Patient's wife, Naheed Reis ([phone]), is patient's medical POA    - Other:  [facility]   PLEASE utilize CHEMICAL restraints with PRN meds first for agitation. Minimize use of PHYSICAL restraints   Keep window shades open and room lit during day and room dim at night in order to promote normal sleep-wake cycles   Encourage frequent contact with family. [place] patient often to situation, location, date.   Continue to limit or discontinue use of narcotics, benzodiazepines, and anti-cholinergic medications, as they may worsen or precipitate delirium.   Continue medical workup for underlying cause of delirium.

## 2020-04-21 NOTE — PT/OT/SLP PROGRESS
Speech Language Pathology      Marie Reis Jr.  MRN: [id number]    Patient not seen today secondary to ARTURO for procedure on attempt. ST will f/u per POC.     Karson Randall CCC-SLP  Speech-Language Pathology  Pager: [phone]

## 2020-04-21 NOTE — [employer]ATION PATIENT INSTRUCTIONS
Discharge Summary/Instructions after an Endoscopic Procedure  Patient Name: Marie Reis  Patient MRN: [id number]  Patient YOB: 1955  Tuesday, April 21, 2020  Abdoulaye Delacruz MD  RESTRICTIONS:  During your procedure today, you received medications for sedation.  These   medications may affect your judgment, balance and coordination.  Therefore,   for 24 hours, you have the following restrictions:   - DO NOT drive a car, operate machinery, make legal/financial decisions,   sign important papers or drink alcohol.    ACTIVITY:  Today: no heavy lifting, straining or running due to procedural   sedation/anesthesia.  The following day: return to full activity including work.  DIET:  Eat and drink normally unless instructed otherwise.     TREATMENT FOR COMMON SIDE EFFECTS:  - Mild abdominal pain, nausea, belching, bloating or excessive gas:  rest,   eat lightly and use a heating pad.  - Sore Throat: treat with throat lozenges and/or gargle with warm salt   water.  - Because air was used during the procedure, expelling large amounts of air   from your rectum or belching is normal.  - If a bowel prep was taken, you may not have a bowel movement for 1-3 days.    This is normal.  SYMPTOMS TO WATCH FOR AND REPORT TO YOUR PHYSICIAN:  1. Abdominal pain or bloating, other than gas cramps.  2. Chest pain.  3. Back pain.  4. Signs of infection such as: chills or fever occurring within 24 hours   after the procedure.  5. Rectal bleeding, which would show as bright red, maroon, or black stools.   (A tablespoon of blood from the rectum is not serious, especially if   hemorrhoids are present.)  6. Vomiting.  7. Weakness or dizziness.  GO DIRECTLY TO THE NEAREST EMERGENCY ROOM IF YOU HAVE ANY OF THE FOLLOWING:      Difficulty breathing              Chills and/or fever over 101 F   Persistent vomiting and/or vomiting blood   Severe abdominal pain   Severe chest pain   Black, tarry stools   Bleeding- more than one  tablespoon   Any other symptom or condition that you feel may need urgent attention  Your doctor recommends these additional instructions:  If any biopsies were taken, your doctors clinic will contact you in 1 to 2   weeks with any results.  - Return patient to [facility] for ongoing care.   - Please follow the post-PEG recommendations including: Nutrition consult   for formula and volume, advance food and medications per primary care   provider, external bolster snug to abdominal wall, change dressing once per   day, start using PEG today and clean site daily with half-strength hydrogen   peroxide for three days, then soap and water daily.   - Please maintain abdominal binder to help prevent risk of the patient   removing the PEG.  - Continue present medications.   - Resume Plavix (clopidogrel) at prior dose today.   - The findings and recommendations were discussed with the patient's family.     - The findings and recommendations were discussed with the referring   physician.   For questions, problems or results please call your physician - Abdoulaye Delacruz MD at Work:  [phone].  [facility], EMERGENCY ROOM PHONE NUMBER: [phone]  IF A COMPLICATION OR EMERGENCY SITUATION ARISES AND YOU ARE UNABLE TO REACH   YOUR PHYSICIAN - GO DIRECTLY TO THE EMERGENCY ROOM.  Abdoulaye Delacruz MD  4/21/2020 11:46:21 AM  This report has been verified and signed electronically.  [employer]

## 2020-04-21 NOTE — SUBJECTIVE & OBJECTIVE
Interval History: See Hospital Course    Family History     Problem Relation (Age of Onset)    Cancer Mother    Diabetes Mother, Father, Sister, Brother    Heart attack Father    Kidney disease Mother    No Known Problems Maternal Aunt, Maternal Uncle, Paternal Aunt, Paternal Uncle, Maternal Grandmother, Maternal Grandfather, Paternal Grandmother, Paternal Grandfather        Tobacco Use    Smoking status: Never Smoker    Smokeless tobacco: Never Used   Substance and Sexual Activity    Alcohol use: No     Frequency: Never     Drinks per session: Patient refused     Binge frequency: Never    Drug use: No    Sexual activity: Never     Psychotherapeutics (From admission, onward)    Start     Stop Route Frequency Ordered    04/19/20 1313  OLANZapine injection 5 mg      -- IM 2 times daily PRN 04/19/20 1213          Review of Systems  Objective:     Vital Signs (Most Recent):  Temp: 98 °F (36.7 °C) (04/21/20 0900)  Pulse: 70 (04/21/20 0900)  Resp: 17 (04/21/20 0900)  BP: 126/70 (04/21/20 0900)  SpO2: 99 % (04/21/20 1012) Vital Signs (24h Range):  Temp:  [97.3 °F (36.3 °C)-98 °F (36.7 °C)] 98 °F (36.7 °C)  Pulse:  [68-76] 70  Resp:  [14-17] 17  SpO2:  [98 %-99 %] 99 %  BP: (106-148)/(64-83) 126/70     Height: 6' (182.9 cm)  Weight: 62.2 kg (137 lb 2 oz)  Body mass index is 18.6 kg/m².      Intake/Output Summary (Last 24 hours) at 4/21/2020 1313  Last data filed at 4/21/2020 1133  Gross per 24 hour   Intake 1125 ml   Output 1500 ml   Net -375 ml     Significant Labs:   Last 24 Hours:   Recent Lab Results       04/21/20  1226   04/21/20  0730   04/21/20  0306   04/20/20  2137   04/20/20  1701        Albumin     3.0         Alkaline Phosphatase     94         ALT     15         Anion Gap     16         AST     22         Baso #     0.04         Basophil%     0.5         BILIRUBIN TOTAL     0.6  Comment:  For infants and newborns, interpretation of results should be based  on gestational age, weight and in agreement with  clinical  observations.  Premature Infant recommended reference ranges:  Up to 24 hours.............<8.0 mg/dL  Up to 48 hours............<12.0 mg/dL  3-5 days..................<15.0 mg/dL  6-29 days.................<15.0 mg/dL           BUN, Bld     58         Calcium     9.3         Chloride     110         CO2     14         Creatinine     4.3         CRP     70.1         Differential Method     Automated         eGFR if      15.8         eGFR if non      13.7  Comment:  Calculation used to obtain the estimated glomerular filtration  rate (eGFR) is the CKD-EPI equation.            Eos #     0.3         Eosinophil%     3.8         Glucose     97         Gran # (ANC)     6.1         Gran%     71.6         Hematocrit     32.2         Hemoglobin     9.9         Immature Grans (Abs)     0.05  Comment:  Mild elevation in immature granulocytes is non specific and   can be seen in a variety of conditions including stress response,   acute inflammation, trauma and pregnancy. Correlation with other   laboratory and clinical findings is essential.           Immature Granulocytes     0.6         Lymph #     1.3         Lymph%     15.6         Magnesium     2.4         MCH     26.7         MCHC     30.7         MCV     87         Mono #     0.7         Mono%     7.9         MPV     11.1         nRBC     0         Phosphorus     4.8         Platelets     266         POCT Glucose 91 114   113 124     Potassium     3.4         PROTEIN TOTAL     7.1         RBC     3.71         RDW     15.4         Sodium     140         WBC     8.58                              Significant Imaging: None

## 2020-04-21 NOTE — PROGRESS NOTES
Palliative medicine follow up to goals of care.      Goals of care have been established.  See consult note 4/19/2020 1:31 PM per   BELEM Wong     Following goals established:  1) Full code status  2) Assure new PEG tube is ok to meet pt's nutrition needs  3) Return to skilled unit at discharge    Speech pathology recommendations: regular, thin diet with aspiration precautions     Primary team  completing appropriate referrals for return to [facility].     Palliative medicine will sign off.  Please re-consult as needed.

## 2020-04-21 NOTE — HOSPITAL COURSE
"2020  Unable to speak with patient over the phone today, awaiting call back from RN. Will work on setting up telehealth visit tomorrow  in the morning.    MS3 Sudeep Ho was able to speak with patient's wife, Naheed Reis ([phone]):  Mrs. Reis spoke with pt over the phone yesterday afternoon, and noted that the pt mistakenly believed his son had just visited him in the hospital. In general, Mrs Reis describes pt as "a talker" and noted that his decreased verbal communication as "unusual." Mrs Reis has not seen her  in person since being admitted in March for CVA, and has described him as "all over the place" since that event, explaining "it's like he's confused." Pt recognized wife during videochat during his stay in SNF. Mrs Reis also disclosed an event in February, when pt took a bath for 6-7 hrs, as wife had fallen asleep waiting, and when asked to open the door, pt refused, at one point telling the son and herself that "if you didn't get away from the door, I'm gonna blow your head off". Mrs Reis does not know of access to gun in the household. When asked about hx of anger/violence, Mrs Reis says that he has calmed down over the past 5 yrs (since son and grandson ), but before he had anger issues: "anything would set him off," and in reference to playing games with children "he had to win, if he didn't win, he was angry."    2020  Patient was amenable to being interviewed via Vidyo this morning with help of RN. Noted to be disoriented, believes he is still at the SNF. When asked how he came to the hospital, replied "I'm trying to find out myself". Demonstrated lack of insight into reason for being here, at first denied any issues with appetite, refusing to eat, or refusing care, then added "I never did feel like eating much". Denied depression, reported mood has been "pretty good". Denied any current or past thoughts of self-harm. Did endorse some anxiety surrounding his current " "medical condition, but added that it was "not too bad". When asked about symptoms of psychosis, patient was ambiguous, reported h/o auditory and/or visual disturbances, but told me "it was not serious", and that he had not experienced any "since all of this started". Patient oriented to self, but not fully to place or time. He did not pass SAVEAHAART, though was able to follow the commands. Remembered his children's and grandchildren's ages.  "

## 2020-04-21 NOTE — PT/OT/SLP PROGRESS
Physical Therapy Treatment    Patient Name:  Marie Reis Jr.   MRN:  [id number]    Recommendations:     Discharge Recommendations:  nursing facility, skilled   Discharge Equipment Recommendations: bedside commode, wheelchair, shower chair   Barriers to discharge: Inaccessible home and Decreased caregiver support    Assessment:     Marie Reis Jr. is a 64 y.o. male admitted with a medical diagnosis of PEG tube malfunction.  He presents with the following impairments/functional limitations:  weakness, impaired functional mobilty, impaired cognition, decreased safety awareness, impaired endurance, gait instability, decreased coordination, impaired fine motor, impaired balance, decreased upper extremity function, impaired self care skills, decreased lower extremity function. Patient tolerated session fair. He demonstrated self-limiting behaviors and required encouragement to participate. Patient would continue to benefit from skilled PT services while in the hospital. At this time, upon d/c he is an appropriate candidate for SNF in order to progress towards an improved level of functional mobility independence.     Rehab Prognosis: Fair; patient would benefit from acute skilled PT services to address these deficits and reach maximum level of function.    Recent Surgery: Procedure(s) (LRB):  EGD (ESOPHAGOGASTRODUODENOSCOPY) (N/A)  INSERTION, PEG TUBE Day of Surgery    Plan:     During this hospitalization, patient to be seen 3 x/week to address the identified rehab impairments via gait training, therapeutic exercises, therapeutic activities, neuromuscular re-education and progress toward the following goals:    · Plan of Care Expires:  05/18/20    Subjective     Chief Complaint: none stated   Patient/Family Comments/goals: none stated   Pain/Comfort:  · Pain Rating 1: 0/10  · Pain Rating Post-Intervention 1: 0/10      Objective:     Communicated with RN prior to session.  Patient found left sidelying with PEG Tube, telemetry,  peripheral IV upon PT entry to room.     General Precautions: Standard, airborne, contact, droplet, fall, diabetic   Orthopedic Precautions:N/A   Braces:       Functional Mobility:  · Bed Mobility:     · Rolling Left:  maximal assistance  · Scooting: maximal assistance  · Supine to Sit: total assistance and of 2 persons  · Sit to Supine: moderate assistance  · Transfers:     · Sit to Stand:  moderate assistance and of 2 persons with no AD  · 2 trials: significant FFP and cervical flexion; manual facilitation to improve posture; partially self-corrected   · Gait: unable to complete - attempted to take side steps, patient did not clear BLE   · Balance: sitting EOB - CGA-Chidi; signficant FFP; slight self-correction at times       AM-PAC 6 CLICK MOBILITY  Turning over in bed (including adjusting bedclothes, sheets and blankets)?: 2  Sitting down on and standing up from a chair with arms (e.g., wheelchair, bedside commode, etc.): 2  Moving from lying on back to sitting on the side of the bed?: 2  Moving to and from a bed to a chair (including a wheelchair)?: 2  Need to walk in hospital room?: 2  Climbing 3-5 steps with a railing?: 1  Basic Mobility Total Score: 11       Therapeutic Activities and Exercises:  -Patient educated on the continued role and goal of PT  -Questions and concerns answered within the the PT scope of practice.   -White board updated in patient room to reflect level of assistance needed with nursing.     Patient left with bed in chair position with all lines intact, call button in reach, bed alarm on and RN notified..    GOALS:   Multidisciplinary Problems     Physical Therapy Goals        Problem: Physical Therapy Goal    Goal Priority Disciplines Outcome Goal Variances Interventions   Physical Therapy Goal     PT, PT/OT Ongoing, Progressing     Description:  Goals to be met by: 2020    Patient will increase functional independence with mobility by performin. Supine <> sit with Minimal  Assistance.  2. Sit <> stand transfer with Minimal Assistance using Rolling Walker.  3. Bed <> chair transfer via Stand Pivot with Minimal Assistance using Rolling Walker.  4. Gait  x 50 feet with Minimal Assistance using Rolling Walker to prepare for community ambulation and endurance activities.  5. Able to tolerate exercise for 15-20 reps with supervision.                        Time Tracking:     PT Received On: 04/21/20  PT Start Time: 0814     PT Stop Time: 0837  PT Total Time (min): 23 min     Billable Minutes: Therapeutic Activity 23    Treatment Type: Treatment  PT/PTA: PT     PTA Visit Number: 0     Charito Regalado, PT  04/21/2020

## 2020-04-21 NOTE — ANESTHESIA PROCEDURE NOTES
Intubation  Performed by: Jennifer Trevizo CRNA  Authorized by: Vivienne Alatorre MD     Intubation:     Induction:  Intravenous    Intubated:  Postinduction    Mask Ventilation:  N/a (RSI)    Attempts:  1    Attempted By:  CRNA    Method of Intubation:  Video laryngoscopy    Blade:  Keyes 4    Laryngeal View Grade: Grade I - full view of chords      Difficult Airway Encountered?: No      Complications:  None    Airway Device:  Oral endotracheal tube    Airway Device Size:  7.5    Style/Cuff Inflation:  Cuffed (inflated to minimal occlusive pressure)    Tube secured:  23    Secured at:  The teeth    Placement Verified By:  Capnometry    Complicating Factors:  None    Findings Post-Intubation:  BS equal bilateral and atraumatic/condition of teeth unchanged

## 2020-04-21 NOTE — PROGRESS NOTES
Progress Note  Hospital Medicine  [facility]       Patient Name: Marie Reis Jr.  MRN:  [id number]  Hospital Medicine Team: [facility] MED L Charlene Porter MD  Date of Admission:  4/18/2020     Length of Stay:  LOS: 1 day   Expected Discharge Date: 4/22/2020  Principal Problem:  PEG tube malfunction     Subjective:     Interval History/Overnight Events:  Doing well, no acute changes. Labs table   HDS with no fevers, on RA. Taking meds by PO, not eating much. PEG tube replacement tomorrow  Stopping lactulose   Talked to wife and updated her     amLODIPine  10 mg Per G Tube Daily    ascorbic acid (vitamin C)  500 mg Oral BID    aspirin  81 mg Per G Tube Daily    atorvastatin  40 mg Per G Tube QHS    carvediloL  25 mg Oral BID WM    clopidogreL  75 mg Oral Daily    enoxaparin  30 mg Subcutaneous Daily    insulin detemir U-100  10 Units Subcutaneous QHS    multivitamin  1 tablet Oral Daily    pantoprazole  40 mg Oral Daily    [START ON 4/21/2020] thiamine  100 mg Oral Daily    vitamin D  1,000 Units Per G Tube Daily           acetaminophen, bisacodyL, Dextrose 10% Bolus, Dextrose 10% Bolus, Dextrose 10% Bolus, glucagon (human recombinant), glucose, glucose, insulin aspart U-100, labetalol, OLANZapine, ondansetron, sodium chloride 0.9%    Review of Systems  Limited due to AMS , but able to obtain   Constitutional: Negative for chills, fatigue, fever.   HENT: Negative for sore throat, trouble swallowing.    Respiratory: Negative for cough, shortness of breath.    Cardiovascular: Negative for chest pain, palpitations, leg swelling.   Gastrointestinal: Negative for abdominal pain, constipation, diarrhea, nausea, vomiting. .   Musculoskeletal: Negative for arthralgias, myalgias.   Skin: Negative for rash, wound, erythema   Neurological: Negative for dizziness, syncope, weakness, light-headedness.     Objective:     Temp:  [97.1 °F (36.2 °C)-98.3 °F (36.8 °C)]   Pulse:  [68-80]    Resp:  [14-17]   BP: (106-155)/(64-74)   SpO2:  [98 %-99 %]         Weight change:    Body mass index is 18.6 kg/m².       Physical Exam:  Gen: NAD, conversant  Head: Abrasion with small echymosis over left eye  Eyes: Pupils dilated but reactive. Does not follow commands to check EOM  Throat: Dry mucous membranes, OP clear  CV: RRR,  no peripheral edema,  Resp: no increased work of breathing on room air  GI: Soft, NT, ND. Catheter in place in PEG tube site with a small amount of bloody drainage surrounding. Nontender and not erythematous surrounding site.   Ext: No joint swelling or tenderness  No edema   Muscle wasting   Neuro: moves all extremities spontaneously, 4.5/5 motor strength throughout, follows simple instructions. Oriented to self and person (knows his wife's name) but not to place or time.    Psychiatry: Calm  Flat affect     Labs:    Chemistries:   Recent Labs   Lab 04/16/20 0443 04/18/20 2349 04/19/20 0241    136 137   K 4.1 4.0 3.8   * 106 107   CO2 22* 20* 20*   BUN 28* 52* 52*   CREATININE 2.6* 3.5* 3.4*   CALCIUM 9.4 8.7 8.9   PROT 6.7 6.5 6.3   BILITOT 0.8 0.4 0.4   ALKPHOS 77 116 96   ALT 13 14 13   AST 18 18 16   MG  --   --  2.3   PHOS  --   --  2.3*        WBC:   Recent Labs   Lab 04/16/20 0443 04/18/20 2349 04/19/20  0242   WBC 8.06 8.38 7.96     Bands:     CBC/Anemia Labs: Coags:    Recent Labs   Lab 04/16/20 0443 04/18/20 2349 04/19/20  0241 04/19/20  0242   WBC 8.06 8.38  --  7.96   HGB 11.0* 8.9*  --  8.7*   HCT 35.6* 28.9*  --  29.8*    210  --  204   MCV 85 85  --  89   RDW 14.6* 15.0*  --  15.0*   FERRITIN 1,604*  --   --   --    FOLATE  --   --  7.7  --    [id number]  --   --  559  --     Recent Labs   Lab 04/18/20  2349 04/19/20  0241   INR 1.1 1.0   APTT  --  31.2        POCT Glucose: HbA1c:    Recent Labs   Lab 04/18/20  2053 04/19/20  2101 04/20/20  0239 04/20/20  0450 04/20/20  1117 04/20/20  1701   POCTGLUCOSE 261* 80 252* 122* 105 124*    Hemoglobin  A1C   Date Value Ref Range Status   03/09/2020 9.1 (H) 4.0 - 5.6 % Final     Comment:   02/12/2020 10.5 (H) 4.0 - 5.6 % Final     Comment:   12/10/2019 9.2 (H) 4.0 - 5.6 % Final     Comment:          Assessment and Plan     Hospital Course:    Mr. Marie Reis Jr. was admitted to Hospital Medicine for management of  PEG tube malfunction     Active Hospital Problems    Diagnosis  POA    *PEG tube malfunction [K94.23]  Yes    Delirium [R41.0]  Yes    Encephalopathy [G93.40]  Yes    Hyperammonemia [E72.20]  Yes    Physical debility [R53.81]  Yes    Altered mental status [R41.82]  Yes    Advanced care planning/counseling discussion [Z71.89]  Not Applicable    Palliative care encounter [Z51.5]  Not Applicable    Counseling regarding advance care planning and goals of care [Z71.89]  Not Applicable    Chronic kidney disease, stage IV (severe) [N18.4]  Yes    Failure to thrive in adult [R62.7]  Yes    Type 2 diabetes mellitus [E11.9]  Yes    Coronary artery disease involving native coronary artery of native heart without angina pectoris [I25.10]  Yes    Anemia, chronic disease [D63.8]  Yes    Acute encephalopathy [G93.40]  Yes    Benign hypertension with chronic kidney disease, stage III [I12.9, N18.3]  Yes    Chronic combined systolic and diastolic heart failure [I50.42]  Yes    History of right PCA stroke [Z86.73]  Not Applicable    Acute CVA (cerebrovascular accident) [I63.9]  Yes     3.5 cm acute cortical infarct in the medial right occipital lobe - as per MRI on 3/17/20        Resolved Hospital Problems   No resolved problems to display.       #PEG tube malfunction  -S/p replacement prior to presentation with good positioning on CT  - GI following   - plan for PEG replacement on 4/20 with GI , NPO after MN     #sub- Acute encephalopathy  # Delirium  # Acute CVA (cerebrovascular accident)  -Subacute encephalopathy, that started after patient's stroke, per wife's report. MRI brain on 3/17 showed '3.5 cm  "acute cortical infarct in the medial right occipital lobe.", more inclined to think that CVA may be largely contributing to the AMS . COVID infection may be also contributing to component on psychosis. Elevated ammonia (seen on prior labs) is unlikely to be the culprit, and suspect it was elevated perhaps in setting of malnutrition, patient with no cirrhosis and normal LFTs. BUN is 50 so unlikely due to uremia. Psychiatric component remains high on DDX for this AMS- concern for psychosis vs other psychiatric conditions- patient is a former vet with hx of PTSD, with recent psychologic trauma from deaths of two of his family members .   - per prior documentation , patient refusing to eat, leading to placement of PEG tube for supplemental nutrition. Recently patient pulled the PEG tube out.   - psychiatry consulted . Unable to see patient due to COVID status (?), patient refused to speak on the phone. It is not unanticipated that patient is unwilling or unable to speak on the phone due to his his current condition   - cont PRN IM zyprexa for non-redirectable agitation  - cont DAPT, BP meds and insulin for recent CVA. Not on statin- suspect due to fairly unremarkable lipid panel and significant weight loss   - palliative care involved on the case- patient has a living will , indicating that he wished all aggressive measures and full code  - may benefit from neurology outpatient eval      #CKD IV  - Cr baseline around 3.5.   - Stable, monitor renal function     #CAD   #chronic combined systolic and diastolic heart failure  - Recent NSTEMI, on DAPT, statin, B Blcoker. Continue  HF not decompensated.      #Anemia  -hbg stable 9  - monitor      #HTN  Continue home meds    # Viral Pneumonia due to COVID-19  - COVID testing + on 3/21 and 4/12  - on RA  Monitor   - COVID precautions     # Physical debility  # Failure to thrive in adult  -     # T2DM  - detemir as above     # Severe protein-calorie malnutrition  - per prior " documentation , patient refusing to eat, leading to placement of PEG tube for supplemental nutrition. Recently patient pulled the PEG tube out.   Nutrition assessment on 4/16 noted :   1.) ADAT to regular (to encourage intake) with Boost Glucose Control TID.   2.) Begin sliding scale bolus feeds using Suplena:               If pt consuming >75% of meals-no bolus;               if PO intake 50-75% of meals: bolus 1/2 can;               if PO intakes 25-50% of meals: bolus 1 can;               if PO intakes <25% of meals: bolus 1.5 cans.  (1 can Suplena provides 425 kcals, 10.6gm protein and 175mL of free water)  3.) If continuous feeds preferred, suggest Suplena @ 20mL/hr advancing 10mL q4h to goal rate 50mL/hr to provide 2160 kcals, 54gm protein and 886mL of free water.               MD to manage fluid.               If GRV >500mL, hold TF q4h then restart regimen.               TF to meet 99% EEN and 87% EPN.   4.) Suggest vitamin C, zinc, MVI due to COVID19.     Disposition:    Back to SNF on 4/22      Signing Physician:     Charlene Porter MD  Department of Hospital Medicine   [facility]  Pager [phone] [id number] [phone]  4/20/2020

## 2020-04-21 NOTE — NURSING
Left message for Formula room when order came in for restart of tube feedings.  Let charge nurse know that if we don't get in touch with formula room within an hour, she will call [facility] to obtain feeding.

## 2020-04-21 NOTE — PROGRESS NOTES
Progress Note  Hospital Medicine  [facility]       Patient Name: Marie Reis Jr.  MRN:  [id number]  Hospital Medicine Team: [facility] [place] [facility] L Charlene Porter MD  Date of Admission:  4/18/2020     Length of Stay:  LOS: 2 days   Expected Discharge Date: 4/22/2020  Principal Problem:  PEG tube malfunction     Subjective:     Interval History/Overnight Events:  S/p PEG tube replacement today . Cr up to 4, likely due to dehydration.  IVF give, TFs and enteral water boluses given   Psychiatry planning on interviewing the patient tomorrow  Would plan to d/c patient back to [facility] when accepted back there      amLODIPine  10 mg Per G Tube Daily    ascorbic acid (vitamin C)  500 mg Oral BID    aspirin  81 mg Per G Tube Daily    atorvastatin  40 mg Per G Tube QHS    carvediloL  25 mg Oral BID WM    clopidogreL  75 mg Oral Daily    enoxaparin  30 mg Subcutaneous Daily    insulin detemir U-100  6 Units Subcutaneous QHS    multivitamin  1 tablet Oral Daily    pantoprazole  40 mg Oral Daily    thiamine  100 mg Oral Daily    vitamin D  1,000 Units Per G Tube Daily           acetaminophen, bisacodyL, Dextrose 10% Bolus, Dextrose 10% Bolus, Dextrose 10% Bolus, glucagon (human recombinant), glucose, glucose, insulin aspart U-100, labetalol, OLANZapine, ondansetron, sodium chloride 0.9%    Review of Systems  Limited due to AMS , but able to obtain   Constitutional: Negative for chills, fatigue, fever.   HENT: Negative for sore throat, trouble swallowing.    Respiratory: Negative for cough, shortness of breath.    Cardiovascular: Negative for chest pain, palpitations, leg swelling.   Gastrointestinal: Negative for abdominal pain, constipation, diarrhea, nausea, vomiting. .   Musculoskeletal: Negative for arthralgias, myalgias.   Skin: Negative for rash, wound, erythema   Neurological: Negative for dizziness, syncope, weakness, light-headedness.     Objective:     Temp:  [97.3 °F (36.3  °C)-98 °F (36.7 °C)]   Pulse:  [70-76]   Resp:  [14-18]   BP: (116-148)/(61-83)   SpO2:  [97 %-99 %]         Weight change:    Body mass index is 18.6 kg/m².       Physical Exam:  Gen: NAD, conversant  Head: Abrasion with small echymosis over left eye  Eyes: Pupils dilated but reactive. Does not follow commands to check EOM  Throat: Dry mucous membranes, OP clear  CV: RRR,  no peripheral edema,  Resp: no increased work of breathing on room air  GI: Soft, NT, ND. Catheter in place in PEG tube site with a small amount of bloody drainage surrounding. Nontender and not erythematous surrounding site.   Ext: No joint swelling or tenderness  No edema   Muscle wasting   Neuro: moves all extremities spontaneously, 4.5/5 motor strength throughout, follows simple instructions. Oriented to self and person (knows his wife's name) but not to place or time.    Psychiatry: Calm  Flat affect     Labs:    Chemistries:   Recent Labs   Lab 04/18/20 2349 04/19/20 0241 04/21/20  0306    137 140   K 4.0 3.8 3.4*    107 110   CO2 20* 20* 14*   BUN 52* 52* 58*   CREATININE 3.5* 3.4* 4.3*   CALCIUM 8.7 8.9 9.3   PROT 6.5 6.3 7.1   BILITOT 0.4 0.4 0.6   ALKPHOS 116 96 94   ALT 14 13 15   AST 18 16 22   MG  --  2.3 2.4   PHOS  --  2.3* 4.8*        WBC:   Recent Labs   Lab 04/16/20 0443 04/18/20 2349 04/19/20 0242 04/21/20  0306   WBC 8.06 8.38 7.96 8.58     Bands:     CBC/Anemia Labs: Coags:    Recent Labs   Lab 04/16/20 0443 04/18/20 2349 04/19/20 0241 04/19/20 0242 04/21/20  0306   WBC 8.06 8.38  --  7.96 8.58   HGB 11.0* 8.9*  --  8.7* 9.9*   HCT 35.6* 28.9*  --  29.8* 32.2*    210  --  204 266   MCV 85 85  --  89 87   RDW 14.6* 15.0*  --  15.0* 15.4*   FERRITIN 1,604*  --   --   --   --    FOLATE  --   --  7.7  --   --    [id number]  --   --  559  --   --     Recent Labs   Lab 04/18/20  2349 04/19/20  0241   INR 1.1 1.0   APTT  --  31.2        POCT Glucose: HbA1c:    Recent Labs   Lab 04/20/20  1117  04/20/20  1701 04/20/20  2137 04/21/20  0730 04/21/20  1226 04/21/20  1629   POCTGLUCOSE 105 124* 113* 114* 91 81    Hemoglobin A1C   Date Value Ref Range Status   03/09/2020 9.1 (H) 4.0 - 5.6 % Final     Comment:   02/12/2020 10.5 (H) 4.0 - 5.6 % Final     Comment:   12/10/2019 9.2 (H) 4.0 - 5.6 % Final     Comment:          Assessment and Plan     Hospital Course:    Mr. Marie Reis Jr. was admitted to Hospital Medicine for management of  PEG tube malfunction     Active Hospital Problems    Diagnosis  POA    *PEG tube malfunction [K94.23]  Yes    Altered behavior [R46.89]  Yes    Urinary retention [R33.9]  Yes    Delirium [R41.0]  Yes    Severe protein-calorie malnutrition [E43]  Yes    Encephalopathy [G93.40]  Yes    Hyperammonemia [E72.20]  Yes    Physical debility [R53.81]  Yes    Altered mental status [R41.82]  Yes    Advanced care planning/counseling discussion [Z71.89]  Not Applicable    Palliative care encounter [Z51.5]  Not Applicable    Counseling regarding advance care planning and goals of care [Z71.89]  Not Applicable    Chronic kidney disease, stage IV (severe) [N18.4]  Yes    Failure to thrive in adult [R62.7]  Yes    Type 2 diabetes mellitus [E11.9]  Yes    Physical deconditioning [R53.81]  Yes    Coronary artery disease involving native coronary artery of native heart without angina pectoris [I25.10]  Yes    COVID-19 virus infection [U07.1]  Yes    Anemia, chronic disease [D63.8]  Yes    Acute encephalopathy [G93.40]  Yes    Benign hypertension with chronic kidney disease, stage III [I12.9, N18.3]  Yes    Chronic combined systolic and diastolic heart failure [I50.42]  Yes    History of right PCA stroke [Z86.73]  Not Applicable    Acute CVA (cerebrovascular accident) [I63.9]  Yes     3.5 cm acute cortical infarct in the medial right occipital lobe - as per MRI on 3/17/20        Resolved Hospital Problems   No resolved problems to display.       #PEG tube malfunction  -S/p  "replacement prior to presentation with good positioning on CT  - GI following   - s/p for PEG replacement on 4/21 with GI , TFs restarted      #sub- Acute encephalopathy  # Delirium  # Acute CVA (cerebrovascular accident)  -Subacute encephalopathy, that started after patient's stroke, per wife's report. MRI brain on 3/17 showed '3.5 cm acute cortical infarct in the medial right occipital lobe.", more inclined to think that CVA may be largely contributing to the AMS . COVID infection may be also contributing to component on psychosis. Elevated ammonia (seen on prior labs) is unlikely to be the culprit, and suspect it was elevated perhaps in setting of malnutrition, patient with no cirrhosis and normal LFTs. BUN is 50 so unlikely due to uremia. Psychiatric component remains high on DDX for this AMS- concern for psychosis vs other psychiatric conditions- patient is a former vet with hx of PTSD, with recent psychologic trauma from deaths of two of his family members .   - per prior documentation , patient refusing to eat, leading to placement of PEG tube for supplemental nutrition. Recently patient pulled the PEG tube out.   - psychiatry consulted . Unable to see patient due to COVID status (?), patient refused to speak on the phone. It is not unanticipated that patient is unwilling or unable to speak on the phone due to his his current condition   - cont PRN IM zyprexa for non-redirectable agitation  - cont DAPT, BP meds and insulin for recent CVA. Not on statin- suspect due to fairly unremarkable lipid panel and significant weight loss   - palliative care involved on the case- patient has a living will , indicating that he wished all aggressive measures and full code  - may benefit from neurology outpatient eval   - Psychiatry planning on interviewing the patient tomorrow 4/22     #CKD IV  - Cr baseline around 3.5.   - Stable, monitor renal function     #CAD   #chronic combined systolic and diastolic heart failure  - " Recent NSTEMI, on DAPT, statin, NANCY Rodrigezcogeneva. Continue  HF not decompensated.      #Anemia  -hbg stable 9  - monitor      #HTN  Continue home meds    # Viral Pneumonia due to COVID-19  - COVID testing + on 3/21 and 4/12  - on RA  Monitor   - COVID precautions     # Physical debility  # Failure to thrive in adult  -     # T2DM  - detemir as above     # Severe protein-calorie malnutrition  - per prior documentation , patient refusing to eat, leading to placement of PEG tube for supplemental nutrition. Recently patient pulled the PEG tube out.   Nutrition assessment on 4/16 noted :   1.) ADAT to regular (to encourage intake) with Boost Glucose Control TID.   2.) Begin sliding scale bolus feeds using Suplena:               If pt consuming >75% of meals-no bolus;               if PO intake 50-75% of meals: bolus 1/2 can;               if PO intakes 25-50% of meals: bolus 1 can;               if PO intakes <25% of meals: bolus 1.5 cans.  (1 can Suplena provides 425 kcals, 10.6gm protein and 175mL of free water)  3.) If continuous feeds preferred, suggest Suplena @ 20mL/hr advancing 10mL q4h to goal rate 50mL/hr to provide 2160 kcals, 54gm protein and 886mL of free water.               MD to manage fluid.               If GRV >500mL, hold TF q4h then restart regimen.               TF to meet 99% EEN and 87% EPN.   4.) Suggest vitamin C, zinc, MVI due to COVID19.     Disposition:      Would plan to d/c patient back to [facility] when accepted back there       Signing Physician:     Charlene Porter MD  Department of Hospital Medicine   [facility]  Pager [phone]  Spectra [phone]  4/21/2020

## 2020-04-21 NOTE — TRANSFER OF CARE
Anesthesia Transfer of Care Note    Patient: Marie Reis Jr.    Procedure(s) Performed: Procedure(s) (LRB):  EGD (ESOPHAGOGASTRODUODENOSCOPY) (N/A)  INSERTION, PEG TUBE    Patient location: [facility]    Anesthesia Type: general    Transport from OR: Transported from OR on room air with adequate spontaneous ventilation    Post pain: adequate analgesia    Post assessment: no apparent anesthetic complications    Post vital signs: stable    Level of consciousness: awake and alert (Mental Status returned to baseline )    Nausea/Vomiting: no nausea/vomiting    Complications: none    Transfer of care protocol was followed      Last vitals:   Visit Vitals  /70 (BP Location: Right arm)   Pulse 70   Temp 36.7 °C (98 °F) (Axillary)   Resp 17   Ht 6' (1.829 m)   Wt 62.2 kg (137 lb 2 oz)   SpO2 99%   BMI 18.60 kg/m²

## 2020-04-21 NOTE — TREATMENT PLAN
GI Treatment Plan    Marie Reis Jr. is a 64 y.o. male admitted to hospital 4/18/2020 (Hospital Day: 4) due to PEG tube malfunction.     Interval History  - EGD Today  - Parrish cathter was present in prior gastrostomy site, site unremarkable.  - Parrish removed and PEG tube was placed  - external bumper at 3cm    Laboratory  Recent Labs   Lab 04/18/20  2349 04/19/20  0242 04/21/20  0306   HGB 8.9* 8.7* 9.9*       Plan  - s/p procedure, see formal note for additional details  - PEG tube replaced endoscopically  - ok to resume using today for feeds, meds and water  - please use abdominal binder to reduce risk of patient removing the PEG  - ok to resume plavix today  - Plan of care was discussed with primary team.  - We are signing-off. Please call with any questions.    Thank you for involving us in the care of Marie Reis Jr.. Please call with any additional questions, concerns or changes in the patient's clinical status.    Negrito Mcarthur MD  Gastroenterology Fellow

## 2020-04-22 NOTE — PLAN OF CARE
Plan of care discussed with patient. Peg tube intact. Tube feeding infusing @50ml per hr. Weight shift assistance provided. Cont. LR infusing 125ml/hr, pt tolerating well. Patient is free of fall/trauma/injury. Denies CP, SOB, or pain/discomfort. All questions addressed. Will continue to monitor

## 2020-04-22 NOTE — PROGRESS NOTES
Progress Note  Hospital Medicine  [facility]       Patient Name: Marie Reis Jr.  MRN:  [id number]  Hospital Medicine Team: [facility] [place] [facility]OSP MED L Charlene Porter MD  Date of Admission:  4/18/2020     Length of Stay:  LOS: 3 days   Expected Discharge Date: 4/22/2020  Principal Problem:  PEG tube malfunction     Subjective:     Interval History/Overnight Events:  Doing well  No acute issues   Tolerating TFs via PEG tube.  Labs showing increasing Cr up to 4.9 , from baseline of 3.5.  IVF given and Cr is improving . K+ replaced today , was noted to be 2.6 on AM labs.   Psychiatry was able to evaluated the patient and their recs were reviewed.      amLODIPine  10 mg Per G Tube Daily    ascorbic acid (vitamin C)  500 mg Oral BID    aspirin  81 mg Per G Tube Daily    atorvastatin  40 mg Per G Tube QHS    carvediloL  25 mg Oral BID WM    clopidogreL  75 mg Oral Daily    heparin (porcine)  5,000 Units Subcutaneous Q8H    insulin detemir U-100  6 Units Subcutaneous QHS    melatonin  6 mg Oral Nightly    multivitamin  1 tablet Oral Daily    pantoprazole  40 mg Oral Daily    potassium chloride 10%  40 mEq Per G Tube Once    thiamine  100 mg Oral Daily    vitamin D  1,000 Units Per G Tube Daily        lactated ringers         acetaminophen, bisacodyL, Dextrose 10% Bolus, Dextrose 10% Bolus, Dextrose 10% Bolus, glucagon (human recombinant), glucose, glucose, insulin aspart U-100, labetalol, OLANZapine, ondansetron, sodium chloride 0.9%    Review of Systems  Limited due to AMS , but able to obtain   Constitutional: Negative for chills, fatigue, fever.   HENT: Negative for sore throat, trouble swallowing.    Respiratory: Negative for cough, shortness of breath.    Cardiovascular: Negative for chest pain, palpitations, leg swelling.   Gastrointestinal: Negative for abdominal pain, constipation, diarrhea, nausea, vomiting. .   Musculoskeletal: Negative for arthralgias, myalgias.   Skin:  Negative for rash, wound, erythema   Neurological: Negative for dizziness, syncope, weakness, light-headedness.     Objective:     Temp:  [98.4 °F (36.9 °C)-98.6 °F (37 °C)]   Pulse:  [68-75]   Resp:  [14-16]   BP: (127-130)/(71-73)   SpO2:  [98 %-99 %]         Weight change:    Body mass index is 18.6 kg/m².       Physical Exam:  Gen: NAD, conversant  Head: Abrasion with small echymosis over left eye  Eyes: Pupils dilated but reactive. Does not follow commands to check EOM  Throat: Dry mucous membranes, OP clear  CV: RRR,  no peripheral edema,  Resp: no increased work of breathing on room air  GI: Soft, NT, ND. Catheter in place in PEG tube site with a small amount of bloody drainage surrounding. Nontender and not erythematous surrounding site.   Ext: No joint swelling or tenderness  No edema   Muscle wasting   Neuro: moves all extremities spontaneously, 4.5/5 motor strength throughout, follows simple instructions. Oriented to self and person (knows his wife's name) but not to place or time.    Psychiatry: Calm  Flat affect     Labs:    Chemistries:   Recent Labs   Lab 04/18/20  2349  04/19/20  0241 04/21/20  0306 04/22/20  0447 04/22/20  1557     --  137 140 138 136   K 4.0  --  3.8 3.4* 2.6* 3.3*     --  107 110 109 109   CO2 20*  --  20* 14* 17* 19*   BUN 52*  --  52* 58* 62* 54*   CREATININE 3.5*  --  3.4* 4.3* 4.9* 4.3*   CALCIUM 8.7  --  8.9 9.3 8.3* 8.1*   PROT 6.5  --  6.3 7.1  --   --    BILITOT 0.4  --  0.4 0.6  --   --    ALKPHOS 116  --  96 94  --   --    ALT 14  --  13 15  --   --    AST 18  --  16 22  --   --    MG  --   --  2.3 2.4 2.5  --    PHOS  --    < > 2.3* 4.8* 3.8 3.1    < > = values in this interval not displayed.        WBC:   Recent Labs   Lab 04/16/20  0443 04/18/20  2349 04/19/20  0242 04/21/20  0306   WBC 8.06 8.38 7.96 8.58     Bands:     CBC/Anemia Labs: Coags:    Recent Labs   Lab 04/16/20  0443 04/18/20  2349 04/19/20  0241 04/19/20  0242 04/21/20  0306   WBC 8.06 8.38   --  7.96 8.58   HGB 11.0* 8.9*  --  8.7* 9.9*   HCT 35.6* 28.9*  --  29.8* 32.2*    210  --  204 266   MCV 85 85  --  89 87   RDW 14.6* 15.0*  --  15.0* 15.4*   FERRITIN 1,604*  --   --   --   --    FOLATE  --   --  7.7  --   --    [id number]  --   --  559  --   --     Recent Labs   Lab 04/18/20  2349 04/19/20  0241   INR 1.1 1.0   APTT  --  31.2        POCT Glucose: HbA1c:    Recent Labs   Lab 04/21/20  0730 04/21/20  1226 04/21/20  1629 04/21/20  2109 04/22/20  0358 04/22/20  1530   POCTGLUCOSE 114* 91 81 174* 157* 235*    Hemoglobin A1C   Date Value Ref Range Status   03/09/2020 9.1 (H) 4.0 - 5.6 % Final     Comment:   02/12/2020 10.5 (H) 4.0 - 5.6 % Final     Comment:   12/10/2019 9.2 (H) 4.0 - 5.6 % Final     Comment:          Assessment and Plan     Hospital Course:    Mr. Marie Reis Jr. was admitted to Hospital Medicine for management of  PEG tube malfunction     Active Hospital Problems    Diagnosis  POA    *PEG tube malfunction [K94.23]  Yes    Altered behavior [R46.89]  Yes    Urinary retention [R33.9]  Yes    Delirium [R41.0]  Yes    Severe protein-calorie malnutrition [E43]  Yes    Encephalopathy [G93.40]  Yes    Hyperammonemia [E72.20]  Yes    Physical debility [R53.81]  Yes    Altered mental status [R41.82]  Yes    Advanced care planning/counseling discussion [Z71.89]  Not Applicable    Palliative care encounter [Z51.5]  Not Applicable    Counseling regarding advance care planning and goals of care [Z71.89]  Not Applicable    Chronic kidney disease, stage IV (severe) [N18.4]  Yes    Failure to thrive in adult [R62.7]  Yes    Type 2 diabetes mellitus [E11.9]  Yes    Physical deconditioning [R53.81]  Yes    Coronary artery disease involving native coronary artery of native heart without angina pectoris [I25.10]  Yes    COVID-19 virus infection [U07.1]  Yes    Anemia, chronic disease [D63.8]  Yes    Acute renal failure superimposed on stage 4 chronic kidney disease [N17.9,  "N18.4]  No    Acute encephalopathy [G93.40]  Yes    Benign hypertension with chronic kidney disease, stage III [I12.9, N18.3]  Yes    Chronic combined systolic and diastolic heart failure [I50.42]  Yes    History of right PCA stroke [Z86.73]  Not Applicable    Acute CVA (cerebrovascular accident) [I63.9]  Yes     3.5 cm acute cortical infarct in the medial right occipital lobe - as per MRI on 3/17/20        Resolved Hospital Problems   No resolved problems to display.       #PEG tube malfunction  -S/p replacement prior to presentation with good positioning on CT  - GI following   - s/p for PEG replacement on 4/21 with GI , TFs restarted      #sub- Acute encephalopathy  # Delirium  # Acute CVA (cerebrovascular accident)  -Subacute encephalopathy, that started after patient's stroke, per wife's report. MRI brain on 3/17 showed '3.5 cm acute cortical infarct in the medial right occipital lobe.", more inclined to think that CVA may be largely contributing to the AMS . COVID infection may be also contributing to component on psychosis. Elevated ammonia (seen on prior labs) is unlikely to be the culprit, and suspect it was elevated perhaps in setting of malnutrition, patient with no cirrhosis and normal LFTs. BUN is 50 so unlikely due to uremia. Psychiatric component remains high on DDX for this AMS- concern for psychosis vs other psychiatric conditions- patient is a former vet with hx of PTSD, with recent psychologic trauma from deaths of two of his family members .   - per prior documentation , patient refusing to eat, leading to placement of PEG tube for supplemental nutrition. Recently patient pulled the PEG tube out.   - psychiatry consulted . Unable to see patient due to COVID status (?), patient refused to speak on the phone. It is not unanticipated that patient is unwilling or unable to speak on the phone due to his his current condition   - cont PRN IM zyprexa for non-redirectable agitation  - cont DAPT, BP " meds and insulin for recent CVA. Not on statin- suspect due to fairly unremarkable lipid panel and significant weight loss   - palliative care involved on the case- patient has a living will , indicating that he wished all aggressive measures and full code  - may benefit from neurology outpatient eval   - Psychiatry planning on interviewing the patient 4/22     #CKD IV  # Acute renal failure superimposed on stage 4 chronic kidney disease  - Cr baseline around 3.5.   - Cr increasing to 4.9 on 4/22, likely due to dehydration and decreased PO intake  - given IVF - LR infusion and bolus   - UA ordered and has not been colleted yet  - patient has a sims in , for urinary retention   - receiving enteral water per PEG tube     #CAD   #chronic combined systolic and diastolic heart failure  - Recent NSTEMI, on DAPT, statin, B Eliacoker. Continue  HF not decompensated.      #Anemia  -hbg stable 9  - monitor      #HTN  Continue home meds    # Viral Pneumonia due to COVID-19  - COVID testing + on 3/21 and 4/12  - on RA  Monitor   - COVID precautions   - re testing ordered on 4/22    # Physical debility  # Failure to thrive in adult  -     # T2DM  - detemir as above     # Severe protein-calorie malnutrition  - per prior documentation , patient refusing to eat, leading to placement of PEG tube for supplemental nutrition. Recently patient pulled the PEG tube out.   Nutrition assessment on 4/16 noted :   1.) ADAT to regular (to encourage intake) with Boost Glucose Control TID.   2.) Begin sliding scale bolus feeds using Suplena:               If pt consuming >75% of meals-no bolus;               if PO intake 50-75% of meals: bolus 1/2 can;               if PO intakes 25-50% of meals: bolus 1 can;               if PO intakes <25% of meals: bolus 1.5 cans.  (1 can Suplena provides 425 kcals, 10.6gm protein and 175mL of free water)  3.) If continuous feeds preferred, suggest Suplena @ 20mL/hr advancing 10mL q4h to goal rate 50mL/hr to  provide 2160 kcals, 54gm protein and 886mL of free water.               MD to manage fluid.               If GRV >500mL, hold TF q4h then restart regimen.               TF to meet 99% EEN and 87% EPN.   4.) Suggest vitamin C, zinc, MVI due to COVID19.     Disposition:      Would plan to d/c patient back to [facility] or another SNF when facility acceptable is obtained. Tentatively 4/23- 4/24    Signing Physician:     Charlene Porter MD  Department of Hospital Medicine   [facility]  Pager [phone]  Spectra [phone]  4/22/2020

## 2020-04-22 NOTE — PT/OT/SLP PROGRESS
"Speech Language Pathology Treatment    Patient Name:  Marie Reis Jr.   MRN:  [id number]  Admitting Diagnosis: PEG tube malfunction    Recommendations:                 General Recommendations:  Cognitive-linguistic therapy  Diet recommendations:  Regular, Liquid Diet Level: Thin   Aspiration Precautions: Standard aspiration precautions   General Precautions: Standard, airborne, contact, droplet, fall, diabetic  Communication strategies:  provide increased time to answer    Subjective     Patient awake and alert during session  "I remember when you were a little kid." Patient confused but pleasant during session  Communicated with nurse prior to session     Pain/Comfort:  · Pain Rating 1: 0/10  · Pain Rating Post-Intervention 1: 0/10    Objective:     Has the patient been evaluated by SLP for swallowing?   No  Keep patient NPO?     Current Respiratory Status: room air      Patient seen for ongoing cognitive therapy. He was sitting up in bed during session. Patient with significantly improved participation and interaction during the session. Patient with confabulatory statements during session, but he was redirectable to tasks with min cueing. [place] in [place] even with max assist from SLP. He answered simple yes/no questions appropriately and followed simple commands independently. He answered simple problem solving questions with 60% accuracy and max assist with intermittent need for repetition and redirection during session. Patient with improved initiation during this session and he engaged with the SLP throughout session. Additionally, he tolerated thin liquids x6 (via straw) and solids x4 (donna crackers) with no overt signs of aspiration. SLP educated patient regarding POC, but he would benefit from ongoing instruction. Patient left in bed with call light within reach.    Assessment:     Marie Reis Jr. is a 64 y.o. male with an SLP diagnosis of Cognitive-Linguistic " Impairment.      Goals:   Multidisciplinary Problems     SLP Goals        Problem: SLP Goal    Goal Priority Disciplines Outcome   SLP Goal     SLP Ongoing, Progressing   Description:  Speech-Language Pathology Goals  Goals to be met by 4/27/20  1. Patient will orient x4 with max assist.   2. Patient will recall personal information with 75% accuracy and max assist.   3. Patient will answer complex yes/no questions with 50% accuracy and mod assist.   4. Patient will answer simple problem solving questions with 50% accuracy and mod assist.  5. Patient will participate in ongoing assessment of cognition/language.                    Plan:     · Patient to be seen:  3 x/week   · Plan of Care expires:  05/20/20  · Plan of Care reviewed with:  patient   · SLP Follow-Up:  Yes       Discharge recommendations:  nursing facility, skilled   Barriers to Discharge:  Level of Skilled Assistance Needed     Time Tracking:     SLP Treatment Date:   04/22/20  Speech Start Time:  1025  Speech Stop Time:  1043     Speech Total Time (min):  18 min    Billable Minutes: Speech Therapy Individual 10 and Self Care/Home Management Training 8    Karson Randall CCC-SLP  Speech-Language Pathology  Pager: [phone]   04/22/2020

## 2020-04-22 NOTE — PLAN OF CARE
Problem: SLP Goal  Goal: SLP Goal  Description  Speech-Language Pathology Goals  Goals to be met by 4/27/20  1. Patient will orient x4 with max assist.   2. Patient will recall personal information with 75% accuracy and max assist.   3. Patient will answer complex yes/no questions with 50% accuracy and mod assist.   4. Patient will answer simple problem solving questions with 50% accuracy and mod assist.  5. Patient will participate in ongoing assessment of cognition/language.   Outcome: Ongoing, Progressing     Patient seen for ongoing cognitive therapy.     Karson Randall CCC-SLP  Speech-Language Pathology  Pager: [phone]      Yes

## 2020-04-22 NOTE — PLAN OF CARE
04/22/20 0855   Post-Acute Status   Post-Acute Authorization Placement   Post-Acute Placement Status Referrals Sent

## 2020-04-22 NOTE — PROGRESS NOTES
[facility]  Psychiatry  Progress Note    Patient Name: Marie Reis Jr.  MRN: [id number]   Code Status: Full Code  Admission Date: 4/18/2020  Hospital Length of Stay: 3 days  Expected Discharge Date: 4/22/2020  Attending Physician: Charlene Porter MD  Primary Care Provider: Nancy Colón MD    Current Legal Status: N/A    Patient information was obtained from patient and the medical record.     Subjective:     Principal Problem: PEG tube malfunction    Chief Complaint: AMS    HPI: Marie Reis Jr. is a 64-year-old male with possible psychiatric history of depression (was on fluoxetine per chart review, though unable to confirm) and a complicated PMHx significant for multiple myeloma, HFrEF, SUZANNA on CPAP, DMII, CKD IV, CVA (2/2018), NSTEMI (3/22), COVID-19 (4/13), and failure to thrive. Patient was brought to the hospital from his SNF after pulling out his PEG tube (which had been placed HERE by GI on 4/15).     On admission on 4/19, patient was confused, believed he was on [address] and that the year was 1990. Psychiatry was consulted to assist with agitation management.    Per Primary MD:  64 M with hx CVA, recent NSTEMI, Multiple Myeloma, recent COVID diagnosis, failure to thrive and decreased PO intake requiring PEG tube was transferred from SNF after pulling out his PEG tube. Per notes patient has been uncooperative and confused for a significant time and does not appear to have any acute changes. PEG tube was placed on 4/15 by GI. Today patient pulled out his PEG tube, was replaced prior to ED presentation with Parrish catheter. He was sent to the ED for CT scan and GI evaluation. Patient is unable to provide any history, believes he is on [address] and it is 1989.      Per notes patient has not been participating in therapy and withdrawn. This was attributed to possibly old stroke symptoms vs depression for which he is on fluoxetine. In the ED he was found to have a markedly elevated ammonia  level. No previous history of liver disease. Hepatitis serologies negative in February.    Per C-L MD:  Attempted to interview patient by phone this morning, however patient currently refusing to speak with us. Per nurse, he has also been refusing to talk to his wife.    Spoke with primary team, who has concerns for progressive encephalopathy perhaps superimposed on depression, patient has been refusing care. Per primary, he was able to move spontaneously on exam, though unable to fully participate in Neuro exam. Also unable to answer orientation questions.    Psychiatric Review of Systems:  Unable to assess 2/2 patient refusing to speak with us this morning.    Psychiatric History:  Diagnose(s): Yes - MDD (per chart review, had been on Remeron for several years, [id number]?) after death of son and grandson in an MVA 5 years ago  Previous Medication Trials: Yes - Remeron, Prozac (unclear whether he took Prozac, seems to have been started at SNF, where patient was refusing many of his medications)  Previous Psychiatric Hospitalizations: unable to assess  Previous Suicide Attempts: unable to assess  History of Violence: unable to assess  Outpatient Psychiatrist: unable to assess    Social History:  Marital Status:   Children: 2, son , daughter works for [employer] as an   Employment Status: unable to assess  Education: unable to assess  Special Ed: unable to assess   History: yes  Housing Status: currently in SNF  Developmental History: unable to assess  History of Abuse: unable to assess  Access to Gun: unable to assess    Substance Abuse History:  Unable to assess    Legal History:  Unable to assess    Family Psychiatric History:   Unable to assess    Medical Review Of Systems:  History unobtainable from patient due to mental status/lack of cooperation      Hospital Course: 2020  Unable to speak with patient over the phone today, awaiting call back from RN. Will work on  "setting up telehealth visit tomorrow  in the morning.    MS3 Sudeep Ho was able to speak with patient's wife, Naheed Reis ([phone]):  Mrs. Reis spoke with pt over the phone yesterday afternoon, and noted that the pt mistakenly believed his son had just visited him in the hospital. In general, Mrs Reis describes pt as "a talker" and noted that his decreased verbal communication as "unusual." Mrs Reis has not seen her  in person since being admitted in March for CVA, and has described him as "all over the place" since that event, explaining "it's like he's confused." Pt recognized wife during videochat during his stay in SNF. Mrs Reis also disclosed an event in February, when pt took a bath for 6-7 hrs, as wife had fallen asleep waiting, and when asked to open the door, pt refused, at one point telling the son and herself that "if you didn't get away from the door, I'm gonna blow your head off". Mrs Reis does not know of access to gun in the household. When asked about hx of anger/violence, Mrs Reis says that he has calmed down over the past 5 yrs (since son and grandson ), but before he had anger issues: "anything would set him off," and in reference to playing games with children "he had to win, if he didn't win, he was angry."    2020  Patient was amenable to being interviewed via Vidyo this morning with help of RN. Noted to be disoriented, believes he is still at the SNF. When asked how he came to the hospital, replied "I'm trying to find out myself". Demonstrated lack of insight into reason for being here, at first denied any issues with appetite, refusing to eat, or refusing care, then added "I never did feel like eating much". Denied depression, reported mood has been "pretty good". Denied any current or past thoughts of self-harm. Did endorse some anxiety surrounding his current medical condition, but added that it was "not too bad". When asked about symptoms of psychosis, patient " "was ambiguous, reported h/o auditory and/or visual disturbances, but told me "it was not serious", and that he had not experienced any "since all of this started". Patient oriented to self, but not fully to place or time. He did not pass SAVEAHAART, though was able to follow the commands. Remembered his children's and grandchildren's ages.    Interval History: See Hospital Course    Family History     Problem Relation (Age of Onset)    Cancer Mother    Diabetes Mother, Father, Sister, Brother    Heart attack Father    Kidney disease Mother    No Known Problems Maternal Aunt, Maternal Uncle, Paternal Aunt, Paternal Uncle, Maternal Grandmother, Maternal Grandfather, Paternal Grandmother, Paternal Grandfather        Tobacco Use    Smoking status: Never Smoker    Smokeless tobacco: Never Used   Substance and Sexual Activity    Alcohol use: No     Frequency: Never     Drinks per session: Patient refused     Binge frequency: Never    Drug use: No    Sexual activity: Never     Psychotherapeutics (From admission, onward)    Start     Stop Route Frequency Ordered    04/19/20 1313  OLANZapine injection 5 mg      -- IM 2 times daily PRN 04/19/20 1213           Review of Systems  Objective:     Vital Signs (Most Recent):  Temp: 98.5 °F (36.9 °C) (04/22/20 1641)  Pulse: 68 (04/22/20 0130)  Resp: 14 (04/22/20 0130)  BP: 130/73 (04/22/20 0130)  SpO2: 99 % (04/22/20 0412) Vital Signs (24h Range):  Temp:  [98.4 °F (36.9 °C)-98.6 °F (37 °C)] 98.5 °F (36.9 °C)  Pulse:  [68-75] 68  Resp:  [14-16] 14  SpO2:  [97 %-99 %] 99 %  BP: (127-130)/(71-73) 130/73     Height: 6' (182.9 cm)  Weight: 62.2 kg (137 lb 2 oz)  Body mass index is 18.6 kg/m².      Intake/Output Summary (Last 24 hours) at 4/22/2020 1644  Last data filed at 4/22/2020 1519  Gross per 24 hour   Intake 1580 ml   Output 525 ml   Net 1055 ml       Physical Exam   Psychiatric:   Mental Status Exam:  Appearance: unremarkable, patient wearing a red hat  Level of " "Consciousness: awake and alert  Behavior/Cooperation: friendly and cooperative  Psychomotor: unremarkable   Speech: normal tone, normal rate, normal pitch, normal volume, increased latency of response  Language: english, fluid  Orientation: person, year, disoriented to place and time (only oriented to year)  Attention Span/Concentration: impaired to some degree, required repetition of questions several times throughout interview  Memory: Impaired to some degree  Mood: "pretty good"  Affect: appropriate, mood-congruent  Thought Process: some disorganization  Associations: poverty of thought  Thought Content: normal, no suicidality, no homicidality, delusions, or paranoia  Fund of Knowledge: Impaired to some degree  Abstraction: Did not assess  Insight: limited  Judgment: limited        Significant Labs:   Last 24 Hours:   Recent Lab Results       04/22/20  1530   04/22/20  0447   04/22/20  0358   04/21/20  2109        Albumin   2.6         Anion Gap   12         BUN, Bld   62         Calcium   8.3         Chloride   109         CO2   17         Creatinine   4.9         eGFR if    13.5         eGFR if non    11.7  Comment:  Calculation used to obtain the estimated glomerular filtration  rate (eGFR) is the CKD-EPI equation.            Glucose   149         Magnesium   2.5         Phosphorus   3.8         POCT Glucose 235   157 174     Potassium   2.6  Comment:  *Critical value -  Results called to and read back by:NATHANIEL KUMAR RN         Sodium   138               Significant Imaging: No new    Assessment/Plan:     Delirium  Marie Reis Jr. is a 64-year-old male with possible psychiatric history of depression (was on fluoxetine per chart review, though unable to confirm) and a complicated PMHx significant for multiple myeloma, HFrEF, SUZANNA on CPAP, DMII, CKD IV, CVA (2/2018), NSTEMI (3/22), COVID-19 (4/13), and failure to thrive. Patient was brought to the hospital from his SNF after pulling " out his PEG tube (which had been placed HERE by GI on 4/15).     On admission on 4/19, patient was confused, believed he was on [address] and that the year was 1990. Psychiatry was consulted to assist with agitation management.    Patient with complex medical history, hyperammonemia on admission, initially refusing to talk to Psychiatry. Agreeable to telemedicine visit this morning, cooperative, even friendly. Patient currently denying symptoms of depression, only endorsing some anxiety surrounding his current medical condition. Assessment limited 2/2 patient still delirious. Patient might be at his new baseline given underlying neurocognitive disorder. Might benefit from outpatient follow-up in our clinic for more thorough assessment once delirium clears.    IMPRESSION  Mixed episode of delirium, multifactorial  R/o underlying neurocognitive disorder/pseudodementia  H/o depression    RECOMMENDATIONS  - Would not start an antidepressant at this time given unclear picture caused by AMS. Remeron can further exacerbate delirium. Would consider starting Lexapro as needed in the future. Patient can schedule a follow-up appointment in our outpatient telemedicine clinic by calling [phone]  - Can use Zyprexa 2.5mg PO/IM q6h PRN for non-redirectable agitation while hospitalized  - Consider melatonin 6mg PO ~3 hours before bedtime for sleep cycle stabilization  - Consider EEG    - Legal:  Patient's wife, Naheed Reis ([phone]), is patient's medical POA    - Other:  DELIRIUM BEHAVIOR MANAGEMENT   PLEASE utilize CHEMICAL restraints with PRN meds first for agitation. Minimize use of PHYSICAL restraints   Keep window shades open and room lit during day and room dim at night in order to promote normal sleep-wake cycles   Encourage frequent contact with family. [place] patient often to situation, location, date.   Continue to limit or discontinue use of narcotics, benzodiazepines, and anti-cholinergic medications, as  they may worsen or precipitate delirium.   Continue medical workup for underlying cause of delirium.    Psychiatry will sign off at this time. Please reconsult if any new issues arise.  Patient discussed with staff, Dr. Madrigal.     Sangeetha Kaba MD, PhD  [facility], PGY-2

## 2020-04-22 NOTE — MEDICAL/APP STUDENT
"Consultation-Liaison Psychiatry - Progress Note      ENCOUNTER DATE:  2020 8:02 AM   SITE:  [facility]  ADMIT DATE:   Pt Name: Marie Reis Jr.   : 1955   MRN: [id number]      HISTORY OF PRESENTING ILLNESS:  Marie Reis Jr. is a 64 y.o. male with history of mutiple myeloma, chronic systolic & diastolic heart failure (reduced EF), hypertension, obstructive sleep apnea, diabetes mellitus II, chronic kidney disease (stage IV), cerebrovascular events (Mar 2015, 2018, R PCA infarct Mar 2020), non-ST elevated myocardial infarction (3/22/2020), pneumonia (COVID-19) who was consulted for agitation management.    Today, pt was interviewed by Dr. Kaba via video-conference (~11:00am), and reports feeling "pretty good." Pt was cooperative with interviewing. Pt reported having talked to his wife just before the interview.    Risk Parameters:  Patient reports no suicidal ideation  Patient reports no homicidal ideation  Patient reports no self-injurious behavior  Patient reports no violent behavior    Current psych meds  Scheduled: None  PRN: Olanzaine (Zyprexa) - 5mg - IM - bid    PAST PSYCHIATRIC, MEDICAL, AND SOCIAL HISTORY REVIEWED  Psych: When asked about the last time he saw a psychiatrist, pt reported: "1 week ago."   Social:  Pt reported having 3 or 4 siblings in the area he stays in touch with during his hospitalization. Pt reports the ages of his son ("40"), daughter ("32"), and grandchildren ("11 and 12"). He reported seeing them earlier in the week, and reported that they "got to go to school."      MEDICAL REVIEW OF SYSTEMS  Unable to adequately assess over call due to confused state.    ALL MEDICATIONS:    Current Facility-Administered Medications:     acetaminophen suppository 650 mg, 650 mg, Rectal, Q6H PRN, Des Williamson MD    amLODIPine tablet 10 mg, 10 mg, Per G Tube, Daily, Des Williamson MD, 10 mg at 20 0953    ascorbic acid (vitamin C) tablet 500 mg, 500 mg, Oral, BID, Des CHAPMAN" MD Teri, 500 mg at 04/21/20 2130    aspirin chewable tablet 81 mg, 81 mg, Per G Tube, Daily, Des Williamson MD, 81 mg at 04/21/20 0954    atorvastatin tablet 40 mg, 40 mg, Per G Tube, QHS, Des Williamson MD, 40 mg at 04/21/20 2130    bisacodyL suppository 10 mg, 10 mg, Rectal, Daily PRN, Des Williamson MD    carvediloL tablet 25 mg, 25 mg, Oral, BID WM, Des Williamson MD, 25 mg at 04/21/20 1726    clopidogreL tablet 75 mg, 75 mg, Oral, Daily, Des Williamson MD, 75 mg at 04/21/20 0954    dextrose 10% (D10W) Bolus, 12.5 g, Intravenous, PRN, Charlene Porter MD    dextrose 10% (D10W) Bolus, 25 g, Intravenous, PRN, Charlene Porter MD    dextrose 10% (D10W) Bolus, 12.5 g, Intravenous, PRN, Charlene Porter MD    glucagon (human recombinant) injection 1 mg, 1 mg, Intramuscular, PRN, Charlene Porter MD    glucose chewable tablet 16 g, 16 g, Oral, PRN, Des Williamson MD    glucose chewable tablet 24 g, 24 g, Oral, PRN, Des Williamson MD    heparin (porcine) injection 5,000 Units, 5,000 Units, Subcutaneous, Q8H, Charlene Porter MD    insulin aspart U-100 pen 0-5 Units, 0-5 Units, Subcutaneous, Q6H PRN, Charlene Porter MD, 1 Units at 04/20/20 0245    insulin detemir U-100 pen 6 Units, 6 Units, Subcutaneous, QHS, Charlene Porter MD, 6 Units at 04/21/20 2100    labetalol 20 mg/4 mL (5 mg/mL) IV syring, 10 mg, Intravenous, TID PC PRN, Charlene Porter MD    multivitamin tablet, 1 tablet, Oral, Daily, Des Williamson MD, 1 tablet at 04/21/20 0953    OLANZapine injection 5 mg, 5 mg, Intramuscular, BID PRN, Charlene Porter MD    ondansetron injection 4 mg, 4 mg, Intravenous, Q8H PRN, Des Williamson MD, 4 mg at 04/21/20 1131    pantoprazole EC tablet 40 mg, 40 mg, Oral, Daily, Des Williamson MD, 40 mg at 04/21/20 0953    potassium chloride 10% oral solution 40 mEq, 40 mEq, Per G Tube, Q4H, Charlene Porter MD    sodium chloride 0.9% flush 10 mL, 10 mL,  "Intravenous, PRN, Des Williamson MD    thiamine tablet 100 mg, 100 mg, Oral, Daily, Charlene Porter MD, 100 mg at 04/21/20 0954    vitamin D 1000 units tablet 1,000 Units, 1,000 Units, Per G Tube, Daily, Des Williamson MD, 1,000 Units at 04/21/20 0954      ALLERGIES:  Review of patient's allergies indicates:   Allergen Reactions    Hydralazine analogues      Increase swelling and throat itching and tightness with use.  Symptoms correlate only with this medication onset in hospital.       RELEVANT LABS/STUDIES:    Lab Results   Component Value Date    WBC 8.58 04/21/2020    HGB 9.9 (L) 04/21/2020    HCT 32.2 (L) 04/21/2020    MCV 87 04/21/2020     04/21/2020     BMP  Lab Results   Component Value Date     04/22/2020    K 2.6 (LL) 04/22/2020     04/22/2020    CO2 17 (L) 04/22/2020    BUN 62 (H) 04/22/2020    CREATININE 4.9 (H) 04/22/2020    CALCIUM 8.3 (L) 04/22/2020    ANIONGAP 12 04/22/2020    ESTGFRAFRICA 13.5 (A) 04/22/2020    EGFRNONAA 11.7 (A) 04/22/2020     Lab Results   Component Value Date    ALT 15 04/21/2020    AST 22 04/21/2020    ALKPHOS 94 04/21/2020    BILITOT 0.6 04/21/2020     Lab Results   Component Value Date    TSH 1.867 04/18/2020     Lab Results   Component Value Date    HGBA1C 9.1 (H) 03/09/2020       VITALS  defer to nursing note    PHYSICAL EXAM  General: calm, in bed  Neurologic:   Gait: Unable to assess   Psychomotor signs:  No involuntary movements or tremor of facial muscles    PSYCHIATRIC EXAM:  CAM-ICU: Positive  Acute onset/ fluctuating course: Yes  Inattention: Yes (failed SAVEAHAART)  Altered LOC: No (RASS=0)  Disorganized thinking: Yes     Mental Status Exam:  Appearance: age appropriate, lying in bed  Behavior/Cooperation: friendly and cooperative  Speech: normal tone, normal pitch, normal volume, delayed, spontaneous  Language: uses words appropriately; NO aphasia or dysarthria  Mood: "pretty good"  Affect: constricted  Thought Process: " "Disorganized  Thought Content: No visual hallucinations, No suicidal ideation, reports auditory hallucinations a few wks ago as "not serious"  Level of Consciousness: Alert, oriented to year, but not month ("May"), day ("Saturday"), or place (believed in SNF)  Memory: Remote: Poor, given inability to recall names of hospital facilities and name of [facility] provider  Attention/concentration: inattentive at times  Fund of Knowledge: appears adequate  Insight:  Impaired, based on delirious state (believed appetite and eating were good, although PEG tube placement)  Judgment: Impaired, based on recent non-compliance with PEG tube placement    IMPRESSION:    Marie Reis Jr. is a 64 y.o. male with history of multiple co-morbitidies (see HPI) including a recent CVA and NSTEMI 4 wks ago complicated w/ COVID-19 pneumonia, who was consulted for agitation management.     Marie is most likely experiencing delirium due to multiple etiologies (potentially: R PCA CVA, COVID-19 pneumonia, hyperammonemia). It cannot be ruled out that the pt may be experiencing a major depressive episode concurrent with delirium; however, this cannot be determined until the delirium has subsided (e.g. differentiating confusion due to delirium from overt hallucination).    Status/Progress:  Based on the examination today, the patient's confused state remains, though his compliance with interviewing has improved.    DIAGNOSES:  Delirium due to multiple etiologies    ICD-10-CM ICD-9-CM   1. COVID-19 virus infection U07.1    2. Altered behavior R46.89 312.9   3. Altered mental status R41.82 780.97   4. PEG tube malfunction K94.23 536.42   5. Acute renal failure superimposed on chronic kidney disease, unspecified CKD stage, unspecified acute renal failure type N17.9 584.9    N18.9 585.9   6. Physical debility R53.81 799.3   7. Altered mental status, unspecified altered mental status type R41.82 780.97   8. Advanced care planning/counseling discussion Z71.89 " V65.49   9. Palliative care encounter Z51.5 V66.7   10. Counseling regarding advance care planning and goals of care Z71.89 V65.49   11. Acute CVA (cerebrovascular accident) I63.9 434.91       PLAN:    1) Continue to monitor patient's behavior with televisit interviews.    2) Continue PRN psych medications for non-redirectable agitation.    3) Other: Still [facility] the [facility].      Nichelle Greco  [facility]    TASNEEM PEGUERO MD   [facility]  4/22/2020 8:02 AM

## 2020-04-22 NOTE — PLAN OF CARE
Pt alert and oriented x 1-2 today.  He was non-cooperative in the morning prior to going to procedure down in Endoscopy and didn't want to allow us to change him.  I had to explain that he had passed a bowel movement which was was unaware.  Pt unwilling to assist in bed mobility and he had two watery, liquid bowel movements, needed more than 2 person assist to change pt.  Towards end of shift around dinner, pt was more verbal and talkative. He joked that he hopes that we have a washer machine b/c he got spaghetti all over the sheets.  I let him know that as long as he enjoyed his meal, we can always get him new sheets.      He ate his entire dinner for the first time in a few days by mouth and had an appetite.  He was also given 350 cc free water flush and there was no resistance met.  There was no kangaroo pumps on floor today and also no bags to empty nutrition into (from formula room), that also had to be ordered from central supply.  Report given to night shift, they will start the feeding at beginning of shift.  Psychiatrist expected to attempt to video chat with pt tomorrow, unfortunately found out from charge nurse that the ipads with [employer] are for limited use.      Otherwise, pt vitals stable.

## 2020-04-22 NOTE — SUBJECTIVE & OBJECTIVE
Interval History: See Hospital Course    Family History     Problem Relation (Age of Onset)    Cancer Mother    Diabetes Mother, Father, Sister, Brother    Heart attack Father    Kidney disease Mother    No Known Problems Maternal Aunt, Maternal Uncle, Paternal Aunt, Paternal Uncle, Maternal Grandmother, Maternal Grandfather, Paternal Grandmother, Paternal Grandfather        Tobacco Use    Smoking status: Never Smoker    Smokeless tobacco: Never Used   Substance and Sexual Activity    Alcohol use: No     Frequency: Never     Drinks per session: Patient refused     Binge frequency: Never    Drug use: No    Sexual activity: Never     Psychotherapeutics (From admission, onward)    Start     Stop Route Frequency Ordered    04/19/20 1313  OLANZapine injection 5 mg      -- IM 2 times daily PRN 04/19/20 1213           Review of Systems  Objective:     Vital Signs (Most Recent):  Temp: 98.5 °F (36.9 °C) (04/22/20 1641)  Pulse: 68 (04/22/20 0130)  Resp: 14 (04/22/20 0130)  BP: 130/73 (04/22/20 0130)  SpO2: 99 % (04/22/20 0412) Vital Signs (24h Range):  Temp:  [98.4 °F (36.9 °C)-98.6 °F (37 °C)] 98.5 °F (36.9 °C)  Pulse:  [68-75] 68  Resp:  [14-16] 14  SpO2:  [97 %-99 %] 99 %  BP: (127-130)/(71-73) 130/73     Height: 6' (182.9 cm)  Weight: 62.2 kg (137 lb 2 oz)  Body mass index is 18.6 kg/m².      Intake/Output Summary (Last 24 hours) at 4/22/2020 1644  Last data filed at 4/22/2020 1519  Gross per 24 hour   Intake 1580 ml   Output 525 ml   Net 1055 ml       Physical Exam   Psychiatric:   Mental Status Exam:  Appearance: unremarkable, patient wearing a red hat  Level of Consciousness: awake and alert  Behavior/Cooperation: friendly and cooperative  Psychomotor: unremarkable   Speech: normal tone, normal rate, normal pitch, normal volume, increased latency of response  Language: english, fluid  Orientation: person, year, disoriented to place and time (only oriented to year)  Attention Span/Concentration: impaired to some  "degree, required repetition of questions several times throughout interview  Memory: Impaired to some degree  Mood: "pretty good"  Affect: appropriate, mood-congruent  Thought Process: some disorganization  Associations: poverty of thought  Thought Content: normal, no suicidality, no homicidality, delusions, or paranoia  Fund of Knowledge: Impaired to some degree  Abstraction: Did not assess  Insight: limited  Judgment: limited        Significant Labs:   Last 24 Hours:   Recent Lab Results       04/22/20  1530   04/22/20  0447   04/22/20  0358   04/21/20  2109        Albumin   2.6         Anion Gap   12         BUN, Bld   62         Calcium   8.3         Chloride   109         CO2   17         Creatinine   4.9         eGFR if    13.5         eGFR if non    11.7  Comment:  Calculation used to obtain the estimated glomerular filtration  rate (eGFR) is the CKD-EPI equation.            Glucose   149         Magnesium   2.5         Phosphorus   3.8         POCT Glucose 235   157 174     Potassium   2.6  Comment:  *Critical value -  Results called to and read back by:NATHANIEL KUMAR RN         Sodium   138               Significant Imaging: No new  "

## 2020-04-23 NOTE — PLAN OF CARE
Goals remain appropriate, continue with OT POC.    Problem: Occupational Therapy Goal  Goal: Occupational Therapy Goal  Description  Goals to be met by: 5/3     Patient will increase functional independence with ADLs by performing:    Bed mobility and supine to sit with minimal assistance. Goal met  UE Dressing while seated EOB with Minimal Assistance.  Grooming while standing with Minimal Assistance.  Toileting from bedside commode with Minimal Assistance for hygiene and clothing management.   Toilet transfer to bedside commode with Minimal Assistance.  Functional mobility at short household distance for ADL task with RW and mod(A).      4/23/2020 1610 by OBED Weston  Outcome: Ongoing, Progressing  4/23/2020 1604 by OBED Weston  Outcome: Ongoing, Progressing   OBED Weston  4/23/2020  Rehab Services

## 2020-04-23 NOTE — PLAN OF CARE
Problem: Physical Therapy Goal  Goal: Physical Therapy Goal  Description  Goals to be met by: 2020    Patient will increase functional independence with mobility by performin. Supine <> sit with Minimal Assistance.  2. Sit <> stand transfer with Minimal Assistance using Rolling Walker.  3. Bed <> chair transfer via Stand Pivot with Minimal Assistance using Rolling Walker.  4. Gait  x 50 feet with Minimal Assistance using Rolling Walker to prepare for community ambulation and endurance activities.  5. Able to tolerate exercise for 15-20 reps with supervision.    Outcome: Ongoing, Progressing     Patient progressing appropriately towards current goals and plan of care remains appropriate at this time. Patient demonstrates decreased activity tolerance secondary to weakness and fatigue.  Patient willing to participate in skilled PT with decreased functional mobility secondary to weakness, decreased midline orientation and decreased balance.  Patient tolerated sitting edge of bed ~ 10 min with between SBA and min A.  Patient required moderate assistance to obtain stance with wide base of support and posterior lean.  Patient required maximal assistance for steps edge of bed secondary to poor balance and difficulty advancing L LE.  Patient continues to benefit from skilled PT for strengthening and mobility training to increase independence and home safety.      Lokesh Jalloh PT, DPT  2020  Pager #: [phone]

## 2020-04-23 NOTE — PT/OT/SLP PROGRESS
Physical Therapy Treatment    Patient Name:  Marie Reis Jr.   MRN:  [id number]    Recommendations:     Discharge Recommendations:  nursing facility, skilled   Discharge Equipment Recommendations: bedside commode, wheelchair, walker, rolling, shower chair   Barriers to discharge: Decreased caregiver support    Assessment:     Marie Reis Jr. is a 64 y.o. male admitted with a medical diagnosis of PEG tube malfunction.  He presents with the following impairments/functional limitations:  weakness, impaired functional mobilty, decreased safety awareness, impaired endurance, impaired sensation, impaired balance, decreased lower extremity function, impaired self care skills.      Patient demonstrates decreased activity tolerance secondary to weakness and fatigue.  Patient willing to participate in skilled PT with decreased functional mobility secondary to weakness, decreased midline orientation and decreased balance.  Patient tolerated sitting edge of bed ~ 10 min with between SBA and min A.  Patient required moderate assistance to obtain stance with wide base of support and posterior lean.  Patient required maximal assistance for steps edge of bed secondary to poor balance and difficulty advancing L LE.  Patient continues to benefit from skilled PT for strengthening and mobility training to increase independence and home safety.      Rehab Prognosis: Good; patient continues to benefit from acute skilled PT services to address these deficits and reach maximum level of function.  Patient remains most appropriate to discharge to nursing facility, skilled  Recent Surgery: Procedure(s) (LRB):  EGD (ESOPHAGOGASTRODUODENOSCOPY) (N/A)  INSERTION, PEG TUBE 2 Days Post-Op    Plan:     During this hospitalization, patient to be seen 3 x/week to address the identified rehab impairments via gait training, therapeutic activities, therapeutic exercises, neuromuscular re-education and progress toward the following goals:    · Plan of Care  "Expires:  20    Subjective     Subjective: "ok"   Pain/Comfort:  · Pain Rating 1: 0/10  · Pain Rating Post-Intervention 1: 0/10      Objective:     Communicated with RN prior to session.  Patient found supine with PEG Tube, telemetry, peripheral IV, bed alarm upon PT entry to room.     General Precautions: Standard, airborne, contact, droplet, fall   Orthopedic Precautions:N/A   Braces: N/A     Functional Mobility:  · Bed Mobility:     · Supine to Sit: minimum assistance  · Sit to Supine: minimum assistance for LE management.  · Transfers:     · Sit to Stand:  moderate assistance with hand-held assist with posterior lean and decreased stability.  · Gait: Patient ambulated 4-5 steps edge of bed with handheld support and maximal assistance   · Balance:   · Sitting: GOOD-: Takes MODERATE challenges from all directions but inconsistently   · Standin: Needs MAXIMAL assist to maintain       AM-PAC 6 CLICK MOBILITY  Turning over in bed (including adjusting bedclothes, sheets and blankets)?: 3  Sitting down on and standing up from a chair with arms (e.g., wheelchair, bedside commode, etc.): 2  Moving from lying on back to sitting on the side of the bed?: 3  Moving to and from a bed to a chair (including a wheelchair)?: 2  Need to walk in hospital room?: 1  Climbing 3-5 steps with a railing?: 1  Basic Mobility Total Score: 12       Therapeutic Activities and Exercises:   patient tolerated sitting edge of bed completing ADLs ~ 10 min with need for between SBA and min A for seated balance.  Patient initially with lean to L   Gait training:  Patient required cues for sequencing and width of base of support to increase independence and safety.  Patient required cues ~ 50% of the time.    Patient Education:    Patient educated on Fall risk, gait training, home safety, Home exercise program and transfer training by explanation.  Patient was receptive to education and needs reinforcement.     Patient left supine with all " lines intact and call button in reach.    GOALS:   Multidisciplinary Problems     Physical Therapy Goals        Problem: Physical Therapy Goal    Goal Priority Disciplines Outcome Goal Variances Interventions   Physical Therapy Goal     PT, PT/OT Ongoing, Progressing     Description:  Goals to be met by: 2020    Patient will increase functional independence with mobility by performin. Supine <> sit with Minimal Assistance.  2. Sit <> stand transfer with Minimal Assistance using Rolling Walker.  3. Bed <> chair transfer via Stand Pivot with Minimal Assistance using Rolling Walker.  4. Gait  x 50 feet with Minimal Assistance using Rolling Walker to prepare for community ambulation and endurance activities.  5. Able to tolerate exercise for 15-20 reps with supervision.                        Time Tracking:     PT Received On: 20  PT Start Time: 1342     PT Stop Time: 1410  PT Total Time (min): 28 min     Billable Minutes: Gait Training 12 min and Therapeutic Activity 16 min    Treatment Type: Treatment  PT/PTA: PT     PTA Visit Number: 0     Lokesh Jalloh, PT  2020

## 2020-04-23 NOTE — PLAN OF CARE
"Pita received call from Padmini with [facility], wanted to know how "the Pt's stay is being coded," Ptia referred Padmini with [facility] nurse Pita Artis to follow as needed. Padmini informed Pita that "they have to decline Pt due to not have the right revenue code." Pita to follow.   "

## 2020-04-23 NOTE — PT/OT/SLP PROGRESS
Occupational Therapy   Treatment    Name: Marie Reis Jr.  MRN: [id number]  Admitting Diagnosis:  PEG tube malfunction  2 Days Post-Op    Recommendations:     Discharge Recommendations: nursing facility, skilled  Discharge Equipment Recommendations:  bedside commode, wheelchair, walker, rolling, shower chair  Barriers to discharge:  Decreased caregiver support    Assessment:     Marie Reis Jr. is a 64 y.o. male with a medical diagnosis of PEG tube malfunction.  He presents with decline in ADLs and functional mobility. Pt demonstrates small improvements today.  Cooperative and good activity tolerance today.  Performance deficits affecting function are weakness, impaired functional mobilty, impaired endurance, impaired self care skills, gait instability, impaired balance, decreased upper extremity function, decreased lower extremity function, decreased safety awareness, impaired cardiopulmonary response to activity.     Rehab Prognosis:  Good; patient would benefit from acute skilled OT services to address these deficits and reach maximum level of function.       Plan:     Patient to be seen 3 x/week to address the above listed problems via therapeutic exercises, self-care/home management, therapeutic activities, neuromuscular re-education, wheelchair management/training  · Plan of Care Expires: 05/19/20  · Plan of Care Reviewed with: patient    Subjective     Pain/Comfort:  · Pain Rating 1: 0/10  · Location - Side 1: Left  · Location - Orientation 1: upper  · Location 1: chest  · Pain Addressed 1: Nurse notified, Distraction, Reposition  · Pain Rating Post-Intervention 1: 0/10    Objective:     Communicated with: RN prior to session.  Patient found HOB elevated with PEG Tube, telemetry, peripheral IV, bed alarm upon OT entry to room.    General Precautions: Standard, airborne, contact, droplet, fall   Orthopedic Precautions:N/A   Braces: N/A     Occupational Performance:     Bed Mobility:    · Patient completed Supine to  Sit with minimum assistance  · Patient completed Sit to Supine with minimum assistance     Functional Mobility/Transfers:  · Patient completed Sit <> Stand Transfer with moderate assistance  with  hand-held assist   · Functional Mobility: Pt took ~4 steps with max A x 1 person HHA in front of him. Likely would do better with a RW.     Activities of Daily Living:  · Feeding:  independence    · Grooming: stand by assistance seated EOB, unable to complete in standing position secondary to poor standing balance  · Bathing: maximal assistance    · Upper Body Dressing: moderate assistance for UB dressing      [facility] 6 Click ADL: 11    Treatment & Education:  · Pt educated on role of OT in acute care setting.   · Assisted with ADLs and functional mobility with assist levels noted above    Patient left HOB elevated with all lines intactEducation:      GOALS:   Multidisciplinary Problems     Occupational Therapy Goals        Problem: Occupational Therapy Goal    Goal Priority Disciplines Outcome Interventions   Occupational Therapy Goal     OT, PT/OT Ongoing, Progressing    Description:  Goals to be met by: 5/3     Patient will increase functional independence with ADLs by performing:    Bed mobility and supine to sit with minimal assistance. Goal met  UE Dressing while seated EOB with Minimal Assistance.  Grooming while standing with Minimal Assistance.  Toileting from bedside commode with Minimal Assistance for hygiene and clothing management.   Toilet transfer to bedside commode with Minimal Assistance.  Functional mobility at short household distance for ADL task with RW and mod(A).                       Time Tracking:     OT Date of Treatment: 04/23/20  OT Start Time: 1342  OT Stop Time: 1410  OT Total Time (min): 28 min    Billable Minutes:Self Care/Home Management 28    OBED Ga  4/23/2020

## 2020-04-23 NOTE — PLAN OF CARE
Problem: SLP Goal  Goal: SLP Goal  Description  Speech-Language Pathology Goals  Goals to be met by 4/27/20  1. Patient will orient x4 with max assist.   2. Patient will recall personal information with 75% accuracy and max assist.   3. Patient will answer complex yes/no questions with 50% accuracy and mod assist.   4. Patient will answer simple problem solving questions with 50% accuracy and mod assist.  5. Patient will participate in ongoing assessment of cognition/language.   Outcome: Ongoing, Progressing   Patient with minimal progress towards goals. ST will continue to follow.   JONNY Lopez., CCC-SLP  Pager: [phone]  04/23/2020

## 2020-04-23 NOTE — PROCEDURES
EXTENDED  ELECTROENCEPHALOGRAM  REPORT    Marie Reis Jr.  [id number]  1955    DATE OF SERVICE: 4/23/2020    DATE OF ADMISSION: 4/18/2020 10:20 PM    ADMITTING/REQUESTING PROVIDER: Charlene Porter MD    METHODOLOGY   Electroencephalographic (EEG) recording is with electrodes placed according to the International 10-20 placement system.  Thirty two (32) channels of digital signal (sampling rate of 512/sec) including T1 and T2 was simultaneously recorded from the scalp and may include  EKG, EMG, and/or eye monitors.  Recording band pass was 0.1 to 512 hz.  Digital video recording of the patient is simultaneously recorded with the EEG.  The patient is instructed report clinical symptoms which may occur during the recording session.  EEG and video recording is stored and archived in digital format.  Activation procedures which include photic stimulation, hyperventilation and instructing patients to perform simple task are done in selected patients.   The EEG is displayed on a monitor screen and can be reviewed using different montages.  Computer assisted analysis is employed to detect spike and electrographic seizure activity.   The entire record is submitted for computer analysis.  The entire recording is visually reviewed and the times identified by computer analysis as being spikes or seizures are reviewed again.  Compresses spectral analysis (CSA) is also performed on the activity recorded from each individual channel.  This is displayed as a power display of frequencies from 0 to 30 Hz over time.   The CSA is reviewed looking for asymmetries in power between homologous areas of the scalp and then compared with the original EEG recording.     [employer] software was also utilized in the review of this study.  This software suite analyzes the EEG recording in multiple domains.  Coherence and rhythmicity is computed to identify EEG sections which may contain organized seizures.  Each channel undergoes analysis to  detect presence of spike and sharp waves which have special and morphological characteristic of epileptic activity.  The routine EEG recording is converted from spacial into frequency domain.  This is then displayed comparing homologous areas to identify areas of significant asymmetry.  Algorithm to identify non-cortically generated artifact is used to separate eye movement, EMG and other artifact from the EEG.      RECORDING TIMES  Start on 4/23/2020, 11:52  Stop on 4/23/2020, 13:16    A total of 1 hour and 24 minutes of EEG recording was obtained.    EEG FINDINGS  Background activity:   The background rhythm was characterized by theta (5-6 Hz) activity with possible 6 Hz posterior dominant rhythm at 30-70 microvolts.   Symmetry and continuity: the background was continuous; asymmetry with delta mixed with theta (4-6 Hz) activity is noted on th eright    Sleep:    No clear sleep architecture seen.    Activation procedures:   Photic stimulation and hyperventilation were not performed   With stimulation reactivity is noted on EEG.    Abnormal activity:   No epileptiform discharges, periodic discharges, lateralized rhythmic delta activity or electrographic seizures were seen.    EKG:   Irregular rhythm seen    IMPRESSION:   This is an abnormal extended (1 hour and 24 minutes) EEG due to:  1) asymmetry with right sided slowing seen, suggestive of underlying structural lesion and cerebral dysfunction  2) moderate generalized slowing seen, suggestive of moderate diffuse cerebral dysfunction.  No epileptiform activity or electrographic seizures seen.  Irregular heart rate noted on EKG.    CLINICAL CORRELATION IS RECOMMENDED    Amy Fan MD, KRYSTLE(), FACNS, TAVARES.  Neurology-Epilepsy.  [facility].

## 2020-04-23 NOTE — PT/OT/SLP PROGRESS
"Speech Language Pathology Treatment    Patient Name:  Marie Reis Jr.   MRN:  [id number]  Admitting Diagnosis: PEG tube malfunction    Recommendations:                 General Recommendations:  Cognitive-linguistic therapy  Diet recommendations:  Regular, Liquid Diet Level: Thin   Aspiration Precautions: Standard aspiration precautions   General Precautions: Standard, airborne, contact, droplet, fall, diabetic  Communication strategies:  provide increased time to answer    Subjective     Patient awake and alert during session, however, confusion evident.   Patient states, "I know how old I am. How old are you?"    Objective:     Has the patient been evaluated by SLP for swallowing?   No  Keep patient NPO?     Current Respiratory Status: room air      Patient seen for ongoing cognitive therapy. He was sitting up in bed during session attempting to dump large pieces of pineapple in mouth. SLP provided patient with a utensil and provided education on safe swallowing precautions. Confusion evident throughout session. Patient oriented to name, place, city, and year. Patient not oriented to month or situation. Patient states reason for being in the hospital because he had an 'incident.' Patient recalled functional information after a delay with <50% accuracy. Patient required max cues to answer simple safety awareness and problem solving questions. SLP assisted patient with brushing teeth. He brushed teeth for ~3 minutes before SLP cued patient to stop brushing and rinse mouth. He answered all y/n questions by nodding head yes despite if inappropriate. SLP provided education on importance of participation with therapy, reason for therapy, ways to independently orient, and SLP goals. Patient nodded head in understanding, however, understanding suspected to be limited and would benefit from reinforcement.     Assessment:     Marie Reis Jr. is a 64 y.o. male with an SLP diagnosis of Cognitive-Linguistic Impairment.      Goals: "   Multidisciplinary Problems     SLP Goals        Problem: SLP Goal    Goal Priority Disciplines Outcome   SLP Goal     SLP Ongoing, Progressing   Description:  Speech-Language Pathology Goals  Goals to be met by 4/27/20  1. Patient will orient x4 with max assist.   2. Patient will recall personal information with 75% accuracy and max assist.   3. Patient will answer complex yes/no questions with 50% accuracy and mod assist.   4. Patient will answer simple problem solving questions with 50% accuracy and mod assist.  5. Patient will participate in ongoing assessment of cognition/language.                    Plan:     · Patient to be seen:  3 x/week   · Plan of Care expires:  05/20/20  · Plan of Care reviewed with:  patient   · SLP Follow-Up:  Yes       Discharge recommendations:  nursing facility, skilled   Barriers to Discharge:  Level of Skilled Assistance Needed     Time Tracking:     SLP Treatment Date:   04/23/20  Speech Start Time:  0854  Speech Stop Time:  0910     Speech Total Time (min):  16 min    Billable Minutes: Speech Therapy Individual 8 and Self Care/Home Management Training 8    JONNY Lopez., CCC-SLP  04/23/2020

## 2020-04-23 NOTE — PLAN OF CARE
"   04/23/20 0937   Post-Acute Status   Post-Acute Authorization Placement   Post-Acute Placement Status Awaiting Internal Medical Clearance     [facility] denied Pt "due to cannot meet needs."   "

## 2020-04-23 NOTE — PLAN OF CARE
Tried to review plan of care with pt. Pt is disoriented and didn't engage in the conversation. Pt has been free from falls and injury. No incidents with peg tube. Marquis feeding well.

## 2020-04-24 NOTE — PHYSICIAN QUERY
PT Name: Marie Reis Jr.  MR #: [id number]     Physician Query Form - Documentation Clarification      CDS/: Jennifer Hanks               Contact information: [email or web address]    This form is a permanent document in the medical record.     Query Date: April 24, 2020    By submitting this query, we are merely seeking further clarification of documentation. Please utilize your independent clinical judgment when addressing the question(s) below.    The Medical record reflects the following:    Supporting Clinical Findings Location in Medical Record   Recent COVID diagnosis  Transferred from SNF after pulling out his PEG tube    Resp: no increased work of breathing on room air    Viral Pneumonia due to COVID-19  COVID-19 testing: Collection Date: 4/13/2020 Collection Time:  11:30 PM positive    Acute Hypoxemic Respiratory Failure  Order RT consult via Respiratory Communication for COVID Protocols  Order Incentive Spirometer Q4h  Order Flutter Valve Q4h  Continuous Pulse Oximetry  Goal SpO2 92-96%  Supplemental O2 via LFNC, VentiMask, or HFNC (see Respiratory Support Oxygen Therapies)  If wheezing, albuterol INH Q6h scheduled & PRN  Proning Protocol if patient is a candidate (see HM Proning Protocol)  GCS >13, able to self-prone  If deterioration, may warrant trial of NIPPV and transfer to neg pressure room or immediate ICU consult     Hospital Medicine H&P 04/19   Resp: 20  SpO2: 100%  Room Air     Initial ED VS 04/18 2217   Respiratory: Negative for cough, shortness of breath.      Resp:  [14-16]   SpO2:  [98 %-99 %]     Resp: no increased work of breathing on room air    Viral Pneumonia due to COVID-19  COVID testing + on 3/21 and 4/12  On RA  Monitor   COVID precautions   Retesting ordered on 4/22     Hospital Medicine PN 04/22   Ceftriaxone IVPB x 1 dose given in ED  Indications Comment: ams     MAR   Mildly accentuated interstitial lung markings which may relate to hypoventilatory status.  Mild  interstitial edema not excluded.  No new confluent airspace consolidation.     CXR 04/18   There is mild bibasilar atelectasis.  There is no significant pleural effusion.       CT Abdomen Pelvis 04/18                                                                            Doctor, Please specify diagnosis or diagnoses associated with above clinical findings.      Please clarify above diagnoses of Viral Pneumonia and Acute Hypoxemic Respiratory Failure:      Provider Use Only      #1)  [   ] Viral Pneumonia ruled in as active diagnosis this current admission    [   ] Viral Pneumonia ruled out     [ xx  ] Viral Pneumonia resolved prior to this current admission    [   ] Other clarification of Viral Pneumonia, please further specify:  ______________    [   ] Clinically Undetermined status of Viral Pneumonia      #2)  [   ] Acute Hypoxemic Respiratory Failure ruled in as active diagnosis this current admission    [   ] Acute Hypoxemic Respiratory Failure ruled out    [ xx  ] Acute Hypoxemic Respiratory Failure resolved prior to this current admission    [   ] Other clarification of Acute Hypoxemic Respiratory Failure, please further specify:  ______________    [   ] Clinically Undetermined status of  Acute Hypoxemic Respiratory Failure                                                                                                             [  ] Clinically Undetermined

## 2020-04-24 NOTE — PLAN OF CARE
Problem: Fall Injury Risk  Goal: Absence of Fall and Fall-Related Injury  Outcome: Ongoing, Progressing     Problem: Adult Inpatient Plan of Care  Goal: Plan of Care Review  Outcome: Ongoing, Progressing  Goal: Patient-Specific Goal (Individualization)  Outcome: Ongoing, Progressing  Goal: Absence of Hospital-Acquired Illness or Injury  Outcome: Ongoing, Progressing  Goal: Optimal Comfort and Wellbeing  Outcome: Ongoing, Progressing  Goal: Readiness for Transition of Care  Outcome: Ongoing, Progressing  Goal: Rounds/Family Conference  Outcome: Ongoing, Progressing     Problem: Diabetes Comorbidity  Goal: Blood Glucose Level Within Desired Range  Outcome: Ongoing, Progressing     Problem: Adjustment to Illness (Sepsis/Septic Shock)  Goal: Optimal Coping  Outcome: Ongoing, Progressing     Problem: Bleeding (Sepsis/Septic Shock)  Goal: Absence of Bleeding  Outcome: Ongoing, Progressing     Problem: Glycemic Control Impaired (Sepsis/Septic Shock)  Goal: Blood Glucose Level Within Desired Range  Outcome: Ongoing, Progressing     Problem: Hemodynamic Instability (Sepsis/Septic Shock)  Goal: Effective Tissue Perfusion  Outcome: Ongoing, Progressing     Problem: Infection (Sepsis/Septic Shock)  Goal: Absence of Infection Signs/Symptoms  Outcome: Ongoing, Progressing     Problem: Nutrition Impaired (Sepsis/Septic Shock)  Goal: Optimal Nutrition Intake  Outcome: Ongoing, Progressing     Problem: Respiratory Compromise (Sepsis/Septic Shock)  Goal: Effective Oxygenation and Ventilation  Outcome: Ongoing, Progressing     Problem: Electrolyte Imbalance (Acute Kidney Injury/Impairment)  Goal: Serum Electrolyte Balance  Outcome: Ongoing, Progressing     Problem: Fluid Imbalance (Acute Kidney Injury/Impairment)  Goal: Optimal Fluid Balance  Outcome: Ongoing, Progressing     Problem: Hematologic Alteration (Acute Kidney Injury/Impairment)  Goal: Hemoglobin, Hematocrit and Platelets Within Normal Range  Outcome: Ongoing,  Progressing     Problem: Oral Intake Inadequate (Acute Kidney Injury/Impairment)  Goal: Optimal Nutrition Intake  Outcome: Ongoing, Progressing     Problem: Renal Function Impairment (Acute Kidney Injury/Impairment)  Goal: Effective Renal Function  Outcome: Ongoing, Progressing     Problem: Coping Ineffective  Goal: Effective Coping  Outcome: Ongoing, Progressing     Problem: Infection  Goal: Infection Symptom Resolution  Outcome: Ongoing, Progressing     Problem: Skin Injury Risk Increased  Goal: Skin Health and Integrity  Outcome: Ongoing, Progressing

## 2020-04-24 NOTE — PLAN OF CARE
Pita placed call to [facility] at [phone], left VM for admissions to r/t 's call. Pita left message for admissions at [facility] at .

## 2020-04-24 NOTE — PLAN OF CARE
"   04/24/20 1013   Post-Acute Status   Post-Acute Authorization Placement   Post-Acute Placement Status Referrals Sent     Sw did speak with [facility], "not accepting any Covid positive patients." Pt was retested this am with a positive result, Sw to follow will other SNF referrals. Sw sent 6 more SNF referrals and rehab referral to [facility], Sw to call and follow. "No beds available," [facility]   "

## 2020-04-25 NOTE — PROGRESS NOTES
Progress Note  Hospital Medicine  [facility], [place]       Patient Name: Marie Reis Jr.  MRN:  [id number]  Hospital Medicine Team: [facility] HOSP MED K Magali Schrader MD  Date of Admission:  4/18/2020     Length of Stay:  LOS: 6 days   Expected Discharge Date: 4/24/2020  Principal Problem:  PEG tube malfunction     Subjective:     Interval History/Overnight Events:  Doing well  No acute issues   Tolerating TFs via PEG tube.  Labs showing increasing Cr up to 4.9 , from baseline of 3.5.  IVF given and Cr is improving . K+ replaced today , was noted to be 2.6 on AM labs.   Psychiatry was able to evaluated the patient and their recs were reviewed.      amLODIPine  10 mg Per G Tube Daily    ascorbic acid (vitamin C)  500 mg Oral BID    aspirin  81 mg Per G Tube Daily    atorvastatin  40 mg Per G Tube QHS    carvediloL  25 mg Oral BID WM    clopidogreL  75 mg Oral Daily    heparin (porcine)  5,000 Units Subcutaneous Q12H    insulin detemir U-100  6 Units Subcutaneous QHS    melatonin  6 mg Oral Nightly    multivitamin  1 tablet Oral Daily    pantoprazole  40 mg Oral Daily    thiamine  100 mg Oral Daily    vitamin D  1,000 Units Per G Tube Daily           acetaminophen, bisacodyL, Dextrose 10% Bolus, Dextrose 10% Bolus, Dextrose 10% Bolus, glucagon (human recombinant), glucose, glucose, insulin aspart U-100, labetalol, OLANZapine, ondansetron, sodium chloride 0.9%    Review of Systems  Limited due to AMS , but able to obtain   Constitutional: Negative for chills, fatigue, fever.   HENT: Negative for sore throat, trouble swallowing.    Respiratory: Negative for cough, shortness of breath.    Cardiovascular: Negative for chest pain, palpitations, leg swelling.   Gastrointestinal: Negative for abdominal pain, constipation, diarrhea, nausea, vomiting. .   Musculoskeletal: Negative for arthralgias, myalgias.   Skin: Negative for rash, wound, erythema   Neurological: Negative for dizziness,  syncope, weakness, light-headedness.     Objective:     Temp:  [98.3 °F (36.8 °C)-98.6 °F (37 °C)]   Pulse:  [77-82]   Resp:  [17-19]   BP: (126-155)/(71-85)   SpO2:  [99 %-100 %]         Weight change:    Body mass index is 19.4 kg/m².       Physical Exam:  Gen: NAD, conversant  Head: Abrasion with small echymosis over left eye  Eyes: Pupils dilated but reactive. Does not follow commands to check EOM  Throat: Dry mucous membranes, OP clear  CV: RRR,  no peripheral edema,  Resp: no increased work of breathing on room air  GI: Soft, NT, ND. Catheter in place in PEG tube site with a small amount of bloody drainage surrounding. Nontender and not erythematous surrounding site.   Ext: No joint swelling or tenderness  No edema   Muscle wasting   Neuro: moves all extremities spontaneously, 4.5/5 motor strength throughout, follows simple instructions. Oriented to self and person (knows his wife's name) but not to place or time.    Psychiatry: Calm  Flat affect     Labs:    Chemistries:   Recent Labs   Lab 04/18/20  2349  04/19/20  0241 04/21/20  0306 04/22/20  0447  04/23/20  0203 04/24/20  0333 04/25/20  0327     --  137 140 138   < > 138 137 138   K 4.0  --  3.8 3.4* 2.6*   < > 3.6 3.7 3.8     --  107 110 109   < > 113* 113* 113*   CO2 20*  --  20* 14* 17*   < > 14* 16* 17*   BUN 52*  --  52* 58* 62*   < > 46* 38* 33*   CREATININE 3.5*  --  3.4* 4.3* 4.9*   < > 3.6* 2.7* 2.3*   CALCIUM 8.7  --  8.9 9.3 8.3*   < > 8.0* 7.9* 7.9*   PROT 6.5  --  6.3 7.1  --   --   --   --   --    BILITOT 0.4  --  0.4 0.6  --   --   --   --   --    ALKPHOS 116  --  96 94  --   --   --   --   --    ALT 14  --  13 15  --   --   --   --   --    AST 18  --  16 22  --   --   --   --   --    MG  --    < > 2.3 2.4 2.5  --  2.1  --   --    PHOS  --    < > 2.3* 4.8* 3.8   < > 2.5*  2.5* 2.2* 2.1*    < > = values in this interval not displayed.        WBC:   Recent Labs   Lab 04/21/20  0306 04/23/20  0203 04/23/20  1040 04/24/20  0333  04/25/20  0328   WBC 8.58 5.99 5.91 6.67 6.72     Bands:     CBC/Anemia Labs: Coags:    Recent Labs   Lab 04/19/20  0241  04/23/20  1040 04/24/20  0333 04/25/20  0328   WBC  --    < > 5.91 6.67 6.72   HGB  --    < > 7.5* 7.6* 7.9*   HCT  --    < > 23.9* 24.1* 25.1*   PLT  --    < > 215 217 230   MCV  --    < > 84 84 86   RDW  --    < > 14.9* 14.6* 15.0*   FOLATE 7.7  --   --   --   --    [id number] 559  --   --   --   --     < > = values in this interval not displayed.    Recent Labs   Lab 04/18/20  2349 04/19/20  0241   INR 1.1 1.0   APTT  --  31.2        POCT Glucose: HbA1c:    Recent Labs   Lab 04/23/20  1640 04/23/20  2245 04/24/20  0759 04/24/20  1107 04/24/20  1703 04/24/20  1949   POCTGLUCOSE 260* 190* 211* 258* 206* 246*    Hemoglobin A1C   Date Value Ref Range Status   03/09/2020 9.1 (H) 4.0 - 5.6 % Final     Comment:   02/12/2020 10.5 (H) 4.0 - 5.6 % Final     Comment:   12/10/2019 9.2 (H) 4.0 - 5.6 % Final     Comment:          Assessment and Plan     Hospital Course:    Mr. Marie Reis Jr. was admitted to Hospital Medicine for management of  PEG tube malfunction     Active Hospital Problems    Diagnosis  POA    *PEG tube malfunction [K94.23]  Yes    Altered behavior [R46.89]  Yes    Urinary retention [R33.9]  Yes    Delirium [R41.0]  Yes    Severe protein-calorie malnutrition [E43]  Yes    Encephalopathy [G93.40]  Yes    Hyperammonemia [E72.20]  Yes    Physical debility [R53.81]  Yes    Altered mental status [R41.82]  Yes    Advanced care planning/counseling discussion [Z71.89]  Not Applicable    Palliative care encounter [Z51.5]  Not Applicable    Counseling regarding advance care planning and goals of care [Z71.89]  Not Applicable    Chronic kidney disease, stage IV (severe) [N18.4]  Yes    Failure to thrive in adult [R62.7]  Yes    Type 2 diabetes mellitus [E11.9]  Yes    Physical deconditioning [R53.81]  Yes    Coronary artery disease involving native coronary artery of native heart  "without angina pectoris [I25.10]  Yes    COVID-19 virus infection [U07.1]  Yes    Anemia, chronic disease [D63.8]  Yes    Acute renal failure superimposed on stage 4 chronic kidney disease [N17.9, N18.4]  No    Acute encephalopathy [G93.40]  Yes    Benign hypertension with chronic kidney disease, stage III [I12.9, N18.3]  Yes    Chronic combined systolic and diastolic heart failure [I50.42]  Yes    History of right PCA stroke [Z86.73]  Not Applicable    Acute CVA (cerebrovascular accident) [I63.9]  Yes     3.5 cm acute cortical infarct in the medial right occipital lobe - as per MRI on 3/17/20        Resolved Hospital Problems   No resolved problems to display.       #PEG tube malfunction  -S/p replacement prior to presentation with good positioning on CT  - GI following   - s/p for PEG replacement on 4/21 with GI , TFs restarted      #sub- Acute encephalopathy  # Delirium  # Acute CVA (cerebrovascular accident)  -Subacute encephalopathy, that started after patient's stroke, per wife's report. MRI brain on 3/17 showed '3.5 cm acute cortical infarct in the medial right occipital lobe.", more inclined to think that CVA may be largely contributing to the AMS . COVID infection may be also contributing to component on psychosis. Elevated ammonia (seen on prior labs) is unlikely to be the culprit, and suspect it was elevated perhaps in setting of malnutrition, patient with no cirrhosis and normal LFTs. BUN is 50 so unlikely due to uremia. Psychiatric component remains high on DDX for this AMS- concern for psychosis vs other psychiatric conditions- patient is a former vet with hx of PTSD, with recent psychologic trauma from deaths of two of his family members .   - per prior documentation , patient refusing to eat, leading to placement of PEG tube for supplemental nutrition. Recently patient pulled the PEG tube out.   - psychiatry consulted . Unable to see patient due to COVID status (?), patient refused to speak " on the phone. It is not unanticipated that patient is unwilling or unable to speak on the phone due to his his current condition   - cont PRN IM zyprexa for non-redirectable agitation  - cont DAPT, BP meds and insulin for recent CVA. Not on statin- suspect due to fairly unremarkable lipid panel and significant weight loss   - palliative care involved on the case- patient has a living will , indicating that he wished all aggressive measures and full code  - may benefit from neurology outpatient eval   - Psychiatry planning on interviewing the patient 4/22     #CKD IV  # Acute renal failure superimposed on stage 4 chronic kidney disease  - Cr baseline around 3.5.   - Cr increasing to 4.9 on 4/22, likely due to dehydration and decreased PO intake  - given IVF - LR infusion and bolus   - UA ordered and has not been colleted yet  - patient has a sims in , for urinary retention   - receiving enteral water per PEG tube     #CAD   #chronic combined systolic and diastolic heart failure  - Recent NSTEMI, on DAPT, statin, B Blcoker. Continue  HF not decompensated.      #Anemia  -hbg stable 9  - monitor      #HTN  Continue home meds    # Viral Pneumonia due to COVID-19  - COVID testing + on 3/21 and 4/12  - on RA  Monitor   - COVID precautions   - re testing ordered on 4/22    # Physical debility  # Failure to thrive in adult  -     # T2DM  - detemir as above     # Severe protein-calorie malnutrition  - per prior documentation , patient refusing to eat, leading to placement of PEG tube for supplemental nutrition. Recently patient pulled the PEG tube out.   Nutrition assessment on 4/16 noted :   1.) ADAT to regular (to encourage intake) with Boost Glucose Control TID.   2.) Begin sliding scale bolus feeds using Suplena:               If pt consuming >75% of meals-no bolus;               if PO intake 50-75% of meals: bolus 1/2 can;               if PO intakes 25-50% of meals: bolus 1 can;               if PO intakes <25% of  meals: bolus 1.5 cans.  (1 can Suplena provides 425 kcals, 10.6gm protein and 175mL of free water)  3.) If continuous feeds preferred, suggest Suplena @ 20mL/hr advancing 10mL q4h to goal rate 50mL/hr to provide 2160 kcals, 54gm protein and 886mL of free water.               MD to manage fluid.               If GRV >500mL, hold TF q4h then restart regimen.               TF to meet 99% EEN and 87% EPN.   4.) Suggest vitamin C, zinc, MVI due to COVID19.     Disposition:      Would plan to d/c patient back to [facility] or another SNF when facility acceptable is obtained. Tentatively 4/23- 4/24    Signing Physician:     Magali cortez

## 2020-04-25 NOTE — PROGRESS NOTES
Progress Note  Hospital Medicine  [facility] [place]       Patient Name: Marie Reis Jr.  MRN:  [id number]  Hospital Medicine Team: [facility] [place] HOSP MED K Magali Schrader MD  Date of Admission:  4/18/2020     Length of Stay:  LOS: 6 days   Expected Discharge Date: 4/24/2020  Principal Problem:  PEG tube malfunction     Subjective:     Interval History/Overnight Events:  Doing well  No acute issues   Tolerating TFs via PEG tube.  Labs showing increasing Cr up to 4.9 , from baseline of 3.5.  IVF given and Cr is improving . K+ replaced today , was noted to be 2.6 on AM labs.   Psychiatry was able to evaluated the patient and their recs were reviewed.      amLODIPine  10 mg Per G Tube Daily    ascorbic acid (vitamin C)  500 mg Oral BID    aspirin  81 mg Per G Tube Daily    atorvastatin  40 mg Per G Tube QHS    carvediloL  25 mg Oral BID WM    clopidogreL  75 mg Oral Daily    heparin (porcine)  5,000 Units Subcutaneous Q12H    insulin detemir U-100  6 Units Subcutaneous QHS    melatonin  6 mg Oral Nightly    multivitamin  1 tablet Oral Daily    pantoprazole  40 mg Oral Daily    thiamine  100 mg Oral Daily    vitamin D  1,000 Units Per G Tube Daily           acetaminophen, bisacodyL, Dextrose 10% Bolus, Dextrose 10% Bolus, Dextrose 10% Bolus, glucagon (human recombinant), glucose, glucose, insulin aspart U-100, labetalol, OLANZapine, ondansetron, sodium chloride 0.9%    Review of Systems  Limited due to AMS , but able to obtain   Constitutional: Negative for chills, fatigue, fever.   HENT: Negative for sore throat, trouble swallowing.    Respiratory: Negative for cough, shortness of breath.    Cardiovascular: Negative for chest pain, palpitations, leg swelling.   Gastrointestinal: Negative for abdominal pain, constipation, diarrhea, nausea, vomiting. .   Musculoskeletal: Negative for arthralgias, myalgias.   Skin: Negative for rash, wound, erythema   Neurological: Negative for dizziness,  syncope, weakness, light-headedness.     Objective:     Temp:  [98.3 °F (36.8 °C)-98.6 °F (37 °C)]   Pulse:  [77-82]   Resp:  [17-19]   BP: (126-155)/(71-85)   SpO2:  [99 %-100 %]         Weight change:    Body mass index is 19.4 kg/m².       Physical Exam:  Gen: NAD, conversant  Head: Abrasion with small echymosis over left eye  Eyes: Pupils dilated but reactive. Does not follow commands to check EOM  Throat: Dry mucous membranes, OP clear  CV: RRR,  no peripheral edema,  Resp: no increased work of breathing on room air  GI: Soft, NT, ND. Catheter in place in PEG tube site with a small amount of bloody drainage surrounding. Nontender and not erythematous surrounding site.   Ext: No joint swelling or tenderness  No edema   Muscle wasting   Neuro: moves all extremities spontaneously, 4.5/5 motor strength throughout, follows simple instructions. Oriented to self and person (knows his wife's name) but not to place or time.    Psychiatry: Calm  Flat affect     Labs:    Chemistries:   Recent Labs   Lab 04/18/20  2349  04/19/20  0241 04/21/20  0306 04/22/20  0447  04/23/20  0203 04/24/20  0333 04/25/20  0327     --  137 140 138   < > 138 137 138   K 4.0  --  3.8 3.4* 2.6*   < > 3.6 3.7 3.8     --  107 110 109   < > 113* 113* 113*   CO2 20*  --  20* 14* 17*   < > 14* 16* 17*   BUN 52*  --  52* 58* 62*   < > 46* 38* 33*   CREATININE 3.5*  --  3.4* 4.3* 4.9*   < > 3.6* 2.7* 2.3*   CALCIUM 8.7  --  8.9 9.3 8.3*   < > 8.0* 7.9* 7.9*   PROT 6.5  --  6.3 7.1  --   --   --   --   --    BILITOT 0.4  --  0.4 0.6  --   --   --   --   --    ALKPHOS 116  --  96 94  --   --   --   --   --    ALT 14  --  13 15  --   --   --   --   --    AST 18  --  16 22  --   --   --   --   --    MG  --    < > 2.3 2.4 2.5  --  2.1  --   --    PHOS  --    < > 2.3* 4.8* 3.8   < > 2.5*  2.5* 2.2* 2.1*    < > = values in this interval not displayed.        WBC:   Recent Labs   Lab 04/21/20  0306 04/23/20  0203 04/23/20  1040 04/24/20  0333  04/25/20  0328   WBC 8.58 5.99 5.91 6.67 6.72     Bands:     CBC/Anemia Labs: Coags:    Recent Labs   Lab 04/19/20  0241  04/23/20  1040 04/24/20  0333 04/25/20  0328   WBC  --    < > 5.91 6.67 6.72   HGB  --    < > 7.5* 7.6* 7.9*   HCT  --    < > 23.9* 24.1* 25.1*   PLT  --    < > 215 217 230   MCV  --    < > 84 84 86   RDW  --    < > 14.9* 14.6* 15.0*   FOLATE 7.7  --   --   --   --    [id number] 559  --   --   --   --     < > = values in this interval not displayed.    Recent Labs   Lab 04/18/20  2349 04/19/20  0241   INR 1.1 1.0   APTT  --  31.2        POCT Glucose: HbA1c:    Recent Labs   Lab 04/23/20  1640 04/23/20  2245 04/24/20  0759 04/24/20  1107 04/24/20  1703 04/24/20  1949   POCTGLUCOSE 260* 190* 211* 258* 206* 246*    Hemoglobin A1C   Date Value Ref Range Status   03/09/2020 9.1 (H) 4.0 - 5.6 % Final     Comment:   02/12/2020 10.5 (H) 4.0 - 5.6 % Final     Comment:   12/10/2019 9.2 (H) 4.0 - 5.6 % Final     Comment:          Assessment and Plan     Hospital Course:    Mr. Marie Reis Jr. was admitted to Hospital Medicine for management of  PEG tube malfunction     Active Hospital Problems    Diagnosis  POA    *PEG tube malfunction [K94.23]  Yes    Altered behavior [R46.89]  Yes    Urinary retention [R33.9]  Yes    Delirium [R41.0]  Yes    Severe protein-calorie malnutrition [E43]  Yes    Encephalopathy [G93.40]  Yes    Hyperammonemia [E72.20]  Yes    Physical debility [R53.81]  Yes    Altered mental status [R41.82]  Yes    Advanced care planning/counseling discussion [Z71.89]  Not Applicable    Palliative care encounter [Z51.5]  Not Applicable    Counseling regarding advance care planning and goals of care [Z71.89]  Not Applicable    Chronic kidney disease, stage IV (severe) [N18.4]  Yes    Failure to thrive in adult [R62.7]  Yes    Type 2 diabetes mellitus [E11.9]  Yes    Physical deconditioning [R53.81]  Yes    Coronary artery disease involving native coronary artery of native heart  "without angina pectoris [I25.10]  Yes    COVID-19 virus infection [U07.1]  Yes    Anemia, chronic disease [D63.8]  Yes    Acute renal failure superimposed on stage 4 chronic kidney disease [N17.9, N18.4]  No    Acute encephalopathy [G93.40]  Yes    Benign hypertension with chronic kidney disease, stage III [I12.9, N18.3]  Yes    Chronic combined systolic and diastolic heart failure [I50.42]  Yes    History of right PCA stroke [Z86.73]  Not Applicable    Acute CVA (cerebrovascular accident) [I63.9]  Yes     3.5 cm acute cortical infarct in the medial right occipital lobe - as per MRI on 3/17/20        Resolved Hospital Problems   No resolved problems to display.       #PEG tube malfunction  -S/p replacement prior to presentation with good positioning on CT  - GI following   - s/p for PEG replacement on 4/21 with GI , TFs restarted      #sub- Acute encephalopathy  # Delirium  # Acute CVA (cerebrovascular accident)  -Subacute encephalopathy, that started after patient's stroke, per wife's report. MRI brain on 3/17 showed '3.5 cm acute cortical infarct in the medial right occipital lobe.", more inclined to think that CVA may be largely contributing to the AMS . COVID infection may be also contributing to component on psychosis. Elevated ammonia (seen on prior labs) is unlikely to be the culprit, and suspect it was elevated perhaps in setting of malnutrition, patient with no cirrhosis and normal LFTs. BUN is 50 so unlikely due to uremia. Psychiatric component remains high on DDX for this AMS- concern for psychosis vs other psychiatric conditions- patient is a former vet with hx of PTSD, with recent psychologic trauma from deaths of two of his family members .   - per prior documentation , patient refusing to eat, leading to placement of PEG tube for supplemental nutrition. Recently patient pulled the PEG tube out.   - psychiatry consulted . Unable to see patient due to COVID status (?), patient refused to speak " on the phone. It is not unanticipated that patient is unwilling or unable to speak on the phone due to his his current condition   - cont PRN IM zyprexa for non-redirectable agitation  - cont DAPT, BP meds and insulin for recent CVA. Not on statin- suspect due to fairly unremarkable lipid panel and significant weight loss   - palliative care involved on the case- patient has a living will , indicating that he wished all aggressive measures and full code  - may benefit from neurology outpatient eval   - Psychiatry planning on interviewing the patient 4/22     #CKD IV  # Acute renal failure superimposed on stage 4 chronic kidney disease  - Cr baseline around 3.5.   - Cr increasing to 4.9 on 4/22, likely due to dehydration and decreased PO intake  - given IVF - LR infusion and bolus   - UA ordered and has not been colleted yet  - patient has a sims in , for urinary retention   - receiving enteral water per PEG tube     #CAD   #chronic combined systolic and diastolic heart failure  - Recent NSTEMI, on DAPT, statin, B Blcoker. Continue  HF not decompensated.      #Anemia  -hbg stable 9  - monitor      #HTN  Continue home meds    # Viral Pneumonia due to COVID-19  - COVID testing + on 3/21 and 4/12  - on RA  Monitor   - COVID precautions   - re testing ordered on 4/22    # Physical debility  # Failure to thrive in adult  -     # T2DM  - detemir as above     # Severe protein-calorie malnutrition  - per prior documentation , patient refusing to eat, leading to placement of PEG tube for supplemental nutrition. Recently patient pulled the PEG tube out.   Nutrition assessment on 4/16 noted :   1.) ADAT to regular (to encourage intake) with Boost Glucose Control TID.   2.) Begin sliding scale bolus feeds using Suplena:               If pt consuming >75% of meals-no bolus;               if PO intake 50-75% of meals: bolus 1/2 can;               if PO intakes 25-50% of meals: bolus 1 can;               if PO intakes <25% of  meals: bolus 1.5 cans.  (1 can Suplena provides 425 kcals, 10.6gm protein and 175mL of free water)  3.) If continuous feeds preferred, suggest Suplena @ 20mL/hr advancing 10mL q4h to goal rate 50mL/hr to provide 2160 kcals, 54gm protein and 886mL of free water.               MD to manage fluid.               If GRV >500mL, hold TF q4h then restart regimen.               TF to meet 99% EEN and 87% EPN.   4.) Suggest vitamin C, zinc, MVI due to COVID19.     Disposition:      Would plan to d/c patient back to [facility] or another SNF when facility acceptable is obtained. Tentatively 4/23- 4/24 4/24: family updated by phone by AUSTIN Do.     Signing Physician:     Magali cortez

## 2020-04-25 NOTE — PROGRESS NOTES
Progress Note  Hospital Medicine  [facility], [place]       Patient Name: Marie Reis Jr.  MRN:  [id number]  Hospital Medicine Team: [facility] HOSP MED K Magali Schrader MD  Date of Admission:  4/18/2020     Length of Stay:  LOS: 6 days   Expected Discharge Date: 4/24/2020  Principal Problem:  PEG tube malfunction     Subjective:     Interval History/Overnight Events:  Doing well  No acute issues   Tolerating TFs via PEG tube.  Labs showing increasing Cr up to 4.9 , from baseline of 3.5.  IVF given and Cr is improving . K+ replaced today , was noted to be 2.6 on AM labs.   Psychiatry was able to evaluated the patient and their recs were reviewed.      amLODIPine  10 mg Per G Tube Daily    ascorbic acid (vitamin C)  500 mg Oral BID    aspirin  81 mg Per G Tube Daily    atorvastatin  40 mg Per G Tube QHS    carvediloL  25 mg Oral BID WM    clopidogreL  75 mg Oral Daily    heparin (porcine)  5,000 Units Subcutaneous Q12H    insulin detemir U-100  6 Units Subcutaneous QHS    melatonin  6 mg Oral Nightly    multivitamin  1 tablet Oral Daily    pantoprazole  40 mg Oral Daily    thiamine  100 mg Oral Daily    vitamin D  1,000 Units Per G Tube Daily           acetaminophen, bisacodyL, Dextrose 10% Bolus, Dextrose 10% Bolus, Dextrose 10% Bolus, glucagon (human recombinant), glucose, glucose, insulin aspart U-100, labetalol, OLANZapine, ondansetron, sodium chloride 0.9%    Review of Systems  Limited due to AMS , but able to obtain   Constitutional: Negative for chills, fatigue, fever.   HENT: Negative for sore throat, trouble swallowing.    Respiratory: Negative for cough, shortness of breath.    Cardiovascular: Negative for chest pain, palpitations, leg swelling.   Gastrointestinal: Negative for abdominal pain, constipation, diarrhea, nausea, vomiting. .   Musculoskeletal: Negative for arthralgias, myalgias.   Skin: Negative for rash, wound, erythema   Neurological: Negative for dizziness,  syncope, weakness, light-headedness.     Objective:     Temp:  [98.3 °F (36.8 °C)-98.6 °F (37 °C)]   Pulse:  [77-82]   Resp:  [17-19]   BP: (126-155)/(71-85)   SpO2:  [99 %-100 %]         Weight change:    Body mass index is 19.4 kg/m².       Physical Exam:  Gen: NAD, conversant  Head: Abrasion with small echymosis over left eye  Eyes: Pupils dilated but reactive. Does not follow commands to check EOM  Throat: Dry mucous membranes, OP clear  CV: RRR,  no peripheral edema,  Resp: no increased work of breathing on room air  GI: Soft, NT, ND. Catheter in place in PEG tube site with a small amount of bloody drainage surrounding. Nontender and not erythematous surrounding site.   Ext: No joint swelling or tenderness  No edema   Muscle wasting   Neuro: moves all extremities spontaneously, 4.5/5 motor strength throughout, follows simple instructions. Oriented to self and person (knows his wife's name) but not to place or time.    Psychiatry: Calm  Flat affect     Labs:    Chemistries:   Recent Labs   Lab 04/18/20  2349  04/19/20  0241 04/21/20  0306 04/22/20  0447  04/23/20  0203 04/24/20  0333 04/25/20  0327     --  137 140 138   < > 138 137 138   K 4.0  --  3.8 3.4* 2.6*   < > 3.6 3.7 3.8     --  107 110 109   < > 113* 113* 113*   CO2 20*  --  20* 14* 17*   < > 14* 16* 17*   BUN 52*  --  52* 58* 62*   < > 46* 38* 33*   CREATININE 3.5*  --  3.4* 4.3* 4.9*   < > 3.6* 2.7* 2.3*   CALCIUM 8.7  --  8.9 9.3 8.3*   < > 8.0* 7.9* 7.9*   PROT 6.5  --  6.3 7.1  --   --   --   --   --    BILITOT 0.4  --  0.4 0.6  --   --   --   --   --    ALKPHOS 116  --  96 94  --   --   --   --   --    ALT 14  --  13 15  --   --   --   --   --    AST 18  --  16 22  --   --   --   --   --    MG  --    < > 2.3 2.4 2.5  --  2.1  --   --    PHOS  --    < > 2.3* 4.8* 3.8   < > 2.5*  2.5* 2.2* 2.1*    < > = values in this interval not displayed.        WBC:   Recent Labs   Lab 04/21/20  0306 04/23/20  0203 04/23/20  1040 04/24/20  0333  04/25/20  0328   WBC 8.58 5.99 5.91 6.67 6.72     Bands:     CBC/Anemia Labs: Coags:    Recent Labs   Lab 04/19/20  0241  04/23/20  1040 04/24/20  0333 04/25/20  0328   WBC  --    < > 5.91 6.67 6.72   HGB  --    < > 7.5* 7.6* 7.9*   HCT  --    < > 23.9* 24.1* 25.1*   PLT  --    < > 215 217 230   MCV  --    < > 84 84 86   RDW  --    < > 14.9* 14.6* 15.0*   FOLATE 7.7  --   --   --   --    [id number] 559  --   --   --   --     < > = values in this interval not displayed.    Recent Labs   Lab 04/18/20  2349 04/19/20  0241   INR 1.1 1.0   APTT  --  31.2        POCT Glucose: HbA1c:    Recent Labs   Lab 04/23/20  1640 04/23/20  2245 04/24/20  0759 04/24/20  1107 04/24/20  1703 04/24/20  1949   POCTGLUCOSE 260* 190* 211* 258* 206* 246*    Hemoglobin A1C   Date Value Ref Range Status   03/09/2020 9.1 (H) 4.0 - 5.6 % Final     Comment:   02/12/2020 10.5 (H) 4.0 - 5.6 % Final     Comment:   12/10/2019 9.2 (H) 4.0 - 5.6 % Final     Comment:          Assessment and Plan     Hospital Course:    Mr. Marie Reis Jr. was admitted to Hospital Medicine for management of  PEG tube malfunction     Active Hospital Problems    Diagnosis  POA    *PEG tube malfunction [K94.23]  Yes    Altered behavior [R46.89]  Yes    Urinary retention [R33.9]  Yes    Delirium [R41.0]  Yes    Severe protein-calorie malnutrition [E43]  Yes    Encephalopathy [G93.40]  Yes    Hyperammonemia [E72.20]  Yes    Physical debility [R53.81]  Yes    Altered mental status [R41.82]  Yes    Advanced care planning/counseling discussion [Z71.89]  Not Applicable    Palliative care encounter [Z51.5]  Not Applicable    Counseling regarding advance care planning and goals of care [Z71.89]  Not Applicable    Chronic kidney disease, stage IV (severe) [N18.4]  Yes    Failure to thrive in adult [R62.7]  Yes    Type 2 diabetes mellitus [E11.9]  Yes    Physical deconditioning [R53.81]  Yes    Coronary artery disease involving native coronary artery of native heart  "without angina pectoris [I25.10]  Yes    COVID-19 virus infection [U07.1]  Yes    Anemia, chronic disease [D63.8]  Yes    Acute renal failure superimposed on stage 4 chronic kidney disease [N17.9, N18.4]  No    Acute encephalopathy [G93.40]  Yes    Benign hypertension with chronic kidney disease, stage III [I12.9, N18.3]  Yes    Chronic combined systolic and diastolic heart failure [I50.42]  Yes    History of right PCA stroke [Z86.73]  Not Applicable    Acute CVA (cerebrovascular accident) [I63.9]  Yes     3.5 cm acute cortical infarct in the medial right occipital lobe - as per MRI on 3/17/20        Resolved Hospital Problems   No resolved problems to display.       #PEG tube malfunction  -S/p replacement prior to presentation with good positioning on CT  - GI following   - s/p for PEG replacement on 4/21 with GI , TFs restarted      #sub- Acute encephalopathy  # Delirium  # Acute CVA (cerebrovascular accident)  -Subacute encephalopathy, that started after patient's stroke, per wife's report. MRI brain on 3/17 showed '3.5 cm acute cortical infarct in the medial right occipital lobe.", more inclined to think that CVA may be largely contributing to the AMS . COVID infection may be also contributing to component on psychosis. Elevated ammonia (seen on prior labs) is unlikely to be the culprit, and suspect it was elevated perhaps in setting of malnutrition, patient with no cirrhosis and normal LFTs. BUN is 50 so unlikely due to uremia. Psychiatric component remains high on DDX for this AMS- concern for psychosis vs other psychiatric conditions- patient is a former vet with hx of PTSD, with recent psychologic trauma from deaths of two of his family members .   - per prior documentation , patient refusing to eat, leading to placement of PEG tube for supplemental nutrition. Recently patient pulled the PEG tube out.   - psychiatry consulted . Unable to see patient due to COVID status (?), patient refused to speak " on the phone. It is not unanticipated that patient is unwilling or unable to speak on the phone due to his his current condition   - cont PRN IM zyprexa for non-redirectable agitation  - cont DAPT, BP meds and insulin for recent CVA. Not on statin- suspect due to fairly unremarkable lipid panel and significant weight loss   - palliative care involved on the case- patient has a living will , indicating that he wished all aggressive measures and full code  - may benefit from neurology outpatient eval   - Psychiatry planning on interviewing the patient 4/22     #CKD IV  # Acute renal failure superimposed on stage 4 chronic kidney disease  - Cr baseline around 3.5.   - Cr increasing to 4.9 on 4/22, likely due to dehydration and decreased PO intake  - given IVF - LR infusion and bolus   - UA ordered and has not been colleted yet  - patient has a sims in , for urinary retention   - receiving enteral water per PEG tube     #CAD   #chronic combined systolic and diastolic heart failure  - Recent NSTEMI, on DAPT, statin, B Blcoker. Continue  HF not decompensated.      #Anemia  -hbg stable 9  - monitor      #HTN  Continue home meds    # Viral Pneumonia due to COVID-19  - COVID testing + on 3/21 and 4/12  - on RA  Monitor   - COVID precautions   - re testing ordered on 4/22    # Physical debility  # Failure to thrive in adult  -     # T2DM  - detemir as above     # Severe protein-calorie malnutrition  - per prior documentation , patient refusing to eat, leading to placement of PEG tube for supplemental nutrition. Recently patient pulled the PEG tube out.   Nutrition assessment on 4/16 noted :   1.) ADAT to regular (to encourage intake) with Boost Glucose Control TID.   2.) Begin sliding scale bolus feeds using Suplena:               If pt consuming >75% of meals-no bolus;               if PO intake 50-75% of meals: bolus 1/2 can;               if PO intakes 25-50% of meals: bolus 1 can;               if PO intakes <25% of  meals: bolus 1.5 cans.  (1 can Suplena provides 425 kcals, 10.6gm protein and 175mL of free water)  3.) If continuous feeds preferred, suggest Suplena @ 20mL/hr advancing 10mL q4h to goal rate 50mL/hr to provide 2160 kcals, 54gm protein and 886mL of free water.               MD to manage fluid.               If GRV >500mL, hold TF q4h then restart regimen.               TF to meet 99% EEN and 87% EPN.   4.) Suggest vitamin C, zinc, MVI due to COVID19.     Disposition:      Would plan to d/c patient back to [facility] or another SNF when facility acceptable is obtained. Tentatively 4/23- 4/24    Signing Physician:     Magali cortez

## 2020-04-26 NOTE — PLAN OF CARE
Problem: Physical Therapy Goal  Goal: Physical Therapy Goal  Description  Goals to be met by: 2020    Patient will increase functional independence with mobility by performin. Supine <> sit with Minimal Assistance.  2. Sit <> stand transfer with Minimal Assistance using Rolling Walker.  3. Bed <> chair transfer via Stand Pivot with Minimal Assistance using Rolling Walker.  4. Gait  x 50 feet with Minimal Assistance using Rolling Walker to prepare for community ambulation and endurance activities.  5. Able to tolerate exercise for 15-20 reps with supervision.       Outcome: Ongoing, Progressing.  Improved strength noted, as evidence of transfer ability.

## 2020-04-26 NOTE — PT/OT/SLP PROGRESS
Physical Therapy Treatment    Patient Name:  Marie Reis Jr.   MRN:  [id number]    Recommendations:     Discharge Recommendations:  nursing facility, skilled   Discharge Equipment Recommendations: bedside commode, shower chair, walker, rolling, wheelchair   Barriers to discharge: Inaccessible home and Decreased caregiver support    Assessment:     Marie Reis Jr. is a 64 y.o. male admitted with a medical diagnosis of PEG tube malfunction.  He presents with the following impairments/functional limitations:  weakness, impaired endurance, impaired self care skills, impaired functional mobilty, gait instability, impaired balance, impaired cardiopulmonary response to activity, decreased safety awareness, decreased upper extremity function, decreased lower extremity function . Patient showed improved bed mobility and transfer ability, but fatigued easily when attempting to ambulate.    Rehab Prognosis: Fair; patient would benefit from acute skilled PT services to address these deficits and reach maximum level of function.    Recent Surgery: Procedure(s) (LRB):  EGD (ESOPHAGOGASTRODUODENOSCOPY) (N/A)  INSERTION, PEG TUBE 5 Days Post-Op    Plan:     During this hospitalization, patient to be seen 3 x/week to address the identified rehab impairments via gait training, therapeutic activities, therapeutic exercises, neuromuscular re-education and progress toward the following goals:    · Plan of Care Expires:  05/18/20    Subjective     Chief Complaint: fatigue  Patient/Family Comments/goals: to go home soon.  Pain/Comfort:  · Pain Rating 1: 0/10  · Pain Rating Post-Intervention 1: 0/10      Objective:     Communicated with nsg prior to session.  Patient found HOB elevated with PEG Tube, telemetry, peripheral IV, bed alarm upon PT entry to room.     General Precautions: Standard, airborne, contact, droplet, fall   Orthopedic Precautions:N/A   Braces: N/A     Functional Mobility:  · Bed Mobility:     · Rolling Left:  stand by  assistance  · Scooting: stand by assistance  · Supine to Sit: stand by assistance  · Sit to Supine: stand by assistance  · Transfers:     · Sit to Stand:  contact guard assistance and minimum assistance with rolling walker  · Gait: 4 steps to the R and to the L with RW and cga to min assistance.      AM-PAC 6 CLICK MOBILITY  Turning over in bed (including adjusting bedclothes, sheets and blankets)?: 4  Sitting down on and standing up from a chair with arms (e.g., wheelchair, bedside commode, etc.): 3  Moving from lying on back to sitting on the side of the bed?: 3  Moving to and from a bed to a chair (including a wheelchair)?: 3  Need to walk in hospital room?: 2  Climbing 3-5 steps with a railing?: 1  Basic Mobility Total Score: 16       Therapeutic Activities and Exercises:   Patient sat at EOB to prepare for treatment. Static standing balance with RW for support 2x30 secs with CGA.  B LE AAROM/AROM x 30 reps on all available planes of motion.    Patient left HOB elevated with all lines intact, call button in reach and bed alarm on..    GOALS:   Multidisciplinary Problems     Physical Therapy Goals        Problem: Physical Therapy Goal    Goal Priority Disciplines Outcome Goal Variances Interventions   Physical Therapy Goal     PT, PT/OT Ongoing, Progressing     Description:  Goals to be met by: 2020    Patient will increase functional independence with mobility by performin. Supine <> sit with Minimal Assistance.  2. Sit <> stand transfer with Minimal Assistance using Rolling Walker.  3. Bed <> chair transfer via Stand Pivot with Minimal Assistance using Rolling Walker.  4. Gait  x 50 feet with Minimal Assistance using Rolling Walker to prepare for community ambulation and endurance activities.  5. Able to tolerate exercise for 15-20 reps with supervision.                        Time Tracking:     PT Received On: 20  PT Start Time: 1431     PT Stop Time: 1455  PT Total Time (min): 24 min      Billable Minutes: Therapeutic Activity 12 and Therapeutic Exercise 12    Treatment Type: Treatment  PT/PTA: PTA     PTA Visit Number: 1     Jose Arnold PTA  04/26/2020

## 2020-04-26 NOTE — PROGRESS NOTES
Progress Note  Hospital Medicine  [facility], [place] [place]       Patient Name: Marie Reis Jr.  MRN:  [id number]  Hospital Medicine Team: [facility] K Magali Schrader MD  Date of Admission:  4/18/2020     Length of Stay:  LOS: 7 days   Expected Discharge Date: 4/24/2020  Principal Problem:  PEG tube malfunction     Subjective:     Interval History/Overnight Events:  Doing well  No acute issues   Tolerating TFs via PEG tube.  Labs showing increasing Cr up to 4.9 , from baseline of 3.5.  IVF given and Cr is improving . K+ replaced today , was noted to be 2.6 on AM labs.   Psychiatry was able to evaluated the patient and their recs were reviewed.      amLODIPine  10 mg Per G Tube Daily    ascorbic acid (vitamin C)  500 mg Oral BID    aspirin  81 mg Per G Tube Daily    atorvastatin  40 mg Per G Tube QHS    carvediloL  25 mg Oral BID WM    clopidogreL  75 mg Oral Daily    heparin (porcine)  5,000 Units Subcutaneous Q12H    insulin detemir U-100  6 Units Subcutaneous QHS    melatonin  6 mg Oral Nightly    multivitamin  1 tablet Oral Daily    pantoprazole  40 mg Oral Daily    sodium bicarbonate  650 mg Oral BID    thiamine  100 mg Oral Daily    vitamin D  1,000 Units Per G Tube Daily           acetaminophen, bisacodyL, Dextrose 10% Bolus, Dextrose 10% Bolus, Dextrose 10% Bolus, glucagon (human recombinant), glucose, glucose, insulin aspart U-100, labetalol, OLANZapine, ondansetron, sodium chloride 0.9%    Review of Systems  Limited due to AMS , but able to obtain   Constitutional: Negative for chills, fatigue, fever.   HENT: Negative for sore throat, trouble swallowing.    Respiratory: Negative for cough, shortness of breath.    Cardiovascular: Negative for chest pain, palpitations, leg swelling.   Gastrointestinal: Negative for abdominal pain, constipation, diarrhea, nausea, vomiting. .   Musculoskeletal: Negative for arthralgias, myalgias.   Skin: Negative for rash, wound, erythema    Neurological: Negative for dizziness, syncope, weakness, light-headedness.     Objective:     Temp:  [97.8 °F (36.6 °C)-98.8 °F (37.1 °C)]   Pulse:  [74-80]   Resp:  [16-19]   BP: (142-166)/(69-88)   SpO2:  [97 %-100 %]         Weight change:    Body mass index is 19.4 kg/m².       Physical Exam:  Gen: NAD, conversant  Head: Abrasion with small echymosis over left eye  Eyes: Pupils dilated but reactive. Does not follow commands to check EOM  Throat: Dry mucous membranes, OP clear  CV: RRR,  no peripheral edema,  Resp: no increased work of breathing on room air  GI: Soft, NT, ND. Catheter in place in PEG tube site with a small amount of bloody drainage surrounding. Nontender and not erythematous surrounding site.   Ext: No joint swelling or tenderness  No edema   Muscle wasting   Neuro: moves all extremities spontaneously, 4.5/5 motor strength throughout, follows simple instructions. Oriented to self and person (knows his wife's name) but not to place or time.    Psychiatry: Calm  Flat affect     Labs:    Chemistries:   Recent Labs   Lab 04/21/20  0306 04/22/20  0447  04/23/20  0203 04/24/20  0333 04/25/20  0327 04/26/20  0405    138   < > 138 137 138 140   K 3.4* 2.6*   < > 3.6 3.7 3.8 4.2    109   < > 113* 113* 113* 111*   CO2 14* 17*   < > 14* 16* 17* 22*   BUN 58* 62*   < > 46* 38* 33* 32*   CREATININE 4.3* 4.9*   < > 3.6* 2.7* 2.3* 2.2*   CALCIUM 9.3 8.3*   < > 8.0* 7.9* 7.9* 8.3*   PROT 7.1  --   --   --   --   --   --    BILITOT 0.6  --   --   --   --   --   --    ALKPHOS 94  --   --   --   --   --   --    ALT 15  --   --   --   --   --   --    AST 22  --   --   --   --   --   --    MG 2.4 2.5  --  2.1  --   --   --    PHOS 4.8* 3.8   < > 2.5*  2.5* 2.2* 2.1* 2.4*    < > = values in this interval not displayed.        WBC:   Recent Labs   Lab 04/23/20  0203 04/23/20  1040 04/24/20  0333 04/25/20  0328 04/26/20  0405   WBC 5.99 5.91 6.67 6.72 7.84     Bands:     CBC/Anemia Labs: Coags:    Recent  Labs   Lab 04/24/20  0333 04/25/20  0328 04/26/20  0405   WBC 6.67 6.72 7.84   HGB 7.6* 7.9* 8.2*   HCT 24.1* 25.1* 26.2*    230 259   MCV 84 86 85   RDW 14.6* 15.0* 15.5*    No results for input(s): PT, INR, APTT in the last 168 hours.     POCT Glucose: HbA1c:    Recent Labs   Lab 04/25/20  1146 04/25/20  1649 04/25/20 2000 04/26/20  0102 04/26/20  0445 04/26/20  1157   POCTGLUCOSE 295* 240* 190* 175* 122* 233*    Hemoglobin A1C   Date Value Ref Range Status   03/09/2020 9.1 (H) 4.0 - 5.6 % Final     Comment:   02/12/2020 10.5 (H) 4.0 - 5.6 % Final     Comment:   12/10/2019 9.2 (H) 4.0 - 5.6 % Final     Comment:          Assessment and Plan     Hospital Course:    Mr. Marie Reis Jr. was admitted to Hospital Medicine for management of  PEG tube malfunction     Active Hospital Problems    Diagnosis  POA    *PEG tube malfunction [K94.23]  Yes    Altered behavior [R46.89]  Yes    Urinary retention [R33.9]  Yes    Delirium [R41.0]  Yes    Severe protein-calorie malnutrition [E43]  Yes    Encephalopathy [G93.40]  Yes    Hyperammonemia [E72.20]  Yes    Physical debility [R53.81]  Yes    Altered mental status [R41.82]  Yes    Advanced care planning/counseling discussion [Z71.89]  Not Applicable    Palliative care encounter [Z51.5]  Not Applicable    Counseling regarding advance care planning and goals of care [Z71.89]  Not Applicable    Chronic kidney disease, stage IV (severe) [N18.4]  Yes    Failure to thrive in adult [R62.7]  Yes    Type 2 diabetes mellitus [E11.9]  Yes    Physical deconditioning [R53.81]  Yes    Coronary artery disease involving native coronary artery of native heart without angina pectoris [I25.10]  Yes    COVID-19 virus infection [U07.1]  Yes    Anemia, chronic disease [D63.8]  Yes    Acute renal failure superimposed on stage 4 chronic kidney disease [N17.9, N18.4]  No    Acute encephalopathy [G93.40]  Yes    Benign hypertension with chronic kidney disease, stage III  "[I12.9, N18.3]  Yes    Chronic combined systolic and diastolic heart failure [I50.42]  Yes    History of right PCA stroke [Z86.73]  Not Applicable    Acute CVA (cerebrovascular accident) [I63.9]  Yes     3.5 cm acute cortical infarct in the medial right occipital lobe - as per MRI on 3/17/20        Resolved Hospital Problems   No resolved problems to display.       #PEG tube malfunction  -S/p replacement prior to presentation with good positioning on CT  - GI following   - s/p for PEG replacement on 4/21 with GI , TFs restarted      #sub- Acute encephalopathy  # Delirium  # Acute CVA (cerebrovascular accident)  -Subacute encephalopathy, that started after patient's stroke, per wife's report. MRI brain on 3/17 showed '3.5 cm acute cortical infarct in the medial right occipital lobe.", more inclined to think that CVA may be largely contributing to the AMS . COVID infection may be also contributing to component on psychosis. Elevated ammonia (seen on prior labs) is unlikely to be the culprit, and suspect it was elevated perhaps in setting of malnutrition, patient with no cirrhosis and normal LFTs. BUN is 50 so unlikely due to uremia. Psychiatric component remains high on DDX for this AMS- concern for psychosis vs other psychiatric conditions- patient is a former vet with hx of PTSD, with recent psychologic trauma from deaths of two of his family members .   - per prior documentation , patient refusing to eat, leading to placement of PEG tube for supplemental nutrition. Recently patient pulled the PEG tube out.   - psychiatry consulted . Unable to see patient due to COVID status (?), patient refused to speak on the phone. It is not unanticipated that patient is unwilling or unable to speak on the phone due to his his current condition   - cont PRN IM zyprexa for non-redirectable agitation  - cont DAPT, BP meds and insulin for recent CVA. Not on statin- suspect due to fairly unremarkable lipid panel and significant " weight loss   - palliative care involved on the case- patient has a living will , indicating that he wished all aggressive measures and full code  - may benefit from neurology outpatient eval   - Psychiatry planning on interviewing the patient 4/22     #CKD IV  # Acute renal failure superimposed on stage 4 chronic kidney disease  - Cr baseline around 3.5.   - Cr increasing to 4.9 on 4/22, likely due to dehydration and decreased PO intake  - given IVF - LR infusion and bolus   - UA ordered and has not been colleted yet  - patient has a sims in , for urinary retention   - receiving enteral water per PEG tube     #CAD   #chronic combined systolic and diastolic heart failure  - Recent NSTEMI, on DAPT, statin, B Blcoker. Continue  HF not decompensated.      #Anemia  -hbg stable 9  - monitor      #HTN  Continue home meds    # Viral Pneumonia due to COVID-19  - COVID testing + on 3/21 and 4/12  - on RA  Monitor   - COVID precautions   - re testing ordered on 4/22    # Physical debility  # Failure to thrive in adult  -     # T2DM  - detemir as above     # Severe protein-calorie malnutrition  - per prior documentation , patient refusing to eat, leading to placement of PEG tube for supplemental nutrition. Recently patient pulled the PEG tube out.   Nutrition assessment on 4/16 noted :   1.) ADAT to regular (to encourage intake) with Boost Glucose Control TID.   2.) Begin sliding scale bolus feeds using Suplena:               If pt consuming >75% of meals-no bolus;               if PO intake 50-75% of meals: bolus 1/2 can;               if PO intakes 25-50% of meals: bolus 1 can;               if PO intakes <25% of meals: bolus 1.5 cans.  (1 can Suplena provides 425 kcals, 10.6gm protein and 175mL of free water)  3.) If continuous feeds preferred, suggest Suplena @ 20mL/hr advancing 10mL q4h to goal rate 50mL/hr to provide 2160 kcals, 54gm protein and 886mL of free water.               MD to manage fluid.               If GRV  >500mL, hold TF q4h then restart regimen.               TF to meet 99% EEN and 87% EPN.   4.) Suggest vitamin C, zinc, MVI due to COVID19.     Disposition:      Would plan to d/c patient back to [facility] or another SNF when facility acceptable is obtained. Tentatively 4/23- 4/24 4/26: called Naheed at 1:05pm. No answer, general message left.     Signing Physician:     Magali cortez

## 2020-04-26 NOTE — PLAN OF CARE
Pt is AAOx4, repositions self in bed. HOB in semi fowlers position. Respirations even and unlabored. No sob noted. Gtube site cleansed with soap and water, patted dry, stoma pink and moist, Minimal serosanguinous drainage noted, 4x4 split gauze placed to site and secured with tape. BS levels monitored. No c/o of pain or discomfort at time. Safety and Aspiration Precautions in place at all times. Will continue to monitor.

## 2020-04-27 NOTE — PROGRESS NOTES
[facility]  Adult Nutrition  Progress Note    SUMMARY       Recommendations    1.) Continue regular diet.   2.) Add Boost Glucose Control BID.   3.) Suggest sliding scale TF regimen:     If pt consuming >75% of meals-no bolus;               if PO intake 50-75% of meals: bolus 1/2 can;               if PO intakes 25-50% of meals: bolus 1 can;               if PO intakes <25% of meals: bolus 1.5 cans.  (1 can Suplena provides 425 kcals, 10.6gm protein and 175mL of free water)  4.) Continue MVI, vitamin C.    Add zinc.   5.) Daily weights.     Goals: 1.) Pt to consume/tolerate >50% of meals by follow up. 2.) Pt to achieve/tolerate >75% EEN and EPN by follow up.   Nutrition Goal Status: new  Communication of RD Recs: reviewed with RN    Reason for Assessment    Reason For Assessment: new tube feeding  Diagnosis: other (see comments)(PEG malfunction)  Relevant Medical History: stroke, CVA, , CAD, DM2, dyslipidemia, HTN, CHF, multiple myeloma, CKD4  Interdisciplinary Rounds: attended  General Information Comments: Remote access: Secure chat initiated with RN. RN reports pt with good PO intake of bkfst this morning, consuming 100% grits and sausage. Pt disliked eggs. RN also states pt received x1 bolus can of TF this morning. Pt tolerating TF regimen with no GRV. No GI distress. Noted no new wt x4 days. Pt continues with malnutrition. NFPE not performed, patient has been screened for possible COVID-19 and has been placed on airborne and contact precautions. Patient is noted as being positive for COVID-19.    Nutrition Discharge Planning: D/c on adequate TF regimen to meet nutritionally needs.     Nutrition Risk Screen    Nutrition Risk Screen: tube feeding or parenteral nutrition    Nutrition/Diet History    Spiritual, Cultural Beliefs, Spiritism Practices, Values that Affect Care: no  Food Allergies: NKFA  Factors Affecting Nutritional Intake: None identified at this time    Anthropometrics    Temp: 98  °F (36.7 °C)  Height Method: Stated  Height: 6' (182.9 cm)  Height (inches): 72 in  Weight Method: Bed Scale  Weight: 64.9 kg (143 lb 1.3 oz)  Weight (lb): 143.08 lb  Ideal Body Weight (IBW), Male: 178 lb  % Ideal Body Weight, Male (lb): 80.38 %  BMI (Calculated): 19.4  BMI Grade: 18.5-24.9 - normal  Weight Loss: unintentional  Usual Body Weight (UBW), k.6 kg(2020)  Weight Change Amount: 47 lb 13.4 oz  % Usual Body Weight: 75.1  % Weight Change From Usual Weight: -25.06 %       Lab/Procedures/Meds    Pertinent Labs Reviewed: reviewed  Pertinent Labs Comments: Chl 113, CO2 19, BUN 27, Cr 2.2, GFR 35.5, Glu 125, Ca 8.2, Phos 2.6, albumin 2.5  Pertinent Medications Reviewed: reviewed  Pertinent Medications Comments: ascorbic acid, statin, insulin, melatonin, MVI, protonix, thiamine, vitamin D    Estimated/Assessed Needs    Weight Used For Calorie Calculations: 64.9 kg (143 lb 1.3 oz)  Energy Calorie Requirements (kcal): 9405-8621  Energy Need Method: Kcal/kg(30-35)  Protein Requirements: 65-78(g/day)  Weight Used For Protein Calculations: 64.9 kg (143 lb 1.3 oz)(1.0-1.2 g/kg)     Estimated Fluid Requirement Method: RDA Method(or per MD)  RDA Method (mL):   CHO Requirement: 243-284gm/day      Nutrition Prescription Ordered    Current Diet Order: regular  Current Nutrition Support Formula Ordered: Suplena  Current Nutrition Support Rate Ordered: 1.5 (ml)  Current Nutrition Support Frequency Ordered: cans TID    Evaluation of Received Nutrient/Fluid Intake    Enteral Calories (kcal): 1913  Enteral Protein (gm): 48  Enteral (Free Water) Fluid (mL): 788  Total Calories (kcal/kg): 29  % Kcal Needs: 98(of lower kcal needs)  Total Protein (gm/kg): 0.7  % Protein Needs: 74(of lower protein needs)  I/O: +5.2L since admit  Energy Calories Required: meeting needs  Protein Required: not meeting needs  Fluid Required: other (see comments)(per MD)  Comments: LBM   Tolerance: tolerating  % Intake of Estimated Energy  Needs: 75 - 100 %  % Meal Intake: 50 - 75 %    Nutrition Risk    Level of Risk/Frequency of Follow-up: moderate(x2/week)     Assessment and Plan  Nutrition Problem:  Severe Protein-Calorie Malnutrition  Malnutrition in the context of Chronic Illness/Injury and Social/Environmental Circumstances     Related to (etiology):  Poor PO intake due to depression     Signs and Symptoms (as evidenced by):  Energy Intake: <50% of estimated energy requirement for >2 months  Weight Loss: 28% x *2 months     Interventions(treatment strategy):  Collaboration of care with providers  Referral of care  General healthful diet  Enteral nutrition-Suplena @ 50mL/hr or sliding scale bolus feeds  Vitamin/Minerals-MVI, ascorbic acid, zinc     Nutrition Diagnosis Status:  New       Monitor and Evaluation    Food and Nutrient Intake: energy intake, food and beverage intake, enteral nutrition intake  Food and Nutrient Adminstration: diet order, enteral and parenteral nutrition administration  Knowledge/Beliefs/Attitudes: food and nutrition knowledge/skill  Physical Activity and Function: nutrition-related ADLs and IADLs  Anthropometric Measurements: weight, weight change, body mass index  Biochemical Data, Medical Tests and Procedures: electrolyte and renal panel, gastrointestinal profile, glucose/endocrine profile  Nutrition-Focused Physical Findings: overall appearance     Malnutrition Assessment  NFPE not performed, patient has been screened for possible COVID-19 and has been placed on airborne and contact precautions. Patient is noted as being positive for COVID-19.    Nutrition Follow-Up    RD Follow-up?: Yes

## 2020-04-27 NOTE — MEDICAL/APP STUDENT
Spoke with patients spouse (Naheed Reis) and informed her that her  is being transferred to a skilled nursing facility as soon as a bed opens up.   Patients spouse requested she be called prior to his transfer so that she is aware of when he is transferred (her number is: [phone]).  Pts spouse last spoke to pt on Saturday/Sunday and stated that he was delirious and told her that he, walked from the hospital to [facility] and that he was receiving therapy.   Pts spouse understood he did not actually walk to [facility] but wanted to know whether he is actually receiving therapy or if that comment was secondary to his delirium; I informed her that his notes states that he is receiving therapy and has been consulted/seen by PT/OT/SLP.   She, profusely, expressed her gratitude about the work [facility] is doing and had no further concerns or questions.  I informed her that I will be calling again tomorrow.

## 2020-04-27 NOTE — PLAN OF CARE
Problem: Nutrition Impaired (Sepsis/Septic Shock)  Goal: Optimal Nutrition Intake  Outcome: Ongoing, Progressing  Intervention: Promote and Optimize Nutrition Delivery  Flowsheets (Taken 4/27/2020 1155)  Nutrition Support Management: tube feeding initiated; weight trending reviewed; other (see comments)     Problem: Oral Intake Inadequate (Acute Kidney Injury/Impairment)  Goal: Optimal Nutrition Intake  Outcome: Ongoing, Progressing  Intervention: Promote and Optimize Nutrition  Flowsheets (Taken 4/27/2020 1155)  Oral Nutrition Promotion: calorie dense foods provided; calorie dense liquids provided; other (see comments)     Problem: Malnutrition  Goal: Improved Nutritional Intake  Outcome: Ongoing, Progressing  Intervention: Promote and Optimize Oral Intake  Flowsheets (Taken 4/27/2020 1155)  Oral Nutrition Promotion: calorie dense foods provided; calorie dense liquids provided; other (see comments)  Intervention: Promote and Optimize Nutrition Support  Flowsheets (Taken 4/27/2020 1155)  Nutrition Support Management: tube feeding initiated; weight trending reviewed; other (see comments)     Recommendations     1.) Continue regular diet.   2.) Add Boost Glucose Control BID.   3.) Suggest sliding scale TF regimen:                If pt consuming >75% of meals-no bolus;               if PO intake 50-75% of meals: bolus 1/2 can;               if PO intakes 25-50% of meals: bolus 1 can;               if PO intakes <25% of meals: bolus 1.5 cans.  (1 can Suplena provides 425 kcals, 10.6gm protein and 175mL of free water)  4.) Continue MVI, vitamin C.               Add zinc.   5.) Daily weights.      Goals: 1.) Pt to consume/tolerate >50% of meals by follow up. 2.) Pt to achieve/tolerate >75% EEN and EPN by follow up.   Nutrition Goal Status: new  Communication of [place] Recs: reviewed with RN

## 2020-04-27 NOTE — PLAN OF CARE
Problem: Occupational Therapy Goal  Goal: Occupational Therapy Goal  Description  Goals to be met by: 5/3     Patient will increase functional independence with ADLs by performing:    Bed mobility and supine to sit with minimal assistance. Goal met  UE Dressing while seated EOB with Minimal Assistance.  Grooming while standing with Minimal Assistance.  Toileting from bedside commode with Minimal Assistance for hygiene and clothing management.   Toilet transfer to bedside commode with Minimal Assistance.  Functional mobility at short household distance for ADL task with RW and mod(A).      Outcome: Ongoing, Progressing    Goals remain appropriate   Sonja Carballo OT  4/27/2020

## 2020-04-27 NOTE — PLAN OF CARE
Pt AAOx4, some signs of mild confusion. VSS. No complaints of pain. Free of falls and injuries. BS monitoring maintained per orders. Water bolus of 350ml 4x daily maintained, 1.5 cans tube feeding w/ ,eals maintained per orders. PEG tube care done per orders. Isolation precautions maintained per orders. Pt received correction insulin per orders.  No acute changes, see previous notes, will continue to monitor.   Problem: Fall Injury Risk  Goal: Absence of Fall and Fall-Related Injury  Outcome: Ongoing, Progressing  Intervention: Identify and Manage Contributors to Fall Injury Risk  Flowsheets (Taken 4/27/2020 1554)  Self-Care Promotion: independence encouraged; BADL personal objects within reach; BADL personal routines maintained  Medication Review/Management: medications reviewed  Intervention: Promote Injury-Free Environment  Flowsheets (Taken 4/27/2020 1554)  Safety Promotion/Fall Prevention: assistive device/personal item within reach; bed alarm set; chair alarm set; diversional activities provided; Fall Risk reviewed with patient/family; Fall Risk signage in place; in recliner, wheels locked; lighting adjusted; medications reviewed; nonskid shoes/socks when out of bed; side rails raised x 2  Environmental Safety Modification: assistive device/personal items within reach; clutter free environment maintained; lighting adjusted; room organization consistent     Problem: Adult Inpatient Plan of Care  Goal: Plan of Care Review  Outcome: Ongoing, Progressing  Flowsheets (Taken 4/27/2020 1554)  Plan of Care Reviewed With: patient  Goal: Patient-Specific Goal (Individualization)  Outcome: Ongoing, Progressing  Goal: Absence of Hospital-Acquired Illness or Injury  Outcome: Ongoing, Progressing  Intervention: Identify and Manage Fall Risk  Flowsheets (Taken 4/27/2020 1554)  Safety Promotion/Fall Prevention: assistive device/personal item within reach; bed alarm set; chair alarm set; diversional activities provided; Fall  Risk reviewed with patient/family; Fall Risk signage in place; in recliner, wheels locked; lighting adjusted; medications reviewed; nonskid shoes/socks when out of bed; side rails raised x 2  Intervention: Prevent VTE (venous thromboembolism)  Flowsheets (Taken 4/27/2020 1554)  VTE Prevention/Management: ambulation promoted; bleeding precautions maintained; bleeding risk assessed; bleeding risk factor(s) identified, provider notified; fluids promoted  Goal: Optimal Comfort and Wellbeing  Outcome: Ongoing, Progressing  Intervention: Provide Person-Centered Care  Flowsheets (Taken 4/27/2020 1554)  Trust Relationship/Rapport: care explained; choices provided; emotional support provided; empathic listening provided; questions answered; thoughts/feelings acknowledged; reassurance provided; questions encouraged  Goal: Readiness for Transition of Care  Outcome: Ongoing, Progressing  Goal: Rounds/Family Conference  Outcome: Ongoing, Progressing     Problem: Diabetes Comorbidity  Goal: Blood Glucose Level Within Desired Range  Outcome: Ongoing, Progressing  Intervention: Maintain Glycemic Control  Flowsheets (Taken 4/27/2020 1554)  Glycemic Management: blood glucose monitoring     Problem: Adjustment to Illness (Sepsis/Septic Shock)  Goal: Optimal Coping  Outcome: Ongoing, Progressing  Intervention: Optimize Psychosocial Adjustment to Illness  Flowsheets (Taken 4/27/2020 1554)  Supportive Measures: active listening utilized; self-care encouraged; verbalization of feelings encouraged  Family/Support System Care: self-care encouraged; support provided     Problem: Bleeding (Sepsis/Septic Shock)  Goal: Absence of Bleeding  Outcome: Ongoing, Progressing  Intervention: Minimize Bleeding Risk  Flowsheets (Taken 4/27/2020 1554)  Bleeding Precautions: blood pressure closely monitored     Problem: Glycemic Control Impaired (Sepsis/Septic Shock)  Goal: Blood Glucose Level Within Desired Range  Outcome: Ongoing,  Progressing  Intervention: Optimize Glycemic Control  Flowsheets (Taken 4/27/2020 1554)  Glycemic Management: blood glucose monitoring     Problem: Hemodynamic Instability (Sepsis/Septic Shock)  Goal: Effective Tissue Perfusion  Outcome: Ongoing, Progressing  Intervention: Optimize Blood Flow  Flowsheets (Taken 4/27/2020 1554)  Fluid/Electrolyte Management: fluids provided     Problem: Infection (Sepsis/Septic Shock)  Goal: Absence of Infection Signs/Symptoms  Outcome: Ongoing, Progressing  Intervention: Prevent or Manage Infection Progression  Flowsheets (Taken 4/27/2020 1554)  Infection Prevention: cohorting utilized; personal protective equipment utilized; environmental surveillance performed; equipment surfaces disinfected; rest/sleep promoted; single patient room provided; visitors restricted/screened  Fever Reduction/Comfort Measures: lightweight bedding; lightweight clothing  Infection Management: aseptic technique maintained  Isolation Precautions: airborne precautions maintained; contact precautions maintained; droplet precautions maintained; protective environment maintained     Problem: Nutrition Impaired (Sepsis/Septic Shock)  Goal: Optimal Nutrition Intake  Outcome: Ongoing, Progressing  Intervention: Promote and Optimize Nutrition Delivery  Flowsheets (Taken 4/27/2020 1554)  Nutrition Support Management: weight trending reviewed; tube feeding initiated     Problem: Respiratory Compromise (Sepsis/Septic Shock)  Goal: Effective Oxygenation and Ventilation  Outcome: Ongoing, Progressing  Intervention: Promote Airway Secretion Clearance  Flowsheets (Taken 4/27/2020 1554)  Cough And Deep Breathing: done independently per patient  Activity Management: activity encouraged; activity clustered for rest period; activity adjusted per tolerance; up to bedside commode - L3; ambulated - L4; up in chair - L3  Intervention: Optimize Oxygenation and Ventilation  Flowsheets (Taken 4/27/2020 1554)  Head of Bed (HOB): HOB  at 20-30 degrees     Problem: Electrolyte Imbalance (Acute Kidney Injury/Impairment)  Goal: Serum Electrolyte Balance  Outcome: Ongoing, Progressing     Problem: Fluid Imbalance (Acute Kidney Injury/Impairment)  Goal: Optimal Fluid Balance  Outcome: Ongoing, Progressing     Problem: Hematologic Alteration (Acute Kidney Injury/Impairment)  Goal: Hemoglobin, Hematocrit and Platelets Within Normal Range  Outcome: Ongoing, Progressing     Problem: Oral Intake Inadequate (Acute Kidney Injury/Impairment)  Goal: Optimal Nutrition Intake  Outcome: Ongoing, Progressing     Problem: Renal Function Impairment (Acute Kidney Injury/Impairment)  Goal: Effective Renal Function  Outcome: Ongoing, Progressing     Problem: Coping Ineffective  Goal: Effective Coping  Outcome: Ongoing, Progressing     Problem: Infection  Goal: Infection Symptom Resolution  Outcome: Ongoing, Progressing     Problem: Skin Injury Risk Increased  Goal: Skin Health and Integrity  Outcome: Ongoing, Progressing     Problem: Malnutrition  Goal: Improved Nutritional Intake  Outcome: Ongoing, Progressing

## 2020-04-27 NOTE — PLAN OF CARE
04/27/20 1349   Discharge Reassessment   Assessment Type Discharge Planning Reassessment   Do you have any problems affording any of your prescribed medications? No   Discharge Plan A Skilled Nursing Facility   Discharge Plan B Home with family;Home Health   DME Needed Upon Discharge  other (see comments)  (bedside commode, shower chair, walker, rolling, wheelchair )   Anticipated Discharge Disposition SNF       Deven Lange RN MSN  Critical Care-   Ext. [phone]

## 2020-04-27 NOTE — PT/OT/SLP PROGRESS
Occupational Therapy   Treatment    Name: Marie Reis Jr.  MRN: [id number]  Admitting Diagnosis:  PEG tube malfunction  6 Days Post-Op    Recommendations:     Discharge Recommendations: nursing facility, skilled  Discharge Equipment Recommendations:  bedside commode, shower chair, walker, rolling, wheelchair  Barriers to discharge:  Decreased caregiver support    Assessment:     Marie Reis Jr. is a 64 y.o. male with a medical diagnosis of PEG tube malfunction.  Patient confused during session but able to follow simple commands. Patient stand by assist for bed mobility. He completed grooming while sitting EOB with good sitting balance. Patient declined further activities during OT session. Performance deficits affecting function are weakness, impaired endurance, impaired self care skills, impaired functional mobilty, impaired cognition, decreased safety awareness, decreased lower extremity function, decreased upper extremity function, impaired cardiopulmonary response to activity.     Rehab Prognosis:  Good; patient would benefit from acute skilled OT services to address these deficits and reach maximum level of function.       Plan:     Patient to be seen 3 x/week to address the above listed problems via self-care/home management, therapeutic activities, therapeutic exercises  · Plan of Care Expires: 05/19/20  · Plan of Care Reviewed with: patient    Subjective     Patient agreeable to OT session.   Pain/Comfort:  · Pain Rating 1: 0/10    Objective:     Communicated with: RN prior to session.  Patient found supine with PEG Tube, telemetry upon OT entry to room.    General Precautions: Standard, airborne, droplet, fall, contact   Orthopedic Precautions:N/A   Braces: N/A     Occupational Performance:     Bed Mobility:    · Patient completed Rolling/Turning to Left with  stand by assistance  · Patient completed Supine to Sit with stand by assistance  · Patient completed Sit to Supine with stand by assistance     Activities  of Daily Living:  · Grooming: set-up for brushing teeth sitting EOB patient with intensive brushing x 5 minutes for teeth, patient was able to complete task without cueing to stop task     [facility] 6 Click ADL: 12    Treatment & Education:  -Patient sat EOB for session with good standing balance requiring stand by assist for balance during functional activity   -Patient confused during session but followed commands   -Patient declined further therapy  -Patient educated on plan of care and role of OT     Patient left supine with all lines intact and call button in reachEducation:      GOALS:   Multidisciplinary Problems     Occupational Therapy Goals        Problem: Occupational Therapy Goal    Goal Priority Disciplines Outcome Interventions   Occupational Therapy Goal     OT, PT/OT Ongoing, Progressing    Description:  Goals to be met by: 5/3     Patient will increase functional independence with ADLs by performing:    Bed mobility and supine to sit with minimal assistance. Goal met  UE Dressing while seated EOB with Minimal Assistance.  Grooming while standing with Minimal Assistance.  Toileting from bedside commode with Minimal Assistance for hygiene and clothing management.   Toilet transfer to bedside commode with Minimal Assistance.  Functional mobility at short household distance for ADL task with RW and mod(A).                       Time Tracking:     OT Date of Treatment: 04/27/20  OT Start Time: 1406  OT Stop Time: 1420  OT Total Time (min): 14 min    Billable Minutes:Self Care/Home Management 14    Sonja Carballo OT  4/27/2020

## 2020-04-27 NOTE — PT/OT/SLP EVAL
Speech Language Pathology Evaluation  Bedside Swallow    Patient Name:  Marie Reis Jr.   MRN:  [id number]  Admitting Diagnosis: PEG tube malfunction    Recommendations:                 General Recommendations:  Cognitive-linguistic therapy  Diet recommendations:  Regular, Thin   Aspiration Precautions: 1 bite/sip at a time, Eliminate distractions, Feed only when awake/alert, Small bites/sips and Standard aspiration precautions   General Precautions: Standard, airborne, contact, droplet, fall  Communication strategies:  provide increased time to answer    History:     Past Medical History:   Diagnosis Date    Acute on chronic combined systolic and diastolic heart failure 3/22/2020    Binocular vision disorder with diplopia 9/22/2016    Bradycardia     Cataract     Cervical spondylosis 10/1/2015    Chronic diastolic congestive heart failure     Chronic diastolic heart failure 1/11/2018    Coronary artery disease due to calcified coronary lesion 3/19/2018    Dyslipidemia 6/26/2015    Encounter for blood transfusion     Hearing impairment 10/25/2013    History of stroke 3/23/2015    Hypertension associated with diabetes 1/11/2018    Hypertensive retinopathy of both eyes 9/26/2017    Lumbar spondylosis 10/1/2015    Multiple myeloma not having achieved remission 1/16/2018    NSTEMI (non-ST elevated myocardial infarction) 3/22/2020    SUZANNA on CPAP     Persistent proteinuria 1/11/2018    Pneumonia of right lower lobe due to infectious organism 3/22/2020    Spondylolisthesis of lumbar region 10/1/2015    Strabismus     Stroke 2014    Thoracic spondylosis 10/1/2015    Type 2 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, with long-term current use of insulin 9/26/2017    Type 2 diabetes mellitus with diabetic polyneuropathy, with long-term current use of insulin 9/28/2015    Type 2 diabetes mellitus with stage 4 chronic kidney disease, with long-term current  use of insulin 1/11/2018       Past Surgical History:   Procedure Laterality Date    CATARACT EXTRACTION W/  INTRAOCULAR LENS IMPLANT Left 4/30/15    D[facility]    COLONOSCOPY N/A 1/20/2020    Procedure: COLONOSCOPY;  Surgeon: Salvador Espinosa MD;  Location: [facility];  Service: Endoscopy;  Laterality: N/A;    ESOPHAGOGASTRODUODENOSCOPY N/A 4/21/2020    Procedure: EGD (ESOPHAGOGASTRODUODENOSCOPY);  Surgeon: Abdoulaye Delacruz MD;  Location: [facility] ([address] [place] [address]);  Service: Endoscopy;  Laterality: N/A;    EYE SURGERY      cataract removal left eye    HAND SURGERY  2/2012    DR. BAKER [facility]    left hand fracture sx      LT EYE   5/2/15    DR KULLMAN [facility]     Prior Intubation HX: Intubated for 30 minutes on 4/21/20 for PEG replacement    Modified Barium Swallow: None on file    Chest X-Rays 4/18/20: Mildly accentuated interstitial lung markings which may relate to hypoventilatory status.  Mild interstitial edema not excluded.  No new confluent airspace consolidation.    Prior diet: reg/thin    Subjective     Patient awake and alert during session  Communicated with nurse prior to session    Pain/Comfort:  · Pain Rating 1: 0/10  · Pain Rating Post-Intervention 1: 0/10    Objective:     Oral Musculature Evaluation  · Oral Musculature: WFL  · Dentition: present and adequate  · Secretion Management: adequate  · Mucosal Quality: adequate  · Mandibular Strength and Mobility: WFL  · Oral Labial Strength and Mobility: WFL  · Lingual Strength and Mobility: WFL  · Buccal Strength and Mobility: WFL  · Volitional Cough: adequate  · Volitional Swallow: timely; good laryngeal elevation  · Voice Prior to PO Intake: WFL    Bedside Swallow Eval:   Consistencies Assessed:  · Thin liquids x8 (via straw)  · Solids x4 (saltine crackers)     Oral Phase:   · WFL    Pharyngeal Phase:   · no overt clinical signs/symptoms of aspiration  · no overt clinical signs/symptoms of pharyngeal dysphagia    Compensatory  Strategies  · None    Treatment: Patient seen for a bedside swallow eval and ongoing cognitive therapy. He was sitting up in bed during session. Patient with improved orientation and memory during session, but he continued to demonstrate intermittent confusion that required redirection. Patient recalled address with min assist and was oriented to self and place independently. He oriented to month and day with mod assist. He answered 3/4 complex yes/no questions with min assist and was 1/3 with problem solving/safety awareness questions and min assist. SLP educated patient regarding POC, but he would benefit from ongoing instruction.     Assessment:     Marie Reis Jr. is a 64 y.o. male with an SLP diagnosis of Cognitive-Linguistic Impairment.      Goals:   Multidisciplinary Problems     SLP Goals        Problem: SLP Goal    Goal Priority Disciplines Outcome   SLP Goal     SLP Ongoing, Progressing   Description:  Speech-Language Pathology Goals  Goals to be met by 4/27/20  1. Patient will orient x4 with max assist.   2. Patient will recall personal information with 75% accuracy and max assist.   3. Patient will answer complex yes/no questions with 50% accuracy and mod assist.   4. Patient will answer simple problem solving questions with 50% accuracy and mod assist.  5. Patient will participate in ongoing assessment of cognition/language.                    Plan:     · Patient to be seen:  3 x/week   · Plan of Care expires:  05/20/20  · Plan of Care reviewed with:  patient   · SLP Follow-Up:  Yes       Discharge recommendations:  nursing facility, skilled   Barriers to Discharge:  Level of Skilled Assistance Needed     Time Tracking:     SLP Treatment Date:   04/27/20  Speech Start Time:  1016  Speech Stop Time:  1033     Speech Total Time (min):  17 min    Billable Minutes: Speech Therapy Individual 9 and Treatment Swallowing Dysfunction 8    Karson Randall CCC-SLP  Speech-Language Pathology  Pager:  [phone]   04/27/2020

## 2020-04-27 NOTE — PLAN OF CARE
Problem: SLP Goal  Goal: SLP Goal  Description  Speech-Language Pathology Goals  Goals to be met by 4/27/20  1. Patient will orient x4 with max assist.   2. Patient will recall personal information with 75% accuracy and max assist.   3. Patient will answer complex yes/no questions with 50% accuracy and mod assist.   4. Patient will answer simple problem solving questions with 50% accuracy and mod assist.  5. Patient will participate in ongoing assessment of cognition/language.   Outcome: Ongoing, Progressing     Patient seen for a bedside swallow eval and ongoing cognitive therapy.    Karson Randall CCC-SLP  Speech-Language Pathology  Pager: [phone]

## 2020-04-27 NOTE — PLAN OF CARE
Pt is AAOx4 watching television, repositions self in bed. HOB elevated in semi fowlers position. Gtube placement verified by GI content, GI content returned to stomach, continuous feeding in progress, pt tolerating well. Gtube dressing moist, small amount of serosanguinous drainage noted to dressing. Gtube site cleansed with soap and water, 4x4 split gauze placed to site. Blood sugar levels monitored, coverage given as ordered by MAR. No s/s of pain or discomfort at time. Safety and Aspiration precautions maintained at all times. Will cont. To monitor.

## 2020-04-27 NOTE — PLAN OF CARE
[facility]     Department of Hospital Medicine     [facility] [place] [address]     [phone] after hours  [phone] fax       NURSING HOME ORDERS    Patient Name: Marie Reis Jr.  YOB: 1955    05/01/2020    Admit to Nursing Home:      Skilled Bed                                                 Diagnoses:  Active Hospital Problems    Diagnosis  POA    *PEG tube malfunction [K94.23]  Yes    Altered behavior [R46.89]  Yes    Urinary retention [R33.9]  Yes    Delirium [R41.0]  Yes    Severe protein-calorie malnutrition [E43]  Yes    Encephalopathy [G93.40]  Yes    Hyperammonemia [E72.20]  Yes    Physical debility [R53.81]  Yes    Altered mental status [R41.82]  Yes    Advanced care planning/counseling discussion [Z71.89]  Not Applicable    Palliative care encounter [Z51.5]  Not Applicable    Counseling regarding advance care planning and goals of care [Z71.89]  Not Applicable    Chronic kidney disease, stage IV (severe) [N18.4]  Yes    Failure to thrive in adult [R62.7]  Yes    Type 2 diabetes mellitus [E11.9]  Yes    Physical deconditioning [R53.81]  Yes    Coronary artery disease involving native coronary artery of native heart without angina pectoris [I25.10]  Yes    COVID-19 virus infection [U07.1]  Yes    Anemia, chronic disease [D63.8]  Yes    Acute renal failure superimposed on stage 4 chronic kidney disease [N17.9, N18.4]  No    Acute encephalopathy [G93.40]  Yes    Benign hypertension with chronic kidney disease, stage III [I12.9, N18.3]  Yes    Chronic combined systolic and diastolic heart failure [I50.42]  Yes    History of right PCA stroke [Z86.73]  Not Applicable    Acute CVA (cerebrovascular accident) [I63.9]  Yes     3.5 cm acute cortical infarct in the medial right occipital lobe - as per MRI on 3/17/20        Resolved Hospital Problems   No resolved problems to display.       Patient is homebound due  to:  PEG tube malfunction    Allergies:  Review of patient's allergies indicates:   Allergen Reactions    Hydralazine analogues      Increase swelling and throat itching and tightness with use.  Symptoms correlate only with this medication onset in hospital.       Vitals:   Every shift (Skilled Nursing patients)    Diet: regular diet                     Supplement:  1 can every three times a day with meals                         Type:    Nepro       Tube Feeding:       Bolus feeding 1.5 cans Suplena with Carb steady TID (at meal times) bolus.   Continue enteral water flush free water 350cc via PEG QID      Acitivities:  - Up in a chair each morning as tolerated  - Ambulate with assistance to bathroom  - Scheduled walks once each shift (every 8 hours)  - May use walker, cane, or self-propelled wheelchair       - Weight bearing: as tolerated     LABS:  Per facility protocol   CMP, CBC each month for 3 months       Nursing Precautions:     - Aspiration precautions:             - Total assistance with meals            -  Upright 90 degrees befor during and after meals             -  Suction at bedside          - Fall precautions per nursing home protocol   - Seizure precaution per [facility] protocol   - Decubitus precautions:        -  for positioning   - Pressure reducing foam mattress   - Turn patient every two hours. Use wedge pillows to anchor patient  - Assistance with meals  - Routine PEG care: keep sufficient padding under bumper while secured with abdominal binder when not inuse.     CONSULTS:     Physical Therapy to evaluate and treat     Occupational Therapy to evaluate and treat     Speech Therapy  to evaluate and treat     Nutrition to evaluate and recommend diet     Psychiatry to evaluate and follow patients for delirium      to evaluate for hospice, and clarify code status with family/                                       healthcare power of     MISCELLANEOUS CARE:        PEG Care:  Clean site every 24 hours        Routine Skin for Bedridden Patients:  Apply moisture barrier cream to all    skin folds and wet areas in perineal area daily and after baths and                           all bowel movements.    -continue droplet and contact precautions per COVID patients per facility protocol.         DIABETES CARE:       Check blood sugar:          Fingerstick blood sugar AC and HS       Report CBG < 60 or > 400 to physician.                                          Insulin Sliding Scale          Glucose  Novolog Insulin Subcutaneous        0 - 60   Orange juice or glucose tablet, hold insulin      No insulin   201-250  2 units   251-300  4 units   301-350  6 units   351-400  8 units   >400   10 units then call physician      Medications: Discontinue all previous medication orders, if any. See new list below.    - can change meds to crushed per G tube PRN if patient refuses oral medications per SNF discretion.      Marie Reis Jr.   Home Medication Instructions VASU:[id number]    Printed on:04/30/20 [id number]   Medication Information                      amLODIPine (NORVASC) 10 MG tablet  Take 1 tablet (10 mg total) by mouth once daily.             ascorbic acid, vitamin C, (VITAMIN C) 500 MG tablet  Take 1 tablet (500 mg total) by mouth 2 (two) times daily.             aspirin (ECOTRIN) 81 MG EC tablet  Take 1 tablet (81 mg total) by mouth once daily.             atorvastatin (LIPITOR) 40 MG tablet  Take 1 tablet (40 mg total) by mouth every evening.             bisacodyL (DULCOLAX) 10 mg Supp  Place 1 suppository (10 mg total) rectally daily as needed (Until bowel movement if patient has no bowel movement for 2 days).                          calcitRIOL (ROCALTROL) 0.25 MCG Cap  Take 2 capsules (0.5 mcg total) by mouth once daily.             carvediloL (COREG) 25 MG tablet  Take 1 tablet (25 mg total) by mouth 2 (two) times daily with meals.             clopidogreL (PLAVIX) 75  mg tablet  Take 1 tablet (75 mg total) by mouth once daily.                                       fluticasone (FLONASE) 50 mcg/actuation nasal spray  1 spray by Each Nare route 2 (two) times daily as needed for nasal congestion/ Rhinitis.             gabapentin (NEURONTIN) 300 MG capsule  Take 1 capsule (300 mg total) by mouth daily as needed for neuropathy/ lower extremity pain             insulin aspart U-100 (NOVOLOG) 100 unit/mL (3 mL) InPn pen  Inject 2 Units into the skin 3 (three) times daily.             insulin detemir U-100 (LEVEMIR FLEXTOUCH) 100 unit/mL (3 mL) SubQ InPn pen  Inject 6 Units into the skin every evening.                          montelukast (SINGULAIR) 10 mg tablet  Take 1 tablet (10 mg total) by mouth every evening.             omeprazole (PRILOSEC OTC) 20 MG tablet  Take 1 tablet (20 mg total) by mouth once daily.             ondansetron (ZOFRAN-ODT) 8 MG TbDL  Take 1 tablet (8 mg total) by mouth every 6 (six) hours as needed for nausea                        polyethylene glycol (GLYCOLAX) 17 gram PwPk  17 g by Per G Tube route once daily.             senna-docusate 8.6-50 mg (PERICOLACE) 8.6-50 mg per tablet  Take 1 tablet by mouth once daily.             sodium bicarbonate 650 MG tablet  Take 1 tablet (650 mg total) by mouth once daily.             vitamin D (VITAMIN D3) 1000 units Tab  Take 1,000 Units by mouth once daily.                     _________________________________  Manish Machado MD  05/01/2020

## 2020-04-28 NOTE — PROGRESS NOTES
Progress Note  Hospital Medicine  [facility], [facility]       Patient Name: Marie Reis Jr.  MRN:  [id number]  Hospital Medicine Team: [facility] K Magali Schrader MD  Date of Admission:  4/18/2020     Length of Stay:  LOS: 9 days   Expected Discharge Date: 4/30/2020  Principal Problem:  PEG tube malfunction     Subjective:     Interval History/Overnight Events:  Doing well  No acute issues   Tolerating TFs via PEG tube.  Labs showing increasing Cr up to 4.9 , from baseline of 3.5.  IVF given and Cr is improving . K+ replaced today , was noted to be 2.6 on AM labs.   Psychiatry was able to evaluated the patient and their recs were reviewed.      amLODIPine  10 mg Per G Tube Daily    ascorbic acid (vitamin C)  500 mg Oral BID    aspirin  81 mg Per G Tube Daily    atorvastatin  40 mg Per G Tube QHS    carvediloL  25 mg Oral BID WM    clopidogreL  75 mg Oral Daily    heparin (porcine)  5,000 Units Subcutaneous Q12H    insulin detemir U-100  6 Units Subcutaneous QHS    melatonin  6 mg Oral Nightly    multivitamin  1 tablet Oral Daily    pantoprazole  40 mg Oral Daily    sodium bicarbonate  650 mg Oral BID    thiamine  100 mg Oral Daily    vitamin D  1,000 Units Per G Tube Daily           acetaminophen, bisacodyL, Dextrose 10% Bolus, Dextrose 10% Bolus, Dextrose 10% Bolus, glucagon (human recombinant), glucose, glucose, insulin aspart U-100, labetalol, OLANZapine, ondansetron, sodium chloride 0.9%    Review of Systems  Limited due to AMS , but able to obtain   Constitutional: Negative for chills, fatigue, fever.   HENT: Negative for sore throat, trouble swallowing.    Respiratory: Negative for cough, shortness of breath.    Cardiovascular: Negative for chest pain, palpitations, leg swelling.   Gastrointestinal: Negative for abdominal pain, constipation, diarrhea, nausea, vomiting. .   Musculoskeletal: Negative for arthralgias, myalgias.   Skin: Negative for rash, wound, erythema    Neurological: Negative for dizziness, syncope, weakness, light-headedness.     Objective:     Temp:  [97.1 °F (36.2 °C)-98.7 °F (37.1 °C)]   Pulse:  [69-81]   Resp:  [14-18]   BP: (129-175)/(72-86)   SpO2:  [99 %-100 %]         Weight change:    Body mass index is 19.4 kg/m².       Physical Exam:  Gen: NAD, conversant  Head: Abrasion with small echymosis over left eye  Eyes: Pupils dilated but reactive. Does not follow commands to check EOM  Throat: Dry mucous membranes, OP clear  CV: RRR,  no peripheral edema,  Resp: no increased work of breathing on room air  GI: Soft, NT, ND. Catheter in place in PEG tube site with a small amount of bloody drainage surrounding. Nontender and not erythematous surrounding site.   Ext: No joint swelling or tenderness  No edema   Muscle wasting   Neuro: moves all extremities spontaneously, 4.5/5 motor strength throughout, follows simple instructions. Oriented to self and person (knows his wife's name) but not to place or time.    Psychiatry: Calm  Flat affect     Labs:    Chemistries:   Recent Labs   Lab 04/22/20  0447  04/23/20  0203  04/26/20 0405 04/27/20 0427 04/28/20  0345      < > 138   < > 140 141 136   K 2.6*   < > 3.6   < > 4.2 4.1 4.2      < > 113*   < > 111* 113* 109   CO2 17*   < > 14*   < > 22* 19* 20*   BUN 62*   < > 46*   < > 32* 27* 27*   CREATININE 4.9*   < > 3.6*   < > 2.2* 2.2* 2.2*   CALCIUM 8.3*   < > 8.0*   < > 8.3* 8.2* 8.0*   MG 2.5  --  2.1  --   --   --   --    PHOS 3.8   < > 2.5*  2.5*   < > 2.4* 2.6* 2.4*    < > = values in this interval not displayed.        WBC:   Recent Labs   Lab 04/24/20  0333 04/25/20  0328 04/26/20  0405 04/27/20  0427 04/28/20  0345   WBC 6.67 6.72 7.84 5.50 5.79     Bands:     CBC/Anemia Labs: Coags:    Recent Labs   Lab 04/26/20  0405 04/27/20  0427 04/28/20  0345   WBC 7.84 5.50 5.79   HGB 8.2* 7.9* 7.5*   HCT 26.2* 25.1* 23.4*    247 221   MCV 85 84 85   RDW 15.5* 15.5* 15.7*    No results for input(s):  PT, INR, APTT in the last 168 hours.     POCT Glucose: HbA1c:    Recent Labs   Lab 04/27/20  0620 04/27/20  1154 04/27/20  1810 04/27/20  2137 04/28/20  1037 04/28/20  1156   POCTGLUCOSE 148* 308* 231* 274* 204* 226*    Hemoglobin A1C   Date Value Ref Range Status   03/09/2020 9.1 (H) 4.0 - 5.6 % Final     Comment:   02/12/2020 10.5 (H) 4.0 - 5.6 % Final     Comment:   12/10/2019 9.2 (H) 4.0 - 5.6 % Final     Comment:          Assessment and Plan     Hospital Course:    Mr. Marie Reis Jr. was admitted to Hospital Medicine for management of  PEG tube malfunction     Active Hospital Problems    Diagnosis  POA    *PEG tube malfunction [K94.23]  Yes    Altered behavior [R46.89]  Yes    Urinary retention [R33.9]  Yes    Delirium [R41.0]  Yes    Severe protein-calorie malnutrition [E43]  Yes    Encephalopathy [G93.40]  Yes    Hyperammonemia [E72.20]  Yes    Physical debility [R53.81]  Yes    Altered mental status [R41.82]  Yes    Advanced care planning/counseling discussion [Z71.89]  Not Applicable    Palliative care encounter [Z51.5]  Not Applicable    Counseling regarding advance care planning and goals of care [Z71.89]  Not Applicable    Chronic kidney disease, stage IV (severe) [N18.4]  Yes    Failure to thrive in adult [R62.7]  Yes    Type 2 diabetes mellitus [E11.9]  Yes    Physical deconditioning [R53.81]  Yes    Coronary artery disease involving native coronary artery of native heart without angina pectoris [I25.10]  Yes    COVID-19 virus infection [U07.1]  Yes    Anemia, chronic disease [D63.8]  Yes    Acute renal failure superimposed on stage 4 chronic kidney disease [N17.9, N18.4]  No    Acute encephalopathy [G93.40]  Yes    Benign hypertension with chronic kidney disease, stage III [I12.9, N18.3]  Yes    Chronic combined systolic and diastolic heart failure [I50.42]  Yes    History of right PCA stroke [Z86.73]  Not Applicable    Acute CVA (cerebrovascular accident) [I63.9]  Yes     3.5  "cm acute cortical infarct in the medial right occipital lobe - as per MRI on 3/17/20        Resolved Hospital Problems   No resolved problems to display.       #PEG tube malfunction  -S/p replacement prior to presentation with good positioning on CT  - GI following   - s/p for PEG replacement on 4/21 with GI , TFs restarted      #sub- Acute encephalopathy  # Delirium  # Acute CVA (cerebrovascular accident)  -Subacute encephalopathy, that started after patient's stroke, per wife's report. MRI brain on 3/17 showed '3.5 cm acute cortical infarct in the medial right occipital lobe.", more inclined to think that CVA may be largely contributing to the AMS . COVID infection may be also contributing to component on psychosis. Elevated ammonia (seen on prior labs) is unlikely to be the culprit, and suspect it was elevated perhaps in setting of malnutrition, patient with no cirrhosis and normal LFTs. BUN is 50 so unlikely due to uremia. Psychiatric component remains high on DDX for this AMS- concern for psychosis vs other psychiatric conditions- patient is a former vet with hx of PTSD, with recent psychologic trauma from deaths of two of his family members .   - per prior documentation , patient refusing to eat, leading to placement of PEG tube for supplemental nutrition. Recently patient pulled the PEG tube out.   - psychiatry consulted . Unable to see patient due to COVID status (?), patient refused to speak on the phone. It is not unanticipated that patient is unwilling or unable to speak on the phone due to his his current condition   - cont PRN IM zyprexa for non-redirectable agitation  - cont DAPT, BP meds and insulin for recent CVA. Not on statin- suspect due to fairly unremarkable lipid panel and significant weight loss   - palliative care involved on the case- patient has a living will , indicating that he wished all aggressive measures and full code  - may benefit from neurology outpatient eval   - Psychiatry " planning on interviewing the patient 4/22     #CKD IV  # Acute renal failure superimposed on stage 4 chronic kidney disease  - Cr baseline around 3.5.   - Cr increasing to 4.9 on 4/22, likely due to dehydration and decreased PO intake  - given IVF - LR infusion and bolus   - UA ordered and has not been colleted yet  - patient has a sims in , for urinary retention   - receiving enteral water per PEG tube     #CAD   #chronic combined systolic and diastolic heart failure  - Recent NSTEMI, on DAPT, statin, B Blcoker. Continue  HF not decompensated.      #Anemia  -hbg stable 9  - monitor      #HTN  Continue home meds    # Viral Pneumonia due to COVID-19  - COVID testing + on 3/21 and 4/12  - on RA  Monitor   - COVID precautions   - re testing ordered on 4/22    # Physical debility  # Failure to thrive in adult  -     # T2DM  - detemir as above     # Severe protein-calorie malnutrition  - per prior documentation , patient refusing to eat, leading to placement of PEG tube for supplemental nutrition. Recently patient pulled the PEG tube out.   Nutrition assessment on 4/16 noted :   1.) ADAT to regular (to encourage intake) with Boost Glucose Control TID.   2.) Begin sliding scale bolus feeds using Suplena:               If pt consuming >75% of meals-no bolus;               if PO intake 50-75% of meals: bolus 1/2 can;               if PO intakes 25-50% of meals: bolus 1 can;               if PO intakes <25% of meals: bolus 1.5 cans.  (1 can Suplena provides 425 kcals, 10.6gm protein and 175mL of free water)  3.) If continuous feeds preferred, suggest Suplena @ 20mL/hr advancing 10mL q4h to goal rate 50mL/hr to provide 2160 kcals, 54gm protein and 886mL of free water.               MD to manage fluid.               If GRV >500mL, hold TF q4h then restart regimen.               TF to meet 99% EEN and 87% EPN.   4.) Suggest vitamin C, zinc, MVI due to COVID19.     Disposition:      Would plan to d/c patient back to [facility]  or another SNF when facility acceptable is obtained. Tentatively 4/23- 4/24 4/26: called Naheed at 1:05pm. No answer, general message left.     4/27: updated family via phone    Signing Physician:     Magali cortez

## 2020-04-28 NOTE — PT/OT/SLP PROGRESS
Occupational Therapy   Treatment    Name: Marie Reis Jr.  MRN: [id number]  Admitting Diagnosis:  PEG tube malfunction  7 Days Post-Op   EGD (ESOPHAGOGASTRODUODENOSCOPY)  INSERTION, PEG TUBE     Recommendations:     Discharge Recommendations: nursing facility, skilled  Discharge Equipment Recommendations:  bedside commode, walker, rolling, shower chair, wheelchair  Barriers to discharge:  Decreased caregiver support    Assessment:     Marie Reis Jr. is a 64 y.o. male with a medical diagnosis of PEG tube malfunction.  He presents with mild confusion this day but is pleasant throughout. Patient requires mod encouragement to participate initially but tolerates session well. Performance deficits affecting function are weakness, impaired endurance, impaired self care skills, impaired functional mobilty, gait instability, impaired balance, impaired cognition, decreased safety awareness, impaired cardiopulmonary response to activity.     Rehab Prognosis:  Good; patient would benefit from acute skilled OT services to address these deficits and reach maximum level of function.       Plan:     Patient to be seen 3 x/week to address the above listed problems via self-care/home management, therapeutic activities, therapeutic exercises  · Plan of Care Expires: 05/19/20  · Plan of Care Reviewed with: patient    Subjective     Pain/Comfort:  · Pain Rating 1: 0/10    Objective:     Communicated with: RN prior to session.  Patient found HOB elevated with telemetry, PEG Tube(hep lock IV) upon OT entry to room.    General Precautions: Standard, airborne, contact, droplet, fall   Orthopedic Precautions:N/A   Braces: N/A     Occupational Performance:     Bed Mobility:    · Patient completed Scooting anteriorly to EOB for foot placement on floor with stand by assistance  · Patient completed Supine to Sit to L side EOB with stand by assistance and additional time and encouragement  · Patient sat EOB to complete ADL tasks with SPV for  static/dynamic sitting balance  · Patient completed Sit to Supine with stand by assistance     Functional Mobility/Transfers:  · Patient completed Sit <> Stand Transfer with contact guard assistance  with  no assistive device   · Functional Mobility: Patient completed functional mobility within room ~5' with CGA/HHA and no AD. No LOB/SOB noted.    Activities of Daily Living:  · Grooming: set-up assistance Patient participated in oral hygiene and face wash with a washcloth while sitting EOB with set-up assist.    [facility] 6 Click ADL: 16    Treatment & Education:  Role of OT/POC  Call button for assistance    Patient left HOB elevated with all lines intact, call button in reach and RN notifiedEducation:      GOALS:   Multidisciplinary Problems     Occupational Therapy Goals        Problem: Occupational Therapy Goal    Goal Priority Disciplines Outcome Interventions   Occupational Therapy Goal     OT, PT/OT Ongoing, Progressing    Description:  Goals to be met by: 5/3     Patient will increase functional independence with ADLs by performing:    Bed mobility and supine to sit with minimal assistance. Goal met  UE Dressing while seated EOB with Minimal Assistance.  Grooming while standing with Minimal Assistance.  Toileting from bedside commode with Minimal Assistance for hygiene and clothing management.   Toilet transfer to bedside commode with Minimal Assistance.  Functional mobility at short household distance for ADL task with RW and mod(A).                       Time Tracking:     OT Date of Treatment: 04/28/20  OT Start Time: 1422  OT Stop Time: 1447  OT Total Time (min): 25 min    Billable Minutes:Self Care/Home Management 12 minutes  Therapeutic Activity 13 minutes    Oralia Romo OT  4/28/2020

## 2020-04-28 NOTE — MEDICAL/APP STUDENT
Called patients spouse (Naheed Reis) at 3:05pm and was forwarded to her voicemail. Left Ms. Reis a message informing her that we are waiting for placement of her spouse into a skilled nursing facility and that if she has any further questions today upon receiving the message, she can call the hospital  and be directed to anyone on her s care team. Will call back tomorrow with any further updates.

## 2020-04-28 NOTE — PLAN OF CARE
Goals remain appropriate.     Problem: Physical Therapy Goal  Goal: Physical Therapy Goal  Description  Goals to be met by: 2020    Patient will increase functional independence with mobility by performin. Supine <> sit with Minimal Assistance.  2. Sit <> stand transfer with Minimal Assistance using Rolling Walker.  3. Bed <> chair transfer via Stand Pivot with Minimal Assistance using Rolling Walker.  4. Gait  x 50 feet with Minimal Assistance using Rolling Walker to prepare for community ambulation and endurance activities.  5. Able to tolerate exercise for 15-20 reps with supervision.       2020 1417 by Dc Baca PTA  Outcome: Ongoing, Progressing

## 2020-04-28 NOTE — PLAN OF CARE
Problem: Fall Injury Risk  Goal: Absence of Fall and Fall-Related Injury  Outcome: Ongoing, Progressing     Problem: Adult Inpatient Plan of Care  Goal: Plan of Care Review  Outcome: Ongoing, Progressing  Goal: Patient-Specific Goal (Individualization)  Outcome: Ongoing, Progressing  Goal: Absence of Hospital-Acquired Illness or Injury  Outcome: Ongoing, Progressing  Goal: Optimal Comfort and Wellbeing  Outcome: Ongoing, Progressing  Goal: Readiness for Transition of Care  Outcome: Ongoing, Progressing  Goal: Rounds/Family Conference  Outcome: Ongoing, Progressing     Problem: Diabetes Comorbidity  Goal: Blood Glucose Level Within Desired Range  Outcome: Ongoing, Progressing     Problem: Adjustment to Illness (Sepsis/Septic Shock)  Goal: Optimal Coping  Outcome: Ongoing, Progressing     Problem: Bleeding (Sepsis/Septic Shock)  Goal: Absence of Bleeding  Outcome: Ongoing, Progressing     Problem: Glycemic Control Impaired (Sepsis/Septic Shock)  Goal: Blood Glucose Level Within Desired Range  Outcome: Ongoing, Progressing     Problem: Hemodynamic Instability (Sepsis/Septic Shock)  Goal: Effective Tissue Perfusion  Outcome: Ongoing, Progressing     Problem: Infection (Sepsis/Septic Shock)  Goal: Absence of Infection Signs/Symptoms  Outcome: Ongoing, Progressing     Problem: Nutrition Impaired (Sepsis/Septic Shock)  Goal: Optimal Nutrition Intake  Outcome: Ongoing, Progressing     Problem: Respiratory Compromise (Sepsis/Septic Shock)  Goal: Effective Oxygenation and Ventilation  Outcome: Ongoing, Progressing     Problem: Electrolyte Imbalance (Acute Kidney Injury/Impairment)  Goal: Serum Electrolyte Balance  Outcome: Ongoing, Progressing     Problem: Fluid Imbalance (Acute Kidney Injury/Impairment)  Goal: Optimal Fluid Balance  Outcome: Ongoing, Progressing     Problem: Hematologic Alteration (Acute Kidney Injury/Impairment)  Goal: Hemoglobin, Hematocrit and Platelets Within Normal Range  Outcome: Ongoing,  Progressing     Problem: Oral Intake Inadequate (Acute Kidney Injury/Impairment)  Goal: Optimal Nutrition Intake  Outcome: Ongoing, Progressing     Problem: Renal Function Impairment (Acute Kidney Injury/Impairment)  Goal: Effective Renal Function  Outcome: Ongoing, Progressing     Problem: Coping Ineffective  Goal: Effective Coping  Outcome: Ongoing, Progressing     Problem: Infection  Goal: Infection Symptom Resolution  Outcome: Ongoing, Progressing     Problem: Skin Injury Risk Increased  Goal: Skin Health and Integrity  Outcome: Ongoing, Progressing     Problem: Malnutrition  Goal: Improved Nutritional Intake  Outcome: Ongoing, Progressing

## 2020-04-28 NOTE — PROGRESS NOTES
Progress Note  Hospital Medicine  [facility], [place] [place]       Patient Name: Marie Reis Jr.  MRN:  [id number]  Hospital Medicine Team: [facility] K Magali Schrader MD  Date of Admission:  4/18/2020     Length of Stay:  LOS: 9 days   Expected Discharge Date: 4/30/2020  Principal Problem:  PEG tube malfunction     Subjective:     Interval History/Overnight Events:  Doing well  No acute issues   Tolerating TFs via PEG tube.  Labs showing increasing Cr up to 4.9 , from baseline of 3.5.  IVF given and Cr is improving . K+ replaced today , was noted to be 2.6 on AM labs.   Psychiatry was able to evaluated the patient and their recs were reviewed.      amLODIPine  10 mg Per G Tube Daily    ascorbic acid (vitamin C)  500 mg Oral BID    aspirin  81 mg Per G Tube Daily    atorvastatin  40 mg Per G Tube QHS    carvediloL  25 mg Oral BID WM    clopidogreL  75 mg Oral Daily    heparin (porcine)  5,000 Units Subcutaneous Q12H    insulin detemir U-100  6 Units Subcutaneous QHS    melatonin  6 mg Oral Nightly    multivitamin  1 tablet Oral Daily    pantoprazole  40 mg Oral Daily    sodium bicarbonate  650 mg Oral BID    thiamine  100 mg Oral Daily    vitamin D  1,000 Units Per G Tube Daily           acetaminophen, bisacodyL, Dextrose 10% Bolus, Dextrose 10% Bolus, Dextrose 10% Bolus, glucagon (human recombinant), glucose, glucose, insulin aspart U-100, labetalol, OLANZapine, ondansetron, sodium chloride 0.9%    Review of Systems  Limited due to AMS , but able to obtain   Constitutional: Negative for chills, fatigue, fever.   HENT: Negative for sore throat, trouble swallowing.    Respiratory: Negative for cough, shortness of breath.    Cardiovascular: Negative for chest pain, palpitations, leg swelling.   Gastrointestinal: Negative for abdominal pain, constipation, diarrhea, nausea, vomiting. .   Musculoskeletal: Negative for arthralgias, myalgias.   Skin: Negative for rash, wound, erythema    Neurological: Negative for dizziness, syncope, weakness, light-headedness.     Objective:     Temp:  [97.1 °F (36.2 °C)-98.7 °F (37.1 °C)]   Pulse:  [69-81]   Resp:  [14-18]   BP: (129-175)/(72-86)   SpO2:  [99 %-100 %]         Weight change:    Body mass index is 19.4 kg/m².       Physical Exam:  Gen: NAD, conversant  Head: Abrasion with small echymosis over left eye  Eyes: Pupils dilated but reactive. Does not follow commands to check EOM  Throat: Dry mucous membranes, OP clear  CV: RRR,  no peripheral edema,  Resp: no increased work of breathing on room air  GI: Soft, NT, ND. Catheter in place in PEG tube site with a small amount of bloody drainage surrounding. Nontender and not erythematous surrounding site.   Ext: No joint swelling or tenderness  No edema   Muscle wasting   Neuro: moves all extremities spontaneously, 4.5/5 motor strength throughout, follows simple instructions. Oriented to self and person (knows his wife's name) but not to place or time.    Psychiatry: Calm  Flat affect     Labs:    Chemistries:   Recent Labs   Lab 04/22/20  0447  04/23/20  0203  04/26/20 0405 04/27/20 0427 04/28/20  0345      < > 138   < > 140 141 136   K 2.6*   < > 3.6   < > 4.2 4.1 4.2      < > 113*   < > 111* 113* 109   CO2 17*   < > 14*   < > 22* 19* 20*   BUN 62*   < > 46*   < > 32* 27* 27*   CREATININE 4.9*   < > 3.6*   < > 2.2* 2.2* 2.2*   CALCIUM 8.3*   < > 8.0*   < > 8.3* 8.2* 8.0*   MG 2.5  --  2.1  --   --   --   --    PHOS 3.8   < > 2.5*  2.5*   < > 2.4* 2.6* 2.4*    < > = values in this interval not displayed.        WBC:   Recent Labs   Lab 04/24/20  0333 04/25/20  0328 04/26/20  0405 04/27/20  0427 04/28/20  0345   WBC 6.67 6.72 7.84 5.50 5.79     Bands:     CBC/Anemia Labs: Coags:    Recent Labs   Lab 04/26/20  0405 04/27/20  0427 04/28/20  0345   WBC 7.84 5.50 5.79   HGB 8.2* 7.9* 7.5*   HCT 26.2* 25.1* 23.4*    247 221   MCV 85 84 85   RDW 15.5* 15.5* 15.7*    No results for input(s):  PT, INR, APTT in the last 168 hours.     POCT Glucose: HbA1c:    Recent Labs   Lab 04/27/20  0620 04/27/20  1154 04/27/20  1810 04/27/20  2137 04/28/20  1037 04/28/20  1156   POCTGLUCOSE 148* 308* 231* 274* 204* 226*    Hemoglobin A1C   Date Value Ref Range Status   03/09/2020 9.1 (H) 4.0 - 5.6 % Final     Comment:   02/12/2020 10.5 (H) 4.0 - 5.6 % Final     Comment:   12/10/2019 9.2 (H) 4.0 - 5.6 % Final     Comment:          Assessment and Plan     Hospital Course:    Mr. Marie Reis Jr. was admitted to Hospital Medicine for management of  PEG tube malfunction     Active Hospital Problems    Diagnosis  POA    *PEG tube malfunction [K94.23]  Yes    Altered behavior [R46.89]  Yes    Urinary retention [R33.9]  Yes    Delirium [R41.0]  Yes    Severe protein-calorie malnutrition [E43]  Yes    Encephalopathy [G93.40]  Yes    Hyperammonemia [E72.20]  Yes    Physical debility [R53.81]  Yes    Altered mental status [R41.82]  Yes    Advanced care planning/counseling discussion [Z71.89]  Not Applicable    Palliative care encounter [Z51.5]  Not Applicable    Counseling regarding advance care planning and goals of care [Z71.89]  Not Applicable    Chronic kidney disease, stage IV (severe) [N18.4]  Yes    Failure to thrive in adult [R62.7]  Yes    Type 2 diabetes mellitus [E11.9]  Yes    Physical deconditioning [R53.81]  Yes    Coronary artery disease involving native coronary artery of native heart without angina pectoris [I25.10]  Yes    COVID-19 virus infection [U07.1]  Yes    Anemia, chronic disease [D63.8]  Yes    Acute renal failure superimposed on stage 4 chronic kidney disease [N17.9, N18.4]  No    Acute encephalopathy [G93.40]  Yes    Benign hypertension with chronic kidney disease, stage III [I12.9, N18.3]  Yes    Chronic combined systolic and diastolic heart failure [I50.42]  Yes    History of right PCA stroke [Z86.73]  Not Applicable    Acute CVA (cerebrovascular accident) [I63.9]  Yes     3.5  "cm acute cortical infarct in the medial right occipital lobe - as per MRI on 3/17/20        Resolved Hospital Problems   No resolved problems to display.       #PEG tube malfunction  -S/p replacement prior to presentation with good positioning on CT  - GI following   - s/p for PEG replacement on 4/21 with GI , TFs restarted      #sub- Acute encephalopathy  # Delirium  # Acute CVA (cerebrovascular accident)  -Subacute encephalopathy, that started after patient's stroke, per wife's report. MRI brain on 3/17 showed '3.5 cm acute cortical infarct in the medial right occipital lobe.", more inclined to think that CVA may be largely contributing to the AMS . COVID infection may be also contributing to component on psychosis. Elevated ammonia (seen on prior labs) is unlikely to be the culprit, and suspect it was elevated perhaps in setting of malnutrition, patient with no cirrhosis and normal LFTs. BUN is 50 so unlikely due to uremia. Psychiatric component remains high on DDX for this AMS- concern for psychosis vs other psychiatric conditions- patient is a former vet with hx of PTSD, with recent psychologic trauma from deaths of two of his family members .   - per prior documentation , patient refusing to eat, leading to placement of PEG tube for supplemental nutrition. Recently patient pulled the PEG tube out.   - psychiatry consulted . Unable to see patient due to COVID status (?), patient refused to speak on the phone. It is not unanticipated that patient is unwilling or unable to speak on the phone due to his his current condition   - cont PRN IM zyprexa for non-redirectable agitation  - cont DAPT, BP meds and insulin for recent CVA. Not on statin- suspect due to fairly unremarkable lipid panel and significant weight loss   - palliative care involved on the case- patient has a living will , indicating that he wished all aggressive measures and full code  - may benefit from neurology outpatient eval   - Psychiatry " planning on interviewing the patient 4/22     #CKD IV  # Acute renal failure superimposed on stage 4 chronic kidney disease  - Cr baseline around 3.5.   - Cr increasing to 4.9 on 4/22, likely due to dehydration and decreased PO intake  - given IVF - LR infusion and bolus   - UA ordered and has not been colleted yet  - patient has a sims in , for urinary retention   - receiving enteral water per PEG tube     #CAD   #chronic combined systolic and diastolic heart failure  - Recent NSTEMI, on DAPT, statin, B Blcoker. Continue  HF not decompensated.      #Anemia  -hbg stable 9  - monitor      #HTN  Continue home meds    # Viral Pneumonia due to COVID-19  - COVID testing + on 3/21 and 4/12  - on RA  Monitor   - COVID precautions   - re testing ordered on 4/22    # Physical debility  # Failure to thrive in adult  -     # T2DM  - detemir as above     # Severe protein-calorie malnutrition  - per prior documentation , patient refusing to eat, leading to placement of PEG tube for supplemental nutrition. Recently patient pulled the PEG tube out.   Nutrition assessment on 4/16 noted :   1.) ADAT to regular (to encourage intake) with Boost Glucose Control TID.   2.) Begin sliding scale bolus feeds using Suplena:               If pt consuming >75% of meals-no bolus;               if PO intake 50-75% of meals: bolus 1/2 can;               if PO intakes 25-50% of meals: bolus 1 can;               if PO intakes <25% of meals: bolus 1.5 cans.  (1 can Suplena provides 425 kcals, 10.6gm protein and 175mL of free water)  3.) If continuous feeds preferred, suggest Suplena @ 20mL/hr advancing 10mL q4h to goal rate 50mL/hr to provide 2160 kcals, 54gm protein and 886mL of free water.               MD to manage fluid.               If GRV >500mL, hold TF q4h then restart regimen.               TF to meet 99% EEN and 87% EPN.   4.) Suggest vitamin C, zinc, MVI due to COVID19.     Disposition:      Would plan to d/c patient back to [facility]  or another SNF when facility acceptable is obtained. Tentatively 4/23- 4/24 4/26: called Naheed at 1:05pm. No answer, general message left.     4/27: updated family via phone    Signing Physician:     Magali cortez

## 2020-04-28 NOTE — PLAN OF CARE
Problem: Occupational Therapy Goal  Goal: Occupational Therapy Goal  Description  Goals to be met by: 5/3     Patient will increase functional independence with ADLs by performing:    Bed mobility and supine to sit with minimal assistance. Goal met  UE Dressing while seated EOB with Minimal Assistance.  Grooming while standing with Minimal Assistance.  Toileting from bedside commode with Minimal Assistance for hygiene and clothing management.   Toilet transfer to bedside commode with Minimal Assistance.  Functional mobility at short household distance for ADL task with RW and mod(A).      Outcome: Ongoing, Progressing    Progressing with POC.  Oralia Romo OT  4/28/2020

## 2020-04-28 NOTE — PLAN OF CARE
Goals remain appropriate.     Problem: Physical Therapy Goal  Goal: Physical Therapy Goal  Description  Goals to be met by: 2020    Patient will increase functional independence with mobility by performin. Supine <> sit with Minimal Assistance.  2. Sit <> stand transfer with Minimal Assistance using Rolling Walker.  3. Bed <> chair transfer via Stand Pivot with Minimal Assistance using Rolling Walker.  4. Gait  x 50 feet with Minimal Assistance using Rolling Walker to prepare for community ambulation and endurance activities.  5. Able to tolerate exercise for 15-20 reps with supervision.       Outcome: Ongoing, Progressing

## 2020-04-28 NOTE — PT/OT/SLP PROGRESS
"Physical Therapy Treatment    Patient Name:  Marie Reis Jr.   MRN:  [id number]    Recommendations:     Discharge Recommendations:  nursing facility, skilled   Discharge Equipment Recommendations: bedside commode, walker, rolling, shower chair, wheelchair   Barriers to discharge: decreased functional mobility requiring increased assistance    Assessment:     Marie Reis Jr. is a 64 y.o. male admitted with a medical diagnosis of PEG tube malfunction.  He presents with the following impairments/functional limitations:  weakness, impaired endurance, impaired self care skills, impaired functional mobilty, gait instability, impaired cognition, decreased safety awareness, decreased lower extremity function, decreased upper extremity function, impaired cardiopulmonary response to activity. Pt mildly confused this session but mobilizing well with SBA to minimal assistance for all mobility. Pt with poor use of RW for ambulation, and requires frequent cues to assure use/proper use of device. Pt will continue to benefit from therapy services to address impairments listed above.     Rehab Prognosis: Fair; patient would benefit from acute skilled PT services to address these deficits and reach maximum level of function.    Recent Surgery: Procedure(s) (LRB):  EGD (ESOPHAGOGASTRODUODENOSCOPY) (N/A)  INSERTION, PEG TUBE 7 Days Post-Op    Plan:     During this hospitalization, patient to be seen 3 x/week to address the identified rehab impairments via gait training, therapeutic activities, therapeutic exercises, neuromuscular re-education and progress toward the following goals:    · Plan of Care Expires:  05/18/20    Subjective     Chief Complaint: no c/o  Patient/Family Comments/goals: "I don't why I have to go anywhere, I can just get the same stuff here that I would get anywhere else." - pt referencing MDs recent conversation about pending d/c to SNF  Pain/Comfort:  · Pain Rating 1: 0/10  · Pain Rating Post-Intervention 1: " 0/10      Objective:     Communicated with NSG prior to session.  Patient found supine with telemetry, PEG Tube upon PTA entry to room.     General Precautions: Standard, airborne, contact, droplet, fall   Orthopedic Precautions:N/A   Braces: N/A     Functional Mobility:  · Bed Mobility:     · Supine to Sit: stand by assistance  · Transfers:     · Sit to Stand:  contact guard assistance with rolling walker  · Bed to Chair: contact guard assistance with  rolling walker  using  Stand Pivot  · Gait: Pt ambulates ~16 ft with RW and CGA/Min A for LOB and poor use of RW. Pt with poor safety awareness, decreased step/stride length, decreased toe clearance, and foot flat contact. Pt stands at toilet for voiding midway through trial.       AM-PAC 6 CLICK MOBILITY  Turning over in bed (including adjusting bedclothes, sheets and blankets)?: 4  Sitting down on and standing up from a chair with arms (e.g., wheelchair, bedside commode, etc.): 3  Moving from lying on back to sitting on the side of the bed?: 3  Moving to and from a bed to a chair (including a wheelchair)?: 3  Need to walk in hospital room?: 2  Climbing 3-5 steps with a railing?: 1  Basic Mobility Total Score: 16       Patient left up in chair with all lines intact and call button in reach.    GOALS:   Multidisciplinary Problems     Physical Therapy Goals        Problem: Physical Therapy Goal    Goal Priority Disciplines Outcome Goal Variances Interventions   Physical Therapy Goal     PT, PT/OT Ongoing, Progressing     Description:  Goals to be met by: 2020    Patient will increase functional independence with mobility by performin. Supine <> sit with Minimal Assistance.  2. Sit <> stand transfer with Minimal Assistance using Rolling Walker.  3. Bed <> chair transfer via Stand Pivot with Minimal Assistance using Rolling Walker.  4. Gait  x 50 feet with Minimal Assistance using Rolling Walker to prepare for community ambulation and endurance  activities.  5. Able to tolerate exercise for 15-20 reps with supervision.                        Time Tracking:     PT Received On: 04/28/20  PT Start Time: 1156     PT Stop Time: 1211  PT Total Time (min): 15 min     Billable Minutes: Gait Training 15    Treatment Type: Treatment  PT/PTA: PTA     PTA Visit Number: 2     Dc Baca, CHER  04/28/2020

## 2020-04-29 NOTE — PROGRESS NOTES
[facility]   Skilled Nursing Facility   Progress Notes     Patient name: Marie Reis Jr.  MRN: [id number]  Patient Class: IP- Inpatient  PCP: Nancy Colón MD  Attending Provider: Magali Schrader MD  Nurse Practitioner: Vivienne Sanders NP  Admission Date: 4/18/2020  LOS: 10 days  Code status: Full Code  Principal Problem:  PEG tube malfunction      TELEMEDICINE VISIT  Patient Location: [facility] 621/621 A  Provider Location: Provider home  Start / End time:[id number]  Evaluation by video and audio     HPI: Marie Reis Jr. is a 64 y.o. man with PMH Chronic systolic and diastolic HF, DMII CKD IV and HTN, Multiple myeloma (not in remission), SUZANNA (BiPAP), DJD LS spine, CVA 2/2018 admitted [facility] on 3/28 with a cc of fatigue and weakness following an unwitnessed syncopal vs seizure event. Patient was admitted 2/24 for blood transfusion. During virtual visit 3/19 he was determined to have right PCA ischemic stroke that was suspected due to cardioembolic from heart failure. Then returned 3/21 for SOB and coughing for one day. He was found to have COVID-19 pneumonia, NSTEMI and probable COVID myocarditis. Patient admitted again 3/28, day after discharge, for a syncopal episode. Patient was found to be dehydrated. Nursing reports patient minimally drinking, not eating and not taking medications since admission here. He also has not been participating with therapy. During televisit. Patient was not cooperative with exam. Patient laid underneath covers during entire visit. Nurse Layo Fuentes jimbo covers back and patient draw them back over. Upon review of previous admission, patient was not eating and only spoke with prompted. He had one previous PT/OT where he participated with physical prompting. After arrival here he has been actively refusing everything food, medications and interaction. He has kept on his visimonitor. Prior to onset of illness patient weighed 191 lb during office visit with his PCP  2/21. Patient has 162lb listed as his current weight. He was independent of ADLs, A&O to person, place and time, but had little recall or insight to his medical conditions. Patient did not have any behavioral issues after his CVA in 2/2018.     Patient transferred to [facility] with PT and OT to improve functional status and ability to perform ADLs.      Interval History:  Nursing notes and vitals reviewed. Pt seen and examined visually via telemedicine cart with nurse at bedside. He is more cooperative today. Not eating. Slowed responses, follows commands slowly, tele sitter in place. Continue close monitoring    PT/ OT--Declines services    RD--  Diet: Regular--refuses all meals; Anticipate PEG placement Monday     Past Medical History: Patient has a past medical history of Acute on chronic combined systolic and diastolic heart failure (3/22/2020), Binocular vision disorder with diplopia (9/22/2016), Bradycardia, Cataract, Cervical spondylosis (10/1/2015), Chronic diastolic congestive heart failure, Chronic diastolic heart failure (1/11/2018), Coronary artery disease due to calcified coronary lesion (3/19/2018), Dyslipidemia (6/26/2015), Encounter for blood transfusion, Hearing impairment (10/25/2013), History of stroke (3/23/2015), Hypertension associated with diabetes (1/11/2018), Hypertensive retinopathy of both eyes (9/26/2017), Lumbar spondylosis (10/1/2015), Multiple myeloma not having achieved remission (1/16/2018), NSTEMI (non-ST elevated myocardial infarction) (3/22/2020), SUZANNA on CPAP, Persistent proteinuria (1/11/2018), Pneumonia of right lower lobe due to infectious organism (3/22/2020), Spondylolisthesis of lumbar region (10/1/2015), Strabismus, Stroke (2014), Thoracic spondylosis (10/1/2015), Type 2 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, with long-term current use of insulin (9/26/2017), Type 2 diabetes mellitus with diabetic polyneuropathy, with  long-term current use of insulin (9/28/2015), and Type 2 diabetes mellitus with stage 4 chronic kidney disease, with long-term current use of insulin (1/11/2018).     Past Surgical History: Patient has a past surgical history that includes left hand fracture sx; Cataract extraction w/  intraocular lens implant (Left, 4/30/15); Hand surgery (2/2012); LT EYE  (5/2/15); Eye surgery; Colonoscopy (N/A, 1/20/2020); and Esophagogastroduodenoscopy (N/A, 4/21/2020).     Social History: Patient reports that he has never smoked. He has never used smokeless tobacco. He reports that he does not drink alcohol or use drugs.     Family History: family history includes Cancer in his mother; Diabetes in his brother, father, mother, and sister; Heart attack in his father; Kidney disease in his mother; No Known Problems in his maternal aunt, maternal grandfather, maternal grandmother, maternal uncle, paternal aunt, paternal grandfather, paternal grandmother, and paternal uncle.     Allergies: Patient is allergic to hydralazine analogues.     ROS:    Review of Systems   Unable to perform ROS: Other (pt uncooperative and disengaged)     PEx     Temp:  [98 °F (36.7 °C)-98.6 °F (37 °C)]   Pulse:  [69-77]   Resp:  [14-22]   BP: (129-164)/(72-82)   SpO2:  [99 %-100 %]   Physical Exam:  Constitutional: Patient resting comfortably in no apparent distress; appears sickly and cachectic   Pulmonary:  Breathing comfortably at rest. Normal respiratory rate -per staff  Neurologic: oriented to self and place, more cooperative today  Psychiatric: Dysphoric mood and withdrawn affect. Uncooperative  Skin: Left eye bruising at brow, left knee abrasion  Breath sounds (per nursing)-CTA on room air      Recent Labs   Lab 04/26/20  0405 04/27/20  0427 04/28/20  0345   WBC 7.84 5.50 5.79   HGB 8.2* 7.9* 7.5*   HCT 26.2* 25.1* 23.4*    247 221     Recent Labs   Lab 04/26/20  0405 04/27/20  0427 04/28/20  0345   GRAN 76.7*  6.0 67.8  3.7 70.9  4.1    LYMPH 10.3*  0.8* 17.1*  0.9* 15.2*  0.9*   MONO 6.4  0.5 7.6  0.4 7.4  0.4   EOS 0.5 0.4 0.3     Recent Labs   Lab 04/22/20 0447 04/23/20  0203  04/26/20 0405 04/27/20 0427 04/28/20  0345      < > 138   < > 140 141 136   K 2.6*   < > 3.6   < > 4.2 4.1 4.2      < > 113*   < > 111* 113* 109   CO2 17*   < > 14*   < > 22* 19* 20*   BUN 62*   < > 46*   < > 32* 27* 27*   CREATININE 4.9*   < > 3.6*   < > 2.2* 2.2* 2.2*   *   < > 177*   < > 106 125* 135*   CALCIUM 8.3*   < > 8.0*   < > 8.3* 8.2* 8.0*   MG 2.5  --  2.1  --   --   --   --    PHOS 3.8   < > 2.5*  2.5*   < > 2.4* 2.6* 2.4*    < > = values in this interval not displayed.     Recent Labs   Lab 04/26/20 0405 04/27/20 0427 04/28/20  0345   ALBUMIN 2.6* 2.5* 2.5*     No results for input(s): LDH, CRP in the last 168 hours.  No results for input(s): CPK, TROPONINI, BNP in the last 168 hours.  Recent Labs   Lab 02/24/20  1405  03/21/20  1933 03/21/20  2342 04/11/20  1144 04/16/20  0443 04/16/20  0740 04/18/20  2349 04/19/20  0241 04/19/20  0242   PROCAL  --   --  0.38*  --   --  0.08  --   --  0.16  --    LACTATE  --    < > 3.5* 1.2 1.6  --   --  1.3  --   --    DDIMER  --   --   --   --   --  1.17*  --   --  0.78*  --    FERRITIN 5,977*  --   --   --  1,795* 1,604*  --   --   --   --    SEDRATE  --   --   --   --   --   --  69*  --   --  69*   V5GTTYGU  --   --   --   --   --   --   --   --   --  Normal Activity    < > = values in this interval not displayed.     Recent Labs   Lab 04/18/20  2349   TSH 1.867     Recent Labs   Lab 04/27/20  2137 04/28/20  1037 04/28/20  1156 04/28/20  1634 04/28/20 2139 04/28/20  2154   POCTGLUCOSE 274* 204* 226* 193* 213* 216*        Baselines:  Hemoglobin   Date Value Ref Range Status   04/28/2020 7.5 (L) 14.0 - 18.0 g/dL Final   04/27/2020 7.9 (L) 14.0 - 18.0 g/dL Final   04/26/2020 8.2 (L) 14.0 - 18.0 g/dL Final   04/25/2020 7.9 (L) 14.0 - 18.0 g/dL Final   04/24/2020 7.6 (L) 14.0 - 18.0 g/dL  Final     Creatinine   Date Value Ref Range Status   04/28/2020 2.2 (H) 0.5 - 1.4 mg/dL Final   04/27/2020 2.2 (H) 0.5 - 1.4 mg/dL Final   04/26/2020 2.2 (H) 0.5 - 1.4 mg/dL Final   04/25/2020 2.3 (H) 0.5 - 1.4 mg/dL Final   04/24/2020 2.7 (H) 0.5 - 1.4 mg/dL Final        Scheduled Meds:    amLODIPine  10 mg Per G Tube Daily    ascorbic acid (vitamin C)  500 mg Oral BID    aspirin  81 mg Per G Tube Daily    atorvastatin  40 mg Per G Tube QHS    carvediloL  25 mg Oral BID WM    clopidogreL  75 mg Oral Daily    heparin (porcine)  5,000 Units Subcutaneous Q12H    insulin detemir U-100  6 Units Subcutaneous QHS    melatonin  6 mg Oral Nightly    multivitamin  1 tablet Oral Daily    pantoprazole  40 mg Oral Daily    sodium bicarbonate  650 mg Oral BID    thiamine  100 mg Oral Daily    vitamin D  1,000 Units Per G Tube Daily     Continuous Infusions:     As Needed: acetaminophen, bisacodyL, Dextrose 10% Bolus, Dextrose 10% Bolus, Dextrose 10% Bolus, glucagon (human recombinant), glucose, glucose, insulin aspart U-100, labetalol, OLANZapine, ondansetron, sodium chloride 0.9%     Active Hospital Problems    Diagnosis  POA    *PEG tube malfunction [K94.23]  Yes    Altered behavior [R46.89]  Yes    Urinary retention [R33.9]  Yes    Delirium [R41.0]  Yes    Severe protein-calorie malnutrition [E43]  Yes    Encephalopathy [G93.40]  Yes    Hyperammonemia [E72.20]  Yes    Physical debility [R53.81]  Yes    Altered mental status [R41.82]  Yes    Advanced care planning/counseling discussion [Z71.89]  Not Applicable    Palliative care encounter [Z51.5]  Not Applicable    Counseling regarding advance care planning and goals of care [Z71.89]  Not Applicable    Chronic kidney disease, stage IV (severe) [N18.4]  Yes    Failure to thrive in adult [R62.7]  Yes    Type 2 diabetes mellitus [E11.9]  Yes    Physical deconditioning [R53.81]  Yes    Coronary artery disease involving native coronary artery of  native heart without angina pectoris [I25.10]  Yes    COVID-19 virus infection [U07.1]  Yes    Anemia, chronic disease [D63.8]  Yes    Acute renal failure superimposed on stage 4 chronic kidney disease [N17.9, N18.4]  No    Acute encephalopathy [G93.40]  Yes    Benign hypertension with chronic kidney disease, stage III [I12.9, N18.3]  Yes    Chronic combined systolic and diastolic heart failure [I50.42]  Yes    History of right PCA stroke [Z86.73]  Not Applicable    Acute CVA (cerebrovascular accident) [I63.9]  Yes     3.5 cm acute cortical infarct in the medial right occipital lobe - as per MRI on 3/17/20        Resolved Hospital Problems   No resolved problems to display.      -------------------------------------------  Assessment and Plan:   ----------------------------------------------    Mechanical fall, unstable  4/12/20  He is fell yesterday unwitnessed and sustained an injury to his left eye and left knee abrasion. [place] [facility] [place]. He is more cooperative today. Not eating. Slowed responses, follows commands slowly, tele sitter in place. Left eye brusing and mild swelling on upper lid, left knee abrasion visualized, minimal drainage, stable.  ED records reviewed, labs reviewed, stable, no acute findings on ct head, tsh wnl. Anticipate PEG placement on next week to assist with medicinal and nutritional needs. Continue close monitoring. Discussed with wife via phone plan of care and results from ed visit. Also discussed peg placement with her. Advised even though he is eating at times it is still inconsistent and he has lost >20pounds in the past few months. The peg is reversible and he can still eat foods by mouth.   4/13   Monitor closely     *Impaired functional mobility and endurance  Participation in PT/ OT limited by lack of interest-pt is self-limiting  Overall stable    PT/ OT discontinued due to lack of participation     COVID 19 infection   COVID 19 pneumonia   Acute  hypoxic respiratory failure and severe sepsis and pneumonia 2/2 COVID-19 , resolved  Onset of sx 3/21  COVID 19 positive on  3/21  CXR: 3/28/20--Mild cardiomegaly.  No significant airspace consolidation or pleural effusion identified.  Small opacities noted at the right lung base. . No pleural effusion.  Completed course of plaquenil  4/29/2020-  On room air and no longer febrile  Likely does not require isolation at this time  4/13/20  Afebrile  Vital signs stable     Isolation   Airborne and Droplet Precaution   N95 masks must be fit tested, wear eye protection   Hand hygiene x 20 seconds   Limit visitors per hospital policy   Consolidating lab draws, nursing care, and interventions      Management   Bundle care as able to minimize in/out of room    Supplemental O2 to maintain SpO2 >92%, if requiring 6L NC or higher, place on nonrebreather and discuss case with MICU   Telemetry & continuous Pulse Ox   If wheezing  albuterol INHALER PRN 4puff Q6hr approximates a nebulizer (avoid nebulization of secretions) and ipratropium daily    Cautious use of NSAIDS for fever per WHO recommendations (3/16/2020)   No new ACEi/ARB start or discontinuation of chronic med unless hypotensive      Other Active Problems    Acute metabolic encephalopathy  Recent right PCA ischemic stroke  Probable vascular dementia  Probable depression  Discussed with neurology  Patient will need 6-12 months to determine recovery  Follow up with neurology  Consider PEG tube  PT/OT when able  prozac 20 mg daily when able  CONSIDER IN-PATIENT REHAB AFTER DISCHARGE IF PATIENT EVER STARTS TO COOPERATE WITH THERAPY  Need speech therapy  Patient does not show capacity make medical decision or perform discharge planning - his wife should be signing consents    Syncopal Event due to dehydration   JAZLYN on CKD4  Monitor closely     NSTEMI type II  Acute on Chronic Combined systolic and diastolic HF  Severe pulmonary HTN  Moderate to severe aortic  stenosis  Coronary artery disease  - Continue medical management of ASA 81 mg daily, plavix 75 mg daily, atorvastatin 40 mg daily, coreg 25 mg BID  - F/u with cardiology outpatient  Furosemide held due to no oral intake and recent dehydration  plavix and clopidogrel held due possible PEG tube procedure     Multiple myeloma  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  - being treated with bortezomib   - due for cycle 6 on 3/25  - F/u with oncology     HTN  Continue home Coreg, amlodipine 10 mg daily     DM II with HTN and CKD IV  DMII with diabetic proliferative retinopathy  - A1c in 3/2020 9.1  4/29/2020 -Patient not eating; Hold medications     Deconditioning  PT/OT     Moderate protein calorie malnutrition   anorexia  - boost   Recommend PEG  If family does not want PEG, then comfort care recommended     Degenerative joint disease - PT/OT         Diet:  Regular diet nutritional supplements boost  Lines/ Drains/ Airways - none      Goals of Care: Return to prior functional status     Discharge plan: TBD   Per PT evaluation       Vivienne Sanders NP  [facility] Medicine- 5th floor  TELEMEDICINE VISIT    DOS: 4/13/20

## 2020-04-29 NOTE — PLAN OF CARE
04/29/20 0931   Post-Acute Status   Post-Acute Authorization Placement   Post-Acute Placement Status Referrals Sent     Sw added PT/OT updates and staff note with dietary needs, Sw to follow.

## 2020-04-29 NOTE — PLAN OF CARE
Problem: SLP Goal  Goal: SLP Goal  Description  Speech-Language Pathology Goals  Goals to be met by 5/4/20  1. Patient will independently orient x4.   2. Patient will recall personal information with 90% accuracy and min assist.   3. Patient will answer complex yes/no questions with 90% accuracy and min assist.   4. Patient will answer simple problem solving questions with 75% accuracy and min assist.  5. Patient will participate in ongoing assessment of cognition/language.     Outcome: Ongoing, Progressing     Patient seen for ongoing cognitive therapy.     Karson Randall CCC-SLP  Speech-Language Pathology  Pager: [phone]

## 2020-04-29 NOTE — PT/OT/SLP PROGRESS
"Speech Language Pathology Treatment    Patient Name:  Marie Reis Jr.   MRN:  [id number]  Admitting Diagnosis: PEG tube malfunction    Recommendations:                 General Recommendations:  Cognitive-linguistic therapy  Diet recommendations:  Regular, Liquid Diet Level: Thin   Aspiration Precautions: Standard aspiration precautions   General Precautions: Standard, airborne, contact, droplet, fall  Communication strategies:  none    Subjective     Patient awake and alert during session  "I'm feeling good. My wife is expecting me back at home soon."     Pain/Comfort:  · Pain Rating 1: 0/10  · Pain Rating Post-Intervention 1: 0/10    Objective:     Has the patient been evaluated by SLP for swallowing?   Yes  Keep patient NPO? No   Current Respiratory Status: room air      Patient seen for ongoing cognitive therapy. He was sitting up in bed during session. Patient was much more engaged during this session though he continued to exhibit confusion. He was oriented x3 with mod assist and was WFL for simple yes/no and simple problem solving tasks. He was hyperverbal and he exhibited difficulty with topic maintenance, but he was easily redirectable. Patient demonstrated impaired insight into condition, but was receptive to education regarding the importance of rehab therapies. He acknowledged teaching, but would benefit from ongoing education. Patient left in room with call light within reach.    Assessment:     Marie Reis Jr. is a 64 y.o. male with an SLP diagnosis of Cognitive-Linguistic Impairment.      Goals:   Multidisciplinary Problems     SLP Goals        Problem: SLP Goal    Goal Priority Disciplines Outcome   SLP Goal     SLP Ongoing, Progressing   Description:  Speech-Language Pathology Goals  Goals to be met by 5/4/20  1. Patient will independently orient x4.   2. Patient will recall personal information with 90% accuracy and min assist.   3. Patient will answer complex yes/no questions with 90% accuracy and min " assist.   4. Patient will answer simple problem solving questions with 75% accuracy and min assist.  5. Patient will participate in ongoing assessment of cognition/language.                      Plan:     · Patient to be seen:  3 x/week   · Plan of Care expires:  05/20/20  · Plan of Care reviewed with:  patient   · SLP Follow-Up:  Yes       Discharge recommendations:  nursing facility, skilled   Barriers to Discharge:  None    Time Tracking:     SLP Treatment Date:   04/29/20  Speech Start Time:  1252  Speech Stop Time:  1310     Speech Total Time (min):  18 min    Billable Minutes: Speech Therapy Individual 10 and Self Care/Home Management Training 8    Karson Randall CCC-SLP  Speech-Language Pathology  Pager: [phone]   04/29/2020

## 2020-04-29 NOTE — PLAN OF CARE
Plan of care discussed with pt. Pt AOx4. ambulated to bathroom independently.  Peg tube in place. Dressing intact and taped. 350cc water bolus given 4x/day with no resistance met. Swallows meds with no issues.  VS stable on visi monitor. Pt denies CP, SOB, or pain/discomfort at this time.Pt free of injuries this shift.  All questions addressed.  Will continue to monitor.

## 2020-04-29 NOTE — MEDICAL/APP STUDENT
Spoke with patients spouse (Naheed Reis, [phone]) at 3:08pm and informed her that Mr. Reis is doing well and is ready to be transferred to a SNF once there is an acceptance.   Pts spouse wanted to know why there has been a delay in SNF acceptance and whether he is still testing positive for COVID-19. I informed pts spouse that I would check-in with pts care team and call back today or tomorrow with any further updates. Pts spouse had no further questions.

## 2020-04-29 NOTE — PROGRESS NOTES
Progress Note  Hospital Medicine  [facility], [place] [place]       Patient Name: Marie Reis Jr.  MRN:  [id number]  Hospital Medicine Team: [facility] MED K Magali Schrader MD  Date of Admission:  4/18/2020     Length of Stay:  LOS: 10 days   Expected Discharge Date: 4/30/2020  Principal Problem:  PEG tube malfunction     Subjective:     Interval History/Overnight Events:  Doing well  No acute issues   Tolerating TFs via PEG tube- have transitioned to bolus feeding. Creatinine at 2.4 today. Hg stable at 8- no need for transfusion. Continuing levemir nightly and sliding scale as he has irregular eating habits but scheduled tube feeds. If glucose elevated today may consider adding prandial.      amLODIPine  10 mg Per G Tube Daily    ascorbic acid (vitamin C)  500 mg Oral BID    aspirin  81 mg Per G Tube Daily    atorvastatin  40 mg Per G Tube QHS    carvediloL  25 mg Oral BID WM    clopidogreL  75 mg Oral Daily    heparin (porcine)  5,000 Units Subcutaneous Q12H    insulin detemir U-100  6 Units Subcutaneous QHS    melatonin  6 mg Oral Nightly    multivitamin  1 tablet Oral Daily    pantoprazole  40 mg Oral Daily    sodium bicarbonate  650 mg Oral BID    thiamine  100 mg Oral Daily    vitamin D  1,000 Units Per G Tube Daily           acetaminophen, bisacodyL, Dextrose 10% Bolus, Dextrose 10% Bolus, Dextrose 10% Bolus, glucagon (human recombinant), glucose, glucose, insulin aspart U-100, labetalol, OLANZapine, ondansetron, sodium chloride 0.9%    Review of Systems  Limited due to AMS , but able to obtain   Constitutional: Negative for chills, fatigue, fever.   HENT: Negative for sore throat, trouble swallowing.    Respiratory: Negative for cough, shortness of breath.    Cardiovascular: Negative for chest pain, palpitations, leg swelling.   Gastrointestinal: Negative for abdominal pain, constipation, diarrhea, nausea, vomiting. .   Musculoskeletal: Negative for arthralgias, myalgias.   Skin:  Negative for rash, wound, erythema   Neurological: Negative for dizziness, syncope, weakness, light-headedness.     Objective:     Temp:  [98 °F (36.7 °C)-98.5 °F (36.9 °C)]   Pulse:  [69-83]   Resp:  [13-22]   BP: (126-167)/(73-82)   SpO2:  [96 %-99 %]         Weight change:    Body mass index is 19.4 kg/m².       Physical Exam:  Gen: NAD, conversant  Head: Abrasion with small echymosis over left eye  Eyes: Pupils dilated but reactive. Does not follow commands to check EOM  Throat: Dry mucous membranes, OP clear  CV: RRR,  no peripheral edema,  Resp: no increased work of breathing on room air  GI: Soft, NT, ND. Catheter in place in PEG tube site with a small amount of bloody drainage surrounding. Nontender and not erythematous surrounding site.   Ext: No joint swelling or tenderness  No edema   Muscle wasting   Neuro: moves all extremities spontaneously, 4.5/5 motor strength throughout, follows simple instructions. Oriented to self and person (knows his wife's name) but not to place or time.    Psychiatry: Calm  Flat affect     Labs:    Chemistries:   Recent Labs   Lab 04/23/20  0203  04/27/20 0427 04/28/20  0345 04/29/20  0305      < > 141 136 140   K 3.6   < > 4.1 4.2 4.2   *   < > 113* 109 111*   CO2 14*   < > 19* 20* 21*   BUN 46*   < > 27* 27* 26*   CREATININE 3.6*   < > 2.2* 2.2* 2.4*   CALCIUM 8.0*   < > 8.2* 8.0* 8.4*   MG 2.1  --   --   --   --    PHOS 2.5*  2.5*   < > 2.6* 2.4* 2.7    < > = values in this interval not displayed.        WBC:   Recent Labs   Lab 04/25/20  0328 04/26/20  0405 04/27/20 0427 04/28/20  0345 04/29/20  0305   WBC 6.72 7.84 5.50 5.79 6.73     Bands:     CBC/Anemia Labs: Coags:    Recent Labs   Lab 04/27/20 0427 04/28/20  0345 04/29/20  0305   WBC 5.50 5.79 6.73   HGB 7.9* 7.5* 8.0*   HCT 25.1* 23.4* 26.3*    221 244   MCV 84 85 87   RDW 15.5* 15.7* 15.9*    No results for input(s): PT, INR, APTT in the last 168 hours.     POCT Glucose: HbA1c:    Recent Labs    Lab 04/27/20  2137 04/28/20  1037 04/28/20  1156 04/28/20  1634 04/28/20  2139 04/28/20  2154   POCTGLUCOSE 274* 204* 226* 193* 213* 216*    Hemoglobin A1C   Date Value Ref Range Status   03/09/2020 9.1 (H) 4.0 - 5.6 % Final     Comment:   02/12/2020 10.5 (H) 4.0 - 5.6 % Final     Comment:   12/10/2019 9.2 (H) 4.0 - 5.6 % Final     Comment:          Assessment and Plan     Hospital Course:    Mr. Marie Reis Jr. was admitted to Hospital Medicine for management of  PEG tube malfunction     Active Hospital Problems    Diagnosis  POA    *PEG tube malfunction [K94.23]  Yes    Altered behavior [R46.89]  Yes    Urinary retention [R33.9]  Yes    Delirium [R41.0]  Yes    Severe protein-calorie malnutrition [E43]  Yes    Encephalopathy [G93.40]  Yes    Hyperammonemia [E72.20]  Yes    Physical debility [R53.81]  Yes    Altered mental status [R41.82]  Yes    Advanced care planning/counseling discussion [Z71.89]  Not Applicable    Palliative care encounter [Z51.5]  Not Applicable    Counseling regarding advance care planning and goals of care [Z71.89]  Not Applicable    Chronic kidney disease, stage IV (severe) [N18.4]  Yes    Failure to thrive in adult [R62.7]  Yes    Type 2 diabetes mellitus [E11.9]  Yes    Physical deconditioning [R53.81]  Yes    Coronary artery disease involving native coronary artery of native heart without angina pectoris [I25.10]  Yes    COVID-19 virus infection [U07.1]  Yes    Anemia, chronic disease [D63.8]  Yes    Acute renal failure superimposed on stage 4 chronic kidney disease [N17.9, N18.4]  No    Acute encephalopathy [G93.40]  Yes    Benign hypertension with chronic kidney disease, stage III [I12.9, N18.3]  Yes    Chronic combined systolic and diastolic heart failure [I50.42]  Yes    History of right PCA stroke [Z86.73]  Not Applicable    Acute CVA (cerebrovascular accident) [I63.9]  Yes     3.5 cm acute cortical infarct in the medial right occipital lobe - as per MRI on  "3/17/20        Resolved Hospital Problems   No resolved problems to display.       #PEG tube malfunction  -S/p replacement prior to presentation with good positioning on CT  - GI following   - s/p for PEG replacement on 4/21 with GI , TFs restarted      #sub- Acute encephalopathy  # Delirium  # Acute CVA (cerebrovascular accident)  -Subacute encephalopathy, that started after patient's stroke, per wife's report. MRI brain on 3/17 showed '3.5 cm acute cortical infarct in the medial right occipital lobe.", more inclined to think that CVA may be largely contributing to the AMS . COVID infection may be also contributing to component on psychosis. Elevated ammonia (seen on prior labs) is unlikely to be the culprit, and suspect it was elevated perhaps in setting of malnutrition, patient with no cirrhosis and normal LFTs. BUN is 50 so unlikely due to uremia. Psychiatric component remains high on DDX for this AMS- concern for psychosis vs other psychiatric conditions- patient is a former vet with hx of PTSD, with recent psychologic trauma from deaths of two of his family members .   - per prior documentation , patient refusing to eat, leading to placement of PEG tube for supplemental nutrition. Recently patient pulled the PEG tube out.   - psychiatry consulted . Unable to see patient due to COVID status (?), patient refused to speak on the phone. It is not unanticipated that patient is unwilling or unable to speak on the phone due to his his current condition   - cont PRN IM zyprexa for non-redirectable agitation  - cont DAPT, BP meds and insulin for recent CVA. Not on statin- suspect due to fairly unremarkable lipid panel and significant weight loss   - palliative care involved on the case- patient has a living will , indicating that he wished all aggressive measures and full code  - may benefit from neurology outpatient eval   - Psychiatry planning on interviewing the patient 4/22.   - 4/29: mental status appears at " baseline currently. Most conversation makes sense and AAO x3      #CKD IV  # Acute renal failure superimposed on stage 4 chronic kidney disease  - Cr baseline around 3.5.   - Cr increasing to 4.9 on 4/22, likely due to dehydration and decreased PO intake  - given IVF - LR infusion and bolus   - UA ordered and has not been colleted yet  - patient has a sims in for urinary retention with failed voiding trials  - receiving enteral water per PEG tube     #CAD   #chronic combined systolic and diastolic heart failure  - Recent NSTEMI, on DAPT, statin, B Blcoker. Continue  - HF not decompensated.      #Anemia  -hbg stable 9  - monitor      #HTN  Continue home meds    # Viral Pneumonia due to COVID-19  - COVID testing + on 3/21 and 4/12  - on RA  Monitor   - COVID precautions   - re testing ordered on 4/24- remains positive    # Physical debility  # Failure to thrive in adult  - SNF recs    # T2DM  - detemir as above     # Severe protein-calorie malnutrition  - per prior documentation , patient refusing to eat, leading to placement of PEG tube for supplemental nutrition. Recently patient pulled the PEG tube out.   Nutrition assessment on 4/16 noted :   1.) ADAT to regular (to encourage intake) with Boost Glucose Control TID.   2.) Begin sliding scale bolus feeds using Suplena:               If pt consuming >75% of meals-no bolus;               if PO intake 50-75% of meals: bolus 1/2 can;               if PO intakes 25-50% of meals: bolus 1 can;               if PO intakes <25% of meals: bolus 1.5 cans.  (1 can Suplena provides 425 kcals, 10.6gm protein and 175mL of free water)  3.) If continuous feeds preferred, suggest Suplena @ 20mL/hr advancing 10mL q4h to goal rate 50mL/hr to provide 2160 kcals, 54gm protein and 886mL of free water.               MD to manage fluid.               If GRV >500mL, hold TF q4h then restart regimen.               TF to meet 99% EEN and 87% EPN.   4.) Suggest vitamin C, zinc, MVI due to  COVID19.     Disposition:      Would plan to d/c patient back to [facility] or another SNF when facility acceptable is obtained. Tentatively 4/23- 4/24 4/26: called Naheed at 1:05pm. No answer, general message left.     4/27: updated family via phone    4/28: family updated via phone      Signing Physician:     Magali cortez

## 2020-04-29 NOTE — PLAN OF CARE
Patient is AAOx4. Vital signs stable. No falls throughout shift. Patient ambulates independently. No complaints of pain. Blood glucose monitored and insulin administered. Patient tolerating tube feedings and water boluses via PEG tube.  Problem: Fall Injury Risk  Goal: Absence of Fall and Fall-Related Injury  Outcome: Ongoing, Progressing     Problem: Adult Inpatient Plan of Care  Goal: Plan of Care Review  Outcome: Ongoing, Progressing  Goal: Patient-Specific Goal (Individualization)  Outcome: Ongoing, Progressing  Goal: Absence of Hospital-Acquired Illness or Injury  Outcome: Ongoing, Progressing  Goal: Optimal Comfort and Wellbeing  Outcome: Ongoing, Progressing  Goal: Readiness for Transition of Care  Outcome: Ongoing, Progressing  Goal: Rounds/Family Conference  Outcome: Ongoing, Progressing     Problem: Diabetes Comorbidity  Goal: Blood Glucose Level Within Desired Range  Outcome: Ongoing, Progressing     Problem: Adjustment to Illness (Sepsis/Septic Shock)  Goal: Optimal Coping  Outcome: Ongoing, Progressing     Problem: Bleeding (Sepsis/Septic Shock)  Goal: Absence of Bleeding  Outcome: Ongoing, Progressing     Problem: Glycemic Control Impaired (Sepsis/Septic Shock)  Goal: Blood Glucose Level Within Desired Range  Outcome: Ongoing, Progressing     Problem: Hemodynamic Instability (Sepsis/Septic Shock)  Goal: Effective Tissue Perfusion  Outcome: Ongoing, Progressing     Problem: Infection (Sepsis/Septic Shock)  Goal: Absence of Infection Signs/Symptoms  Outcome: Ongoing, Progressing     Problem: Nutrition Impaired (Sepsis/Septic Shock)  Goal: Optimal Nutrition Intake  Outcome: Ongoing, Progressing     Problem: Respiratory Compromise (Sepsis/Septic Shock)  Goal: Effective Oxygenation and Ventilation  Outcome: Ongoing, Progressing     Problem: Electrolyte Imbalance (Acute Kidney Injury/Impairment)  Goal: Serum Electrolyte Balance  Outcome: Ongoing, Progressing     Problem: Fluid Imbalance (Acute Kidney  Injury/Impairment)  Goal: Optimal Fluid Balance  Outcome: Ongoing, Progressing     Problem: Hematologic Alteration (Acute Kidney Injury/Impairment)  Goal: Hemoglobin, Hematocrit and Platelets Within Normal Range  Outcome: Ongoing, Progressing     Problem: Oral Intake Inadequate (Acute Kidney Injury/Impairment)  Goal: Optimal Nutrition Intake  Outcome: Ongoing, Progressing     Problem: Renal Function Impairment (Acute Kidney Injury/Impairment)  Goal: Effective Renal Function  Outcome: Ongoing, Progressing     Problem: Coping Ineffective  Goal: Effective Coping  Outcome: Ongoing, Progressing     Problem: Infection  Goal: Infection Symptom Resolution  Outcome: Ongoing, Progressing     Problem: Skin Injury Risk Increased  Goal: Skin Health and Integrity  Outcome: Ongoing, Progressing     Problem: Malnutrition  Goal: Improved Nutritional Intake  Outcome: Ongoing, Progressing

## 2020-04-30 NOTE — PT/OT/SLP PROGRESS
"Physical Therapy Treatment    Patient Name:  Marie Reis Jr.   MRN:  [id number]    Recommendations:     Discharge Recommendations:  nursing facility, skilled   Discharge Equipment Recommendations: walker, rolling   Barriers to discharge: None    Assessment:     Marie Reis Jr. is a 64 y.o. male admitted with a medical diagnosis of PEG tube malfunction.  He presents with the following impairments/functional limitations:  weakness, impaired endurance.    Rehab Prognosis: Good; patient would benefit from acute skilled PT services to address these deficits and reach maximum level of function.    Recent Surgery: Procedure(s) (LRB):  EGD (ESOPHAGOGASTRODUODENOSCOPY) (N/A)  INSERTION, PEG TUBE 9 Days Post-Op    Plan:     During this hospitalization, patient to be seen 3 x/week to address the identified rehab impairments via gait training, therapeutic activities, therapeutic exercises and progress toward the following goals:    · Plan of Care Expires:  05/18/20    Subjective     Chief Complaint: none  Patient/Family Comments/goals: "I want to call my wife and tell her how good I did" "I'd like to go see the river at the end of the abreu". Unable due to locked unit.   Pain/Comfort:  · Pain Rating 1: 0/10      Objective:     Communicated with nurse prior to session.  Patient found up in bathroom independently upon entering.  with telemetry, PEG Tube upon PT entry to room.     General Precautions: Standard, airborne, contact, droplet, fall   Orthopedic Precautions:N/A   Braces:       Functional Mobility:  · Bed Mobility:     · Supine to Sit: independence  · Sit to Supine: independence  · Transfers:     · Sit to Stand:  supervision with rolling walker  · Gait: 250 ft with SBA in halls with RW. No lob.       AM-PAC 6 CLICK MOBILITY  Turning over in bed (including adjusting bedclothes, sheets and blankets)?: 4  Sitting down on and standing up from a chair with arms (e.g., wheelchair, bedside commode, etc.): 4  Moving from lying on back " to sitting on the side of the bed?: 4  Moving to and from a bed to a chair (including a wheelchair)?: 4  Need to walk in hospital room?: 3  Climbing 3-5 steps with a railing?: 3  Basic Mobility Total Score: 22       Therapeutic Activities and Exercises:   performed BLE therex seated in bedside chair before returning to supine, ex's included LAQ, Hip Flex, ap, hip abd add with Pillow 20 reps each with good tolerance.     Patient left supine with all lines intact and call button in reach..    GOALS:   Multidisciplinary Problems     Physical Therapy Goals     Not on file          Multidisciplinary Problems (Resolved)        Problem: Physical Therapy Goal    Goal Priority Disciplines Outcome Goal Variances Interventions   Physical Therapy Goal   (Resolved)     PT, PT/OT Met     Description:  Goals to be met by: 2020    Patient will increase functional independence with mobility by performin. Supine <> sit with Minimal Assistance.-met  2. Sit <> stand transfer with Minimal Assistance using Rolling Walker.-met  3. Bed <> chair transfer via Stand Pivot with Minimal Assistance using Rolling Walker.-met  4. Gait  x 50 feet with Minimal Assistance using Rolling Walker to prepare for community ambulation and endurance activities.- met  5. Able to tolerate exercise for 15-20 reps with supervision.-met                         Time Tracking:     PT Received On: 20  PT Start Time: 1415     PT Stop Time: 1441  PT Total Time (min): 26 min     Billable Minutes: Gait Training 16 and Therapeutic Exercise 10    Treatment Type: Treatment  PT/PTA: PTA     PTA Visit Number: 3     Daniela Hein, PTA  2020

## 2020-04-30 NOTE — PLAN OF CARE
AAOx4, some signs of mild confusion. VSS. No complaints of pain, free of falls and injuries. 350ml water boluses maintained per orders. Isolation precautions maintained. BS monitoring maintained per orders. Pt ambulates to bathroom. No acute changes, see previous notes, will continue to monitor.    Problem: Fall Injury Risk  Goal: Absence of Fall and Fall-Related Injury  Outcome: Ongoing, Progressing  Intervention: Identify and Manage Contributors to Fall Injury Risk  Flowsheets (Taken 4/30/2020 0441)  Self-Care Promotion: independence encouraged; BADL personal objects within reach; BADL personal routines maintained  Medication Review/Management: medications reviewed  Intervention: Promote Injury-Free Environment  Flowsheets (Taken 4/30/2020 0441)  Safety Promotion/Fall Prevention: assistive device/personal item within reach; bed alarm set; commode/urinal/bedpan at bedside; diversional activities provided; Fall Risk reviewed with patient/family; Fall Risk signage in place; lighting adjusted; medications reviewed; nonskid shoes/socks when out of bed; side rails raised x 2  Environmental Safety Modification: assistive device/personal items within reach; clutter free environment maintained; lighting adjusted; room organization consistent     Problem: Adult Inpatient Plan of Care  Goal: Plan of Care Review  Outcome: Ongoing, Progressing  Flowsheets (Taken 4/30/2020 0441)  Plan of Care Reviewed With: patient  Goal: Patient-Specific Goal (Individualization)  Outcome: Ongoing, Progressing  Goal: Absence of Hospital-Acquired Illness or Injury  Outcome: Ongoing, Progressing  Intervention: Identify and Manage Fall Risk  Flowsheets (Taken 4/30/2020 0441)  Safety Promotion/Fall Prevention: assistive device/personal item within reach; bed alarm set; commode/urinal/bedpan at bedside; diversional activities provided; Fall Risk reviewed with patient/family; Fall Risk signage in place; lighting adjusted; medications reviewed;  nonskid shoes/socks when out of bed; side rails raised x 2  Intervention: Prevent VTE (venous thromboembolism)  Flowsheets (Taken 4/30/2020 0441)  VTE Prevention/Management: ambulation promoted; bleeding precautions maintained; bleeding risk assessed; bleeding risk factor(s) identified, provider notified; fluids promoted  Goal: Optimal Comfort and Wellbeing  Outcome: Ongoing, Progressing  Intervention: Provide Person-Centered Care  Flowsheets (Taken 4/30/2020 0441)  Trust Relationship/Rapport: care explained; choices provided; emotional support provided; empathic listening provided; questions answered; thoughts/feelings acknowledged; questions encouraged; reassurance provided  Goal: Readiness for Transition of Care  Outcome: Ongoing, Progressing  Goal: Rounds/Family Conference  Outcome: Ongoing, Progressing     Problem: Diabetes Comorbidity  Goal: Blood Glucose Level Within Desired Range  Outcome: Ongoing, Progressing  Intervention: Maintain Glycemic Control  Flowsheets (Taken 4/30/2020 0441)  Glycemic Management: blood glucose monitoring     Problem: Adjustment to Illness (Sepsis/Septic Shock)  Goal: Optimal Coping  Outcome: Ongoing, Progressing  Intervention: Optimize Psychosocial Adjustment to Illness  Flowsheets (Taken 4/30/2020 0441)  Supportive Measures: active listening utilized; relaxation techniques promoted; self-care encouraged  Family/Support System Care: support provided; self-care encouraged     Problem: Bleeding (Sepsis/Septic Shock)  Goal: Absence of Bleeding  Outcome: Ongoing, Progressing     Problem: Glycemic Control Impaired (Sepsis/Septic Shock)  Goal: Blood Glucose Level Within Desired Range  Outcome: Ongoing, Progressing  Intervention: Optimize Glycemic Control  Flowsheets (Taken 4/30/2020 0441)  Glycemic Management: blood glucose monitoring     Problem: Hemodynamic Instability (Sepsis/Septic Shock)  Goal: Effective Tissue Perfusion  Outcome: Ongoing, Progressing  Intervention: Optimize Blood  Flow  Flowsheets (Taken 4/30/2020 0441)  Fluid/Electrolyte Management: fluids adjusted     Problem: Infection (Sepsis/Septic Shock)  Goal: Absence of Infection Signs/Symptoms  Outcome: Ongoing, Progressing  Intervention: Prevent or Manage Infection Progression  Flowsheets (Taken 4/30/2020 0441)  Infection Prevention: cohorting utilized; environmental surveillance performed; equipment surfaces disinfected; personal protective equipment utilized; rest/sleep promoted; single patient room provided; visitors restricted/screened  Fever Reduction/Comfort Measures: lightweight bedding; lightweight clothing  Infection Management: aseptic technique maintained  Isolation Precautions: airborne precautions maintained; contact precautions maintained; droplet precautions maintained; protective environment maintained     Problem: Nutrition Impaired (Sepsis/Septic Shock)  Goal: Optimal Nutrition Intake  Outcome: Ongoing, Progressing     Problem: Respiratory Compromise (Sepsis/Septic Shock)  Goal: Effective Oxygenation and Ventilation  Outcome: Ongoing, Progressing  Intervention: Promote Airway Secretion Clearance  Flowsheets (Taken 4/30/2020 0441)  Breathing Techniques/Airway Clearance: deep/controlled cough encouraged  Cough And Deep Breathing: done independently per patient  Activity Management: activity adjusted per tolerance; activity clustered for rest period; ambulated to bathroom - L4  Intervention: Optimize Oxygenation and Ventilation  Flowsheets (Taken 4/30/2020 0441)  Airway/Ventilation Management: airway patency maintained  Head of Bed (HOB): HOB flat; HOB at 15 degrees     Problem: Electrolyte Imbalance (Acute Kidney Injury/Impairment)  Goal: Serum Electrolyte Balance  Outcome: Ongoing, Progressing  Intervention: Monitor and Manage Electrolyte Imbalance  Flowsheets (Taken 4/30/2020 0441)  Fluid/Electrolyte Management: fluids adjusted     Problem: Fluid Imbalance (Acute Kidney Injury/Impairment)  Goal: Optimal Fluid  Balance  Outcome: Ongoing, Progressing  Intervention: Monitor and Manage Fluid Balance  Flowsheets (Taken 4/30/2020 0441)  Fluid/Electrolyte Management: fluids adjusted     Problem: Hematologic Alteration (Acute Kidney Injury/Impairment)  Goal: Hemoglobin, Hematocrit and Platelets Within Normal Range  Outcome: Ongoing, Progressing     Problem: Oral Intake Inadequate (Acute Kidney Injury/Impairment)  Goal: Optimal Nutrition Intake  Outcome: Ongoing, Progressing  Intervention: Promote and Optimize Nutrition  Flowsheets (Taken 4/30/2020 0441)  Oral Nutrition Promotion: calorie dense foods provided     Problem: Renal Function Impairment (Acute Kidney Injury/Impairment)  Goal: Effective Renal Function  Outcome: Ongoing, Progressing  Intervention: Monitor and Support Renal Function  Flowsheets (Taken 4/30/2020 0441)  Medication Review/Management: medications reviewed     Problem: Coping Ineffective  Goal: Effective Coping  Outcome: Ongoing, Progressing  Intervention: Support and Enhance Coping Strategies  Flowsheets (Taken 4/30/2020 0441)  Supportive Measures: active listening utilized; relaxation techniques promoted; self-care encouraged  Family/Support System Care: support provided; self-care encouraged  Environmental Support: calm environment promoted; environmental consistency promoted; rest periods encouraged; distractions minimized     Problem: Infection  Goal: Infection Symptom Resolution  Outcome: Ongoing, Progressing  Intervention: Prevent or Manage Infection  Flowsheets (Taken 4/30/2020 0441)  Fever Reduction/Comfort Measures: lightweight bedding; lightweight clothing  Infection Management: aseptic technique maintained  Isolation Precautions: airborne precautions maintained; contact precautions maintained; droplet precautions maintained; protective environment maintained     Problem: Skin Injury Risk Increased  Goal: Skin Health and Integrity  Outcome: Ongoing, Progressing  Intervention: Optimize Skin  Protection  Flowsheets (Taken 4/30/2020 0441)  Pressure Reduction Techniques: frequent weight shift encouraged; weight shift assistance provided  Pressure Reduction Devices: pressure-redistributing mattress utilized; positioning supports utilized; heel offloading device utilized  Skin Protection: adhesive use limited; electrode sites changed; incontinence pads utilized; tubing/devices free from skin contact  Head of Bed (HOB): HOB flat; HOB at 15 degrees  Intervention: Promote and Optimize Oral Intake  Flowsheets (Taken 4/30/2020 0441)  Oral Nutrition Promotion: calorie dense foods provided     Problem: Malnutrition  Goal: Improved Nutritional Intake  Outcome: Ongoing, Progressing  Intervention: Promote and Optimize Oral Intake  Flowsheets (Taken 4/30/2020 0441)  Oral Nutrition Promotion: calorie dense foods provided

## 2020-04-30 NOTE — PROGRESS NOTES
[facility]  Adult Nutrition  Progress Note    SUMMARY       Recommendations    1.) Continue regular diet.   2.) Add Boost Glucose Control BID.   3.) Suggest sliding scale TF regimen:                If pt consuming >75% of meals-no bolus;               if PO intake 50-75% of meals: bolus 1/2 can;               if PO intakes 25-50% of meals: bolus 1 can;               if PO intakes <25% of meals: bolus 1.5 cans.  (1 can Suplena provides 425 kcals, 10.6gm protein and 175mL of free water)  4.) Continue MVI, vitamin C.               Add zinc.   5.) Daily weights.      Goals: 1.) Pt to consume/tolerate >50% of meals by follow up. 2.) Pt to achieve/tolerate >75% EEN and EPN by follow up.   Nutrition Goal Status: new  Communication of RD Recs: reviewed with RN    Reason for Assessment    Reason For Assessment: RD follow-up  Diagnosis: other (see comments)(PEG malfunction)  Relevant Medical History: stroke, CVA, , CAD, DM2, dyslipidemia, HTN, CHF, multiple myeloma, CKD4  Interdisciplinary Rounds: attended  General Information Comments: Remote access: Unable to reach pt via phone. Pt with varied intake of meals, consuming 25%. Noted bolus TF regimen in place with pt tolerating 3 cans/day. No GRV per chart. No GI distress. Wt stable since last RD visit. Pt continues to meet malnutrition. NFPE not performed, patient has been screened for possible COVID-19 and has been placed on airborne and contact precautions. Patient is noted as being positive for COVID-19.    Nutrition Discharge Planning: D/c on adequate TF regimen to meet nutritionally needs.     Nutrition Risk Screen    Nutrition Risk Screen: tube feeding or parenteral nutrition    Nutrition/Diet History    Spiritual, Cultural Beliefs, Gnosticism Practices, Values that Affect Care: no  Food Allergies: NKFA  Factors Affecting Nutritional Intake: None identified at this time    Anthropometrics    Temp: 98.5 °F (36.9 °C)  Height Method: Stated  Height: 6'  (182.9 cm)  Height (inches): 72 in  Weight Method: Bed Scale  Weight: 64.9 kg (143 lb 1.3 oz)  Weight (lb): 143.08 lb  Ideal Body Weight (IBW), Male: 178 lb  % Ideal Body Weight, Male (lb): 80.38 %  BMI (Calculated): 19.4  BMI Grade: 18.5-24.9 - normal  Weight Loss: unintentional  Usual Body Weight (UBW), k.6 kg(2020)  Weight Change Amount: 47 lb 13.4 oz  % Usual Body Weight: 75.1  % Weight Change From Usual Weight: -25.06 %       Lab/Procedures/Meds    Pertinent Labs Reviewed: reviewed  Pertinent Labs Comments: Chl 111, CO2 20, Cr 2.1, GFR 37.6, Ca 8.0, abumin 2.5  Pertinent Medications Reviewed: reviewed  Pertinent Medications Comments: ascorbic acid, statin, insulin, melatonin, MVI, protonix, thiamine, vitamin D      Estimated/Assessed Needs    Weight Used For Calorie Calculations: 64.9 kg (143 lb 1.3 oz)  Energy Calorie Requirements (kcal): 5300-1417  Energy Need Method: Kcal/kg(30-35)  Protein Requirements: 65-78(g/day)  Weight Used For Protein Calculations: 64.9 kg (143 lb 1.3 oz)(1.0-1.2 g/kg)     Estimated Fluid Requirement Method: RDA Method(or per MD)  RDA Method (mL):   CHO Requirement: 243-284gm/day      Nutrition Prescription Ordered    Current Diet Order: regular  Current Nutrition Support Formula Ordered: Suplena  Current Nutrition Support Rate Ordered: 1.5 (ml)  Current Nutrition Support Frequency Ordered: cans TID    Evaluation of Received Nutrient/Fluid Intake    Enteral Calories (kcal): 1913  Enteral Protein (gm): 48  Enteral (Free Water) Fluid (mL): 788  Total Calories (kcal/kg): 29  % Kcal Needs: 98(of lower kcal needs)  Total Protein (gm/kg): 0.7  % Protein Needs: 74(of lower protein needs)  I/O: +10.8L since admit  Energy Calories Required: meeting needs  Protein Required: not meeting needs  Fluid Required: other (see comments)(per MD)  Comments: LBM   Tolerance: tolerating  % Intake of Estimated Energy Needs: 75 - 100 %  % Meal Intake: 0 - 25 %    Nutrition Risk    Level of  Risk/Frequency of Follow-up: low(x1/week)     Assessment and Plan  Nutrition Problem:  Severe Protein-Calorie Malnutrition  Malnutrition in the context of Chronic Illness/Injury and Social/Environmental Circumstances     Related to (etiology):  Poor PO intake due to depression     Signs and Symptoms (as evidenced by):  Energy Intake: <50% of estimated energy requirement for >2 months  Weight Loss: 28% x *2 months     Interventions(treatment strategy):  Collaboration of care with providers  Referral of care  General healthful diet  Enteral nutrition-Suplena @ 50mL/hr or sliding scale bolus feeds  Vitamin/Minerals-MVI, ascorbic acid, zinc     Nutrition Diagnosis Status:  Continues; Improving       Monitor and Evaluation    Food and Nutrient Intake: energy intake, food and beverage intake, enteral nutrition intake  Food and Nutrient Adminstration: diet order, enteral and parenteral nutrition administration  Knowledge/Beliefs/Attitudes: food and nutrition knowledge/skill  Physical Activity and Function: nutrition-related ADLs and IADLs  Anthropometric Measurements: weight, weight change, body mass index  Biochemical Data, Medical Tests and Procedures: electrolyte and renal panel, gastrointestinal profile, glucose/endocrine profile  Nutrition-Focused Physical Findings: overall appearance     Malnutrition Assessment  NFPE not performed, patient has been screened for possible COVID-19 and has been placed on airborne and contact precautions. Patient is noted as being positive for COVID-19.    Nutrition Follow-Up    RD Follow-up?: Yes

## 2020-04-30 NOTE — PLAN OF CARE
04/30/20 0857   Post-Acute Status   Post-Acute Authorization Placement   Post-Acute Placement Status Referrals Sent     Pita left message with admissions at [facility] at  to see if decision was made to accept Pt, Pita to follow. iPta also left right care message. Pt is accepted per Laura at [facility], Pita to reach out to N for auth and Laura will contact wife for admit paperwork.

## 2020-04-30 NOTE — PLAN OF CARE
Patient is AAOx4. Vital signs stable. No falls throughout shift. Patient ambulates independently. Patient tolerating tube feedings and water boluses via PEG tube. Blood glucose monitored and insulin administered.  Problem: Fall Injury Risk  Goal: Absence of Fall and Fall-Related Injury  Outcome: Ongoing, Progressing     Problem: Adult Inpatient Plan of Care  Goal: Plan of Care Review  Outcome: Ongoing, Progressing  Goal: Patient-Specific Goal (Individualization)  Outcome: Ongoing, Progressing  Goal: Absence of Hospital-Acquired Illness or Injury  Outcome: Ongoing, Progressing  Goal: Optimal Comfort and Wellbeing  Outcome: Ongoing, Progressing  Goal: Readiness for Transition of Care  Outcome: Ongoing, Progressing  Goal: Rounds/Family Conference  Outcome: Ongoing, Progressing     Problem: Diabetes Comorbidity  Goal: Blood Glucose Level Within Desired Range  Outcome: Ongoing, Progressing     Problem: Adjustment to Illness (Sepsis/Septic Shock)  Goal: Optimal Coping  Outcome: Ongoing, Progressing     Problem: Bleeding (Sepsis/Septic Shock)  Goal: Absence of Bleeding  Outcome: Ongoing, Progressing     Problem: Glycemic Control Impaired (Sepsis/Septic Shock)  Goal: Blood Glucose Level Within Desired Range  Outcome: Ongoing, Progressing     Problem: Hemodynamic Instability (Sepsis/Septic Shock)  Goal: Effective Tissue Perfusion  Outcome: Ongoing, Progressing     Problem: Infection (Sepsis/Septic Shock)  Goal: Absence of Infection Signs/Symptoms  Outcome: Ongoing, Progressing     Problem: Nutrition Impaired (Sepsis/Septic Shock)  Goal: Optimal Nutrition Intake  Outcome: Ongoing, Progressing     Problem: Respiratory Compromise (Sepsis/Septic Shock)  Goal: Effective Oxygenation and Ventilation  Outcome: Ongoing, Progressing     Problem: Electrolyte Imbalance (Acute Kidney Injury/Impairment)  Goal: Serum Electrolyte Balance  Outcome: Ongoing, Progressing     Problem: Fluid Imbalance (Acute Kidney Injury/Impairment)  Goal:  Optimal Fluid Balance  Outcome: Ongoing, Progressing     Problem: Hematologic Alteration (Acute Kidney Injury/Impairment)  Goal: Hemoglobin, Hematocrit and Platelets Within Normal Range  Outcome: Ongoing, Progressing     Problem: Oral Intake Inadequate (Acute Kidney Injury/Impairment)  Goal: Optimal Nutrition Intake  Outcome: Ongoing, Progressing     Problem: Renal Function Impairment (Acute Kidney Injury/Impairment)  Goal: Effective Renal Function  Outcome: Ongoing, Progressing     Problem: Coping Ineffective  Goal: Effective Coping  Outcome: Ongoing, Progressing     Problem: Infection  Goal: Infection Symptom Resolution  Outcome: Ongoing, Progressing     Problem: Skin Injury Risk Increased  Goal: Skin Health and Integrity  Outcome: Ongoing, Progressing     Problem: Malnutrition  Goal: Improved Nutritional Intake  Outcome: Ongoing, Progressing

## 2020-04-30 NOTE — PLAN OF CARE
Marie Reis Jr.  has been accepted for transfer to [facility] and will be followed through telemedicine services beginning 05/01/20  at 7am.    Silvana Delong

## 2020-04-30 NOTE — PLAN OF CARE
Pita did fax auth request to [facility] to . Pita spoke with spouse Naheed, answered questions regarding Pt's placement, [facility] no beds and Pt's insurance, Pita to follow. [facility] may contact Pita for further questions.

## 2020-04-30 NOTE — PT/OT/SLP PROGRESS
Occupational Therapy   Treatment    Name: Marie Reis Jr.  MRN: [id number]  Admitting Diagnosis:  PEG tube malfunction  9 Days Post-Op    Recommendations:     Discharge Recommendations: nursing facility, skilled  Discharge Equipment Recommendations:  bedside commode, walker, rolling, shower chair, wheelchair  Barriers to discharge:  Decreased caregiver support    Assessment:     Marie Reis Jr. is a 64 y.o. male with a medical diagnosis of PEG tube malfunction. Patient completed grooming in sitting today at supervision. He completed thera-ex sitting EOB with good endurance and good sitting balance. He is supervision for bed mobility and transfer sit-stand without device. Performance deficits affecting function are weakness, impaired endurance, impaired self care skills, impaired functional mobilty, decreased upper extremity function, decreased lower extremity function, impaired cardiopulmonary response to activity.     Rehab Prognosis:  Good; patient would benefit from acute skilled OT services to address these deficits and reach maximum level of function.       Plan:     Patient to be seen 3 x/week to address the above listed problems via self-care/home management, therapeutic activities, therapeutic exercises  · Plan of Care Expires: 05/19/20  · Plan of Care Reviewed with: patient    Subjective     I do not want to stand to brush my teeth.  Pain/Comfort:  · Pain Rating 1: 0/10    Objective:     Communicated with: RN prior to session.  Patient found supine with telemetry, PEG Tube upon OT entry to room.    General Precautions: Standard, airborne, contact, droplet, fall   Orthopedic Precautions:N/A   Braces: N/A     Occupational Performance:     Bed Mobility:    · Patient completed Rolling/Turning to Left with  supervision  · Patient completed Supine to Sit with supervision  · Patient completed Sit to Supine with supervision     Functional Mobility/Transfers:  · Patient completed Sit <> Stand Transfer with supervision   with  no assistive device     Activities of Daily Living:  · Grooming: supervision brushing teeth sitting EOB  · Upper Body Dressing: supervision doff/taqueria gown sitting EOB    [facility] 6 Click ADL: 19    Treatment & Education:  -Patient completed thera-ex sitting EOB to increase endurance    -shoulder flexion    -chest press    -bicep curls   -Patient completed 10 sit-stands from bed to increase endurance   -Patient educated on role of OT and plan of care     Patient left supine with all lines intact and call button in reachEducation:      GOALS:   Multidisciplinary Problems     Occupational Therapy Goals        Problem: Occupational Therapy Goal    Goal Priority Disciplines Outcome Interventions   Occupational Therapy Goal     OT, PT/OT Ongoing, Progressing    Description:  Goals to be met by: 5/3     Patient will increase functional independence with ADLs by performing:    Bed mobility and supine to sit with minimal assistance. Goal met  UE Dressing while seated EOB with Minimal Assistance. MET   Grooming while standing with Minimal Assistance.  Toileting from bedside commode with Minimal Assistance for hygiene and clothing management.   Toilet transfer to bedside commode with Minimal Assistance.  Functional mobility at short household distance for ADL task with RW and mod(A).                        Time Tracking:     OT Date of Treatment: 04/30/20  OT Start Time: 1329  OT Stop Time: 1353  OT Total Time (min): 24 min    Billable Minutes:Self Care/Home Management 10  Therapeutic Exercise 14    Sonja Carballo OT  4/30/2020

## 2020-04-30 NOTE — PLAN OF CARE
Problem: Physical Therapy Goal  Goal: Physical Therapy Goal  Description  Goals to be met by: 2020    Patient will increase functional independence with mobility by performin. Supine <> sit with Minimal Assistance.-met  2. Sit <> stand transfer with Minimal Assistance using Rolling Walker.-met  3. Bed <> chair transfer via Stand Pivot with Minimal Assistance using Rolling Walker.-met  4. Gait  x 50 feet with Minimal Assistance using Rolling Walker to prepare for community ambulation and endurance activities.- met  5. Able to tolerate exercise for 15-20 reps with supervision.-met        Outcome: Met

## 2020-04-30 NOTE — PLAN OF CARE
Problem: Occupational Therapy Goal  Goal: Occupational Therapy Goal  Description  Goals to be met by: 5/3     Patient will increase functional independence with ADLs by performing:    Bed mobility and supine to sit with minimal assistance. Goal met  UE Dressing while seated EOB with Minimal Assistance. MET   Grooming while standing with Minimal Assistance.  Toileting from bedside commode with Minimal Assistance for hygiene and clothing management.   Toilet transfer to bedside commode with Minimal Assistance.  Functional mobility at short household distance for ADL task with RW and mod(A).       Outcome: Ongoing, Progressing     Goals remain  Sojna Carballo OT  4/30/2020

## 2020-04-30 NOTE — PROGRESS NOTES
"       Progress Note  Hospital Medicine  [facility], [place] [place]       Patient Name: Marie Reis Jr.  MRN:  [id number]  Hospital Medicine Team: [facility] K Sharmin Valero MD  Date of Admission:  4/18/2020     Length of Stay:  LOS: 11 days   Expected Discharge Date: 4/30/2020  Principal Problem:  PEG tube malfunction     Subjective:     Interval History/Overnight Events:  Doing well  Very talkative today, easily arousable from his nap. Reports his appetite is improving greatly, wants lemonade, chips, bugle chips, and ice cream. Cr 2.1, improving. Hg has been in mid 7's to 8 the last few days, when GI replaced the PEG last week they did an EGD and no abnormalities found. He is on tube feeds for nutrition spplement as appetite has been spotty but is clear to eat regular diet when he wants also. Added bowel regimen today for him, lst BM 4/28.   Has accepting SNF, discussed with wife about med stability for it, she had some confusion about why 20 days, we discussed that is just if he needs snf they cantake him under insurance for 20 days and if more they can do that also but also if hes improved he can transition to  after that also. She understood better after that, she is to sign paperwork today vs tomorrow. She agrees his mood has perked up a lot the last few days and is talking more. When told about his diet choices she said,  'that is my  right there!". If paperwork is done tomorrow, he will be a virtual med candidate tomorrow. PPD ordered.       amLODIPine  10 mg Per G Tube Daily    ascorbic acid (vitamin C)  500 mg Oral BID    aspirin  81 mg Per G Tube Daily    atorvastatin  40 mg Per G Tube QHS    carvediloL  25 mg Oral BID WM    clopidogreL  75 mg Oral Daily    heparin (porcine)  5,000 Units Subcutaneous Q12H    insulin aspart U-100  2 Units Subcutaneous TIDWM    insulin detemir U-100  6 Units Subcutaneous QHS    melatonin  6 mg Oral Nightly    multivitamin  1 tablet Oral Daily "    pantoprazole  40 mg Oral Daily    polyethylene glycol  17 g Per G Tube Daily    senna-docusate 8.6-50 mg  1 tablet Oral Daily    sodium bicarbonate  650 mg Oral BID    thiamine  100 mg Oral Daily    tuberculin  5 Units Intradermal Once    vitamin D  1,000 Units Per G Tube Daily           acetaminophen, bisacodyL, Dextrose 10% Bolus, Dextrose 10% Bolus, Dextrose 10% Bolus, glucagon (human recombinant), glucose, glucose, insulin aspart U-100, labetalol, OLANZapine, ondansetron, sodium chloride 0.9%    Review of Systems  Limited due to AMS , but able to obtain   Constitutional: Negative for chills, fatigue, fever.   HENT: Negative for sore throat, trouble swallowing.    Respiratory: Negative for cough, shortness of breath.    Cardiovascular: Negative for chest pain, palpitations, leg swelling.   Gastrointestinal: Negative for abdominal pain, constipation, diarrhea, nausea, vomiting. .   Musculoskeletal: Negative for arthralgias, myalgias.   Skin: Negative for rash, wound, erythema   Neurological: Negative for dizziness, syncope, weakness, light-headedness.     Objective:     Temp:  [97.5 °F (36.4 °C)-99.1 °F (37.3 °C)]   Pulse:  [72-82]   Resp:  [10-26]   BP: (141-168)/(68-79)   SpO2:  [97 %-99 %]         Weight change:    Body mass index is 19.4 kg/m².       Physical Exam:  Gen: NAD, conversant, pleasant. Asking for potato chips.  Head: Abrasion with small echymosis over left eye  Eyes: Pupils dilated but reactive. follow commands today.  Throat: Dry mucous membranes, OP clear  CV: RRR,  no peripheral edema,  Resp: no increased work of breathing on room air  GI: Soft, NT, ND. Catheter in place in PEG tube site.. Nontender and not erythematous surrounding site.   Ext: No joint swelling or tenderness  No edema   Muscle wasting   Neuro: moves all extremities spontaneously, 4.5/5 motor strength throughout, follows simple instructions. Oriented to self and person (knows his wife's name) but not to place or time.     Psychiatry: Calm  More animated conversation.    Labs:    Chemistries:   Recent Labs   Lab 04/28/20  0345 04/29/20  0305 04/30/20  0255    140 138   K 4.2 4.2 4.1    111* 111*   CO2 20* 21* 20*   BUN 27* 26* 22   CREATININE 2.2* 2.4* 2.1*   CALCIUM 8.0* 8.4* 8.0*   PHOS 2.4* 2.7 2.9        WBC:   Recent Labs   Lab 04/26/20  0405 04/27/20  0427 04/28/20  0345 04/29/20  0305 04/30/20  0255   WBC 7.84 5.50 5.79 6.73 6.68     Bands:     CBC/Anemia Labs: Coags:    Recent Labs   Lab 04/28/20  0345 04/29/20  0305 04/30/20  0255   WBC 5.79 6.73 6.68   HGB 7.5* 8.0* 7.4*   HCT 23.4* 26.3* 23.7*    244 236   MCV 85 87 85   RDW 15.7* 15.9* 15.9*    No results for input(s): PT, INR, APTT in the last 168 hours.     POCT Glucose: HbA1c:    Recent Labs   Lab 04/29/20  1117 04/29/20  1621 04/29/20 2003 04/29/20  2128 04/30/20  0741 04/30/20  1140   POCTGLUCOSE 196* 310* 240* 179* 103 184*    Hemoglobin A1C   Date Value Ref Range Status   03/09/2020 9.1 (H) 4.0 - 5.6 % Final     Comment:   02/12/2020 10.5 (H) 4.0 - 5.6 % Final     Comment:   12/10/2019 9.2 (H) 4.0 - 5.6 % Final     Comment:          Assessment and Plan     Hospital Course:    Mr. Marie Reis Jr. was admitted to Hospital Medicine for management of  PEG tube malfunction     Active Hospital Problems    Diagnosis  POA    *PEG tube malfunction [K94.23]  Yes    Altered behavior [R46.89]  Yes    Urinary retention [R33.9]  Yes    Delirium [R41.0]  Yes    Severe protein-calorie malnutrition [E43]  Yes    Encephalopathy [G93.40]  Yes    Hyperammonemia [E72.20]  Yes    Physical debility [R53.81]  Yes    Altered mental status [R41.82]  Yes    Advanced care planning/counseling discussion [Z71.89]  Not Applicable    Palliative care encounter [Z51.5]  Not Applicable    Counseling regarding advance care planning and goals of care [Z71.89]  Not Applicable    Chronic kidney disease, stage IV (severe) [N18.4]  Yes    Failure to thrive in adult  "[R62.7]  Yes    Type 2 diabetes mellitus [E11.9]  Yes    Physical deconditioning [R53.81]  Yes    Coronary artery disease involving native coronary artery of native heart without angina pectoris [I25.10]  Yes    COVID-19 virus infection [U07.1]  Yes    Anemia, chronic disease [D63.8]  Yes    Acute renal failure superimposed on stage 4 chronic kidney disease [N17.9, N18.4]  No    Acute encephalopathy [G93.40]  Yes    Benign hypertension with chronic kidney disease, stage III [I12.9, N18.3]  Yes    Chronic combined systolic and diastolic heart failure [I50.42]  Yes    History of right PCA stroke [Z86.73]  Not Applicable    Acute CVA (cerebrovascular accident) [I63.9]  Yes     3.5 cm acute cortical infarct in the medial right occipital lobe - as per MRI on 3/17/20        Resolved Hospital Problems   No resolved problems to display.       #PEG tube malfunction  -S/p replacement prior to presentation with good positioning on CT  - GI following   - s/p for PEG replacement on 4/21 with GI , TFs restarted. As appetite improves as outpatient, may be able to tolerate down TF, appetite improving now     #sub- Acute encephalopathy  # Delirium  # Acute CVA (cerebrovascular accident)  -Subacute encephalopathy, that started after patient's stroke, per wife's report. MRI brain on 3/17 showed '3.5 cm acute cortical infarct in the medial right occipital lobe.", more inclined to think that CVA may be largely contributing to the AMS . COVID infection may be also contributing to component on psychosis. Elevated ammonia (seen on prior labs) is unlikely to be the culprit, and suspect it was elevated perhaps in setting of malnutrition, patient with no cirrhosis and normal LFTs. BUN is 50 so unlikely due to uremia. Psychiatric component remains high on DDX for this AMS- concern for psychosis vs other psychiatric conditions- patient is a former vet with hx of PTSD, with recent psychologic trauma from deaths of two of his family " members .   - per prior documentation , patient refusing to eat, leading to placement of PEG tube for supplemental nutrition. Recently patient pulled the PEG tube out.   - psychiatry consulted . Unable to see patient due to COVID status (?), patient refused to speak on the phone. It is not unanticipated that patient is unwilling or unable to speak on the phone due to his his current condition   - cont PRN IM zyprexa for non-redirectable agitation  - cont DAPT, BP meds and insulin for recent CVA. Not on statin- suspect due to fairly unremarkable lipid panel and significant weight loss   - palliative care involved on the case- patient has a living will , indicating that he wished all aggressive measures and full code  - may benefit from neurology outpatient eval   - Psychiatry planning on interviewing the patient 4/22.   - 4/29: mental status appears at baseline currently. Most conversation makes sense and AAO x3   4/30: improving daily, asking for chips, conversive, wife states more like imself the last few days     #CKD IV  # Acute renal failure superimposed on stage 4 chronic kidney disease  - Cr baseline around 3.5.   - Cr increasing to 4.9 on 4/22, likely due to dehydration and decreased PO intake  - given IVF - LR infusion and bolus   - UA ordered and has not been colleted yet  - patient has a sims in for urinary retention with failed voiding trials  - receiving enteral water per PEG tube  4/30: Cr 2.1, improved with hydration, PEG supplementation as alternative means to oral when appetite gest spotty     #CAD   #chronic combined systolic and diastolic heart failure  - Recent NSTEMI, on DAPT, statin, B Alexey. Continue  - HF not decompensated.      #Anemia  -hbg stable between mid 7s-9s the last week  -EG for peg replacemente last week did not show any upper GI abnormalities  - monitor      #HTN  Continue home meds    # Viral Pneumonia due to COVID-19  - COVID testing + on 3/21 and 4/12  - on RA  Monitor   -  COVID precautions   - re testing ordered on 4/24- remains positive    # Physical debility  # Failure to thrive in adult  - SNF recs, accepted now, pending paperwork on 4/30.    # T2DM  - detemir as above   4/30 add novolog 2 U with meals per pharm recs  -ssi    # Severe protein-calorie malnutrition  - per prior documentation , patient refusing to eat, leading to placement of PEG tube for supplemental nutrition. Recently patient pulled the PEG tube out.   Nutrition assessment on 4/16 noted :   1.) ADAT to regular (to encourage intake) with Boost Glucose Control TID.   2.) Begin sliding scale bolus feeds using Suplena:               If pt consuming >75% of meals-no bolus;               if PO intake 50-75% of meals: bolus 1/2 can;               if PO intakes 25-50% of meals: bolus 1 can;               if PO intakes <25% of meals: bolus 1.5 cans.  (1 can Suplena provides 425 kcals, 10.6gm protein and 175mL of free water)  3.) If continuous feeds preferred, suggest Suplena @ 20mL/hr advancing 10mL q4h to goal rate 50mL/hr to provide 2160 kcals, 54gm protein and 886mL of free water.               MD to manage fluid.               If GRV >500mL, hold TF q4h then restart regimen.               TF to meet 99% EEN and 87% EPN.   4.) Suggest vitamin C, zinc, MVI due to COVID19.     Disposition:      Has accepting SNF today, pending paperwork filled out, if not dc today then can be telemed tomorrow and expeccted dc today vs tomorrow    4/26: called Naheed at 1:05pm. No answer, general message left.     4/27: updated family via phone    4/28: family updated via phone    4/30: updated wife on phone      Signing Physician:     Sharmin Valero MD

## 2020-05-01 NOTE — PLAN OF CARE
Problem: SLP Goal  Goal: SLP Goal  Description  Speech-Language Pathology Goals  Goals to be met by 5/4/20  1. Patient will independently orient x4.   2. Patient will recall personal information with 90% accuracy and min assist.   3. Patient will answer complex yes/no questions with 90% accuracy and min assist.   4. Patient will answer simple problem solving questions with 75% accuracy and min assist.  5. Patient will participate in ongoing assessment of cognition/language.     Outcome: Ongoing, Progressing   Continue POC.   Emily P. Abadie M.S., CCC-SLP  Speech Language Pathologist  [phone]  05/01/2020

## 2020-05-01 NOTE — PLAN OF CARE
Pt ok to d/c, nurse to call report to  to [facility], room is 813b and nurse to call report to the [facility]'s [place] nurse. Stretcher transport for 3pm. Transport auth number is [id number] with Brisa with PH[facility].

## 2020-05-01 NOTE — PT/OT/SLP PROGRESS
"  Speech Language Pathology Treatment    Patient Name:  Marie Reis Jr.   MRN:  [id number]  Admitting Diagnosis: PEG tube malfunction    Recommendations:                 General Recommendations:  Cognitive-linguistic therapy  Diet recommendations:  Regular, Liquid Diet Level: Thin   Aspiration Precautions: Standard aspiration precautions   General Precautions: Standard, airborne, contact, droplet, fall  Communication strategies:  none    Subjective     Patient awake and alert during session  "They say today is my last day in here"    Pain/Comfort:  Pain Rating 1: 0/10    Objective:     Has the patient been evaluated by SLP for swallowing?   Yes  Keep patient NPO? No   Current Respiratory Status: room air      Patient seen for ongoing cognitive therapy.  Patient was oriented to name, , and place, though not oriented to date.  Patient answered two unit yes/no questions with 100% acc. Completed compare and contrast problem solving tasks with 30% acc indep.  Responses to general conversation with SLP appear appropriate.  Skilled education was provided to patient  re: diet recs, standard aspiration precautions of which to follow, and ongoing ST plan of care.    Assessment:     Marie Reis Jr. is a 64 y.o. male with an SLP diagnosis of Cognitive-Linguistic Impairment.      Goals:   Multidisciplinary Problems     SLP Goals        Problem: SLP Goal    Goal Priority Disciplines Outcome   SLP Goal     SLP Ongoing, Progressing   Description:  Speech-Language Pathology Goals  Goals to be met by 20  1. Patient will independently orient x4.   2. Patient will recall personal information with 90% accuracy and min assist.   3. Patient will answer complex yes/no questions with 90% accuracy and min assist.   4. Patient will answer simple problem solving questions with 75% accuracy and min assist.  5. Patient will participate in ongoing assessment of cognition/language.                      Plan:     · Patient to be seen:  3 x/week "   · Plan of Care expires:  05/20/20  · Plan of Care reviewed with:  patient   · SLP Follow-Up:  Yes       Discharge recommendations:  nursing facility, skilled   Barriers to Discharge:  None    Time Tracking:     SLP Treatment Date:   05/01/20  Speech Start Time:  0943  Speech Stop Time:  1000     Speech Total Time (min):  17 min    Billable Minutes: Speech Therapy Individual 8 and Self Care/Home Management Training 8    Emily P. Abadie M.S., CCC-SLP  Speech Language Pathologist  [phone]  05/01/2020

## 2020-05-01 NOTE — NURSING
Patient awake and alert. No complaints of pain. Ambulating to restroom well. Tolerating water boluses. accuchecks completed as ordered with coverage given as needed. levemir given as ordered.

## 2020-05-01 NOTE — PLAN OF CARE
Pt was d/c to SNF and left facility at 1400 via stretcher. He was able to ambulate from bed to stretcher with out difficulty.  IV was removed from right forearm with out difficulty and with tip intact. He denies any pain or discomfort. Resp are even and unlabored.BS are active x4. NAD noted. Report called to SNF unit. Surgical mask in place.

## 2020-05-01 NOTE — PLAN OF CARE
05/01/20 0845   Post-Acute Status   Post-Acute Authorization Placement   Post-Acute Placement Status Referrals Sent     Awaiting PH[facility] auth and paperwork, updated orders to be sent with today's date. Sw sent orders, PPD and updated PT/OT notes, Sw to follow. Left message to complete locet.

## 2020-05-03 NOTE — DISCHARGE SUMMARY
Discharge Summary  Hospital Medicine       Name: Marie Reis Jr.  YOB: 1955 (Age: 64 y.o.)  Date of Admission: 4/18/2020  Date of Discharge: 5/3/2020  Attending Provider on Discharge: Manish Machado MD  [facility] Medicine Team: [facility] [place]  Code status: Full Code    Primary Care Provider: Nancy Colón MD    Discharge Diagnosis:  Active Hospital Problems    Diagnosis  POA    *PEG tube malfunction [K94.23]  Yes    Altered behavior [R46.89]  Yes    Urinary retention [R33.9]  Yes    Delirium [R41.0]  Yes    Severe protein-calorie malnutrition [E43]  Yes    Encephalopathy [G93.40]  Yes    Hyperammonemia [E72.20]  Yes    Physical debility [R53.81]  Yes    Altered mental status [R41.82]  Yes    Advanced care planning/counseling discussion [Z71.89]  Not Applicable    Palliative care encounter [Z51.5]  Not Applicable    Counseling regarding advance care planning and goals of care [Z71.89]  Not Applicable    Chronic kidney disease, stage IV (severe) [N18.4]  Yes    Failure to thrive in adult [R62.7]  Yes    Type 2 diabetes mellitus [E11.9]  Yes    Physical deconditioning [R53.81]  Yes    Coronary artery disease involving native coronary artery of native heart without angina pectoris [I25.10]  Yes    COVID-19 virus infection [U07.1]  Yes    Anemia, chronic disease [D63.8]  Yes    Acute renal failure superimposed on stage 4 chronic kidney disease [N17.9, N18.4]  No    Acute encephalopathy [G93.40]  Yes    Benign hypertension with chronic kidney disease, stage III [I12.9, N18.3]  Yes    Chronic combined systolic and diastolic heart failure [I50.42]  Yes    History of right PCA stroke [Z86.73]  Not Applicable    Acute CVA (cerebrovascular accident) [I63.9]  Yes     3.5 cm acute cortical infarct in the medial right occipital lobe - as per MRI on 3/17/20        Resolved Hospital Problems   No resolved problems to display.       HPI: 64 M with hx CVA, recent NSTEMI, Multiple  "Myeloma, recent COVID diagnosis, failure to thrive and decreased PO intake requiring PEG tube was transferred from SNF after pulling out his PEG tube. Per notes patient has been uncooperative and confused for a significant time and does not appear to have any acute changes. PEG tube was placed on 4/15 by GI. Today patient pulled out his PEG tube, was replaced prior to ED presentation with Parrish catheter. He was sent to the ED for CT scan and GI evaluation. Patient is unable to provide any history, believes he is on [employer] and it is 1989.      Per notes patient has not been participating in therapy and withdrawn. This was attributed to possibly old stroke symptoms vs depression for which he is on fluoxetine. In the ED he was found to have a markedly elevated ammonia level. No previous history of liver disease. Hepatitis serologies negative in February.     Hospital Course:     #PEG tube malfunction  -S/p replacement prior to presentation with good positioning on CT  - GI following   - s/p for PEG replacement on 4/21 with GI , TFs restarted. As appetite improves as outpatient, may be able to tolerate down TF, appetite improving now     #sub- Acute encephalopathy  # Delirium  # Acute CVA (cerebrovascular accident)  -Subacute encephalopathy, that started after patient's stroke, per wife's report. MRI brain on 3/17 showed '3.5 cm acute cortical infarct in the medial right occipital lobe.", more inclined to think that CVA may be largely contributing to the AMS . COVID infection may be also contributing to component on psychosis. Elevated ammonia (seen on prior labs) is unlikely to be the culprit, and suspect it was elevated perhaps in setting of malnutrition, patient with no cirrhosis and normal LFTs. BUN is 50 so unlikely due to uremia. Psychiatric component remains high on DDX for this AMS- concern for psychosis vs other psychiatric conditions- patient is a former vet with hx of PTSD, with recent psychologic " trauma from deaths of two of his family members .   - per prior documentation , patient refusing to eat, leading to placement of PEG tube for supplemental nutrition. Recently patient pulled the PEG tube out.   - psychiatry consulted . Unable to see patient due to COVID status (?), patient refused to speak on the phone. It is not unanticipated that patient is unwilling or unable to speak on the phone due to his his current condition   - cont PRN IM zyprexa for non-redirectable agitation  - cont DAPT, BP meds and insulin for recent CVA. Not on statin- suspect due to fairly unremarkable lipid panel and significant weight loss   - palliative care involved on the case- patient has a living will , indicating that he wished all aggressive measures and full code  - may benefit from neurology outpatient eval   - Psychiatry planning on interviewing the patient 4/22.   - 4/29: mental status appears at baseline currently. Most conversation makes sense and AAO x3   4/30: improving daily, asking for chips, conversive, wife states more like imself the last few days     #CKD IV  # Acute renal failure superimposed on stage 4 chronic kidney disease  - Cr baseline around 3.5.   - Cr increasing to 4.9 on 4/22, likely due to dehydration and decreased PO intake  - given IVF - LR infusion and bolus   - UA ordered and has not been colleted yet  - patient has a sims in for urinary retention with failed voiding trials  - receiving enteral water per PEG tube  4/30: Cr 2.1, improved with hydration, PEG supplementation as alternative means to oral when appetite gest spotty     #CAD   #chronic combined systolic and diastolic heart failure  - Recent NSTEMI, on DAPT, statin, NANCY Blackburn. Continue  - HF not decompensated.      #Anemia  -hbg stable between mid 7s-9s the last week  -EG for peg replacemente last week did not show any upper GI abnormalities  - monitor      #HTN  Continue home meds     # Viral Pneumonia due to COVID-19  - COVID testing +  on 3/21 and 4/12  - on RA  Monitor   - COVID precautions   - re testing ordered on 4/24- remains positive     # Physical debility  # Failure to thrive in adult  - SNF recs, accepted now.     # T2DM  - detemir as above   4/30 add novolog 2 U with meals per pharm recs  -ssi     # Severe protein-calorie malnutrition  - per prior documentation , patient refusing to eat, leading to placement of PEG tube for supplemental nutrition. Recently patient pulled the PEG tube out.   Nutrition assessment on 4/16 noted :   1.) ADAT to regular (to encourage intake) with Boost Glucose Control TID.   2.) Begin sliding scale bolus feeds using Suplena:               If pt consuming >75% of meals-no bolus;               if PO intake 50-75% of meals: bolus 1/2 can;               if PO intakes 25-50% of meals: bolus 1 can;               if PO intakes <25% of meals: bolus 1.5 cans.  (1 can Suplena provides 425 kcals, 10.6gm protein and 175mL of free water)  3.) If continuous feeds preferred, suggest Suplena @ 20mL/hr advancing 10mL q4h to goal rate 50mL/hr to provide 2160 kcals, 54gm protein and 886mL of free water.               MD to manage fluid.               If GRV >500mL, hold TF q4h then restart regimen.               TF to meet 99% EEN and 87% EPN.   4.) Suggest vitamin C, zinc, MVI due to COVID19.          Marie Reis Jr. was admitted to Hospital Medicine for treatment of COVID-19 viral infection and was treated with supportive care following a comprehensive physical, radiographic, and lab evaluation tailored to the current standard of care for COVID19. PEG was fixed. He was stabilized and discharged to SNF.     On the day of discharge, isolation precautions were reviewed at length verbally. The patient was also provided with written isolation guidelines modified from the [place] [facility] as well as the CDC, as part of discharge paperwork. An updated phone number was obtained, which will be used to  contact the patient when results are available, and positive patients will be enrolled in both the COVID-19 Home Symptom Monitoring program.    Labs:  Recent Labs   Lab 04/29/20  0305 04/30/20  0255 05/01/20  0500   WBC 6.73 6.68 6.00   HGB 8.0* 7.4* 7.5*   HCT 26.3* 23.7* 24.3*    236 237     Recent Labs   Lab 04/29/20  0305 04/30/20  0255 05/01/20  0500    138 139   K 4.2 4.1 4.4   * 111* 111*   CO2 21* 20* 20*   BUN 26* 22 17   CREATININE 2.4* 2.1* 2.0*   GLU 93 94 129*   CALCIUM 8.4* 8.0* 8.0*   PHOS 2.7 2.9 3.0     Recent Labs   Lab 04/29/20  0305 04/30/20  0255 05/01/20  0500   ALBUMIN 2.6* 2.5* 2.6*      Recent Labs   Lab 04/30/20  0741 04/30/20  1140 04/30/20  1835 04/30/20  2109 05/01/20  0625 05/01/20  1147   POCTGLUCOSE 103 184* 204* 296* 126* 210*     No results for input(s): CPK, CPKMB, MB, TROPONINI in the last 72 hours.    ROS (Positive in Bold, otherwise negative)  Constitutional: fever, chills, night sweats  CV: chest pain, edema, palpitations  Resp: SOB, cough, sputum production  GI: changes in appetite, NVDC, pain, melena, hematochezia, GERD, hematemesis  : Dysuria, hematuria, urinary urgency, frequency  MSK: arthralgia/myalgia, joint swelling  Neuro/Psych: anxiety, depression    Discharge Exam: Patient was seen and examined on the date of discharge and determined to be suitable for discharge.    Procedures: CXR    Consultants: HM, palliative, CNS, GI, psych, nutrition     Discharge Medication List as of 5/1/2020  3:23 PM      START taking these medications    Details   ascorbic acid, vitamin C, (VITAMIN C) 500 MG tablet Take 1 tablet (500 mg total) by mouth 2 (two) times daily., Starting Mon 4/27/2020, OTC      bisacodyL (DULCOLAX) 10 mg Supp Place 1 suppository (10 mg total) rectally daily as needed (Until bowel movement if patient has no bowel movement for 2 days)., Starting Thu 4/30/2020, OTC      insulin detemir U-100 (LEVEMIR FLEXTOUCH) 100 unit/mL (3 mL) SubQ InPn pen  Inject 6 Units into the skin every evening., Starting Mon 4/27/2020, Until Tue 4/27/2021, No Print      polyethylene glycol (GLYCOLAX) 17 gram PwPk 17 g by Per G Tube route once daily., Starting Thu 4/30/2020, No Print      senna-docusate 8.6-50 mg (PERICOLACE) 8.6-50 mg per tablet Take 1 tablet by mouth once daily., Starting Thu 4/30/2020, No Print      sodium bicarbonate 650 MG tablet Take 1 tablet (650 mg total) by mouth once daily., Starting Mon 4/27/2020, Until Tue 4/27/2021, No Print         CONTINUE these medications which have CHANGED    Details   insulin aspart U-100 (NOVOLOG) 100 unit/mL (3 mL) InPn pen Inject 2 Units into the skin 3 (three) times daily., Starting Thu 4/30/2020, Until Fri 4/30/2021, No Print         CONTINUE these medications which have NOT CHANGED    Details   amLODIPine (NORVASC) 10 MG tablet Take 1 tablet (10 mg total) by mouth once daily., Starting Mon 2/17/2020, Print      aspirin (ECOTRIN) 81 MG EC tablet Take 1 tablet (81 mg total) by mouth once daily., Starting Sat 3/28/2020, Until Sun 3/28/2021, Normal      atorvastatin (LIPITOR) 40 MG tablet Take 1 tablet (40 mg total) by mouth every evening., Starting Fri 3/27/2020, Until Sat 3/27/2021, Normal      blood sugar diagnostic Strp 1 strip by Misc.(Non-Drug; Combo Route) route after meals as needed., Starting 1/14/2014, Until Discontinued, Historical Med      calcitRIOL (ROCALTROL) 0.25 MCG Cap Take 2 capsules (0.5 mcg total) by mouth once daily., Starting Mon 1/13/2020, Normal      carvediloL (COREG) 25 MG tablet Take 1 tablet (25 mg total) by mouth 2 (two) times daily with meals., Starting Mon 2/17/2020, Print      clopidogreL (PLAVIX) 75 mg tablet Take 1 tablet (75 mg total) by mouth once daily., Starting Mon 2/17/2020, Print      flash glucose scanning reader (FREESTYLE CLAUDIO 14 DAY READER) Misc Use as directed., Normal      flash glucose sensor (FREESTYLE CLAUDIO 14 DAY SENSOR) Kit Change every 14 days., Print      fluticasone  "(FLONASE) 50 mcg/actuation nasal spray 1 spray by Each Nare route 2 (two) times daily as needed for Rhinitis., Starting Fri 9/9/2016, Print      gabapentin (NEURONTIN) 300 MG capsule Take 1 capsule (300 mg total) by mouth daily as needed., Starting Wed 9/11/2019, No Print      insulin needles, disposable, 31 X 5/16 " Ndle Pt to use pen needle with Lantus daily, Normal      montelukast (SINGULAIR) 10 mg tablet Take 1 tablet (10 mg total) by mouth every evening., Starting 9/9/2016, Until Discontinued, Print      omeprazole (PRILOSEC OTC) 20 MG tablet Take 1 tablet (20 mg total) by mouth once daily., Starting Wed 10/9/2019, Until Thu 10/8/2020, Normal      ondansetron (ZOFRAN-ODT) 8 MG TbDL Take 1 tablet (8 mg total) by mouth every 6 (six) hours as needed., Starting 9/19/2016, Until Discontinued, Print      pen needle, diabetic 32 gauge x 1/4" Ndle use to inject insulin once daily, Starting Fri 3/27/2020, Normal      vitamin D (VITAMIN D3) 1000 units Tab Take 1,000 Units by mouth once daily., Historical Med         STOP taking these medications       azithromycin (ZITHROMAX) 500 MG tablet Comments:   Reason for Stopping:         dexAMETHasone (DECADRON) 4 MG Tab Comments:   Reason for Stopping:         diclofenac sodium 1.5 % Drop Comments:   Reason for Stopping:         furosemide (LASIX) 40 MG tablet Comments:   Reason for Stopping:         glucose 4 GM chewable tablet Comments:   Reason for Stopping:         insulin (LANTUS SOLOSTAR U-100 INSULIN) glargine 100 units/mL (3mL) SubQ pen Comments:   Reason for Stopping:         lidocaine (LIDODERM) 5 % Comments:   Reason for Stopping:         lidocaine (XYLOCAINE) 5 % Oint ointment Comments:   Reason for Stopping:         LIDOTRAL 3.88 % Crea Comments:   Reason for Stopping:         linaGLIPtin (TRADJENTA) 5 mg Tab tablet Comments:   Reason for Stopping:         methyl salicylate 25 % Crea Comments:   Reason for Stopping:         nitroGLYCERIN (NITROSTAT) 0.4 MG SL " tablet Comments:   Reason for Stopping:               The relevant and important risks, side effects, and benefits of their medications were reviewed with patient during hospitalization and at discharge. The patient was given the opportunity to discuss and ask questions about their medications, including target symptoms, potential risks, side effects and benefits of their medications, as well as their expected prognosis if non-medication treatment options were chosen.  The patient expresses understanding of all these options and information and voluntarily consents to treatment.    Discharge Diet: regular diet with TF     Activity: activity as tolerated    Discharge Condition: Stable    Disposition: Skilled Nursing Facility     Time spent  on the discharge of the patient including review of hospital course with the patient. reviewing discharge medications and arranging follow-up care: 39 mins    Discharge examination Patient was seen and examined on the date of discharge and determined to be suitable for discharge.    Discharge plan and follow up:  It is critical that you make your follow-up appointment(s). If you are discharged on the weekend or after business hours, or if we are unable to schedule these appointments for you for any reason, you or a family member need to call during the next business day to schedule your appointment(s).    -Follow up with your PCP, Nancy Colón MD within 1-2 weeks as arranged with SW and treatment team prior to discharge  -Follow up with [place] [id number] home monitoring program   -Take all medications as prescribed and listed above.     - Please return to ED or call your physician if you have:         1. Fevers > 101.5 unresponsive to tylenol.       2. Abdominal pain and/or distention       3. Intractable nausea, vomiting or diarrhea       4. Inability to tolerate adequate oral intake of food       5. Neurologic changes, chest pain or shortness of breath     6. Worsening shortness of  breath        Future Appointments   Date Time Provider Department Center   5/19/2020  8:00 AM [facility] [place]   5/22/2020  9:40 AM Nancy Colón MD [facility] [place]   7/8/2020  1:30 PM BELEM Gardiner, FNP [facility]         Manish Machado MD  [facility] Medicine Staff  [phone] pager

## 2020-05-04 NOTE — PROGRESS NOTES
[facility]   New Visit Progress Note   Recent Hospital Discharge  DOS 5/2/2020     PRESENTING HISTORY     Chief Complaint/Reason for Admission:  Follow up Hospital Discharge   PCP: Nancy Colón MD   Date of Admission: 4/18/2020  Date of Discharge: 5/1/2020    History of Present Illness:  Mr. Marie Reis Jr. is a 64 y.o. male who was recently admitted to the hospital for COVID diagnosis, failure to thrive and decreased PO intake requiring PEG tube was transferred from SNF after pulling out his PEG tube. Per notes patient has been uncooperative and confused for a significant time and does not appear to have any acute changes. PEG tube was placed on 4/15 by GI. The patient pulled out his PEG tube again on 4/18 with good positioning on CT. He had EGD recently and no abnormalities found. He is on tube feeds for nutrition spplement as appetite has been spotty but is clear to eat regular diet when he wants also.    Per notes patient has not been participating in therapy and withdrawn. This was attributed to possibly old stroke symptoms vs depression for which he is on fluoxetine. In the ED he was found to have a markedly elevated ammonia level. No previous history of liver disease. Hepatitis serologies negative in February.     He was stabilized and discharged to SNF.      ________________________________________________________________    Today:  The resident is seen for initial hospital follow up. Laying in bed with blanket over his head. Arousal to voice. Oriented to person only. Minimal speech. No acute distress. States he is doing fine. No complaints. LCTAB with no edema.  Pegtube intact with no erythema surrounding site.       Review of Systems  General ROS: negative for chills, fever  Psychological ROS: negative for hallucination  Ophthalmic ROS: negative for blurry vision  ENT ROS: negative for epistaxis, sore throat or rhinorrhea  Respiratory ROS: no cough, shortness of breath, or  wheezing  Cardiovascular ROS: no chest pain or dyspnea on exertion  Gastrointestinal ROS: no abdominal pain, change in bowel habits, or black/ bloody stools  Genito-Urinary ROS: no trouble voiding  Musculoskeletal ROS: negative for gait disturbance or muscular weakness  Neurological ROS: no syncope or seizures; no ataxia  Dermatological ROS: negative for pruritis, rash and jaundice      PAST HISTORY:     Past Medical History:   Diagnosis Date    Acute on chronic combined systolic and diastolic heart failure 3/22/2020    Binocular vision disorder with diplopia 9/22/2016    Bradycardia     Cataract     Cervical spondylosis 10/1/2015    Chronic diastolic congestive heart failure     Chronic diastolic heart failure 1/11/2018    Coronary artery disease due to calcified coronary lesion 3/19/2018    Dyslipidemia 6/26/2015    Encounter for blood transfusion     Hearing impairment 10/25/2013    History of stroke 3/23/2015    Hypertension associated with diabetes 1/11/2018    Hypertensive retinopathy of both eyes 9/26/2017    Lumbar spondylosis 10/1/2015    Multiple myeloma not having achieved remission 1/16/2018    NSTEMI (non-ST elevated myocardial infarction) 3/22/2020    SUZANNA on CPAP     Persistent proteinuria 1/11/2018    Pneumonia of right lower lobe due to infectious organism 3/22/2020    Spondylolisthesis of lumbar region 10/1/2015    Strabismus     Stroke 2014    Thoracic spondylosis 10/1/2015    Type 2 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, with long-term current use of insulin 9/26/2017    Type 2 diabetes mellitus with diabetic polyneuropathy, with long-term current use of insulin 9/28/2015    Type 2 diabetes mellitus with stage 4 chronic kidney disease, with long-term current use of insulin 1/11/2018       Past Surgical History:   Procedure Laterality Date    CATARACT EXTRACTION W/  INTRAOCULAR LENS IMPLANT Left 4/30/15    Doris     COLONOSCOPY N/A 1/20/2020    Procedure: COLONOSCOPY;  Surgeon: Salvador Espinosa MD;  Location: [facility] ENDO;  Service: Endoscopy;  Laterality: N/A;    ESOPHAGOGASTRODUODENOSCOPY N/A 4/21/2020    Procedure: EGD (ESOPHAGOGASTRODUODENOSCOPY);  Surgeon: Abdoulaye Delacruz MD;  Location: [facility] ([address] [place] [address]);  Service: Endoscopy;  Laterality: N/A;    EYE SURGERY      cataract removal left eye    HAND SURGERY  2/2012    DR. BAKER [facility]    left hand fracture sx      LT EYE   5/2/15    DR KULLMAN [facility]       Family History   Problem Relation Age of Onset    Diabetes Mother     Cancer Mother     Kidney disease Mother     Diabetes Father     Heart attack Father     Diabetes Sister     Diabetes Brother     No Known Problems Maternal Aunt     No Known Problems Maternal Uncle     No Known Problems Paternal Aunt     No Known Problems Paternal Uncle     No Known Problems Maternal Grandmother     No Known Problems Maternal Grandfather     No Known Problems Paternal Grandmother     No Known Problems Paternal Grandfather     Blindness Neg Hx     Anesthesia problems Neg Hx     Amblyopia Neg Hx     Cataracts Neg Hx     Glaucoma Neg Hx     Hypertension Neg Hx     Macular degeneration Neg Hx     Retinal detachment Neg Hx     Strabismus Neg Hx     Stroke Neg Hx     Thyroid disease Neg Hx     Heart disease Neg Hx     Heart failure Neg Hx     Hyperlipidemia Neg Hx          MEDICATIONS & ALLERGIES:     Current Outpatient Medications on File Prior to Visit   Medication Sig Dispense Refill    amLODIPine (NORVASC) 10 MG tablet Take 1 tablet (10 mg total) by mouth once daily. 90 tablet 3    ascorbic acid, vitamin C, (VITAMIN C) 500 MG tablet Take 1 tablet (500 mg total) by mouth 2 (two) times daily.      aspirin (ECOTRIN) 81 MG EC tablet Take 1 tablet (81 mg total) by mouth once daily. 30 tablet 11    atorvastatin (LIPITOR) 40 MG tablet Take 1 tablet (40 mg total) by mouth every evening. 90 tablet 3     "bisacodyL (DULCOLAX) 10 mg Supp Place 1 suppository (10 mg total) rectally daily as needed (Until bowel movement if patient has no bowel movement for 2 days).  0    blood sugar diagnostic Strp 1 strip by Misc.(Non-Drug; Combo Route) route after meals as needed.      calcitRIOL (ROCALTROL) 0.25 MCG Cap Take 2 capsules (0.5 mcg total) by mouth once daily. 60 capsule 6    carvediloL (COREG) 25 MG tablet Take 1 tablet (25 mg total) by mouth 2 (two) times daily with meals. 180 tablet 3    clopidogreL (PLAVIX) 75 mg tablet Take 1 tablet (75 mg total) by mouth once daily. 90 tablet 3    flash glucose scanning reader (FREESTYLE CLAUDIO 14 DAY READER) Misc Use as directed. 1 each 0    flash glucose sensor (FREESTYLE CLAUDIO 14 DAY SENSOR) Kit Change every 14 days. 9 kit 3    fluticasone (FLONASE) 50 mcg/actuation nasal spray 1 spray by Each Nare route 2 (two) times daily as needed for Rhinitis. (Patient taking differently: 1 spray by Each Nare route as needed for Rhinitis. ) 16 g 5    gabapentin (NEURONTIN) 300 MG capsule Take 1 capsule (300 mg total) by mouth daily as needed. (Patient taking differently: Take 300 mg by mouth 2 (two) times daily. ) 90 capsule 1    insulin aspart U-100 (NOVOLOG) 100 unit/mL (3 mL) InPn pen Inject 2 Units into the skin 3 (three) times daily.  0    insulin detemir U-100 (LEVEMIR FLEXTOUCH) 100 unit/mL (3 mL) SubQ InPn pen Inject 6 Units into the skin every evening.  0    insulin needles, disposable, 31 X 5/16 " Ndle Pt to use pen needle with Lantus daily 100 each 3    montelukast (SINGULAIR) 10 mg tablet Take 1 tablet (10 mg total) by mouth every evening. 90 tablet 1    omeprazole (PRILOSEC OTC) 20 MG tablet Take 1 tablet (20 mg total) by mouth once daily. 30 tablet 11    ondansetron (ZOFRAN-ODT) 8 MG TbDL Take 1 tablet (8 mg total) by mouth every 6 (six) hours as needed. 30 tablet 2    pen needle, diabetic 32 gauge x 1/4" Ndle use to inject insulin once daily 100 each 6    " polyethylene glycol (GLYCOLAX) 17 gram PwPk 17 g by Per G Tube route once daily.  0    senna-docusate 8.6-50 mg (PERICOLACE) 8.6-50 mg per tablet Take 1 tablet by mouth once daily.      sodium bicarbonate 650 MG tablet Take 1 tablet (650 mg total) by mouth once daily. 30 tablet 11    vitamin D (VITAMIN D3) 1000 units Tab Take 1,000 Units by mouth once daily.       No current facility-administered medications on file prior to visit.         Review of patient's allergies indicates:   Allergen Reactions    Hydralazine analogues      Increase swelling and throat itching and tightness with use.  Symptoms correlate only with this medication onset in hospital.       OBJECTIVE:     Vital Signs:  /82, pulse 77, resp 18, sat 98%, temp 99.2  Wt Readings from Last 1 Encounters:   04/27/20 0817 64.9 kg (143 lb 1.3 oz)   04/23/20 2300 64.9 kg (143 lb 1.3 oz)   04/22/20 2300 63.4 kg (139 lb 12.4 oz)   04/18/20 2217 62.2 kg (137 lb 2 oz)     There is no height or weight on file to calculate BMI.        Physical Exam:  General appearance: cooperative, no distress  Constitutional:Oriented to person  + appears well-developed and well-nourished.   HEENT: Normocephalic, atraumatic  Eyes: conjunctivae/corneas clear  Lungs: clear to auscultation bilaterally  Heart: regular rate and rhythm  Abdomen: soft, non-tender; bowel sounds normoactive; +PEG tube intact with no erythematous surrounding site.   Extremities: extremities symmetric; no clubbing, cyanosis, or edema  Integument: Skin color, texture, turgor normal; no rashes; hair distrubution normal  Neurologic: Arousal to voice. +impaired cognition  Psychiatric: + calm and cooperative. Minimal speech.     Laboratory  Lab Results   Component Value Date    WBC 6.00 05/01/2020    HGB 7.5 (L) 05/01/2020    HCT 24.3 (L) 05/01/2020    MCV 87 05/01/2020     05/01/2020     BMP  Lab Results   Component Value Date     05/01/2020    K 4.4 05/01/2020     (H) 05/01/2020     CO2 20 (L) 05/01/2020    BUN 17 05/01/2020    CREATININE 2.0 (H) 05/01/2020    CALCIUM 8.0 (L) 05/01/2020    ANIONGAP 8 05/01/2020    ESTGFRAFRICA 39.9 (A) 05/01/2020    EGFRNONAA 34.5 (A) 05/01/2020     Lab Results   Component Value Date    ALT 15 04/21/2020    AST 22 04/21/2020    ALKPHOS 94 04/21/2020    BILITOT 0.6 04/21/2020     Lab Results   Component Value Date    INR 1.0 04/19/2020    INR 1.1 04/18/2020    INR 1.2 03/21/2020     Lab Results   Component Value Date    HGBA1C 9.1 (H) 03/09/2020         ASSESSMENT & PLAN:     PEG tube malfunction  -S/p replacement prior to presentation with good positioning on CT  - on tube feeds for nutrition spplement as appetite has been spotty but is clear to eat regular diet when he wants also     #sub- Acute encephalopathy  # Delirium  # Acute CVA (cerebrovascular accident)  -Subacute encephalopathy, that started after patient's stroke  - cont PRN IM zyprexa for non-redirectable agitation  - palliative care involved on the case- patient has a living will , indicating that he wished all aggressive measures and full code  - may benefit from neurology outpatient eval        #CKD IV  # Acute renal failure superimposed on stage 4 chronic kidney disease  - Cr baseline around 3.5.   - Cr increasing to 4.9 on 4/22, likely due to dehydration and decreased PO intake  - receiving enteral water per PEG tube       #CAD   #chronic combined systolic and diastolic heart failure  - Recent NSTEMI, on DAPT, statin, B Blcoker. Continue  - HF not decompensated.      #Anemia  -hbg stable between mid 7s-9s the last week  -EG for peg replacemente last week did not show any upper GI abnormalities  - monitor      #HTN  Continue home meds     # Viral Pneumonia due to COVID-19  - COVID testing + on 3/21 and 4/12  - on RA  Monitor   - re testing ordered on 4/24- remains positive     # Physical debility  # Failure to thrive in adult  - SNF     # T2DM with neuropathy  - detemir 6 units in evening and  novolog 2 U with meals   -ssi  - gabapentin 300 mg daily prn     # Severe protein-calorie malnutrition  - per prior documentation , patient refusing to eat, leading to placement of PEG tube for supplemental nutrition.  - peg tube feeds TID and pleasure feedings as tolerated    GERD  - on prilosec    Constipation   - continue on Glycolax and senna      Scheduled Follow-up :  Future Appointments   Date Time Provider Department Center   5/19/2020  8:00 AM LAB, [facility]   5/22/2020  9:40 AM Nancy Colón MD [facility] [place]   7/8/2020  1:30 PM BEELM Gardnier, FNP [facility]       Post Visit Medication List:     Medication List           Accurate as of May 2, 2020 11:59 PM. If you have any questions, ask your nurse or doctor.               CHANGE how you take these medications    fluticasone propionate 50 mcg/actuation nasal spray  Commonly known as:  FLONASE  1 spray by Each Nare route 2 (two) times daily as needed for Rhinitis.  What changed:  when to take this     gabapentin 300 MG capsule  Commonly known as:  NEURONTIN  Take 1 capsule (300 mg total) by mouth daily as needed.  What changed:  when to take this        CONTINUE taking these medications    amLODIPine 10 MG tablet  Commonly known as:  NORVASC  Take 1 tablet (10 mg total) by mouth once daily.     ascorbic acid (vitamin C) 500 MG tablet  Commonly known as:  VITAMIN C  Take 1 tablet (500 mg total) by mouth 2 (two) times daily.     aspirin 81 MG EC tablet  Commonly known as:  ECOTRIN  Take 1 tablet (81 mg total) by mouth once daily.     atorvastatin 40 MG tablet  Commonly known as:  LIPITOR  Take 1 tablet (40 mg total) by mouth every evening.     bisacodyL 10 mg Supp  Commonly known as:  DULCOLAX  Place 1 suppository (10 mg total) rectally daily as needed (Until bowel movement if patient has no bowel movement for 2 days).     blood sugar diagnostic Strp     calcitRIOL 0.25 MCG Cap  Commonly known as:  ROCALTROL  Take 2  "capsules (0.5 mcg total) by mouth once daily.     carvediloL 25 MG tablet  Commonly known as:  COREG  Take 1 tablet (25 mg total) by mouth 2 (two) times daily with meals.     clopidogreL 75 mg tablet  Commonly known as:  PLAVIX  Take 1 tablet (75 mg total) by mouth once daily.     flash glucose scanning reader Misc  Commonly known as:  FREESTYLE CLAUDIO 14 DAY READER  Use as directed.     flash glucose sensor Kit  Commonly known as:  FREESTYLE CLAUDIO 14 DAY SENSOR  Change every 14 days.     insulin aspart U-100 100 unit/mL (3 mL) Inpn pen  Commonly known as:  NovoLOG  Inject 2 Units into the skin 3 (three) times daily.     insulin detemir U-100 100 unit/mL (3 mL) Inpn pen  Commonly known as:  LEVEMIR FLEXTOUCH  Inject 6 Units into the skin every evening.     montelukast 10 mg tablet  Commonly known as:  SINGULAIR  Take 1 tablet (10 mg total) by mouth every evening.     NOVOFINE 32 32 gauge x 1/4" Ndle  Generic drug:  pen needle, diabetic  use to inject insulin once daily     omeprazole 20 MG tablet  Commonly known as:  PriLOSEC OTC  Take 1 tablet (20 mg total) by mouth once daily.     ondansetron 8 MG Tbdl  Commonly known as:  ZOFRAN-ODT  Take 1 tablet (8 mg total) by mouth every 6 (six) hours as needed.     pen needle, diabetic 31 gauge x 5/16" Ndle  Pt to use pen needle with Lantus daily     polyethylene glycol 17 gram Pwpk  Commonly known as:  GLYCOLAX  17 g by Per G Tube route once daily.     senna-docusate 8.6-50 mg 8.6-50 mg per tablet  Commonly known as:  PERICOLACE  Take 1 tablet by mouth once daily.     sodium bicarbonate 650 MG tablet  Take 1 tablet (650 mg total) by mouth once daily.     vitamin D 1000 units Tab  Commonly known as:  VITAMIN D3          QM    Coronary Artery Disease: Antiplatelet Therapy:  Patient currently on or prescribed on this visit- Aspirin and/or Clopidogrel (Plavix)      Total time of the visit 67 min 1:10 pm -2:17 pm   Non physical exam/ non charting time: 45 minutes   Description of " non physical exam/non charting time: Extensive chart review completed including all consultation notes.  All pertinent laboratory and radiographical images reviewed.      Signing Physician:  Beth Soria NP     Due to unresolved technical issue with EMR, Medication list on this note summary may not be accurate.

## 2020-05-04 NOTE — DISCHARGE SUMMARY
[facility]  Discharge Summary  [facility] Medicine      Admit Date: 4/1/2020    Discharge Date and Time: 4/18/2020 10:00 AM    Discharge Provider: Iraida Almanzar    Reason for Admission: debility from COVID infection      Indwelling Lines/Drains at time of discharge:        Gastrostomy/Enterostomy 04/15/20 1601 Percutaneous endoscopic gastrostomy (PEG) LUQ feeding (Active)   Securement secured to abdomen 4/30/2020  7:42 AM   Interventions Prior to Feeding patency checked;residual checked 4/30/2020  7:42 AM   Feeding Type bolus 4/29/2020  7:31 AM   Clamp Status/Tolerance no residual;no nausea;no emesis;no abdominal distention;no abdominal discomfort;no restlessness 4/29/2020  7:53 PM   Dressing dry and intact 4/29/2020  7:53 PM   Insertion Site sanguineous drainage;no redness;no warmth;no drainage;no tenderness;no swelling 4/27/2020  8:17 AM   Site Care site cleansed w/ sterile normal saline 4/28/2020  8:00 AM   Flush/Irrigation flushed w/;water;no resistance met 4/28/2020  8:00 PM   Intake (mL) 237 mL 4/30/2020  6:00 PM   Water Bolus (mL) 350 mL 5/1/2020  6:00 AM   Tube Output(mL)(Include Discarded Residual) 0 mL 4/28/2020  8:00 AM   Formula Name [place] 4/28/2020  8:00 AM   Tube Feeding Intake (mL) 350 4/25/2020  1:00 PM   Residual Amount (ml) 0 ml 4/28/2020  8:00 AM       Hospital Course (synopsis of major diagnoses, care, treatment, and services provided during the course of the hospital stay):     Nursing reports patient minimally drinking, not eating and not taking medications since admission here. He also has not been participating with therapy. During televisit. Patient was not cooperative with exam. Patient laid underneath covers during entire visit. Nurse Layo Fuentes jimbo covers back and patient draw them back over. After arrival here he has been actively refusing everything food, medications and interaction. He has kept on his visimonitor. Prior to onset of illness patient weighed 191 lb during  office visit with his PCP 2/21. Patient has 162lb listed as his current weight. He was independent of ADLs, A&O to person, place and time, but had little recall or insight to his medical conditions.     Discussed case with neurology who stated patient may have depression and recommended starting prozac. They also suggested that patient's post-stroke (?anoxic brain injury) mentation may need at list 6 months before determining prognosis of recovery. Patient did start to eat and take medications but not eating enough to sustain life. Wife agreed to PEG. His ASA and plavix was held. Patient underwent endoscopically placed gastrostomy tube on 4/15/2020. Patient was admitted with abdominal binder over gastrostomy tube, however, patient removed the binder and his gastrostomy tube. Patient was transferred to ER for definitive management.      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]  Yes    Acute on chronic combined systolic and diastolic heart failure [I50.43]  Yes    Pneumonia due 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 [U07.1, J12.89]  Yes    NSTEMI (non-ST elevated myocardial infarction) [I21.4]  Yes    Moderate to severe aortic stenosis [I35.0]  Yes    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, with long-term current use of insulin [E11.3513, E11.65, Z79.4]  Yes    Moderate episode of recurrent major depressive disorder [F33.1]  Yes    Acute renal failure superimposed on stage 4 chronic  kidney disease [N17.9, N18.4]  Yes    Multiple myeloma [C90.00]  Yes    Chronic combined systolic and diastolic heart failure [I50.42]  Yes      Resolved Hospital Problems    Diagnosis Date Resolved POA    Acute respiratory disease due to severe acute respiratory syndrome coronavirus 2 (SARS-CoV-2) [U07.1, J06.9] 04/20/2020 Yes       Discharged Condition: fair    Disposition: Discharged to Other Faci*      I spent more than 30 minutes total on this discharge including seeing and examining patient, note writing, reconcilling medications, and discussion with other health providers on team.

## 2020-05-07 NOTE — PROGRESS NOTES
Replaced by [facility]   Recent Hospital Discharge  DOS 5/6/2020     PRESENTING HISTORY     Chief Complaint/Reason for Admission:  Labs review and Follow up for COVID 19 and chronic diseases     PCP: Nancy Colón MD   Date of Admission: 4/18/2020  Date of Discharge: 5/1/2020    History of Present Illness:  Mr. Marie Reis Jr. is a 64 y.o. male who was recently admitted to the hospital for COVID diagnosis, failure to thrive and decreased PO intake requiring PEG tube was transferred from SNF after pulling out his PEG tube. Per notes patient has been uncooperative and confused for a significant time and does not appear to have any acute changes. PEG tube was placed on 4/15 by GI. The patient pulled out his PEG tube again on 4/18 with good positioning on CT. He had EGD recently and no abnormalities found. He is on tube feeds for nutrition spplement as appetite has been spotty but is clear to eat regular diet when he wants also.    Per notes patient has not been participating in therapy and withdrawn. This was attributed to possibly old stroke symptoms vs depression for which he is on fluoxetine. In the ED he was found to have a markedly elevated ammonia level. No previous history of liver disease. Hepatitis serologies negative in February.     He was stabilized and discharged to SNF.      ________________________________________________________________    Today:  The resident is currently laying in bed with no acute distress. Resident is more interactive today. Resident states he is fine. No complaints. Denies SOB, CP, cough, chills, body aches. Resident has been working with PT and ambulates with rolling walker in room. Resident states he is eating and drinking. Per resident, he lives at home with wife. Plans to return home after skilled therapy completed.    LCTAB with no edema.  Pegtube intact with no erythema surrounding site.   Labs 5/6/20: H&H 7.8 and 24, Na 137, K 4.4, BUN 27, creat 2.2,  glucose 224      Review of Systems  General ROS: negative for chills, fever  Psychological ROS: negative for hallucination  Ophthalmic ROS: negative for blurry vision  ENT ROS: negative for epistaxis, sore throat or rhinorrhea  Respiratory ROS: no cough, shortness of breath, or wheezing  Cardiovascular ROS: no chest pain or dyspnea on exertion  Gastrointestinal ROS: no abdominal pain, change in bowel habits, or black/ bloody stools  Genito-Urinary ROS: no trouble voiding  Musculoskeletal ROS: negative for gait disturbance or muscular weakness  Neurological ROS: no syncope or seizures; no ataxia  Dermatological ROS: negative for pruritis, rash and jaundice      PAST HISTORY:     Past Medical History:   Diagnosis Date    Acute on chronic combined systolic and diastolic heart failure 3/22/2020    Binocular vision disorder with diplopia 9/22/2016    Bradycardia     Cataract     Cervical spondylosis 10/1/2015    Chronic diastolic congestive heart failure     Chronic diastolic heart failure 1/11/2018    Coronary artery disease due to calcified coronary lesion 3/19/2018    Dyslipidemia 6/26/2015    Encounter for blood transfusion     Hearing impairment 10/25/2013    History of stroke 3/23/2015    Hypertension associated with diabetes 1/11/2018    Hypertensive retinopathy of both eyes 9/26/2017    Lumbar spondylosis 10/1/2015    Multiple myeloma not having achieved remission 1/16/2018    NSTEMI (non-ST elevated myocardial infarction) 3/22/2020    SUZANNA on CPAP     Persistent proteinuria 1/11/2018    Pneumonia of right lower lobe due to infectious organism 3/22/2020    Spondylolisthesis of lumbar region 10/1/2015    Strabismus     Stroke 2014    Thoracic spondylosis 10/1/2015    Type 2 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, with long-term current use of insulin 9/26/2017    Type 2 diabetes mellitus with diabetic polyneuropathy, with long-term current  use of insulin 9/28/2015    Type 2 diabetes mellitus with stage 4 chronic kidney disease, with long-term current use of insulin 1/11/2018       Past Surgical History:   Procedure Laterality Date    CATARACT EXTRACTION W/  INTRAOCULAR LENS IMPLANT Left 4/30/15    Diane    COLONOSCOPY N/A 1/20/2020    Procedure: COLONOSCOPY;  Surgeon: Salvador Espinosa MD;  [facility];  Service: Endoscopy;  Laterality: N/A;    ESOPHAGOGASTRODUODENOSCOPY N/A 4/21/2020    Procedure: EGD (ESOPHAGOGASTRODUODENOSCOPY);  Surgeon: Abdoulaye Delacruz MD;  Location: [facility] ([address] [place] [address]);  Service: Endoscopy;  Laterality: N/A;    EYE SURGERY      cataract removal left eye    HAND SURGERY  2/2012    DR. BAKER [facility]    left hand fracture sx      LT EYE   5/2/15    DR KULLMAN [facility]       Family History   Problem Relation Age of Onset    Diabetes Mother     Cancer Mother     Kidney disease Mother     Diabetes Father     Heart attack Father     Diabetes Sister     Diabetes Brother     No Known Problems Maternal Aunt     No Known Problems Maternal Uncle     No Known Problems Paternal Aunt     No Known Problems Paternal Uncle     No Known Problems Maternal Grandmother     No Known Problems Maternal Grandfather     No Known Problems Paternal Grandmother     No Known Problems Paternal Grandfather     Blindness Neg Hx     Anesthesia problems Neg Hx     Amblyopia Neg Hx     Cataracts Neg Hx     Glaucoma Neg Hx     Hypertension Neg Hx     Macular degeneration Neg Hx     Retinal detachment Neg Hx     Strabismus Neg Hx     Stroke Neg Hx     Thyroid disease Neg Hx     Heart disease Neg Hx     Heart failure Neg Hx     Hyperlipidemia Neg Hx          MEDICATIONS & ALLERGIES:     Current Outpatient Medications on File Prior to Visit   Medication Sig Dispense Refill    amLODIPine (NORVASC) 10 MG tablet Take 1 tablet (10 mg total) by mouth once daily. 90 tablet 3    ascorbic acid, vitamin C, (VITAMIN C) 500  "MG tablet Take 1 tablet (500 mg total) by mouth 2 (two) times daily.      aspirin (ECOTRIN) 81 MG EC tablet Take 1 tablet (81 mg total) by mouth once daily. 30 tablet 11    atorvastatin (LIPITOR) 40 MG tablet Take 1 tablet (40 mg total) by mouth every evening. 90 tablet 3    bisacodyL (DULCOLAX) 10 mg Supp Place 1 suppository (10 mg total) rectally daily as needed (Until bowel movement if patient has no bowel movement for 2 days).  0    blood sugar diagnostic Strp 1 strip by Misc.(Non-Drug; Combo Route) route after meals as needed.      calcitRIOL (ROCALTROL) 0.25 MCG Cap Take 2 capsules (0.5 mcg total) by mouth once daily. 60 capsule 6    carvediloL (COREG) 25 MG tablet Take 1 tablet (25 mg total) by mouth 2 (two) times daily with meals. 180 tablet 3    clopidogreL (PLAVIX) 75 mg tablet Take 1 tablet (75 mg total) by mouth once daily. 90 tablet 3    flash glucose scanning reader (FREESTYLE CLAUDIO 14 DAY READER) Misc Use as directed. 1 each 0    flash glucose sensor (FREESTYLE CLAUDIO 14 DAY SENSOR) Kit Change every 14 days. 9 kit 3    fluticasone (FLONASE) 50 mcg/actuation nasal spray 1 spray by Each Nare route 2 (two) times daily as needed for Rhinitis. (Patient taking differently: 1 spray by Each Nare route as needed for Rhinitis. ) 16 g 5    gabapentin (NEURONTIN) 300 MG capsule Take 1 capsule (300 mg total) by mouth daily as needed. (Patient taking differently: Take 300 mg by mouth 2 (two) times daily. ) 90 capsule 1    insulin aspart U-100 (NOVOLOG) 100 unit/mL (3 mL) InPn pen Inject 2 Units into the skin 3 (three) times daily.  0    insulin detemir U-100 (LEVEMIR FLEXTOUCH) 100 unit/mL (3 mL) SubQ InPn pen Inject 6 Units into the skin every evening.  0    insulin needles, disposable, 31 X 5/16 " Ndle Pt to use pen needle with Lantus daily 100 each 3    montelukast (SINGULAIR) 10 mg tablet Take 1 tablet (10 mg total) by mouth every evening. 90 tablet 1    omeprazole (PRILOSEC OTC) 20 MG tablet " "Take 1 tablet (20 mg total) by mouth once daily. 30 tablet 11    ondansetron (ZOFRAN-ODT) 8 MG TbDL Take 1 tablet (8 mg total) by mouth every 6 (six) hours as needed. 30 tablet 2    pen needle, diabetic 32 gauge x 1/4" Ndle use to inject insulin once daily 100 each 6    polyethylene glycol (GLYCOLAX) 17 gram PwPk 17 g by Per G Tube route once daily.  0    senna-docusate 8.6-50 mg (PERICOLACE) 8.6-50 mg per tablet Take 1 tablet by mouth once daily.      sodium bicarbonate 650 MG tablet Take 1 tablet (650 mg total) by mouth once daily. 30 tablet 11    vitamin D (VITAMIN D3) 1000 units Tab Take 1,000 Units by mouth once daily.       No current facility-administered medications on file prior to visit.         Review of patient's allergies indicates:   Allergen Reactions    Hydralazine analogues      Increase swelling and throat itching and tightness with use.  Symptoms correlate only with this medication onset in hospital.       OBJECTIVE:     Vital Signs:  /76, pulse 66, resp 18, sat 97%, temp 98.6  Wt Readings from Last 1 Encounters:   04/27/20 0817 64.9 kg (143 lb 1.3 oz)   04/23/20 2300 64.9 kg (143 lb 1.3 oz)   04/22/20 2300 63.4 kg (139 lb 12.4 oz)   04/18/20 2217 62.2 kg (137 lb 2 oz)     There is no height or weight on file to calculate BMI.        Physical Exam:  General appearance: cooperative, no distress  Constitutional:Oriented to person  + appears well-developed and well-nourished.   HEENT: Normocephalic, atraumatic  Eyes: conjunctivae/corneas clear  Lungs: clear to auscultation bilaterally  Heart: regular rate and rhythm  Abdomen: soft, non-tender; bowel sounds normoactive; +PEG tube intact with no erythematous surrounding site.   Extremities: extremities symmetric; no clubbing, cyanosis, or edema  Integument: Skin color, texture, turgor normal; no rashes; hair distrubution normal  Neurologic: Alert and awake. Answer simple questions appropriately.  +impaired cognition  Psychiatric: + calm " and cooperative.     Laboratory  Lab Results   Component Value Date    WBC 5.61 05/06/2020    HGB 7.8 (L) 05/06/2020    HCT 24.0 (L) 05/06/2020    MCV 85 05/06/2020     05/06/2020     BMP  Lab Results   Component Value Date     05/06/2020    K 4.4 05/06/2020     05/06/2020    CO2 23 05/06/2020    BUN 27 (H) 05/06/2020    CREATININE 2.2 (H) 05/06/2020    CALCIUM 8.5 (L) 05/06/2020    ANIONGAP 6 (L) 05/06/2020    ESTGFRAFRICA 36 (A) 05/06/2020    EGFRNONAA 31 (A) 05/06/2020     Lab Results   Component Value Date    ALT 15 04/21/2020    AST 22 04/21/2020    ALKPHOS 94 04/21/2020    BILITOT 0.6 04/21/2020     Lab Results   Component Value Date    INR 1.0 04/19/2020    INR 1.1 04/18/2020    INR 1.2 03/21/2020     Lab Results   Component Value Date    HGBA1C 9.1 (H) 03/09/2020         ASSESSMENT & PLAN:     S/p Pegtube placement  -S/p replacement prior to presentation with good positioning on CT  - on tube feeds for nutrition spplement as appetite has been spotty but is clear to eat regular diet when he wants also     #sub- Acute encephalopathy  # Delirium  # Acute CVA (cerebrovascular accident)  -Subacute encephalopathy, that started after patient's stroke  - cont PRN IM zyprexa for non-redirectable agitation  - palliative care involved on the case- patient has a living will , indicating that he wished all aggressive measures and full code  - may benefit from neurology outpatient eval        #CKD IV  # Acute renal failure superimposed on stage 4 chronic kidney disease  - Cr baseline around 3.5.   - Cr increasing to 4.9 on 4/22, likely due to dehydration and decreased PO intake  - receiving enteral water per PEG tube       #CAD   #chronic combined systolic and diastolic heart failure  - Recent NSTEMI, on DAPT, statin, B Blcoker. Continue  - HF not decompensated.      #Anemia  -hbg stable between mid 7s-9s the last week  -EG for peg replacemente last week did not show any upper GI abnormalities  -  monitor      #HTN  Continue home meds     # Viral Pneumonia due to COVID-19  - COVID testing + on 3/21 and 4/12  - on RA  Monitor   - re testing ordered on 4/24- remains positive  -currently in isolation     # Physical debility  # Failure to thrive in adult  - SNF     # T2DM with neuropathy  - detemir 6 units in evening and novolog 2 U with meals   -ssi  - gabapentin 300 mg daily prn     # Severe protein-calorie malnutrition  - per prior documentation , patient refusing to eat, leading to placement of PEG tube for supplemental nutrition.  - peg tube feeds TID and pleasure feedings as tolerated    GERD  - on prilosec    Constipation   - continue on Glycolax and senna      Scheduled Follow-up :  Future Appointments   Date Time Provider Department [place]   5/19/2020  8:00 AM LAB, [facility]   5/22/2020  9:40 AM Nancy Colón MD [facility]   7/8/2020  1:30 PM BELEM Gardiner, FNP [facility]       Post Visit Medication List:     Medication List           Accurate as of May 6, 2020  9:37 PM. If you have any questions, ask your nurse or doctor.               CHANGE how you take these medications    fluticasone propionate 50 mcg/actuation nasal spray  Commonly known as:  FLONASE  1 spray by Each Nare route 2 (two) times daily as needed for Rhinitis.  What changed:  when to take this     gabapentin 300 MG capsule  Commonly known as:  NEURONTIN  Take 1 capsule (300 mg total) by mouth daily as needed.  What changed:  when to take this        CONTINUE taking these medications    amLODIPine 10 MG tablet  Commonly known as:  NORVASC  Take 1 tablet (10 mg total) by mouth once daily.     ascorbic acid (vitamin C) 500 MG tablet  Commonly known as:  VITAMIN C  Take 1 tablet (500 mg total) by mouth 2 (two) times daily.     aspirin 81 MG EC tablet  Commonly known as:  ECOTRIN  Take 1 tablet (81 mg total) by mouth once daily.     atorvastatin 40 MG tablet  Commonly known as:  LIPITOR  Take 1  "tablet (40 mg total) by mouth every evening.     bisacodyL 10 mg Supp  Commonly known as:  DULCOLAX  Place 1 suppository (10 mg total) rectally daily as needed (Until bowel movement if patient has no bowel movement for 2 days).     blood sugar diagnostic Strp     calcitRIOL 0.25 MCG Cap  Commonly known as:  ROCALTROL  Take 2 capsules (0.5 mcg total) by mouth once daily.     carvediloL 25 MG tablet  Commonly known as:  COREG  Take 1 tablet (25 mg total) by mouth 2 (two) times daily with meals.     clopidogreL 75 mg tablet  Commonly known as:  PLAVIX  Take 1 tablet (75 mg total) by mouth once daily.     flash glucose scanning reader Misc  Commonly known as:  FREESTYLE CLAUDIO 14 DAY READER  Use as directed.     flash glucose sensor Kit  Commonly known as:  FREESTYLE CLAUDIO 14 DAY SENSOR  Change every 14 days.     insulin aspart U-100 100 unit/mL (3 mL) Inpn pen  Commonly known as:  NovoLOG  Inject 2 Units into the skin 3 (three) times daily.     insulin detemir U-100 100 unit/mL (3 mL) Inpn pen  Commonly known as:  LEVEMIR FLEXTOUCH  Inject 6 Units into the skin every evening.     montelukast 10 mg tablet  Commonly known as:  SINGULAIR  Take 1 tablet (10 mg total) by mouth every evening.     NOVOFINE 32 32 gauge x 1/4" Ndle  Generic drug:  pen needle, diabetic  use to inject insulin once daily     omeprazole 20 MG tablet  Commonly known as:  PriLOSEC OTC  Take 1 tablet (20 mg total) by mouth once daily.     ondansetron 8 MG Tbdl  Commonly known as:  ZOFRAN-ODT  Take 1 tablet (8 mg total) by mouth every 6 (six) hours as needed.     pen needle, diabetic 31 gauge x 5/16" Ndle  Pt to use pen needle with Lantus daily     polyethylene glycol 17 gram Pwpk  Commonly known as:  GLYCOLAX  17 g by Per G Tube route once daily.     senna-docusate 8.6-50 mg 8.6-50 mg per tablet  Commonly known as:  PERICOLACE  Take 1 tablet by mouth once daily.     sodium bicarbonate 650 MG tablet  Take 1 tablet (650 mg total) by mouth once daily.   "   vitamin D 1000 units Tab  Commonly known as:  VITAMIN D3          QM    Coronary Artery Disease: Antiplatelet Therapy:  Patient currently on or prescribed on this visit- Aspirin and/or Clopidogrel (Plavix)        Signing Physician:  Beth Soria NP     Due to unresolved technical issue with EMR, Medication list on this note summary may not be accurate.

## 2020-05-09 NOTE — PROGRESS NOTES
[facility]   Recent Hospital Discharge  DOS 5/8/2020     PRESENTING HISTORY     Chief Complaint/Reason for Admission: Evaluate for COVID 19 and chronic diseases     PCP: Nancy Colón MD   Date of Admission: 4/18/2020  Date of Discharge: 5/1/2020    History of Present Illness:  Mr. Marie Reis Jr. is a 64 y.o. male who was recently admitted to the hospital for COVID diagnosis, failure to thrive and decreased PO intake requiring PEG tube was transferred from SNF after pulling out his PEG tube. Per notes patient has been uncooperative and confused for a significant time and does not appear to have any acute changes. PEG tube was placed on 4/15 by GI. The patient pulled out his PEG tube again on 4/18 with good positioning on CT. He had EGD recently and no abnormalities found. He is on tube feeds for nutrition spplement as appetite has been spotty but is clear to eat regular diet when he wants also.    Per notes patient has not been participating in therapy and withdrawn. This was attributed to possibly old stroke symptoms vs depression for which he is on fluoxetine. In the ED he was found to have a markedly elevated ammonia level. No previous history of liver disease. Hepatitis serologies negative in February.     He was stabilized and discharged to SNF.      ________________________________________________________________    Today:  The resident is currently sitting up in the chair unassisted with no acute distress. Resident states he is doing fine with no complaints. Reports eating very well. Plan for discharge home with wife on 5/14. Resident denies cough, SOB, CP, chills, body aches.   Resident ambulates with rolling walker in room.   LCTAB with no edema.  Pegtube intact with no erythema surrounding site.       Review of Systems  General ROS: negative for chills, fever  Psychological ROS: negative for hallucination  Ophthalmic ROS: negative for blurry vision  ENT ROS: negative for  epistaxis, sore throat or rhinorrhea  Respiratory ROS: no cough, shortness of breath, or wheezing  Cardiovascular ROS: no chest pain or dyspnea on exertion  Gastrointestinal ROS: no abdominal pain, change in bowel habits, or black/ bloody stools  Genito-Urinary ROS: no trouble voiding  Musculoskeletal ROS: negative for gait disturbance or muscular weakness  Neurological ROS: no syncope or seizures; no ataxia  Dermatological ROS: negative for pruritis, rash and jaundice      PAST HISTORY:     Past Medical History:   Diagnosis Date    Acute on chronic combined systolic and diastolic heart failure 3/22/2020    Binocular vision disorder with diplopia 9/22/2016    Bradycardia     Cataract     Cervical spondylosis 10/1/2015    Chronic diastolic congestive heart failure     Chronic diastolic heart failure 1/11/2018    Coronary artery disease due to calcified coronary lesion 3/19/2018    Dyslipidemia 6/26/2015    Encounter for blood transfusion     Hearing impairment 10/25/2013    History of stroke 3/23/2015    Hypertension associated with diabetes 1/11/2018    Hypertensive retinopathy of both eyes 9/26/2017    Lumbar spondylosis 10/1/2015    Multiple myeloma not having achieved remission 1/16/2018    NSTEMI (non-ST elevated myocardial infarction) 3/22/2020    SUZANNA on CPAP     Persistent proteinuria 1/11/2018    Pneumonia of right lower lobe due to infectious organism 3/22/2020    Spondylolisthesis of lumbar region 10/1/2015    Strabismus     Stroke 2014    Thoracic spondylosis 10/1/2015    Type 2 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, with long-term current use of insulin 9/26/2017    Type 2 diabetes mellitus with diabetic polyneuropathy, with long-term current use of insulin 9/28/2015    Type 2 diabetes mellitus with stage 4 chronic kidney disease, with long-term current use of insulin 1/11/2018       Past Surgical History:   Procedure Laterality  Date    CATARACT EXTRACTION W/  INTRAOCULAR LENS IMPLANT Left 4/30/15    Diane    COLONOSCOPY N/A 1/20/2020    Procedure: COLONOSCOPY;  Surgeon: Salvador Espinosa MD;  Location: [facility];  Service: Endoscopy;  Laterality: N/A;    ESOPHAGOGASTRODUODENOSCOPY N/A 4/21/2020    Procedure: EGD (ESOPHAGOGASTRODUODENOSCOPY);  Surgeon: Abdoulaye Delacruz MD;  Location: [facility] ([address] [place] [address]);  Service: Endoscopy;  Laterality: N/A;    EYE SURGERY      cataract removal left eye    HAND SURGERY  2/2012    DR. BAKER [facility]    left hand fracture sx      LT EYE   5/2/15    DR KULLMAN [facility]       Family History   Problem Relation Age of Onset    Diabetes Mother     Cancer Mother     Kidney disease Mother     Diabetes Father     Heart attack Father     Diabetes Sister     Diabetes Brother     No Known Problems Maternal Aunt     No Known Problems Maternal Uncle     No Known Problems Paternal Aunt     No Known Problems Paternal Uncle     No Known Problems Maternal Grandmother     No Known Problems Maternal Grandfather     No Known Problems Paternal Grandmother     No Known Problems Paternal Grandfather     Blindness Neg Hx     Anesthesia problems Neg Hx     Amblyopia Neg Hx     Cataracts Neg Hx     Glaucoma Neg Hx     Hypertension Neg Hx     Macular degeneration Neg Hx     Retinal detachment Neg Hx     Strabismus Neg Hx     Stroke Neg Hx     Thyroid disease Neg Hx     Heart disease Neg Hx     Heart failure Neg Hx     Hyperlipidemia Neg Hx          MEDICATIONS & ALLERGIES:     Current Outpatient Medications on File Prior to Visit   Medication Sig Dispense Refill    amLODIPine (NORVASC) 10 MG tablet Take 1 tablet (10 mg total) by mouth once daily. 90 tablet 3    ascorbic acid, vitamin C, (VITAMIN C) 500 MG tablet Take 1 tablet (500 mg total) by mouth 2 (two) times daily.      aspirin (ECOTRIN) 81 MG EC tablet Take 1 tablet (81 mg total) by mouth once daily. 30 tablet 11    atorvastatin  "(LIPITOR) 40 MG tablet Take 1 tablet (40 mg total) by mouth every evening. 90 tablet 3    bisacodyL (DULCOLAX) 10 mg Supp Place 1 suppository (10 mg total) rectally daily as needed (Until bowel movement if patient has no bowel movement for 2 days).  0    blood sugar diagnostic Strp 1 strip by Misc.(Non-Drug; Combo Route) route after meals as needed.      calcitRIOL (ROCALTROL) 0.25 MCG Cap Take 2 capsules (0.5 mcg total) by mouth once daily. 60 capsule 6    carvediloL (COREG) 25 MG tablet Take 1 tablet (25 mg total) by mouth 2 (two) times daily with meals. 180 tablet 3    clopidogreL (PLAVIX) 75 mg tablet Take 1 tablet (75 mg total) by mouth once daily. 90 tablet 3    flash glucose scanning reader (FREESTYLE CLAUDIO 14 DAY READER) Misc Use as directed. 1 each 0    flash glucose sensor (FREESTYLE CLAUDIO 14 DAY SENSOR) Kit Change every 14 days. 9 kit 3    fluticasone (FLONASE) 50 mcg/actuation nasal spray 1 spray by Each Nare route 2 (two) times daily as needed for Rhinitis. (Patient taking differently: 1 spray by Each Nare route as needed for Rhinitis. ) 16 g 5    gabapentin (NEURONTIN) 300 MG capsule Take 1 capsule (300 mg total) by mouth daily as needed. (Patient taking differently: Take 300 mg by mouth 2 (two) times daily. ) 90 capsule 1    insulin aspart U-100 (NOVOLOG) 100 unit/mL (3 mL) InPn pen Inject 2 Units into the skin 3 (three) times daily.  0    insulin detemir U-100 (LEVEMIR FLEXTOUCH) 100 unit/mL (3 mL) SubQ InPn pen Inject 6 Units into the skin every evening.  0    insulin needles, disposable, 31 X 5/16 " Ndle Pt to use pen needle with Lantus daily 100 each 3    montelukast (SINGULAIR) 10 mg tablet Take 1 tablet (10 mg total) by mouth every evening. 90 tablet 1    omeprazole (PRILOSEC OTC) 20 MG tablet Take 1 tablet (20 mg total) by mouth once daily. 30 tablet 11    ondansetron (ZOFRAN-ODT) 8 MG TbDL Take 1 tablet (8 mg total) by mouth every 6 (six) hours as needed. 30 tablet 2    pen " "needle, diabetic 32 gauge x 1/4" Ndle use to inject insulin once daily 100 each 6    polyethylene glycol (GLYCOLAX) 17 gram PwPk 17 g by Per G Tube route once daily.  0    senna-docusate 8.6-50 mg (PERICOLACE) 8.6-50 mg per tablet Take 1 tablet by mouth once daily.      sodium bicarbonate 650 MG tablet Take 1 tablet (650 mg total) by mouth once daily. 30 tablet 11    vitamin D (VITAMIN D3) 1000 units Tab Take 1,000 Units by mouth once daily.       No current facility-administered medications on file prior to visit.         Review of patient's allergies indicates:   Allergen Reactions    Hydralazine analogues      Increase swelling and throat itching and tightness with use.  Symptoms correlate only with this medication onset in hospital.       OBJECTIVE:     Vital Signs:  /73, temp 99.8, resp 20, SP02 99%, pulse 77  Wt Readings from Last 1 Encounters:   04/27/20 0817 64.9 kg (143 lb 1.3 oz)   04/23/20 2300 64.9 kg (143 lb 1.3 oz)   04/22/20 2300 63.4 kg (139 lb 12.4 oz)   04/18/20 2217 62.2 kg (137 lb 2 oz)     There is no height or weight on file to calculate BMI.        Physical Exam:  General appearance: cooperative, no distress  Constitutional:Oriented to person  + appears well-developed and well-nourished.   HEENT: Normocephalic, atraumatic  Eyes: conjunctivae/corneas clear  Lungs: clear to auscultation bilaterally  Heart: regular rate and rhythm  Abdomen: soft, non-tender; bowel sounds normoactive; +PEG tube intact with no erythematous surrounding site.   Extremities: extremities symmetric; no clubbing, cyanosis, or edema  Integument: Skin color, texture, turgor normal; no rashes; hair distrubution normal  Neurologic: Alert and awake. Answer simple questions appropriately.  +impaired cognition  Psychiatric: + calm and cooperative.     Laboratory  Lab Results   Component Value Date    WBC 5.61 05/06/2020    HGB 7.8 (L) 05/06/2020    HCT 24.0 (L) 05/06/2020    MCV 85 05/06/2020     05/06/2020 "     BMP  Lab Results   Component Value Date     05/06/2020    K 4.4 05/06/2020     05/06/2020    CO2 23 05/06/2020    BUN 27 (H) 05/06/2020    CREATININE 2.2 (H) 05/06/2020    CALCIUM 8.5 (L) 05/06/2020    ANIONGAP 6 (L) 05/06/2020    ESTGFRAFRICA 36 (A) 05/06/2020    EGFRNONAA 31 (A) 05/06/2020     Lab Results   Component Value Date    ALT 15 04/21/2020    AST 22 04/21/2020    ALKPHOS 94 04/21/2020    BILITOT 0.6 04/21/2020     Lab Results   Component Value Date    INR 1.0 04/19/2020    INR 1.1 04/18/2020    INR 1.2 03/21/2020     Lab Results   Component Value Date    HGBA1C 9.1 (H) 03/09/2020         ASSESSMENT & PLAN:     S/p Pegtube placement  -S/p replacement prior to presentation with good positioning on CT  - on tube feeds for nutrition spplement as appetite has been spotty but is clear to eat regular diet when he wants also     #sub- Acute encephalopathy  # Delirium  # Acute CVA (cerebrovascular accident)  -Subacute encephalopathy, that started after patient's stroke  - cont PRN IM zyprexa for non-redirectable agitation  - palliative care involved on the case- patient has a living will , indicating that he wished all aggressive measures and full code  - may benefit from neurology outpatient eval        #CKD IV  # Acute renal failure superimposed on stage 4 chronic kidney disease  - Cr baseline around 3.5.   - Cr increasing to 4.9 on 4/22, likely due to dehydration and decreased PO intake  - receiving enteral water per PEG tube       #CAD   #chronic combined systolic and diastolic heart failure  - Recent NSTEMI, on DAPT, statin, B Blcoker. Continue  - HF not decompensated.      #Anemia  -hbg stable between mid 7s-9s the last week  -EG for peg replacemente last week did not show any upper GI abnormalities  - monitor      #HTN  Continue home meds     # Viral Pneumonia due to COVID-19  - COVID testing + on 3/21 and 4/12  - on RA  Monitor   - re testing ordered on 4/24- remains positive  -currently in  isolation     # Physical debility  # Failure to thrive in adult  - SNF     # T2DM with neuropathy  - detemir 6 units in evening and novolog 2 U with meals   -ssi  - gabapentin 300 mg daily prn     # Severe protein-calorie malnutrition  - per prior documentation , patient refusing to eat, leading to placement of PEG tube for supplemental nutrition.  - peg tube feeds TID and pleasure feedings as tolerated    GERD  - on prilosec    Constipation   - continue on Glycolax and senna      Scheduled Follow-up :  Future Appointments   Date Time Provider Department [place]   5/19/2020  8:00 AM LAB, [facility]   5/22/2020  9:40 AM Nancy Colón MD [facility]   7/8/2020  1:30 PM Gifty Massey APRN, FNP [facility]       Post Visit Medication List:     Medication List           Accurate as of May 8, 2020 11:59 PM. If you have any questions, ask your nurse or doctor.               CHANGE how you take these medications    fluticasone propionate 50 mcg/actuation nasal spray  Commonly known as:  FLONASE  1 spray by Each Nare route 2 (two) times daily as needed for Rhinitis.  What changed:  when to take this     gabapentin 300 MG capsule  Commonly known as:  NEURONTIN  Take 1 capsule (300 mg total) by mouth daily as needed.  What changed:  when to take this        CONTINUE taking these medications    amLODIPine 10 MG tablet  Commonly known as:  NORVASC  Take 1 tablet (10 mg total) by mouth once daily.     ascorbic acid (vitamin C) 500 MG tablet  Commonly known as:  VITAMIN C  Take 1 tablet (500 mg total) by mouth 2 (two) times daily.     aspirin 81 MG EC tablet  Commonly known as:  ECOTRIN  Take 1 tablet (81 mg total) by mouth once daily.     atorvastatin 40 MG tablet  Commonly known as:  LIPITOR  Take 1 tablet (40 mg total) by mouth every evening.     bisacodyL 10 mg Supp  Commonly known as:  DULCOLAX  Place 1 suppository (10 mg total) rectally daily as needed (Until bowel movement if patient has  "no bowel movement for 2 days).     blood sugar diagnostic Strp     calcitRIOL 0.25 MCG Cap  Commonly known as:  ROCALTROL  Take 2 capsules (0.5 mcg total) by mouth once daily.     carvediloL 25 MG tablet  Commonly known as:  COREG  Take 1 tablet (25 mg total) by mouth 2 (two) times daily with meals.     clopidogreL 75 mg tablet  Commonly known as:  PLAVIX  Take 1 tablet (75 mg total) by mouth once daily.     flash glucose scanning reader Misc  Commonly known as:  FREESTYLE CLAUDIO 14 DAY READER  Use as directed.     flash glucose sensor Kit  Commonly known as:  FREESTYLE CLAUDIO 14 DAY SENSOR  Change every 14 days.     insulin aspart U-100 100 unit/mL (3 mL) Inpn pen  Commonly known as:  NovoLOG  Inject 2 Units into the skin 3 (three) times daily.     insulin detemir U-100 100 unit/mL (3 mL) Inpn pen  Commonly known as:  LEVEMIR FLEXTOUCH  Inject 6 Units into the skin every evening.     montelukast 10 mg tablet  Commonly known as:  SINGULAIR  Take 1 tablet (10 mg total) by mouth every evening.     NOVOFINE 32 32 gauge x 1/4" Ndle  Generic drug:  pen needle, diabetic  use to inject insulin once daily     omeprazole 20 MG tablet  Commonly known as:  PriLOSEC OTC  Take 1 tablet (20 mg total) by mouth once daily.     ondansetron 8 MG Tbdl  Commonly known as:  ZOFRAN-ODT  Take 1 tablet (8 mg total) by mouth every 6 (six) hours as needed.     pen needle, diabetic 31 gauge x 5/16" Ndle  Pt to use pen needle with Lantus daily     polyethylene glycol 17 gram Pwpk  Commonly known as:  GLYCOLAX  17 g by Per G Tube route once daily.     senna-docusate 8.6-50 mg 8.6-50 mg per tablet  Commonly known as:  PERICOLACE  Take 1 tablet by mouth once daily.     sodium bicarbonate 650 MG tablet  Take 1 tablet (650 mg total) by mouth once daily.     vitamin D 1000 units Tab  Commonly known as:  VITAMIN D3          QM    Coronary Artery Disease: Antiplatelet Therapy:  Patient currently on or prescribed on this visit- Aspirin and/or Clopidogrel " (Plavix)        Signing Physician:  Beth Soria NP     Due to unresolved technical issue with EMR, Medication list on this note summary may not be accurate.

## 2020-05-14 NOTE — PROGRESS NOTES
[facility]   Discharge Summary  DOS 5/13/2020     PRESENTING HISTORY     Chief Complaint/Reason for Admission: Discharge Summary     PCP: Nancy Colón MD   Date of Admission: 4/18/2020  Date of Discharge: 5/1/2020    History of Present Illness:  Mr. Marie Reis Jr. is a 64 y.o. male who was recently admitted to the hospital for COVID diagnosis, failure to thrive and decreased PO intake requiring PEG tube was transferred from SNF after pulling out his PEG tube. Per notes patient has been uncooperative and confused for a significant time and does not appear to have any acute changes. PEG tube was placed on 4/15 by GI. The patient pulled out his PEG tube again on 4/18 with good positioning on CT. He had EGD recently and no abnormalities found. He is on tube feeds for nutrition spplement as appetite has been spotty but is clear to eat regular diet when he wants also.    Per notes patient has not been participating in therapy and withdrawn. This was attributed to possibly old stroke symptoms vs depression for which he is on fluoxetine. In the ED he was found to have a markedly elevated ammonia level. No previous history of liver disease. Hepatitis serologies negative in February.     He was stabilized and discharged to SNF.      ________________________________________________________________    Today:  The resident is currently sitting up in the chair unassisted with no acute distress. Resident states he is doing fine with no complaints. He has been stable with no s/s of COVID. He is discharged home tomorrow with [facility] for skilled nursing, PT/OT. DME: bedside commode and rolling walker.         Review of Systems  General ROS: negative for chills, fever  Psychological ROS: negative for hallucination  Ophthalmic ROS: negative for blurry vision  ENT ROS: negative for epistaxis, sore throat or rhinorrhea  Respiratory ROS: no cough, shortness of breath, or  wheezing  Cardiovascular ROS: no chest pain or dyspnea on exertion  Gastrointestinal ROS: no abdominal pain, change in bowel habits, or black/ bloody stools  Genito-Urinary ROS: no trouble voiding  Musculoskeletal ROS: negative for gait disturbance or muscular weakness  Neurological ROS: no syncope or seizures; no ataxia  Dermatological ROS: negative for pruritis, rash and jaundice      PAST HISTORY:     Past Medical History:   Diagnosis Date    Acute on chronic combined systolic and diastolic heart failure 3/22/2020    Binocular vision disorder with diplopia 9/22/2016    Bradycardia     Cataract     Cervical spondylosis 10/1/2015    Chronic diastolic congestive heart failure     Chronic diastolic heart failure 1/11/2018    Coronary artery disease due to calcified coronary lesion 3/19/2018    Dyslipidemia 6/26/2015    Encounter for blood transfusion     Hearing impairment 10/25/2013    History of stroke 3/23/2015    Hypertension associated with diabetes 1/11/2018    Hypertensive retinopathy of both eyes 9/26/2017    Lumbar spondylosis 10/1/2015    Multiple myeloma not having achieved remission 1/16/2018    NSTEMI (non-ST elevated myocardial infarction) 3/22/2020    SUZANNA on CPAP     Persistent proteinuria 1/11/2018    Pneumonia of right lower lobe due to infectious organism 3/22/2020    Spondylolisthesis of lumbar region 10/1/2015    Strabismus     Stroke 2014    Thoracic spondylosis 10/1/2015    Type 2 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, with long-term current use of insulin 9/26/2017    Type 2 diabetes mellitus with diabetic polyneuropathy, with long-term current use of insulin 9/28/2015    Type 2 diabetes mellitus with stage 4 chronic kidney disease, with long-term current use of insulin 1/11/2018       Past Surgical History:   Procedure Laterality Date    CATARACT EXTRACTION W/  INTRAOCULAR LENS IMPLANT Left 4/30/15    [facility]     COLONOSCOPY N/A 1/20/2020    Procedure: COLONOSCOPY;  Surgeon: Salvador Espinosa MD;  Location: [facility] ENDO;  Service: Endoscopy;  Laterality: N/A;    ESOPHAGOGASTRODUODENOSCOPY N/A 4/21/2020    Procedure: EGD (ESOPHAGOGASTRODUODENOSCOPY);  Surgeon: Abdoulaye Delacruz MD;  Location: [facility] ([address] [place] [address]);  Service: Endoscopy;  Laterality: N/A;    EYE SURGERY      cataract removal left eye    HAND SURGERY  2/2012    DR. BAKER [facility]    left hand fracture sx      LT EYE   5/2/15    DR KULLMAN [facility]       Family History   Problem Relation Age of Onset    Diabetes Mother     Cancer Mother     Kidney disease Mother     Diabetes Father     Heart attack Father     Diabetes Sister     Diabetes Brother     No Known Problems Maternal Aunt     No Known Problems Maternal Uncle     No Known Problems Paternal Aunt     No Known Problems Paternal Uncle     No Known Problems Maternal Grandmother     No Known Problems Maternal Grandfather     No Known Problems Paternal Grandmother     No Known Problems Paternal Grandfather     Blindness Neg Hx     Anesthesia problems Neg Hx     Amblyopia Neg Hx     Cataracts Neg Hx     Glaucoma Neg Hx     Hypertension Neg Hx     Macular degeneration Neg Hx     Retinal detachment Neg Hx     Strabismus Neg Hx     Stroke Neg Hx     Thyroid disease Neg Hx     Heart disease Neg Hx     Heart failure Neg Hx     Hyperlipidemia Neg Hx          MEDICATIONS & ALLERGIES:     Current Outpatient Medications on File Prior to Visit   Medication Sig Dispense Refill    amLODIPine (NORVASC) 10 MG tablet Take 1 tablet (10 mg total) by mouth once daily. 90 tablet 3    ascorbic acid, vitamin C, (VITAMIN C) 500 MG tablet Take 1 tablet (500 mg total) by mouth 2 (two) times daily.      aspirin (ECOTRIN) 81 MG EC tablet Take 1 tablet (81 mg total) by mouth once daily. 30 tablet 11    atorvastatin (LIPITOR) 40 MG tablet Take 1 tablet (40 mg total) by mouth every evening. 90 tablet 3     "bisacodyL (DULCOLAX) 10 mg Supp Place 1 suppository (10 mg total) rectally daily as needed (Until bowel movement if patient has no bowel movement for 2 days).  0    blood sugar diagnostic Strp 1 strip by Misc.(Non-Drug; Combo Route) route after meals as needed.      calcitRIOL (ROCALTROL) 0.25 MCG Cap Take 2 capsules (0.5 mcg total) by mouth once daily. 60 capsule 6    carvediloL (COREG) 25 MG tablet Take 1 tablet (25 mg total) by mouth 2 (two) times daily with meals. 180 tablet 3    clopidogreL (PLAVIX) 75 mg tablet Take 1 tablet (75 mg total) by mouth once daily. 90 tablet 3    flash glucose scanning reader (FREESTYLE CLAUDIO 14 DAY READER) Misc Use as directed. 1 each 0    flash glucose sensor (FREESTYLE CLAUDIO 14 DAY SENSOR) Kit Change every 14 days. 9 kit 3    fluticasone (FLONASE) 50 mcg/actuation nasal spray 1 spray by Each Nare route 2 (two) times daily as needed for Rhinitis. (Patient taking differently: 1 spray by Each Nare route as needed for Rhinitis. ) 16 g 5    gabapentin (NEURONTIN) 300 MG capsule Take 1 capsule (300 mg total) by mouth daily as needed. (Patient taking differently: Take 300 mg by mouth 2 (two) times daily. ) 90 capsule 1    insulin aspart U-100 (NOVOLOG) 100 unit/mL (3 mL) InPn pen Inject 2 Units into the skin 3 (three) times daily.  0    insulin detemir U-100 (LEVEMIR FLEXTOUCH) 100 unit/mL (3 mL) SubQ InPn pen Inject 6 Units into the skin every evening.  0    insulin needles, disposable, 31 X 5/16 " Ndle Pt to use pen needle with Lantus daily 100 each 3    montelukast (SINGULAIR) 10 mg tablet Take 1 tablet (10 mg total) by mouth every evening. 90 tablet 1    omeprazole (PRILOSEC OTC) 20 MG tablet Take 1 tablet (20 mg total) by mouth once daily. 30 tablet 11    ondansetron (ZOFRAN-ODT) 8 MG TbDL Take 1 tablet (8 mg total) by mouth every 6 (six) hours as needed. 30 tablet 2    pen needle, diabetic 32 gauge x 1/4" Ndle use to inject insulin once daily 100 each 6    " polyethylene glycol (GLYCOLAX) 17 gram PwPk 17 g by Per G Tube route once daily.  0    senna-docusate 8.6-50 mg (PERICOLACE) 8.6-50 mg per tablet Take 1 tablet by mouth once daily.      sodium bicarbonate 650 MG tablet Take 1 tablet (650 mg total) by mouth once daily. 30 tablet 11    vitamin D (VITAMIN D3) 1000 units Tab Take 1,000 Units by mouth once daily.       No current facility-administered medications on file prior to visit.         Review of patient's allergies indicates:   Allergen Reactions    Hydralazine analogues      Increase swelling and throat itching and tightness with use.  Symptoms correlate only with this medication onset in hospital.       OBJECTIVE:     Vital Signs:  Vitals:    05/13/20 2215   BP: (!) 157/73   Pulse: 70   Resp: 18   Temp: 98.5 °F (36.9 °C)   SpO2: 100%     Wt Readings from Last 1 Encounters:   04/27/20 0817 64.9 kg (143 lb 1.3 oz)   04/23/20 2300 64.9 kg (143 lb 1.3 oz)   04/22/20 2300 63.4 kg (139 lb 12.4 oz)   04/18/20 2217 62.2 kg (137 lb 2 oz)     There is no height or weight on file to calculate BMI.        Physical Exam:  General appearance: cooperative, no distress  Constitutional:Oriented to person  + appears well-developed and well-nourished.   HEENT: Normocephalic, atraumatic  Eyes: conjunctivae/corneas clear  Lungs: clear to auscultation bilaterally  Heart: regular rate and rhythm  Abdomen: soft, non-tender; bowel sounds normoactive; +PEG tube intact with no erythematous surrounding site.   Extremities: extremities symmetric; no clubbing, cyanosis, or edema  Integument: Skin color, texture, turgor normal; no rashes; hair distrubution normal  Neurologic: Alert and awake. Answer simple questions appropriately.  +impaired cognition  Psychiatric: + calm and cooperative.     Laboratory  Lab Results   Component Value Date    WBC 5.61 05/06/2020    HGB 7.8 (L) 05/06/2020    HCT 24.0 (L) 05/06/2020    MCV 85 05/06/2020     05/06/2020     BMP  Lab Results    Component Value Date     05/06/2020    K 4.4 05/06/2020     05/06/2020    CO2 23 05/06/2020    BUN 27 (H) 05/06/2020    CREATININE 2.2 (H) 05/06/2020    CALCIUM 8.5 (L) 05/06/2020    ANIONGAP 6 (L) 05/06/2020    ESTGFRAFRICA 36 (A) 05/06/2020    EGFRNONAA 31 (A) 05/06/2020     Lab Results   Component Value Date    ALT 15 04/21/2020    AST 22 04/21/2020    ALKPHOS 94 04/21/2020    BILITOT 0.6 04/21/2020     Lab Results   Component Value Date    INR 1.0 04/19/2020    INR 1.1 04/18/2020    INR 1.2 03/21/2020     Lab Results   Component Value Date    HGBA1C 9.1 (H) 03/09/2020         ASSESSMENT & PLAN:     Debility s/p Viral Pneumonia due to COVID-19  - COVID testing + on 3/21 and 4/12  - on RA  Monitor   - re testing ordered on 4/24- remains positive  - 5/13 has been asymptomatic. D/C home tomorrow with home health    S/p Pegtube placement  -S/p replacement prior to presentation with good positioning on CT  - on tube feeds for nutrition spplement as appetite has been spotty but is clear to eat regular diet when he wants also     #sub- Acute encephalopathy  # Delirium  # Acute CVA (cerebrovascular accident)  -Subacute encephalopathy, that started after patient's stroke  - cont PRN IM zyprexa for non-redirectable agitation  - palliative care involved on the case- patient has a living will , indicating that he wished all aggressive measures and full code  - may benefit from neurology outpatient eval        #CKD IV  # Acute renal failure superimposed on stage 4 chronic kidney disease  - Cr baseline around 3.5.   - Cr increasing to 4.9 on 4/22, likely due to dehydration and decreased PO intake  - receiving enteral water per PEG tube       #CAD   #chronic combined systolic and diastolic heart failure  - Recent NSTEMI, on DAPT, statin, B Blcoker. Continue  - HF not decompensated.      #Anemia  -hbg stable between mid 7s-9s the last week  -EG for peg replacemente last week did not show any upper GI  abnormalities  - monitor      #HTN  Continue home meds      # T2DM with neuropathy  - detemir 6 units in evening and novolog 2 U with meals   -ssi  - gabapentin 300 mg daily prn     # Severe protein-calorie malnutrition  - per prior documentation , patient refusing to eat, leading to placement of PEG tube for supplemental nutrition.  - peg tube feeds TID and pleasure feedings as tolerated    GERD  - on prilosec    Constipation   - continue on Glycolax and senna      Scheduled Follow-up :  Future Appointments   Date Time Provider Department [place]   5/19/2020  8:00 AM LAB, [facility] [place]   5/22/2020  9:40 AM Nancy Colón MD [facility]   7/8/2020  1:30 PM BELEM Gardiner, FNP [facility]       Post Visit Medication List:     Medication List           Accurate as of May 13, 2020 10:26 PM. If you have any questions, ask your nurse or doctor.               CHANGE how you take these medications    fluticasone propionate 50 mcg/actuation nasal spray  Commonly known as:  FLONASE  1 spray by Each Nare route 2 (two) times daily as needed for Rhinitis.  What changed:  when to take this     gabapentin 300 MG capsule  Commonly known as:  NEURONTIN  Take 1 capsule (300 mg total) by mouth daily as needed.  What changed:  when to take this        CONTINUE taking these medications    amLODIPine 10 MG tablet  Commonly known as:  NORVASC  Take 1 tablet (10 mg total) by mouth once daily.     ascorbic acid (vitamin C) 500 MG tablet  Commonly known as:  VITAMIN C  Take 1 tablet (500 mg total) by mouth 2 (two) times daily.     aspirin 81 MG EC tablet  Commonly known as:  ECOTRIN  Take 1 tablet (81 mg total) by mouth once daily.     atorvastatin 40 MG tablet  Commonly known as:  LIPITOR  Take 1 tablet (40 mg total) by mouth every evening.     bisacodyL 10 mg Supp  Commonly known as:  DULCOLAX  Place 1 suppository (10 mg total) rectally daily as needed (Until bowel movement if patient has no bowel  "movement for 2 days).     blood sugar diagnostic Strp     calcitRIOL 0.25 MCG Cap  Commonly known as:  ROCALTROL  Take 2 capsules (0.5 mcg total) by mouth once daily.     carvediloL 25 MG tablet  Commonly known as:  COREG  Take 1 tablet (25 mg total) by mouth 2 (two) times daily with meals.     clopidogreL 75 mg tablet  Commonly known as:  PLAVIX  Take 1 tablet (75 mg total) by mouth once daily.     flash glucose scanning reader Misc  Commonly known as:  FREESTYLE CLAUDIO 14 DAY READER  Use as directed.     flash glucose sensor Kit  Commonly known as:  FREESTYLE CLAUDIO 14 DAY SENSOR  Change every 14 days.     insulin aspart U-100 100 unit/mL (3 mL) Inpn pen  Commonly known as:  NovoLOG  Inject 2 Units into the skin 3 (three) times daily.     insulin detemir U-100 100 unit/mL (3 mL) Inpn pen  Commonly known as:  LEVEMIR FLEXTOUCH  Inject 6 Units into the skin every evening.     montelukast 10 mg tablet  Commonly known as:  SINGULAIR  Take 1 tablet (10 mg total) by mouth every evening.     NOVOFINE 32 32 gauge x 1/4" Ndle  Generic drug:  pen needle, diabetic  use to inject insulin once daily     omeprazole 20 MG tablet  Commonly known as:  PriLOSEC OTC  Take 1 tablet (20 mg total) by mouth once daily.     ondansetron 8 MG Tbdl  Commonly known as:  ZOFRAN-ODT  Take 1 tablet (8 mg total) by mouth every 6 (six) hours as needed.     pen needle, diabetic 31 gauge x 5/16" Ndle  Pt to use pen needle with Lantus daily     polyethylene glycol 17 gram Pwpk  Commonly known as:  GLYCOLAX  17 g by Per G Tube route once daily.     senna-docusate 8.6-50 mg 8.6-50 mg per tablet  Commonly known as:  PERICOLACE  Take 1 tablet by mouth once daily.     sodium bicarbonate 650 MG tablet  Take 1 tablet (650 mg total) by mouth once daily.     vitamin D 1000 units Tab  Commonly known as:  VITAMIN D3          QM    Coronary Artery Disease: Antiplatelet Therapy:  Patient currently on or prescribed on this visit- Aspirin and/or Clopidogrel " (Plavix)      Total time of the visit 36 min     Signing Physician:  Beth Soria NP     Due to unresolved technical issue with EMR, Medication list on this note summary may not be accurate.

## 2020-05-21 NOTE — ED NOTES
Pt was brought in via  ems after an suspected cardiac arrest. EMS applied bernard, intubated, and started an IO in the left leg. Ems reported starting fluid bolus, 6 epis, 1 bicarb. CBG was 301 in the field. Pt was reported covid + In mid April.

## 2020-05-21 NOTE — ED NOTES
Pulse check, patient PEA. No cardiac rhythm verified by Dr. Curtis via bedside ultrasound. TOD at 0610

## 2020-05-21 NOTE — ED NOTES
Patient discharged home at this time.  Discharge instructions and medications reviewed with patient and she  verbalized understanding.   Pulse check; pt in PEA. CPR continued

## 2020-05-21 NOTE — ED PROVIDER NOTES
Encounter Date: 5/21/2020    SCRIBE #1 NOTE: I, Libby Mills am scribing for, and in the presence of, Dr. Curtis.       History     Chief Complaint   Patient presents with    Cardiac Arrest     wife called EMS stating pt was having difficulty breathing and was unresponsive. When EMS arrived he was pulseless and apneic with asystole on monitor. CPR in progress on arrival.      Marie Reis Jr. is a 64 y.o. male who  has a past medical history of acute on chronic combined systolic and diastolic heart failure, bradycardia, cervical spondylosis, chronic diastolic congestive heart failure, chronic diastolic heart failure, coronary artery disease due to calcified coronary lesion, dyslipidemia, history of stroke, hypertension associated with diabetes, lumbar spondylosis, NSTEMI, SUZANNA on CPAP, persistent proteinuria, pneumonia of right lower lobe due to infectious organism (3/22/2020), type 2 diabetes mellitus with long-term current use of insulin, and type 2 diabetes mellitus with stage 4 chronic kidney disease.    The patient presents to the ED unresponsive.    Per EMS, wife states patient began to experience difficulty breathing this morning and requested to be brought to the ED. In route, he became unresponsive, so she called EMS. When EMS arrived to the scene, patient was pulseless and apneic with asystole on monitor. In route, EMS gave the patient 6 rounds of epi and 1 round of bicarbonate. Total down time of patient PTA was over 20 minutes. Upon arrival to ED, patient is in PEA arrest and CPR is in progress. Per chart review, patient tested positive for COVID on 04/18/20.    The history is provided by the EMS personnel. The history is limited by the condition of the patient.     Review of patient's allergies indicates:   Allergen Reactions    Hydralazine analogues      Increase swelling and throat itching and tightness with use.  Symptoms correlate only with this medication onset in hospital.     Past Medical  History:   Diagnosis Date    Acute on chronic combined systolic and diastolic heart failure 3/22/2020    Binocular vision disorder with diplopia 9/22/2016    Bradycardia     Cataract     Cervical spondylosis 10/1/2015    Chronic diastolic congestive heart failure     Chronic diastolic heart failure 1/11/2018    Coronary artery disease due to calcified coronary lesion 3/19/2018    Dyslipidemia 6/26/2015    Encounter for blood transfusion     Hearing impairment 10/25/2013    History of stroke 3/23/2015    Hypertension associated with diabetes 1/11/2018    Hypertensive retinopathy of both eyes 9/26/2017    Lumbar spondylosis 10/1/2015    Multiple myeloma not having achieved remission 1/16/2018    NSTEMI (non-ST elevated myocardial infarction) 3/22/2020    SUZANNA on CPAP     Persistent proteinuria 1/11/2018    Pneumonia of right lower lobe due to infectious organism 3/22/2020    Spondylolisthesis of lumbar region 10/1/2015    Strabismus     Stroke 2014    Thoracic spondylosis 10/1/2015    Type 2 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, with long-term current use of insulin 9/26/2017    Type 2 diabetes mellitus with diabetic polyneuropathy, with long-term current use of insulin 9/28/2015    Type 2 diabetes mellitus with stage 4 chronic kidney disease, with long-term current use of insulin 1/11/2018     Past Surgical History:   Procedure Laterality Date    CATARACT EXTRACTION W/  INTRAOCULAR LENS IMPLANT Left 4/30/15    [facility]    COLONOSCOPY N/A 1/20/2020    Procedure: COLONOSCOPY;  Surgeon: Salvador Espinosa MD;  Location: [facility];  Service: Endoscopy;  Laterality: N/A;    ESOPHAGOGASTRODUODENOSCOPY N/A 4/21/2020    Procedure: EGD (ESOPHAGOGASTRODUODENOSCOPY);  Surgeon: Abdoulaye Delacruz MD;  Location: [address]);  Service: Endoscopy;  Laterality: N/A;    EYE SURGERY      cataract removal left eye    HAND SURGERY  2/2012     DR. BAKER [facility]    left hand fracture sx      LT EYE   5/2/15    DR [facility]     Family History   Problem Relation Age of Onset    Diabetes Mother     Cancer Mother     Kidney disease Mother     Diabetes Father     Heart attack Father     Diabetes Sister     Diabetes Brother     No Known Problems Maternal Aunt     No Known Problems Maternal Uncle     No Known Problems Paternal Aunt     No Known Problems Paternal Uncle     No Known Problems Maternal Grandmother     No Known Problems Maternal Grandfather     No Known Problems Paternal Grandmother     No Known Problems Paternal Grandfather     Blindness Neg Hx     Anesthesia problems Neg Hx     Amblyopia Neg Hx     Cataracts Neg Hx     Glaucoma Neg Hx     Hypertension Neg Hx     Macular degeneration Neg Hx     Retinal detachment Neg Hx     Strabismus Neg Hx     Stroke Neg Hx     Thyroid disease Neg Hx     Heart disease Neg Hx     Heart failure Neg Hx     Hyperlipidemia Neg Hx      Social History     Tobacco Use    Smoking status: Never Smoker    Smokeless tobacco: Never Used   Substance Use Topics    Alcohol use: No     Frequency: Never     Drinks per session: Patient refused     Binge frequency: Never    Drug use: No     Review of Systems   Unable to perform ROS: Patient unresponsive       Physical Exam     Initial Vitals   BP Pulse Resp Temp SpO2   -- -- -- -- --      MAP       --         Physical Exam    Constitutional:   Unresponsive   HENT:   Head: Normocephalic and atraumatic.   Dominic tube in place   Eyes:   Pupils dilated nonreactive to light   Cardiovascular:   Asystole   Pulmonary/Chest:   Dominic tube in place  Bilateral rales and rhonchi   Neurological:   Patient unresponsive   Skin: Skin is dry. Capillary refill takes more than 3 seconds.   Cold extremities         ED Course   Procedures  Labs Reviewed - No data to display       Imaging Results    None          Medical Decision Making:   History:   Old Medical Records: I  decided to obtain old medical records.  Initial Assessment:   Marie Reis Jr. is a 64 y.o. male with a past medical history of acute on chronic combined systolic and diastolic heart failure, hypertension associated with diabetes, and NSTEMI who presents to the ED unresponsive and in PEA arrest. CPR in progress on arrival.   Differential Diagnosis:   Differential Diagnosis includes, but is not limited to:  Cardiac arrest, electrolyte arrangement, COVID-19  ED Management:  Upon arrival to ED patient received 1 round of epi CPR.  PA arrest noted on monitor, ultrasound noted for cardiac standstill.   TOD 6:10              Attending Attestation:         Attending Critical Care:   Critical Care Times:   Direct Patient Care (initial evaluation, reassessments, and time considering the case)................................................................10 minutes.   Additional History from reviewing old medical records or taking additional history from the family, EMS, PCP, etc.......................5 minutes.   ==============================================================  · Total Critical Care Time - exclusive of procedural time: 15 minutes.  ==============================================================              ED Course as of May 22 2248   Thu May 21, 2020   0643 Family notified and at bedside     [DC]      ED Course User Index  [DC] Harvey Curtis Jr., MD                Clinical Impression:       ICD-10-CM ICD-9-CM   1. Cardiac arrest I46.9 427.5             ED Disposition Condition                      I, Harvey Curtis,  personally performed the services described in this documentation. All medical record entries made by the scribe were at my direction and in my presence.  I have reviewed the chart and agree that the record reflects my personal performance and is accurate and complete. Harvey Curtis Jr., MD  20 2248       Harvey Curtis Jr., MD  20  1778       Harvey Curtis Jr., MD  06/06/20 [id number]

## 2020-06-12 ENCOUNTER — DOCUMENT SCAN (OUTPATIENT)
Dept: HOME HEALTH SERVICES | Facility: HOSPITAL | Age: 65
End: 2020-06-12
Payer: MEDICARE

## 2020-06-22 ENCOUNTER — EXTERNAL HOME HEALTH (OUTPATIENT)
Dept: HOME HEALTH SERVICES | Facility: HOSPITAL | Age: 65
End: 2020-06-22
Payer: MEDICARE

## 2021-10-27 NOTE — ED NOTES
"Spoke with son. Son reported that they were at home and mr. Reis reported unable to catch his breath for about 30 min. Patient refused to call EMS. Pt reported "im not going back to that place". They applied his home CPAP to help him breathe. They reported it wasn't enough and called EMS. Son reported no  home planned at the moment.   " [place]  QUESTIONNAIRE    Height (m)__1.727___    Weight (kg) __99_______Age ____55______  Male    Please choose the correct response to each question    CATEGORY 1    1. Do you snore?   a. Yes   With out cpap    If you snore:  2. Your  snoring  is:       d. Very loud - can be heard in adjacent rooms   with out cpap    3. How often do you snore          a. Nearly Every Day with out cpap    4. Has your  snoring  ever bothered other  people?          a. Yes     With out cpap    5. Has anyone  noticed  that  you quit breathing during  your  sleep?      a. Nearly Every Day with out cpap    CATEGORY 2        6. How often  do you feel tired  or fatigued  after  your  sleep?          e. Never or nearly never        7. During  your  waking  time, do you feel tired,  fatigued  or not up to par?          e. Never or nearly never        8. Have you ever  nodded  off or fallen asleep while driving  a vehicle?          b. No    If yes:      9. How often  does this occur?         f.  N/A      CATEGORY 3    10. Do you have high  blood pressure?        a. Yes         BMI   33.19

## 2023-07-09 NOTE — TELEPHONE ENCOUNTER
----- Message from Alissa Dotson MD sent at 2/7/2019  5:49 PM CST -----  Mr. Reis continues to be non compliant with his medicines, diet. He had just taken his morning antihypertensive medicines at noon so at 1 p.m. Appointment his BP control looked poor. I advised him to check it back in 3-4 hours and to call in if SBP still very high. I was wondering if he can be enrolled into [employer] BP monitoring program. I am unable to refer as our clinic is apparently not connected with the program. Thanks. Overall he has very high risk of progression to ESRD, most recent A1C is > 11.    Resident

## 2024-07-14 NOTE — PLAN OF CARE
Goals remain appropriate, continue with OT POC.    Problem: Occupational Therapy Goal  Goal: Occupational Therapy Goal  Description  Goals to be met by: 5/3     Patient will increase functional independence with ADLs by performing:    Bed mobility and supine to sit with minimal assistance.  UE Dressing while seated EOB with Minimal Assistance.  Grooming while standing with Minimal Assistance.  Toileting from bedside commode with Minimal Assistance for hygiene and clothing management.   Toilet transfer to bedside commode with Minimal Assistance.  Functional mobility at short household distance for ADL task with RW and mod(A).     Outcome: Ongoing, Progressing     [facility]  4/23/2020  Rehab Services     .

## 2024-12-10 NOTE — ASSESSMENT & PLAN NOTE
Clinically dry, will fluid resuscitate and monitor for signs of volume overload    
Continued B Blocker, CCB.   Acei held on admission, restart on discharge    
Continuing B Blocker, CCB. Hold Acei in setting of JAZLYN    
Elevated PTH with nl Calcium. Will order Vit D for AM  
Elevated PTH with nl Calcium. Will order Vit D for AM  Appears 2/2 renal disease with low vitamin D  Discharge with Vit D supplementation  
Followed by Dr. Guaman on steroids and  Revlimid. Holding for now.     
Followed by Dr. Guaman on steroids and  Revlimid. Resume home regimen.     
Hypertensive on presentation. Home meds given without lisinopril. Resuming all home medications on discharge.   
Hypertensive on presentation. Will give home medicines but holding Lisinopril in setting of JAZLYN.     
Likely etiology of hyperglycemia is medication nonconformity in setting of steroid use.   Serum osm 305, no anion gap or evidence of acidosis  Patient with polyuria likely 2/2 hyperglycemia   Will replace suspected hypovolemia and correct blood glucose. Reducing home dose by 5 units to account for kidney injury  Q6 accucheck with MDSSI and holding NPO to limit variables while correcting.   23 units detemir    
No evidence of volume overload despite 3 L crystalloid given    
Presented with  and it appears to have been elevated for at least one week. Had not been taking insulin and responded well to long acting given in hospital.   Advised on need for insulin and not skipping when he doesn't feel well. Explained rationale behind SSI and long acting and he agrees to take at home.   Has all supplies needed.       
Suspect pre-renal in setting of osmotic diuresis.   Baseline cr mid 2s increased to 4.1 about 1 week ago. Improved somewhat today to 3.5.   Will give IVF and monitor for improvement.     
Suspect pre-renal in setting of osmotic diuresis. Improved with fluid resuscitation back to baseline creatinine.   Resuming all home medications.     
,
